# Patient Record
Sex: FEMALE | Race: WHITE | Employment: OTHER | URBAN - METROPOLITAN AREA
[De-identification: names, ages, dates, MRNs, and addresses within clinical notes are randomized per-mention and may not be internally consistent; named-entity substitution may affect disease eponyms.]

---

## 2017-01-29 ENCOUNTER — PATIENT MESSAGE (OUTPATIENT)
Dept: FAMILY MEDICINE CLINIC | Age: 67
End: 2017-01-29

## 2017-01-30 RX ORDER — CLOBETASOL PROPIONATE 0.5 MG/G
AEROSOL, FOAM TOPICAL
Qty: 1 CAN | Refills: 5 | Status: SHIPPED | OUTPATIENT
Start: 2017-01-30 | End: 2019-03-19 | Stop reason: ALTCHOICE

## 2017-05-31 ENCOUNTER — PATIENT MESSAGE (OUTPATIENT)
Dept: FAMILY MEDICINE CLINIC | Age: 67
End: 2017-05-31

## 2017-05-31 DIAGNOSIS — M15.9 PRIMARY OSTEOARTHRITIS INVOLVING MULTIPLE JOINTS: Primary | ICD-10-CM

## 2017-06-15 RX ORDER — TIZANIDINE 4 MG/1
TABLET ORAL
Qty: 180 TABLET | Refills: 0 | Status: SHIPPED | OUTPATIENT
Start: 2017-06-15 | End: 2017-09-13 | Stop reason: SDUPTHER

## 2017-09-14 RX ORDER — TIZANIDINE 4 MG/1
TABLET ORAL
Qty: 180 TABLET | Refills: 0 | Status: SHIPPED | OUTPATIENT
Start: 2017-09-14 | End: 2017-12-06 | Stop reason: SDUPTHER

## 2017-09-20 ENCOUNTER — TELEPHONE (OUTPATIENT)
Dept: FAMILY MEDICINE CLINIC | Age: 67
End: 2017-09-20

## 2017-09-20 RX ORDER — OMEPRAZOLE 20 MG/1
20 CAPSULE, DELAYED RELEASE ORAL DAILY
Qty: 30 CAPSULE | Refills: 11 | Status: SHIPPED | OUTPATIENT
Start: 2017-09-20 | End: 2018-09-21 | Stop reason: SDUPTHER

## 2017-09-26 DIAGNOSIS — I10 ESSENTIAL HYPERTENSION: ICD-10-CM

## 2017-09-26 RX ORDER — AMLODIPINE BESYLATE 10 MG/1
5 TABLET ORAL DAILY
Qty: 45 TABLET | Refills: 3 | Status: SHIPPED | OUTPATIENT
Start: 2017-09-26 | End: 2018-05-11 | Stop reason: SDUPTHER

## 2017-11-06 ENCOUNTER — OFFICE VISIT (OUTPATIENT)
Dept: PULMONOLOGY | Age: 67
End: 2017-11-06
Payer: MEDICARE

## 2017-11-06 VITALS
WEIGHT: 216 LBS | SYSTOLIC BLOOD PRESSURE: 122 MMHG | BODY MASS INDEX: 36.88 KG/M2 | HEART RATE: 62 BPM | DIASTOLIC BLOOD PRESSURE: 78 MMHG | OXYGEN SATURATION: 96 % | HEIGHT: 64 IN

## 2017-11-06 DIAGNOSIS — I10 ESSENTIAL HYPERTENSION: ICD-10-CM

## 2017-11-06 DIAGNOSIS — E66.9 OBESITY (BMI 30-39.9): ICD-10-CM

## 2017-11-06 DIAGNOSIS — G47.33 OBSTRUCTIVE SLEEP APNEA ON CPAP: Primary | ICD-10-CM

## 2017-11-06 DIAGNOSIS — Z78.0 POST-MENOPAUSAL: ICD-10-CM

## 2017-11-06 DIAGNOSIS — Z99.89 OBSTRUCTIVE SLEEP APNEA ON CPAP: Primary | ICD-10-CM

## 2017-11-06 PROCEDURE — 99214 OFFICE O/P EST MOD 30 MIN: CPT | Performed by: NURSE PRACTITIONER

## 2017-11-06 RX ORDER — TRIHEXYPHENIDYL HYDROCHLORIDE 2 MG/1
1 TABLET ORAL 2 TIMES DAILY
COMMUNITY
End: 2019-03-19 | Stop reason: ALTCHOICE

## 2017-11-06 NOTE — PATIENT INSTRUCTIONS
Health Maintenance Due   Topic Date Due    Hepatitis C screen  1950    DTaP/Tdap/Td vaccine (1 - Tdap) 07/02/1969    Colon cancer screen colonoscopy  07/02/2000    Zostavax vaccine  07/02/2010    DEXA (modify frequency per FRAX score)  07/02/2015    Pneumococcal low/med risk (1 of 2 - PCV13) 07/02/2015    Breast cancer screen  11/13/2016    Flu vaccine (1) 09/01/2017

## 2017-11-17 RX ORDER — METOPROLOL TARTRATE 50 MG/1
50 TABLET, FILM COATED ORAL 2 TIMES DAILY
Qty: 180 TABLET | Refills: 3 | Status: SHIPPED | OUTPATIENT
Start: 2017-11-17 | End: 2017-12-21 | Stop reason: DRUGHIGH

## 2017-11-29 ENCOUNTER — HOSPITAL ENCOUNTER (OUTPATIENT)
Dept: MAMMOGRAPHY | Age: 67
Discharge: HOME | End: 2017-11-29
Payer: MEDICARE

## 2017-11-29 DIAGNOSIS — Z12.31 VISIT FOR SCREENING MAMMOGRAM: ICD-10-CM

## 2017-11-29 PROCEDURE — G0202 SCR MAMMO BI INCL CAD: HCPCS

## 2017-12-05 ENCOUNTER — HOSPITAL ENCOUNTER (OUTPATIENT)
Dept: WOMENS IMAGING | Age: 67
Discharge: HOME | End: 2017-12-05
Payer: MEDICARE

## 2017-12-05 DIAGNOSIS — R92.2 BREAST DENSITY: ICD-10-CM

## 2017-12-05 PROCEDURE — G0279 TOMOSYNTHESIS, MAMMO: HCPCS

## 2017-12-05 PROCEDURE — 76642 ULTRASOUND BREAST LIMITED: CPT

## 2017-12-05 PROCEDURE — G0206 DX MAMMO INCL CAD UNI: HCPCS

## 2017-12-07 RX ORDER — TIZANIDINE 4 MG/1
TABLET ORAL
Qty: 180 TABLET | Refills: 0 | Status: SHIPPED | OUTPATIENT
Start: 2017-12-07 | End: 2018-03-14 | Stop reason: SDUPTHER

## 2017-12-13 ENCOUNTER — HOSPITAL ENCOUNTER (OUTPATIENT)
Dept: WOMENS IMAGING | Age: 67
Discharge: HOME | End: 2017-12-13
Payer: MEDICARE

## 2017-12-13 VITALS — DIASTOLIC BLOOD PRESSURE: 63 MMHG | TEMPERATURE: 98 F | SYSTOLIC BLOOD PRESSURE: 107 MMHG | HEART RATE: 56 BPM

## 2017-12-13 DIAGNOSIS — N63.20 MASS OF LEFT BREAST: ICD-10-CM

## 2017-12-13 PROCEDURE — G0206 DX MAMMO INCL CAD UNI: HCPCS

## 2017-12-13 PROCEDURE — C1894 INTRO/SHEATH, NON-LASER: HCPCS

## 2017-12-13 PROCEDURE — 19083 BX BREAST 1ST LESION US IMAG: CPT

## 2017-12-13 PROCEDURE — 88305 TISSUE EXAM BY PATHOLOGIST: CPT

## 2017-12-13 PROCEDURE — A4648 IMPLANTABLE TISSUE MARKER: HCPCS

## 2017-12-13 NOTE — PROGRESS NOTES
Breast Biopsy Flowsheet/Post-Operative Care    Date of Procedure: 12/13/2017  Physician: Dr. Mendoza  Technologist: Dena HERNANDEZ (JAMAR)(HERIBERTO Lundy [place] guided breast biopsy  Lesion type: [] Palpable     [x] Non-palpable  Breast: left    Clock face position: Site #1:  Lower inner, retroareolar region         Primary Method of Detection: [] Palpation    [x] Mammogram    [] Ultrasound   [x] Mass:      [] Microcalcifications:   [] Pleomorphic   [] Increasing Number   Distribution:  [] Grouped [] Linear [] Regional    Asymmetry: asymmetric    Biopsy Method:   sertera:    Site #1     Gauge:  14     # of Passes: 4     Clip:   [] \"Ribbon\"   [] \"O\"     [] \"M\"      [] \"X\"      [x] \"tribell\"       [] \"Bowtie\"  [] \"U\"          Pre-Op Assessment: (BI-RADS)   [] 3. Probably Benign   [x] 4. Suspicious Abnormality   [] 5. Highly Suggestive of Malignancy      Patient Tolerated Procedure: good  Complications: none  Comments: none    Post Operative Care  Steri strips: Yes  Dressing: [] Pressure Dressing   [x] Gauze. Tape   Ice Applied to Site:  Yes  Evidence of Bleeding:  No    Pain Verbalized: No      Written Discharge Instructions: Yes  Condition at Discharge: good  Time of Discharge: 9609    Electronically signed by Oumou Simmons RN on 12/13/2017 at 5:29 PM

## 2017-12-13 NOTE — PROGRESS NOTES
[facility] 69  Pre-Biopsy Assessment      Patient Education    Written information about procedure Yes   [] Left        [] Right       [] Bilateral   Procedural steps explained Yes   [] Stereotactic Biopsy      [] Ultrasound Biopsy   Post-op potential: bruising, hematoma, pain Yes    Self-care: activity, care of dressing Yes    Patient verbalized understanding Yes    Consent signed and witnessed Yes      Hormone Therapy Status: on Premarin    Recent Medication: [] Aspirin [] Ibuprofen  [] Coumadin [x] N/A       Emotional Status: [x] Calm   [] Nervous   [] Emotionally Upset    Language or Physical Barriers: none    Comments: none        Electronically signed by Marcella Salinas RN on 12/13/2017 at 5:28 PM

## 2017-12-20 RX ORDER — LEVOTHYROXINE SODIUM 125 MCG
125 TABLET ORAL DAILY
Qty: 30 TABLET | Refills: 11 | Status: SHIPPED | OUTPATIENT
Start: 2017-12-20 | End: 2018-12-19 | Stop reason: SDUPTHER

## 2017-12-21 ENCOUNTER — OFFICE VISIT (OUTPATIENT)
Dept: FAMILY MEDICINE CLINIC | Age: 67
End: 2017-12-21
Payer: MEDICARE

## 2017-12-21 VITALS
RESPIRATION RATE: 20 BRPM | HEART RATE: 56 BPM | WEIGHT: 217.8 LBS | SYSTOLIC BLOOD PRESSURE: 102 MMHG | BODY MASS INDEX: 37.18 KG/M2 | DIASTOLIC BLOOD PRESSURE: 52 MMHG | HEIGHT: 64 IN

## 2017-12-21 DIAGNOSIS — F31.9 BIPOLAR AFFECTIVE DISORDER, REMISSION STATUS UNSPECIFIED (HCC): ICD-10-CM

## 2017-12-21 DIAGNOSIS — Z99.89 OBSTRUCTIVE SLEEP APNEA ON CPAP: ICD-10-CM

## 2017-12-21 DIAGNOSIS — I10 ESSENTIAL HYPERTENSION: ICD-10-CM

## 2017-12-21 DIAGNOSIS — G47.33 OBSTRUCTIVE SLEEP APNEA ON CPAP: ICD-10-CM

## 2017-12-21 DIAGNOSIS — E78.5 DYSLIPIDEMIA: ICD-10-CM

## 2017-12-21 DIAGNOSIS — Z78.0 POST-MENOPAUSAL: ICD-10-CM

## 2017-12-21 DIAGNOSIS — R42 ORTHOSTATIC LIGHTHEADEDNESS: Primary | ICD-10-CM

## 2017-12-21 DIAGNOSIS — R73.01 IFG (IMPAIRED FASTING GLUCOSE): ICD-10-CM

## 2017-12-21 DIAGNOSIS — R68.2 DRY MOUTH: ICD-10-CM

## 2017-12-21 DIAGNOSIS — E03.9 HYPOTHYROIDISM, UNSPECIFIED TYPE: ICD-10-CM

## 2017-12-21 PROCEDURE — 3014F SCREEN MAMMO DOC REV: CPT | Performed by: FAMILY MEDICINE

## 2017-12-21 PROCEDURE — G8427 DOCREV CUR MEDS BY ELIG CLIN: HCPCS | Performed by: FAMILY MEDICINE

## 2017-12-21 PROCEDURE — 99214 OFFICE O/P EST MOD 30 MIN: CPT | Performed by: FAMILY MEDICINE

## 2017-12-21 PROCEDURE — 4040F PNEUMOC VAC/ADMIN/RCVD: CPT | Performed by: FAMILY MEDICINE

## 2017-12-21 PROCEDURE — G8484 FLU IMMUNIZE NO ADMIN: HCPCS | Performed by: FAMILY MEDICINE

## 2017-12-21 PROCEDURE — 1090F PRES/ABSN URINE INCON ASSESS: CPT | Performed by: FAMILY MEDICINE

## 2017-12-21 PROCEDURE — 1036F TOBACCO NON-USER: CPT | Performed by: FAMILY MEDICINE

## 2017-12-21 PROCEDURE — G8417 CALC BMI ABV UP PARAM F/U: HCPCS | Performed by: FAMILY MEDICINE

## 2017-12-21 PROCEDURE — 3017F COLORECTAL CA SCREEN DOC REV: CPT | Performed by: FAMILY MEDICINE

## 2017-12-21 PROCEDURE — G8400 PT W/DXA NO RESULTS DOC: HCPCS | Performed by: FAMILY MEDICINE

## 2017-12-21 PROCEDURE — 1123F ACP DISCUSS/DSCN MKR DOCD: CPT | Performed by: FAMILY MEDICINE

## 2017-12-21 RX ORDER — METOPROLOL TARTRATE 50 MG/1
25 TABLET, FILM COATED ORAL 2 TIMES DAILY
Qty: 180 TABLET | Refills: 3 | Status: SHIPPED
Start: 2017-12-21 | End: 2018-09-05

## 2017-12-21 ASSESSMENT — ENCOUNTER SYMPTOMS
RESPIRATORY NEGATIVE: 1
GASTROINTESTINAL NEGATIVE: 1
CHEST TIGHTNESS: 0

## 2017-12-21 ASSESSMENT — PATIENT HEALTH QUESTIONNAIRE - PHQ9
SUM OF ALL RESPONSES TO PHQ QUESTIONS 1-9: 0
1. LITTLE INTEREST OR PLEASURE IN DOING THINGS: 0
2. FEELING DOWN, DEPRESSED OR HOPELESS: 0
SUM OF ALL RESPONSES TO PHQ9 QUESTIONS 1 & 2: 0

## 2017-12-21 NOTE — PROGRESS NOTES
Visit Information    Have you changed or started any medications since your last visit including any over-the-counter medicines, vitamins, or herbal medicines? no   Are you having any side effects from any of your medications? -  no  Have you stopped taking any of your medications? Is so, why? -  yes - tx complete    Have you seen any other physician or provider since your last visit? Yes - Records Obtained  Have you had any other diagnostic tests since your last visit? Yes - Records Obtained  Have you been seen in the emergency room and/or had an admission to a hospital since we last saw you? No  Have you had your routine dental cleaning in the past 6 months? yes - 11/2017    Have you activated your [employer] account? If not, what are your barriers?  Yes     Patient Care Team:  Jazmyn Solis DO as PCP - General (Family Medicine)  Jazmyn Solis DO as PCP - S Attributed Provider    Medical History Review  Past Medical, Family, and Social History reviewed and does not contribute to the patient presenting condition    Health Maintenance   Topic Date Due    Hepatitis C screen  1950    DTaP/Tdap/Td vaccine (1 - Tdap) 07/02/1969    Colon cancer screen colonoscopy  07/02/2000    Zostavax vaccine  07/02/2010    DEXA (modify frequency per FRAX score)  07/02/2015    Pneumococcal low/med risk (1 of 2 - PCV13) 07/02/2015    Flu vaccine (1) 09/01/2017    Breast cancer screen  12/13/2018    Diabetes screen  12/15/2018    Lipid screen  12/15/2020

## 2017-12-21 NOTE — PROGRESS NOTES
Subjective:      Patient ID: Godfrey Ring is a 79 y.o. female. HPI:    Chief Complaint   Patient presents with    Dizziness     off and on for the past 3mo. When dizzines occurs pt has a hard time concentrating or thinking straight     Pt here for periodic dizziness over the last few months. In the morning is the worse time. This happens shortly after taking her meds. BPs have been running low after her am meds. BP Readings from Last 3 Encounters:   17 (!) 102/52   17 107/63   17 122/78     Pt has also had a lot of life stressors lately. With additional questioning, it appears she is more orthostatic. Denies CP, palpitations. No recent med changes. Patient Active Problem List   Diagnosis    Hypothyroid    Eczema    Dyslipidemia    Bipolar disorder ([address]    Post-menopausal    HTN (hypertension)    Contact dermatitis    Obesity (BMI 30-39. 9)    Obstructive sleep apnea on CPAP    ACE inhibitor-aggravated angioedema     Past Surgical History:   Procedure Laterality Date    ANKLE SURGERY      left    CATARACT REMOVAL      Both eyes      SECTION      x 3    FOOT SURGERY      Left     HIP SURGERY      HYSTERECTOMY      Total     TOTAL KNEE ARTHROPLASTY Left 10/14/14     Prior to Admission medications    Medication Sig Start Date End Date Taking? Authorizing Provider   SYNTHROID 125 MCG tablet Take 1 tablet by mouth daily Take with water on an empty stomach- wait 30 minutes before eating or taking other meds.  17  Yes Marian Kind, DO   tiZANidine (ZANAFLEX) 4 MG tablet TAKE 1 TABLET TWICE A DAY 17  Yes Marian Kind, DO   metoprolol tartrate (LOPRESSOR) 50 MG tablet Take 1 tablet by mouth 2 times daily 17  Yes Marian Kind, DO   trihexyphenidyl (ARTANE) 2 MG tablet Take 2 mg by mouth 3 times daily   Yes Historical Provider, MD   amLODIPine (NORVASC) 10 MG tablet Take 0.5 tablets by mouth daily 17  Yes Maximus Jimenez

## 2018-01-19 LAB
A/G RATIO: 2 (ref 1.5–2.5)
ABSOLUTE BASO #: 0 /CMM (ref 0–200)
ABSOLUTE EOS #: 200 /CMM (ref 0–500)
ABSOLUTE LYMPH #: 1900 /CMM (ref 1000–4800)
ABSOLUTE MONO #: 600 /CMM (ref 0–800)
ABSOLUTE NEUT #: 3900 /CMM (ref 1800–7700)
ALBUMIN SERPL-MCNC: 4.5 GM/DL (ref 3.5–5)
ALP BLD-CCNC: 52 IU/L (ref 39–118)
ALT SERPL-CCNC: 20 IU/L (ref 10–40)
ANION GAP SERPL CALCULATED.3IONS-SCNC: 11 MMOL/L (ref 4–12)
AST SERPL-CCNC: 22 IU/L (ref 15–41)
BASOPHILS RELATIVE PERCENT: 0.7 % (ref 0–2)
BILIRUB SERPL-MCNC: 0.5 MG/DL (ref 0.2–1)
BUN BLDV-MCNC: 23 MG/DL (ref 7–20)
CALCIUM SERPL-MCNC: 9.4 MG/DL (ref 8.8–10.5)
CHLORIDE BLD-SCNC: 104 MEQ/L (ref 101–111)
CHOLESTEROL/HDL RELATIVE RISK: 2.9 (ref 4–4.4)
CHOLESTEROL: 211 MG/DL
CO2: 24 MEQ/L (ref 21–32)
CREAT SERPL-MCNC: 0.79 MG/DL (ref 0.6–1.3)
CREATININE CLEARANCE: >60
DIRECT-LDL / HDL RISK: 2
EOSINOPHILS RELATIVE PERCENT: 3 % (ref 0–6)
ESTIMATED AVERAGE GLUCOSE: 114 MG/DL
GLUCOSE: 73 MG/DL (ref 70–110)
HBA1C MFR BLD: 5.6 % (ref 4.4–6.4)
HCT VFR BLD CALC: 40.4 % (ref 35–44)
HDLC SERPL-MCNC: 71 MG/DL
HEMOGLOBIN: 13.4 GM/DL (ref 12–15)
LDL CHOLESTEROL DIRECT: 142 MG/DL
LYMPHOCYTES RELATIVE PERCENT: 27.9 % (ref 15–45)
MCH RBC QN AUTO: 29.5 PG (ref 27.5–33)
MCHC RBC AUTO-ENTMCNC: 33.3 GM/DL (ref 33–36)
MCV RBC AUTO: 88.6 CU MIC (ref 80–97)
MONOCYTES RELATIVE PERCENT: 9.7 % (ref 2–10)
NEUTROPHILS RELATIVE PERCENT: 58.7 % (ref 40–70)
NUCLEATED RBCS: 0 /100 WBC
PDW BLD-RTO: 13.3 % (ref 12–16)
PLATELET # BLD: 319 TH/CMM (ref 150–400)
POTASSIUM SERPL-SCNC: 3.8 MEQ/L (ref 3.6–5)
RBC # BLD: 4.56 MIL/CMM (ref 4–5.1)
SODIUM BLD-SCNC: 139 MEQ/L (ref 135–145)
TOTAL PROTEIN: 6.8 G/DL (ref 6.2–8)
TRIGL SERPL-MCNC: 188 MG/DL
TSH REFLEX: 1.43 MCIU/ML (ref 0.49–4.67)
VLDLC SERPL CALC-MCNC: 37 MG/DL
WBC # BLD: 6.6 TH/CMM (ref 4.4–10.5)

## 2018-03-15 RX ORDER — TIZANIDINE 4 MG/1
TABLET ORAL
Qty: 180 TABLET | Refills: 0 | Status: SHIPPED | OUTPATIENT
Start: 2018-03-15 | End: 2018-06-22 | Stop reason: SDUPTHER

## 2018-04-18 RX ORDER — LAMOTRIGINE 100 MG/1
250 TABLET ORAL DAILY
Qty: 30 TABLET | OUTPATIENT
Start: 2018-04-18

## 2018-04-19 RX ORDER — LABETALOL 100 MG/1
100 TABLET, FILM COATED ORAL 2 TIMES DAILY
Qty: 180 TABLET | Refills: 3 | Status: SHIPPED | OUTPATIENT
Start: 2018-04-19 | End: 2019-05-22 | Stop reason: SDUPTHER

## 2018-05-11 ENCOUNTER — TELEPHONE (OUTPATIENT)
Dept: FAMILY MEDICINE CLINIC | Age: 68
End: 2018-05-11

## 2018-05-11 DIAGNOSIS — I10 ESSENTIAL HYPERTENSION: ICD-10-CM

## 2018-05-11 RX ORDER — AMLODIPINE BESYLATE 10 MG/1
10 TABLET ORAL DAILY
Qty: 90 TABLET | Refills: 3 | Status: SHIPPED | OUTPATIENT
Start: 2018-05-11 | End: 2019-03-27 | Stop reason: SDUPTHER

## 2018-06-22 RX ORDER — TIZANIDINE 4 MG/1
TABLET ORAL
Qty: 180 TABLET | Refills: 0 | Status: SHIPPED | OUTPATIENT
Start: 2018-06-22 | End: 2018-09-05

## 2018-06-22 RX ORDER — TIZANIDINE 4 MG/1
TABLET ORAL
Qty: 180 TABLET | Refills: 0 | OUTPATIENT
Start: 2018-06-22

## 2018-09-05 ENCOUNTER — OFFICE VISIT (OUTPATIENT)
Dept: FAMILY MEDICINE CLINIC | Age: 68
End: 2018-09-05
Payer: MEDICARE

## 2018-09-05 VITALS
SYSTOLIC BLOOD PRESSURE: 105 MMHG | HEIGHT: 63 IN | DIASTOLIC BLOOD PRESSURE: 55 MMHG | BODY MASS INDEX: 36 KG/M2 | WEIGHT: 203.2 LBS

## 2018-09-05 DIAGNOSIS — E78.5 DYSLIPIDEMIA: ICD-10-CM

## 2018-09-05 DIAGNOSIS — Z78.0 POST-MENOPAUSAL: ICD-10-CM

## 2018-09-05 DIAGNOSIS — Z00.00 ROUTINE GENERAL MEDICAL EXAMINATION AT A HEALTH CARE FACILITY: ICD-10-CM

## 2018-09-05 DIAGNOSIS — I10 ESSENTIAL HYPERTENSION: Primary | ICD-10-CM

## 2018-09-05 DIAGNOSIS — E03.9 HYPOTHYROIDISM, UNSPECIFIED TYPE: ICD-10-CM

## 2018-09-05 DIAGNOSIS — G47.33 OBSTRUCTIVE SLEEP APNEA ON CPAP: ICD-10-CM

## 2018-09-05 DIAGNOSIS — Z99.89 OBSTRUCTIVE SLEEP APNEA ON CPAP: ICD-10-CM

## 2018-09-05 DIAGNOSIS — E66.9 OBESITY (BMI 30-39.9): ICD-10-CM

## 2018-09-05 DIAGNOSIS — F31.9 BIPOLAR AFFECTIVE DISORDER, REMISSION STATUS UNSPECIFIED (HCC): ICD-10-CM

## 2018-09-05 PROCEDURE — 1123F ACP DISCUSS/DSCN MKR DOCD: CPT | Performed by: FAMILY MEDICINE

## 2018-09-05 PROCEDURE — G0438 PPPS, INITIAL VISIT: HCPCS | Performed by: FAMILY MEDICINE

## 2018-09-05 PROCEDURE — G8400 PT W/DXA NO RESULTS DOC: HCPCS | Performed by: FAMILY MEDICINE

## 2018-09-05 PROCEDURE — 3017F COLORECTAL CA SCREEN DOC REV: CPT | Performed by: FAMILY MEDICINE

## 2018-09-05 PROCEDURE — 1101F PT FALLS ASSESS-DOCD LE1/YR: CPT | Performed by: FAMILY MEDICINE

## 2018-09-05 PROCEDURE — 99214 OFFICE O/P EST MOD 30 MIN: CPT | Performed by: FAMILY MEDICINE

## 2018-09-05 PROCEDURE — 1036F TOBACCO NON-USER: CPT | Performed by: FAMILY MEDICINE

## 2018-09-05 PROCEDURE — 1090F PRES/ABSN URINE INCON ASSESS: CPT | Performed by: FAMILY MEDICINE

## 2018-09-05 PROCEDURE — G8427 DOCREV CUR MEDS BY ELIG CLIN: HCPCS | Performed by: FAMILY MEDICINE

## 2018-09-05 PROCEDURE — 4040F PNEUMOC VAC/ADMIN/RCVD: CPT | Performed by: FAMILY MEDICINE

## 2018-09-05 PROCEDURE — G8417 CALC BMI ABV UP PARAM F/U: HCPCS | Performed by: FAMILY MEDICINE

## 2018-09-05 RX ORDER — CLONAZEPAM 0.5 MG/1
1 TABLET ORAL 2 TIMES DAILY
Status: ON HOLD | COMMUNITY
End: 2019-09-13

## 2018-09-05 RX ORDER — VENLAFAXINE 75 MG/1
75 TABLET ORAL DAILY
COMMUNITY
End: 2020-09-22

## 2018-09-05 ASSESSMENT — LIFESTYLE VARIABLES
HOW OFTEN DURING THE LAST YEAR HAVE YOU BEEN UNABLE TO REMEMBER WHAT HAPPENED THE NIGHT BEFORE BECAUSE YOU HAD BEEN DRINKING: 0
HOW OFTEN DURING THE LAST YEAR HAVE YOU FAILED TO DO WHAT WAS NORMALLY EXPECTED FROM YOU BECAUSE OF DRINKING: 0
HAS A RELATIVE, FRIEND, DOCTOR, OR ANOTHER HEALTH PROFESSIONAL EXPRESSED CONCERN ABOUT YOUR DRINKING OR SUGGESTED YOU CUT DOWN: 0
AUDIT-C TOTAL SCORE: 1
AUDIT TOTAL SCORE: 1
HOW OFTEN DURING THE LAST YEAR HAVE YOU FOUND THAT YOU WERE NOT ABLE TO STOP DRINKING ONCE YOU HAD STARTED: 0
HOW OFTEN DURING THE LAST YEAR HAVE YOU HAD A FEELING OF GUILT OR REMORSE AFTER DRINKING: 0
HOW OFTEN DO YOU HAVE A DRINK CONTAINING ALCOHOL: 1
HOW MANY STANDARD DRINKS CONTAINING ALCOHOL DO YOU HAVE ON A TYPICAL DAY: 0
HOW OFTEN DO YOU HAVE SIX OR MORE DRINKS ON ONE OCCASION: 0
HOW OFTEN DURING THE LAST YEAR HAVE YOU NEEDED AN ALCOHOLIC DRINK FIRST THING IN THE MORNING TO GET YOURSELF GOING AFTER A NIGHT OF HEAVY DRINKING: 0
HAVE YOU OR SOMEONE ELSE BEEN INJURED AS A RESULT OF YOUR DRINKING: 0

## 2018-09-05 ASSESSMENT — ENCOUNTER SYMPTOMS
GASTROINTESTINAL NEGATIVE: 1
RESPIRATORY NEGATIVE: 1

## 2018-09-05 ASSESSMENT — PATIENT HEALTH QUESTIONNAIRE - PHQ9
SUM OF ALL RESPONSES TO PHQ QUESTIONS 1-9: 0
SUM OF ALL RESPONSES TO PHQ QUESTIONS 1-9: 0

## 2018-09-05 NOTE — PATIENT INSTRUCTIONS
You may receive a survey about your visit with us today. The feedback from our patients helps us identify what is working well and where the service to all patients can be enhanced. Thank you! Personalized Preventive Plan for Elsy Rizo - 9/5/2018  Medicare offers a range of preventive health benefits. Some of the tests and screenings are paid in full while other may be subject to a deductible, co-insurance, and/or copay. Some of these benefits include a comprehensive review of your medical history including lifestyle, illnesses that may run in your family, and various assessments and screenings as appropriate. After reviewing your medical record and screening and assessments performed today your provider may have ordered immunizations, labs, imaging, and/or referrals for you. A list of these orders (if applicable) as well as your Preventive Care list are included within your After Visit Summary for your review. Other Preventive Recommendations:    · A preventive eye exam performed by an eye specialist is recommended every 1-2 years to screen for glaucoma; cataracts, macular degeneration, and other eye disorders. · A preventive dental visit is recommended every 6 months. · Try to get at least 150 minutes of exercise per week or 10,000 steps per day on a pedometer . · Order or download the FREE \"Exercise & Physical Activity: Your Everyday Guide\" from [employer]. Call 4-630.638.1050 or search [employer] online. · You need 4219-8120 mg of calcium and 2701-5608 IU of vitamin D per day. It is possible to meet your calcium requirement with diet alone, but a vitamin D supplement is usually necessary to meet this goal.  · When exposed to the sun, use a sunscreen that protects against both UVA and UVB radiation with an SPF of 30 or greater. Reapply every 2 to 3 hours or after sweating, drying off with a towel, or swimming.   · Always wear a seat belt when

## 2018-09-05 NOTE — PROGRESS NOTES
Subjective:      Patient ID: Maureen Cadet is a 76 y.o. female. HPI:  Annual eval.    Doing well overall. BP well controlled. Was recently in the hospital for SI and racing thoughts. While there, her BP was in the 200s. Had a hard time getting it down. BP Readings from Last 3 Encounters:   17 (!) 102/52   17 107/63   17 122/78     Looks good on current meds. Pt denies CP, chest tightness. Does admit to occasional lightheadedness with position change. Mood is much better controlled at the moment. Has appt with Dr. Tyler Schneider every few months. GERD controlled. OAB controlled. Pt recently changed her Gyn, she is writing the Myrbetriq and Premarin. Patient Active Problem List   Diagnosis    Hypothyroid    Eczema    Dyslipidemia    Bipolar disorder ([address]    Post-menopausal    HTN (hypertension)    Contact dermatitis    Obesity (BMI 30-39. 9)    Obstructive sleep apnea on CPAP    ACE inhibitor-aggravated angioedema     Past Surgical History:   Procedure Laterality Date    ANKLE SURGERY      left    CATARACT REMOVAL      Both eyes      SECTION      x 3    FOOT SURGERY      Left     HIP SURGERY      HYSTERECTOMY      Total     TOTAL KNEE ARTHROPLASTY Left 10/14/14     Prior to Admission medications    Medication Sig Start Date End Date Taking? Authorizing Provider   venlafaxine (EFFEXOR) 75 MG tablet Take 75 mg by mouth daily   Yes Historical Provider, MD   buPROPion (WELLBUTRIN) 100 MG tablet Take 100 mg by mouth daily   Yes Historical Provider, MD   clonazePAM (KLONOPIN) 0.5 MG tablet Take 0.5 mg by mouth daily as needed. Radha Lara Historical Provider, MD   tiZANidine (ZANAFLEX) 4 MG tablet TAKE 1 TABLET TWICE A DAY 18  Yes Elysa Hashimoto, DO   amLODIPine (NORVASC) 10 MG tablet Take 1 tablet by mouth daily 18  Yes Elysa Hashimoto, DO   labetalol (TRANDATE) 100 MG tablet Take 1 tablet by mouth 2 times daily 18  Yes Vicki Duke Ebb Parents, DO   metoprolol tartrate (LOPRESSOR) 50 MG tablet Take 0.5 tablets by mouth 2 times daily 12/21/17  Yes Viviane Uribe DO   SYNTHROID 125 MCG tablet Take 1 tablet by mouth daily Take with water on an empty stomach- wait 30 minutes before eating or taking other meds. 12/20/17  Yes Viviane Uribe DO   trihexyphenidyl (ARTANE) 2 MG tablet Take 2 mg by mouth 3 times daily   Yes Historical Provider, MD   omeprazole (PRILOSEC) 20 MG delayed release capsule Take 1 capsule by mouth daily 9/20/17  Yes Viviane Uribe DO   clobetasol (OLUX) 0.05 % foam Apply topically 2 times daily. 1/30/17  Yes Viviane Uribe DO   Mirabegron ER (MYRBETRIQ) 50 MG TB24 Take 50 mg by mouth daily   Yes Historical Provider, MD   lamoTRIgine (LAMICTAL) 100 MG tablet Take 250 mg by mouth daily 1 in the am 1 in the pm    Yes Historical Provider, MD   venlafaxine (EFFEXOR XR) 150 MG XR capsule Take 2 capsules once daily 10/28/14  Yes Viviane Uribe DO   gabapentin (NEURONTIN) 100 MG capsule Take two tablets by oral route twice daily  Patient taking differently: 400 mg 2 times daily Take two tablets by oral route twice daily 10/28/14  Yes Viviane Uribe DO   Lurasidone HCl (LATUDA PO) Take 60 mg by mouth daily    Yes Historical Provider, MD   fish oil-omega-3 fatty acids 1000 MG capsule Take 1 g by mouth daily. Yes Historical Provider, MD   vitamin E 400 UNIT capsule Take 400 Units by mouth daily. Yes Historical Provider, MD   Ascorbic Acid (VITAMIN C) 500 MG tablet Take 500 mg by mouth daily. Yes Historical Provider, MD   Multiple Vitamin (MULTIVITAMIN PO) Take 1 tablet by mouth daily. Yes Historical Provider, MD   estrogens, conjugated, (PREMARIN) 0.9 MG tablet Take 0.9 mg by mouth daily. Yes Historical Provider, MD         Review of Systems   Constitutional: Negative. HENT: Negative. Respiratory: Negative. Cardiovascular: Negative. Gastrointestinal: Negative. DO    Medicare Annual Wellness Visit  Name: Jay Rued Date: 2018   MRN: [id number] Sex: Female   Age: 76 y.o. Ethnicity: Non-/Non    : 1950 Race: Anna Palomino is here for Medicare AWV; Colonoscopy (discuss); and Immunizations (discuss Prevnar 15 )    Screenings for behavioral, psychosocial and functional/safety risks, and cognitive dysfunction are all negative except as indicated below. These results, as well as other patient data from the [address] form, are documented in Flowsheets linked to this Encounter. Allergies   Allergen Reactions    Adhesive Tape     Codeine     Lotrel [Amlodipine Besy-Benazepril Hcl] Swelling    Pcn [Penicillins]     Typhoid Vaccines        Prior to Visit Medications    Medication Sig Taking? Authorizing Provider   venlafaxine (EFFEXOR) 75 MG tablet Take 75 mg by mouth daily Yes Historical Provider, MD   buPROPion (WELLBUTRIN) 100 MG tablet Take 100 mg by mouth daily Yes Historical Provider, MD   clonazePAM (KLONOPIN) 0.5 MG tablet Take 0.5 mg by mouth daily as needed. . Yes Historical Provider, MD   amLODIPine (NORVASC) 10 MG tablet Take 1 tablet by mouth daily Yes Minnie Cristina DO   labetalol (TRANDATE) 100 MG tablet Take 1 tablet by mouth 2 times daily Yes Minnie Cristina DO   SYNTHROID 125 MCG tablet Take 1 tablet by mouth daily Take with water on an empty stomach- wait 30 minutes before eating or taking other meds. Yes Minnie Cristina DO   trihexyphenidyl (ARTANE) 2 MG tablet Take 2 mg by mouth 3 times daily Yes Historical Provider, MD   omeprazole (PRILOSEC) 20 MG delayed release capsule Take 1 capsule by mouth daily Yes Minnie Cristina DO   clobetasol (OLUX) 0.05 % foam Apply topically 2 times daily.  Yes Minnie Cristina DO   Mirabegron ER (MYRBETRIQ) 50 MG TB24 Take 50 mg by mouth daily Yes Historical Provider, MD   lamoTRIgine (LAMICTAL) 100 MG tablet Take 250 mg by mouth daily 1 in the Weight: 203 lb 3.2 oz (92.2 kg)   Height: 5' 2.7\" (1.593 m)     Body mass index is 36.34 kg/m². Patient's complete Health Risk Assessment and screening values have been reviewed and are found in Flowsheets. The following problems were reviewed today and where indicated follow up appointments were made and/or referrals ordered. Positive Risk Factor Screenings with Interventions:     General Health:  General  In general, how would you say your health is?: Very Good  In the past 7 days, have you experienced any of the following?: (!) (P) Stress  Do you get the social and emotional support that you need?: (P) Yes  Do you have a Living Will?: (!) (P) No  General Health Risk Interventions:  · Stress: has f/u with Dr. Obregon Mins  · No Living Will: patient declined    Health Habits/Nutrition:  Health Habits/Nutrition  Do you exercise for at least 20 minutes 2-3 times per week?: (!) (P) No  Have you lost any weight without trying in the past 3 months?: (!) (P) Yes  Do you eat fewer than 2 meals per day?: (P) No  Have you seen a dentist within the past year?: (P) Yes  Body mass index is 36.34 kg/m². Health Habits/Nutrition Interventions:  · Nutritional issues:  pt's weight is down, on low-sugar, gluten-free diet    Hearing/Vision:  Hearing/Vision  Do you or your family notice any trouble with your hearing?: (P) No  Do you have difficulty driving, watching TV, or doing any of your daily activities because of your eyesight?: (!) (P) Yes  Have you had an eye exam within the past year?: (P) Yes  Hearing/Vision Interventions:  · Vision concerns:  patient encouraged to make appointment with his/her eye specialist    Personalized Preventive Plan   Current Health Maintenance Status    There is no immunization history on file for this patient.      Health Maintenance   Topic Date Due    Hepatitis C screen  1950    DTaP/Tdap/Td vaccine (1 - Tdap) 07/02/1969    Shingles Vaccine (1 of 2 - 2 Dose Series) 07/02/2000    Colon cancer screen colonoscopy  07/02/2000    DEXA (modify frequency per FRAX score)  07/02/2015    Pneumococcal low/med risk (1 of 2 - PCV13) 07/02/2015    Flu vaccine (1) 09/01/2018    Breast cancer screen  12/13/2018    TSH testing  01/19/2019    Lipid screen  01/19/2023     Recommendations for Preventive Services Due: see orders and patient instructions/AVS.  .   Recommended screening schedule for the next 5-10 years is provided to the patient in written form: see Patient Instructions/AVS.

## 2018-09-21 RX ORDER — OMEPRAZOLE 20 MG/1
20 CAPSULE, DELAYED RELEASE ORAL DAILY
Qty: 30 CAPSULE | Refills: 11 | Status: SHIPPED | OUTPATIENT
Start: 2018-09-21 | End: 2019-08-26 | Stop reason: SDUPTHER

## 2018-09-26 ENCOUNTER — APPOINTMENT (OUTPATIENT)
Dept: WOMENS IMAGING | Age: 68
End: 2018-09-26
Payer: MEDICARE

## 2018-09-26 ENCOUNTER — HOSPITAL ENCOUNTER (OUTPATIENT)
Dept: WOMENS IMAGING | Age: 68
Discharge: HOME | End: 2018-09-26
Payer: MEDICARE

## 2018-09-26 DIAGNOSIS — Z09 FOLLOW-UP EXAM: ICD-10-CM

## 2018-09-26 DIAGNOSIS — N63.0 BREAST MASS: ICD-10-CM

## 2018-09-26 PROCEDURE — G0279 TOMOSYNTHESIS, MAMMO: HCPCS

## 2018-11-07 ENCOUNTER — OFFICE VISIT (OUTPATIENT)
Dept: PULMONOLOGY | Age: 68
End: 2018-11-07
Payer: MEDICARE

## 2018-11-07 VITALS
BODY MASS INDEX: 35.86 KG/M2 | HEIGHT: 63 IN | WEIGHT: 202.4 LBS | HEART RATE: 60 BPM | DIASTOLIC BLOOD PRESSURE: 68 MMHG | SYSTOLIC BLOOD PRESSURE: 112 MMHG | OXYGEN SATURATION: 98 %

## 2018-11-07 DIAGNOSIS — Z99.89 OBSTRUCTIVE SLEEP APNEA ON CPAP: Primary | ICD-10-CM

## 2018-11-07 DIAGNOSIS — I10 ESSENTIAL HYPERTENSION: ICD-10-CM

## 2018-11-07 DIAGNOSIS — E66.9 OBESITY (BMI 30-39.9): ICD-10-CM

## 2018-11-07 DIAGNOSIS — G47.33 OBSTRUCTIVE SLEEP APNEA ON CPAP: Primary | ICD-10-CM

## 2018-11-07 PROCEDURE — 1090F PRES/ABSN URINE INCON ASSESS: CPT | Performed by: PHYSICIAN ASSISTANT

## 2018-11-07 PROCEDURE — 4040F PNEUMOC VAC/ADMIN/RCVD: CPT | Performed by: PHYSICIAN ASSISTANT

## 2018-11-07 PROCEDURE — G8484 FLU IMMUNIZE NO ADMIN: HCPCS | Performed by: PHYSICIAN ASSISTANT

## 2018-11-07 PROCEDURE — 1036F TOBACCO NON-USER: CPT | Performed by: PHYSICIAN ASSISTANT

## 2018-11-07 PROCEDURE — G8417 CALC BMI ABV UP PARAM F/U: HCPCS | Performed by: PHYSICIAN ASSISTANT

## 2018-11-07 PROCEDURE — G8400 PT W/DXA NO RESULTS DOC: HCPCS | Performed by: PHYSICIAN ASSISTANT

## 2018-11-07 PROCEDURE — 3017F COLORECTAL CA SCREEN DOC REV: CPT | Performed by: PHYSICIAN ASSISTANT

## 2018-11-07 PROCEDURE — 1123F ACP DISCUSS/DSCN MKR DOCD: CPT | Performed by: PHYSICIAN ASSISTANT

## 2018-11-07 PROCEDURE — 1101F PT FALLS ASSESS-DOCD LE1/YR: CPT | Performed by: PHYSICIAN ASSISTANT

## 2018-11-07 PROCEDURE — 99213 OFFICE O/P EST LOW 20 MIN: CPT | Performed by: PHYSICIAN ASSISTANT

## 2018-11-07 PROCEDURE — G8427 DOCREV CUR MEDS BY ELIG CLIN: HCPCS | Performed by: PHYSICIAN ASSISTANT

## 2018-11-07 ASSESSMENT — ENCOUNTER SYMPTOMS
ALLERGIC/IMMUNOLOGIC NEGATIVE: 1
COUGH: 0
SHORTNESS OF BREATH: 0
BACK PAIN: 0
DIARRHEA: 0
WHEEZING: 0
STRIDOR: 0
CHEST TIGHTNESS: 0
EYES NEGATIVE: 1
NAUSEA: 0

## 2018-11-07 NOTE — PROGRESS NOTES
[facility]         [id number]  2018   Chief Complaint   Patient presents with    Follow-up     12 month follow up RANDY with a Milton Rolle        Pt of Dr. Mitra Renteria    PAP Download:   Original or initialAHI: 50.4     Date of initial study: 09     [x] Compliant  97%   []  Noncompliant      PAP TypeAirSense 10 AutoSet  Level 8 cmH20  Avg Hrs/Day 9:27  AHI: 1.4   Recorded compliance dates ,10-8-18 to 18   Machine/Mfg: ResMed  Interface: Nasal    Provider:  [x][facility]  [][facility]  [][facility]  [][facility]         [][facility] []Other:   Neck Size:16   Mallampati 4  ESS: 5     Here is a scan of the most recent download:          Presentation:   Ana Santo presents for sleep medicine follow up for obstructive sleep apnea  Since the last visit, Ana Santo is doing well with PAP. She feels rested. She is taking naps occ. She is raising 2 grandchildren. Equipment issues: The pressure is  acceptable, the mask is acceptable and she  is  using the humidity. Sleep issues:  Do you feel better? Yes  More rested? Yes   Better concentration? yes    Progress History:   Since last visit any new medical issues? No  New ER or hospitlal visits? No  Any new or changes in medicines? No  Any new sleep medicines?  No        Past Medical History:   Diagnosis Date    Asthma     History of blood transfusion 2004    Hypertension     Mood disorder ([address]     Sleep apnea     Thyroid disease        Past Surgical History:   Procedure Laterality Date    ANKLE SURGERY      left    CATARACT REMOVAL      Both eyes      SECTION      x 3    FOOT SURGERY      Left     HIP SURGERY      HYSTERECTOMY      Total     TOTAL KNEE ARTHROPLASTY Left 10/14/14       Social History   Substance Use Topics    Smoking status: Never Smoker    Smokeless tobacco: Never Used    Alcohol use No      Comment: rarely       Allergies   Allergen Reactions    Adhesive Tape     Codeine Musculoskeletal: Normal range of motion. Neurological: She is alert and oriented to person, place, and time. Skin: Skin is warm and dry. Psychiatric: She has a normal mood and affect. Her behavior is normal. Judgment and thought content normal.         ASSESSMENT/DIAGNOSIS     Diagnosis Orders   1. Obstructive sleep apnea on CPAP     2. Obesity (BMI 30-39.9)     3. Essential hypertension              Plan   Do you need any equipment today? Yes update supplies    - She  was advised to continue current positive airway pressure therapy with above described pressure. - She  advised to keep goodcompliance with current recommended pressure to get optimal results and clinical improvement  - Recommend 7-9 hours of sleep with PAP  - She was advised to call [facility] regarding supplies if needed.   -She call my office for earlier appointment if needed for worsening of sleep symptoms.   - She was instructed on weight loss  - Andre Lopez was educated about my impression and plan. Patient verbalizesunderstanding.   We will see Areli Shown back in: 1 year with download    Saunders BRUCE Lockwood PA-C  11/7/2018

## 2018-12-13 RX ORDER — TIZANIDINE 4 MG/1
TABLET ORAL
Qty: 180 TABLET | Refills: 0 | Status: SHIPPED | OUTPATIENT
Start: 2018-12-13 | End: 2019-03-19 | Stop reason: ALTCHOICE

## 2018-12-19 RX ORDER — LEVOTHYROXINE SODIUM 125 MCG
125 TABLET ORAL DAILY
Qty: 30 TABLET | Refills: 11 | Status: SHIPPED | OUTPATIENT
Start: 2018-12-19 | End: 2019-12-02 | Stop reason: SDUPTHER

## 2018-12-19 NOTE — TELEPHONE ENCOUNTER
We received a fax from [facility] requesting a refill of Synthroid 125mcg. Refill if appropriate.

## 2019-03-19 ENCOUNTER — OFFICE VISIT (OUTPATIENT)
Dept: FAMILY MEDICINE CLINIC | Age: 69
End: 2019-03-19
Payer: MEDICARE

## 2019-03-19 VITALS
BODY MASS INDEX: 36.48 KG/M2 | WEIGHT: 213.7 LBS | RESPIRATION RATE: 20 BRPM | OXYGEN SATURATION: 97 % | HEART RATE: 88 BPM | DIASTOLIC BLOOD PRESSURE: 80 MMHG | TEMPERATURE: 98.2 F | SYSTOLIC BLOOD PRESSURE: 134 MMHG | HEIGHT: 64 IN

## 2019-03-19 DIAGNOSIS — M53.3 SACROILIAC JOINT DYSFUNCTION OF RIGHT SIDE: ICD-10-CM

## 2019-03-19 DIAGNOSIS — M18.12 DEGENERATIVE ARTHRITIS OF THUMB, LEFT: ICD-10-CM

## 2019-03-19 DIAGNOSIS — B34.9 VIRAL SYNDROME: Primary | ICD-10-CM

## 2019-03-19 PROCEDURE — G8417 CALC BMI ABV UP PARAM F/U: HCPCS | Performed by: NURSE PRACTITIONER

## 2019-03-19 PROCEDURE — 1101F PT FALLS ASSESS-DOCD LE1/YR: CPT | Performed by: NURSE PRACTITIONER

## 2019-03-19 PROCEDURE — 3017F COLORECTAL CA SCREEN DOC REV: CPT | Performed by: NURSE PRACTITIONER

## 2019-03-19 PROCEDURE — 99213 OFFICE O/P EST LOW 20 MIN: CPT | Performed by: NURSE PRACTITIONER

## 2019-03-19 PROCEDURE — 4040F PNEUMOC VAC/ADMIN/RCVD: CPT | Performed by: NURSE PRACTITIONER

## 2019-03-19 PROCEDURE — G8484 FLU IMMUNIZE NO ADMIN: HCPCS | Performed by: NURSE PRACTITIONER

## 2019-03-19 PROCEDURE — 1036F TOBACCO NON-USER: CPT | Performed by: NURSE PRACTITIONER

## 2019-03-19 PROCEDURE — 1090F PRES/ABSN URINE INCON ASSESS: CPT | Performed by: NURSE PRACTITIONER

## 2019-03-19 PROCEDURE — G8400 PT W/DXA NO RESULTS DOC: HCPCS | Performed by: NURSE PRACTITIONER

## 2019-03-19 PROCEDURE — G8427 DOCREV CUR MEDS BY ELIG CLIN: HCPCS | Performed by: NURSE PRACTITIONER

## 2019-03-19 PROCEDURE — 1123F ACP DISCUSS/DSCN MKR DOCD: CPT | Performed by: NURSE PRACTITIONER

## 2019-03-19 RX ORDER — BUPROPION HYDROCHLORIDE 150 MG/1
150 TABLET ORAL EVERY MORNING
COMMUNITY
End: 2019-09-04 | Stop reason: ALTCHOICE

## 2019-03-19 RX ORDER — METHYLPREDNISOLONE 4 MG/1
TABLET ORAL
Qty: 1 KIT | Refills: 0 | Status: SHIPPED | OUTPATIENT
Start: 2019-03-19 | End: 2019-03-25

## 2019-03-19 RX ORDER — TIZANIDINE 2 MG/1
2 TABLET ORAL 2 TIMES DAILY
COMMUNITY

## 2019-03-19 RX ORDER — TETRABENAZINE 12.5 MG/1
12.5 TABLET ORAL 2 TIMES DAILY
COMMUNITY
End: 2021-05-25

## 2019-03-20 ASSESSMENT — ENCOUNTER SYMPTOMS
ABDOMINAL PAIN: 0
BACK PAIN: 1
SHORTNESS OF BREATH: 0
NAUSEA: 0
COUGH: 1
RHINORRHEA: 1
SORE THROAT: 1

## 2019-03-26 ENCOUNTER — TELEPHONE (OUTPATIENT)
Dept: FAMILY MEDICINE CLINIC | Age: 69
End: 2019-03-26

## 2019-03-26 DIAGNOSIS — M53.3 SACROILIAC JOINT DYSFUNCTION OF RIGHT SIDE: Primary | ICD-10-CM

## 2019-03-27 DIAGNOSIS — I10 ESSENTIAL HYPERTENSION: ICD-10-CM

## 2019-03-27 RX ORDER — AMLODIPINE BESYLATE 10 MG/1
10 TABLET ORAL DAILY
Qty: 90 TABLET | Refills: 3 | Status: SHIPPED | OUTPATIENT
Start: 2019-03-27 | End: 2019-09-04 | Stop reason: DRUGHIGH

## 2019-05-15 ENCOUNTER — OFFICE VISIT (OUTPATIENT)
Dept: FAMILY MEDICINE CLINIC | Age: 69
End: 2019-05-15
Payer: MEDICARE

## 2019-05-15 VITALS
BODY MASS INDEX: 37.2 KG/M2 | HEART RATE: 79 BPM | WEIGHT: 216.7 LBS | TEMPERATURE: 98.3 F | DIASTOLIC BLOOD PRESSURE: 60 MMHG | OXYGEN SATURATION: 97 % | SYSTOLIC BLOOD PRESSURE: 122 MMHG | RESPIRATION RATE: 15 BRPM

## 2019-05-15 DIAGNOSIS — Z01.818 PRE-OP EVALUATION: Primary | ICD-10-CM

## 2019-05-15 DIAGNOSIS — Z99.89 OBSTRUCTIVE SLEEP APNEA ON CPAP: ICD-10-CM

## 2019-05-15 DIAGNOSIS — E78.5 DYSLIPIDEMIA: ICD-10-CM

## 2019-05-15 DIAGNOSIS — F31.9 BIPOLAR AFFECTIVE DISORDER, REMISSION STATUS UNSPECIFIED (HCC): ICD-10-CM

## 2019-05-15 DIAGNOSIS — M17.11 PRIMARY OSTEOARTHRITIS OF RIGHT KNEE: ICD-10-CM

## 2019-05-15 DIAGNOSIS — E03.9 HYPOTHYROIDISM, UNSPECIFIED TYPE: ICD-10-CM

## 2019-05-15 DIAGNOSIS — Z78.0 POST-MENOPAUSAL: ICD-10-CM

## 2019-05-15 DIAGNOSIS — I10 ESSENTIAL HYPERTENSION: ICD-10-CM

## 2019-05-15 DIAGNOSIS — G47.33 OBSTRUCTIVE SLEEP APNEA ON CPAP: ICD-10-CM

## 2019-05-15 PROCEDURE — 1123F ACP DISCUSS/DSCN MKR DOCD: CPT | Performed by: FAMILY MEDICINE

## 2019-05-15 PROCEDURE — 1090F PRES/ABSN URINE INCON ASSESS: CPT | Performed by: FAMILY MEDICINE

## 2019-05-15 PROCEDURE — 1036F TOBACCO NON-USER: CPT | Performed by: FAMILY MEDICINE

## 2019-05-15 PROCEDURE — 3017F COLORECTAL CA SCREEN DOC REV: CPT | Performed by: FAMILY MEDICINE

## 2019-05-15 PROCEDURE — G8417 CALC BMI ABV UP PARAM F/U: HCPCS | Performed by: FAMILY MEDICINE

## 2019-05-15 PROCEDURE — 99214 OFFICE O/P EST MOD 30 MIN: CPT | Performed by: FAMILY MEDICINE

## 2019-05-15 PROCEDURE — G8400 PT W/DXA NO RESULTS DOC: HCPCS | Performed by: FAMILY MEDICINE

## 2019-05-15 PROCEDURE — G8427 DOCREV CUR MEDS BY ELIG CLIN: HCPCS | Performed by: FAMILY MEDICINE

## 2019-05-15 PROCEDURE — 4040F PNEUMOC VAC/ADMIN/RCVD: CPT | Performed by: FAMILY MEDICINE

## 2019-05-15 NOTE — PROGRESS NOTES
Visit Information    Have you changed or started any medications since your last visit including any over-the-counter medicines, vitamins, or herbal medicines? no   Are you having any side effects from any of your medications? -  no  Have you stopped taking any of your medications? Is so, why? -  no    Have you seen any other physician or provider since your last visit? Yes - Records Obtained  Have you had any other diagnostic tests since your last visit? Yes - Records Obtained  Have you been seen in the emergency room and/or had an admission to a hospital since we last saw you? No  Have you had your routine dental cleaning in the past 6 months? na  Have you activated your [employer] account? If not, what are your barriers?  Yes     Patient Care Team:  Jackie Woody DO as PCP - General (Family Medicine)  Jackie Woody DO as PCP - S Attributed Provider    Medical History Review  Past Medical, Family, and Social History reviewed and does contribute to the patient presenting condition    Health Maintenance   Topic Date Due    Hepatitis C screen  1950    DTaP/Tdap/Td vaccine (1 - Tdap) 07/02/1969    Shingles Vaccine (1 of 2) 07/02/2000    Colon cancer screen colonoscopy  07/02/2000    DEXA (modify frequency per FRAX score)  07/02/2015    Pneumococcal 65+ years Vaccine (1 of 2 - PCV13) 07/02/2015    TSH testing  01/19/2019    Flu vaccine (Season Ended) 09/01/2019    Breast cancer screen  09/26/2019    Lipid screen  01/19/2023

## 2019-05-15 NOTE — PROGRESS NOTES
Subjective:      Patient ID: Michell Ramirez is a 76 y.o. female. HPI:    Chief Complaint   Patient presents with        Dr. Carlita Shelby 19 RTK at [address].  Scheduled for right total knee with Dr. Carlita Shelby on 19. Pt denies CP. Able to perform 4 METs with no cardiac symptoms. No recent stress or echo. BP well controlled. BP Readings from Last 3 Encounters:   05/15/19 122/60   19 134/80   18 112/68     Pt has had issues with general anesthesia in the past, she has requested this to be under spinal.    Patient Active Problem List   Diagnosis    Hypothyroid    Eczema    Dyslipidemia    Bipolar disorder ([address]    Post-menopausal    HTN (hypertension)    Contact dermatitis    Obesity (BMI 30-39. 9)    Obstructive sleep apnea on CPAP    ACE inhibitor-aggravated angioedema     Past Surgical History:   Procedure Laterality Date    ANKLE SURGERY      left    CATARACT REMOVAL      Both eyes      SECTION      x 3    FOOT SURGERY      Left     HIP SURGERY      HYSTERECTOMY      Total     TOTAL KNEE ARTHROPLASTY Left 10/14/14     Prior to Admission medications    Medication Sig Start Date End Date Taking? Authorizing Provider   amLODIPine (NORVASC) 10 MG tablet Take 1 tablet by mouth daily 3/27/19  Yes Ana Yancey DO   tiZANidine (ZANAFLEX) 2 MG tablet Take 2 mg by mouth 2 times daily   Yes Historical Provider, MD   tetrabenazine (XENAZINE) 12.5 MG tablet Take 12.5 mg by mouth 2 times daily   Yes Historical Provider, MD   buPROPion (WELLBUTRIN XL) 150 MG extended release tablet Take 150 mg by mouth every morning   Yes Historical Provider, MD   diclofenac sodium 1 % GEL Apply 2 g topically 4 times daily 3/19/19  Yes DAVID Lomeli - CNP   SYNTHROID 125 MCG tablet Take 1 tablet by mouth daily Take with water on an empty stomach- wait 30 minutes before eating or taking other meds.  18  Yes Ana Yancey DO   omeprazole (PRILOSEC) 20 MG delayed release capsule Take 1 capsule by mouth daily 9/21/18  Yes Reggie Morales DO   venlafaxine [facility]) 75 MG tablet Take 75 mg by mouth daily   Yes Historical Provider, MD   clonazePAM (KLONOPIN) 0.5 MG tablet Take 0.5 mg by mouth daily as needed. Yannick Lowe Historical Provider, MD   labetalol (TRANDATE) 100 MG tablet Take 1 tablet by mouth 2 times daily 4/19/18  Yes Reggie Morales DO   Mirabegron ER (MYRBETRIQ) 50 MG TB24 Take 50 mg by mouth daily   Yes Historical Provider, MD   lamoTRIgine (LAMICTAL) 100 MG tablet Take 100 mg by mouth daily 1 in the am 2 in the pm   Yes Historical Provider, MD   venlafaxine (EFFEXOR XR) 150 MG XR capsule Take 2 capsules once daily 10/28/14  Yes Reggie Morales DO   gabapentin (NEURONTIN) 100 MG capsule Take two tablets by oral route twice daily  Patient taking differently: 400 mg 2 times daily Take two tablets by oral route twice daily 10/28/14  Yes Reggie Morales DO   Lurasidone HCl (LATUDA PO) Take 20 mg by mouth daily    Yes Historical Provider, MD   fish oil-omega-3 fatty acids 1000 MG capsule Take 1 g by mouth daily. Yes Historical Provider, MD   vitamin E 400 UNIT capsule Take 400 Units by mouth daily. Yes Historical Provider, MD   Ascorbic Acid (VITAMIN C) 500 MG tablet Take 500 mg by mouth daily. Yes Historical Provider, MD   Multiple Vitamin (MULTIVITAMIN PO) Take 1 tablet by mouth daily.      Yes Historical Provider, MD   estrogens, conjugated, (PREMARIN) 0.9 MG tablet Take 0.45 mg by mouth daily    Yes Historical Provider, MD       Lab Results   Component Value Date    LABA1C 5.6 01/19/2018     Lab Results   Component Value Date     01/19/2018       No components found for: CHLPL  Lab Results   Component Value Date    TRIG 188 (H) 01/19/2018    TRIG 215 (H) 12/15/2015    TRIG 219 (H) 11/24/2014     Lab Results   Component Value Date    HDL 71 01/19/2018    HDL 66 12/15/2015    HDL 58 11/24/2014     Lab Results Component Value Date    LDLCALC 93 03/01/2013    LDLCALC 101 08/31/2012    LDLCALC 87 03/16/2012     No results found for: LABVLDL      Chemistry        Component Value Date/Time     01/19/2018 0801    K 3.8 01/19/2018 0801     01/19/2018 0801    CO2 24 01/19/2018 0801    BUN 23 (H) 01/19/2018 0801    CREATININE 0.79 01/19/2018 0801        Component Value Date/Time    CALCIUM 9.4 01/19/2018 0801    ALKPHOS 52 01/19/2018 0801    AST 22 01/19/2018 0801    ALT 20 01/19/2018 0801    BILITOT 0.5 01/19/2018 0801            No results found for: TSH, L4QVSYV, X0WUINS, THYROIDAB    Lab Results   Component Value Date    WBC 6.6 01/19/2018    HGB 13.4 01/19/2018    HCT 40.4 01/19/2018    MCV 88.6 01/19/2018     01/19/2018         Health Maintenance   Topic Date Due    Hepatitis C screen  1950    DTaP/Tdap/Td vaccine (1 - Tdap) 07/02/1969    Shingles Vaccine (1 of 2) 07/02/2000    Colon cancer screen colonoscopy  07/02/2000    DEXA (modify frequency per FRAX score)  07/02/2015    Pneumococcal 65+ years Vaccine (1 of 2 - PCV13) 07/02/2015    TSH testing  01/19/2019    Flu vaccine (Season Ended) 09/01/2019    Breast cancer screen  09/26/2019    Lipid screen  01/19/2023         There is no immunization history on file for this patient. Review of Systems   Constitutional: Negative. HENT: Negative. Respiratory: Negative. Cardiovascular: Negative. Gastrointestinal: Negative. Musculoskeletal: Positive for arthralgias (right knee pain). All other systems reviewed and are negative. Objective:   Physical Exam   Constitutional: She is oriented to person, place, and time. She appears well-developed and well-nourished. HENT:   Head: Normocephalic and atraumatic. Right Ear: Tympanic membrane normal.   Left Ear: Tympanic membrane normal.   Mouth/Throat: Oropharynx is clear and moist and mucous membranes are normal.   Neck: Carotid bruit is not present.    Cardiovascular:

## 2019-05-16 ASSESSMENT — ENCOUNTER SYMPTOMS
RESPIRATORY NEGATIVE: 1
GASTROINTESTINAL NEGATIVE: 1

## 2019-05-22 ENCOUNTER — TELEPHONE (OUTPATIENT)
Dept: FAMILY MEDICINE CLINIC | Age: 69
End: 2019-05-22

## 2019-05-22 RX ORDER — LABETALOL 100 MG/1
100 TABLET, FILM COATED ORAL 2 TIMES DAILY
Qty: 180 TABLET | Refills: 3 | Status: SHIPPED | OUTPATIENT
Start: 2019-05-22 | End: 2019-10-01

## 2019-05-22 NOTE — TELEPHONE ENCOUNTER
Received a fax from [employer] requesting a refill of the patients Labetalol. Order pended for your signature.

## 2019-06-03 ENCOUNTER — HOSPITAL ENCOUNTER (OUTPATIENT)
Dept: INTERVENTIONAL RADIOLOGY/VASCULAR | Age: 69
Discharge: HOME | End: 2019-06-03
Payer: MEDICARE

## 2019-06-03 DIAGNOSIS — M79.661 PAIN IN RIGHT LOWER LEG: ICD-10-CM

## 2019-06-03 PROCEDURE — 93971 EXTREMITY STUDY: CPT

## 2019-07-12 RX ORDER — TIZANIDINE 4 MG/1
TABLET ORAL
Qty: 180 TABLET | Refills: 0 | Status: SHIPPED | OUTPATIENT
Start: 2019-07-12 | End: 2019-09-04 | Stop reason: DRUGHIGH

## 2019-08-26 RX ORDER — OMEPRAZOLE 20 MG/1
20 CAPSULE, DELAYED RELEASE ORAL DAILY
Qty: 30 CAPSULE | Refills: 11 | Status: SHIPPED | OUTPATIENT
Start: 2019-08-26 | End: 2019-09-04 | Stop reason: SDUPTHER

## 2019-08-29 ENCOUNTER — TELEPHONE (OUTPATIENT)
Dept: FAMILY MEDICINE CLINIC | Age: 69
End: 2019-08-29

## 2019-08-29 DIAGNOSIS — I89.0 LYMPHEDEMA OF RIGHT LOWER EXTREMITY: Primary | ICD-10-CM

## 2019-08-29 NOTE — TELEPHONE ENCOUNTER
I'm assuming he meant \"lymphedema clinic\" and not \"lymphoma clinic\"? Is she having significant swelling in the leg?

## 2019-09-04 ENCOUNTER — OFFICE VISIT (OUTPATIENT)
Dept: FAMILY MEDICINE CLINIC | Age: 69
End: 2019-09-04
Payer: MEDICARE

## 2019-09-04 VITALS
WEIGHT: 241.7 LBS | HEIGHT: 63 IN | SYSTOLIC BLOOD PRESSURE: 108 MMHG | BODY MASS INDEX: 42.82 KG/M2 | DIASTOLIC BLOOD PRESSURE: 66 MMHG | HEART RATE: 80 BPM | RESPIRATION RATE: 20 BRPM

## 2019-09-04 DIAGNOSIS — E66.01 MORBID OBESITY WITH BMI OF 40.0-44.9, ADULT (HCC): ICD-10-CM

## 2019-09-04 DIAGNOSIS — Z98.890 S/P KNEE SURGERY: ICD-10-CM

## 2019-09-04 DIAGNOSIS — R60.0 LOWER LEG EDEMA: Primary | ICD-10-CM

## 2019-09-04 DIAGNOSIS — I10 ESSENTIAL HYPERTENSION: ICD-10-CM

## 2019-09-04 DIAGNOSIS — R63.5 WEIGHT GAIN: ICD-10-CM

## 2019-09-04 PROCEDURE — 1123F ACP DISCUSS/DSCN MKR DOCD: CPT | Performed by: FAMILY MEDICINE

## 2019-09-04 PROCEDURE — 3017F COLORECTAL CA SCREEN DOC REV: CPT | Performed by: FAMILY MEDICINE

## 2019-09-04 PROCEDURE — 1090F PRES/ABSN URINE INCON ASSESS: CPT | Performed by: FAMILY MEDICINE

## 2019-09-04 PROCEDURE — 1036F TOBACCO NON-USER: CPT | Performed by: FAMILY MEDICINE

## 2019-09-04 PROCEDURE — 4040F PNEUMOC VAC/ADMIN/RCVD: CPT | Performed by: FAMILY MEDICINE

## 2019-09-04 PROCEDURE — G8400 PT W/DXA NO RESULTS DOC: HCPCS | Performed by: FAMILY MEDICINE

## 2019-09-04 PROCEDURE — G8427 DOCREV CUR MEDS BY ELIG CLIN: HCPCS | Performed by: FAMILY MEDICINE

## 2019-09-04 PROCEDURE — G8417 CALC BMI ABV UP PARAM F/U: HCPCS | Performed by: FAMILY MEDICINE

## 2019-09-04 PROCEDURE — 99214 OFFICE O/P EST MOD 30 MIN: CPT | Performed by: FAMILY MEDICINE

## 2019-09-04 RX ORDER — OMEPRAZOLE 20 MG/1
20 CAPSULE, DELAYED RELEASE ORAL DAILY
Qty: 30 CAPSULE | Refills: 11 | Status: SHIPPED | OUTPATIENT
Start: 2019-09-04 | End: 2019-12-03

## 2019-09-04 RX ORDER — AMLODIPINE BESYLATE 10 MG/1
5 TABLET ORAL DAILY
Qty: 90 TABLET | Refills: 3 | Status: SHIPPED
Start: 2019-09-04 | End: 2019-09-04 | Stop reason: SINTOL

## 2019-09-04 RX ORDER — BUMETANIDE 1 MG/1
1 TABLET ORAL DAILY
Qty: 7 TABLET | Refills: 0 | Status: SHIPPED | OUTPATIENT
Start: 2019-09-04 | End: 2019-09-09

## 2019-09-04 RX ORDER — NAPROXEN 500 MG/1
500 TABLET ORAL 2 TIMES DAILY WITH MEALS
Status: ON HOLD | COMMUNITY
End: 2019-09-15 | Stop reason: HOSPADM

## 2019-09-04 RX ORDER — OLANZAPINE 7.5 MG/1
15 TABLET ORAL NIGHTLY
COMMUNITY
End: 2020-09-22

## 2019-09-04 ASSESSMENT — ENCOUNTER SYMPTOMS
GASTROINTESTINAL NEGATIVE: 1
SHORTNESS OF BREATH: 1
CHEST TIGHTNESS: 0

## 2019-09-04 NOTE — PROGRESS NOTES
MG tablet Take 0.45 mg by mouth daily    Yes Historical Provider, MD         Review of Systems   Constitutional: Positive for unexpected weight change. HENT: Negative. Respiratory: Positive for shortness of breath. Negative for chest tightness. Cardiovascular: Positive for leg swelling. Negative for chest pain. Gastrointestinal: Negative. Musculoskeletal: Negative. All other systems reviewed and are negative. Objective:   Physical Exam   Constitutional: She is oriented to person, place, and time. She appears well-developed and well-nourished. HENT:   Head: Normocephalic and atraumatic. Right Ear: Tympanic membrane normal.   Left Ear: Tympanic membrane normal.   Mouth/Throat: Oropharynx is clear and moist and mucous membranes are normal.   Neck: Carotid bruit is not present. Cardiovascular: Normal rate, regular rhythm and normal heart sounds. No murmur heard. Pulmonary/Chest: Effort normal and breath sounds normal.   Abdominal: Soft. Bowel sounds are normal.   Musculoskeletal: She exhibits edema (2+ edema RLE, 1+ LLE with stasis changes right.). Neurological: She is alert and oriented to person, place, and time. Skin: Skin is warm and dry. Psychiatric: She has a normal mood and affect. Her behavior is normal.   Nursing note and vitals reviewed. Assessment:       Diagnosis Orders   1. Lower leg edema  bumetanide (BUMEX) 1 MG tablet   2. Weight gain     3. S/P knee surgery     4. Essential hypertension     5. Morbid obesity with BMI of 40.0-44.9, adult ([facility]     6.  Morbid obesity with BMI of 40.0-44.9, adult ([facility]             Plan:      -  Feel that her edema is med related  -  Norvasc vs Zyprexa vs gabapentin vs combo  -  Will hold Norvasc for now  -  Start Bumex daily for 1 week  -  RTO 1 week for re-eval      Quality & Risk Score Accuracy    Visit Dx:  Z29.42, Z68.41 - Morbid obesity with BMI of 40.0-44.9, adult ([facility] [address]  The current BMI is 42.82 kg/m2 as of 09/04/19 09:01

## 2019-09-09 ENCOUNTER — PATIENT MESSAGE (OUTPATIENT)
Dept: FAMILY MEDICINE CLINIC | Age: 69
End: 2019-09-09

## 2019-09-09 RX ORDER — BUMETANIDE 1 MG/1
1 TABLET ORAL 2 TIMES DAILY
Qty: 14 TABLET | Refills: 0 | Status: SHIPPED | OUTPATIENT
Start: 2019-09-09 | End: 2019-09-11

## 2019-09-11 ENCOUNTER — HOSPITAL ENCOUNTER (OUTPATIENT)
Age: 69
Discharge: HOME | End: 2019-09-11
Payer: MEDICARE

## 2019-09-11 ENCOUNTER — OFFICE VISIT (OUTPATIENT)
Dept: FAMILY MEDICINE CLINIC | Age: 69
End: 2019-09-11
Payer: MEDICARE

## 2019-09-11 ENCOUNTER — HOSPITAL ENCOUNTER (OUTPATIENT)
Dept: GENERAL RADIOLOGY | Age: 69
Discharge: HOME | End: 2019-09-11
Payer: MEDICARE

## 2019-09-11 VITALS
BODY MASS INDEX: 41.92 KG/M2 | DIASTOLIC BLOOD PRESSURE: 70 MMHG | SYSTOLIC BLOOD PRESSURE: 152 MMHG | HEART RATE: 76 BPM | WEIGHT: 236.6 LBS | RESPIRATION RATE: 20 BRPM | HEIGHT: 63 IN

## 2019-09-11 DIAGNOSIS — R73.01 IFG (IMPAIRED FASTING GLUCOSE): ICD-10-CM

## 2019-09-11 DIAGNOSIS — R63.5 WEIGHT GAIN: ICD-10-CM

## 2019-09-11 DIAGNOSIS — R14.0 ABDOMINAL DISTENTION: ICD-10-CM

## 2019-09-11 DIAGNOSIS — I89.0 LYMPHEDEMA: ICD-10-CM

## 2019-09-11 DIAGNOSIS — E66.01 MORBID OBESITY WITH BMI OF 40.0-44.9, ADULT (HCC): ICD-10-CM

## 2019-09-11 DIAGNOSIS — I10 ESSENTIAL HYPERTENSION: ICD-10-CM

## 2019-09-11 DIAGNOSIS — E03.9 HYPOTHYROIDISM, UNSPECIFIED TYPE: ICD-10-CM

## 2019-09-11 DIAGNOSIS — R60.0 BILATERAL LOWER EXTREMITY EDEMA: Primary | ICD-10-CM

## 2019-09-11 DIAGNOSIS — K59.00 CONSTIPATION, UNSPECIFIED CONSTIPATION TYPE: ICD-10-CM

## 2019-09-11 PROCEDURE — 4040F PNEUMOC VAC/ADMIN/RCVD: CPT | Performed by: FAMILY MEDICINE

## 2019-09-11 PROCEDURE — 1123F ACP DISCUSS/DSCN MKR DOCD: CPT | Performed by: FAMILY MEDICINE

## 2019-09-11 PROCEDURE — 3017F COLORECTAL CA SCREEN DOC REV: CPT | Performed by: FAMILY MEDICINE

## 2019-09-11 PROCEDURE — G8417 CALC BMI ABV UP PARAM F/U: HCPCS | Performed by: FAMILY MEDICINE

## 2019-09-11 PROCEDURE — G8427 DOCREV CUR MEDS BY ELIG CLIN: HCPCS | Performed by: FAMILY MEDICINE

## 2019-09-11 PROCEDURE — 74018 RADEX ABDOMEN 1 VIEW: CPT

## 2019-09-11 PROCEDURE — 99214 OFFICE O/P EST MOD 30 MIN: CPT | Performed by: FAMILY MEDICINE

## 2019-09-11 PROCEDURE — G8400 PT W/DXA NO RESULTS DOC: HCPCS | Performed by: FAMILY MEDICINE

## 2019-09-11 PROCEDURE — 1036F TOBACCO NON-USER: CPT | Performed by: FAMILY MEDICINE

## 2019-09-11 PROCEDURE — 1090F PRES/ABSN URINE INCON ASSESS: CPT | Performed by: FAMILY MEDICINE

## 2019-09-11 RX ORDER — FUROSEMIDE 40 MG/1
40 TABLET ORAL 2 TIMES DAILY
Qty: 14 TABLET | Refills: 0 | Status: SHIPPED | OUTPATIENT
Start: 2019-09-11 | End: 2019-09-18 | Stop reason: SDUPTHER

## 2019-09-11 ASSESSMENT — ENCOUNTER SYMPTOMS
SHORTNESS OF BREATH: 1
ABDOMINAL DISTENTION: 1
ABDOMINAL PAIN: 1
CONSTIPATION: 1
BLOOD IN STOOL: 0

## 2019-09-11 NOTE — PROGRESS NOTES
Subjective:      Patient ID: Ana Vazquez is a 71 y.o. female. HPI:    Chief Complaint   Patient presents with    Follow-up     1wk follow up right leg edema     1 week eval.    Still having significant LE edema. Her weight is down 5 lbs but she does not seem to be responding to the Bumex as hoped. Wt Readings from Last 3 Encounters:   19 236 lb 9.6 oz (107.3 kg)   19 241 lb 11.2 oz (109.6 kg)   05/15/19 216 lb 11.2 oz (98.3 kg)     BP elevated off the amlodipine. BP Readings from Last 3 Encounters:   19 (!) 152/70   19 108/66   05/15/19 122/60     She also c/o constipation and abdominal distention. Has not had a BM for 4 days and feels full. Denies NV. No recent med changes other than decreased gabapentin as well as stopping the amlodipine. Patient Active Problem List   Diagnosis    Hypothyroid    Eczema    Dyslipidemia    Bipolar disorder ([facility] [address] [facility]    Post-menopausal    HTN (hypertension)    Contact dermatitis    Obesity (BMI 30-39. 9)    Obstructive sleep apnea on CPAP    ACE inhibitor-aggravated angioedema     Past Surgical History:   Procedure Laterality Date    ANKLE SURGERY      left    CATARACT REMOVAL      Both eyes      SECTION      x 3    FOOT SURGERY      Left     HIP SURGERY      HYSTERECTOMY      Total     TOTAL KNEE ARTHROPLASTY Left 10/14/14    TOTAL KNEE ARTHROPLASTY Right 2019     Prior to Admission medications    Medication Sig Start Date End Date Taking?  Authorizing Provider   furosemide (LASIX) 40 MG tablet Take 1 tablet by mouth 2 times daily for 7 days 19 Yes Valarie Briggs DO   OLANZapine (ZYPREXA) 7.5 MG tablet Take 7.5 mg by mouth nightly   Yes Historical Provider, MD   naproxen (EC NAPROSYN) 500 MG EC tablet Take 500 mg by mouth 2 times daily (with meals)   Yes Historical Provider, MD   omeprazole (PRILOSEC) 20 MG delayed release capsule Take 1 capsule by mouth daily 19  Yes Valarie Briggs DO   labetalol (TRANDATE) 100 MG tablet Take 1 tablet by mouth 2 times daily 5/22/19  Yes Joel Lam DO   tiZANidine (ZANAFLEX) 2 MG tablet Take 2 mg by mouth 2 times daily   Yes Historical Provider, MD   tetrabenazine (XENAZINE) 12.5 MG tablet Take 12.5 mg by mouth 2 times daily   Yes Historical Provider, MD   SYNTHROID 125 MCG tablet Take 1 tablet by mouth daily Take with water on an empty stomach- wait 30 minutes before eating or taking other meds. 12/19/18  Yes Joel Lam DO   venlafaxine (EFFEXOR) 75 MG tablet Take 75 mg by mouth daily   Yes Historical Provider, MD   clonazePAM (KLONOPIN) 0.5 MG tablet Take 0.5 mg by mouth daily as needed. .   Yes Historical Provider, MD   Mirabegron ER (MYRBETRIQ) 50 MG TB24 Take 50 mg by mouth daily   Yes Historical Provider, MD   lamoTRIgine (LAMICTAL) 100 MG tablet Take 100 mg by mouth daily 1 in the am 2 in the pm   Yes Historical Provider, MD   venlafaxine (EFFEXOR XR) 150 MG XR capsule Take 2 capsules once daily 10/28/14  Yes Joel Lam DO   gabapentin (NEURONTIN) 100 MG capsule Take two tablets by oral route twice daily  Patient taking differently: 400 mg 2 times daily Take two tablets by oral route twice daily 10/28/14  Yes Joel Lam DO   fish oil-omega-3 fatty acids 1000 MG capsule Take 1 g by mouth daily. Yes Historical Provider, MD   vitamin E 400 UNIT capsule Take 400 Units by mouth daily. Yes Historical Provider, MD   Ascorbic Acid (VITAMIN C) 500 MG tablet Take 500 mg by mouth daily. Yes Historical Provider, MD   Multiple Vitamin (MULTIVITAMIN PO) Take 1 tablet by mouth daily. Yes Historical Provider, MD   estrogens, conjugated, (PREMARIN) 0.9 MG tablet Take 0.45 mg by mouth daily    Yes Historical Provider, MD         Review of Systems   Constitutional: Positive for appetite change. Negative for fever. HENT: Negative. Respiratory: Positive for shortness of breath.     Cardiovascular: Positive for leg vs lymphedema  -  Check labs, KUB  -  Continue Miralax for now  -  RTO prn for now, further recs after above        Eder Leo, DO

## 2019-09-12 ENCOUNTER — TELEPHONE (OUTPATIENT)
Dept: FAMILY MEDICINE CLINIC | Age: 69
End: 2019-09-12

## 2019-09-12 ENCOUNTER — APPOINTMENT (OUTPATIENT)
Dept: CT IMAGING | Age: 69
End: 2019-09-12
Payer: MEDICARE

## 2019-09-12 ENCOUNTER — HOSPITAL ENCOUNTER (OUTPATIENT)
Age: 69
Setting detail: OBSERVATION
Discharge: HOME | End: 2019-09-15
Attending: FAMILY MEDICINE | Admitting: INTERNAL MEDICINE
Payer: MEDICARE

## 2019-09-12 ENCOUNTER — APPOINTMENT (OUTPATIENT)
Dept: GENERAL RADIOLOGY | Age: 69
End: 2019-09-12
Payer: MEDICARE

## 2019-09-12 DIAGNOSIS — I71.20 THORACIC AORTIC ANEURYSM WITHOUT RUPTURE: ICD-10-CM

## 2019-09-12 DIAGNOSIS — R14.0 BLOATING: ICD-10-CM

## 2019-09-12 DIAGNOSIS — R77.8 ELEVATED TROPONIN: ICD-10-CM

## 2019-09-12 DIAGNOSIS — R60.0 BILATERAL LOWER EXTREMITY EDEMA: Primary | ICD-10-CM

## 2019-09-12 LAB
ALBUMIN SERPL-MCNC: 4.5 G/DL (ref 3.5–5.1)
ALP BLD-CCNC: 81 U/L (ref 38–126)
ALT SERPL-CCNC: 22 U/L (ref 11–66)
ANION GAP SERPL CALCULATED.3IONS-SCNC: 15 MEQ/L (ref 8–16)
AST SERPL-CCNC: 24 U/L (ref 5–40)
BASOPHILS # BLD: 0.7 %
BASOPHILS ABSOLUTE: 0.1 THOU/MM3 (ref 0–0.1)
BILIRUB SERPL-MCNC: 0.3 MG/DL (ref 0.3–1.2)
BILIRUBIN DIRECT: < 0.2 MG/DL (ref 0–0.3)
BILIRUBIN URINE: NEGATIVE
BLOOD, URINE: NEGATIVE
BUN BLDV-MCNC: 22 MG/DL (ref 7–22)
CALCIUM SERPL-MCNC: 9.9 MG/DL (ref 8.5–10.5)
CHARACTER, URINE: CLEAR
CHLORIDE BLD-SCNC: 92 MEQ/L (ref 98–111)
CO2: 25 MEQ/L (ref 23–33)
COLOR: YELLOW
CREAT SERPL-MCNC: 0.8 MG/DL (ref 0.4–1.2)
EKG ATRIAL RATE: 76 BPM
EKG ATRIAL RATE: 80 BPM
EKG P AXIS: 63 DEGREES
EKG P AXIS: 66 DEGREES
EKG P-R INTERVAL: 164 MS
EKG P-R INTERVAL: 174 MS
EKG Q-T INTERVAL: 380 MS
EKG Q-T INTERVAL: 380 MS
EKG QRS DURATION: 82 MS
EKG QRS DURATION: 84 MS
EKG QTC CALCULATION (BAZETT): 427 MS
EKG QTC CALCULATION (BAZETT): 438 MS
EKG R AXIS: -11 DEGREES
EKG R AXIS: 2 DEGREES
EKG T AXIS: 33 DEGREES
EKG T AXIS: 53 DEGREES
EKG VENTRICULAR RATE: 76 BPM
EKG VENTRICULAR RATE: 80 BPM
EOSINOPHIL # BLD: 3.7 %
EOSINOPHILS ABSOLUTE: 0.3 THOU/MM3 (ref 0–0.4)
ERYTHROCYTE [DISTWIDTH] IN BLOOD BY AUTOMATED COUNT: 14 % (ref 11.5–14.5)
ERYTHROCYTE [DISTWIDTH] IN BLOOD BY AUTOMATED COUNT: 45 FL (ref 35–45)
GFR SERPL CREATININE-BSD FRML MDRD: 71 ML/MIN/1.73M2
GLUCOSE BLD-MCNC: 87 MG/DL (ref 70–108)
GLUCOSE URINE: NEGATIVE MG/DL
HCT VFR BLD CALC: 38 % (ref 37–47)
HEMOGLOBIN: 12.2 GM/DL (ref 12–16)
IMMATURE GRANS (ABS): 0.02 THOU/MM3 (ref 0–0.07)
IMMATURE GRANULOCYTES: 0 %
KETONES, URINE: NEGATIVE
LEUKOCYTE ESTERASE, URINE: NEGATIVE
LIPASE: 33.1 U/L (ref 5.6–51.3)
LYMPHOCYTES # BLD: 23.8 %
LYMPHOCYTES ABSOLUTE: 1.8 THOU/MM3 (ref 1–4.8)
MCH RBC QN AUTO: 28.2 PG (ref 26–33)
MCHC RBC AUTO-ENTMCNC: 32.1 GM/DL (ref 32.2–35.5)
MCV RBC AUTO: 87.8 FL (ref 81–99)
MONOCYTES # BLD: 11.6 %
MONOCYTES ABSOLUTE: 0.9 THOU/MM3 (ref 0.4–1.3)
NITRITE, URINE: NEGATIVE
NUCLEATED RED BLOOD CELLS: 0 /100 WBC
OSMOLALITY CALCULATION: 267.2 MOSMOL/KG (ref 275–300)
PH UA: 7 (ref 5–9)
PLATELET # BLD: 343 THOU/MM3 (ref 130–400)
PMV BLD AUTO: 9.1 FL (ref 9.4–12.4)
POTASSIUM SERPL-SCNC: 4.1 MEQ/L (ref 3.5–5.2)
PRO-BNP: 188.5 PG/ML (ref 0–900)
PROTEIN UA: NEGATIVE
RBC # BLD: 4.33 MILL/MM3 (ref 4.2–5.4)
SEG NEUTROPHILS: 59.9 %
SEGMENTED NEUTROPHILS ABSOLUTE COUNT: 4.6 THOU/MM3 (ref 1.8–7.7)
SODIUM BLD-SCNC: 132 MEQ/L (ref 135–145)
SPECIFIC GRAVITY, URINE: 1.01 (ref 1–1.03)
TOTAL PROTEIN: 7.3 G/DL (ref 6.1–8)
TROPONIN T: 0.01 NG/ML
TROPONIN T: 0.02 NG/ML
UROBILINOGEN, URINE: 0.2 EU/DL (ref 0–1)
WBC # BLD: 7.6 THOU/MM3 (ref 4.8–10.8)

## 2019-09-12 PROCEDURE — 99285 EMERGENCY DEPT VISIT HI MDM: CPT

## 2019-09-12 PROCEDURE — 85025 COMPLETE CBC W/AUTO DIFF WBC: CPT

## 2019-09-12 PROCEDURE — 83690 ASSAY OF LIPASE: CPT

## 2019-09-12 PROCEDURE — 80053 COMPREHEN METABOLIC PANEL: CPT

## 2019-09-12 PROCEDURE — 6370000000 HC RX 637 (ALT 250 FOR IP): Performed by: NURSE PRACTITIONER

## 2019-09-12 PROCEDURE — 2709999900 HC NON-CHARGEABLE SUPPLY

## 2019-09-12 PROCEDURE — G0378 HOSPITAL OBSERVATION PER HR: HCPCS

## 2019-09-12 PROCEDURE — 74176 CT ABD & PELVIS W/O CONTRAST: CPT

## 2019-09-12 PROCEDURE — 82248 BILIRUBIN DIRECT: CPT

## 2019-09-12 PROCEDURE — 93010 ELECTROCARDIOGRAM REPORT: CPT | Performed by: NUCLEAR MEDICINE

## 2019-09-12 PROCEDURE — 84484 ASSAY OF TROPONIN QUANT: CPT

## 2019-09-12 PROCEDURE — 36415 COLL VENOUS BLD VENIPUNCTURE: CPT

## 2019-09-12 PROCEDURE — 71045 X-RAY EXAM CHEST 1 VIEW: CPT

## 2019-09-12 PROCEDURE — 81003 URINALYSIS AUTO W/O SCOPE: CPT

## 2019-09-12 PROCEDURE — 99220 PR INITIAL OBSERVATION CARE/DAY 70 MINUTES: CPT | Performed by: INTERNAL MEDICINE

## 2019-09-12 PROCEDURE — 83880 ASSAY OF NATRIURETIC PEPTIDE: CPT

## 2019-09-12 PROCEDURE — 93005 ELECTROCARDIOGRAM TRACING: CPT | Performed by: NURSE PRACTITIONER

## 2019-09-12 RX ORDER — POLYETHYLENE GLYCOL 3350 17 G/17G
17 POWDER, FOR SOLUTION ORAL 2 TIMES DAILY
Status: DISPENSED | OUTPATIENT
Start: 2019-09-12 | End: 2019-09-14

## 2019-09-12 RX ORDER — FUROSEMIDE 10 MG/ML
40 INJECTION INTRAMUSCULAR; INTRAVENOUS 2 TIMES DAILY
Status: COMPLETED | OUTPATIENT
Start: 2019-09-12 | End: 2019-09-14

## 2019-09-12 RX ORDER — TETRABENAZINE 12.5 MG/1
12.5 TABLET ORAL 2 TIMES DAILY
Status: DISCONTINUED | OUTPATIENT
Start: 2019-09-12 | End: 2019-09-15 | Stop reason: HOSPADM

## 2019-09-12 RX ORDER — LEVOTHYROXINE SODIUM 0.12 MG/1
125 TABLET ORAL
Status: DISCONTINUED | OUTPATIENT
Start: 2019-09-13 | End: 2019-09-15 | Stop reason: HOSPADM

## 2019-09-12 RX ORDER — GABAPENTIN 400 MG/1
400 CAPSULE ORAL 2 TIMES DAILY
Status: DISCONTINUED | OUTPATIENT
Start: 2019-09-13 | End: 2019-09-15 | Stop reason: HOSPADM

## 2019-09-12 RX ORDER — POTASSIUM CHLORIDE 7.45 MG/ML
10 INJECTION INTRAVENOUS PRN
Status: DISCONTINUED | OUTPATIENT
Start: 2019-09-12 | End: 2019-09-15 | Stop reason: HOSPADM

## 2019-09-12 RX ORDER — POTASSIUM CHLORIDE 20 MEQ/1
40 TABLET, EXTENDED RELEASE ORAL PRN
Status: DISCONTINUED | OUTPATIENT
Start: 2019-09-12 | End: 2019-09-15 | Stop reason: HOSPADM

## 2019-09-12 RX ORDER — SODIUM CHLORIDE 0.9 % (FLUSH) 0.9 %
10 SYRINGE (ML) INJECTION EVERY 12 HOURS SCHEDULED
Status: DISCONTINUED | OUTPATIENT
Start: 2019-09-12 | End: 2019-09-15 | Stop reason: HOSPADM

## 2019-09-12 RX ORDER — CLONAZEPAM 0.5 MG/1
0.5 TABLET ORAL NIGHTLY
Status: DISCONTINUED | OUTPATIENT
Start: 2019-09-12 | End: 2019-09-13

## 2019-09-12 RX ORDER — SODIUM CHLORIDE 0.9 % (FLUSH) 0.9 %
10 SYRINGE (ML) INJECTION PRN
Status: DISCONTINUED | OUTPATIENT
Start: 2019-09-12 | End: 2019-09-15 | Stop reason: HOSPADM

## 2019-09-12 RX ORDER — ASCORBIC ACID 500 MG
500 TABLET ORAL DAILY
Status: DISCONTINUED | OUTPATIENT
Start: 2019-09-13 | End: 2019-09-15 | Stop reason: HOSPADM

## 2019-09-12 RX ORDER — ASPIRIN 81 MG/1
324 TABLET, CHEWABLE ORAL ONCE
Status: COMPLETED | OUTPATIENT
Start: 2019-09-12 | End: 2019-09-12

## 2019-09-12 RX ORDER — ONDANSETRON 2 MG/ML
4 INJECTION INTRAMUSCULAR; INTRAVENOUS EVERY 6 HOURS PRN
Status: DISCONTINUED | OUTPATIENT
Start: 2019-09-12 | End: 2019-09-15 | Stop reason: HOSPADM

## 2019-09-12 RX ADMIN — ASPIRIN 324 MG: 81 TABLET, CHEWABLE ORAL at 19:38

## 2019-09-12 ASSESSMENT — ENCOUNTER SYMPTOMS
SHORTNESS OF BREATH: 1
WHEEZING: 0
ABDOMINAL PAIN: 1
NAUSEA: 1
COUGH: 0
VOMITING: 0
BACK PAIN: 0
RHINORRHEA: 0
SORE THROAT: 0
DIARRHEA: 0
CONSTIPATION: 1

## 2019-09-12 ASSESSMENT — PAIN SCALES - GENERAL: PAINLEVEL_OUTOF10: 0

## 2019-09-13 LAB
ALBUMIN SERPL-MCNC: 4.1 G/DL (ref 3.5–5.1)
ALP BLD-CCNC: 74 U/L (ref 38–126)
ALT SERPL-CCNC: 20 U/L (ref 11–66)
ANION GAP SERPL CALCULATED.3IONS-SCNC: 13 MEQ/L (ref 8–16)
AST SERPL-CCNC: 22 U/L (ref 5–40)
BILIRUB SERPL-MCNC: 0.3 MG/DL (ref 0.3–1.2)
BILIRUBIN URINE: NEGATIVE
BLOOD, URINE: NEGATIVE
BUN BLDV-MCNC: 20 MG/DL (ref 7–22)
CALCIUM SERPL-MCNC: 9.7 MG/DL (ref 8.5–10.5)
CHARACTER, URINE: CLEAR
CHLORIDE BLD-SCNC: 99 MEQ/L (ref 98–111)
CO2: 28 MEQ/L (ref 23–33)
COLOR: YELLOW
CREAT SERPL-MCNC: 0.7 MG/DL (ref 0.4–1.2)
ERYTHROCYTE [DISTWIDTH] IN BLOOD BY AUTOMATED COUNT: 14.1 % (ref 11.5–14.5)
ERYTHROCYTE [DISTWIDTH] IN BLOOD BY AUTOMATED COUNT: 45.2 FL (ref 35–45)
GFR SERPL CREATININE-BSD FRML MDRD: 83 ML/MIN/1.73M2
GLUCOSE BLD-MCNC: 93 MG/DL (ref 70–108)
GLUCOSE, URINE: NEGATIVE MG/DL
HCT VFR BLD CALC: 36.5 % (ref 37–47)
HEMOGLOBIN: 11.7 GM/DL (ref 12–16)
KETONES, URINE: NEGATIVE
LEUKOCYTE EST, POC: NEGATIVE
LV EF: 58 %
LVEF MODALITY: NORMAL
MCH RBC QN AUTO: 28.4 PG (ref 26–33)
MCHC RBC AUTO-ENTMCNC: 32.1 GM/DL (ref 32.2–35.5)
MCV RBC AUTO: 88.6 FL (ref 81–99)
MRSA SCREEN RT-PCR: NEGATIVE
NITRITE, URINE: NEGATIVE
PH UA: 7.5 (ref 5–9)
PLATELET # BLD: 312 THOU/MM3 (ref 130–400)
PMV BLD AUTO: 8.9 FL (ref 9.4–12.4)
POTASSIUM REFLEX MAGNESIUM: 3.7 MEQ/L (ref 3.5–5.2)
PROTEIN UA: NEGATIVE MG/DL
RBC # BLD: 4.12 MILL/MM3 (ref 4.2–5.4)
SODIUM BLD-SCNC: 140 MEQ/L (ref 135–145)
SPECIFIC GRAVITY UA: 1.01 (ref 1–1.03)
TOTAL PROTEIN: 6.7 G/DL (ref 6.1–8)
TROPONIN T: 0.01 NG/ML
TROPONIN T: 0.03 NG/ML
TROPONIN T: 0.03 NG/ML
TSH SERPL DL<=0.05 MIU/L-ACNC: 2.47 UIU/ML (ref 0.4–4.2)
UROBILINOGEN, URINE: 1 EU/DL (ref 0–1)
WBC # BLD: 5.6 THOU/MM3 (ref 4.8–10.8)

## 2019-09-13 PROCEDURE — 2580000003 HC RX 258: Performed by: INTERNAL MEDICINE

## 2019-09-13 PROCEDURE — 84443 ASSAY THYROID STIM HORMONE: CPT

## 2019-09-13 PROCEDURE — G0378 HOSPITAL OBSERVATION PER HR: HCPCS

## 2019-09-13 PROCEDURE — 96376 TX/PRO/DX INJ SAME DRUG ADON: CPT

## 2019-09-13 PROCEDURE — 80053 COMPREHEN METABOLIC PANEL: CPT

## 2019-09-13 PROCEDURE — 6370000000 HC RX 637 (ALT 250 FOR IP): Performed by: FAMILY MEDICINE

## 2019-09-13 PROCEDURE — 81003 URINALYSIS AUTO W/O SCOPE: CPT

## 2019-09-13 PROCEDURE — 6370000000 HC RX 637 (ALT 250 FOR IP): Performed by: INTERNAL MEDICINE

## 2019-09-13 PROCEDURE — 84484 ASSAY OF TROPONIN QUANT: CPT

## 2019-09-13 PROCEDURE — 94760 N-INVAS EAR/PLS OXIMETRY 1: CPT

## 2019-09-13 PROCEDURE — 6360000002 HC RX W HCPCS: Performed by: INTERNAL MEDICINE

## 2019-09-13 PROCEDURE — 87081 CULTURE SCREEN ONLY: CPT

## 2019-09-13 PROCEDURE — 99226 PR SBSQ OBSERVATION CARE/DAY 35 MINUTES: CPT | Performed by: FAMILY MEDICINE

## 2019-09-13 PROCEDURE — 96374 THER/PROPH/DIAG INJ IV PUSH: CPT

## 2019-09-13 PROCEDURE — 87641 MR-STAPH DNA AMP PROBE: CPT

## 2019-09-13 PROCEDURE — 36415 COLL VENOUS BLD VENIPUNCTURE: CPT

## 2019-09-13 PROCEDURE — 85027 COMPLETE CBC AUTOMATED: CPT

## 2019-09-13 PROCEDURE — 93306 TTE W/DOPPLER COMPLETE: CPT

## 2019-09-13 PROCEDURE — 6370000000 HC RX 637 (ALT 250 FOR IP): Performed by: PHYSICIAN ASSISTANT

## 2019-09-13 RX ORDER — HYDRALAZINE HYDROCHLORIDE 10 MG/1
10 TABLET, FILM COATED ORAL EVERY 8 HOURS
Status: DISCONTINUED | OUTPATIENT
Start: 2019-09-13 | End: 2019-09-15 | Stop reason: HOSPADM

## 2019-09-13 RX ORDER — VENLAFAXINE 37.5 MG/1
75 TABLET ORAL DAILY
Status: DISCONTINUED | OUTPATIENT
Start: 2019-09-13 | End: 2019-09-13

## 2019-09-13 RX ORDER — GABAPENTIN 300 MG/1
300 CAPSULE ORAL 3 TIMES DAILY
COMMUNITY

## 2019-09-13 RX ORDER — CLONAZEPAM 1 MG/1
1 TABLET ORAL 3 TIMES DAILY PRN
COMMUNITY

## 2019-09-13 RX ORDER — NAPROXEN 250 MG/1
250 TABLET ORAL 2 TIMES DAILY PRN
Status: DISCONTINUED | OUTPATIENT
Start: 2019-09-13 | End: 2019-09-15 | Stop reason: HOSPADM

## 2019-09-13 RX ORDER — VENLAFAXINE HYDROCHLORIDE 150 MG/1
225 CAPSULE, EXTENDED RELEASE ORAL DAILY
COMMUNITY

## 2019-09-13 RX ORDER — VENLAFAXINE 37.5 MG/1
150 TABLET ORAL DAILY
Status: DISCONTINUED | OUTPATIENT
Start: 2019-09-13 | End: 2019-09-13

## 2019-09-13 RX ORDER — CLONAZEPAM 1 MG/1
1 TABLET ORAL 2 TIMES DAILY
Status: DISCONTINUED | OUTPATIENT
Start: 2019-09-13 | End: 2019-09-15 | Stop reason: HOSPADM

## 2019-09-13 RX ADMIN — TETRABENAZINE 12.5 MG: 12.5 TABLET ORAL at 08:57

## 2019-09-13 RX ADMIN — Medication 10 ML: at 18:15

## 2019-09-13 RX ADMIN — SODIUM CHLORIDE, PRESERVATIVE FREE 10 ML: 5 INJECTION INTRAVENOUS at 09:08

## 2019-09-13 RX ADMIN — TETRABENAZINE 12.5 MG: 12.5 TABLET ORAL at 20:53

## 2019-09-13 RX ADMIN — HYDRALAZINE HYDROCHLORIDE 10 MG: 10 TABLET, FILM COATED ORAL at 20:51

## 2019-09-13 RX ADMIN — Medication 500 MG: at 09:05

## 2019-09-13 RX ADMIN — POLYETHYLENE GLYCOL 3350 17 G: 17 POWDER, FOR SOLUTION ORAL at 08:54

## 2019-09-13 RX ADMIN — SODIUM CHLORIDE, PRESERVATIVE FREE 10 ML: 5 INJECTION INTRAVENOUS at 20:55

## 2019-09-13 RX ADMIN — HYDRALAZINE HYDROCHLORIDE 10 MG: 10 TABLET, FILM COATED ORAL at 04:35

## 2019-09-13 RX ADMIN — SODIUM CHLORIDE, PRESERVATIVE FREE 10 ML: 5 INJECTION INTRAVENOUS at 00:06

## 2019-09-13 RX ADMIN — GABAPENTIN 400 MG: 400 CAPSULE ORAL at 20:52

## 2019-09-13 RX ADMIN — POLYETHYLENE GLYCOL 3350 17 G: 17 POWDER, FOR SOLUTION ORAL at 20:51

## 2019-09-13 RX ADMIN — GABAPENTIN 300 MG: 400 CAPSULE ORAL at 08:58

## 2019-09-13 RX ADMIN — CLONAZEPAM 1 MG: 1 TABLET ORAL at 20:52

## 2019-09-13 RX ADMIN — TETRABENAZINE 12.5 MG: 12.5 TABLET ORAL at 00:07

## 2019-09-13 RX ADMIN — FUROSEMIDE 40 MG: 10 INJECTION, SOLUTION INTRAMUSCULAR; INTRAVENOUS at 18:15

## 2019-09-13 RX ADMIN — CLONAZEPAM 0.5 MG: 0.5 TABLET ORAL at 00:03

## 2019-09-13 RX ADMIN — GABAPENTIN 400 MG: 400 CAPSULE ORAL at 00:05

## 2019-09-13 RX ADMIN — FUROSEMIDE 40 MG: 10 INJECTION, SOLUTION INTRAMUSCULAR; INTRAVENOUS at 00:04

## 2019-09-13 RX ADMIN — HYDRALAZINE HYDROCHLORIDE 10 MG: 10 TABLET, FILM COATED ORAL at 11:59

## 2019-09-13 RX ADMIN — FUROSEMIDE 40 MG: 10 INJECTION, SOLUTION INTRAMUSCULAR; INTRAVENOUS at 09:56

## 2019-09-13 RX ADMIN — CLONAZEPAM 1 MG: 1 TABLET ORAL at 12:38

## 2019-09-13 RX ADMIN — VENLAFAXINE HYDROCHLORIDE 225 MG: 150 CAPSULE, EXTENDED RELEASE ORAL at 12:42

## 2019-09-13 RX ADMIN — LEVOTHYROXINE SODIUM 125 MCG: 0.12 TABLET ORAL at 07:40

## 2019-09-13 ASSESSMENT — PAIN SCALES - GENERAL
PAINLEVEL_OUTOF10: 0

## 2019-09-13 ASSESSMENT — ENCOUNTER SYMPTOMS
ABDOMINAL DISTENTION: 1
SINUS PAIN: 0
SINUS PRESSURE: 0

## 2019-09-13 NOTE — H&P
Hypertension     Mood disorder ([facility] [address]     Sleep apnea     Thyroid disease        Past Surgical History:          Procedure Laterality Date    ANKLE SURGERY      left    CATARACT REMOVAL      Both eyes      SECTION      x 3    FOOT SURGERY      Left     HIP SURGERY      HYSTERECTOMY      Total     TOTAL KNEE ARTHROPLASTY Left 10/14/14    TOTAL KNEE ARTHROPLASTY Right 2019       Medications Prior to Admission:      Prior to Admission medications    Medication Sig Start Date End Date Taking? Authorizing Provider   furosemide (LASIX) 40 MG tablet Take 1 tablet by mouth 2 times daily for 7 days 19 Yes Candy Robles DO   OLANZapine (ZYPREXA) 7.5 MG tablet Take 7.5 mg by mouth nightly   Yes Historical Provider, MD   naproxen (EC NAPROSYN) 500 MG EC tablet Take 500 mg by mouth 2 times daily (with meals)   Yes Historical Provider, MD   omeprazole (PRILOSEC) 20 MG delayed release capsule Take 1 capsule by mouth daily 19  Yes Candy Robles DO   labetalol (TRANDATE) 100 MG tablet Take 1 tablet by mouth 2 times daily 19  Yes Candy Robles DO   tiZANidine (ZANAFLEX) 2 MG tablet Take 2 mg by mouth 2 times daily   Yes Historical Provider, MD   tetrabenazine (XENAZINE) 12.5 MG tablet Take 12.5 mg by mouth 2 times daily   Yes Historical Provider, MD   SYNTHROID 125 MCG tablet Take 1 tablet by mouth daily Take with water on an empty stomach- wait 30 minutes before eating or taking other meds. 18  Yes Candy Robles DO   venlafaxine (EFFEXOR) 75 MG tablet Take 75 mg by mouth daily   Yes Historical Provider, MD   clonazePAM (KLONOPIN) 0.5 MG tablet Take 0.5 mg by mouth daily as needed. .   Yes Historical Provider, MD   Mirabegron ER (MYRBETRIQ) 50 MG TB24 Take 50 mg by mouth daily   Yes Historical Provider, MD   lamoTRIgine (LAMICTAL) 100 MG tablet Take 100 mg by mouth daily 1 in the am 2 in the pm   Yes Historical Provider, MD   venlafaxine (EFFEXOR XR) 150 MG XR capsule Take 2 capsules once daily 10/28/14  Yes Joel Lam DO   gabapentin (NEURONTIN) 100 MG capsule Take two tablets by oral route twice daily  Patient taking differently: 400 mg 2 times daily Take two tablets by oral route twice daily 10/28/14  Yes Joel Lam DO   fish oil-omega-3 fatty acids 1000 MG capsule Take 1 g by mouth daily. Yes Historical Provider, MD   vitamin E 400 UNIT capsule Take 400 Units by mouth daily. Yes Historical Provider, MD   Ascorbic Acid (VITAMIN C) 500 MG tablet Take 500 mg by mouth daily. Yes Historical Provider, MD   Multiple Vitamin (MULTIVITAMIN PO) Take 1 tablet by mouth daily. Yes Historical Provider, MD   estrogens, conjugated, (PREMARIN) 0.9 MG tablet Take 0.45 mg by mouth daily    Yes Historical Provider, MD       Allergies:  Ace inhibitors; Adhesive tape; Codeine; Lotrel [amlodipine besy-benazepril hcl]; Pcn [penicillins]; and Typhoid vaccines    Social History:      The patient currently lives     TOBACCO:   reports that she has never smoked. She has never used smokeless tobacco.  ETOH:   reports that she does not drink alcohol. Family History:       Reviewed in detail and negative for DM, CAD, Cancer, CVA. Positive as follows:        Problem Relation Age of Onset    Diabetes Mother     High Blood Pressure Father     Cancer Father     Other Brother     Diabetes Brother     Cancer Sister     Breast Cancer Sister 39    Cancer Brother        Diet:  No diet orders on file    REVIEW OF SYSTEMS:   Pertinent positives as noted in the HPI. All other systems reviewed and negative. PHYSICAL EXAM:    BP (!) 186/91   Pulse 81   Temp 97.6 °F (36.4 °C) (Oral)   Resp 18   Ht 5' 3\" (1.6 m)   Wt 240 lb (108.9 kg)   LMP  (Exact Date)   SpO2 96%   BMI 42.51 kg/m²     General appearance:  No apparent distress, appears stated age and cooperative. HEENT:  Normal cephalic, atraumatic without obvious deformity.  Pupils equal, structures may be vascular. Mild prominence of the interstitium suggesting mild interstitial edema. **This report has been created using voice recognition software. It may contain minor errors which are inherent in voice recognition technology. **      Final report electronically signed by Dr. Kylee Baca on 9/12/2019 5:27 PM           DVT prophylaxis: Lovenox    Code Status: Prior      PT/OT Eval Status:     Surya Choi Problems    Diagnosis Date Noted    Leg edema [R60.0] 09/12/2019       PLAN:    1. Leg Edema- less clearly defined. ? Possibly from RANDY versus HFPEF. Reports about 7lbs weight loss with recent diuretics use  BNP normal but non reliable with morbid obesity  + JVD without HJR   + abd congestion and decreased appetite  Will start Lasix 40mg IV push BID and do daily weights  Check Echo to establish if CHF. GDMT- will start BB, and ? ARB  Check TSH    2. HTN- not controlled per patient since ACEI was discontinued  Add PRN hydralazine. Consider Imdur of not tolerating ACEI  Keep systolic < 489    3. Mood disorder- continue home agents    4. RANDY- continue home CPAP. 5. Morbid obesity-May be contributing to CHF. need with loss. ? Bariatric surgery          Thank you Hoa Silverio DO for the opportunity to be involved in this patient's care.     Electronically signed by No Curry MD on 9/12/2019 at 8:47 PM

## 2019-09-13 NOTE — FLOWSHEET NOTE
09/13/19 1727   Provider Notification   Reason for Communication Evaluate   Provider Name Danitza Pepe   Provider Notification Physician Assistant   Method of Communication Secure Message   Response No new orders   Notification Time [id number]     aortic aneurysm 4.1 per ECHO results today 9/13/2019, trops slightly elevated    Reply back -- > Add to Dr. Chaim Valentine list

## 2019-09-14 LAB
ALBUMIN SERPL-MCNC: 4.2 G/DL (ref 3.5–5.1)
ALP BLD-CCNC: 74 U/L (ref 38–126)
ALT SERPL-CCNC: 21 U/L (ref 11–66)
ANION GAP SERPL CALCULATED.3IONS-SCNC: 16 MEQ/L (ref 8–16)
AST SERPL-CCNC: 20 U/L (ref 5–40)
BASOPHILS # BLD: 0.7 %
BASOPHILS ABSOLUTE: 0 THOU/MM3 (ref 0–0.1)
BILIRUB SERPL-MCNC: 0.2 MG/DL (ref 0.3–1.2)
BUN BLDV-MCNC: 27 MG/DL (ref 7–22)
CALCIUM SERPL-MCNC: 10 MG/DL (ref 8.5–10.5)
CHLORIDE BLD-SCNC: 100 MEQ/L (ref 98–111)
CO2: 23 MEQ/L (ref 23–33)
CREAT SERPL-MCNC: 0.8 MG/DL (ref 0.4–1.2)
EOSINOPHIL # BLD: 3 %
EOSINOPHILS ABSOLUTE: 0.2 THOU/MM3 (ref 0–0.4)
ERYTHROCYTE [DISTWIDTH] IN BLOOD BY AUTOMATED COUNT: 14.3 % (ref 11.5–14.5)
ERYTHROCYTE [DISTWIDTH] IN BLOOD BY AUTOMATED COUNT: 46 FL (ref 35–45)
GFR SERPL CREATININE-BSD FRML MDRD: 71 ML/MIN/1.73M2
GLUCOSE BLD-MCNC: 112 MG/DL (ref 70–108)
HCT VFR BLD CALC: 40.1 % (ref 37–47)
HEMOGLOBIN: 12.7 GM/DL (ref 12–16)
IMMATURE GRANS (ABS): 0.02 THOU/MM3 (ref 0–0.07)
IMMATURE GRANULOCYTES: 0 %
LYMPHOCYTES # BLD: 23.7 %
LYMPHOCYTES ABSOLUTE: 1.6 THOU/MM3 (ref 1–4.8)
MCH RBC QN AUTO: 28.5 PG (ref 26–33)
MCHC RBC AUTO-ENTMCNC: 31.7 GM/DL (ref 32.2–35.5)
MCV RBC AUTO: 89.9 FL (ref 81–99)
MONOCYTES # BLD: 13.4 %
MONOCYTES ABSOLUTE: 0.9 THOU/MM3 (ref 0.4–1.3)
NUCLEATED RED BLOOD CELLS: 0 /100 WBC
PLATELET # BLD: 315 THOU/MM3 (ref 130–400)
PMV BLD AUTO: 8.8 FL (ref 9.4–12.4)
POTASSIUM REFLEX MAGNESIUM: 4 MEQ/L (ref 3.5–5.2)
RBC # BLD: 4.46 MILL/MM3 (ref 4.2–5.4)
SEG NEUTROPHILS: 58.9 %
SEGMENTED NEUTROPHILS ABSOLUTE COUNT: 3.9 THOU/MM3 (ref 1.8–7.7)
SODIUM BLD-SCNC: 139 MEQ/L (ref 135–145)
TOTAL PROTEIN: 7 G/DL (ref 6.1–8)
WBC # BLD: 6.7 THOU/MM3 (ref 4.8–10.8)

## 2019-09-14 PROCEDURE — 80053 COMPREHEN METABOLIC PANEL: CPT

## 2019-09-14 PROCEDURE — 99203 OFFICE O/P NEW LOW 30 MIN: CPT | Performed by: INTERNAL MEDICINE

## 2019-09-14 PROCEDURE — G0378 HOSPITAL OBSERVATION PER HR: HCPCS

## 2019-09-14 PROCEDURE — 96375 TX/PRO/DX INJ NEW DRUG ADDON: CPT

## 2019-09-14 PROCEDURE — 96376 TX/PRO/DX INJ SAME DRUG ADON: CPT

## 2019-09-14 PROCEDURE — 6370000000 HC RX 637 (ALT 250 FOR IP): Performed by: INTERNAL MEDICINE

## 2019-09-14 PROCEDURE — 2500000003 HC RX 250 WO HCPCS: Performed by: PHYSICIAN ASSISTANT

## 2019-09-14 PROCEDURE — 2580000003 HC RX 258: Performed by: INTERNAL MEDICINE

## 2019-09-14 PROCEDURE — 36415 COLL VENOUS BLD VENIPUNCTURE: CPT

## 2019-09-14 PROCEDURE — 85025 COMPLETE CBC W/AUTO DIFF WBC: CPT

## 2019-09-14 PROCEDURE — 99226 PR SBSQ OBSERVATION CARE/DAY 35 MINUTES: CPT | Performed by: FAMILY MEDICINE

## 2019-09-14 PROCEDURE — 6360000002 HC RX W HCPCS: Performed by: INTERNAL MEDICINE

## 2019-09-14 PROCEDURE — 6370000000 HC RX 637 (ALT 250 FOR IP): Performed by: FAMILY MEDICINE

## 2019-09-14 RX ORDER — LABETALOL 20 MG/4 ML (5 MG/ML) INTRAVENOUS SYRINGE
10 EVERY 6 HOURS PRN
Status: DISCONTINUED | OUTPATIENT
Start: 2019-09-14 | End: 2019-09-14

## 2019-09-14 RX ORDER — LABETALOL 100 MG/1
100 TABLET, FILM COATED ORAL EVERY 12 HOURS SCHEDULED
Status: DISCONTINUED | OUTPATIENT
Start: 2019-09-14 | End: 2019-09-15 | Stop reason: HOSPADM

## 2019-09-14 RX ORDER — FUROSEMIDE 40 MG/1
40 TABLET ORAL DAILY
Status: DISCONTINUED | OUTPATIENT
Start: 2019-09-15 | End: 2019-09-15 | Stop reason: HOSPADM

## 2019-09-14 RX ADMIN — HYDRALAZINE HYDROCHLORIDE 10 MG: 10 TABLET, FILM COATED ORAL at 04:00

## 2019-09-14 RX ADMIN — LABETALOL 20 MG/4 ML (5 MG/ML) INTRAVENOUS SYRINGE 10 MG: at 01:16

## 2019-09-14 RX ADMIN — FUROSEMIDE 40 MG: 10 INJECTION, SOLUTION INTRAMUSCULAR; INTRAVENOUS at 09:32

## 2019-09-14 RX ADMIN — CLONAZEPAM 1 MG: 1 TABLET ORAL at 20:36

## 2019-09-14 RX ADMIN — VENLAFAXINE HYDROCHLORIDE 225 MG: 150 CAPSULE, EXTENDED RELEASE ORAL at 11:31

## 2019-09-14 RX ADMIN — LABETALOL HCL 100 MG: 100 TABLET, FILM COATED ORAL at 20:32

## 2019-09-14 RX ADMIN — GABAPENTIN 400 MG: 400 CAPSULE ORAL at 11:31

## 2019-09-14 RX ADMIN — HYDRALAZINE HYDROCHLORIDE 10 MG: 10 TABLET, FILM COATED ORAL at 20:39

## 2019-09-14 RX ADMIN — LEVOTHYROXINE SODIUM 125 MCG: 0.12 TABLET ORAL at 05:37

## 2019-09-14 RX ADMIN — TETRABENAZINE 12.5 MG: 12.5 TABLET ORAL at 20:34

## 2019-09-14 RX ADMIN — HYDRALAZINE HYDROCHLORIDE 10 MG: 10 TABLET, FILM COATED ORAL at 11:31

## 2019-09-14 RX ADMIN — Medication 10 ML: at 01:16

## 2019-09-14 RX ADMIN — SODIUM CHLORIDE, PRESERVATIVE FREE 10 ML: 5 INJECTION INTRAVENOUS at 09:32

## 2019-09-14 RX ADMIN — GABAPENTIN 400 MG: 400 CAPSULE ORAL at 20:31

## 2019-09-14 RX ADMIN — CLONAZEPAM 1 MG: 1 TABLET ORAL at 09:32

## 2019-09-14 RX ADMIN — SODIUM CHLORIDE, PRESERVATIVE FREE 10 ML: 5 INJECTION INTRAVENOUS at 20:42

## 2019-09-14 RX ADMIN — TETRABENAZINE 12.5 MG: 12.5 TABLET ORAL at 11:31

## 2019-09-14 RX ADMIN — Medication 500 MG: at 11:31

## 2019-09-14 ASSESSMENT — PAIN SCALES - GENERAL: PAINLEVEL_OUTOF10: 0

## 2019-09-14 NOTE — CONSULTS
SECTION      x 3    FOOT SURGERY      Left     HIP SURGERY      HYSTERECTOMY      Total     TOTAL KNEE ARTHROPLASTY Left 10/14/14    TOTAL KNEE ARTHROPLASTY Right 2019       Medications Prior to Admission:    Medications Prior to Admission: clonazePAM (KLONOPIN) 1 MG tablet, Take 1 mg by mouth 2 times daily. venlafaxine (EFFEXOR XR) 150 MG extended release capsule, Take 150 mg by mouth daily  gabapentin (NEURONTIN) 300 MG capsule, Take 300 mg by mouth 4 times daily. furosemide (LASIX) 40 MG tablet, Take 1 tablet by mouth 2 times daily for 7 days  OLANZapine (ZYPREXA) 7.5 MG tablet, Take 7.5 mg by mouth nightly  naproxen (EC NAPROSYN) 500 MG EC tablet, Take 500 mg by mouth 2 times daily (with meals)  omeprazole (PRILOSEC) 20 MG delayed release capsule, Take 1 capsule by mouth daily  labetalol (TRANDATE) 100 MG tablet, Take 1 tablet by mouth 2 times daily  tiZANidine (ZANAFLEX) 2 MG tablet, Take 2 mg by mouth 2 times daily  tetrabenazine (XENAZINE) 12.5 MG tablet, Take 12.5 mg by mouth 2 times daily  SYNTHROID 125 MCG tablet, Take 1 tablet by mouth daily Take with water on an empty stomach- wait 30 minutes before eating or taking other meds. venlafaxine (EFFEXOR) 75 MG tablet, Take 75 mg by mouth daily  Mirabegron ER (MYRBETRIQ) 50 MG TB24, Take 50 mg by mouth daily  lamoTRIgine (LAMICTAL) 100 MG tablet, Take 100 mg by mouth daily 1 in the am 2 in the pm  fish oil-omega-3 fatty acids 1000 MG capsule, Take 1 g by mouth daily. vitamin E 400 UNIT capsule, Take 400 Units by mouth daily. Ascorbic Acid (VITAMIN C) 500 MG tablet, Take 500 mg by mouth daily. Multiple Vitamin (MULTIVITAMIN PO), Take 1 tablet by mouth daily. estrogens, conjugated, (PREMARIN) 0.9 MG tablet, Take 0.45 mg by mouth daily   [DISCONTINUED] clonazePAM (KLONOPIN) 0.5 MG tablet, Take 1 mg by mouth 2 times daily.    [DISCONTINUED] venlafaxine (EFFEXOR XR) 150 MG XR capsule, Take 2 capsules once daily (Patient taking differently: Take 150 mg by mouth daily )  [DISCONTINUED] gabapentin (NEURONTIN) 100 MG capsule, Take two tablets by oral route twice daily (Patient taking differently: 400 mg 2 times daily Take two tablets by oral route twice daily)    Allergies:    Ace inhibitors; Adhesive tape; Codeine; Lotrel [amlodipine besy-benazepril hcl]; Pcn [penicillins]; and Typhoid vaccines    Social History:    reports that she has never smoked. She has never used smokeless tobacco. She reports that she does not drink alcohol or use drugs. Family History:   family history includes Breast Cancer (age of onset: 39) in her sister; Cancer in her brother, father, and sister; Diabetes in her brother and mother; High Blood Pressure in her father; Other in her brother. Denies any family hisotry of aortic aneurysm or dissection    REVIEW OF SYSTEMS:  Constitutional: negative for anorexia, chills and fevers,weight change  Respiratory: negative for cough, hemoptysis, shortness of breath and wheezing  Cardiovascular: negative for  orthopnea, palpitations and syncope. Gastrointestinal: negative for abdominal pain,nausea , vomiting, constipation, diarrhea.   Hematologic/lymphatic: negative for bruising,prolonged bleeding,blood clots  Musculoskeletal: H/O KNEE SURGERY   Neurological: negative for coordination problems, dizziness, gait problems and vertigo  Behavioral/Psych:negative for mood/sleep disturbance      PHYSICAL EXAM:   Vitals:  Patient Vitals for the past 24 hrs:   BP Temp Temp src Pulse Resp SpO2 Weight   09/14/19 0942 -- -- -- -- -- -- 223 lb 3.2 oz (101.2 kg)   09/14/19 0905 (!) 151/85 99.1 °F (37.3 °C) Oral 76 16 93 % --   09/14/19 0600 (!) 152/81 -- -- 75 -- -- --   09/14/19 0526 (!) 158/77 -- -- 76 19 -- --   09/14/19 0509 (!) 142/93 -- -- 76 22 -- --   09/14/19 0500 (!) 164/82 -- -- -- -- -- --   09/14/19 0356 (!) 199/86 97.8 °F (36.6 °C) Oral 80 18 92 % --   09/14/19 0245 (!) 160/77 -- -- 80 17 94 % --   09/14/19 0115 (!) 154/74 09/14/2019    MPV 8.8 09/14/2019     BMP:    Lab Results   Component Value Date     09/14/2019    K 4.0 09/14/2019     09/14/2019    CO2 23 09/14/2019    BUN 27 09/14/2019    LABALBU 4.2 09/14/2019    CREATININE 0.8 09/14/2019    CALCIUM 10.0 09/14/2019    LABGLOM 71 09/14/2019    GLUCOSE 112 09/14/2019    GLUCOSE 73 01/19/2018     Hepatic Function Panel:    Lab Results   Component Value Date    ALKPHOS 74 09/14/2019    ALT 21 09/14/2019    AST 20 09/14/2019    PROT 7.0 09/14/2019    BILITOT 0.2 09/14/2019    BILIDIR <0.2 09/12/2019    LABALBU 4.2 09/14/2019     Magnesium:  No results found for: MG  Warfarin PT/INR:  No components found for: PTPATWAR, PTINRWAR  HgBA1c:    Lab Results   Component Value Date    LABA1C 5.6 01/19/2018     FLP:    Lab Results   Component Value Date    TRIG 188 01/19/2018    HDL 71 01/19/2018    HDL 51 03/16/2012    LDLCALC 93 03/01/2013    LDLDIRECT 142 01/19/2018     TSH:    Lab Results   Component Value Date    TSH 2.470 09/13/2019     BNP: No results found for: BNP      ASSESSMENT/RECOMMENDATIONS:    1. Dilated ascending aorta  - Seen on echocardiogram, 4.1 cm  - Denies any chest pain, SOB, dizziness. ..  - Has no Family Hisotry of aortic aneurysm or dissection  - Will need CTA of aorta, can be done as outpatient     2. HFpEF (Acute on chronic)  - Continue PO lasix on discharge  - Limit sodium intake  - Needs good blood pressure control     3. Elevated Troponin  - Denies chest pain, trended down  - ESCOTO, resolved  - Probably demand ischemia in setting of uncontrolled HTN, HFpEF, volume overload   - Denies having any recent ischemia work up   - Needs a stress test, can be done as outpatient. Will follow up with me in office in two weeks    Above findings and plan of care were discussed with patient and her  in details, patient's questions were answered. Thank you for allowing me to participate in the care of this patient.   Please let me know if I can be of any further assistance.     Rasta Celaya MD   11:01 AM  9/14/2019

## 2019-09-15 VITALS
DIASTOLIC BLOOD PRESSURE: 78 MMHG | WEIGHT: 222 LBS | HEIGHT: 63 IN | RESPIRATION RATE: 18 BRPM | TEMPERATURE: 98.4 F | OXYGEN SATURATION: 97 % | HEART RATE: 78 BPM | SYSTOLIC BLOOD PRESSURE: 144 MMHG | BODY MASS INDEX: 39.34 KG/M2

## 2019-09-15 LAB — MRSA SCREEN: NORMAL

## 2019-09-15 PROCEDURE — 6370000000 HC RX 637 (ALT 250 FOR IP): Performed by: INTERNAL MEDICINE

## 2019-09-15 PROCEDURE — G0378 HOSPITAL OBSERVATION PER HR: HCPCS

## 2019-09-15 PROCEDURE — 99217 PR OBSERVATION CARE DISCHARGE MANAGEMENT: CPT | Performed by: FAMILY MEDICINE

## 2019-09-15 PROCEDURE — 2580000003 HC RX 258: Performed by: INTERNAL MEDICINE

## 2019-09-15 PROCEDURE — 6370000000 HC RX 637 (ALT 250 FOR IP): Performed by: FAMILY MEDICINE

## 2019-09-15 RX ORDER — HYDRALAZINE HYDROCHLORIDE 10 MG/1
10 TABLET, FILM COATED ORAL EVERY 8 HOURS
Qty: 90 TABLET | Refills: 0 | Status: SHIPPED | OUTPATIENT
Start: 2019-09-15 | End: 2019-10-01

## 2019-09-15 RX ADMIN — LEVOTHYROXINE SODIUM 125 MCG: 0.12 TABLET ORAL at 04:40

## 2019-09-15 RX ADMIN — SODIUM CHLORIDE, PRESERVATIVE FREE 10 ML: 5 INJECTION INTRAVENOUS at 09:02

## 2019-09-15 RX ADMIN — TETRABENAZINE 12.5 MG: 12.5 TABLET ORAL at 09:02

## 2019-09-15 RX ADMIN — CLONAZEPAM 1 MG: 1 TABLET ORAL at 09:02

## 2019-09-15 RX ADMIN — HYDRALAZINE HYDROCHLORIDE 10 MG: 10 TABLET, FILM COATED ORAL at 13:02

## 2019-09-15 RX ADMIN — HYDRALAZINE HYDROCHLORIDE 10 MG: 10 TABLET, FILM COATED ORAL at 04:40

## 2019-09-15 RX ADMIN — VENLAFAXINE HYDROCHLORIDE 225 MG: 150 CAPSULE, EXTENDED RELEASE ORAL at 09:02

## 2019-09-15 RX ADMIN — FUROSEMIDE 40 MG: 40 TABLET ORAL at 09:02

## 2019-09-15 RX ADMIN — Medication 500 MG: at 09:02

## 2019-09-15 RX ADMIN — LABETALOL HCL 100 MG: 100 TABLET, FILM COATED ORAL at 09:02

## 2019-09-15 RX ADMIN — GABAPENTIN 400 MG: 400 CAPSULE ORAL at 09:02

## 2019-09-15 NOTE — PROGRESS NOTES
Discharge teaching and instructions for diagnosis/procedure of  Bilateral Leg Edema completed with patient using teachback method. AVS reviewed. Printed prescriptions given to patient. Patients IV and Tele were removed. Patients belongings and medications were sent home with patient. Patient voiced understanding regarding prescriptions, follow up appointments, and care of self at home.  Discharged in a wheelchair to  home with support per family
Echo completed at bedside.
Date)   SpO2 91%   BMI 39.54 kg/m²      Intake/Output Summary (Last 24 hours) at 9/14/2019 1348  Last data filed at 9/14/2019 0356  Gross per 24 hour   Intake 740 ml   Output 1600 ml   Net -860 ml      Subjective: Patient reported having high blood pressure and she was on Norvasc in the past but it has been stopped because of bilateral lower extremity swelling. Patient had a reaction from ACE inhibitors in the past probable angioedema. Patient was on labetalol 100 mg every 12 hours before she got admitted but not started yet. Patient started hydralazine when she got admitted. Patient reported highly improvement in her leg swelling with the use of Lasix every 12 hours. Nurse's notes, vitals, labs reviewed. General:   sitting in bed comfortably  HEENT:  normocephalic and atraumatic. No scleral icterus. PERRLA. Neck: supple. No thyromegaly. Lungs: clear to auscultation. No abnormal breathing sounds appreciated. Cardiac: S1, S2, RRR without murmur. No JVD. Abdomen: Slightly tender generalized, no rebound tenderness or guarding. Bowel sounds positive. Extremities: trace pitting edema bilateral lower extremities   vasculature: capillary refill < 3 seconds. Pedal pulses intact bilaterally. Skin:  warm and dry. Not clammy. Psych:  Alert to time, person and place. Communicable. Affect appropriate  Lymph:  No supraclavicular ,and no anterior cervical lymphadenopathy. Neurologic:  No focal deficit. CN II-XII grossly intact. Motor and Sensory capacity intact in all extremities. Data: (All radiographs, tracings, PFTs, and imaging are personally viewed and interpreted unless otherwise noted). Discussed with patient plan. Patient verbalized understandings and agree. All questions addressed with concerns.         Electronically signed by Aretha Shetty MD on 9/14/2019 at 1:48 PM

## 2019-09-15 NOTE — DISCHARGE SUMMARY
them in 2 weeks after discharge. #4. possible lymphedema and venous insufficiency: Patient already prescribed pneumatic compression stocking he is not using currently. We encouraged patient to use them for good outcome.     #5. constipation: Patient already on MiraLAX, Colace added to her regimen to improve her constipation.     #6. mixed depression and anxiety: Continue home medication of benzos and Effexor 225 mg daily           #7. morbid obesity: Patient needs comprehensive behavior counseling with her primary care physician as an outpatient and frequent visits to discussed morbid obesity and weight reduction. #8. chronic diverticulosis: patient instructed to follow-up with GI as an outpatient. Exam:     Vitals:  Vitals:    09/14/19 1706 09/14/19 2000 09/14/19 2332 09/15/19 0845   BP: (!) 168/88 (!) 164/88 (!) 174/83 (!) 144/78   Pulse:  88 83 78   Resp:  16 16 18   Temp:  98.7 °F (37.1 °C) 98.5 °F (36.9 °C) 98.4 °F (36.9 °C)   TempSrc:   Oral Oral   SpO2:  93% 92% 97%   Weight:       Height:         Weight: Weight: 223 lb 3.2 oz (101.2 kg)     24 hour intake/output:    Intake/Output Summary (Last 24 hours) at 9/15/2019 1040  Last data filed at 9/14/2019 1933  Gross per 24 hour   Intake 680 ml   Output 1425 ml   Net -745 ml         General:   sitting in bed comfortably  HEENT:  normocephalic and atraumatic. No scleral icterus. PERRLA. Neck: supple. No thyromegaly. Lungs: clear to auscultation. No abnormal breathing sounds appreciated. Cardiac: S1, S2, RRR without murmur. No JVD. Abdomen: Slightly tender generalized, no rebound tenderness or guarding. Bowel sounds positive. Extremities: trace pitting edema bilateral lower extremities   vasculature: capillary refill < 3 seconds. Pedal pulses intact bilaterally. Skin:  warm and dry. Not clammy. Psych:  Alert to time, person and place. Communicable.   Affect appropriate  Lymph:  No supraclavicular ,and no anterior cervical with water on an empty stomach- wait 30 minutes before eating or taking other meds. tetrabenazine (XENAZINE) 12.5 MG tablet  Take 12.5 mg by mouth 2 times daily             tiZANidine (ZANAFLEX) 2 MG tablet  Take 2 mg by mouth 2 times daily             venlafaxine (EFFEXOR XR) 150 MG extended release capsule  Take 150 mg by mouth daily             venlafaxine (EFFEXOR) 75 MG tablet  Take 75 mg by mouth daily             vitamin E 400 UNIT capsule  Take 400 Units by mouth daily. Time Spent on discharge is more than 30 minutes in the examination, evaluation, counseling and review of medications and discharge plan. Signed: Thank you Suzette Barnes DO for the opportunity to be involved in this patient's care.     Electronically signed by Zeeshan Beyer MD on 9/15/2019 at 10:40 AM

## 2019-09-16 ENCOUNTER — TELEPHONE (OUTPATIENT)
Dept: FAMILY MEDICINE CLINIC | Age: 69
End: 2019-09-16

## 2019-09-16 NOTE — TELEPHONE ENCOUNTER
[facility] Transitions Initial Follow Up Call    Outreach made within 2 business days of discharge: Yes    Patient: Darwin Valdez Patient : 1950   MRN: [id number]  Reason for Admission: There are no discharge diagnoses documented for the most recent discharge. Discharge Date: 9/15/19       Spoke with: Patient    Discharge department/facility: [facility]    TCM Interactive Patient Contact:  Was patient able to fill all prescriptions: Yes  Was patient instructed to bring all medications to the follow-up visit: Yes  Is patient taking all medications as directed in the discharge summary?  Yes  Does patient understand their discharge instructions: Yes  Does patient have questions or concerns that need addressed prior to 7-14 day follow up office visit: No-    Scheduled appointment with PCP within 7-14 days    Follow Up  Future Appointments   Date Time Provider Jake Holloway   2019  1:45 PM Joel Lam, Deepika Nixon [facility]   2019  2:40 PM STR CT IMAGING RM1  [facility] OUT EXP STR Radiolog   10/1/2019  1:30 PM Carisa Willett MD [facility]   2019 11:15 AM Geovani Spain [address] [place] [address]

## 2019-09-17 ENCOUNTER — TELEPHONE (OUTPATIENT)
Dept: FAMILY MEDICINE CLINIC | Age: 69
End: 2019-09-17

## 2019-09-18 ENCOUNTER — OFFICE VISIT (OUTPATIENT)
Dept: FAMILY MEDICINE CLINIC | Age: 69
End: 2019-09-18
Payer: MEDICARE

## 2019-09-18 VITALS
RESPIRATION RATE: 20 BRPM | BODY MASS INDEX: 40.39 KG/M2 | DIASTOLIC BLOOD PRESSURE: 64 MMHG | SYSTOLIC BLOOD PRESSURE: 102 MMHG | HEART RATE: 76 BPM | WEIGHT: 228 LBS

## 2019-09-18 DIAGNOSIS — R60.0 BILATERAL LOWER EXTREMITY EDEMA: ICD-10-CM

## 2019-09-18 DIAGNOSIS — I10 ESSENTIAL HYPERTENSION: ICD-10-CM

## 2019-09-18 DIAGNOSIS — I89.0 LYMPHEDEMA: ICD-10-CM

## 2019-09-18 DIAGNOSIS — I71.21 ASCENDING AORTIC ANEURYSM: ICD-10-CM

## 2019-09-18 DIAGNOSIS — E66.01 MORBID OBESITY WITH BMI OF 40.0-44.9, ADULT (HCC): ICD-10-CM

## 2019-09-18 DIAGNOSIS — F31.9 BIPOLAR AFFECTIVE DISORDER, REMISSION STATUS UNSPECIFIED (HCC): ICD-10-CM

## 2019-09-18 DIAGNOSIS — R73.01 IFG (IMPAIRED FASTING GLUCOSE): ICD-10-CM

## 2019-09-18 DIAGNOSIS — Z78.0 POST-MENOPAUSAL: ICD-10-CM

## 2019-09-18 DIAGNOSIS — R63.5 WEIGHT GAIN: ICD-10-CM

## 2019-09-18 DIAGNOSIS — I50.31 ACUTE HEART FAILURE WITH PRESERVED EJECTION FRACTION (HCC): Primary | ICD-10-CM

## 2019-09-18 DIAGNOSIS — E03.9 HYPOTHYROIDISM, UNSPECIFIED TYPE: ICD-10-CM

## 2019-09-18 DIAGNOSIS — Z12.31 ENCOUNTER FOR SCREENING MAMMOGRAM FOR BREAST CANCER: ICD-10-CM

## 2019-09-18 PROCEDURE — 1111F DSCHRG MED/CURRENT MED MERGE: CPT | Performed by: FAMILY MEDICINE

## 2019-09-18 PROCEDURE — 99495 TRANSJ CARE MGMT MOD F2F 14D: CPT | Performed by: FAMILY MEDICINE

## 2019-09-18 RX ORDER — FUROSEMIDE 40 MG/1
40 TABLET ORAL DAILY
Qty: 30 TABLET | Refills: 11 | Status: SHIPPED | OUTPATIENT
Start: 2019-09-18 | End: 2019-10-01

## 2019-09-18 NOTE — PROGRESS NOTES
Post-Discharge Transitional Care Management Services      Lani Vyas   YOB: 1950    Date of Visit:  9/18/2019  30 Day Post-Discharge Date: 10/15/19  Chief Complaint   Patient presents with    Follow-Up from [facility] [place] - D/C'd from [facility] on 9/15/19 - [facility]     Hospital Course:   Lani Vyas is a 71 y.o. female admitted to [facility] on 9/12/2019 for:    #1. heart failure with preserved ejection fraction (CHF exacerbation): Per echo results grade 1 diastolic dysfunction. Patient received Lasix IV every 12 hours and her condition improved the next day.   Instructed to continue Lasix when she discharged home 40 mg every 12 hours orally. Instructed to continue cardiac diet, low-sodium diet and encourage DASH diet.  Treat other comorbidities including hypertension, weight reduction, healthy cardiac diet.     #2. bilateral lower extremity edema: Likely secondary to CHF exacerbation continue same plan as above.     #3.  Uncontrolled hypertension:  Patient blood pressure was uncontrolled when she got admitted. Patient started on hydralazine 10 mg every 8 hours. We restarted labetalol 100 mg every 12 hours. Patient blood pressure improved with this regimen.   Patient had a reaction from ACE inhibitors in the past.  Echocardiogram showed aortic aneurysm 4.1 cm.   We controlled hypertension while inpatient. Patient needs CTA to confirm the size of aortic aneurysm at the same time she need a stress test and both need to be done as an outpatient. Patient been seen from cardiology and recommended to see them in 2 weeks after discharge.     #4.  possible lymphedema and venous insufficiency: Patient already prescribed pneumatic compression stocking he is not using currently.  We encouraged patient to use them for good outcome.     #5. constipation: Patient already on MiraLAX, Colace added to her regimen to improve her constipation.     #6. mixed depression and anxiety: Continue home medication of benzos and Effexor 225 mg daily           #7. morbid obesity: Patient needs comprehensive behavior counseling with her primary care physician as an outpatient and frequent visits to discussed morbid obesity and weight reduction.          #8. chronic diverticulosis: patient instructed to follow-up with GI as an outpatient. Weight is down 8 lbs since 9/11. She is   Wt Readings from Last 3 Encounters:   09/18/19 228 lb (103.4 kg)   09/15/19 222 lb (100.7 kg)   09/11/19 236 lb 9.6 oz (107.3 kg)         Allergies   Allergen Reactions    Ace Inhibitors Anaphylaxis    Adhesive Tape     Codeine     Lotrel [Amlodipine Besy-Benazepril Hcl] Swelling    Pcn [Penicillins]     Typhoid Vaccines      Outpatient Medications Marked as Taking for the 9/18/19 encounter (Office Visit) with Ebenezer Landa, DO   Medication Sig Dispense Refill    furosemide (LASIX) 40 MG tablet Take 1 tablet by mouth daily 30 tablet 11    hydrALAZINE (APRESOLINE) 10 MG tablet Take 1 tablet by mouth every 8 hours 90 tablet 0    clonazePAM (KLONOPIN) 1 MG tablet Take 1 mg by mouth 2 times daily.  venlafaxine (EFFEXOR XR) 150 MG extended release capsule Take 150 mg by mouth daily      gabapentin (NEURONTIN) 300 MG capsule Take 300 mg by mouth 3 times daily.  OLANZapine (ZYPREXA) 7.5 MG tablet Take 5 mg by mouth nightly       omeprazole (PRILOSEC) 20 MG delayed release capsule Take 1 capsule by mouth daily 30 capsule 11    labetalol (TRANDATE) 100 MG tablet Take 1 tablet by mouth 2 times daily 180 tablet 3    tiZANidine (ZANAFLEX) 2 MG tablet Take 2 mg by mouth 2 times daily      tetrabenazine (XENAZINE) 12.5 MG tablet Take 12.5 mg by mouth 2 times daily      SYNTHROID 125 MCG tablet Take 1 tablet by mouth daily Take with water on an empty stomach- wait 30 minutes before eating or taking other meds.  30 tablet 11    venlafaxine (EFFEXOR) 75 MG tablet Take 75 mg by mouth daily      Mirabegron ER (MYRBETRIQ) 50

## 2019-09-18 NOTE — PROGRESS NOTES
Chronic Disease Visit Information    BP Readings from Last 3 Encounters:   09/18/19 102/64   09/15/19 (!) 144/78   09/11/19 (!) 152/70          Hemoglobin A1C (%)   Date Value   01/19/2018 5.6   12/15/2015 5.8   06/14/2013 5.7     LDL Calculated (mg/dL)   Date Value   03/01/2013 93     HDL   Date Value   01/19/2018 71 mg/dL   03/16/2012 51 mg/dl     BUN (mg/dL)   Date Value   09/14/2019 27 (H)     CREATININE (mg/dL)   Date Value   09/14/2019 0.8     Glucose (mg/dL)   Date Value   09/14/2019 112 (H)   01/19/2018 73            Have you changed or started any medications since your last visit including any over-the-counter medicines, vitamins, or herbal medicines? yes - see updated med list   Are you having any side effects from any of your medications? -  no  Have you stopped taking any of your medications? Is so, why? -  no    Have you seen any other physician or provider since your last visit? Yes - Records Obtained  Have you had any other diagnostic tests since your last visit? Yes - Records Obtained  Have you been seen in the emergency room and/or had an admission to a hospital since we last saw you? Yes - Records Obtained  Have you had your annual diabetic retinal (eye) exam? N/A  Have you had your routine dental cleaning in the past 6 months? n/a  Have you activated your [employer] account? If not, what are your barriers?  Yes     Patient Care Team:  Jen De La Vega DO as PCP - General (Family Medicine)  Jen De La Vega DO as PCP - [facility]         Medical History Review  Past Medical, Family, and Social History reviewed and does contribute to the patient presenting condition    Health Maintenance   Topic Date Due    Hepatitis C screen  1950    DTaP/Tdap/Td vaccine (1 - Tdap) 07/02/1969    Shingles Vaccine (1 of 2) 07/02/2000    Colon cancer screen colonoscopy  07/02/2000    DEXA (modify frequency per FRAX score)  07/02/2015    Pneumococcal 65+ years Vaccine (1 of 2 - PCV13)

## 2019-09-19 ENCOUNTER — PATIENT MESSAGE (OUTPATIENT)
Dept: FAMILY MEDICINE CLINIC | Age: 69
End: 2019-09-19

## 2019-09-19 DIAGNOSIS — I71.20 THORACIC AORTIC ANEURYSM WITHOUT RUPTURE: Primary | ICD-10-CM

## 2019-09-19 PROBLEM — I71.21 ASCENDING AORTIC ANEURYSM: Status: ACTIVE | Noted: 2019-09-19

## 2019-09-19 NOTE — TELEPHONE ENCOUNTER
From: Tri Moore  To: Hosea Mera DO  Sent: 9/19/2019 1:41 PM EDT  Subject: Visit Follow-Up Question    [facility] needs a \"CT CHEST WO CONTRAST\" [FWE904]. Original order [id number]. Dr. Juwan Riggs ordered it, but the prior authorization department will not allow it ordered by a specailist. Makes no sense! It sounds like central scheduling from [facility]. [facility] will be contacting you to get the order, but if not, could you please send an order to [address] [place] [address] and [facility] Services.     Thanks  Rudy Francis and Xi Schulz

## 2019-09-22 NOTE — ED PROVIDER NOTES
[address]     Pt Name: Melissa Renteria  MRN: [id number]  [place] 1950  Date of evaluation: 9/22/19        Mid-level provider Note:    I have personally performed and/or participated in the history, exam and medical decision making and agree with all pertinent clinical information as noted by the previous provider. I have also reviewed and agree with the past medical, family and social history unless otherwise noted. I have personally performed a face to face diagnostic evaluation on this patient. I have reviewed the previous provider's findings and agree. Evaluation:  I assumed care of this patient from NIA stephen pending imaging. CT was reassuring but  Her troponin is elevated without renal insufficiency. She needs to be admitted for a cardiac work up. She is agreeable and the hospitalist graciously agreed to admit. Ct Abdomen Pelvis Wo Contrast Additional Contrast? None    Result Date: 9/12/2019  PROCEDURE: CT ABDOMEN PELVIS WO CONTRAST CLINICAL INFORMATION: abdominal pain. constipation . COMPARISON: May 8, 2010 TECHNIQUE: Axial 5 mm CT images were obtained through the abdomen and pelvis. No contrast was given. Coronal reconstructions were obtained. All CT scans at this facility use dose modulation, iterative reconstruction, and/or weight-based dosing when appropriate to reduce radiation dose to as low as reasonably achievable. FINDINGS:  The heart remains mildly enlarged. There are coarse markings at the posterior bases. There is visualization of a left lateral lower lobe subpleural nodule or granuloma partially visualized measuring 4 mm. There is no effusion present. The noncontrast appearance of the liver demonstrates mild hepatomegaly and fatty changes. The gallbladder is mildly distended but negative for visualized stone retention or wall thickening. There is no secondary biliary obstruction.  The pancreas and spleen are
Negative for diarrhea and vomiting. Genitourinary: Negative for decreased urine volume, difficulty urinating, dysuria, flank pain, frequency, hematuria and urgency. Musculoskeletal: Negative for arthralgias, back pain, myalgias and neck pain. PAST MEDICAL HISTORY     Past Medical History:   Diagnosis Date    Asthma     History of blood transfusion 2004    Hypertension     Mood disorder ([facility] [address]     Sleep apnea     Thyroid disease        SURGICALHISTORY      has a past surgical history that includes Total knee arthroplasty (Left, 10/14/14); hip surgery; Foot surgery; Ankle surgery;  section; Cataract removal; Hysterectomy; and Total knee arthroplasty (Right, 2019). CURRENT MEDICATIONS       Current Discharge Medication List      CONTINUE these medications which have NOT CHANGED    Details   clonazePAM (KLONOPIN) 1 MG tablet Take 1 mg by mouth 2 times daily. venlafaxine (EFFEXOR XR) 150 MG extended release capsule Take 150 mg by mouth daily      furosemide (LASIX) 40 MG tablet Take 1 tablet by mouth 2 times daily for 7 days  Qty: 14 tablet, Refills: 0    Associated Diagnoses: Bilateral lower extremity edema      OLANZapine (ZYPREXA) 7.5 MG tablet Take 7.5 mg by mouth nightly      naproxen (EC NAPROSYN) 500 MG EC tablet Take 500 mg by mouth 2 times daily (with meals)      omeprazole (PRILOSEC) 20 MG delayed release capsule Take 1 capsule by mouth daily  Qty: 30 capsule, Refills: 11      labetalol (TRANDATE) 100 MG tablet Take 1 tablet by mouth 2 times daily  Qty: 180 tablet, Refills: 3      tiZANidine (ZANAFLEX) 2 MG tablet Take 2 mg by mouth 2 times daily      tetrabenazine (XENAZINE) 12.5 MG tablet Take 12.5 mg by mouth 2 times daily      SYNTHROID 125 MCG tablet Take 1 tablet by mouth daily Take with water on an empty stomach- wait 30 minutes before eating or taking other meds.   Qty: 30 tablet, Refills: 11      venlafaxine (EFFEXOR) 75 MG tablet Take 75 mg by mouth daily

## 2019-09-24 ENCOUNTER — HOSPITAL ENCOUNTER (OUTPATIENT)
Dept: CT IMAGING | Age: 69
Discharge: HOME | End: 2019-09-24
Payer: MEDICARE

## 2019-09-24 DIAGNOSIS — I71.20 THORACIC AORTIC ANEURYSM WITHOUT RUPTURE: ICD-10-CM

## 2019-09-24 PROCEDURE — 71250 CT THORAX DX C-: CPT

## 2019-09-29 ENCOUNTER — HOSPITAL ENCOUNTER (EMERGENCY)
Age: 69
Discharge: HOME | End: 2019-09-30
Attending: EMERGENCY MEDICINE
Payer: MEDICARE

## 2019-09-29 ENCOUNTER — APPOINTMENT (OUTPATIENT)
Dept: GENERAL RADIOLOGY | Age: 69
End: 2019-09-29
Payer: MEDICARE

## 2019-09-29 ENCOUNTER — APPOINTMENT (OUTPATIENT)
Dept: CT IMAGING | Age: 69
End: 2019-09-29
Payer: MEDICARE

## 2019-09-29 DIAGNOSIS — I10 HYPERTENSION, UNSPECIFIED TYPE: Primary | ICD-10-CM

## 2019-09-29 LAB
ALBUMIN SERPL-MCNC: 4.3 G/DL (ref 3.5–5.1)
ALP BLD-CCNC: 83 U/L (ref 38–126)
ALT SERPL-CCNC: 18 U/L (ref 11–66)
ANION GAP SERPL CALCULATED.3IONS-SCNC: 14 MEQ/L (ref 8–16)
AST SERPL-CCNC: 19 U/L (ref 5–40)
BASOPHILS # BLD: 0.4 %
BASOPHILS ABSOLUTE: 0 THOU/MM3 (ref 0–0.1)
BILIRUB SERPL-MCNC: < 0.2 MG/DL (ref 0.3–1.2)
BILIRUBIN DIRECT: < 0.2 MG/DL (ref 0–0.3)
BUN BLDV-MCNC: 25 MG/DL (ref 7–22)
CALCIUM SERPL-MCNC: 9.8 MG/DL (ref 8.5–10.5)
CHLORIDE BLD-SCNC: 101 MEQ/L (ref 98–111)
CO2: 26 MEQ/L (ref 23–33)
CREAT SERPL-MCNC: 0.8 MG/DL (ref 0.4–1.2)
EKG ATRIAL RATE: 91 BPM
EKG P AXIS: 62 DEGREES
EKG P-R INTERVAL: 152 MS
EKG Q-T INTERVAL: 358 MS
EKG QRS DURATION: 74 MS
EKG QTC CALCULATION (BAZETT): 440 MS
EKG R AXIS: -16 DEGREES
EKG T AXIS: 27 DEGREES
EKG VENTRICULAR RATE: 91 BPM
EOSINOPHIL # BLD: 3.1 %
EOSINOPHILS ABSOLUTE: 0.3 THOU/MM3 (ref 0–0.4)
ERYTHROCYTE [DISTWIDTH] IN BLOOD BY AUTOMATED COUNT: 14.1 % (ref 11.5–14.5)
ERYTHROCYTE [DISTWIDTH] IN BLOOD BY AUTOMATED COUNT: 46.3 FL (ref 35–45)
GFR SERPL CREATININE-BSD FRML MDRD: 71 ML/MIN/1.73M2
GLUCOSE BLD-MCNC: 102 MG/DL (ref 70–108)
HCT VFR BLD CALC: 38.5 % (ref 37–47)
HEMOGLOBIN: 12.1 GM/DL (ref 12–16)
IMMATURE GRANS (ABS): 0.02 THOU/MM3 (ref 0–0.07)
IMMATURE GRANULOCYTES: 0.2 %
LIPASE: 64.2 U/L (ref 5.6–51.3)
LYMPHOCYTES # BLD: 24.8 %
LYMPHOCYTES ABSOLUTE: 2.1 THOU/MM3 (ref 1–4.8)
MCH RBC QN AUTO: 28.3 PG (ref 26–33)
MCHC RBC AUTO-ENTMCNC: 31.4 GM/DL (ref 32.2–35.5)
MCV RBC AUTO: 90.2 FL (ref 81–99)
MONOCYTES # BLD: 10.8 %
MONOCYTES ABSOLUTE: 0.9 THOU/MM3 (ref 0.4–1.3)
NUCLEATED RED BLOOD CELLS: 0 /100 WBC
OSMOLALITY CALCULATION: 285.9 MOSMOL/KG (ref 275–300)
PLATELET # BLD: 324 THOU/MM3 (ref 130–400)
PMV BLD AUTO: 8.9 FL (ref 9.4–12.4)
POTASSIUM SERPL-SCNC: 3.9 MEQ/L (ref 3.5–5.2)
PRO-BNP: 229.5 PG/ML (ref 0–900)
RBC # BLD: 4.27 MILL/MM3 (ref 4.2–5.4)
SEG NEUTROPHILS: 60.7 %
SEGMENTED NEUTROPHILS ABSOLUTE COUNT: 5 THOU/MM3 (ref 1.8–7.7)
SODIUM BLD-SCNC: 141 MEQ/L (ref 135–145)
TOTAL PROTEIN: 7.1 G/DL (ref 6.1–8)
TROPONIN T: 0.03 NG/ML
TSH SERPL DL<=0.05 MIU/L-ACNC: 2.62 UIU/ML (ref 0.4–4.2)
WBC # BLD: 8.3 THOU/MM3 (ref 4.8–10.8)

## 2019-09-29 PROCEDURE — 99285 EMERGENCY DEPT VISIT HI MDM: CPT

## 2019-09-29 PROCEDURE — 93005 ELECTROCARDIOGRAM TRACING: CPT | Performed by: EMERGENCY MEDICINE

## 2019-09-29 PROCEDURE — 83880 ASSAY OF NATRIURETIC PEPTIDE: CPT

## 2019-09-29 PROCEDURE — 2500000003 HC RX 250 WO HCPCS: Performed by: EMERGENCY MEDICINE

## 2019-09-29 PROCEDURE — 85025 COMPLETE CBC W/AUTO DIFF WBC: CPT

## 2019-09-29 PROCEDURE — 80053 COMPREHEN METABOLIC PANEL: CPT

## 2019-09-29 PROCEDURE — 36415 COLL VENOUS BLD VENIPUNCTURE: CPT

## 2019-09-29 PROCEDURE — 84484 ASSAY OF TROPONIN QUANT: CPT

## 2019-09-29 PROCEDURE — 83690 ASSAY OF LIPASE: CPT

## 2019-09-29 PROCEDURE — 71046 X-RAY EXAM CHEST 2 VIEWS: CPT

## 2019-09-29 PROCEDURE — 82248 BILIRUBIN DIRECT: CPT

## 2019-09-29 PROCEDURE — 6370000000 HC RX 637 (ALT 250 FOR IP): Performed by: EMERGENCY MEDICINE

## 2019-09-29 PROCEDURE — 84443 ASSAY THYROID STIM HORMONE: CPT

## 2019-09-29 PROCEDURE — 70450 CT HEAD/BRAIN W/O DYE: CPT

## 2019-09-29 PROCEDURE — 96374 THER/PROPH/DIAG INJ IV PUSH: CPT

## 2019-09-29 RX ORDER — HYDRALAZINE HYDROCHLORIDE 20 MG/ML
10 INJECTION INTRAMUSCULAR; INTRAVENOUS ONCE
Status: DISCONTINUED | OUTPATIENT
Start: 2019-09-29 | End: 2019-09-29

## 2019-09-29 RX ORDER — METOPROLOL TARTRATE 5 MG/5ML
5 INJECTION INTRAVENOUS ONCE
Status: COMPLETED | OUTPATIENT
Start: 2019-09-29 | End: 2019-09-29

## 2019-09-29 RX ORDER — CLONIDINE HYDROCHLORIDE 0.1 MG/1
0.1 TABLET ORAL ONCE
Status: COMPLETED | OUTPATIENT
Start: 2019-09-29 | End: 2019-09-29

## 2019-09-29 RX ADMIN — CLONIDINE HYDROCHLORIDE 0.1 MG: 0.1 TABLET ORAL at 23:44

## 2019-09-29 RX ADMIN — METOPROLOL TARTRATE 5 MG: 5 INJECTION INTRAVENOUS at 23:44

## 2019-09-29 ASSESSMENT — ENCOUNTER SYMPTOMS
ABDOMINAL PAIN: 0
VOMITING: 0
PHOTOPHOBIA: 0
ABDOMINAL DISTENTION: 0
TROUBLE SWALLOWING: 0
WHEEZING: 0
BACK PAIN: 0
SORE THROAT: 0
BLOOD IN STOOL: 0
EYE DISCHARGE: 0
CHEST TIGHTNESS: 0
EYE PAIN: 0
SINUS PRESSURE: 0
DIARRHEA: 0
RHINORRHEA: 0
VOICE CHANGE: 0
COUGH: 0
SHORTNESS OF BREATH: 1
EYE ITCHING: 0
CHOKING: 0
CONSTIPATION: 0
NAUSEA: 0
EYE REDNESS: 0

## 2019-09-30 ENCOUNTER — TELEPHONE (OUTPATIENT)
Dept: FAMILY MEDICINE CLINIC | Age: 69
End: 2019-09-30

## 2019-09-30 VITALS
BODY MASS INDEX: 41.64 KG/M2 | HEIGHT: 63 IN | DIASTOLIC BLOOD PRESSURE: 65 MMHG | WEIGHT: 235 LBS | TEMPERATURE: 98.4 F | RESPIRATION RATE: 16 BRPM | SYSTOLIC BLOOD PRESSURE: 144 MMHG | HEART RATE: 94 BPM | OXYGEN SATURATION: 96 %

## 2019-09-30 LAB
BILIRUBIN URINE: NEGATIVE
BLOOD, URINE: NEGATIVE
CHARACTER, URINE: CLEAR
COLOR: YELLOW
GLUCOSE URINE: NEGATIVE MG/DL
KETONES, URINE: NEGATIVE
LEUKOCYTE ESTERASE, URINE: NEGATIVE
NITRITE, URINE: NEGATIVE
PH UA: 6.5 (ref 5–9)
PROTEIN UA: NEGATIVE
SPECIFIC GRAVITY, URINE: 1.01 (ref 1–1.03)
UROBILINOGEN, URINE: 0.2 EU/DL (ref 0–1)

## 2019-09-30 PROCEDURE — 93010 ELECTROCARDIOGRAM REPORT: CPT | Performed by: INTERNAL MEDICINE

## 2019-09-30 PROCEDURE — 6360000002 HC RX W HCPCS: Performed by: EMERGENCY MEDICINE

## 2019-09-30 PROCEDURE — 81003 URINALYSIS AUTO W/O SCOPE: CPT

## 2019-09-30 PROCEDURE — 96375 TX/PRO/DX INJ NEW DRUG ADDON: CPT

## 2019-09-30 RX ORDER — HYDRALAZINE HYDROCHLORIDE 20 MG/ML
20 INJECTION INTRAMUSCULAR; INTRAVENOUS ONCE
Status: COMPLETED | OUTPATIENT
Start: 2019-09-30 | End: 2019-09-30

## 2019-09-30 RX ORDER — CLONIDINE HYDROCHLORIDE 0.1 MG/1
0.1 TABLET ORAL PRN
Qty: 15 TABLET | Refills: 0 | Status: SHIPPED | OUTPATIENT
Start: 2019-09-30 | End: 2019-11-20 | Stop reason: ALTCHOICE

## 2019-09-30 RX ADMIN — HYDRALAZINE HYDROCHLORIDE 20 MG: 20 INJECTION INTRAMUSCULAR; INTRAVENOUS at 00:40

## 2019-10-01 ENCOUNTER — OFFICE VISIT (OUTPATIENT)
Dept: CARDIOLOGY CLINIC | Age: 69
End: 2019-10-01
Payer: MEDICARE

## 2019-10-01 VITALS
HEIGHT: 63 IN | SYSTOLIC BLOOD PRESSURE: 154 MMHG | WEIGHT: 235 LBS | HEART RATE: 78 BPM | DIASTOLIC BLOOD PRESSURE: 72 MMHG | BODY MASS INDEX: 41.64 KG/M2

## 2019-10-01 DIAGNOSIS — R06.02 SOB (SHORTNESS OF BREATH) ON EXERTION: ICD-10-CM

## 2019-10-01 DIAGNOSIS — R60.0 BILATERAL LOWER EXTREMITY EDEMA: ICD-10-CM

## 2019-10-01 DIAGNOSIS — I72.9 ANEURYSM (HCC): Primary | ICD-10-CM

## 2019-10-01 DIAGNOSIS — I10 ESSENTIAL (PRIMARY) HYPERTENSION: ICD-10-CM

## 2019-10-01 PROCEDURE — G8400 PT W/DXA NO RESULTS DOC: HCPCS | Performed by: INTERNAL MEDICINE

## 2019-10-01 PROCEDURE — G8428 CUR MEDS NOT DOCUMENT: HCPCS | Performed by: INTERNAL MEDICINE

## 2019-10-01 PROCEDURE — 1090F PRES/ABSN URINE INCON ASSESS: CPT | Performed by: INTERNAL MEDICINE

## 2019-10-01 PROCEDURE — 4040F PNEUMOC VAC/ADMIN/RCVD: CPT | Performed by: INTERNAL MEDICINE

## 2019-10-01 PROCEDURE — 1036F TOBACCO NON-USER: CPT | Performed by: INTERNAL MEDICINE

## 2019-10-01 PROCEDURE — 99214 OFFICE O/P EST MOD 30 MIN: CPT | Performed by: INTERNAL MEDICINE

## 2019-10-01 PROCEDURE — 3017F COLORECTAL CA SCREEN DOC REV: CPT | Performed by: INTERNAL MEDICINE

## 2019-10-01 PROCEDURE — 1123F ACP DISCUSS/DSCN MKR DOCD: CPT | Performed by: INTERNAL MEDICINE

## 2019-10-01 PROCEDURE — G8484 FLU IMMUNIZE NO ADMIN: HCPCS | Performed by: INTERNAL MEDICINE

## 2019-10-01 PROCEDURE — 93000 ELECTROCARDIOGRAM COMPLETE: CPT | Performed by: INTERNAL MEDICINE

## 2019-10-01 PROCEDURE — G8417 CALC BMI ABV UP PARAM F/U: HCPCS | Performed by: INTERNAL MEDICINE

## 2019-10-01 RX ORDER — HYDRALAZINE HYDROCHLORIDE 25 MG/1
25 TABLET, FILM COATED ORAL EVERY 8 HOURS
Qty: 270 TABLET | Refills: 3 | Status: SHIPPED | OUTPATIENT
Start: 2019-10-01 | End: 2019-10-07 | Stop reason: ALTCHOICE

## 2019-10-01 RX ORDER — LABETALOL 200 MG/1
200 TABLET, FILM COATED ORAL 2 TIMES DAILY
Qty: 135 TABLET | Refills: 3 | Status: SHIPPED | OUTPATIENT
Start: 2019-10-01 | End: 2019-11-20

## 2019-10-01 RX ORDER — FUROSEMIDE 40 MG/1
40 TABLET ORAL 2 TIMES DAILY
Qty: 180 TABLET | Refills: 3 | Status: SHIPPED | OUTPATIENT
Start: 2019-10-01 | End: 2019-10-14 | Stop reason: DRUGHIGH

## 2019-10-02 ENCOUNTER — HOSPITAL ENCOUNTER (OUTPATIENT)
Dept: ULTRASOUND IMAGING | Age: 69
Discharge: HOME | End: 2019-10-02
Payer: MEDICARE

## 2019-10-02 DIAGNOSIS — K76.0 FATTY LIVER: ICD-10-CM

## 2019-10-02 PROCEDURE — 76705 ECHO EXAM OF ABDOMEN: CPT

## 2019-10-02 PROCEDURE — 93975 VASCULAR STUDY: CPT

## 2019-10-07 ENCOUNTER — OFFICE VISIT (OUTPATIENT)
Dept: CARDIOLOGY CLINIC | Age: 69
End: 2019-10-07
Payer: MEDICARE

## 2019-10-07 VITALS
DIASTOLIC BLOOD PRESSURE: 84 MMHG | HEART RATE: 80 BPM | BODY MASS INDEX: 41.82 KG/M2 | SYSTOLIC BLOOD PRESSURE: 170 MMHG | WEIGHT: 236 LBS | HEIGHT: 63 IN

## 2019-10-07 DIAGNOSIS — I10 ESSENTIAL HYPERTENSION: ICD-10-CM

## 2019-10-07 DIAGNOSIS — I50.32 CHRONIC DIASTOLIC CONGESTIVE HEART FAILURE (HCC): Primary | ICD-10-CM

## 2019-10-07 PROCEDURE — 99213 OFFICE O/P EST LOW 20 MIN: CPT | Performed by: PHYSICIAN ASSISTANT

## 2019-10-07 PROCEDURE — 1036F TOBACCO NON-USER: CPT | Performed by: PHYSICIAN ASSISTANT

## 2019-10-07 PROCEDURE — 1090F PRES/ABSN URINE INCON ASSESS: CPT | Performed by: PHYSICIAN ASSISTANT

## 2019-10-07 PROCEDURE — G8484 FLU IMMUNIZE NO ADMIN: HCPCS | Performed by: PHYSICIAN ASSISTANT

## 2019-10-07 PROCEDURE — 3017F COLORECTAL CA SCREEN DOC REV: CPT | Performed by: PHYSICIAN ASSISTANT

## 2019-10-07 PROCEDURE — 4040F PNEUMOC VAC/ADMIN/RCVD: CPT | Performed by: PHYSICIAN ASSISTANT

## 2019-10-07 PROCEDURE — G8417 CALC BMI ABV UP PARAM F/U: HCPCS | Performed by: PHYSICIAN ASSISTANT

## 2019-10-07 PROCEDURE — G8400 PT W/DXA NO RESULTS DOC: HCPCS | Performed by: PHYSICIAN ASSISTANT

## 2019-10-07 PROCEDURE — G8427 DOCREV CUR MEDS BY ELIG CLIN: HCPCS | Performed by: PHYSICIAN ASSISTANT

## 2019-10-07 PROCEDURE — 1123F ACP DISCUSS/DSCN MKR DOCD: CPT | Performed by: PHYSICIAN ASSISTANT

## 2019-10-07 RX ORDER — HYDRALAZINE HYDROCHLORIDE 50 MG/1
50 TABLET, FILM COATED ORAL 3 TIMES DAILY
Qty: 90 TABLET | Refills: 3 | Status: ON HOLD | OUTPATIENT
Start: 2019-10-07 | End: 2019-10-18 | Stop reason: HOSPADM

## 2019-10-08 LAB
B-TYPE NATRIURETIC PEPTIDE: 106 PG/ML
BUN BLDV-MCNC: 31 MG/DL
CALCIUM SERPL-MCNC: 9.9 MG/DL
CHLORIDE BLD-SCNC: 103 MMOL/L
CHOLESTEROL, TOTAL: 180 MG/DL
CHOLESTEROL/HDL RATIO: 3.4
CO2: 25 MMOL/L
CREAT SERPL-MCNC: 1.06 MG/DL
GFR CALCULATED: 51
GLUCOSE BLD-MCNC: 102 MG/DL
HDLC SERPL-MCNC: 52 MG/DL (ref 35–70)
LDL CHOLESTEROL CALCULATED: 120 MG/DL (ref 0–160)
POTASSIUM SERPL-SCNC: 4.4 MMOL/L
SODIUM BLD-SCNC: 138 MMOL/L
TRIGL SERPL-MCNC: 137 MG/DL
VLDLC SERPL CALC-MCNC: 27 MG/DL

## 2019-10-10 ENCOUNTER — TELEPHONE (OUTPATIENT)
Dept: CARDIOLOGY CLINIC | Age: 69
End: 2019-10-10

## 2019-10-10 DIAGNOSIS — I50.32 CHRONIC DIASTOLIC CONGESTIVE HEART FAILURE (HCC): Primary | ICD-10-CM

## 2019-10-11 ENCOUNTER — HOSPITAL ENCOUNTER (OUTPATIENT)
Dept: NON INVASIVE DIAGNOSTICS | Age: 69
Discharge: HOME | End: 2019-10-11
Payer: MEDICARE

## 2019-10-11 DIAGNOSIS — R60.0 BILATERAL LOWER EXTREMITY EDEMA: ICD-10-CM

## 2019-10-11 DIAGNOSIS — I72.9 ANEURYSM (HCC): ICD-10-CM

## 2019-10-11 DIAGNOSIS — R06.02 SOB (SHORTNESS OF BREATH) ON EXERTION: ICD-10-CM

## 2019-10-11 LAB
ANION GAP SERPL CALCULATED.3IONS-SCNC: 10 MMOL/L (ref 4–12)
B-TYPE NATRIURETIC PEPTIDE: 106 PG/ML (ref 0–100)
BUN BLDV-MCNC: 31 MG/DL (ref 7–20)
CALCIUM SERPL-MCNC: 9.9 MG/DL (ref 8.8–10.5)
CHLORIDE BLD-SCNC: 103 MEQ/L (ref 101–111)
CHOLESTEROL/HDL RELATIVE RISK: 3.4 (ref 4–4.4)
CHOLESTEROL: 180 MG/DL
CO2: 25 MEQ/L (ref 21–32)
CREAT SERPL-MCNC: 1.06 MG/DL (ref 0.6–1.3)
CREATININE CLEARANCE: 51
DIRECT-LDL / HDL RISK: 2.3
GLUCOSE: 102 MG/DL (ref 70–110)
HDLC SERPL-MCNC: 52 MG/DL
LDL CHOLESTEROL DIRECT: 120 MG/DL
POTASSIUM SERPL-SCNC: 4.4 MEQ/L (ref 3.6–5)
SODIUM BLD-SCNC: 138 MEQ/L (ref 135–145)
TRIGL SERPL-MCNC: 137 MG/DL
VLDLC SERPL CALC-MCNC: 27 MG/DL

## 2019-10-11 PROCEDURE — 6360000002 HC RX W HCPCS

## 2019-10-11 PROCEDURE — 3430000000 HC RX DIAGNOSTIC RADIOPHARMACEUTICAL: Performed by: INTERNAL MEDICINE

## 2019-10-11 PROCEDURE — A9500 TC99M SESTAMIBI: HCPCS | Performed by: INTERNAL MEDICINE

## 2019-10-11 PROCEDURE — 93017 CV STRESS TEST TRACING ONLY: CPT | Performed by: INTERNAL MEDICINE

## 2019-10-11 PROCEDURE — 78452 HT MUSCLE IMAGE SPECT MULT: CPT

## 2019-10-11 PROCEDURE — 2709999900 HC NON-CHARGEABLE SUPPLY

## 2019-10-11 RX ADMIN — Medication 34.9 MILLICURIE: at 09:25

## 2019-10-11 RX ADMIN — Medication 10.2 MILLICURIE: at 08:15

## 2019-10-14 ENCOUNTER — HOSPITAL ENCOUNTER (OUTPATIENT)
Age: 69
Discharge: HOME | End: 2019-10-14
Payer: MEDICARE

## 2019-10-14 ENCOUNTER — TELEPHONE (OUTPATIENT)
Dept: CARDIOLOGY CLINIC | Age: 69
End: 2019-10-14

## 2019-10-14 ENCOUNTER — OFFICE VISIT (OUTPATIENT)
Dept: CARDIOLOGY CLINIC | Age: 69
End: 2019-10-14
Payer: MEDICARE

## 2019-10-14 VITALS
BODY MASS INDEX: 42.28 KG/M2 | OXYGEN SATURATION: 91 % | HEART RATE: 74 BPM | HEIGHT: 63 IN | WEIGHT: 238.6 LBS | SYSTOLIC BLOOD PRESSURE: 130 MMHG | DIASTOLIC BLOOD PRESSURE: 70 MMHG

## 2019-10-14 DIAGNOSIS — R06.00 DYSPNEA, UNSPECIFIED TYPE: ICD-10-CM

## 2019-10-14 DIAGNOSIS — I50.33 CHF (CONGESTIVE HEART FAILURE), NYHA CLASS III, ACUTE ON CHRONIC, DIASTOLIC (HCC): Primary | ICD-10-CM

## 2019-10-14 DIAGNOSIS — R60.0 BILATERAL LOWER EXTREMITY EDEMA: ICD-10-CM

## 2019-10-14 DIAGNOSIS — R94.39 ABNORMAL STRESS TEST: ICD-10-CM

## 2019-10-14 DIAGNOSIS — I50.32 CHRONIC DIASTOLIC CONGESTIVE HEART FAILURE (HCC): ICD-10-CM

## 2019-10-14 PROBLEM — I71.20 THORACIC AORTIC ANEURYSM WITHOUT RUPTURE (HCC): Status: ACTIVE | Noted: 2019-09-19

## 2019-10-14 LAB
ANION GAP SERPL CALCULATED.3IONS-SCNC: 12 MEQ/L (ref 8–16)
BUN BLDV-MCNC: 35 MG/DL (ref 7–22)
CALCIUM SERPL-MCNC: 10.4 MG/DL (ref 8.5–10.5)
CHLORIDE BLD-SCNC: 98 MEQ/L (ref 98–111)
CO2: 29 MEQ/L (ref 23–33)
CREAT SERPL-MCNC: 0.9 MG/DL (ref 0.4–1.2)
GFR SERPL CREATININE-BSD FRML MDRD: 62 ML/MIN/1.73M2
GLUCOSE BLD-MCNC: 101 MG/DL (ref 70–108)
POTASSIUM SERPL-SCNC: 4.6 MEQ/L (ref 3.5–5.2)
PRO-BNP: 286.1 PG/ML (ref 0–900)
SODIUM BLD-SCNC: 139 MEQ/L (ref 135–145)

## 2019-10-14 PROCEDURE — G8484 FLU IMMUNIZE NO ADMIN: HCPCS | Performed by: NURSE PRACTITIONER

## 2019-10-14 PROCEDURE — G8427 DOCREV CUR MEDS BY ELIG CLIN: HCPCS | Performed by: NURSE PRACTITIONER

## 2019-10-14 PROCEDURE — 1123F ACP DISCUSS/DSCN MKR DOCD: CPT | Performed by: NURSE PRACTITIONER

## 2019-10-14 PROCEDURE — G8400 PT W/DXA NO RESULTS DOC: HCPCS | Performed by: NURSE PRACTITIONER

## 2019-10-14 PROCEDURE — 3017F COLORECTAL CA SCREEN DOC REV: CPT | Performed by: NURSE PRACTITIONER

## 2019-10-14 PROCEDURE — 1090F PRES/ABSN URINE INCON ASSESS: CPT | Performed by: NURSE PRACTITIONER

## 2019-10-14 PROCEDURE — 1036F TOBACCO NON-USER: CPT | Performed by: NURSE PRACTITIONER

## 2019-10-14 PROCEDURE — 4040F PNEUMOC VAC/ADMIN/RCVD: CPT | Performed by: NURSE PRACTITIONER

## 2019-10-14 PROCEDURE — 99214 OFFICE O/P EST MOD 30 MIN: CPT | Performed by: NURSE PRACTITIONER

## 2019-10-14 PROCEDURE — 96374 THER/PROPH/DIAG INJ IV PUSH: CPT | Performed by: NURSE PRACTITIONER

## 2019-10-14 PROCEDURE — 83880 ASSAY OF NATRIURETIC PEPTIDE: CPT

## 2019-10-14 PROCEDURE — 80048 BASIC METABOLIC PNL TOTAL CA: CPT

## 2019-10-14 PROCEDURE — G8417 CALC BMI ABV UP PARAM F/U: HCPCS | Performed by: NURSE PRACTITIONER

## 2019-10-14 PROCEDURE — 36415 COLL VENOUS BLD VENIPUNCTURE: CPT

## 2019-10-14 RX ORDER — FUROSEMIDE 10 MG/ML
60 INJECTION INTRAMUSCULAR; INTRAVENOUS ONCE
Status: COMPLETED | OUTPATIENT
Start: 2019-10-14 | End: 2019-10-14

## 2019-10-14 RX ORDER — FUROSEMIDE 80 MG
80 TABLET ORAL 2 TIMES DAILY
COMMUNITY
End: 2019-10-14 | Stop reason: SDUPTHER

## 2019-10-14 RX ORDER — ATORVASTATIN CALCIUM 20 MG/1
20 TABLET, FILM COATED ORAL DAILY
COMMUNITY
End: 2019-10-14 | Stop reason: SDUPTHER

## 2019-10-14 RX ORDER — ATORVASTATIN CALCIUM 20 MG/1
20 TABLET, FILM COATED ORAL DAILY
Qty: 90 TABLET | Refills: 3 | Status: SHIPPED | OUTPATIENT
Start: 2019-10-14 | End: 2020-09-14 | Stop reason: SDUPTHER

## 2019-10-14 RX ORDER — FUROSEMIDE 80 MG
80 TABLET ORAL 2 TIMES DAILY
Qty: 180 TABLET | Refills: 3 | Status: ON HOLD | OUTPATIENT
Start: 2019-10-14 | End: 2019-10-18 | Stop reason: HOSPADM

## 2019-10-14 RX ORDER — POTASSIUM CHLORIDE 20 MEQ/1
40 TABLET, EXTENDED RELEASE ORAL ONCE
Status: COMPLETED | OUTPATIENT
Start: 2019-10-14 | End: 2019-10-14

## 2019-10-14 RX ADMIN — FUROSEMIDE 60 MG: 10 INJECTION INTRAMUSCULAR; INTRAVENOUS at 09:42

## 2019-10-14 RX ADMIN — POTASSIUM CHLORIDE 40 MEQ: 20 TABLET, EXTENDED RELEASE ORAL at 09:42

## 2019-10-14 ASSESSMENT — ENCOUNTER SYMPTOMS
ABDOMINAL DISTENTION: 1
SHORTNESS OF BREATH: 1
COUGH: 0

## 2019-10-17 ENCOUNTER — TELEPHONE (OUTPATIENT)
Dept: CARDIOLOGY CLINIC | Age: 69
End: 2019-10-17

## 2019-10-17 DIAGNOSIS — N17.9 AKI (ACUTE KIDNEY INJURY) (HCC): Primary | ICD-10-CM

## 2019-10-17 DIAGNOSIS — I50.33 ACUTE ON CHRONIC HEART FAILURE WITH PRESERVED EJECTION FRACTION (HCC): ICD-10-CM

## 2019-10-18 ENCOUNTER — HOSPITAL ENCOUNTER (OUTPATIENT)
Dept: INPATIENT UNIT | Age: 69
Discharge: HOME | End: 2019-10-18
Attending: INTERNAL MEDICINE | Admitting: INTERNAL MEDICINE
Payer: MEDICARE

## 2019-10-18 VITALS
HEART RATE: 74 BPM | BODY MASS INDEX: 40.93 KG/M2 | SYSTOLIC BLOOD PRESSURE: 93 MMHG | DIASTOLIC BLOOD PRESSURE: 49 MMHG | TEMPERATURE: 97.3 F | WEIGHT: 231 LBS | OXYGEN SATURATION: 93 % | HEIGHT: 63 IN | RESPIRATION RATE: 20 BRPM

## 2019-10-18 DIAGNOSIS — R94.39 ABNORMAL STRESS TEST: ICD-10-CM

## 2019-10-18 LAB
ABO: NORMAL
ANION GAP SERPL CALCULATED.3IONS-SCNC: 13 MEQ/L (ref 8–16)
ANTIBODY SCREEN: NORMAL
APTT: 34.1 SECONDS (ref 22–38)
BUN BLDV-MCNC: 38 MG/DL (ref 7–22)
CALCIUM SERPL-MCNC: 9.4 MG/DL (ref 8.5–10.5)
CHLORIDE BLD-SCNC: 97 MEQ/L (ref 98–111)
CO2: 27 MEQ/L (ref 23–33)
COLLECTED BY:: ABNORMAL
COLLECTED BY:: ABNORMAL
COLLECTED BY:: NORMAL
CREAT SERPL-MCNC: 1.3 MG/DL (ref 0.4–1.2)
EKG ATRIAL RATE: 69 BPM
EKG P AXIS: 5 DEGREES
EKG P-R INTERVAL: 182 MS
EKG Q-T INTERVAL: 424 MS
EKG QRS DURATION: 90 MS
EKG QTC CALCULATION (BAZETT): 454 MS
EKG R AXIS: -21 DEGREES
EKG T AXIS: -3 DEGREES
EKG VENTRICULAR RATE: 69 BPM
ERYTHROCYTE [DISTWIDTH] IN BLOOD BY AUTOMATED COUNT: 14.3 % (ref 11.5–14.5)
ERYTHROCYTE [DISTWIDTH] IN BLOOD BY AUTOMATED COUNT: 48.3 FL (ref 35–45)
GFR SERPL CREATININE-BSD FRML MDRD: 41 ML/MIN/1.73M2
GLUCOSE BLD-MCNC: 114 MG/DL (ref 70–108)
HCT VFR BLD CALC: 33.5 % (ref 37–47)
HEMOGLOBIN: 10.4 GM/DL (ref 12–16)
INR BLD: 0.9 (ref 0.85–1.13)
MCH RBC QN AUTO: 28.5 PG (ref 26–33)
MCHC RBC AUTO-ENTMCNC: 31 GM/DL (ref 32.2–35.5)
MCV RBC AUTO: 91.8 FL (ref 81–99)
PLATELET # BLD: 277 THOU/MM3 (ref 130–400)
PMV BLD AUTO: 8.7 FL (ref 9.4–12.4)
POC O2 SATURATION: 77 % (ref 94–97)
POC O2 SATURATION: 80 % (ref 94–97)
POC O2 SATURATION: 96 % (ref 94–97)
POTASSIUM SERPL-SCNC: 4.3 MEQ/L (ref 3.5–5.2)
RBC # BLD: 3.65 MILL/MM3 (ref 4.2–5.4)
RH FACTOR: NORMAL
SODIUM BLD-SCNC: 137 MEQ/L (ref 135–145)
SOURCE, BLOOD GAS: ABNORMAL
SOURCE, BLOOD GAS: ABNORMAL
SOURCE, BLOOD GAS: NORMAL
WBC # BLD: 6.3 THOU/MM3 (ref 4.8–10.8)

## 2019-10-18 PROCEDURE — 93456 R HRT CORONARY ARTERY ANGIO: CPT | Performed by: INTERNAL MEDICINE

## 2019-10-18 PROCEDURE — 86900 BLOOD TYPING SEROLOGIC ABO: CPT

## 2019-10-18 PROCEDURE — 93460 R&L HRT ART/VENTRICLE ANGIO: CPT

## 2019-10-18 PROCEDURE — 93005 ELECTROCARDIOGRAM TRACING: CPT | Performed by: INTERNAL MEDICINE

## 2019-10-18 PROCEDURE — C1769 GUIDE WIRE: HCPCS

## 2019-10-18 PROCEDURE — 86901 BLOOD TYPING SEROLOGIC RH(D): CPT

## 2019-10-18 PROCEDURE — 85730 THROMBOPLASTIN TIME PARTIAL: CPT

## 2019-10-18 PROCEDURE — 82810 BLOOD GASES O2 SAT ONLY: CPT

## 2019-10-18 PROCEDURE — 2580000003 HC RX 258: Performed by: INTERNAL MEDICINE

## 2019-10-18 PROCEDURE — 2500000003 HC RX 250 WO HCPCS

## 2019-10-18 PROCEDURE — C1887 CATHETER, GUIDING: HCPCS

## 2019-10-18 PROCEDURE — 80048 BASIC METABOLIC PNL TOTAL CA: CPT

## 2019-10-18 PROCEDURE — C1894 INTRO/SHEATH, NON-LASER: HCPCS

## 2019-10-18 PROCEDURE — 86850 RBC ANTIBODY SCREEN: CPT

## 2019-10-18 PROCEDURE — 6360000002 HC RX W HCPCS

## 2019-10-18 PROCEDURE — 36415 COLL VENOUS BLD VENIPUNCTURE: CPT

## 2019-10-18 PROCEDURE — 85027 COMPLETE CBC AUTOMATED: CPT

## 2019-10-18 PROCEDURE — 2709999900 HC NON-CHARGEABLE SUPPLY

## 2019-10-18 PROCEDURE — 6370000000 HC RX 637 (ALT 250 FOR IP): Performed by: INTERNAL MEDICINE

## 2019-10-18 PROCEDURE — 6360000004 HC RX CONTRAST MEDICATION: Performed by: INTERNAL MEDICINE

## 2019-10-18 PROCEDURE — 85610 PROTHROMBIN TIME: CPT

## 2019-10-18 RX ORDER — SODIUM CHLORIDE 9 MG/ML
INJECTION, SOLUTION INTRAVENOUS CONTINUOUS
Status: DISCONTINUED | OUTPATIENT
Start: 2019-10-18 | End: 2019-10-18 | Stop reason: HOSPADM

## 2019-10-18 RX ORDER — HYDRALAZINE HYDROCHLORIDE 100 MG/1
100 TABLET, FILM COATED ORAL 3 TIMES DAILY
Qty: 60 TABLET | Refills: 3 | Status: SHIPPED | OUTPATIENT
Start: 2019-10-18 | End: 2019-11-04 | Stop reason: DRUGHIGH

## 2019-10-18 RX ORDER — ASPIRIN 325 MG
325 TABLET ORAL ONCE
Status: COMPLETED | OUTPATIENT
Start: 2019-10-18 | End: 2019-10-18

## 2019-10-18 RX ORDER — BUMETANIDE 1 MG/1
1 TABLET ORAL EVERY 8 HOURS
Qty: 30 TABLET | Refills: 3 | Status: SHIPPED | OUTPATIENT
Start: 2019-10-18 | End: 2019-11-20

## 2019-10-18 RX ORDER — SODIUM CHLORIDE 0.9 % (FLUSH) 0.9 %
10 SYRINGE (ML) INJECTION PRN
Status: DISCONTINUED | OUTPATIENT
Start: 2019-10-18 | End: 2019-10-18 | Stop reason: HOSPADM

## 2019-10-18 RX ADMIN — IOPAMIDOL 40 ML: 755 INJECTION, SOLUTION INTRAVENOUS at 09:20

## 2019-10-18 RX ADMIN — SODIUM CHLORIDE: 9 INJECTION, SOLUTION INTRAVENOUS at 07:16

## 2019-10-18 RX ADMIN — ASPIRIN 325 MG: 325 TABLET, FILM COATED ORAL at 07:10

## 2019-10-19 PROCEDURE — 93010 ELECTROCARDIOGRAM REPORT: CPT | Performed by: INTERNAL MEDICINE

## 2019-10-21 ENCOUNTER — TELEPHONE (OUTPATIENT)
Dept: CARDIOLOGY CLINIC | Age: 69
End: 2019-10-21

## 2019-10-21 ENCOUNTER — TELEPHONE (OUTPATIENT)
Dept: FAMILY MEDICINE CLINIC | Age: 69
End: 2019-10-21

## 2019-10-29 ENCOUNTER — OFFICE VISIT (OUTPATIENT)
Dept: CARDIOLOGY CLINIC | Age: 69
End: 2019-10-29
Payer: MEDICARE

## 2019-10-29 VITALS
SYSTOLIC BLOOD PRESSURE: 170 MMHG | HEART RATE: 73 BPM | DIASTOLIC BLOOD PRESSURE: 86 MMHG | WEIGHT: 231.4 LBS | HEIGHT: 63 IN | OXYGEN SATURATION: 90 % | BODY MASS INDEX: 41 KG/M2

## 2019-10-29 DIAGNOSIS — I50.32 CHF (CONGESTIVE HEART FAILURE), NYHA CLASS III, CHRONIC, DIASTOLIC (HCC): Primary | ICD-10-CM

## 2019-10-29 PROCEDURE — 4040F PNEUMOC VAC/ADMIN/RCVD: CPT | Performed by: NURSE PRACTITIONER

## 2019-10-29 PROCEDURE — 99213 OFFICE O/P EST LOW 20 MIN: CPT | Performed by: NURSE PRACTITIONER

## 2019-10-29 PROCEDURE — G8427 DOCREV CUR MEDS BY ELIG CLIN: HCPCS | Performed by: NURSE PRACTITIONER

## 2019-10-29 PROCEDURE — 3017F COLORECTAL CA SCREEN DOC REV: CPT | Performed by: NURSE PRACTITIONER

## 2019-10-29 PROCEDURE — G8400 PT W/DXA NO RESULTS DOC: HCPCS | Performed by: NURSE PRACTITIONER

## 2019-10-29 PROCEDURE — 1123F ACP DISCUSS/DSCN MKR DOCD: CPT | Performed by: NURSE PRACTITIONER

## 2019-10-29 PROCEDURE — G8484 FLU IMMUNIZE NO ADMIN: HCPCS | Performed by: NURSE PRACTITIONER

## 2019-10-29 PROCEDURE — 1036F TOBACCO NON-USER: CPT | Performed by: NURSE PRACTITIONER

## 2019-10-29 PROCEDURE — 1090F PRES/ABSN URINE INCON ASSESS: CPT | Performed by: NURSE PRACTITIONER

## 2019-10-29 PROCEDURE — G8417 CALC BMI ABV UP PARAM F/U: HCPCS | Performed by: NURSE PRACTITIONER

## 2019-10-29 RX ORDER — LUBIPROSTONE 24 UG/1
24 CAPSULE, GELATIN COATED ORAL DAILY
COMMUNITY
End: 2020-02-18

## 2019-10-29 RX ORDER — POLYETHYLENE GLYCOL 3350 17 G/17G
17 POWDER, FOR SOLUTION ORAL DAILY
COMMUNITY
End: 2020-09-22

## 2019-10-29 ASSESSMENT — ENCOUNTER SYMPTOMS
COUGH: 0
SHORTNESS OF BREATH: 1
ABDOMINAL DISTENTION: 0

## 2019-10-31 ENCOUNTER — TELEPHONE (OUTPATIENT)
Dept: CARDIOLOGY CLINIC | Age: 69
End: 2019-10-31

## 2019-11-04 ENCOUNTER — OFFICE VISIT (OUTPATIENT)
Dept: CARDIOLOGY CLINIC | Age: 69
End: 2019-11-04
Payer: MEDICARE

## 2019-11-04 VITALS
SYSTOLIC BLOOD PRESSURE: 125 MMHG | WEIGHT: 227.6 LBS | HEART RATE: 76 BPM | DIASTOLIC BLOOD PRESSURE: 70 MMHG | HEIGHT: 63 IN | BODY MASS INDEX: 40.33 KG/M2

## 2019-11-04 DIAGNOSIS — I50.32 CHRONIC DIASTOLIC (CONGESTIVE) HEART FAILURE (HCC): Primary | ICD-10-CM

## 2019-11-04 DIAGNOSIS — I10 ESSENTIAL HYPERTENSION: ICD-10-CM

## 2019-11-04 PROCEDURE — 99213 OFFICE O/P EST LOW 20 MIN: CPT | Performed by: PHYSICIAN ASSISTANT

## 2019-11-04 PROCEDURE — G8417 CALC BMI ABV UP PARAM F/U: HCPCS | Performed by: PHYSICIAN ASSISTANT

## 2019-11-04 PROCEDURE — G8484 FLU IMMUNIZE NO ADMIN: HCPCS | Performed by: PHYSICIAN ASSISTANT

## 2019-11-04 PROCEDURE — 3017F COLORECTAL CA SCREEN DOC REV: CPT | Performed by: PHYSICIAN ASSISTANT

## 2019-11-04 PROCEDURE — G8400 PT W/DXA NO RESULTS DOC: HCPCS | Performed by: PHYSICIAN ASSISTANT

## 2019-11-04 PROCEDURE — 1036F TOBACCO NON-USER: CPT | Performed by: PHYSICIAN ASSISTANT

## 2019-11-04 PROCEDURE — 4040F PNEUMOC VAC/ADMIN/RCVD: CPT | Performed by: PHYSICIAN ASSISTANT

## 2019-11-04 PROCEDURE — 1090F PRES/ABSN URINE INCON ASSESS: CPT | Performed by: PHYSICIAN ASSISTANT

## 2019-11-04 PROCEDURE — G8427 DOCREV CUR MEDS BY ELIG CLIN: HCPCS | Performed by: PHYSICIAN ASSISTANT

## 2019-11-04 PROCEDURE — 1123F ACP DISCUSS/DSCN MKR DOCD: CPT | Performed by: PHYSICIAN ASSISTANT

## 2019-11-04 RX ORDER — HYDRALAZINE HYDROCHLORIDE 100 MG/1
50 TABLET, FILM COATED ORAL 3 TIMES DAILY
Qty: 60 TABLET | Refills: 3 | Status: SHIPPED | OUTPATIENT
Start: 2019-11-04 | End: 2019-11-20

## 2019-11-08 ENCOUNTER — TELEPHONE (OUTPATIENT)
Dept: CARDIOLOGY CLINIC | Age: 69
End: 2019-11-08

## 2019-11-14 ENCOUNTER — OFFICE VISIT (OUTPATIENT)
Dept: INTERNAL MEDICINE CLINIC | Age: 69
End: 2019-11-14
Payer: MEDICARE

## 2019-11-14 VITALS — WEIGHT: 224 LBS | HEIGHT: 63 IN | BODY MASS INDEX: 39.69 KG/M2

## 2019-11-14 DIAGNOSIS — I50.32 CHRONIC DIASTOLIC HEART FAILURE (HCC): ICD-10-CM

## 2019-11-14 PROCEDURE — 97802 MEDICAL NUTRITION INDIV IN: CPT | Performed by: DIETITIAN, REGISTERED

## 2019-11-14 RX ORDER — FERROUS SULFATE 325(65) MG
325 TABLET ORAL
COMMUNITY

## 2019-11-18 ENCOUNTER — TELEPHONE (OUTPATIENT)
Dept: CARDIOLOGY CLINIC | Age: 69
End: 2019-11-18

## 2019-11-18 LAB
ANION GAP SERPL CALCULATED.3IONS-SCNC: 8 MMOL/L (ref 4–12)
BUN BLDV-MCNC: 42 MG/DL (ref 7–20)
CALCIUM SERPL-MCNC: 9.5 MG/DL (ref 8.8–10.5)
CHLORIDE BLD-SCNC: 107 MEQ/L (ref 101–111)
CO2: 26 MEQ/L (ref 21–32)
CREAT SERPL-MCNC: 0.91 MG/DL (ref 0.6–1.3)
CREATININE CLEARANCE: >60
GLUCOSE: 88 MG/DL (ref 70–110)
POTASSIUM SERPL-SCNC: 4.1 MEQ/L (ref 3.6–5)
SODIUM BLD-SCNC: 141 MEQ/L (ref 135–145)

## 2019-11-20 ENCOUNTER — OFFICE VISIT (OUTPATIENT)
Dept: FAMILY MEDICINE CLINIC | Age: 69
End: 2019-11-20
Payer: MEDICARE

## 2019-11-20 VITALS
RESPIRATION RATE: 16 BRPM | WEIGHT: 224.3 LBS | HEART RATE: 76 BPM | OXYGEN SATURATION: 98 % | BODY MASS INDEX: 39.73 KG/M2 | SYSTOLIC BLOOD PRESSURE: 124 MMHG | DIASTOLIC BLOOD PRESSURE: 58 MMHG

## 2019-11-20 DIAGNOSIS — G56.90 NEUROPATHY OF HAND, UNSPECIFIED LATERALITY: Primary | ICD-10-CM

## 2019-11-20 PROCEDURE — G8400 PT W/DXA NO RESULTS DOC: HCPCS | Performed by: FAMILY MEDICINE

## 2019-11-20 PROCEDURE — 1036F TOBACCO NON-USER: CPT | Performed by: FAMILY MEDICINE

## 2019-11-20 PROCEDURE — 1123F ACP DISCUSS/DSCN MKR DOCD: CPT | Performed by: FAMILY MEDICINE

## 2019-11-20 PROCEDURE — G8417 CALC BMI ABV UP PARAM F/U: HCPCS | Performed by: FAMILY MEDICINE

## 2019-11-20 PROCEDURE — 4040F PNEUMOC VAC/ADMIN/RCVD: CPT | Performed by: FAMILY MEDICINE

## 2019-11-20 PROCEDURE — 3017F COLORECTAL CA SCREEN DOC REV: CPT | Performed by: FAMILY MEDICINE

## 2019-11-20 PROCEDURE — G8427 DOCREV CUR MEDS BY ELIG CLIN: HCPCS | Performed by: FAMILY MEDICINE

## 2019-11-20 PROCEDURE — 1090F PRES/ABSN URINE INCON ASSESS: CPT | Performed by: FAMILY MEDICINE

## 2019-11-20 PROCEDURE — 99213 OFFICE O/P EST LOW 20 MIN: CPT | Performed by: FAMILY MEDICINE

## 2019-11-20 PROCEDURE — G8484 FLU IMMUNIZE NO ADMIN: HCPCS | Performed by: FAMILY MEDICINE

## 2019-11-20 RX ORDER — CLINDAMYCIN HYDROCHLORIDE 150 MG/1
CAPSULE ORAL
COMMUNITY
Start: 2019-11-11 | End: 2019-12-03

## 2019-11-20 RX ORDER — BUMETANIDE 1 MG/1
1 TABLET ORAL DAILY
COMMUNITY
End: 2019-11-22 | Stop reason: SDUPTHER

## 2019-11-20 RX ORDER — HYDRALAZINE HYDROCHLORIDE 25 MG/1
TABLET, FILM COATED ORAL
COMMUNITY
Start: 2019-11-15 | End: 2019-11-22 | Stop reason: SDUPTHER

## 2019-11-20 RX ORDER — LABETALOL 100 MG/1
TABLET, FILM COATED ORAL
COMMUNITY
Start: 2019-11-15 | End: 2019-11-22 | Stop reason: SDUPTHER

## 2019-11-20 RX ORDER — FUROSEMIDE 40 MG/1
TABLET ORAL
COMMUNITY
Start: 2019-11-15 | End: 2019-11-20

## 2019-11-20 ASSESSMENT — ENCOUNTER SYMPTOMS
GASTROINTESTINAL NEGATIVE: 1
RESPIRATORY NEGATIVE: 1

## 2019-11-22 ENCOUNTER — OFFICE VISIT (OUTPATIENT)
Dept: NEPHROLOGY | Age: 69
End: 2019-11-22
Payer: MEDICARE

## 2019-11-22 VITALS
HEART RATE: 64 BPM | DIASTOLIC BLOOD PRESSURE: 60 MMHG | BODY MASS INDEX: 39.34 KG/M2 | SYSTOLIC BLOOD PRESSURE: 112 MMHG | WEIGHT: 222 LBS | OXYGEN SATURATION: 95 % | HEIGHT: 63 IN

## 2019-11-22 DIAGNOSIS — N18.2 CKD (CHRONIC KIDNEY DISEASE), STAGE II: Primary | ICD-10-CM

## 2019-11-22 DIAGNOSIS — I10 ESSENTIAL HYPERTENSION: ICD-10-CM

## 2019-11-22 PROCEDURE — G8484 FLU IMMUNIZE NO ADMIN: HCPCS | Performed by: INTERNAL MEDICINE

## 2019-11-22 PROCEDURE — G8417 CALC BMI ABV UP PARAM F/U: HCPCS | Performed by: INTERNAL MEDICINE

## 2019-11-22 PROCEDURE — G8400 PT W/DXA NO RESULTS DOC: HCPCS | Performed by: INTERNAL MEDICINE

## 2019-11-22 PROCEDURE — G8427 DOCREV CUR MEDS BY ELIG CLIN: HCPCS | Performed by: INTERNAL MEDICINE

## 2019-11-22 PROCEDURE — 4040F PNEUMOC VAC/ADMIN/RCVD: CPT | Performed by: INTERNAL MEDICINE

## 2019-11-22 PROCEDURE — 1090F PRES/ABSN URINE INCON ASSESS: CPT | Performed by: INTERNAL MEDICINE

## 2019-11-22 PROCEDURE — 1123F ACP DISCUSS/DSCN MKR DOCD: CPT | Performed by: INTERNAL MEDICINE

## 2019-11-22 PROCEDURE — 3017F COLORECTAL CA SCREEN DOC REV: CPT | Performed by: INTERNAL MEDICINE

## 2019-11-22 PROCEDURE — 99213 OFFICE O/P EST LOW 20 MIN: CPT | Performed by: INTERNAL MEDICINE

## 2019-11-22 PROCEDURE — 1036F TOBACCO NON-USER: CPT | Performed by: INTERNAL MEDICINE

## 2019-11-22 RX ORDER — SPIRONOLACTONE 25 MG/1
25 TABLET ORAL DAILY
Qty: 30 TABLET | Refills: 3 | Status: SHIPPED | OUTPATIENT
Start: 2019-11-22 | End: 2020-01-24 | Stop reason: SDUPTHER

## 2019-11-22 RX ORDER — HYDRALAZINE HYDROCHLORIDE 50 MG/1
50 TABLET, FILM COATED ORAL 3 TIMES DAILY
Qty: 90 TABLET | Refills: 1
Start: 2019-11-22 | End: 2020-02-18

## 2019-11-22 RX ORDER — LABETALOL 200 MG/1
200 TABLET, FILM COATED ORAL 2 TIMES DAILY
Qty: 60 TABLET | Refills: 1
Start: 2019-11-22 | End: 2020-01-07 | Stop reason: ALTCHOICE

## 2019-11-22 RX ORDER — BUMETANIDE 1 MG/1
1 TABLET ORAL 3 TIMES DAILY
Qty: 80 TABLET | Refills: 1
Start: 2019-11-22 | End: 2019-11-25 | Stop reason: SDUPTHER

## 2019-11-25 RX ORDER — BUMETANIDE 1 MG/1
1 TABLET ORAL 3 TIMES DAILY
Qty: 90 TABLET | Refills: 6 | Status: SHIPPED | OUTPATIENT
Start: 2019-11-25 | End: 2020-01-30

## 2019-12-02 RX ORDER — LEVOTHYROXINE SODIUM 125 MCG
125 TABLET ORAL DAILY
Qty: 30 TABLET | Refills: 11 | Status: SHIPPED | OUTPATIENT
Start: 2019-12-02 | End: 2020-12-22 | Stop reason: SDUPTHER

## 2019-12-03 ENCOUNTER — OFFICE VISIT (OUTPATIENT)
Dept: CARDIOLOGY CLINIC | Age: 69
End: 2019-12-03
Payer: MEDICARE

## 2019-12-03 VITALS
SYSTOLIC BLOOD PRESSURE: 122 MMHG | HEART RATE: 62 BPM | WEIGHT: 220.8 LBS | HEIGHT: 63 IN | DIASTOLIC BLOOD PRESSURE: 70 MMHG | BODY MASS INDEX: 39.12 KG/M2 | OXYGEN SATURATION: 97 %

## 2019-12-03 DIAGNOSIS — I10 ESSENTIAL HYPERTENSION: ICD-10-CM

## 2019-12-03 DIAGNOSIS — I50.32 CHF (CONGESTIVE HEART FAILURE), NYHA CLASS II, CHRONIC, DIASTOLIC (HCC): Primary | ICD-10-CM

## 2019-12-03 PROCEDURE — 1036F TOBACCO NON-USER: CPT | Performed by: NURSE PRACTITIONER

## 2019-12-03 PROCEDURE — 1123F ACP DISCUSS/DSCN MKR DOCD: CPT | Performed by: NURSE PRACTITIONER

## 2019-12-03 PROCEDURE — G8484 FLU IMMUNIZE NO ADMIN: HCPCS | Performed by: NURSE PRACTITIONER

## 2019-12-03 PROCEDURE — 4040F PNEUMOC VAC/ADMIN/RCVD: CPT | Performed by: NURSE PRACTITIONER

## 2019-12-03 PROCEDURE — G8417 CALC BMI ABV UP PARAM F/U: HCPCS | Performed by: NURSE PRACTITIONER

## 2019-12-03 PROCEDURE — 1090F PRES/ABSN URINE INCON ASSESS: CPT | Performed by: NURSE PRACTITIONER

## 2019-12-03 PROCEDURE — G8427 DOCREV CUR MEDS BY ELIG CLIN: HCPCS | Performed by: NURSE PRACTITIONER

## 2019-12-03 PROCEDURE — 3017F COLORECTAL CA SCREEN DOC REV: CPT | Performed by: NURSE PRACTITIONER

## 2019-12-03 PROCEDURE — 99214 OFFICE O/P EST MOD 30 MIN: CPT | Performed by: NURSE PRACTITIONER

## 2019-12-03 PROCEDURE — G8400 PT W/DXA NO RESULTS DOC: HCPCS | Performed by: NURSE PRACTITIONER

## 2019-12-03 RX ORDER — OMEPRAZOLE 40 MG/1
20 CAPSULE, DELAYED RELEASE ORAL DAILY
COMMUNITY
Start: 2019-11-25 | End: 2020-09-08

## 2019-12-03 ASSESSMENT — ENCOUNTER SYMPTOMS
COUGH: 0
SHORTNESS OF BREATH: 1
ABDOMINAL DISTENTION: 1

## 2019-12-09 ENCOUNTER — TELEPHONE (OUTPATIENT)
Dept: NEPHROLOGY | Age: 69
End: 2019-12-09

## 2019-12-09 DIAGNOSIS — I10 ESSENTIAL HYPERTENSION: ICD-10-CM

## 2019-12-09 DIAGNOSIS — N18.2 CKD (CHRONIC KIDNEY DISEASE), STAGE II: ICD-10-CM

## 2019-12-11 LAB
ANION GAP SERPL CALCULATED.3IONS-SCNC: 8 MMOL/L (ref 4–12)
BUN BLDV-MCNC: 40 MG/DL (ref 7–20)
CALCIUM SERPL-MCNC: 10 MG/DL (ref 8.8–10.5)
CHLORIDE BLD-SCNC: 105 MEQ/L (ref 101–111)
CO2: 28 MEQ/L (ref 21–32)
CREAT SERPL-MCNC: 1.12 MG/DL (ref 0.6–1.3)
CREATININE CLEARANCE: 48
GLUCOSE: 79 MG/DL (ref 70–110)
POTASSIUM SERPL-SCNC: 4.2 MEQ/L (ref 3.6–5)
SODIUM BLD-SCNC: 141 MEQ/L (ref 135–145)

## 2020-01-07 ENCOUNTER — OFFICE VISIT (OUTPATIENT)
Dept: CARDIOLOGY CLINIC | Age: 70
End: 2020-01-07
Payer: MEDICARE

## 2020-01-07 ENCOUNTER — TELEPHONE (OUTPATIENT)
Dept: CARDIOLOGY CLINIC | Age: 70
End: 2020-01-07

## 2020-01-07 VITALS
DIASTOLIC BLOOD PRESSURE: 50 MMHG | SYSTOLIC BLOOD PRESSURE: 104 MMHG | HEART RATE: 83 BPM | HEIGHT: 63 IN | BODY MASS INDEX: 39.16 KG/M2 | WEIGHT: 221 LBS

## 2020-01-07 PROCEDURE — G8417 CALC BMI ABV UP PARAM F/U: HCPCS | Performed by: PHYSICIAN ASSISTANT

## 2020-01-07 PROCEDURE — 3017F COLORECTAL CA SCREEN DOC REV: CPT | Performed by: PHYSICIAN ASSISTANT

## 2020-01-07 PROCEDURE — G8484 FLU IMMUNIZE NO ADMIN: HCPCS | Performed by: PHYSICIAN ASSISTANT

## 2020-01-07 PROCEDURE — G8400 PT W/DXA NO RESULTS DOC: HCPCS | Performed by: PHYSICIAN ASSISTANT

## 2020-01-07 PROCEDURE — G8427 DOCREV CUR MEDS BY ELIG CLIN: HCPCS | Performed by: PHYSICIAN ASSISTANT

## 2020-01-07 PROCEDURE — 1036F TOBACCO NON-USER: CPT | Performed by: PHYSICIAN ASSISTANT

## 2020-01-07 PROCEDURE — 4040F PNEUMOC VAC/ADMIN/RCVD: CPT | Performed by: PHYSICIAN ASSISTANT

## 2020-01-07 PROCEDURE — 1123F ACP DISCUSS/DSCN MKR DOCD: CPT | Performed by: PHYSICIAN ASSISTANT

## 2020-01-07 PROCEDURE — 99213 OFFICE O/P EST LOW 20 MIN: CPT | Performed by: PHYSICIAN ASSISTANT

## 2020-01-07 PROCEDURE — 1090F PRES/ABSN URINE INCON ASSESS: CPT | Performed by: PHYSICIAN ASSISTANT

## 2020-01-07 RX ORDER — LABETALOL 100 MG/1
TABLET, FILM COATED ORAL
Qty: 90 TABLET | Refills: 3 | Status: SHIPPED | OUTPATIENT
Start: 2020-01-07 | End: 2020-04-02 | Stop reason: ALTCHOICE

## 2020-01-07 NOTE — PROGRESS NOTES
[facility] PROFESSIONAL SERVICES  [facility] [address]   [address] [address]   Dept: [phone]   Dept Fax: [phone]   Loc: [phone]      Chief Complaint   Patient presents with    Follow-up     discuss fatigue and medications      Patient presents with complaint of fatigue. Patient states after taking her morning medication she is quite fatigued and has to go back to sleep. She has some occasional lightheadedness. She has some intermittent shortness of breath. She denies any chest pain or palpitations. Cardiologist:  Dr. Marhsall Felt:  Fatigue   pulmonary:    No dyspnea, no wheezing  Cardiac:    Denies recent chest pain   GI:     No nausea or vomiting, no abdominal pain  Neuro:   Occasional light headedness  Musculoskeletal:  No recent active issues  Extremities:   No edema, good peripheral pulses      Past Medical History:   Diagnosis Date    Acid reflux     Arthritis     Asthma     Bipolar 1 disorder ([facility])     CHF (congestive heart failure) ([facility])     CKD (chronic kidney disease), stage II 11/22/2019    History of blood transfusion 2004    Hypertension     Mood disorder ([facility])     Sleep apnea     Thyroid disease        Allergies   Allergen Reactions    Ace Inhibitors Anaphylaxis    Adhesive Tape     Codeine     Lotrel [Amlodipine Besy-Benazepril Hcl] Swelling    Pcn [Penicillins]     Typhoid Vaccines        Current Outpatient Medications   Medication Sig Dispense Refill    labetalol (NORMODYNE) 100 MG tablet Take 100 mg po in the morning and 200 mg po in the evening 90 tablet 3    omeprazole (PRILOSEC) 40 MG delayed release capsule Take 40 mg by mouth daily      SYNTHROID 125 MCG tablet Take 1 tablet by mouth daily Take with water on an empty stomach- wait 30 minutes before eating or taking other meds.  30 tablet 11    bumetanide (BUMEX) 1 MG tablet Take 1 tablet by mouth 3 times daily 90 tablet 6    hydrALAZINE (APRESOLINE) 50 MG tablet Take 1 tablet by mouth 3 times daily 90 tablet 1    spironolactone (ALDACTONE) 25 MG tablet Take 1 tablet by mouth daily 30 tablet 3    methylcellulose (CITRUCEL) 500 MG TABS Take by mouth      ferrous sulfate 325 (65 Fe) MG tablet Take 325 mg by mouth daily (with breakfast)      polyethylene glycol (GLYCOLAX) packet Take 17 g by mouth daily as needed for Constipation      lubiprostone (AMITIZA) 24 MCG capsule Take 24 mcg by mouth 2 times daily (with meals)       atorvastatin (LIPITOR) 20 MG tablet Take 1 tablet by mouth daily 90 tablet 3    aspirin 81 MG tablet Take 81 mg by mouth daily      clonazePAM (KLONOPIN) 1 MG tablet Take 1 mg by mouth 2 times daily. Patient taking 1/2 tab in morning and full tablet at night      venlafaxine (EFFEXOR XR) 150 MG extended release capsule Take 150 mg by mouth daily      gabapentin (NEURONTIN) 300 MG capsule Take 300 mg by mouth 3 times daily.  OLANZapine (ZYPREXA) 7.5 MG tablet Take 5 mg by mouth nightly       tiZANidine (ZANAFLEX) 2 MG tablet Take 2 mg by mouth 2 times daily      tetrabenazine (XENAZINE) 12.5 MG tablet Take 12.5 mg by mouth 2 times daily      venlafaxine (EFFEXOR) 75 MG tablet Take 75 mg by mouth daily      Mirabegron ER (MYRBETRIQ) 50 MG TB24 Take 50 mg by mouth daily      lamoTRIgine (LAMICTAL) 100 MG tablet Take 100 mg by mouth daily Patient taking 1 tablet TID      fish oil-omega-3 fatty acids 1000 MG capsule Take 1 g by mouth daily.  vitamin E 400 UNIT capsule Take 400 Units by mouth daily.  Ascorbic Acid (VITAMIN C) 500 MG tablet Take 500 mg by mouth daily.  Multiple Vitamin (MULTIVITAMIN PO) Take 1 tablet by mouth daily.  estrogens, conjugated, (PREMARIN) 0.9 MG tablet Take 0.3 mg by mouth daily        No current facility-administered medications for this visit.         Social History     Socioeconomic History    Marital status:      Spouse name: None    Number of children: None    Years of education: None    Highest education level: None   Occupational History    None   Social Needs    Financial resource strain: None    Food insecurity:     Worry: None     Inability: None    Transportation needs:     Medical: None     Non-medical: None   Tobacco Use    Smoking status: Never Smoker    Smokeless tobacco: Never Used   Substance and Sexual Activity    Alcohol use: No     Alcohol/week: 0.0 standard drinks     Comment: rarely    Drug use: No    Sexual activity: Yes     Partners: Male   Lifestyle    Physical activity:     Days per week: None     Minutes per session: None    Stress: None   Relationships    Social connections:     Talks on phone: None     Gets together: None     Attends Denominational service: None     Active member of club or organization: None     Attends meetings of clubs or organizations: None     Relationship status: None    Intimate partner violence:     Fear of current or ex partner: None     Emotionally abused: None     Physically abused: None     Forced sexual activity: None   Other Topics Concern    None   Social History Narrative    None       Family History   Problem Relation Age of Onset    Diabetes Mother     High Blood Pressure Father     Cancer Father     Other Brother     Diabetes Brother     Cancer Sister     Breast Cancer Sister 39    Cancer Brother        Blood pressure (!) 104/50, pulse 83, height 5' 3\" (1.6 m), weight 221 lb (100.2 kg), not currently breastfeeding. General:   Well developed, well nourished  Lungs:   Clear to auscultation  Heart:    Normal S1 S2, No murmur, rubs, or gallops  Abdomen:   Soft, non tender, no organomegalies, positive bowel sounds  Extremities:   No edema, no cyanosis, good peripheral pulses  Neurological:   Awake, alert, oriented. No obvious focal deficits  Musculoskeletal:  No obvious deformities        Cardiac catheterization 10/18/2019  CONCLUSION:  1.  Nonobstructive coronary artery disease.   2. Gilmar Loredo heart failure with preserved ejection fraction. 3.  Volume overload. 4.  Elevated right and left filling pressures. 5.  Moderate pulmonary venous hypertension.     Echo 9/13/2019  Summary   Normal left ventricle size and systolic function. Ejection fraction was   estimated at 55 to 60 %. There were no regional left ventricular wall   motion abnormalities and wall thickness was within normal limits.   Doppler parameters were consistent with abnormal left ventricular   relaxation (grade 1 diastolic dysfunction).   The left atrium is Moderately dilated.   Aortic aneurysm noted in the ascending aorta .   Aortic aneurysm measures 4.1 cm . Diagnosis Orders   1. Chronic diastolic heart failure ([address]     2. Essential hypertension         Continue Dr Tonie Centeno current treatment plan    We will decrease her morning dose of labetalol to 100 mg    Continue to monitor blood pressure closely    Continue all other current medications and with constant vigilance to changes in symptoms and also any potential side effects. Return for care if become worse or seek medical attention immediately. The patient is educated on symptoms of heart disease that include chest pain and passing out, dizziness, etc and to report them if there is any change or go to emergency room.        Follow up with Dr Kristen Blackburn as scheduled or sooner if needed  (Please note that portions of this note were completed with a voice recognition program.  Efforts were made to edit the dictation but occasionally words are mis-transcribed.)      Lina Huitron PA-C

## 2020-01-07 NOTE — TELEPHONE ENCOUNTER
Visit Follow-Up Question     From  Allegra Zacarias To  [facility] Clinical Support Pool Sent  1/7/2020 12:10 PM   Yasir House is currently taking eight regular Tylenol pills a day for aches and pains. She had a knee replacement in May and has gone to physical therapy after that fall that we mentioned.  Since she cannot take Aleve or Advil,  A suggestion was offered by her Psychiatrist to use the compound creme diclofenac/gabapentin.  Would this be okay?  We can ask family doctor to prescribe.

## 2020-01-22 LAB
ANION GAP SERPL CALCULATED.3IONS-SCNC: 11 MMOL/L (ref 4–12)
BUN BLDV-MCNC: 65 MG/DL (ref 7–20)
CALCIUM SERPL-MCNC: 10.1 MG/DL (ref 8.8–10.5)
CHLORIDE BLD-SCNC: 103 MEQ/L (ref 101–111)
CO2: 25 MEQ/L (ref 21–32)
CREAT SERPL-MCNC: 1.33 MG/DL (ref 0.6–1.3)
CREATININE CLEARANCE: 40
CREATININE, RANDOM URINE: 60.4 MG/DL
GLUCOSE: 98 MG/DL (ref 70–110)
POTASSIUM SERPL-SCNC: 4.2 MEQ/L (ref 3.6–5)
PROTEIN, URINE, RANDOM: 5.1 MG/DL
PROTEIN/CREAT RATIO: 0.1 G/1.73M2
SODIUM BLD-SCNC: 139 MEQ/L (ref 135–145)

## 2020-01-24 ENCOUNTER — OFFICE VISIT (OUTPATIENT)
Dept: NEPHROLOGY | Age: 70
End: 2020-01-24
Payer: MEDICARE

## 2020-01-24 VITALS
WEIGHT: 218 LBS | SYSTOLIC BLOOD PRESSURE: 106 MMHG | OXYGEN SATURATION: 97 % | DIASTOLIC BLOOD PRESSURE: 60 MMHG | BODY MASS INDEX: 38.62 KG/M2 | HEART RATE: 71 BPM

## 2020-01-24 PROCEDURE — 4040F PNEUMOC VAC/ADMIN/RCVD: CPT | Performed by: INTERNAL MEDICINE

## 2020-01-24 PROCEDURE — 1123F ACP DISCUSS/DSCN MKR DOCD: CPT | Performed by: INTERNAL MEDICINE

## 2020-01-24 PROCEDURE — 1036F TOBACCO NON-USER: CPT | Performed by: INTERNAL MEDICINE

## 2020-01-24 PROCEDURE — G8400 PT W/DXA NO RESULTS DOC: HCPCS | Performed by: INTERNAL MEDICINE

## 2020-01-24 PROCEDURE — G8417 CALC BMI ABV UP PARAM F/U: HCPCS | Performed by: INTERNAL MEDICINE

## 2020-01-24 PROCEDURE — 3017F COLORECTAL CA SCREEN DOC REV: CPT | Performed by: INTERNAL MEDICINE

## 2020-01-24 PROCEDURE — G8427 DOCREV CUR MEDS BY ELIG CLIN: HCPCS | Performed by: INTERNAL MEDICINE

## 2020-01-24 PROCEDURE — G8484 FLU IMMUNIZE NO ADMIN: HCPCS | Performed by: INTERNAL MEDICINE

## 2020-01-24 PROCEDURE — 99213 OFFICE O/P EST LOW 20 MIN: CPT | Performed by: INTERNAL MEDICINE

## 2020-01-24 PROCEDURE — 1090F PRES/ABSN URINE INCON ASSESS: CPT | Performed by: INTERNAL MEDICINE

## 2020-01-24 RX ORDER — ACETAMINOPHEN 325 MG/1
650 TABLET ORAL 4 TIMES DAILY
COMMUNITY

## 2020-01-24 RX ORDER — SPIRONOLACTONE 25 MG/1
25 TABLET ORAL DAILY
Qty: 30 TABLET | Refills: 5 | Status: SHIPPED | OUTPATIENT
Start: 2020-01-24 | End: 2020-12-22 | Stop reason: SDUPTHER

## 2020-01-24 NOTE — PROGRESS NOTES
[facility]    [facility], [address]   [place] [facility]  [phone]  Progress Note  2020 9:46 AM      Pt Name:    Quentin Staley  YOB: 1950  Primary Care Physician:  Pauline Pérez DO     Chief Complaint:   Chief Complaint   Patient presents with    Follow-up     CKD II        History of Present Illness: This is a follow-up visit for hypertension and CKD. The patient was first seen in clinic 2 months ago. We started her on Spironolactone last visit and she says it has helped her abdominal bloating/swelling. Her BP was on lower ends and she was having a lot of fatigue in the mornings so cardio decreased her morning Labetalol dose to 100 mg. She states she is still tired but this seems to have helped. Home Bps are usually 494A-220A systolic. She is on bumex 1 mg TID. She has hx of HTN, diastolic CHF,pulm HTN,  obesity, thoracic AA, OA, RANDY, bipolar disorder, nephrolithiasis. The patient denies any nausea/vomiting/chest pain/shortness of breath/gross hematuria/flank pain/edema. Pertinent items are noted in HPI.          Past History:  Past Medical History:   Diagnosis Date    Acid reflux     Arthritis     Asthma     Bipolar 1 disorder ([address]     CHF (congestive heart failure) (HCC)     CKD (chronic kidney disease), stage II 2019    History of blood transfusion 2004    Hypertension     Mood disorder ([address]     Sleep apnea     Thyroid disease      Past Surgical History:   Procedure Laterality Date    ANKLE SURGERY      left    CATARACT REMOVAL      Both eyes      SECTION      x 3    FOOT SURGERY      Left     HIP SURGERY      HYSTERECTOMY      Total     TOTAL KNEE ARTHROPLASTY Left 10/14/14    TOTAL KNEE ARTHROPLASTY Right 2019        VITALS:  /60 (Site: Right Upper Arm, Position: Sitting, Cuff Size: Large Adult)   Pulse 71   Wt 218 lb (98.9 kg)   LMP  (Exact Date)   SpO2 97%   BMI 38.62 kg/m² XR) 150 MG extended release capsule Take 150 mg by mouth daily      gabapentin (NEURONTIN) 300 MG capsule Take 300 mg by mouth 3 times daily.  OLANZapine (ZYPREXA) 7.5 MG tablet Take 5 mg by mouth nightly       tiZANidine (ZANAFLEX) 2 MG tablet Take 2 mg by mouth 2 times daily      tetrabenazine (XENAZINE) 12.5 MG tablet Take 12.5 mg by mouth 2 times daily      venlafaxine (EFFEXOR) 75 MG tablet Take 75 mg by mouth daily      Mirabegron ER (MYRBETRIQ) 50 MG TB24 Take 50 mg by mouth daily      lamoTRIgine (LAMICTAL) 100 MG tablet Take 100 mg by mouth daily Patient taking 1 tablet TID      Ascorbic Acid (VITAMIN C) 500 MG tablet Take 500 mg by mouth daily.  Multiple Vitamin (MULTIVITAMIN PO) Take 1 tablet by mouth daily.  methylcellulose (CITRUCEL) 500 MG TABS Take by mouth      fish oil-omega-3 fatty acids 1000 MG capsule Take 1 g by mouth daily.  vitamin E 400 UNIT capsule Take 400 Units by mouth daily.  estrogens, conjugated, (PREMARIN) 0.9 MG tablet Take 0.3 mg by mouth daily        No current facility-administered medications for this visit.          Laboratory & Diagnostics:  CBC:   Lab Results   Component Value Date    WBC 6.3 10/18/2019    HGB 10.4 (L) 10/18/2019    HCT 33.5 (L) 10/18/2019    MCV 91.8 10/18/2019     10/18/2019     BMP:    Lab Results   Component Value Date     01/22/2020     12/11/2019     11/18/2019    K 4.2 01/22/2020    K 4.2 12/11/2019    K 4.1 11/18/2019     01/22/2020     12/11/2019     11/18/2019    CO2 25 01/22/2020    CO2 28 12/11/2019    CO2 26 11/18/2019    BUN 65 (H) 01/22/2020    BUN 40 (H) 12/11/2019    BUN 42 (H) 11/18/2019    CREATININE 1.33 (H) 01/22/2020    CREATININE 1.12 12/11/2019    CREATININE 0.91 11/18/2019    GLUCOSE 98 01/22/2020    GLUCOSE 79 12/11/2019    GLUCOSE 88 11/18/2019      Hepatic:   Lab Results   Component Value Date    AST 19 09/29/2019    AST 20 09/14/2019    AST 22 09/13/2019    ALT 18 09/29/2019    ALT 21 09/14/2019    ALT 20 09/13/2019    BILITOT <0.2 (L) 09/29/2019    BILITOT 0.2 (L) 09/14/2019    BILITOT 0.3 09/13/2019    ALKPHOS 83 09/29/2019    ALKPHOS 74 09/14/2019    ALKPHOS 74 09/13/2019     BNP:   Lab Results   Component Value Date     (H) 10/08/2019     (H) 10/08/2019     Lipids:   Lab Results   Component Value Date    CHOL 180 10/08/2019    HDL 52 10/08/2019     INR:   Lab Results   Component Value Date    INR 0.90 10/18/2019     URINE:   Lab Results   Component Value Date    PROTUR 30 mg/dL 09/23/2014     Lab Results   Component Value Date    NITRU NEGATIVE 09/30/2019    COLORU YELLOW 09/30/2019    PHUR 6.5 09/30/2019    WBCUA 0-5 09/23/2014    RBCUA None 09/23/2014    MUCUS Present 09/23/2014    BACTERIA Trace 09/23/2014    CLARITYU sl cloudy 10/25/2011    SPECGRAV 1.012 09/13/2019    LEUKOCYTESUR NEGATIVE 09/30/2019    UROBILINOGEN 0.2 09/30/2019    BILIRUBINUR NEGATIVE 09/30/2019    BILIRUBINUR beg 10/25/2011    BLOODU NEGATIVE 09/30/2019    GLUCOSEU NEGATIVE 09/30/2019    KETUA NEGATIVE 09/30/2019      Microalbumen/Creatinine ratio:  No components found for: RUCREAT        Impression/Plan:   1. CKD II with mild worsening of renal fxn due to medications (Spironolactone + Bumex) however her symptoms are better and BP is controlled. She is following < 2 L fluid restriction . Will recheck BMP 2 weeks if worse we may need to increase her fluid restriction a little. 2. Essential HTN: controlled  3. Diastolic CHF/pulm HTN: controlled on TID Bumex and Spironolactone. 4. RANDY        Bloodwork and medications were reviewed and plan of care discussed with the patient. Return to clinic in 6 months  or sooner if the need arises.       Xuan Jacobson,   [facility]

## 2020-01-29 NOTE — PROGRESS NOTES
TOTAL KNEE ARTHROPLASTY Right 05/24/2019       Social History     Tobacco Use    Smoking status: Never Smoker    Smokeless tobacco: Never Used   Substance Use Topics    Alcohol use: No     Alcohol/week: 0.0 standard drinks     Comment: rarely    Drug use: No       Allergies   Allergen Reactions    Ace Inhibitors Anaphylaxis    Adhesive Tape     Codeine     Lotrel [Amlodipine Besy-Benazepril Hcl] Swelling    Pcn [Penicillins]     Typhoid Vaccines        Current Outpatient Medications   Medication Sig Dispense Refill    linaclotide (LINZESS) 290 MCG CAPS capsule Take 290 mcg by mouth every morning (before breakfast)      acetaminophen (TYLENOL) 325 MG tablet Take 650 mg by mouth every 6 hours as needed for Pain      spironolactone (ALDACTONE) 25 MG tablet Take 1 tablet by mouth daily 30 tablet 5    labetalol (NORMODYNE) 100 MG tablet Take 100 mg po in the morning and 200 mg po in the evening 90 tablet 3    omeprazole (PRILOSEC) 40 MG delayed release capsule Take 20 mg by mouth daily       SYNTHROID 125 MCG tablet Take 1 tablet by mouth daily Take with water on an empty stomach- wait 30 minutes before eating or taking other meds. 30 tablet 11    bumetanide (BUMEX) 1 MG tablet Take 1 tablet by mouth 3 times daily 90 tablet 6    hydrALAZINE (APRESOLINE) 50 MG tablet Take 1 tablet by mouth 3 times daily 90 tablet 1    methylcellulose (CITRUCEL) 500 MG TABS Take by mouth      ferrous sulfate 325 (65 Fe) MG tablet Take 325 mg by mouth daily (with breakfast)      polyethylene glycol (GLYCOLAX) packet Take 17 g by mouth daily as needed for Constipation      atorvastatin (LIPITOR) 20 MG tablet Take 1 tablet by mouth daily 90 tablet 3    aspirin 81 MG tablet Take 81 mg by mouth daily      clonazePAM (KLONOPIN) 1 MG tablet Take 1 mg by mouth 2 times daily.  Patient taking 1/2 tab in morning and full tablet at night      venlafaxine (EFFEXOR XR) 150 MG extended release capsule Take 150 mg by mouth daily  gabapentin (NEURONTIN) 300 MG capsule Take 300 mg by mouth 3 times daily.  OLANZapine (ZYPREXA) 7.5 MG tablet Take 5 mg by mouth nightly       tiZANidine (ZANAFLEX) 2 MG tablet Take 2 mg by mouth 2 times daily      tetrabenazine (XENAZINE) 12.5 MG tablet Take 12.5 mg by mouth 2 times daily      venlafaxine (EFFEXOR) 75 MG tablet Take 75 mg by mouth daily      Mirabegron ER (MYRBETRIQ) 50 MG TB24 Take 50 mg by mouth daily      lamoTRIgine (LAMICTAL) 100 MG tablet Take 100 mg by mouth daily Patient taking 1 tablet TID      fish oil-omega-3 fatty acids 1000 MG capsule Take 1 g by mouth daily.  vitamin E 400 UNIT capsule Take 400 Units by mouth daily.  Ascorbic Acid (VITAMIN C) 500 MG tablet Take 500 mg by mouth daily.  Multiple Vitamin (MULTIVITAMIN PO) Take 1 tablet by mouth daily.  estrogens, conjugated, (PREMARIN) 0.9 MG tablet Take 0.3 mg by mouth daily       lubiprostone (AMITIZA) 24 MCG capsule Take 24 mcg by mouth daily        No current facility-administered medications for this visit. Family History   Problem Relation Age of Onset    Diabetes Mother     High Blood Pressure Father     Cancer Father     Other Brother     Diabetes Brother     Cancer Sister     Breast Cancer Sister 39    Cancer Brother         Review of Systems -   Review of Systems   Constitutional: Negative for activity change, appetite change, chills and fever. HENT: Negative for congestion and postnasal drip. Eyes: Negative. Respiratory: Negative for cough, chest tightness, shortness of breath, wheezing and stridor. Cardiovascular: Negative for chest pain and leg swelling. Gastrointestinal: Negative for diarrhea and nausea. Endocrine: Negative. Genitourinary: Negative. Musculoskeletal: Negative. Negative for arthralgias and back pain. Skin: Negative. Allergic/Immunologic: Negative. Neurological: Negative. Negative for dizziness and light-headedness. my office for earlier appointment if needed for worsening of sleep symptoms.   - She was instructed on weight loss  - Obdulio Gibbs was educated about my impression and plan. Patient verbalizesunderstanding.   We will see Irene Mora back in: 1 year with download    Information added by my medical assistant/LPN was reviewed today         Shabbir Crabtree PA-C, MPAS  1/30/2020

## 2020-01-30 ENCOUNTER — OFFICE VISIT (OUTPATIENT)
Dept: PULMONOLOGY | Age: 70
End: 2020-01-30
Payer: MEDICARE

## 2020-01-30 ENCOUNTER — OFFICE VISIT (OUTPATIENT)
Dept: CARDIOLOGY CLINIC | Age: 70
End: 2020-01-30
Payer: MEDICARE

## 2020-01-30 VITALS
DIASTOLIC BLOOD PRESSURE: 64 MMHG | OXYGEN SATURATION: 97 % | SYSTOLIC BLOOD PRESSURE: 132 MMHG | HEART RATE: 76 BPM | WEIGHT: 220.6 LBS | HEIGHT: 63 IN | BODY MASS INDEX: 39.09 KG/M2

## 2020-01-30 VITALS
DIASTOLIC BLOOD PRESSURE: 68 MMHG | SYSTOLIC BLOOD PRESSURE: 120 MMHG | HEART RATE: 87 BPM | OXYGEN SATURATION: 96 % | BODY MASS INDEX: 38.97 KG/M2 | WEIGHT: 220 LBS

## 2020-01-30 PROCEDURE — 4040F PNEUMOC VAC/ADMIN/RCVD: CPT | Performed by: PHYSICIAN ASSISTANT

## 2020-01-30 PROCEDURE — 1090F PRES/ABSN URINE INCON ASSESS: CPT | Performed by: PHYSICIAN ASSISTANT

## 2020-01-30 PROCEDURE — 3017F COLORECTAL CA SCREEN DOC REV: CPT | Performed by: NURSE PRACTITIONER

## 2020-01-30 PROCEDURE — G8427 DOCREV CUR MEDS BY ELIG CLIN: HCPCS | Performed by: PHYSICIAN ASSISTANT

## 2020-01-30 PROCEDURE — 1036F TOBACCO NON-USER: CPT | Performed by: NURSE PRACTITIONER

## 2020-01-30 PROCEDURE — G8484 FLU IMMUNIZE NO ADMIN: HCPCS | Performed by: NURSE PRACTITIONER

## 2020-01-30 PROCEDURE — G8417 CALC BMI ABV UP PARAM F/U: HCPCS | Performed by: NURSE PRACTITIONER

## 2020-01-30 PROCEDURE — 1123F ACP DISCUSS/DSCN MKR DOCD: CPT | Performed by: NURSE PRACTITIONER

## 2020-01-30 PROCEDURE — 1090F PRES/ABSN URINE INCON ASSESS: CPT | Performed by: NURSE PRACTITIONER

## 2020-01-30 PROCEDURE — G8484 FLU IMMUNIZE NO ADMIN: HCPCS | Performed by: PHYSICIAN ASSISTANT

## 2020-01-30 PROCEDURE — G8400 PT W/DXA NO RESULTS DOC: HCPCS | Performed by: PHYSICIAN ASSISTANT

## 2020-01-30 PROCEDURE — 4040F PNEUMOC VAC/ADMIN/RCVD: CPT | Performed by: NURSE PRACTITIONER

## 2020-01-30 PROCEDURE — 99213 OFFICE O/P EST LOW 20 MIN: CPT | Performed by: NURSE PRACTITIONER

## 2020-01-30 PROCEDURE — 1036F TOBACCO NON-USER: CPT | Performed by: PHYSICIAN ASSISTANT

## 2020-01-30 PROCEDURE — 99213 OFFICE O/P EST LOW 20 MIN: CPT | Performed by: PHYSICIAN ASSISTANT

## 2020-01-30 PROCEDURE — G8427 DOCREV CUR MEDS BY ELIG CLIN: HCPCS | Performed by: NURSE PRACTITIONER

## 2020-01-30 PROCEDURE — G8417 CALC BMI ABV UP PARAM F/U: HCPCS | Performed by: PHYSICIAN ASSISTANT

## 2020-01-30 PROCEDURE — 1123F ACP DISCUSS/DSCN MKR DOCD: CPT | Performed by: PHYSICIAN ASSISTANT

## 2020-01-30 PROCEDURE — 3017F COLORECTAL CA SCREEN DOC REV: CPT | Performed by: PHYSICIAN ASSISTANT

## 2020-01-30 PROCEDURE — G8400 PT W/DXA NO RESULTS DOC: HCPCS | Performed by: NURSE PRACTITIONER

## 2020-01-30 RX ORDER — BUMETANIDE 1 MG/1
1 TABLET ORAL 2 TIMES DAILY
Qty: 90 TABLET | Refills: 6
Start: 2020-01-30 | End: 2020-04-09

## 2020-01-30 ASSESSMENT — ENCOUNTER SYMPTOMS
NAUSEA: 0
COUGH: 0
DIARRHEA: 0
STRIDOR: 0
SHORTNESS OF BREATH: 0
CHEST TIGHTNESS: 0
WHEEZING: 0
BACK PAIN: 0
ALLERGIC/IMMUNOLOGIC NEGATIVE: 1
EYES NEGATIVE: 1

## 2020-01-30 NOTE — PROGRESS NOTES
transfusion 2004    Hypertension     Mood disorder ([facility] [address]     Sleep apnea     Thyroid disease      Past Surgical History:   Procedure Laterality Date    ANKLE SURGERY      left    CATARACT REMOVAL      Both eyes      SECTION      x 3    FOOT SURGERY      Left     HIP SURGERY      HYSTERECTOMY      Total     TOTAL KNEE ARTHROPLASTY Left 10/14/14    TOTAL KNEE ARTHROPLASTY Right 2019     Family History   Problem Relation Age of Onset    Diabetes Mother     High Blood Pressure Father     Cancer Father     Other Brother     Diabetes Brother     Cancer Sister     Breast Cancer Sister 39    Cancer Brother      Social History     Tobacco Use    Smoking status: Never Smoker    Smokeless tobacco: Never Used   Substance Use Topics    Alcohol use: No     Alcohol/week: 0.0 standard drinks     Comment: rarely     Current Outpatient Medications   Medication Sig Dispense Refill    linaclotide (LINZESS) 290 MCG CAPS capsule Take 290 mcg by mouth every morning (before breakfast)      bumetanide (BUMEX) 1 MG tablet Take 1 tablet by mouth 2 times daily 90 tablet 6    acetaminophen (TYLENOL) 325 MG tablet Take 650 mg by mouth every 6 hours as needed for Pain      spironolactone (ALDACTONE) 25 MG tablet Take 1 tablet by mouth daily 30 tablet 5    labetalol (NORMODYNE) 100 MG tablet Take 100 mg po in the morning and 200 mg po in the evening 90 tablet 3    omeprazole (PRILOSEC) 40 MG delayed release capsule Take 20 mg by mouth daily       SYNTHROID 125 MCG tablet Take 1 tablet by mouth daily Take with water on an empty stomach- wait 30 minutes before eating or taking other meds.  30 tablet 11    hydrALAZINE (APRESOLINE) 50 MG tablet Take 1 tablet by mouth 3 times daily 90 tablet 1    methylcellulose (CITRUCEL) 500 MG TABS Take by mouth      ferrous sulfate 325 (65 Fe) MG tablet Take 325 mg by mouth daily (with breakfast)      polyethylene glycol (GLYCOLAX) packet Take 17 g by mouth daily as Neurological: Negative for weakness, light-headedness and headaches. Hematological: Negative for adenopathy. Psychiatric/Behavioral: Negative for sleep disturbance. OBJECTIVE:   Today's Vitals:  /68   Pulse 87   Wt 220 lb (99.8 kg)   LMP  (Exact Date)   SpO2 96%   BMI 38.97 kg/m²     Physical Exam  Vitals signs reviewed. Constitutional:       General: She is not in acute distress. Appearance: Normal appearance. She is well-developed. She is not diaphoretic. HENT:      Head: Normocephalic and atraumatic. Eyes:      Conjunctiva/sclera: Conjunctivae normal.   Neck:      Musculoskeletal: Normal range of motion and neck supple. Comments: No JVD  Cardiovascular:      Rate and Rhythm: Normal rate and regular rhythm. Heart sounds: Normal heart sounds. No murmur. Pulmonary:      Effort: Pulmonary effort is normal. No respiratory distress. Breath sounds: Normal breath sounds. No wheezing or rales. Abdominal:      General: Bowel sounds are normal. There is no distension. Palpations: Abdomen is soft. Tenderness: There is no abdominal tenderness. Musculoskeletal: Normal range of motion. Right lower leg: No edema. Left lower leg: No edema. Skin:     General: Skin is warm and dry. Capillary Refill: Capillary refill takes less than 2 seconds. Neurological:      Mental Status: She is alert and oriented to person, place, and time. Coordination: Coordination normal.   Psychiatric:         Behavior: Behavior normal.         Wt Readings from Last 3 Encounters:   01/30/20 220 lb (99.8 kg)   01/30/20 220 lb 9.6 oz (100.1 kg)   01/24/20 218 lb (98.9 kg)     BP Readings from Last 3 Encounters:   01/30/20 120/68   01/30/20 132/64   01/24/20 106/60     Pulse Readings from Last 3 Encounters:   01/30/20 87   01/30/20 76   01/24/20 71     Body mass index is 38.97 kg/m². ECHO:   Summary   Normal left ventricle size and systolic function.  Ejection fraction  D1:  Large tortuous vessel without significant obstruction. Dominance, right.     MEDICATIONS:  See MAR.     ACCESS:  1.  Right brachial vein for right heart cath. 2.  Right radial artery for left heart cath.     COMPLICATIONS:  None.     CONCLUSION:  1.  Nonobstructive coronary artery disease. 2.  Congestive heart failure with preserved ejection fraction. 3.  Volume overload. 4.  Elevated right and left filling pressures. 5.  Moderate pulmonary venous hypertension.     RECOMMENDATIONS:  1.  Aggressive risk factor modification for nonobstructive coronary  disease. 2.  Continue aspirin 81 mg.  3.  Lipitor 20 mg p.o. daily. 4.  Fluid restriction to less than 1.5 to 2 liters per day. 5.  Sodium restriction to less than 1.5 gm per day. 6.  Daily weight monitoring. 7.  The patient was given Lasix 80 mg IV x1 in the cath lab. 8.  Stop p.o. Lasix.  Currently, the patient is on Lasix 80 mg p.o.  b.i.d.  9.  Start Bumex 1 mg p.o. t.i.d. 10.  Check BMP and magnesium level next week. 11.  Keep potassium above 4 and magnesium above 2. 12.  The patient needs better blood pressure control.  Her hydralazine  was increased to 100 mg p.o. t.i.d.  13.  Continue to monitor home blood pressure readings. 15.  The patient has history of obstructive sleep apnea.  Continue CPAP  during sleep.  She had an appointment with her sleep medicine specialist  within one to two weeks. 15.  Lifestyle modifications including weight loss advisable. 16.  Refer to cardiac rehab once volume status is better. The above findings and plan of care were discussed in details with the  patient and her family members including  and daughter.    Questions were answered. Niall Shaffer are agreeable with above-mentioned plan.     Jones Walter MD     D: 10/18/2019 9:59:22       Results reviewed:  BNP:   Lab Results   Component Value Date     (H) 10/08/2019     CBC:   Lab Results   Component Value Date    WBC 6.3 10/18/2019    RBC 3.65 10/18/2019    HGB 10.4 10/18/2019    HCT 33.5 10/18/2019     10/18/2019     CMP:    Lab Results   Component Value Date     01/22/2020    K 4.2 01/22/2020    K 4.0 09/14/2019     01/22/2020    CO2 25 01/22/2020    BUN 65 01/22/2020    CREATININE 1.33 01/22/2020    AGRATIO 2.0 01/19/2018    LABGLOM 41 10/18/2019    GLUCOSE 98 01/22/2020    CALCIUM 10.10 01/22/2020     Hepatic Function Panel:    Lab Results   Component Value Date    ALKPHOS 83 09/29/2019    ALT 18 09/29/2019    AST 19 09/29/2019    PROT 7.1 09/29/2019    BILITOT <0.2 09/29/2019    BILIDIR <0.2 09/29/2019    LABALBU 4.3 09/29/2019     Magnesium:    Lab Results   Component Value Date    MG 2.4 10/25/2019     PT/INR:    Lab Results   Component Value Date    INR 0.90 10/18/2019     Lipids:    Lab Results   Component Value Date    TRIG 137 10/08/2019    HDL 52 10/08/2019    HDL 51 03/16/2012    LDLCALC 120 10/08/2019    LDLDIRECT 120 10/08/2019       ASSESSMENT AND PLAN:   The patient's condition/symptoms are Stable: No clinical evidence of fluid overload today. Continue current medical regimen without changes at present time. Diagnosis Orders   1. CHF (congestive heart failure), NYHA class II, chronic, diastolic (HCC)        Continue:  GDMT:   ACE/ARB/ARNI - None   BB - Labetalol 100 AM, 200 PM   Diuretic - Bumex 1 TID - Decrease 1 BID   Hydralazine/Isos. - Hydralazine 50 TID   Aldosterone- Aldactone 25/day  Continue Current medications  Stable. Looks great. No signs fluid overload. Labs 1/22 - creat 1.33, GFR 40, BUN 65 (slight bump in all)  Appears hit dry weight - 220#. Will decrease Bumex to 1 BID( from TID) - If weights increase 2-3#/day or 5# week instructed to take extra Bumex. Repeat BMP in 2 weeks. Dr Dexter Soto following closely as well. Continue to monitor BPs - if fatigue worsens consider changing to Coreg or Toprol. F/U w/ [facility] in March   F/U clinic in 6 months or sooner if needed.      · Daily weights  · Fluid restriction of 2 Liters per day  · Limit sodium in diet to around 3302-6451 mg/day  · Monitor BP  · Activity as tolerated     Patient was instructed to call the [facility] for any changes in symptoms as noted in AVS.      Return in about 6 months (around 7/30/2020). or sooner if needed     Patient given educational materials - see patient instructions. We discussed the importance of weighing oneself and recording daily. We also discussed the importance of a low sodium diet, higher sodium foods to avoid and better low sodium food options. Patient verbalizes understanding of plan of care using teach back method, and is agreeable to the treatment plan.        Electronically signed by DAVID Del Real CNP on 1/31/2020 at 9:23 AM

## 2020-01-30 NOTE — PATIENT INSTRUCTIONS
You may receive a survey regarding the care you received during your visit. Your input is valuable to us. We encourage you to complete and return your survey. We hope you will choose us in the future for your healthcare needs. Continue:  · Continue current medications  · Daily weights and record  · Fluid restriction of 2 Liters per day  · Limit sodium in diet to around 0360-4110 mg/day  · Monitor BP  · Activity as tolerated     Call the Heart Failure Clinic for any of the following symptoms: [phone]   Weight gain of 2-3 pounds in 1 day or 5 pounds in 1 week   Increased shortness of breath   Shortness of breath while laying down   Cough   Chest pain   Swelling in feet, ankles or legs   Tenderness or bloating in the abdomen   Fatigue    Decreased appetite or nausea    Confusion     Decrease Bumex to twice daily - take 3rd dose if weight goes up 2-3# overnight or 5# in week.

## 2020-01-31 ASSESSMENT — ENCOUNTER SYMPTOMS
COUGH: 0
ABDOMINAL DISTENTION: 0
SHORTNESS OF BREATH: 0

## 2020-02-10 LAB
ANION GAP SERPL CALCULATED.3IONS-SCNC: 8 MMOL/L (ref 4–12)
BUN BLDV-MCNC: 28 MG/DL (ref 7–20)
CALCIUM SERPL-MCNC: 9.6 MG/DL (ref 8.8–10.5)
CHLORIDE BLD-SCNC: 106 MEQ/L (ref 101–111)
CO2: 27 MEQ/L (ref 21–32)
CREAT SERPL-MCNC: 1 MG/DL (ref 0.6–1.3)
CREATININE CLEARANCE: 55
GLUCOSE: 79 MG/DL (ref 70–110)
POTASSIUM SERPL-SCNC: 4.4 MEQ/L (ref 3.6–5)
SODIUM BLD-SCNC: 141 MEQ/L (ref 135–145)

## 2020-02-11 ENCOUNTER — TELEPHONE (OUTPATIENT)
Dept: NEPHROLOGY | Age: 70
End: 2020-02-11

## 2020-02-11 NOTE — TELEPHONE ENCOUNTER
Called to inform patient and she states she was on Labetalol 200 mg BID and we decreased it due to it making her too sleepy.

## 2020-02-11 NOTE — TELEPHONE ENCOUNTER
Patient is notified of good labs. But BP is running 190-160's /50,88,90's and 100's.  All she has taken is 50mg of hydralizine and 100 mg of labetalol

## 2020-02-18 ENCOUNTER — OFFICE VISIT (OUTPATIENT)
Dept: CARDIOLOGY CLINIC | Age: 70
End: 2020-02-18
Payer: MEDICARE

## 2020-02-18 VITALS
OXYGEN SATURATION: 93 % | DIASTOLIC BLOOD PRESSURE: 62 MMHG | WEIGHT: 225 LBS | BODY MASS INDEX: 39.86 KG/M2 | HEART RATE: 80 BPM | SYSTOLIC BLOOD PRESSURE: 106 MMHG

## 2020-02-18 PROCEDURE — 1036F TOBACCO NON-USER: CPT | Performed by: NURSE PRACTITIONER

## 2020-02-18 PROCEDURE — 4040F PNEUMOC VAC/ADMIN/RCVD: CPT | Performed by: NURSE PRACTITIONER

## 2020-02-18 PROCEDURE — 1090F PRES/ABSN URINE INCON ASSESS: CPT | Performed by: NURSE PRACTITIONER

## 2020-02-18 PROCEDURE — G8427 DOCREV CUR MEDS BY ELIG CLIN: HCPCS | Performed by: NURSE PRACTITIONER

## 2020-02-18 PROCEDURE — 99213 OFFICE O/P EST LOW 20 MIN: CPT | Performed by: NURSE PRACTITIONER

## 2020-02-18 PROCEDURE — 3017F COLORECTAL CA SCREEN DOC REV: CPT | Performed by: NURSE PRACTITIONER

## 2020-02-18 PROCEDURE — G8417 CALC BMI ABV UP PARAM F/U: HCPCS | Performed by: NURSE PRACTITIONER

## 2020-02-18 PROCEDURE — G8484 FLU IMMUNIZE NO ADMIN: HCPCS | Performed by: NURSE PRACTITIONER

## 2020-02-18 PROCEDURE — 1123F ACP DISCUSS/DSCN MKR DOCD: CPT | Performed by: NURSE PRACTITIONER

## 2020-02-18 PROCEDURE — G8400 PT W/DXA NO RESULTS DOC: HCPCS | Performed by: NURSE PRACTITIONER

## 2020-02-18 RX ORDER — HYDRALAZINE HYDROCHLORIDE 50 MG/1
75 TABLET, FILM COATED ORAL 3 TIMES DAILY
COMMUNITY
End: 2020-02-26

## 2020-02-18 RX ORDER — HYDRALAZINE HYDROCHLORIDE 50 MG/1
75 TABLET, FILM COATED ORAL 3 TIMES DAILY
Qty: 90 TABLET | Refills: 3 | Status: SHIPPED | OUTPATIENT
Start: 2020-02-18 | End: 2020-02-26 | Stop reason: SDUPTHER

## 2020-02-18 ASSESSMENT — ENCOUNTER SYMPTOMS
COUGH: 0
SHORTNESS OF BREATH: 1
ABDOMINAL DISTENTION: 0

## 2020-02-18 NOTE — PROGRESS NOTES
Heart Failure Clinic       Visit Date: 2/18/2020  Cardiologist:  Dr. Pablo Bolaños  Primary Care Physician: Dr. Anna Vasquez,     Deven Abarca is a 71 y.o. female who presents today for:  Chief Complaint   Patient presents with    Congestive Heart Failure       HPI:   Deven Abarca is a 71 y.o. female who presents to the office for a follow up patient visit in the heart failure clinic. Accompanied by   Raising grandkids - 15and 15year old  HX: HFpEF (55-60%, grd 1), Uncontrolled HTN (past year, worse since knee surgery/infection in July 2019), obesity, thoracic AA, OA-knee surgery, RANDY, Bipolar  Dry Wt = 220#    Hospitalization r/t Heart Failure:  Sept 2019 = ESCOTO, Edema, HTN.  Lasix IV.  BNP normal.  +CXR. Discharge weight - 222#  Cath 10/18/19 - Nonobstructive CAD, PWCP 30 - received Lasix 80 IV in lab, changed to Bumex TID  OV 12/3 - doing well - no changes   OV 1/7 Errol - fatigued, BP low. Decreased Labetolol  CC 1/15 - Saw Dr Shweta Fisher. No changes. MEMs only if readmitted. Concerns today: Called yesterday and wanted appt = feeling depressed and frustrated w/ lifestyle changes required w/ HF. She wrote a letter - read it aloud. Stating she can't eat what she wants. She is wasting so much time trying to eat right - cook from scratch. She is overall frustrated, feeling depressed. This is already a bad time of year that she struggles w/ w/ her Bipolar. She has not ambition for anything - sleeping a lot more. She saw her psychiatrist yesterday - he encouraged her to start exercising. She has appt w/ [address] program this Thursday  Up 5# in 2 weeks - mild ESCOTO. Activity: Walked from parking lot - mild ESCOTO, can complete ADLs. Diet: Watches VERY closely = keeps Na<2gm and fluid around 48 oz.     Patient has:  Chest Pain: No  SOB: ESCOTO  Orthopnea/PND: No  RANDY: Compliant w/ CPAP  Edema: No  Fatigue: some - improving  Abdominal bloating: No  Cough: Psychiatric/Behavioral: Negative for sleep disturbance. OBJECTIVE:   Today's Vitals:  /62   Pulse 80   Wt 225 lb (102.1 kg)   LMP  (Exact Date)   SpO2 93%   BMI 39.86 kg/m²     Physical Exam  Vitals signs reviewed. Constitutional:       General: She is not in acute distress. Appearance: Normal appearance. She is well-developed. She is not diaphoretic. HENT:      Head: Normocephalic and atraumatic. Eyes:      Conjunctiva/sclera: Conjunctivae normal.   Neck:      Musculoskeletal: Normal range of motion and neck supple. Comments: No JVD  Cardiovascular:      Rate and Rhythm: Normal rate and regular rhythm. Heart sounds: Normal heart sounds. No murmur. Pulmonary:      Effort: Pulmonary effort is normal. No respiratory distress. Breath sounds: Normal breath sounds. No wheezing or rales. Abdominal:      General: Bowel sounds are normal. There is no distension. Palpations: Abdomen is soft. Tenderness: There is no abdominal tenderness. Musculoskeletal: Normal range of motion. Right lower leg: No edema. Left lower leg: No edema. Skin:     General: Skin is warm and dry. Capillary Refill: Capillary refill takes less than 2 seconds. Neurological:      Mental Status: She is alert and oriented to person, place, and time. Coordination: Coordination normal.   Psychiatric:         Behavior: Behavior normal.       Wt Readings from Last 3 Encounters:   02/18/20 225 lb (102.1 kg)   01/30/20 220 lb (99.8 kg)   01/30/20 220 lb 9.6 oz (100.1 kg)     BP Readings from Last 3 Encounters:   02/18/20 106/62   01/30/20 120/68   01/30/20 132/64     Pulse Readings from Last 3 Encounters:   02/18/20 80   01/30/20 87   01/30/20 76     Body mass index is 39.86 kg/m². ECHO:   Summary   Normal left ventricle size and systolic function. Ejection fraction was   estimated at 55 to 60 %.  There were no regional left ventricular wall   motion abnormalities and wall MAR.     ACCESS:  1.  Right brachial vein for right heart cath. 2.  Right radial artery for left heart cath.     COMPLICATIONS:  None.     CONCLUSION:  1.  Nonobstructive coronary artery disease. 2.  Congestive heart failure with preserved ejection fraction. 3.  Volume overload. 4.  Elevated right and left filling pressures. 5.  Moderate pulmonary venous hypertension.     RECOMMENDATIONS:  1.  Aggressive risk factor modification for nonobstructive coronary  disease. 2.  Continue aspirin 81 mg.  3.  Lipitor 20 mg p.o. daily. 4.  Fluid restriction to less than 1.5 to 2 liters per day. 5.  Sodium restriction to less than 1.5 gm per day. 6.  Daily weight monitoring. 7.  The patient was given Lasix 80 mg IV x1 in the cath lab. 8.  Stop p.o. Lasix.  Currently, the patient is on Lasix 80 mg p.o.  b.i.d.  9.  Start Bumex 1 mg p.o. t.i.d. 10.  Check BMP and magnesium level next week. 11.  Keep potassium above 4 and magnesium above 2. 12.  The patient needs better blood pressure control.  Her hydralazine  was increased to 100 mg p.o. t.i.d.  13.  Continue to monitor home blood pressure readings. 15.  The patient has history of obstructive sleep apnea.  Continue CPAP  during sleep.  She had an appointment with her sleep medicine specialist  within one to two weeks. 15.  Lifestyle modifications including weight loss advisable. 16.  Refer to cardiac rehab once volume status is better. The above findings and plan of care were discussed in details with the  patient and her family members including  and daughter.    Questions were answered. Norma Jerez are agreeable with above-mentioned plan.     Jude Mathur MD     D: 10/18/2019 9:59:22       Results reviewed:  BNP:   Lab Results   Component Value Date     (H) 10/08/2019     CBC:   Lab Results   Component Value Date    WBC 6.3 10/18/2019    RBC 3.65 10/18/2019    HGB 10.4 10/18/2019    HCT 33.5 10/18/2019     10/18/2019     CMP:    Lab Results Component Value Date     02/10/2020    K 4.4 02/10/2020    K 4.0 09/14/2019     02/10/2020    CO2 27 02/10/2020    BUN 28 02/10/2020    CREATININE 1.00 02/10/2020    AGRATIO 2.0 01/19/2018    LABGLOM 41 10/18/2019    GLUCOSE 79 02/10/2020    CALCIUM 9.60 02/10/2020     Hepatic Function Panel:    Lab Results   Component Value Date    ALKPHOS 83 09/29/2019    ALT 18 09/29/2019    AST 19 09/29/2019    PROT 7.1 09/29/2019    BILITOT <0.2 09/29/2019    BILIDIR <0.2 09/29/2019    LABALBU 4.3 09/29/2019     Magnesium:    Lab Results   Component Value Date    MG 2.4 10/25/2019     PT/INR:    Lab Results   Component Value Date    INR 0.90 10/18/2019     Lipids:    Lab Results   Component Value Date    TRIG 137 10/08/2019    HDL 52 10/08/2019    HDL 51 03/16/2012    LDLCALC 120 10/08/2019    LDLDIRECT 120 10/08/2019       ASSESSMENT AND PLAN:   The patient's condition/symptoms are Stable: No clinical evidence of fluid overload today. Continue current medical regimen without changes at present time. Diagnosis Orders   1. CHF (congestive heart failure), NYHA class II, chronic, diastolic ([facility] [address]  [facility]      Continue:  GDMT:   ACE/ARB/ARNI - None   BB - Labetalol 100 AM, 200 PM   Diuretic - Bumex 1  BID   Hydralazine/Isos. - Hydralazine 75 TID   Aldosterone- Aldactone 25/day  Continue Current medications  Stable. Looks good but dealing w/ some depression/frustration. Encouraged to start exercising = [address] = get calendar, write it down. Encouraged to Journal - not just the bad, also good. Referral to Spiritual care  Continue to monitor BPs - bring cuff to next OV. Labs 2/10 - great  F/U w/ [facility] in March   F/U clinic in 6 months or sooner if needed.      · Daily weights  · Fluid restriction of 2 Liters per day  · Limit sodium in diet to around 7324-4646 mg/day  · Monitor BP  · Activity as tolerated     Patient was instructed to call the Heart Failure Clinic for any changes in symptoms as noted in AVS.      Return in about 4 months (around 6/18/2020). or sooner if needed     Patient given educational materials - see patient instructions. We discussed the importance of weighing oneself and recording daily. We also discussed the importance of a low sodium diet, higher sodium foods to avoid and better low sodium food options. Patient verbalizes understanding of plan of care using teach back method, and is agreeable to the treatment plan.        Electronically signed by Cherylene Pons, APRN - CNP on 2/18/2020 at 9:27 AM

## 2020-02-18 NOTE — LETTER
[facility]  [facility]  [address]  [facility] [address]  Phone: [phone]  Fax: [phone]    DAVID Morgan CNP        February 18, 2020     Patient: Amada Padilla   YOB: 1950   Date of Visit: 2/18/2020       To Whom It May Concern: It is my medical opinion that Cassandra Candelario may participate in [facility].    If you have any questions or concerns, please don't hesitate to call.     Sincerely,        DAVID Morgan CNP

## 2020-02-19 ENCOUNTER — TELEPHONE (OUTPATIENT)
Dept: SPIRITUAL SERVICES | Facility: CLINIC | Age: 70
End: 2020-02-19

## 2020-02-19 NOTE — TELEPHONE ENCOUNTER
I received a telephone call from Chilo Rust requesting support for her recent diagnosis and what is being experienced in her life at this time. An appointment is scheduled for 2/24 at [address] [place] [address].

## 2020-02-24 ENCOUNTER — CLINICAL DOCUMENTATION (OUTPATIENT)
Dept: SPIRITUAL SERVICES | Facility: CLINIC | Age: 70
End: 2020-02-24

## 2020-02-24 NOTE — PROGRESS NOTES
Ambulatory  Consult Note     Patient Name:  Concepcion Arriola   YOB: 1950   Age/Gender: 71 y.o. / female     Consultation Requested By:  CHF RN of Rene Ching for support due to new diagnosis. Advanced Care Planning:  Rene Ching has completed [address] and Living Will documents in her file. Hospital Problem List:    Patient Active Problem List   Diagnosis    Hypothyroid    Eczema    Dyslipidemia    [facility] [address]    Post-menopausal    HTN (hypertension)    Contact dermatitis    Obesity (BMI 30-39. 9)    Obstructive sleep apnea on CPAP    ACE inhibitor-aggravated angioedema    Leg edema    Acute on chronic diastolic congestive heart failure (HCC)    Ascending aortic aneurysm (HCC)    Chronic diastolic heart failure ([facility] [address]    Thoracic aortic aneurysm without rupture (HCC)    Abnormal stress test    CKD (chronic kidney disease), stage II     Patient Demographics    Address: [facility]  [place] [address]   Contact Numbers: [phone] (home)    Next of Kin: Extended Emergency Contact Information  Primary Emergency Contact: Yasmeen Hannon  Address: [address] [facility], [address] [place] [address] Phone: [phone]  Mobile Phone: [phone]  Relation: Spouse  Secondary Emergency Contact: [address] Phone: [phone]  Mobile Phone: [phone]  Relation: Child       Anabaptism Affiliation/: Tenriism   Mobility/Ability to leave home-Do they Drive? 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 She was approachable and responsive, freely engaging in open conversation about her feelings and story. She expressed, \"I've been down. I was upset. \" She added that as she would journal about her feelings, she felt \"I want to give up on what I was to do. \" It was at that point she reached out to the CHF Clinic for help. Objective:       Calm   [] Approachable   [x] Grieving   [x]   Anxious   [] Angry   [x] Loneliness   []   Hopeless   [] Coping   [] Despair   []   Passive   [] Tearful   [] Fearful  []    Hopeful  []  Peaceful   [] Sleeping  []    Guilt   [] Other   []    Unable to Respond  []         Assessment:     Mac Woodruff freely shared her story of diagnosis in September 2019. She expressed \"it was a real surprise to me. \" She was accepting of the diagnosis \"with a grain of salt,\" but it led to other discoveries. She shared the need of seeing a kidney specialist for complications. In her frustration with her experience at that time, she stated \"it seemed like all I was doing was going to see doctors. \" She was the grieving the loss of her freedom, and life as she knew it prior to diagnosis. She shared her story of change and ongoing adjustment. She stated that 4 weeks of penitentiary was enjoyed by her and her , then they received permanent custody of 2 grandchild (now ages 15 and 13). She added that this was the only way to keep the children out of foster care. It was and still is a major life-change for both of them. In addition to care of the children, she is dealing with mobility changes. She is now using a wheelchair on long excursions due to lack of stamina. A scooter may be an option, if needed, in the future. She stated having an accident in her past while in [address] [place] [address], which brought several injuries with lingering aftereffects to her body. Currently, she is battling with normal emotions which come with major life-change: denial that it has come her way, anger at the diagnosis, and frustration at what she can no longer do with ease, or minimal effort. Moving forward, she is sustained through support from her .  She is the

## 2020-02-26 ENCOUNTER — TELEPHONE (OUTPATIENT)
Dept: CARDIOLOGY CLINIC | Age: 70
End: 2020-02-26

## 2020-02-26 RX ORDER — HYDRALAZINE HYDROCHLORIDE 50 MG/1
75 TABLET, FILM COATED ORAL 3 TIMES DAILY
Qty: 135 TABLET | Refills: 3 | Status: SHIPPED | OUTPATIENT
Start: 2020-02-26 | End: 2020-04-02

## 2020-02-27 ENCOUNTER — TELEPHONE (OUTPATIENT)
Dept: INTERNAL MEDICINE CLINIC | Age: 70
End: 2020-02-27

## 2020-03-09 ENCOUNTER — CLINICAL DOCUMENTATION (OUTPATIENT)
Dept: SPIRITUAL SERVICES | Facility: CLINIC | Age: 70
End: 2020-03-09

## 2020-03-09 NOTE — PROGRESS NOTES
Omar Echeverria is a 71-year-old wife present for a follow-up scheduled appointment to receive support/support as she processes life-adjustment following a new diagnosis with CHF, along with additional health concerns. She freely engaged in open conversation as she shared her recent story and feelings being experienced. She expressed \"my sister \" (she lives in [place]). She added having just spoken with her prior to her death. She is grieving her death, and the fact of no formal  service. A \"mock\"  service was held in [facility] with other family members in her memory, which \"helped\" her with some closure. She added that her  created a picture collage of her sister. Omar Echeverria shared that her new lifestyle has been difficult. She shared \"I still struggle with not being able to go out and eat like I used to do. \" Not getting out to eat, as well as not going out for her \"wellness program\" has contributed to her stating being \"depressed. \" She stated having to \"push myself to get out of bed. \" She added that she is viewing her wellness program as \"punishment,\" instead of a means to a better quality of life. She admitted during the conversation \"I'm pessimistic. \"    Harry Jimenez continued to encouraged her, and challenge her, to establish goals and post pictures of the things she would like to do and is planning to do in her future as motivation to push forward. She stated having posted a picture of [place], which is a destination for her with her . In addition, she was encouraged to post other pictures which could motivate her to move forward, as well as maintain a positive attitude to promote a positive outcome. She added pursuing a couple motivational children's books to assist: [facility]; Oh, The Places You'll Go. She was reminded that we often find ourselves in a \"rut\" throughout life. Therefore, we must choose our \"rut\" wisely.  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  provided an empathic and compassionate presence for her to express her story, as well as feelings. Hope was nurtured through the words expressed during the encounter, as well as a sustaining presence. She was grateful for the encounter and ministry received. The session was ended with prayer while holding hands. A follow-up appointment is scheduled for 3/25 at [address] [place] [address].  remains available for further emotional and spiritual support as needed.

## 2020-03-23 ENCOUNTER — TELEPHONE (OUTPATIENT)
Dept: CARDIOLOGY CLINIC | Age: 70
End: 2020-03-23

## 2020-03-23 NOTE — TELEPHONE ENCOUNTER
220ish is around your dry weight so the water pills are doing their job. I know you are wanting to lose more weight and this is difficult as we age. Keep moving and exercising as much as you can on your own. Hopefully you can get back with the Wellness program after this craziness. Your blood pressures are little elevated in AM, PM but are ok when meds in your system. The Hydralazine can be used 4 times a day instead of 3 times if you want to try tweeking the schedule with that it may help. Call me if your not sure. Send me an update on BPs in 1 week. Thanks.

## 2020-03-27 ENCOUNTER — CLINICAL DOCUMENTATION (OUTPATIENT)
Dept: SPIRITUAL SERVICES | Facility: CLINIC | Age: 70
End: 2020-03-27

## 2020-03-30 ENCOUNTER — TELEPHONE (OUTPATIENT)
Dept: CARDIOLOGY CLINIC | Age: 70
End: 2020-03-30

## 2020-04-02 ENCOUNTER — VIRTUAL VISIT (OUTPATIENT)
Dept: CARDIOLOGY CLINIC | Age: 70
End: 2020-04-02
Payer: MEDICARE

## 2020-04-02 VITALS
SYSTOLIC BLOOD PRESSURE: 140 MMHG | BODY MASS INDEX: 38.44 KG/M2 | HEART RATE: 70 BPM | DIASTOLIC BLOOD PRESSURE: 80 MMHG | WEIGHT: 217 LBS

## 2020-04-02 PROCEDURE — 99213 OFFICE O/P EST LOW 20 MIN: CPT | Performed by: NURSE PRACTITIONER

## 2020-04-02 RX ORDER — CARVEDILOL 25 MG/1
12.5 TABLET ORAL DAILY
Qty: 60 TABLET | Refills: 2 | Status: SHIPPED | OUTPATIENT
Start: 2020-04-02 | End: 2020-12-29 | Stop reason: SDUPTHER

## 2020-04-02 RX ORDER — HYDRALAZINE HYDROCHLORIDE 50 MG/1
50 TABLET, FILM COATED ORAL 3 TIMES DAILY
Qty: 135 TABLET | Refills: 3
Start: 2020-04-02 | End: 2021-01-19 | Stop reason: SDUPTHER

## 2020-04-02 ASSESSMENT — ENCOUNTER SYMPTOMS
COUGH: 0
ABDOMINAL DISTENTION: 0
SHORTNESS OF BREATH: 1

## 2020-04-02 NOTE — PROGRESS NOTES
TELEHEALTH EVALUATION -- Audio/Visual (During HJKRF-24 public health emergency)    [address]        Visit Date: 4/2/2020  Cardiologist:  Dr. Dk Santo  Primary Care Physician: Dr. Rose Marie Tuttle,     Harshal Mcneill is a 71 y.o. female who presents today for:  No chief complaint on file. HPI:   Harshal Mcneill is a 71 y.o. female who is seen via video virtual [facility] visit for a follow up patient visit in the heart failure clinic. Harshal Mcneill was at home. Provider was present at CHF clinic. CHF clinic nurse assisted. TYPE HF: HFpEF (EF 55-60%, grd 1)  Device: No  HX: Uncontrolled HTN (past year, worse since knee surgery/infection in July 2019), obesity, thoracic AA, OA-knee surgery, RANDY, Bipolar  Dry Wt = 220#     Hospitalization r/t Heart Failure:  Sept 2019 = ESCOTO, Edema, HTN.  Lasix IV.  BNP normal.  +CXR. Discharge weight - 222#  Cath 10/18/19 - Nonobstructive CAD, PWCP 30 - received Lasix 80 IV in lab, changed to Bumex TID  OV 12/3 - doing well - no changes   OV 1/7 Errol - fatigued, BP low. Decreased Labetolol  CC 1/15 - Saw Dr Adri Cannon. No changes. MEMs only if readmitted    Concerns today: Called yesterday regarding concerns w/ symptoms from BP meds. Have been adjusting past few weeks for high BP. States after taking Labetalol, \"my BP bottoms out\" - 105-110s. Prior to Labetalol -170s. Feels groggy, tired. Activity: Walked outside 200-300ft, some ESCOTO. Was feeling better when doing Wellness Program - plans to resume after Covid crisis.     Diet: watches closely    Patient has:  Chest Pain: No  SOB: ESCOTO - no worse than normal  Orthopnea/PND: No  RANDY: Compliant w/ CPAP  Edema: No  Fatigue: Same  Abdominal bloating: GI related  Cough: No  Appetite: Good  Any extra diuretic use: No  Weight gain: No  Home weight: Stable  Home blood pressure: see above    Past Medical History:   Diagnosis Date    Acid reflux     Arthritis     Asthma     the [facility] waiver authority and the [employer] and [employer] Act, this Virtual  Visit was conducted, with patient's consent, to reduce the patient's risk of exposure to COVID-19 and provide continuity of care for an established patient. Services were provided through a video synchronous discussion virtually to substitute for in-person clinic visit. Greater then 30 minutes of time was spent reviewing the chart and educating the patient about HF, medications, diet, exercise, and discussing the plan of care. I personally spent more then 50% of the appt time face to face with the patient counseling/coordinating patient's care.     Electronicallysigned by DAVID Palacios CNP on 4/2/2020 at 10:28 AM

## 2020-04-04 ENCOUNTER — APPOINTMENT (OUTPATIENT)
Dept: GENERAL RADIOLOGY | Age: 70
End: 2020-04-04
Payer: MEDICARE

## 2020-04-04 ENCOUNTER — HOSPITAL ENCOUNTER (EMERGENCY)
Age: 70
Discharge: HOME | End: 2020-04-04
Attending: EMERGENCY MEDICINE
Payer: MEDICARE

## 2020-04-04 VITALS
HEART RATE: 54 BPM | DIASTOLIC BLOOD PRESSURE: 59 MMHG | BODY MASS INDEX: 38.45 KG/M2 | WEIGHT: 217 LBS | HEIGHT: 63 IN | OXYGEN SATURATION: 94 % | RESPIRATION RATE: 16 BRPM | SYSTOLIC BLOOD PRESSURE: 115 MMHG | TEMPERATURE: 98.4 F

## 2020-04-04 LAB
ALBUMIN SERPL-MCNC: 4 G/DL (ref 3.5–5.1)
ALP BLD-CCNC: 107 U/L (ref 38–126)
ALT SERPL-CCNC: 18 U/L (ref 11–66)
ANION GAP SERPL CALCULATED.3IONS-SCNC: 15 MEQ/L (ref 8–16)
AST SERPL-CCNC: 19 U/L (ref 5–40)
BASOPHILS # BLD: 0.4 %
BASOPHILS ABSOLUTE: 0 THOU/MM3 (ref 0–0.1)
BILIRUB SERPL-MCNC: 0.2 MG/DL (ref 0.3–1.2)
BUN BLDV-MCNC: 49 MG/DL (ref 7–22)
CALCIUM SERPL-MCNC: 9.6 MG/DL (ref 8.5–10.5)
CHLORIDE BLD-SCNC: 98 MEQ/L (ref 98–111)
CO2: 24 MEQ/L (ref 23–33)
CREAT SERPL-MCNC: 1.8 MG/DL (ref 0.4–1.2)
EKG ATRIAL RATE: 57 BPM
EKG P AXIS: 59 DEGREES
EKG P-R INTERVAL: 180 MS
EKG Q-T INTERVAL: 428 MS
EKG QRS DURATION: 88 MS
EKG QTC CALCULATION (BAZETT): 416 MS
EKG R AXIS: 6 DEGREES
EKG T AXIS: 64 DEGREES
EKG VENTRICULAR RATE: 57 BPM
EOSINOPHIL # BLD: 2.3 %
EOSINOPHILS ABSOLUTE: 0.2 THOU/MM3 (ref 0–0.4)
ERYTHROCYTE [DISTWIDTH] IN BLOOD BY AUTOMATED COUNT: 12.8 % (ref 11.5–14.5)
ERYTHROCYTE [DISTWIDTH] IN BLOOD BY AUTOMATED COUNT: 44.6 FL (ref 35–45)
GFR SERPL CREATININE-BSD FRML MDRD: 28 ML/MIN/1.73M2
GLUCOSE BLD-MCNC: 106 MG/DL (ref 70–108)
HCT VFR BLD CALC: 33.8 % (ref 37–47)
HEMOGLOBIN: 10.6 GM/DL (ref 12–16)
IMMATURE GRANS (ABS): 0.24 THOU/MM3 (ref 0–0.07)
IMMATURE GRANULOCYTES: 3.1 %
INR BLD: 0.88 (ref 0.85–1.13)
LYMPHOCYTES # BLD: 23.7 %
LYMPHOCYTES ABSOLUTE: 1.8 THOU/MM3 (ref 1–4.8)
MCH RBC QN AUTO: 29.9 PG (ref 26–33)
MCHC RBC AUTO-ENTMCNC: 31.4 GM/DL (ref 32.2–35.5)
MCV RBC AUTO: 95.5 FL (ref 81–99)
MONOCYTES # BLD: 14 %
MONOCYTES ABSOLUTE: 1.1 THOU/MM3 (ref 0.4–1.3)
NUCLEATED RED BLOOD CELLS: 0 /100 WBC
OSMOLALITY CALCULATION: 287.2 MOSMOL/KG (ref 275–300)
PLATELET # BLD: 385 THOU/MM3 (ref 130–400)
PMV BLD AUTO: 8.6 FL (ref 9.4–12.4)
POTASSIUM REFLEX MAGNESIUM: 4.7 MEQ/L (ref 3.5–5.2)
PRO-BNP: 244.8 PG/ML (ref 0–900)
RBC # BLD: 3.54 MILL/MM3 (ref 4.2–5.4)
SEG NEUTROPHILS: 56.5 %
SEGMENTED NEUTROPHILS ABSOLUTE COUNT: 4.4 THOU/MM3 (ref 1.8–7.7)
SODIUM BLD-SCNC: 137 MEQ/L (ref 135–145)
TOTAL PROTEIN: 7.1 G/DL (ref 6.1–8)
TROPONIN T: 0.05 NG/ML
TROPONIN T: 0.06 NG/ML
WBC # BLD: 7.8 THOU/MM3 (ref 4.8–10.8)

## 2020-04-04 PROCEDURE — 85610 PROTHROMBIN TIME: CPT

## 2020-04-04 PROCEDURE — 6370000000 HC RX 637 (ALT 250 FOR IP): Performed by: EMERGENCY MEDICINE

## 2020-04-04 PROCEDURE — 93010 ELECTROCARDIOGRAM REPORT: CPT | Performed by: NUCLEAR MEDICINE

## 2020-04-04 PROCEDURE — 96361 HYDRATE IV INFUSION ADD-ON: CPT

## 2020-04-04 PROCEDURE — 2580000003 HC RX 258: Performed by: EMERGENCY MEDICINE

## 2020-04-04 PROCEDURE — 99284 EMERGENCY DEPT VISIT MOD MDM: CPT

## 2020-04-04 PROCEDURE — 80053 COMPREHEN METABOLIC PANEL: CPT

## 2020-04-04 PROCEDURE — 83880 ASSAY OF NATRIURETIC PEPTIDE: CPT

## 2020-04-04 PROCEDURE — 84484 ASSAY OF TROPONIN QUANT: CPT

## 2020-04-04 PROCEDURE — 36415 COLL VENOUS BLD VENIPUNCTURE: CPT

## 2020-04-04 PROCEDURE — 85025 COMPLETE CBC W/AUTO DIFF WBC: CPT

## 2020-04-04 PROCEDURE — 93005 ELECTROCARDIOGRAM TRACING: CPT | Performed by: EMERGENCY MEDICINE

## 2020-04-04 PROCEDURE — 96360 HYDRATION IV INFUSION INIT: CPT

## 2020-04-04 PROCEDURE — 71046 X-RAY EXAM CHEST 2 VIEWS: CPT

## 2020-04-04 RX ORDER — ASPIRIN 81 MG/1
324 TABLET, CHEWABLE ORAL ONCE
Status: COMPLETED | OUTPATIENT
Start: 2020-04-04 | End: 2020-04-04

## 2020-04-04 RX ORDER — 0.9 % SODIUM CHLORIDE 0.9 %
500 INTRAVENOUS SOLUTION INTRAVENOUS ONCE
Status: COMPLETED | OUTPATIENT
Start: 2020-04-04 | End: 2020-04-04

## 2020-04-04 RX ADMIN — SODIUM CHLORIDE 500 ML: 9 INJECTION, SOLUTION INTRAVENOUS at 01:51

## 2020-04-04 RX ADMIN — ASPIRIN 81 MG 324 MG: 81 TABLET ORAL at 01:35

## 2020-04-04 ASSESSMENT — PAIN DESCRIPTION - LOCATION: LOCATION: ARM;CHEST

## 2020-04-04 ASSESSMENT — ENCOUNTER SYMPTOMS
BACK PAIN: 0
ABDOMINAL PAIN: 0
EYE ITCHING: 0
ABDOMINAL DISTENTION: 0
SHORTNESS OF BREATH: 1
VOMITING: 0
CHEST TIGHTNESS: 0
SORE THROAT: 0
STRIDOR: 0
NAUSEA: 0
RHINORRHEA: 0
PHOTOPHOBIA: 0
DIARRHEA: 0
CONSTIPATION: 0
EYE PAIN: 0
EYE DISCHARGE: 0
COUGH: 0
WHEEZING: 0
EYE REDNESS: 0

## 2020-04-04 ASSESSMENT — PAIN DESCRIPTION - PAIN TYPE: TYPE: ACUTE PAIN

## 2020-04-04 ASSESSMENT — PAIN DESCRIPTION - DESCRIPTORS: DESCRIPTORS: PRESSURE

## 2020-04-04 ASSESSMENT — PAIN DESCRIPTION - ORIENTATION: ORIENTATION: LEFT

## 2020-04-04 ASSESSMENT — PAIN SCALES - GENERAL: PAINLEVEL_OUTOF10: 2

## 2020-04-04 NOTE — ED PROVIDER NOTES
light.   Neck:      Musculoskeletal: Normal range of motion and neck supple. Vascular: No JVD. Trachea: No tracheal deviation. Cardiovascular:      Rate and Rhythm: Normal rate and regular rhythm. Heart sounds: Normal heart sounds. No murmur. No friction rub. No gallop. Pulmonary:      Effort: Pulmonary effort is normal. No respiratory distress. Breath sounds: Normal breath sounds. No stridor. No wheezing or rales. Chest:      Chest wall: No tenderness. Abdominal:      General: Bowel sounds are normal. There is no distension. Palpations: Abdomen is soft. There is no mass. Tenderness: There is no abdominal tenderness. There is no guarding or rebound. Hernia: No hernia is present. Musculoskeletal:         General: No tenderness or deformity. Right lower leg: Edema present. Left lower leg: Edema present. Comments: 2+ mild edema which is chronic. Lymphadenopathy:      Cervical: No cervical adenopathy. Skin:     General: Skin is warm and dry. Capillary Refill: Capillary refill takes less than 2 seconds. Coloration: Skin is not pale. Findings: No erythema or rash. Neurological:      Mental Status: She is alert and oriented to person, place, and time. Cranial Nerves: No cranial nerve deficit. Sensory: No sensory deficit. Motor: No abnormal muscle tone. Coordination: Coordination normal.      Deep Tendon Reflexes: Reflexes normal.   Psychiatric:         Behavior: Behavior normal.         Thought Content:  Thought content normal.         Judgment: Judgment normal.           DIFFERENTIAL DIAGNOSIS:   Bronchitis, Pneumonia, ACS, PE, Musculoskeletal pain, pneumothorax, pleuritic pain, CHF, renal impairment    DIAGNOSTIC RESULTS     EKG: All EKG's are interpreted by the Emergency Department Physician who either signs or Co-signsthis chart in the absence of a cardiologist.  Sinus bradycardia  VR 57 bpm   ms  QRS duration 88 ms  QTc 416 ms  Normal EKG    RADIOLOGY: non-plain film images(s) such as CT, Ultrasound and MRI are read by the radiologist.    XR CHEST STANDARD (2 VW)   Final Result      Mid and lower left lung infiltrates consistent with pneumonia. Peripheral right upper lobe infiltrate or mass, new compared to the prior study. Recommend imaging follow-up to resolution. **This report has been created using voice recognition software. It may contain minor errors which are inherent in voice recognition technology. **      Final report electronically signed by Dr. Panda Steen on 4/4/2020 1:30 AM          []Visualized and interpreted by me   [] Radiologist's Wet Read Report Reviewed   [] Discussed with Radiologist.    Marti Nunez:   Results for orders placed or performed during the hospital encounter of 04/04/20   Troponin   Result Value Ref Range    Troponin T 0.055 (A) ng/ml   CBC auto differential   Result Value Ref Range    WBC 7.8 4.8 - 10.8 thou/mm3    RBC 3.54 (L) 4.20 - 5.40 mill/mm3    Hemoglobin 10.6 (L) 12.0 - 16.0 gm/dl    Hematocrit 33.8 (L) 37.0 - 47.0 %    MCV 95.5 81.0 - 99.0 fL    MCH 29.9 26.0 - 33.0 pg    MCHC 31.4 (L) 32.2 - 35.5 gm/dl    RDW-CV 12.8 11.5 - 14.5 %    RDW-SD 44.6 35.0 - 45.0 fL    Platelets 806 486 - 090 thou/mm3    MPV 8.6 (L) 9.4 - 12.4 fL    Seg Neutrophils 56.5 %    Lymphocytes 23.7 %    Monocytes 14.0 %    Eosinophils 2.3 %    Basophils 0.4 %    Immature Granulocytes 3.1 %    Segs Absolute 4.4 1.8 - 7.7 thou/mm3    Lymphocytes Absolute 1.8 1.0 - 4.8 thou/mm3    Monocytes Absolute 1.1 0.4 - 1.3 thou/mm3    Eosinophils Absolute 0.2 0.0 - 0.4 thou/mm3    Basophils Absolute 0.0 0.0 - 0.1 thou/mm3    Immature Grans (Abs) 0.24 (H) 0.00 - 0.07 thou/mm3    nRBC 0 /100 wbc   Comprehensive Metabolic Panel w/ Reflex to MG   Result Value Ref Range    Glucose 106 70 - 108 mg/dL    CREATININE 1.8 (H) 0.4 - 1.2 mg/dL    BUN 49 (H) 7 - 22 mg/dL    Sodium 137 135 - 145 meq/L    Potassium reflex Magnesium 4.7 3.5 - 5.2 meq/L    Chloride 98 98 - 111 meq/L    CO2 24 23 - 33 meq/L    Calcium 9.6 8.5 - 10.5 mg/dL    AST 19 5 - 40 U/L    Alkaline Phosphatase 107 38 - 126 U/L    Total Protein 7.1 6.1 - 8.0 g/dL    Alb 4.0 3.5 - 5.1 g/dL    Total Bilirubin 0.2 (L) 0.3 - 1.2 mg/dL    ALT 18 11 - 66 U/L   Brain Natriuretic Peptide   Result Value Ref Range    Pro-.8 0.0 - 900.0 pg/mL   Protime-INR   Result Value Ref Range    INR 0.88 0.85 - 1.13   Anion Gap   Result Value Ref Range    Anion Gap 15.0 8.0 - 16.0 meq/L   Glomerular Filtration Rate, Estimated   Result Value Ref Range    Est, Glom Filt Rate 28 (A) ml/min/1.73m2   Osmolality   Result Value Ref Range    Osmolality Calc 287.2 275.0 - 300 mOsmol/kg   Troponin   Result Value Ref Range    Troponin T 0.051 (A) ng/ml   EKG Chest Pain   Result Value Ref Range    Ventricular Rate 57 BPM    Atrial Rate 57 BPM    P-R Interval 180 ms    QRS Duration 88 ms    Q-T Interval 428 ms    QTc Calculation (Bazett) 416 ms    P Axis 59 degrees    R Axis 6 degrees    T Axis 64 degrees       EMERGENCY DEPARTMENT COURSE:   Vitals:    Vitals:    04/04/20 0135 04/04/20 0140 04/04/20 0154 04/04/20 0240   BP: 111/66 97/62  (!) 115/59   Pulse: 57 58 61 54   Resp: 16 19 18 16   Temp:       TempSrc:       SpO2: 95% 94% 95% 94%   Weight:       Height:           12:52 AM    Patient is seen and evaluated in a timely fashion. Action:     Large bore IV  EKG  CXR  Labs    MedicalDecision Making    Reassessment: Stable. Patient feels better with following ED medications. Medications   0.9 % sodium chloride bolus (500 mLs Intravenous New Bag 4/4/20 0151)   aspirin chewable tablet 324 mg (324 mg Oral Given 4/4/20 0135)       No more chest pain in the ED. Labs are reviewed. Initial troponin 0.055, repeated troponin 0.051. Patient's BUN is 49, creatinine 1.8, both above her baselines which are 28/0.9. Patient is on Lasix and spironolactone.   Apparently patient is having some renal impairment due to agressive diuresis. Chest x-ray interpreted by radiologist as mid and lower left lung infiltrates consistent with pneumonia. Patient has no fever, no cough, chest pain only lasted for several minutes, her lung exams are almost clear, I have no suspicion patient has pneumonia. But I did have a thorough discussion with the patient regarding abnormal chest x-ray findings and I recommend patient's PCP to repeat a chest x-ray in 1 week. Patient discharged in stable conditions. I suggest she also should call her nephrologist and cardiologist to give them update about ED findings. I recommend holding both Lasix and spironolactone for 3-5 days and decrease her fluid restriction to 1500 ml daily. See below for detailed discharge plan. CRITICAL CARE:   None    CONSULTS:  None    PROCEDURES:  None    FINAL IMPRESSION      1. Atypical chest pain    2. Acute on chronic renal insufficiency    3. Chronic congestive heart failure, unspecified heart failure type ([facility] [address]    4. Elevated troponin          DISPOSITION/PLAN   Home    PATIENT REFERRED TO:  Maggie Waters DO  [address] [address]    In 1 week  Tell them your BUN is 48 and Creatinine is 1.8. ED physician hold your Lasix and Spironolactone. Your cardiologist    In 1 week  ED vist follow up for chest pain and CHF. Tell them ED physician hold your water pills due to impaired renal functions and your fluid restriction down to 1500 ml daily.       DISCHARGE MEDICATIONS:  New Prescriptions    No medications on file       (Please note that portions of this note were completed with a voice recognition program.  Efforts were made to edit the dictations but occasionally words aremis-transcribed.)    MD Rosy Giles MD  04/04/20 [id number]

## 2020-04-04 NOTE — ED NOTES
Pt medicated per provider order. Pt tolerates well. Pt vital signs stable and respirations unlabored.      [facility], RN  04/04/20 5007

## 2020-04-06 ENCOUNTER — CARE COORDINATION (OUTPATIENT)
Dept: CARE COORDINATION | Age: 70
End: 2020-04-06

## 2020-04-06 ENCOUNTER — TELEPHONE (OUTPATIENT)
Dept: NEPHROLOGY | Age: 70
End: 2020-04-06

## 2020-04-06 ENCOUNTER — TELEPHONE (OUTPATIENT)
Dept: CARDIOLOGY CLINIC | Age: 70
End: 2020-04-06

## 2020-04-06 NOTE — TELEPHONE ENCOUNTER
Spoke to patient told her to resume bumex at a low dose 1 mg starting on Tuesday and then do bmp.   Pt verbalized understanding

## 2020-04-06 NOTE — CARE COORDINATION
COVID-19 ED/Flu Clinic Initial Outreach Note    Patient contacted regarding recent visit for viral symptoms. This Eduin Guzman contacted the patient by telephone to perform post discharge call. Verified name and  with patient as identifiers. Provided introduction to self, and reason for call due to viral symptoms of infection and/or exposure to COVID-19. Patient presented to emergency department/flu clinic with complaints of viral symptoms/exposure to COVID. Patient reports symptoms are improving. Due to no new or worsening symptoms the RN CTN/JAMEL was not notified for escalation. Discussed exposure protocols and quarantine with CDC Guidelines What To Do If You Are Sick    Patient was given an opportunity for questions and concerns. Stay home except to get medical care  People who are mildly ill with COVID-19 are able to isolate at home during their illness. You should restrict activities outside your home, except for getting medical care. Do not go to work, school, or public areas. Avoid using public transportation, ride-sharing, or taxis. Separate yourself from other people and animals in your home  People: As much as possible, you should stay in a specific room and away from other people in your home. Also, you should use a separate bathroom, if available. Animals: You should restrict contact with pets and other animals while you are sick with COVID-19, just like you would around other people. Although there have not been reports of pets or other animals becoming sick with COVID-19, it is still recommended that people sick with COVID-19 limit contact with animals until more information is known about the virus. When possible, have another member of your household care for your animals while you are sick. If you are sick with COVID-19, avoid contact with your pet, including petting, snuggling, being kissed or licked, and sharing food.  If you must care for your pet or be around animals while you are sick, wash your hands before and after you interact with pets and wear a facemask. Call ahead before visiting your doctor  If you have a medical appointment, call the healthcare provider and tell them that you have or may have COVID-19. This will help the healthcare provider's office take steps to keep other people from getting infected or exposed. Wear a facemask  You should wear a facemask when you are around other people (e.g., sharing a room or vehicle) or pets and before you enter a healthcare provider's office. If you are not able to wear a facemask (for example, because it causes trouble breathing), then people who live with you should not stay in the same room with you, or they should wear a facemask if they enter your room. Cover your coughs and sneezes  Cover your mouth and nose with a tissue when you cough or sneeze. Throw used tissues in a lined trash can. Immediately wash your hands with soap and water for at least 20 seconds or, if soap and water are not available, clean your hands with an alcohol-based hand  that contains at least 60% alcohol. Clean your hands often  Wash your hands often with soap and water for at least 20 seconds, especially after blowing your nose, coughing, or sneezing; going to the bathroom; and before eating or preparing food. If soap and water are not readily available, use an alcohol-based hand  with at least 60% alcohol, covering all surfaces of your hands and rubbing them together until they feel dry. Soap and water are the best option if hands are visibly dirty. Avoid touching your eyes, nose, and mouth with unwashed hands. Avoid sharing personal household items  You should not share dishes, drinking glasses, cups, eating utensils, towels, or bedding with other people or pets in your home. After using these items, they should be washed thoroughly with soap and water.   Clean all high-touch surfaces everyday  High touch surfaces include counters, tabletops, doorknobs, bathroom fixtures, toilets, phones, keyboards, tablets, and bedside tables. Also, clean any surfaces that may have blood, stool, or body fluids on them. Use a household cleaning spray or wipe, according to the label instructions. Labels contain instructions for safe and effective use of the cleaning product including precautions you should take when applying the product, such as wearing gloves and making sure you have good ventilation during use of the product. Monitor your symptoms  Seek prompt medical attention if your illness is worsening (e.g., difficulty breathing). Before seeking care, call your healthcare provider and tell them that you have, or are being evaluated for, COVID-19. Put on a facemask before you enter the facility. These steps will help the healthcare provider's office to keep other people in the office or waiting room from getting infected or exposed. Ask your healthcare provider to call the local or [facility] health department. Persons who are placed under If you have a medical emergency and need to call 911, notify the dispatch personnel that you have, or are being evaluated for COVID-19. If possible, put on a facemask before emergency medical services arrive. The patient agrees to contact the [employer] exposure line [phone], local health department [place] Department of Health: ([phone]) and PCP office for questions related to their healthcare. Author provided contact information for future reference. Patient/family/caregiver given information for [employer] and agrees to enroll no    Based on Loop alert triggers, patient will be contacted by nurse care manager for worsening symptoms. I spoke with the patient re: ed visit and Cade education. Patient was seen and treated for chest pain. Patient states she is doing well and denies any more chest pain since ED visit. Admits to SOB, but states this is chronic. No fever or cough.   Patient has been in contact

## 2020-04-06 NOTE — TELEPHONE ENCOUNTER
Has not taken Bumex or Aldactone past 2 days. Having diarrhea, thinks shes taking too much Linzess, is holding Metamucil - wt is down to 216#. Repeat bloodwork tomorrow or Wednesday. Continue to hold diuretics. /65, HR 52 after meds.   Call GI regarding diarrhea and Linzess

## 2020-04-07 ENCOUNTER — CLINICAL DOCUMENTATION (OUTPATIENT)
Dept: SPIRITUAL SERVICES | Facility: CLINIC | Age: 70
End: 2020-04-07

## 2020-04-08 LAB
ANION GAP SERPL CALCULATED.3IONS-SCNC: 12 MMOL/L (ref 4–12)
BUN BLDV-MCNC: 39 MG/DL (ref 7–20)
CALCIUM SERPL-MCNC: 9.9 MG/DL (ref 8.8–10.5)
CHLORIDE BLD-SCNC: 108 MEQ/L (ref 101–111)
CO2: 21 MEQ/L (ref 21–32)
CREAT SERPL-MCNC: 1.22 MG/DL (ref 0.6–1.3)
CREATININE CLEARANCE: 44
GLUCOSE: 87 MG/DL (ref 70–110)
POTASSIUM SERPL-SCNC: 4.8 MEQ/L (ref 3.6–5)
SODIUM BLD-SCNC: 141 MEQ/L (ref 135–145)

## 2020-04-09 RX ORDER — BUMETANIDE 1 MG/1
1 TABLET ORAL DAILY
Qty: 90 TABLET | Refills: 6
Start: 2020-04-09 | End: 2020-11-05 | Stop reason: SDUPTHER

## 2020-04-09 NOTE — TELEPHONE ENCOUNTER
Restarted Bumex 1mg/day and Aldactone 25/day. Called and spoke to [place] = wts stable. No HF symptoms. Instructed to continue above doses. Notes BPs little elevated in evening, 150-160's. Will monitor over weekend and check HR as well and call on Monday. May increase Coreg if tolerates.

## 2020-04-17 ENCOUNTER — TELEMEDICINE (OUTPATIENT)
Dept: FAMILY MEDICINE CLINIC | Age: 70
End: 2020-04-17
Payer: MEDICARE

## 2020-04-17 VITALS
DIASTOLIC BLOOD PRESSURE: 75 MMHG | WEIGHT: 219 LBS | SYSTOLIC BLOOD PRESSURE: 150 MMHG | BODY MASS INDEX: 38.79 KG/M2 | HEART RATE: 76 BPM

## 2020-04-17 PROCEDURE — G0439 PPPS, SUBSEQ VISIT: HCPCS | Performed by: FAMILY MEDICINE

## 2020-04-17 PROCEDURE — 3017F COLORECTAL CA SCREEN DOC REV: CPT | Performed by: FAMILY MEDICINE

## 2020-04-17 PROCEDURE — 4040F PNEUMOC VAC/ADMIN/RCVD: CPT | Performed by: FAMILY MEDICINE

## 2020-04-17 PROCEDURE — 1123F ACP DISCUSS/DSCN MKR DOCD: CPT | Performed by: FAMILY MEDICINE

## 2020-04-17 ASSESSMENT — ENCOUNTER SYMPTOMS
GASTROINTESTINAL NEGATIVE: 1
SHORTNESS OF BREATH: 1

## 2020-04-17 ASSESSMENT — LIFESTYLE VARIABLES: HOW OFTEN DO YOU HAVE A DRINK CONTAINING ALCOHOL: 0

## 2020-04-17 NOTE — PROGRESS NOTES
Subjective:      Patient ID: Sameera Murillo is a 71 y.o. female. HPI:    Chief Complaint   Patient presents with    Medicare AWV     This visit is being done virtually, pt is at home. I am conducting this interview from my home office. This visit was performed via a synchronous telecommunication system. AMW. Doing well overall. Pt was recently seen in the ED for CP. Work up essentially negative other than HEBER. Diuretics were held at that time, repeat BMP looks much better. She is now back on spironolactone and Bumex, lower dose. Follows closely with the CHF clinic as well as her Nephrologist.  Plans to repeat BMP in a few more weeks. Pt denies CP, chest tightness. Pt does admit to some SOB on exertion, this is not new for her. Weight stable, checks daily and records. Wt Readings from Last 3 Encounters:   20 219 lb (99.3 kg)   20 217 lb (98.4 kg)   20 217 lb (98.4 kg)     BP elevated this am.  Recently switched to Coreg. Most readings ok at home. BP Readings from Last 3 Encounters:   20 (!) 150/75   20 (!) 115/59   20 (!) 140/80     Mood controlled on current medications    GERD controlled on omeprazole. OAB controlled on Myrbetriq. Patient Active Problem List   Diagnosis    Hypothyroid    Eczema    Dyslipidemia    Bipolar disorder ([facility] [address]    Post-menopausal    HTN (hypertension)    Contact dermatitis    Obesity (BMI 30-39. 9)    Obstructive sleep apnea on CPAP    ACE inhibitor-aggravated angioedema    Leg edema    Acute on chronic diastolic congestive heart failure (HCC)    Ascending aortic aneurysm (HCC)    Chronic diastolic heart failure (HCC)    Thoracic aortic aneurysm without rupture (HCC)    Abnormal stress test    CKD (chronic kidney disease), stage II     Past Surgical History:   Procedure Laterality Date    ANKLE SURGERY      left    CATARACT REMOVAL      Both eyes      SECTION      x 3    FOOT SURGERY      Left DTaP/Tdap/Td vaccine (1 - Tdap) 07/02/1969    Shingles Vaccine (1 of 2) 07/02/2000    Colon cancer screen colonoscopy  07/02/2000    DEXA (modify frequency per FRAX score)  07/02/2005    Pneumococcal 65+ years Vaccine (1 of 1 - PPSV23) 07/02/2015    Annual Wellness Visit (AWV)  05/29/2019    Breast cancer screen  09/26/2019    Flu vaccine (Season Ended) 11/20/2020 (Originally 9/1/2020)    TSH testing  09/29/2020    Lipid screen  10/08/2020    Potassium monitoring  04/07/2021    Creatinine monitoring  04/07/2021    Hepatitis A vaccine  Aged Out    Hepatitis B vaccine  Aged Out    Hib vaccine  Aged Out    Meningococcal (ACWY) vaccine  Aged Out     Recommendations for [employer] Due: see orders and patient instructions/AVS.  . Recommended screening schedule for the next 5-10 years is provided to the patient in written form: see Patient Sonali Gastelum was seen today for medicare awv.     Diagnoses and all orders for this visit:    Routine general medical examination at a health care facility    Bilateral lower extremity edema    Essential hypertension    Morbid obesity with BMI of 40.0-44.9, adult (HCC)    Hypothyroidism, unspecified type    Lymphedema    Chronic diastolic heart failure ([address]    Bipolar affective disorder, remission status unspecified ([address]    CKD (chronic kidney disease), stage II

## 2020-04-21 ENCOUNTER — CARE COORDINATION (OUTPATIENT)
Dept: CARE COORDINATION | Age: 70
End: 2020-04-21

## 2020-04-21 NOTE — CARE COORDINATION
Patient contacted regarding COVID-19 risk and screening. This author contacted the patient by telephone to perform follow-up call. Verified name and  with patient as identifiers. Symptoms reviewed with patient. Patient reports symptoms are improving. Due to no new or worsening symptoms the RN CTN/ACROD was not notified for escalation. This author reviewed discharge instructions, medical action plan and red flags such as increased shortness of breath, increasing fever, worsening cough or chest pain with patient who verbalized understanding. Discussed exposure protocols and quarantine with CDC Guidelines What To Do If You Are Sick    Patient who was given an opportunity for questions and concerns. The patient agrees to contact the [facility] exposure line [phone], local health department [place] Department of Health: ([phone])[facility] PCP office for questions related to their healthcare. Author provided contact information for future reference. I spoke with the patient for a subsequent call re: ed visit. Patient was seen and treated for chest pain. Patient is doing much better. Denies current chest pain or tightness. Patient did have a VV with PCP. is now back on spironolactone and Bumex, lower dose. Follows closely with the CHF clinic as well as her Nephrologist. Madhuri  to SOB on exertion. No cough or fever. We discussed COVID-19 precautions and education. I advised the patient to call the community call center at [phone] for COVID 19-like symptoms for further evalaution and instructions. After hours, please call [facility] COVID-19 hotline number  [phone]. Encouraged the patient to notify PCP office for new or worsening symptoms. I will resolve episode and not contact any further at this time.

## 2020-04-28 LAB
ANION GAP SERPL CALCULATED.3IONS-SCNC: 9 MMOL/L (ref 4–12)
BUN BLDV-MCNC: 32 MG/DL (ref 7–20)
CALCIUM SERPL-MCNC: 9.5 MG/DL (ref 8.8–10.5)
CHLORIDE BLD-SCNC: 104 MEQ/L (ref 101–111)
CO2: 27 MEQ/L (ref 21–32)
CREAT SERPL-MCNC: 1.15 MG/DL (ref 0.6–1.3)
CREATININE CLEARANCE: 47
GLUCOSE: 100 MG/DL (ref 70–110)
POTASSIUM SERPL-SCNC: 4.2 MEQ/L (ref 3.6–5)
SODIUM BLD-SCNC: 140 MEQ/L (ref 135–145)

## 2020-05-05 ENCOUNTER — TELEPHONE (OUTPATIENT)
Dept: CARDIOLOGY CLINIC | Age: 70
End: 2020-05-05

## 2020-05-06 ENCOUNTER — PATIENT MESSAGE (OUTPATIENT)
Dept: CARDIOLOGY CLINIC | Age: 70
End: 2020-05-06

## 2020-05-06 NOTE — TELEPHONE ENCOUNTER
Called pt to have her hold Bumex and Aldactone this am.  She took them 5 minutes ago. CT Scan is scheduled tomorrow. Do we need to moved it or are there other instructions?

## 2020-05-07 ENCOUNTER — HOSPITAL ENCOUNTER (OUTPATIENT)
Dept: CT IMAGING | Age: 70
Discharge: HOME | End: 2020-05-07
Payer: MEDICARE

## 2020-05-07 PROCEDURE — 71275 CT ANGIOGRAPHY CHEST: CPT

## 2020-05-07 PROCEDURE — 6360000004 HC RX CONTRAST MEDICATION: Performed by: INTERNAL MEDICINE

## 2020-05-07 RX ADMIN — IOPAMIDOL 75 ML: 755 INJECTION, SOLUTION INTRAVENOUS at 11:17

## 2020-05-08 ENCOUNTER — TELEPHONE (OUTPATIENT)
Dept: CARDIOLOGY CLINIC | Age: 70
End: 2020-05-08

## 2020-05-11 ENCOUNTER — TELEMEDICINE (OUTPATIENT)
Dept: FAMILY MEDICINE CLINIC | Age: 70
End: 2020-05-11
Payer: MEDICARE

## 2020-05-11 PROCEDURE — G8428 CUR MEDS NOT DOCUMENT: HCPCS | Performed by: FAMILY MEDICINE

## 2020-05-11 PROCEDURE — 1090F PRES/ABSN URINE INCON ASSESS: CPT | Performed by: FAMILY MEDICINE

## 2020-05-11 PROCEDURE — 3017F COLORECTAL CA SCREEN DOC REV: CPT | Performed by: FAMILY MEDICINE

## 2020-05-11 PROCEDURE — G8400 PT W/DXA NO RESULTS DOC: HCPCS | Performed by: FAMILY MEDICINE

## 2020-05-11 PROCEDURE — 4040F PNEUMOC VAC/ADMIN/RCVD: CPT | Performed by: FAMILY MEDICINE

## 2020-05-11 PROCEDURE — 1123F ACP DISCUSS/DSCN MKR DOCD: CPT | Performed by: FAMILY MEDICINE

## 2020-05-11 PROCEDURE — 99214 OFFICE O/P EST MOD 30 MIN: CPT | Performed by: FAMILY MEDICINE

## 2020-05-11 ASSESSMENT — ENCOUNTER SYMPTOMS
GASTROINTESTINAL NEGATIVE: 1
RESPIRATORY NEGATIVE: 1

## 2020-05-11 NOTE — TELEPHONE ENCOUNTER
Patient has been informed and voiced understanding and has scheduled a VV Mychart for 05.11.2020 @ 1200  Patient wanted to also make Dr Rob John aware of the liver US that she had done 12.06.2019 (results in EPIC to view) which was ordered by Dr Sarai Austin

## 2020-05-11 NOTE — PROGRESS NOTES
Subjective:      Patient ID: Dwayne Hassan is a 71 y.o. female. HPI:    Chief Complaint   Patient presents with    Abnormal Test Results       Dwayne Hassan (:  1950) has requested an audio/video evaluation for the following concern(s):    Pt wishes to review results from recent testing. She had recent CT via Cardio to eval thoracic aneurysm. This is stable but other findings noted. Impression       1. Stable mild prominence of the ascending aorta measuring 4 cm in greatest diameter. 2. Stable pulmonary nodules. 3. Enhancing lesions in the right lobe of liver. Recommend multiphase CT or MRI of the liver for further evaluation. She had a liver US back in October that did not show any lesions at that time. Impression       Mildly lobulated liver without focal mass. Vasculature appears intact. There is a large gallstone.         Per pt, she is down to 1/2 Bumex and 1/2 spironolactone daily per CHF clinic. Weight and BPs elevated today so plans to take additional medication. Weight was up 3 lbs today. She was camping over the weekend and did not follow her diet. Patient Active Problem List   Diagnosis    Hypothyroid    Eczema    Dyslipidemia    Bipolar disorder ([facility] [address]    Post-menopausal    HTN (hypertension)    Contact dermatitis    Obesity (BMI 30-39. 9)    Obstructive sleep apnea on CPAP    ACE inhibitor-aggravated angioedema    Leg edema    Acute on chronic diastolic congestive heart failure (HCC)    Ascending aortic aneurysm (HCC)    Chronic diastolic heart failure (HCC)    Thoracic aortic aneurysm without rupture (HCC)    Abnormal stress test    CKD (chronic kidney disease), stage II     Past Surgical History:   Procedure Laterality Date    ANKLE SURGERY      left    CATARACT REMOVAL      Both eyes      SECTION      x 3    FOOT SURGERY      Left     HIP SURGERY      HYSTERECTOMY      Total     TOTAL KNEE ARTHROPLASTY Left 10/14/14    TOTAL KNEE ARTHROPLASTY Right 05/24/2019     Prior to Admission medications    Medication Sig Start Date End Date Taking? Authorizing Provider   bumetanide (BUMEX) 1 MG tablet Take 1 tablet by mouth daily 4/9/20   DAVID Jackson CNP   hydrALAZINE (APRESOLINE) 50 MG tablet Take 1 tablet by mouth 3 times daily 4/2/20   DAVID Jackson CNP   carvedilol (COREG) 25 MG tablet Take 0.5 tablets by mouth daily 4/2/20   DAVID Jackson CNP   acetaminophen (TYLENOL) 325 MG tablet Take 650 mg by mouth 4 times daily     Historical Provider, MD   spironolactone (ALDACTONE) 25 MG tablet Take 1 tablet by mouth daily 1/24/20   Chris Dueñas DO   omeprazole (PRILOSEC) 40 MG delayed release capsule Take 20 mg by mouth daily  11/25/19   Historical Provider, MD   SYNTHROID 125 MCG tablet Take 1 tablet by mouth daily Take with water on an empty stomach- wait 30 minutes before eating or taking other meds. 12/2/19   Mata Cunningham,    ferrous sulfate 325 (65 Fe) MG tablet Take 325 mg by mouth daily (with breakfast)    Historical Provider, MD   polyethylene glycol (GLYCOLAX) packet Take 17 g by mouth daily     Historical Provider, MD   atorvastatin (LIPITOR) 20 MG tablet Take 1 tablet by mouth daily 10/14/19   Jonathan Enriquez MD   clonazePAM (KLONOPIN) 1 MG tablet Take 1 mg by mouth 2 times daily. Historical Provider, MD   venlafaxine (EFFEXOR XR) 150 MG extended release capsule Take 150 mg by mouth daily    Historical Provider, MD   gabapentin (NEURONTIN) 300 MG capsule Take 300 mg by mouth 3 times daily.      Historical Provider, MD   OLANZapine (ZYPREXA) 7.5 MG tablet Take 5 mg by mouth nightly     Historical Provider, MD   tiZANidine (ZANAFLEX) 2 MG tablet Take 2 mg by mouth 2 times daily    Historical Provider, MD   tetrabenazine (XENAZINE) 12.5 MG tablet Take 12.5 mg by mouth 2 times daily    Historical Provider, MD   venlafaxine (EFFEXOR) 75 MG tablet Take 75 mg by mouth daily    Historical Provider, MD specialists as scheduled  -  Results reviewed, will check MRI  -  RTO prn for now, further recommendations after above    Due to this being a TeleHealth encounter (During [address] emergency), evaluation of the following organ systems was limited: Vitals/Constitutional/EENT/Resp/CV/GI//MS/Neuro/Skin/Heme-Lymph-Imm. Pursuant to the emergency declaration under the [address] [place] [address], [address] [place] [address] authority and the [employer] Act, this Virtual Visit was conducted with patient's (and/or legal guardian's) consent, to reduce the patient's risk of exposure to COVID-19 and provide necessary medical care. The patient (and/or legal guardian) has also been advised to contact this office for worsening conditions or problems, and seek emergency medical treatment and/or call 911 if deemed necessary. Patient identification was verified at the start of the visit: Yes    Total time spent for this encounter: Not billed by time    Services were provided through a video synchronous discussion virtually to substitute for in-person clinic visit. Patient and provider were located at their individual homes.           Peewee Schmitz DO

## 2020-05-19 ENCOUNTER — HOSPITAL ENCOUNTER (OUTPATIENT)
Dept: MRI IMAGING | Age: 70
Discharge: HOME | End: 2020-05-19
Payer: MEDICARE

## 2020-05-19 PROCEDURE — A9579 GAD-BASE MR CONTRAST NOS,1ML: HCPCS | Performed by: FAMILY MEDICINE

## 2020-05-19 PROCEDURE — 74183 MRI ABD W/O CNTR FLWD CNTR: CPT

## 2020-05-19 PROCEDURE — 6360000004 HC RX CONTRAST MEDICATION: Performed by: FAMILY MEDICINE

## 2020-05-19 RX ADMIN — GADOTERIDOL 20 ML: 279.3 INJECTION, SOLUTION INTRAVENOUS at 11:06

## 2020-05-20 ENCOUNTER — TELEPHONE (OUTPATIENT)
Dept: SPIRITUAL SERVICES | Facility: CLINIC | Age: 70
End: 2020-05-20

## 2020-05-29 ENCOUNTER — OFFICE VISIT (OUTPATIENT)
Dept: FAMILY MEDICINE CLINIC | Age: 70
End: 2020-05-29
Payer: MEDICARE

## 2020-05-29 ENCOUNTER — HOSPITAL ENCOUNTER (OUTPATIENT)
Dept: GENERAL RADIOLOGY | Age: 70
Discharge: HOME | End: 2020-05-29
Payer: MEDICARE

## 2020-05-29 ENCOUNTER — HOSPITAL ENCOUNTER (OUTPATIENT)
Age: 70
Discharge: HOME | End: 2020-05-29
Payer: MEDICARE

## 2020-05-29 VITALS
DIASTOLIC BLOOD PRESSURE: 72 MMHG | BODY MASS INDEX: 38.82 KG/M2 | RESPIRATION RATE: 12 BRPM | SYSTOLIC BLOOD PRESSURE: 118 MMHG | HEIGHT: 63 IN | TEMPERATURE: 97.8 F | WEIGHT: 219.1 LBS | HEART RATE: 66 BPM

## 2020-05-29 PROCEDURE — G8400 PT W/DXA NO RESULTS DOC: HCPCS | Performed by: FAMILY MEDICINE

## 2020-05-29 PROCEDURE — 4040F PNEUMOC VAC/ADMIN/RCVD: CPT | Performed by: FAMILY MEDICINE

## 2020-05-29 PROCEDURE — 73502 X-RAY EXAM HIP UNI 2-3 VIEWS: CPT

## 2020-05-29 PROCEDURE — 1090F PRES/ABSN URINE INCON ASSESS: CPT | Performed by: FAMILY MEDICINE

## 2020-05-29 PROCEDURE — 1036F TOBACCO NON-USER: CPT | Performed by: FAMILY MEDICINE

## 2020-05-29 PROCEDURE — 1123F ACP DISCUSS/DSCN MKR DOCD: CPT | Performed by: FAMILY MEDICINE

## 2020-05-29 PROCEDURE — G8427 DOCREV CUR MEDS BY ELIG CLIN: HCPCS | Performed by: FAMILY MEDICINE

## 2020-05-29 PROCEDURE — 3017F COLORECTAL CA SCREEN DOC REV: CPT | Performed by: FAMILY MEDICINE

## 2020-05-29 PROCEDURE — 99213 OFFICE O/P EST LOW 20 MIN: CPT | Performed by: FAMILY MEDICINE

## 2020-05-29 PROCEDURE — G8417 CALC BMI ABV UP PARAM F/U: HCPCS | Performed by: FAMILY MEDICINE

## 2020-05-29 PROCEDURE — 73552 X-RAY EXAM OF FEMUR 2/>: CPT

## 2020-05-29 RX ORDER — TRAMADOL HYDROCHLORIDE 50 MG/1
50 TABLET ORAL EVERY 8 HOURS PRN
Qty: 15 TABLET | Refills: 0 | Status: SHIPPED | OUTPATIENT
Start: 2020-05-29 | End: 2020-06-03

## 2020-05-29 NOTE — PROGRESS NOTES
Subjective:      Patient ID: Trish Pollack is a 71 y.o. female. HPI:    Chief Complaint   Patient presents with    Fall     having pain      Pt here today to discuss pain in right hip and leg. Pt states that she was walking on her deck last week and fell. She fell onto her left buttock region but was ok. Then, 2 days ago she fell at her daughter's house onto the same area. Now with sharp pain in the right hip and left side of her low back. No bruising. Significant pain in the right buttock and right hamstring. She is having a hard time bearing weight. She is able to ambulate short distance with the walker. Patient Active Problem List   Diagnosis    Hypothyroid    Eczema    Dyslipidemia    Bipolar disorder ([address]    Post-menopausal    HTN (hypertension)    Contact dermatitis    Obesity (BMI 30-39. 9)    Obstructive sleep apnea on CPAP    ACE inhibitor-aggravated angioedema    Leg edema    Acute on chronic diastolic congestive heart failure (HCC)    Ascending aortic aneurysm (HCC)    Chronic diastolic heart failure (HCC)    Thoracic aortic aneurysm without rupture (HCC)    Abnormal stress test    CKD (chronic kidney disease), stage II     Past Surgical History:   Procedure Laterality Date    ANKLE SURGERY      left    CATARACT REMOVAL      Both eyes      SECTION      x 3    FOOT SURGERY      Left     HIP SURGERY      HYSTERECTOMY      Total     TOTAL KNEE ARTHROPLASTY Left 10/14/14    TOTAL KNEE ARTHROPLASTY Right 2019     Prior to Admission medications    Medication Sig Start Date End Date Taking? Authorizing Provider   traMADol (ULTRAM) 50 MG tablet Take 1 tablet by mouth every 8 hours as needed for Pain for up to 5 days.  5/29/20 6/3/20 Yes Sandra Aragon DO   bumetanide (BUMEX) 1 MG tablet Take 1 tablet by mouth daily 20  Yes DAVID Licona CNP   hydrALAZINE (APRESOLINE) 50 MG tablet Take 1 tablet by mouth 3 times daily 20  Yes tablet Take 5 mg by mouth nightly     Historical Provider, MD   tetrabenazine (XENAZINE) 12.5 MG tablet Take 12.5 mg by mouth 2 times daily    Historical Provider, MD         Review of Systems   Constitutional: Negative. HENT: Negative. Respiratory: Negative. Cardiovascular: Negative. Gastrointestinal: Negative. Musculoskeletal: Positive for arthralgias (right hip pain), back pain and gait problem. All other systems reviewed and are negative. Objective:   Physical Exam  Vitals signs and nursing note reviewed. Constitutional:       Appearance: She is well-developed. HENT:      Head: Normocephalic and atraumatic. Right Ear: Tympanic membrane normal.      Left Ear: Tympanic membrane normal.   Cardiovascular:      Rate and Rhythm: Normal rate and regular rhythm. Heart sounds: Normal heart sounds. No murmur. Pulmonary:      Effort: Pulmonary effort is normal.      Breath sounds: Normal breath sounds. Abdominal:      General: Bowel sounds are normal.      Palpations: Abdomen is soft. Musculoskeletal:      Right hip: She exhibits tenderness. She exhibits normal range of motion, no bony tenderness and no deformity. Lumbar back: She exhibits tenderness and pain. Back:    Skin:     General: Skin is warm and dry. Neurological:      Mental Status: She is alert and oriented to person, place, and time. Psychiatric:         Behavior: Behavior normal.         Assessment:       Diagnosis Orders   1. Acute hip pain, right  XR HIP RIGHT (2-3 VIEWS)    traMADol (ULTRAM) 50 MG tablet   2.  Right leg pain  XR FEMUR RIGHT (MIN 2 VIEWS)           Plan:      -  Will start with x-rays and go from there  -  Tramadol prn pain  -  RTO prn, will call with results and recommendations        Hunter Hendrickson DO

## 2020-06-01 ASSESSMENT — ENCOUNTER SYMPTOMS
GASTROINTESTINAL NEGATIVE: 1
RESPIRATORY NEGATIVE: 1
BACK PAIN: 1

## 2020-06-03 ENCOUNTER — TELEPHONE (OUTPATIENT)
Dept: CARDIOLOGY CLINIC | Age: 70
End: 2020-06-03

## 2020-06-04 LAB
ANION GAP SERPL CALCULATED.3IONS-SCNC: 10 MMOL/L (ref 4–12)
BUN BLDV-MCNC: 37 MG/DL (ref 7–20)
CALCIUM SERPL-MCNC: 9.6 MG/DL (ref 8.8–10.5)
CHLORIDE BLD-SCNC: 104 MEQ/L (ref 101–111)
CO2: 26 MEQ/L (ref 21–32)
CREAT SERPL-MCNC: 1.18 MG/DL (ref 0.6–1.3)
CREATININE CLEARANCE: 45
GLUCOSE: 87 MG/DL (ref 70–110)
POTASSIUM SERPL-SCNC: 4.6 MEQ/L (ref 3.6–5)
SODIUM BLD-SCNC: 140 MEQ/L (ref 135–145)

## 2020-06-16 ENCOUNTER — OFFICE VISIT (OUTPATIENT)
Dept: CARDIOLOGY CLINIC | Age: 70
End: 2020-06-16
Payer: MEDICARE

## 2020-06-16 VITALS
SYSTOLIC BLOOD PRESSURE: 126 MMHG | BODY MASS INDEX: 40.04 KG/M2 | DIASTOLIC BLOOD PRESSURE: 72 MMHG | HEIGHT: 63 IN | HEART RATE: 70 BPM | WEIGHT: 226 LBS

## 2020-06-16 PROCEDURE — G8400 PT W/DXA NO RESULTS DOC: HCPCS | Performed by: INTERNAL MEDICINE

## 2020-06-16 PROCEDURE — 1036F TOBACCO NON-USER: CPT | Performed by: INTERNAL MEDICINE

## 2020-06-16 PROCEDURE — G8417 CALC BMI ABV UP PARAM F/U: HCPCS | Performed by: INTERNAL MEDICINE

## 2020-06-16 PROCEDURE — 1123F ACP DISCUSS/DSCN MKR DOCD: CPT | Performed by: INTERNAL MEDICINE

## 2020-06-16 PROCEDURE — G8427 DOCREV CUR MEDS BY ELIG CLIN: HCPCS | Performed by: INTERNAL MEDICINE

## 2020-06-16 PROCEDURE — 1090F PRES/ABSN URINE INCON ASSESS: CPT | Performed by: INTERNAL MEDICINE

## 2020-06-16 PROCEDURE — 3017F COLORECTAL CA SCREEN DOC REV: CPT | Performed by: INTERNAL MEDICINE

## 2020-06-16 PROCEDURE — 99213 OFFICE O/P EST LOW 20 MIN: CPT | Performed by: INTERNAL MEDICINE

## 2020-06-16 PROCEDURE — 4040F PNEUMOC VAC/ADMIN/RCVD: CPT | Performed by: INTERNAL MEDICINE

## 2020-06-16 RX ORDER — ASPIRIN 81 MG/1
81 TABLET ORAL DAILY
COMMUNITY

## 2020-06-16 RX ORDER — LINACLOTIDE 72 UG/1
72 CAPSULE, GELATIN COATED ORAL
COMMUNITY
Start: 2020-06-15 | End: 2021-02-02

## 2020-06-16 NOTE — PROGRESS NOTES
[address] [place] [address],[place] [id number] [address] [facility] [address]  [place] [address]  Dept: [phone]  Dept Fax: [phone]  Loc: [phone]    Visit Date: 6/16/2020    Ms. Paola Arteaga is a 71 y.o. female  who presented for:  Chief Complaint   Patient presents with    Check-Up    Congestive Heart Failure       HPI:   FATUMA Blankenship is a pleasant 71year old male patient who  has a past medical history of Acid reflux, Arthritis, Asthma, Bipolar 1 disorder ([facility] [address] CHF (congestive heart failure) ([facility] [address] CKD (chronic kidney disease), stage II, History of blood transfusion, Hypertension, Mood disorder ([facility] [address] Sleep apnea, and Thyroid disease. She has known h/o HFpEF. The patient follows at CHF clinic. Multiple adjustments in BP medications were previously needed, now CHF better controlled. She presents for follow up. Cath in 10/2019 revealed nonobstructive CAD, elevated filling pressures. Echo 09/2019 revealed preserved EF. The patient is compliant with her medications. She watches her BP, weight at home. Patient denies chest pain, shortness of breath, dyspnea on exertion, orthopnea, paroxysmal nocturnal dyspnea, palpitations, dizziness, syncope, weight gain or leg swelling.        Current Outpatient Medications:     aspirin 81 MG EC tablet, Take 81 mg by mouth daily, Disp: , Rfl:     LINZESS 72 MCG CAPS capsule, Take 72 mcg by mouth daily , Disp: , Rfl:     bumetanide (BUMEX) 1 MG tablet, Take 1 tablet by mouth daily, Disp: 90 tablet, Rfl: 6    hydrALAZINE (APRESOLINE) 50 MG tablet, Take 1 tablet by mouth 3 times daily, Disp: 135 tablet, Rfl: 3    carvedilol (COREG) 25 MG tablet, Take 0.5 tablets by mouth daily, Disp: 60 tablet, Rfl: 2    acetaminophen (TYLENOL) 325 MG tablet, Take 650 mg by mouth 4 times daily , Disp: , Rfl:     spironolactone (ALDACTONE) 25 MG tablet, Take 1 tablet by mouth daily, Disp: 30 tablet, Rfl: 5    omeprazole (PRILOSEC) 40 MG delayed release alcohol or use drugs. Family History  Berdine Lat family history includes Breast Cancer (age of onset: 39) in her sister; Cancer in her brother, father, and sister; Diabetes in her brother and mother; High Blood Pressure in her father; Other in her brother. Past Surgical History   Past Surgical History:   Procedure Laterality Date    ANKLE SURGERY      left    CATARACT REMOVAL      Both eyes      SECTION      x 3    FOOT SURGERY      Left     HIP SURGERY      HYSTERECTOMY      Total     TOTAL KNEE ARTHROPLASTY Left 10/14/14    TOTAL KNEE ARTHROPLASTY Right 2019       Review of Systems   Constitutional: Negative for chills and fever  HENT: Negative for congestion, sinus pressure, sneezing and sore throat. Eyes: Negative for pain, discharge, redness and itching. Respiratory: Negative for apnea, cough  Gastrointestinal: Negative for blood in stool, constipation, diarrhea   Endocrine: Negative for cold intolerance, heat intolerance, polydipsia. Genitourinary: Negative for dysuria, enuresis, flank pain and hematuria. Musculoskeletal: Negative for arthralgias, joint swelling and neck pain. Neurological: Negative for numbness and headaches. Psychiatric/Behavioral: Negative for agitation, confusion, decreased concentration and dysphoric mood. Objective:     /72   Pulse 70   Ht 5' 3\" (1.6 m)   Wt 226 lb (102.5 kg)   BMI 40.03 kg/m²     Wt Readings from Last 3 Encounters:   20 226 lb (102.5 kg)   20 219 lb 1.6 oz (99.4 kg)   20 219 lb (99.3 kg)     BP Readings from Last 3 Encounters:   20 126/72   20 118/72   20 (!) 150/75       Nursing note and vitals reviewed. Physical Exam   Constitutional: Oriented to person, place, and time. Appears well-developed and well-nourished. ENT: Moist mucous membranes. No bleeding. Tongue is midline. Head: Normocephalic and atraumatic.    Eyes: EOM are normal. Pupils are equal, round, and reactive to light.   Neck: Normal range of motion. Neck supple. No JVD present. Cardiovascular: Normal rate, regular rhythm, systolci murmur, no rubs, and intact distal pulses. Pulmonary/Chest: Effort normal and breath sounds normal. No respiratory distress. No wheezes. No rales. Abdominal: Soft. Bowel sounds are normal. No distension. There is no tenderness. Musculoskeletal: Normal range of motion. trace edema. Neurological: Alert and oriented to person, place, and time. No cranial nerve deficit. Coordination normal.   Skin: Skin is warm and dry. Psychiatric: Normal mood and affect.        No results found for: CKTOTAL, CKMB, CKMBINDEX    Lab Results   Component Value Date    WBC 5.1 04/28/2020    RBC 3.89 04/28/2020    HGB 11.9 04/28/2020    HCT 35.8 04/28/2020    MCV 92.1 04/28/2020    MCH 30.7 04/28/2020    MCHC 33.4 04/28/2020    RDW 13.7 04/28/2020     04/28/2020    MPV 8.6 04/04/2020       Lab Results   Component Value Date     06/04/2020    K 4.6 06/04/2020    K 4.7 04/04/2020     06/04/2020    CO2 26 06/04/2020    BUN 37 06/04/2020    LABALBU 4.0 04/04/2020    CREATININE 1.18 06/04/2020    CALCIUM 9.60 06/04/2020    LABGLOM 28 04/04/2020    GLUCOSE 87 06/04/2020       Lab Results   Component Value Date    ALKPHOS 107 04/04/2020    ALT 18 04/04/2020    AST 19 04/04/2020    PROT 7.1 04/04/2020    BILITOT 0.2 04/04/2020    BILIDIR <0.2 09/29/2019    LABALBU 4.0 04/04/2020       Lab Results   Component Value Date    MG 2.4 10/25/2019       Lab Results   Component Value Date    INR 0.88 04/04/2020    INR 0.90 10/18/2019         Lab Results   Component Value Date    LABA1C 5.6 01/19/2018       Lab Results   Component Value Date    TRIG 137 10/08/2019    HDL 52 10/08/2019    HDL 51 03/16/2012    LDLCALC 120 10/08/2019    LDLDIRECT 120 10/08/2019       Lab Results   Component Value Date    TSH 2.620 09/29/2019         Testing Reviewed:      I have individually reviewed the cardiac test ----------------------------------------------------------------      Findings      Mitral Valve   The mitral valve structure was normal with normal leaflet separation. DOPPLER: The transmitral velocity was within the normal range with no   evidence for mitral stenosis. Trace mitral regurgitation is present. Aortic Valve   The aortic valve was trileaflet with normal thickness and cuspal   separation. DOPPLER: Transaortic velocity was within the normal range with   no evidence of aortic stenosis. Trivial aortic regurgitation is noted. Tricuspid Valve   The tricuspid valve structure was normal with normal leaflet separation. DOPPLER: There was no evidence of tricuspid stenosis. Trivial tricuspid regurgitation visualized. Pulmonic Valve   The pulmonic valve leaflets exhibited normal thickness, no calcification,   and normal cuspal separation. DOPPLER: The transpulmonic velocity was   within the normal range with no evidence for regurgitation. Left Atrium   The left atrium is Moderately dilated. Left Ventricle   Normal left ventricle size and systolic function. Ejection fraction was   estimated at 55 to 60 %. There were no regional left ventricular wall   motion abnormalities and wall thickness was within normal limits. Doppler parameters were consistent with abnormal left ventricular   relaxation (grade 1 diastolic dysfunction). Right Atrium   Right atrial size was normal.      Right Ventricle   The right ventricular size was normal with normal systolic function and   wall thickness. Pericardial Effusion   The pericardium was normal in appearance with no evidence of a pericardial   effusion. Pleural Effusion   No evidence of pleural effusion. Aorta / Great Vessels   Aortic aneurysm noted in the ascending aorta . Aortic aneurysm measures 4.1 cm .   -The Pulmonary artery is within normal limits.    -IVC size is within normal limits with normal respiratory phasic changes. M-Mode/2D Measurements & Calculations      LV Diastolic   LV Systolic Dimension:    AV Cusp Separation: 1.5 cmLA   Dimension: 5.4 4.3 cm                    Dimension: 3.9 cmAO Root   cm             LV Volume Diastolic: 091  Dimension: 3.5 cmLA Area: 29.5   LV FS:20.4 %   ml                        cm^2   LV PW          LV Volume Systolic: 89.5   Diastolic: 1   ml   cm             LV EDV/LV EDV Index: 141   Septum         ml/68 m^2LV ESV/LV ESV    RV Diastolic Dimension: 2.6 cm   Diastolic: 0.7 Index: 40.1 ml/40 m^2   cm             EF Calculated: 41.1 %     LA/Aorta: 1.11                                            Ascending Aorta: 4.1 cm                                            LA volume/Index: 127.9 ml /61m^2     Doppler Measurements & Calculations      MV Peak E-Wave: 124 cm/s   AV Peak Velocity: 164  LVOT Peak Velocity: 143   MV Peak A-Wave: 140 cm/s   cm/s                   cm/s   MV E/A Ratio: 0.89         AV Peak Gradient:      LVOT Peak Gradient: 7   MV Peak Gradient: 6.15     10.76 mmHg             mmHg   mmHg                                                     TV Peak E-Wave: 65.6   MV Deceleration Time: 165                         cm/s   msec                                              TV Peak A-Wave: 92 cm/s                              AV P1/2t: 510 msec                              IVRT: 95 msec          TV Peak Gradient: 1.72   MV E' Septal Velocity: 5.4                        mmHg   cm/s   MV A' Septal Velocity: 8.6 AV DVI (Vmax):0.87     PV Peak Velocity: 92   cm/s                                              cm/s   MV E' Lateral Velocity:                           PV Peak Gradient: 3.39   5.8 cm/s                                          mmHg   MV A' Lateral Velocity:   12.9 cm/s   E/E' septal: 22.96   E/E' lateral: 21.38     [email or web address] [id number]%3d        Ekg:   EKG Interpretation:  nonspecific ST and T waves changes, sinus bradycardia, unchanged from previous tracings. Stress Test: Summary   There was a moderate sized, moderate in intensity, reversible myocardial   perfusion defect of the anterior wall. There was a small sized, moderate in intensity, reversible myocardial   perfusion defect of the lateral wall. The nuclear images is suggestive for myocardial ischemia. Recommendation   Invasive ischemia workup is recommended if clinically indicated. Signatures         Cath:10/2019  HEMODYNAMICS:  1. Left ventricular end-diastolic pressure was found to be elevated at  30 mmHg. 2.  Cardiac output 8.9 or 8.91 liters per minute. 3.  Cardiac index 4.3 liters per minute per square meter. 4.  Right atrial pressure elevated at 25 mmHg. 5.  RV pressure was elevated at 59/14 mmHg. 6.  Pulmonary arterial pressure was elevated at 57/34 mmHg with a mean  pulmonary arterial pressure of 45 mmHg. 7.  Pulmonary capillary wedge pressure was 30 mmHg.     CORONARY ANGIOGRAM:  1. RCA:  RCA has moderate tortuosity in the proximal segment. Proximal  RCA is patent without significant obstruction. Mid RCA is patent  without significant obstruction. Distal RCA is patent. RPDA has some  mild diffuse disease. RPL has distal mild diffuse disease. 2.  Left main:  Left main is patent without significant obstruction. Left main bifurcates into LCX and LAD. 3.  LCX:  Moderate tortuosity noted in the LCX. No significant  obstruction in the proximal segment. Distal LCX with mild diffuse  disease. OM1 is a larger tortuous branching vessel with mild diffuse  disease. 4. LAD:  Proximal LAD is patent without significant obstruction. Mid  LAD is moderately tortuous. No significant obstruction. Distal LAD has  moderate tortuosity with mild diffuse disease. 5.  D1:  Large tortuous vessel without significant obstruction.    Dominance, right.     MEDICATIONS:  See MAR.     ACCESS:  1. Right brachial vein for right heart cath. 2.  Right radial artery for left heart cath.     COMPLICATIONS:  None.     CONCLUSION:  1. Nonobstructive coronary artery disease. 2.  Congestive heart failure with preserved ejection fraction. 3.  Volume overload. 4.  Elevated right and left filling pressures. 5.  Moderate pulmonary venous hypertension. AssessmentPlan:   Senia Castillo is a pleasant 71year old male patient who  has a past medical history of Acid reflux, Arthritis, Asthma, Bipolar 1 disorder ([address] CHF (congestive heart failure) ([address] CKD (chronic kidney disease), stage II, History of blood transfusion, Hypertension, Mood disorder ([address] Sleep apnea, and Thyroid disease. She has known h/o HFpEF. The patient follows at CHF clinic. Multiple adjustments in BP medications were previously needed, now CHF better controlled. She presents for follow up. Cath in 10/2019 revealed nonobstructive CAD, elevated filling pressures. Echo 09/2019 revealed preserved EF. The patient is compliant with her medications. She watches her BP, weight at home. Patient denies chest pain, shortness of breath, dyspnea on exertion, orthopnea, paroxysmal nocturnal dyspnea, palpitations, dizziness, syncope, weight gain or leg swelling. 1. HFpEF, chronic   2. Hypertension  3. Obesity  4. CKD  5. Anemia  6. Asthma   7. Dyslipidemia      Well compensated CHF at this time   Cont bumex 1 mg po daily   Monitor weight   Na restriction    BP is well controlled   Cont FU with CHF clinic    Hyperlipidemia: on statins, followed periodically. Patient need periodic lipid and liver profile. Above findings and plan of care were discussed with patient in details, patient's questions were answered.      Disposition:  RTC in 12 months             Electronically signed by Benson Martinez MD, [place], [place]    6/16/2020 at 10:58 AM EDT

## 2020-06-16 NOTE — PROGRESS NOTES
Pt here for 8 mo check up    Pt denies     Pt continues with chest pain , went to ED in 4/2020, still getting a tingle since ED visit , sob on exertion is better , noticing swelling in mid section at times    Pt denies heart palpitations, dizziness, swelling in legs and feet     Pt concerned b/p runs high at times 130-150/60-70

## 2020-06-19 ENCOUNTER — OFFICE VISIT (OUTPATIENT)
Dept: NEPHROLOGY | Age: 70
End: 2020-06-19
Payer: MEDICARE

## 2020-06-19 VITALS
BODY MASS INDEX: 40.03 KG/M2 | OXYGEN SATURATION: 98 % | WEIGHT: 226 LBS | DIASTOLIC BLOOD PRESSURE: 70 MMHG | SYSTOLIC BLOOD PRESSURE: 128 MMHG | HEART RATE: 74 BPM

## 2020-06-19 PROCEDURE — G8417 CALC BMI ABV UP PARAM F/U: HCPCS | Performed by: INTERNAL MEDICINE

## 2020-06-19 PROCEDURE — 99213 OFFICE O/P EST LOW 20 MIN: CPT | Performed by: INTERNAL MEDICINE

## 2020-06-19 PROCEDURE — 1123F ACP DISCUSS/DSCN MKR DOCD: CPT | Performed by: INTERNAL MEDICINE

## 2020-06-19 PROCEDURE — 1036F TOBACCO NON-USER: CPT | Performed by: INTERNAL MEDICINE

## 2020-06-19 PROCEDURE — 3017F COLORECTAL CA SCREEN DOC REV: CPT | Performed by: INTERNAL MEDICINE

## 2020-06-19 PROCEDURE — 1090F PRES/ABSN URINE INCON ASSESS: CPT | Performed by: INTERNAL MEDICINE

## 2020-06-19 PROCEDURE — G8427 DOCREV CUR MEDS BY ELIG CLIN: HCPCS | Performed by: INTERNAL MEDICINE

## 2020-06-19 PROCEDURE — G8400 PT W/DXA NO RESULTS DOC: HCPCS | Performed by: INTERNAL MEDICINE

## 2020-06-19 PROCEDURE — 4040F PNEUMOC VAC/ADMIN/RCVD: CPT | Performed by: INTERNAL MEDICINE

## 2020-06-19 NOTE — PROGRESS NOTES
Ascorbic Acid (VITAMIN C) 500 MG tablet Take 500 mg by mouth daily.  Multiple Vitamin (MULTIVITAMIN PO) Take 1 tablet by mouth daily.  acetaminophen (TYLENOL) 325 MG tablet Take 650 mg by mouth 4 times daily       omeprazole (PRILOSEC) 40 MG delayed release capsule Take 20 mg by mouth daily       OLANZapine (ZYPREXA) 7.5 MG tablet Take 5 mg by mouth nightly        No current facility-administered medications for this visit.          Laboratory & Diagnostics:  CBC:   Lab Results   Component Value Date    WBC 5.1 04/28/2020    HGB 11.9 (L) 04/28/2020    HCT 35.8 04/28/2020    MCV 92.1 04/28/2020     04/28/2020     BMP:    Lab Results   Component Value Date     06/04/2020     04/28/2020     04/07/2020    K 4.6 06/04/2020    K 4.2 04/28/2020    K 4.8 04/07/2020     06/04/2020     04/28/2020     04/07/2020    CO2 26 06/04/2020    CO2 27 04/28/2020    CO2 21 04/07/2020    BUN 37 (H) 06/04/2020    BUN 32 (H) 04/28/2020    BUN 39 (H) 04/07/2020    CREATININE 1.18 06/04/2020    CREATININE 1.15 04/28/2020    CREATININE 1.22 04/07/2020    GLUCOSE 87 06/04/2020    GLUCOSE 100 04/28/2020    GLUCOSE 87 04/07/2020      Hepatic:   Lab Results   Component Value Date    AST 19 04/04/2020    AST 19 09/29/2019    AST 20 09/14/2019    ALT 18 04/04/2020    ALT 18 09/29/2019    ALT 21 09/14/2019    BILITOT 0.2 (L) 04/04/2020    BILITOT <0.2 (L) 09/29/2019    BILITOT 0.2 (L) 09/14/2019    ALKPHOS 107 04/04/2020    ALKPHOS 83 09/29/2019    ALKPHOS 74 09/14/2019     BNP:   Lab Results   Component Value Date     (H) 10/08/2019     (H) 10/08/2019     Lipids:   Lab Results   Component Value Date    CHOL 180 10/08/2019    HDL 52 10/08/2019     INR:   Lab Results   Component Value Date    INR 0.88 04/04/2020    INR 0.90 10/18/2019     URINE:   Lab Results   Component Value Date    PROTUR 30 mg/dL 09/23/2014     Lab Results   Component Value Date    NITRU NEGATIVE 09/30/2019

## 2020-07-20 ENCOUNTER — TELEPHONE (OUTPATIENT)
Dept: CARDIOLOGY CLINIC | Age: 70
End: 2020-07-20

## 2020-07-20 NOTE — TELEPHONE ENCOUNTER
Pt called and states her weight is fluctuating. She is up 3 pounds overnight, pt states it is up when she eats out, then goes back down after a day. 232 today, 229 yesterday. Pt states only symptom is abdominal bloating. Currently on bumex 1 mg daily and spironolactone 25 mg daily.      Pt also wants to know if her next appt can be a MyChart visit

## 2020-07-20 NOTE — TELEPHONE ENCOUNTER
Im ok w/ fluctuation - it seems to come back down. Definitely call if wt doesn't return to baseline.   Yes ok to do MyChart Visit

## 2020-07-28 ENCOUNTER — TELEPHONE (OUTPATIENT)
Dept: CARDIOLOGY CLINIC | Age: 70
End: 2020-07-28

## 2020-07-28 NOTE — TELEPHONE ENCOUNTER
Patient left message wt up 5 lb and abd bloating     Returned call   No answer, left message to call office

## 2020-07-28 NOTE — TELEPHONE ENCOUNTER
Patient returned call       7/20 231  7/27 234  Took extra bumex  7/28 235.8    bp's have been   130-140/60    Ache under ribs   Feels like when she first started in clinic   Took 2 bumex yesterday  Denies ANTHONY  Has ESCOTO

## 2020-08-11 ENCOUNTER — OFFICE VISIT (OUTPATIENT)
Dept: CARDIOLOGY CLINIC | Age: 70
End: 2020-08-11
Payer: MEDICARE

## 2020-08-11 VITALS
HEART RATE: 79 BPM | BODY MASS INDEX: 42.31 KG/M2 | DIASTOLIC BLOOD PRESSURE: 61 MMHG | HEIGHT: 63 IN | SYSTOLIC BLOOD PRESSURE: 108 MMHG | WEIGHT: 238.8 LBS | OXYGEN SATURATION: 91 %

## 2020-08-11 LAB
ANION GAP SERPL CALCULATED.3IONS-SCNC: 12 MEQ/L (ref 8–16)
BUN BLDV-MCNC: 39 MG/DL (ref 7–22)
CALCIUM SERPL-MCNC: 9.8 MG/DL (ref 8.5–10.5)
CHLORIDE BLD-SCNC: 105 MEQ/L (ref 98–111)
CO2: 24 MEQ/L (ref 23–33)
CREAT SERPL-MCNC: 1 MG/DL (ref 0.4–1.2)
GFR SERPL CREATININE-BSD FRML MDRD: 55 ML/MIN/1.73M2
GLUCOSE BLD-MCNC: 118 MG/DL (ref 70–108)
POTASSIUM SERPL-SCNC: 4.6 MEQ/L (ref 3.5–5.2)
PRO-BNP: 72.9 PG/ML (ref 0–900)
SODIUM BLD-SCNC: 141 MEQ/L (ref 135–145)

## 2020-08-11 PROCEDURE — G8400 PT W/DXA NO RESULTS DOC: HCPCS | Performed by: NURSE PRACTITIONER

## 2020-08-11 PROCEDURE — 4040F PNEUMOC VAC/ADMIN/RCVD: CPT | Performed by: NURSE PRACTITIONER

## 2020-08-11 PROCEDURE — 1123F ACP DISCUSS/DSCN MKR DOCD: CPT | Performed by: NURSE PRACTITIONER

## 2020-08-11 PROCEDURE — 3017F COLORECTAL CA SCREEN DOC REV: CPT | Performed by: NURSE PRACTITIONER

## 2020-08-11 PROCEDURE — G8427 DOCREV CUR MEDS BY ELIG CLIN: HCPCS | Performed by: NURSE PRACTITIONER

## 2020-08-11 PROCEDURE — 99213 OFFICE O/P EST LOW 20 MIN: CPT | Performed by: NURSE PRACTITIONER

## 2020-08-11 PROCEDURE — 36415 COLL VENOUS BLD VENIPUNCTURE: CPT | Performed by: NURSE PRACTITIONER

## 2020-08-11 PROCEDURE — G8417 CALC BMI ABV UP PARAM F/U: HCPCS | Performed by: NURSE PRACTITIONER

## 2020-08-11 PROCEDURE — 1090F PRES/ABSN URINE INCON ASSESS: CPT | Performed by: NURSE PRACTITIONER

## 2020-08-11 PROCEDURE — 1036F TOBACCO NON-USER: CPT | Performed by: NURSE PRACTITIONER

## 2020-08-11 ASSESSMENT — ENCOUNTER SYMPTOMS
SHORTNESS OF BREATH: 1
ABDOMINAL DISTENTION: 1
COUGH: 0

## 2020-08-11 NOTE — PROGRESS NOTES
Heart Failure Clinic       Visit Date: 8/11/2020  Cardiologist:  Dr. Brennan Ibarra  Primary Care Physician: Dr. Phyllis Calderón, DO    Jori Stewart is a 79 y.o. female who presents today for:  Chief Complaint   Patient presents with    Follow-up     CHF       HPI:   Jori Stewart is a 79 y.o. female who presents to the office for a follow up patient visit in the heart failure clinic. Accompanied by     TYPE HF: HFpEF (EF 55-60%, grd 1)  Device: No  HX: Uncontrolled HTN (past year, worse since knee surgery/infection in July 2019), obesity, thoracic AA, OA-knee surgery, RANDY, Bipolar  Dry Wt = 220#     Hospitalization r/t Heart Failure:  Sept 2019 = ESCOTO, Edema, HTN.  Lasix IV.  BNP normal.  +CXR. Discharge weight - 222#  Cath 10/18/19 - Nonobstructive CAD, PWCP 30 - received Lasix 80 IV in lab, changed to Bumex TID  OV 12/3 - doing well - no changes   OV 1/7 Errol - fatigued, BP low.  Decreased Labetolol  CC 1/15 - Saw Dr Yana Hemphill.  No changes.  MEMs only if readmitted    Concerns today: Called yesterday d/t wt gain, bloating, increased ESCOTO and concern d/t leaving for vacation today. She notes her Zyprexa has been increased several times  In past month and questions it causing wt gain. She has also not been as active. She fell in May/June and has been using walker since. She has taken extra Bumex here/there - average 2x/week - states it does not help. She wanted to be seen to ensure it is not fluid accumulating. She only feels fluid overload in abd. Reviewed wt log - it has been slow gain and appears to have started about time Zyprexa was increased.    Diet: still watching very closely    Patient has:  Chest Pain: No  SOB: ESCOTO - slightly worse  Orthopnea/PND: No  RANDY: Compliant w/ CPAP  Edema: no  Fatigue: No  Abdominal bloating: yes  Cough: no  Appetite: good  Any extra diuretic use: no  Weight gain: yes = up approx 10-15# in past 2 months  Home weight: going up  Home blood pressure: ferrous sulfate 325 (65 Fe) MG tablet Take 325 mg by mouth daily (with breakfast)      polyethylene glycol (GLYCOLAX) packet Take 17 g by mouth daily       atorvastatin (LIPITOR) 20 MG tablet Take 1 tablet by mouth daily 90 tablet 3    clonazePAM (KLONOPIN) 1 MG tablet Take 1 mg by mouth 3 times daily as needed.  venlafaxine (EFFEXOR XR) 150 MG extended release capsule Take 150 mg by mouth daily      gabapentin (NEURONTIN) 300 MG capsule Take 300 mg by mouth 3 times daily.  OLANZapine (ZYPREXA) 7.5 MG tablet Take 5 mg by mouth nightly       tiZANidine (ZANAFLEX) 2 MG tablet Take 2 mg by mouth 2 times daily      tetrabenazine (XENAZINE) 12.5 MG tablet Take 12.5 mg by mouth 2 times daily      venlafaxine (EFFEXOR) 75 MG tablet Take 75 mg by mouth daily      Mirabegron ER (MYRBETRIQ) 50 MG TB24 Take 50 mg by mouth daily      lamoTRIgine (LAMICTAL) 100 MG tablet Take 100 mg by mouth daily Patient taking 1 tablet TID      Ascorbic Acid (VITAMIN C) 500 MG tablet Take 500 mg by mouth daily.  Multiple Vitamin (MULTIVITAMIN PO) Take 1 tablet by mouth daily. No current facility-administered medications for this visit. Allergies   Allergen Reactions    Ace Inhibitors Anaphylaxis    Adhesive Tape     Codeine     Lotrel [Amlodipine Besy-Benazepril Hcl] Swelling    Pcn [Penicillins]     Typhoid Vaccines        SUBJECTIVE:   Review of Systems   Constitutional: Negative for activity change, appetite change and fatigue. Respiratory: Positive for shortness of breath (ESCOTO). Negative for cough. Cardiovascular: Negative for chest pain, palpitations and leg swelling. Gastrointestinal: Positive for abdominal distention. Neurological: Negative for weakness, light-headedness and headaches. Hematological: Negative for adenopathy. Psychiatric/Behavioral: Negative for sleep disturbance.        OBJECTIVE:   Today's Vitals:  /61   Pulse 79   Ht 5' 3\" (1.6 m)   Wt 238 lb 12.8 oz dysfunction).   The left atrium is Moderately dilated.   Aortic aneurysm noted in the ascending aorta .   Aortic aneurysm measures 4.1 cm .      Signature   ----------------------------------------------------------------   Electronically signed by Yvonne Guzman MD (Interpreting   physician) on 09/13/2019 at 04:51 PM   ----------------------------------------------------------------        CATH (10/18)  FINDINGS:  HEMODYNAMICS:  1.  Left ventricular end-diastolic pressure was found to be elevated at  30 mmHg. 2.  Cardiac output 8.9 or 8.91 liters per minute. 3.  Cardiac index 4.3 liters per minute per square meter. 4.  Right atrial pressure elevated at 25 mmHg. 5.  RV pressure was elevated at 59/14 mmHg. 6.  Pulmonary arterial pressure was elevated at 57/34 mmHg with a mean  pulmonary arterial pressure of 45 mmHg. 7.  Pulmonary capillary wedge pressure was 30 mmHg.     CORONARY ANGIOGRAM:  1.  RCA:  RCA has moderate tortuosity in the proximal segment.  Proximal  RCA is patent without significant obstruction.  Mid RCA is patent  without significant obstruction.  Distal RCA is patent.  RPDA has some  mild diffuse disease.  RPL has distal mild diffuse disease. 2.  Left main:  Left main is patent without significant obstruction. Left main bifurcates into LCX and LAD. 3.  LCX:  Moderate tortuosity noted in the LCX.  No significant  obstruction in the proximal segment.  Distal LCX with mild diffuse  disease.  OM1 is a larger tortuous branching vessel with mild diffuse  disease. 4.  LAD:  Proximal LAD is patent without significant obstruction.  Mid  LAD is moderately tortuous.  No significant obstruction.  Distal LAD has  moderate tortuosity with mild diffuse disease. 5.  D1:  Large tortuous vessel without significant obstruction. Dominance, right.     MEDICATIONS:  See MAR.     ACCESS:  1.  Right brachial vein for right heart cath.   2.  Right radial artery for left heart cath.     COMPLICATIONS:  None.     CONCLUSION:  1.  Nonobstructive coronary artery disease. 2.  Congestive heart failure with preserved ejection fraction. 3.  Volume overload. 4.  Elevated right and left filling pressures. 5.  Moderate pulmonary venous hypertension.     RECOMMENDATIONS:  1.  Aggressive risk factor modification for nonobstructive coronary  disease. 2.  Continue aspirin 81 mg.  3.  Lipitor 20 mg p.o. daily. 4.  Fluid restriction to less than 1.5 to 2 liters per day. 5.  Sodium restriction to less than 1.5 gm per day. 6.  Daily weight monitoring. 7.  The patient was given Lasix 80 mg IV x1 in the cath lab. 8.  Stop p.o. Lasix.  Currently, the patient is on Lasix 80 mg p.o.  b.i.d.  9.  Start Bumex 1 mg p.o. t.i.d. 10.  Check BMP and magnesium level next week. 11.  Keep potassium above 4 and magnesium above 2. 12.  The patient needs better blood pressure control.  Her hydralazine  was increased to 100 mg p.o. t.i.d.  13.  Continue to monitor home blood pressure readings. 15.  The patient has history of obstructive sleep apnea.  Continue CPAP  during sleep.  She had an appointment with her sleep medicine specialist  within one to two weeks. 15.  Lifestyle modifications including weight loss advisable. 16.  Refer to cardiac rehab once volume status is better. The above findings and plan of care were discussed in details with the  patient and her family members including  and daughter.    Questions were answered. Kalin Cousin are agreeable with above-mentioned plan.     Vianney Camara MD     D: 10/18/2019 9:59:22        Results reviewed:  BNP:   Lab Results   Component Value Date     (H) 10/08/2019     CBC:   Lab Results   Component Value Date    WBC 5.1 04/28/2020    RBC 3.89 04/28/2020    HGB 11.9 04/28/2020    HCT 35.8 04/28/2020     04/28/2020     CMP:    Lab Results   Component Value Date     06/04/2020    K 4.6 06/04/2020    K 4.7 04/04/2020     06/04/2020    CO2 26 06/04/2020 mg/day  · Monitor BP  · Activity as tolerated     Patient was instructed to call the [facility] for any changes in symptoms as noted in AVS.      Return in about 3 months (around 11/11/2020). or sooner if needed     Patient given educational materials - see patient instructions. We discussed the importance of weighing oneself and recording daily. We also discussed the importance of a low sodium diet, higher sodium foods to avoid and better low sodium food options. Patient verbalizes understanding of plan of care using teach back method, and is agreeable to the treatment plan.        Electronically signed by DAVID Aguilar CNP on 8/11/2020 at 10:24 AM

## 2020-09-04 ENCOUNTER — TELEPHONE (OUTPATIENT)
Dept: FAMILY MEDICINE CLINIC | Age: 70
End: 2020-09-04

## 2020-09-04 NOTE — TELEPHONE ENCOUNTER
[facility] fax received asking for refill of Omeprazole DR 20 mg Capsules. On review of medication list 40 mg is listed. Pt unsure what mg she's taking.  is at work currently he will call the office back on Tuesday 9/8/2020 regarding. When confirmed asking to refill.

## 2020-09-05 ENCOUNTER — PATIENT MESSAGE (OUTPATIENT)
Dept: FAMILY MEDICINE CLINIC | Age: 70
End: 2020-09-05

## 2020-09-08 RX ORDER — OMEPRAZOLE 20 MG/1
20 CAPSULE, DELAYED RELEASE ORAL
Qty: 90 CAPSULE | Refills: 3 | Status: SHIPPED | OUTPATIENT
Start: 2020-09-08 | End: 2021-08-17 | Stop reason: SDUPTHER

## 2020-09-08 NOTE — TELEPHONE ENCOUNTER
From: Jasper Kingsley  To: Braxton Madsen DO  Sent: 9/5/2020 1:02 PM EDT  Subject: Prescription Question    I double checked with the pharmacy. I am taking omeprazole 20 mg tablets. The omeprazole 40 mg tablets were prescribed I believe by Dr. Karthikeyan Galindo after a test I had done with him for just one month. So if you could add omeprazole 20 mg back into my med list that would be great. Please sent prescription to [facility].

## 2020-09-14 ENCOUNTER — TELEPHONE (OUTPATIENT)
Dept: CARDIOLOGY CLINIC | Age: 70
End: 2020-09-14

## 2020-09-14 RX ORDER — ATORVASTATIN CALCIUM 20 MG/1
20 TABLET, FILM COATED ORAL DAILY
Qty: 90 TABLET | Refills: 3 | Status: SHIPPED | OUTPATIENT
Start: 2020-09-14 | End: 2021-08-18 | Stop reason: SDUPTHER

## 2020-09-14 NOTE — TELEPHONE ENCOUNTER
Patient's  LM on [facility]F line [facility]. [facility]  stated pt was told she needs to make an appt with HF clinic due to weight gain of 20lbs in 2.5 months.     UTLM on pt VM--number rings and then hangs up

## 2020-09-17 LAB
ANION GAP SERPL CALCULATED.3IONS-SCNC: 7 MMOL/L (ref 4–12)
B-TYPE NATRIURETIC PEPTIDE: 14 PG/ML (ref 0–100)
BUN BLDV-MCNC: 45 MG/DL (ref 7–20)
CALCIUM SERPL-MCNC: 9.4 MG/DL (ref 8.8–10.5)
CHLORIDE BLD-SCNC: 107 MEQ/L (ref 101–111)
CO2: 25 MEQ/L (ref 21–32)
CREAT SERPL-MCNC: 1.17 MG/DL (ref 0.6–1.3)
CREATININE CLEARANCE: 46
GLUCOSE: 90 MG/DL (ref 70–110)
POTASSIUM SERPL-SCNC: 4.4 MEQ/L (ref 3.6–5)
SODIUM BLD-SCNC: 139 MEQ/L (ref 135–145)

## 2020-09-22 ENCOUNTER — OFFICE VISIT (OUTPATIENT)
Dept: CARDIOLOGY CLINIC | Age: 70
End: 2020-09-22
Payer: MEDICARE

## 2020-09-22 VITALS
HEIGHT: 63 IN | WEIGHT: 244 LBS | SYSTOLIC BLOOD PRESSURE: 108 MMHG | BODY MASS INDEX: 43.23 KG/M2 | HEART RATE: 87 BPM | DIASTOLIC BLOOD PRESSURE: 66 MMHG | OXYGEN SATURATION: 92 %

## 2020-09-22 PROCEDURE — 1090F PRES/ABSN URINE INCON ASSESS: CPT | Performed by: NURSE PRACTITIONER

## 2020-09-22 PROCEDURE — 4040F PNEUMOC VAC/ADMIN/RCVD: CPT | Performed by: NURSE PRACTITIONER

## 2020-09-22 PROCEDURE — 1036F TOBACCO NON-USER: CPT | Performed by: NURSE PRACTITIONER

## 2020-09-22 PROCEDURE — 99214 OFFICE O/P EST MOD 30 MIN: CPT | Performed by: NURSE PRACTITIONER

## 2020-09-22 PROCEDURE — G8427 DOCREV CUR MEDS BY ELIG CLIN: HCPCS | Performed by: NURSE PRACTITIONER

## 2020-09-22 PROCEDURE — G8400 PT W/DXA NO RESULTS DOC: HCPCS | Performed by: NURSE PRACTITIONER

## 2020-09-22 PROCEDURE — 3017F COLORECTAL CA SCREEN DOC REV: CPT | Performed by: NURSE PRACTITIONER

## 2020-09-22 PROCEDURE — 1123F ACP DISCUSS/DSCN MKR DOCD: CPT | Performed by: NURSE PRACTITIONER

## 2020-09-22 PROCEDURE — G8417 CALC BMI ABV UP PARAM F/U: HCPCS | Performed by: NURSE PRACTITIONER

## 2020-09-22 RX ORDER — OLANZAPINE 15 MG/1
15 TABLET ORAL DAILY
COMMUNITY
Start: 2020-09-08

## 2020-09-22 RX ORDER — BUSPIRONE HYDROCHLORIDE 15 MG/1
15 TABLET ORAL 3 TIMES DAILY
COMMUNITY
Start: 2020-09-04 | End: 2021-02-02

## 2020-09-22 ASSESSMENT — ENCOUNTER SYMPTOMS
COUGH: 0
ABDOMINAL DISTENTION: 0
SHORTNESS OF BREATH: 1

## 2020-09-22 NOTE — PROGRESS NOTES
Heart Failure Clinic       Visit Date: 9/22/2020  Cardiologist:  Dr. Brandi Saucedo  Primary Care Physician: Dr. Lamin Carson, DO    Eda Moise is a 79 y.o. female who presents today for:  Chief Complaint   Patient presents with    Congestive Heart Failure       HPI:   Eda Moise is a 79 y.o. female who presents to the office for a follow up patient visit in the heart failure clinic. Accompanied by     TYPE HF: HFpEF (EF 55-60%, grd 1)  Device: No  HX: Uncontrolled HTN (past year, worse since knee surgery/infection in July 2019), obesity, thoracic AA, OA-knee surgery, RANDY, Bipolar    Dry Wt = 220#     Hospitalization r/t Heart Failure:  Sept 2019 = ESCOTO, Edema, HTN.  Lasix IV.  BNP normal.  +CXR. Discharge weight - 222#  Cath 10/18/19 - Nonobstructive CAD, PWCP 30 - received Lasix 80 IV in lab, changed to Bumex TID  OV 12/3 - doing well - no changes   OV 1/7 Errol - fatigued, BP low.  Decreased Labetolol  CC 1/15/20 - Saw Dr Princess Mittal.  No changes.  MEMs only if readmitted    Concerns today: Called last week c/o wt gain (20# in 2.5 months) and increased ESCOTO/SOB requesting appt to be seen. CXR and BMP, BNP done last week - CXR negative. BMP normal.  BNP 14 (lowest ever). Lowest wt - 220# in April - TODAY 244# = has been gradual gain  Knee issues, fell  In May/June = less active since  Psych meds adjusted - Zyprexa = does have side effect of wt gain  VERY inactive =  states she sleeps till noon - then back to bed by 7 pm  Activity: walker - walked from parking lot - no break. Diet: Watching salt and fluid closely    Patient has:  Chest Pain: No  SOB: Yes  Orthopnea/PND: No  RANDY: Compliant w/ CPAP  Edema: No  Fatigue:  Yes  Abdominal bloating: Yes  Cough: No  Appetite: Good  Any extra diuretic use: No  Weight gain: YES  Home weight: going up  Home blood pressure: sporadic = some high    Past Medical History:   Diagnosis Date    Acid reflux     Arthritis     Asthma     Bipolar 1 disorder ([facility]     CHF (congestive heart failure) (HCC)     CKD (chronic kidney disease), stage II 2019    History of blood transfusion 2004    Hypertension     Mood disorder ([facility]     Sleep apnea     Thyroid disease      Past Surgical History:   Procedure Laterality Date    ANKLE SURGERY      left    CATARACT REMOVAL      Both eyes      SECTION      x 3    FOOT SURGERY      Left     HIP SURGERY      HYSTERECTOMY      Total     TOTAL KNEE ARTHROPLASTY Left 10/14/14    TOTAL KNEE ARTHROPLASTY Right 2019     Family History   Problem Relation Age of Onset    Diabetes Mother     High Blood Pressure Father     Cancer Father     Other Brother     Diabetes Brother     Cancer Sister     Breast Cancer Sister 39    Cancer Brother      Social History     Tobacco Use    Smoking status: Never Smoker    Smokeless tobacco: Never Used   Substance Use Topics    Alcohol use: No     Alcohol/week: 0.0 standard drinks     Comment: rarely     Current Outpatient Medications   Medication Sig Dispense Refill    OLANZapine (ZYPREXA) 15 MG tablet Take 15 mg by mouth daily      busPIRone (BUSPAR) 15 MG tablet Take 15 mg by mouth 3 times daily      atorvastatin (LIPITOR) 20 MG tablet Take 1 tablet by mouth daily 90 tablet 3    omeprazole (PRILOSEC) 20 MG delayed release capsule Take 1 capsule by mouth every morning (before breakfast) 90 capsule 3    aspirin 81 MG EC tablet Take 81 mg by mouth daily      LINZESS 72 MCG CAPS capsule Take 72 mcg by mouth every 3 days       bumetanide (BUMEX) 1 MG tablet Take 1 tablet by mouth daily 90 tablet 6    hydrALAZINE (APRESOLINE) 50 MG tablet Take 1 tablet by mouth 3 times daily 135 tablet 3    carvedilol (COREG) 25 MG tablet Take 0.5 tablets by mouth daily 60 tablet 2    acetaminophen (TYLENOL) 325 MG tablet Take 650 mg by mouth 4 times daily       spironolactone (ALDACTONE) 25 MG tablet Take 1 tablet by mouth daily 30 tablet 5    SYNTHROID 125 distress. Appearance: Normal appearance. She is well-developed. She is obese. She is not diaphoretic. HENT:      Head: Normocephalic and atraumatic. Eyes:      Conjunctiva/sclera: Conjunctivae normal.   Neck:      Musculoskeletal: Normal range of motion and neck supple. Comments: No JVD  Cardiovascular:      Rate and Rhythm: Normal rate and regular rhythm. Heart sounds: Normal heart sounds. No murmur. Pulmonary:      Effort: Pulmonary effort is normal. No respiratory distress. Breath sounds: Normal breath sounds. No wheezing or rales. Abdominal:      General: Bowel sounds are normal. There is no distension. Palpations: Abdomen is soft. Tenderness: There is no abdominal tenderness. Musculoskeletal: Normal range of motion. Right lower leg: No edema. Left lower leg: No edema. Skin:     General: Skin is warm and dry. Capillary Refill: Capillary refill takes less than 2 seconds. Neurological:      Mental Status: She is alert and oriented to person, place, and time. Coordination: Coordination normal.   Psychiatric:         Behavior: Behavior normal.         Wt Readings from Last 3 Encounters:   09/22/20 244 lb (110.7 kg)   08/11/20 238 lb 12.8 oz (108.3 kg)   06/19/20 226 lb (102.5 kg)     BP Readings from Last 3 Encounters:   09/22/20 108/66   08/11/20 108/61   06/19/20 128/70     Pulse Readings from Last 3 Encounters:   09/22/20 87   08/11/20 79   06/19/20 74     Body mass index is 43.22 kg/m². ECHO:   Summary   Normal left ventricle size and systolic function. Ejection fraction was   estimated at 55 to 60 %.  There were no regional left ventricular wall   motion abnormalities and wall thickness was within normal limits.   Doppler parameters were consistent with abnormal left ventricular   relaxation (grade 1 diastolic dysfunction).   The left atrium is Moderately dilated.   Aortic aneurysm noted in the ascending aorta .   Aortic aneurysm measures 4.1 cm .      Signature   ----------------------------------------------------------------   Electronically signed by Ana Turner MD (Interpreting   [id number]) on 09/13/2019 at 04:51 PM   ----------------------------------------------------------------        CATH (10/18)  FINDINGS:  HEMODYNAMICS:  1.  Left ventricular end-diastolic pressure was found to be elevated at  30 mmHg. 2.  Cardiac output 8.9 or 8.91 liters per minute. 3.  Cardiac index 4.3 liters per minute per square meter. 4.  Right atrial pressure elevated at 25 mmHg. 5.  RV pressure was elevated at 59/14 mmHg. 6.  Pulmonary arterial pressure was elevated at 57/34 mmHg with a mean  pulmonary arterial pressure of 45 mmHg. 7.  Pulmonary capillary wedge pressure was 30 mmHg.     CORONARY ANGIOGRAM:  1.  RCA:  RCA has moderate tortuosity in the proximal segment.  Proximal  RCA is patent without significant obstruction.  Mid RCA is patent  without significant obstruction.  Distal RCA is patent.  RPDA has some  mild diffuse disease.  RPL has distal mild diffuse disease. 2.  Left main:  Left main is patent without significant obstruction. Left main bifurcates into LCX and LAD. 3.  LCX:  Moderate tortuosity noted in the LCX.  No significant  obstruction in the proximal segment.  Distal LCX with mild diffuse  disease.  OM1 is a larger tortuous branching vessel with mild diffuse  disease. 4.  LAD:  Proximal LAD is patent without significant obstruction.  Mid  LAD is moderately tortuous.  No significant obstruction.  Distal LAD has  moderate tortuosity with mild diffuse disease. 5.  D1:  Large tortuous vessel without significant obstruction. Dominance, right.     MEDICATIONS:  See MAR.     ACCESS:  1.  Right brachial vein for right heart cath. 2.  Right radial artery for left heart cath.     COMPLICATIONS:  None.     CONCLUSION:  1.  Nonobstructive coronary artery disease. 2.  Congestive heart failure with preserved ejection fraction.   3.  Volume overload. 4.  Elevated right and left filling pressures. 5.  Moderate pulmonary venous hypertension.     RECOMMENDATIONS:  1.  Aggressive risk factor modification for nonobstructive coronary  disease. 2.  Continue aspirin 81 mg.  3.  Lipitor 20 mg p.o. daily. 4.  Fluid restriction to less than 1.5 to 2 liters per day. 5.  Sodium restriction to less than 1.5 gm per day. 6.  Daily weight monitoring. 7.  The patient was given Lasix 80 mg IV x1 in the cath lab. 8.  Stop p.o. Lasix.  Currently, the patient is on Lasix 80 mg p.o.  b.i.d.  9.  Start Bumex 1 mg p.o. t.i.d. 10.  Check BMP and magnesium level next week. 11.  Keep potassium above 4 and magnesium above 2. 12.  The patient needs better blood pressure control.  Her hydralazine  was increased to 100 mg p.o. t.i.d.  13.  Continue to monitor home blood pressure readings. 15.  The patient has history of obstructive sleep apnea.  Continue CPAP  during sleep.  She had an appointment with her sleep medicine specialist  within one to two weeks. 15.  Lifestyle modifications including weight loss advisable. 16.  Refer to cardiac rehab once volume status is better. The above findings and plan of care were discussed in details with the  patient and her family members including  and daughter.    Questions were answered. Alexa Gibson are agreeable with above-mentioned plan.     Harvey Torres MD     D: 10/18/2019 9:59:22      Results reviewed:  BNP:   Lab Results   Component Value Date    BNP 14 09/17/2020     CBC:   Lab Results   Component Value Date    WBC 5.1 04/28/2020    RBC 3.89 04/28/2020    HGB 11.9 04/28/2020    HCT 35.8 04/28/2020     04/28/2020     CMP:    Lab Results   Component Value Date     09/17/2020    K 4.4 09/17/2020    K 4.7 04/04/2020     09/17/2020    CO2 25 09/17/2020    BUN 45 09/17/2020    CREATININE 1.17 09/17/2020    AGRATIO 2.0 01/19/2018    LABGLOM 55 08/11/2020    GLUCOSE 90 09/17/2020    CALCIUM 9.40 09/17/2020 more then 50% of the appt time face to face with the patient. · Daily weights  · Fluid restriction of 2 Liters per day  · Limit sodium in diet to around 9113-0135 mg/day  · Monitor BP  · Activity as tolerated     Patient was instructed to call the [facility] for any changes in symptoms as noted in AVS.      Return in about 2 months (around 11/22/2020). or sooner if needed     Patient given educational materials - see patient instructions. We discussed the importance of weighing oneself and recording daily. We also discussed the importance of a low sodium diet, higher sodium foods to avoid and better low sodium food options. Patient verbalizes understanding of plan of care using teach back method, and is agreeable to the treatment plan.        Electronically signed by DAVID Robin CNP on 9/22/2020 at 9:47 AM

## 2020-09-22 NOTE — PATIENT INSTRUCTIONS
You may receive a survey regarding the care you received during your visit. Your input is valuable to us. We encourage you to complete and return your survey. We hope you will choose us in the future for your healthcare needs.     Continue:  · Continue current medications  · Daily weights and record  · Fluid restriction of 2 Liters per day  · Limit sodium in diet to around 8162-2787 mg/day  · Monitor BP  · Activity as tolerated     Call the Heart Failure Clinic for any of the following symptoms: [phone]   Weight gain of 2-3 pounds in 1 day or 5 pounds in 1 week   Increased shortness of breath   Shortness of breath while laying down   Cough   Chest pain   Swelling in feet, ankles or legs   Tenderness or bloating in the abdomen   Fatigue    Decreased appetite or nausea    Confusion

## 2020-10-05 ENCOUNTER — TELEPHONE (OUTPATIENT)
Dept: SPIRITUAL SERVICES | Facility: CLINIC | Age: 70
End: 2020-10-05

## 2020-10-05 NOTE — TELEPHONE ENCOUNTER
received a telephone call from Brian Walker, asking if now was a good time to talk?  provided a listening presence for Brian Walker to freely share her story, as well as desire for a possible CHF Clinic Support Group. Brian Walker expressed her current physical situation, having \"pulled my MCL,\" and now being in braces which is limiting her ability to be mobile. She added using a walker for needed assistance. Emotionally, Brian Walker shared experiencing \"ups-and-downs\" with her bipolar situation. She stated believing that the medication may be contributing to weight gain, which is frustrating for her. Mentally, she shared dealing with discouragement about her recent incident and the limitations which have resulted. She is frustrated that she cannot do more. Spiritually, she is hopeful for the future. She shared a desire to be part of a CHF Clinic Support Group with other persons dealing with similar situations in life. She will speak with the other persons interested to secure needed information about a preferred day and time to meet.  will check on a possible room once the information is received. She was appreciative of the encounter and opportunity to receive support, as well as the prospect of Support Group moving forward. She was grateful for the listening presence, as well as the prayer provided.

## 2020-11-04 ENCOUNTER — PATIENT MESSAGE (OUTPATIENT)
Dept: CARDIOLOGY CLINIC | Age: 70
End: 2020-11-04

## 2020-11-05 RX ORDER — BUMETANIDE 1 MG/1
1 TABLET ORAL DAILY
Qty: 90 TABLET | Refills: 3 | Status: SHIPPED | OUTPATIENT
Start: 2020-11-05 | End: 2020-12-23

## 2020-11-24 ENCOUNTER — TELEMEDICINE (OUTPATIENT)
Dept: CARDIOLOGY CLINIC | Age: 70
End: 2020-11-24
Payer: MEDICARE

## 2020-11-24 PROCEDURE — 1123F ACP DISCUSS/DSCN MKR DOCD: CPT | Performed by: NURSE PRACTITIONER

## 2020-11-24 PROCEDURE — G8427 DOCREV CUR MEDS BY ELIG CLIN: HCPCS | Performed by: NURSE PRACTITIONER

## 2020-11-24 PROCEDURE — 4040F PNEUMOC VAC/ADMIN/RCVD: CPT | Performed by: NURSE PRACTITIONER

## 2020-11-24 PROCEDURE — 1036F TOBACCO NON-USER: CPT | Performed by: NURSE PRACTITIONER

## 2020-11-24 PROCEDURE — 99214 OFFICE O/P EST MOD 30 MIN: CPT | Performed by: NURSE PRACTITIONER

## 2020-11-24 PROCEDURE — G8400 PT W/DXA NO RESULTS DOC: HCPCS | Performed by: NURSE PRACTITIONER

## 2020-11-24 PROCEDURE — 3017F COLORECTAL CA SCREEN DOC REV: CPT | Performed by: NURSE PRACTITIONER

## 2020-11-24 PROCEDURE — G8417 CALC BMI ABV UP PARAM F/U: HCPCS | Performed by: NURSE PRACTITIONER

## 2020-11-24 PROCEDURE — G8484 FLU IMMUNIZE NO ADMIN: HCPCS | Performed by: NURSE PRACTITIONER

## 2020-11-24 PROCEDURE — 1090F PRES/ABSN URINE INCON ASSESS: CPT | Performed by: NURSE PRACTITIONER

## 2020-11-24 RX ORDER — ARIPIPRAZOLE 5 MG/1
10 TABLET ORAL DAILY
Qty: 30 TABLET | Refills: 3 | COMMUNITY
Start: 2020-11-24

## 2020-11-24 ASSESSMENT — ENCOUNTER SYMPTOMS
SHORTNESS OF BREATH: 1
ABDOMINAL DISTENTION: 0
COUGH: 0

## 2020-11-24 NOTE — PROGRESS NOTES
TELEHEALTH EVALUATION -- Audio/Visual (During [id number] public health emergency)    Jenifer        Visit Date: 11/24/2020  Cardiologist:  Dr. Rosmery Ramirez  Primary Care Physician: Dr. Landry Shaffer, DO Rylan Cartwright is a 79 y.o. female who presents today for:  No chief complaint on file. HPI:   Rylan Cartwright is a 79 y.o. female who is seen via video virtual [address] visit for a follow up patient visit in the heart failure clinic. Rylan Cartwright was at home. Provider was present at CHF clinic. CHF clinic nurse assisted. Accompanied by , Mk Jean    TYPE HF: HFpEF (EF 55-60%, grd 1)  Device: No  HX: Uncontrolled HTN (past year, worse since knee surgery/infection in July 2019), obesity, thoracic AA, OA-knee surgery, RANDY, Bipolar     Dry Wt = 220#     Hospitalization r/t Heart Failure:  Sept 2019 = ESCOTO, Edema, HTN.  Lasix IV.  BNP normal.  +CXR. Discharge weight - 222#  Cath 10/18/19 - Nonobstructive CAD, PWCP 30 - received Lasix 80 IV in lab, changed to Bumex TID  CC 1/15/20 - Saw Dr Joan Chen.  No changes.  MEMs only if readmitted  Slow wt gain seemingly r/t Zyprexa use = lowest 220# (April), highest 244#    Concerns today: Wt has plateaued = today 855#. She took herself off Zyprexa x 2 weeks and lost 4# had to restart for mood - wt reincreased. Notes some bloating on/off and continued ESCOTO. Takes extra Bumex few times/month. Energy has improved. She was sleeping a lot, depressed. Abilify added = helped mood significantly  States she cleaned living room all morning = does still get ESCOTO, fatigued. No worse.     Diet: watching sodium/fluid intake    Patient has:  Chest Pain: no  SOB: ESCOTO  Orthopnea/PND: no  RANDY: compliant w/ CPAP  Edema: no  Fatigue: same  Abdominal bloating: on/off  Cough: no  Appetite: good  Any extra diuretic use: no  Weight gain: no  Home weight: stable  Home blood pressure: stable = 130-140/60-70    Past Medical History:   Diagnosis Date    Acid reflux     Arthritis     Asthma     Bipolar 1 disorder (HCC)     CHF (congestive heart failure) (HCC)     CKD (chronic kidney disease), stage II 2019    History of blood transfusion 2004    Hypertension     Mood disorder ([facility] [address]     Sleep apnea     Thyroid disease      Past Surgical History:   Procedure Laterality Date    ANKLE SURGERY      left    CATARACT REMOVAL      Both eyes      SECTION      x 3    FOOT SURGERY      Left     HIP SURGERY      HYSTERECTOMY      Total     TOTAL KNEE ARTHROPLASTY Left 10/14/14    TOTAL KNEE ARTHROPLASTY Right 2019     Family History   Problem Relation Age of Onset    Diabetes Mother     High Blood Pressure Father     Cancer Father     Other Brother     Diabetes Brother     Cancer Sister     Breast Cancer Sister 39    Cancer Brother      Social History     Tobacco Use    Smoking status: Never Smoker    Smokeless tobacco: Never Used   Substance Use Topics    Alcohol use: No     Alcohol/week: 0.0 standard drinks     Comment: rarely     Current Outpatient Medications   Medication Sig Dispense Refill    ARIPiprazole (ABILIFY) 5 MG tablet Take 2 tablets by mouth daily 30 tablet 3    bumetanide (BUMEX) 1 MG tablet Take 1 tablet by mouth daily 90 tablet 3    OLANZapine (ZYPREXA) 15 MG tablet Take 15 mg by mouth daily      busPIRone (BUSPAR) 15 MG tablet Take 15 mg by mouth 3 times daily      atorvastatin (LIPITOR) 20 MG tablet Take 1 tablet by mouth daily 90 tablet 3    omeprazole (PRILOSEC) 20 MG delayed release capsule Take 1 capsule by mouth every morning (before breakfast) 90 capsule 3    aspirin 81 MG EC tablet Take 81 mg by mouth daily      LINZESS 72 MCG CAPS capsule Take 72 mcg by mouth every 3 days       hydrALAZINE (APRESOLINE) 50 MG tablet Take 1 tablet by mouth 3 times daily 135 tablet 3    carvedilol (COREG) 25 MG tablet Take 0.5 tablets by mouth daily 60 tablet 2    acetaminophen (TYLENOL) 325 MG tablet Take 650 mg by mouth 4 times daily       spironolactone (ALDACTONE) 25 MG tablet Take 1 tablet by mouth daily 30 tablet 5    SYNTHROID 125 MCG tablet Take 1 tablet by mouth daily Take with water on an empty stomach- wait 30 minutes before eating or taking other meds. 30 tablet 11    ferrous sulfate 325 (65 Fe) MG tablet Take 325 mg by mouth daily (with breakfast)      clonazePAM (KLONOPIN) 1 MG tablet Take 1 mg by mouth 3 times daily as needed.  venlafaxine (EFFEXOR XR) 150 MG extended release capsule Take 150 mg by mouth daily      gabapentin (NEURONTIN) 300 MG capsule Take 300 mg by mouth 3 times daily.  tiZANidine (ZANAFLEX) 2 MG tablet Take 2 mg by mouth 2 times daily      tetrabenazine (XENAZINE) 12.5 MG tablet Take 12.5 mg by mouth 2 times daily      Mirabegron ER (MYRBETRIQ) 50 MG TB24 Take 50 mg by mouth daily      lamoTRIgine (LAMICTAL) 100 MG tablet Take 100 mg by mouth daily Patient taking 1 tablet TID      Ascorbic Acid (VITAMIN C) 500 MG tablet Take 500 mg by mouth daily.  Multiple Vitamin (MULTIVITAMIN PO) Take 1 tablet by mouth daily. No current facility-administered medications for this visit. Allergies   Allergen Reactions    Ace Inhibitors Anaphylaxis    Adhesive Tape     Codeine     Lotrel [Amlodipine Besy-Benazepril Hcl] Swelling    Pcn [Penicillins]     Typhoid Vaccines        SUBJECTIVE:   Review of Systems   Constitutional: Positive for fatigue. Negative for activity change and appetite change. Respiratory: Positive for shortness of breath (ESCOTO). Negative for cough. Cardiovascular: Negative for chest pain, palpitations and leg swelling. Gastrointestinal: Negative for abdominal distention. Neurological: Negative for weakness, light-headedness and headaches. Hematological: Negative for adenopathy. Psychiatric/Behavioral: Negative for sleep disturbance. OBJECTIVE:   Today's Vitals:   There were no vitals taken for this visit.    Physical Exam  Vitals signs reviewed. Constitutional:       General: She is not in acute distress. Appearance: Normal appearance. HENT:      Head: Normocephalic and atraumatic. Eyes:      Extraocular Movements: Extraocular movements intact. Neck:      Musculoskeletal: Normal range of motion. Pulmonary:      Effort: Pulmonary effort is normal.   Abdominal:      General: Abdomen is flat. There is no distension. Musculoskeletal:      Right lower leg: No edema (none per patient). Left lower leg: No edema (none per patient). Skin:     General: Skin is warm and dry. Neurological:      Mental Status: She is alert and oriented to person, place, and time. Psychiatric:         Mood and Affect: Mood normal.         Behavior: Behavior normal.         Wt Readings from Last 3 Encounters:   09/22/20 244 lb (110.7 kg)   08/11/20 238 lb 12.8 oz (108.3 kg)   06/19/20 226 lb (102.5 kg)     BP Readings from Last 3 Encounters:   09/22/20 108/66   08/11/20 108/61   06/19/20 128/70     Pulse Readings from Last 3 Encounters:   09/22/20 87   08/11/20 79   06/19/20 74     There is no height or weight on file to calculate BMI. ECHO:   Summary   Normal left ventricle size and systolic function. Ejection fraction was   estimated at 55 to 60 %.  There were no regional left ventricular wall   motion abnormalities and wall thickness was within normal limits.   Doppler parameters were consistent with abnormal left ventricular   relaxation (grade 1 diastolic dysfunction).   The left atrium is Moderately dilated.   Aortic aneurysm noted in the ascending aorta .   Aortic aneurysm measures 4.1 cm .      Signature   ----------------------------------------------------------------   Electronically signed by Zita Fofana MD (Interpreting   physician) on 09/13/2019 at 04:51 PM   ----------------------------------------------------------------        CATH (10/18)  FINDINGS:  HEMODYNAMICS:  1.  Left ventricular end-diastolic pressure was found to be elevated at  30 mmHg. 2.  Cardiac output 8.9 or 8.91 liters per minute. 3.  Cardiac index 4.3 liters per minute per square meter. 4.  Right atrial pressure elevated at 25 mmHg. 5.  RV pressure was elevated at 59/14 mmHg. 6.  Pulmonary arterial pressure was elevated at 57/34 mmHg with a mean  pulmonary arterial pressure of 45 mmHg. 7.  Pulmonary capillary wedge pressure was 30 mmHg.     CORONARY ANGIOGRAM:  1.  RCA:  RCA has moderate tortuosity in the proximal segment.  Proximal  RCA is patent without significant obstruction.  Mid RCA is patent  without significant obstruction.  Distal RCA is patent.  RPDA has some  mild diffuse disease.  RPL has distal mild diffuse disease. 2.  Left main:  Left main is patent without significant obstruction. Left main bifurcates into LCX and LAD. 3.  LCX:  Moderate tortuosity noted in the LCX.  No significant  obstruction in the proximal segment.  Distal LCX with mild diffuse  disease.  OM1 is a larger tortuous branching vessel with mild diffuse  disease. 4.  LAD:  Proximal LAD is patent without significant obstruction.  Mid  LAD is moderately tortuous.  No significant obstruction.  Distal LAD has  moderate tortuosity with mild diffuse disease. 5.  D1:  Large tortuous vessel without significant obstruction. Dominance, right.     MEDICATIONS:  See MAR.     ACCESS:  1.  Right brachial vein for right heart cath. 2.  Right radial artery for left heart cath.     COMPLICATIONS:  None.     CONCLUSION:  1.  Nonobstructive coronary artery disease. 2.  Congestive heart failure with preserved ejection fraction. 3.  Volume overload. 4.  Elevated right and left filling pressures. 5.  Moderate pulmonary venous hypertension.     RECOMMENDATIONS:  1.  Aggressive risk factor modification for nonobstructive coronary  disease. 2.  Continue aspirin 81 mg.  3.  Lipitor 20 mg p.o. daily.   4.  Fluid restriction to less than 1.5 to 2 liters per day.  5.  Sodium restriction to less than 1.5 gm per day. 6.  Daily weight monitoring. 7.  The patient was given Lasix 80 mg IV x1 in the cath lab. 8.  Stop p.o. Lasix.  Currently, the patient is on Lasix 80 mg p.o.  b.i.d.  9.  Start Bumex 1 mg p.o. t.i.d. 10.  Check BMP and magnesium level next week. 11.  Keep potassium above 4 and magnesium above 2. 12.  The patient needs better blood pressure control.  Her hydralazine  was increased to 100 mg p.o. t.i.d.  13.  Continue to monitor home blood pressure readings. 15.  The patient has history of obstructive sleep apnea.  Continue CPAP  during sleep.  She had an appointment with her sleep medicine specialist  within one to two weeks. 15.  Lifestyle modifications including weight loss advisable. 16.  Refer to cardiac rehab once volume status is better. The above findings and plan of care were discussed in details with the  patient and her family members including  and daughter.    Questions were answered. Mariana Winston are agreeable with above-mentioned plan.     Ly Person MD     D: 10/18/2019 9:59:22       Results reviewed:  BNP:   Lab Results   Component Value Date    BNP 14 09/17/2020     CBC:   Lab Results   Component Value Date    WBC 5.1 04/28/2020    RBC 3.89 04/28/2020    HGB 11.9 04/28/2020    HCT 35.8 04/28/2020     04/28/2020     CMP:    Lab Results   Component Value Date     09/17/2020    K 4.4 09/17/2020    K 4.7 04/04/2020     09/17/2020    CO2 25 09/17/2020    BUN 45 09/17/2020    CREATININE 1.17 09/17/2020    AGRATIO 2.0 01/19/2018    LABGLOM 55 08/11/2020    GLUCOSE 90 09/17/2020    CALCIUM 9.40 09/17/2020     Hepatic Function Panel:    Lab Results   Component Value Date    ALKPHOS 107 04/04/2020    ALT 18 04/04/2020    AST 19 04/04/2020    PROT 7.1 04/04/2020    BILITOT 0.2 04/04/2020    BILIDIR <0.2 09/29/2019    LABALBU 4.0 04/04/2020     Magnesium:    Lab Results   Component Value Date    MG 2.4 10/25/2019 PT/INR:    Lab Results   Component Value Date    INR 0.88 04/04/2020     Lipids:    Lab Results   Component Value Date    TRIG 137 10/08/2019    HDL 52 10/08/2019    HDL 51 03/16/2012    LDLCALC 120 10/08/2019    LDLDIRECT 120 10/08/2019       ASSESSMENT AND PLAN:   The patient's condition/symptoms are Stable: No clinical evidence of fluid overload today. Continue current medical regimen without changes at present time. Diagnosis Orders   1. CHF (congestive heart failure), NYHA class II, chronic, diastolic ([facility] [address]     2. Essential hypertension     3. CKD (chronic kidney disease), stage II     4. ESCOTO (dyspnea on exertion)       Continue:  GDMT:   ACE/ARB/ARNI - None   BB - Coreg 12.5 BID   Diuretic - Bumex 1/day   Hydralazine/Isos. - Hydralazine 50 TID   Aldosterone- Aldactone 25/day  Continue Current medications  Stable. Appears Euvolemic  Reviewed labs - stable. No med changes today. Continue monitor BP  Encouraged to continue to increase activity. Continue fluid/salt adherence. F/U in 3 months or sooner if needed. · Daily weights  · Fluid restriction of 2 Liters per day  · Limit sodium in diet to around 8913-2494 mg/day  · Monitor BP  · Activity as tolerated     Patient was instructed to call the [facility] for any changes in symptoms as noted in AVS.      Return in about 3 months (around 2/24/2021). or sooner if needed     We discussed the importance of weighing oneself and recording daily. We also discussed the importance of a low sodium diet, higher sodium foods to avoid and appropriate low sodium food choices. Discussed use, benefit, and side effects of prescribed medications. All patient questions answered. Patient verbalizes understanding of plan of care using teach back method, and is agreeable to the treatment plan.      Pursuant to the emergency declaration under the [address] [facility] [address] and Response Supplemental Appropriations Act, this Virtual  Visit was conducted, with patient's consent, to reduce the patient's risk of exposure to COVID-19 and provide continuity of care for an established patient. Services were provided through a video synchronous discussion virtually to substitute for in-person clinic visit. Total of 30 minutes of time was spent reviewing the chart and educating the patient about HF, medications, diet, exercise, and discussing the plan of care. I personally spent more then 50% of the appt time face to face with the patient counseling/coordinating patient's care.     Electronicallysigned by DAVID Jacobs CNP on 11/24/2020 at 4:16 PM

## 2020-12-17 LAB
ANION GAP SERPL CALCULATED.3IONS-SCNC: 8 MMOL/L (ref 4–12)
BUN BLDV-MCNC: 44 MG/DL (ref 7–20)
CALCIUM SERPL-MCNC: 10.2 MG/DL (ref 8.8–10.5)
CHLORIDE BLD-SCNC: 103 MEQ/L (ref 101–111)
CO2: 28 MEQ/L (ref 21–32)
CREAT SERPL-MCNC: 1.38 MG/DL (ref 0.6–1.3)
CREATININE CLEARANCE: 38
GLUCOSE: 126 MG/DL (ref 70–110)
PARATHYROID HORMONE INTACT: 47.1 U/ML (ref 12–88)
PHOSPHORUS: 3.9 MG/DL (ref 2.4–4.7)
POTASSIUM SERPL-SCNC: 4.1 MEQ/L (ref 3.6–5)
SODIUM BLD-SCNC: 139 MEQ/L (ref 135–145)
VITAMIN D 25-HYDROXY: 29.7 NG/ML (ref 30–100)

## 2020-12-22 ENCOUNTER — OFFICE VISIT (OUTPATIENT)
Dept: NEPHROLOGY | Age: 70
End: 2020-12-22
Payer: MEDICARE

## 2020-12-22 VITALS
SYSTOLIC BLOOD PRESSURE: 120 MMHG | OXYGEN SATURATION: 98 % | WEIGHT: 243.6 LBS | BODY MASS INDEX: 43.15 KG/M2 | DIASTOLIC BLOOD PRESSURE: 68 MMHG | HEART RATE: 73 BPM

## 2020-12-22 PROCEDURE — G8427 DOCREV CUR MEDS BY ELIG CLIN: HCPCS | Performed by: INTERNAL MEDICINE

## 2020-12-22 PROCEDURE — G8417 CALC BMI ABV UP PARAM F/U: HCPCS | Performed by: INTERNAL MEDICINE

## 2020-12-22 PROCEDURE — 1036F TOBACCO NON-USER: CPT | Performed by: INTERNAL MEDICINE

## 2020-12-22 PROCEDURE — G8484 FLU IMMUNIZE NO ADMIN: HCPCS | Performed by: INTERNAL MEDICINE

## 2020-12-22 PROCEDURE — G8400 PT W/DXA NO RESULTS DOC: HCPCS | Performed by: INTERNAL MEDICINE

## 2020-12-22 PROCEDURE — 99213 OFFICE O/P EST LOW 20 MIN: CPT | Performed by: INTERNAL MEDICINE

## 2020-12-22 PROCEDURE — 4040F PNEUMOC VAC/ADMIN/RCVD: CPT | Performed by: INTERNAL MEDICINE

## 2020-12-22 PROCEDURE — 3017F COLORECTAL CA SCREEN DOC REV: CPT | Performed by: INTERNAL MEDICINE

## 2020-12-22 PROCEDURE — 1090F PRES/ABSN URINE INCON ASSESS: CPT | Performed by: INTERNAL MEDICINE

## 2020-12-22 PROCEDURE — 1123F ACP DISCUSS/DSCN MKR DOCD: CPT | Performed by: INTERNAL MEDICINE

## 2020-12-22 RX ORDER — SPIRONOLACTONE 25 MG/1
25 TABLET ORAL DAILY
Qty: 30 TABLET | Refills: 5 | Status: SHIPPED | OUTPATIENT
Start: 2020-12-22 | End: 2021-06-18 | Stop reason: SDUPTHER

## 2020-12-22 RX ORDER — LEVOTHYROXINE SODIUM 125 MCG
125 TABLET ORAL DAILY
Qty: 30 TABLET | Refills: 11 | Status: SHIPPED | OUTPATIENT
Start: 2020-12-22 | End: 2021-12-09 | Stop reason: SDUPTHER

## 2020-12-22 NOTE — PROGRESS NOTES
[facility]    [facility], [address]   [address]  [phone]  Progress Note  2020 10:13 AM      Pt Name:    Danilo Scott  YOB: 1950  Primary Care Physician:  Anthony Kyle DO     Chief Complaint:   Chief Complaint   Patient presents with    Follow-up     CKD III        History of Present Illness: This is a follow-up visit for CKD III and HTN. Patient has had quite a bit of weight gain she states from Zyprexa. States edema is controlled with bumex 1 mg daily and spironolactone 25 mg daily. Chronic ESCOTO is unchanged. She has hx of HTN, diastolic CHF,pulm HTN,  obesity, thoracic AA, OA, RANDY, bipolar disorder, nephrolithiasis. Pertinent items are noted in HPI. Past History:  Past Medical History:   Diagnosis Date    Acid reflux     Arthritis     Asthma     Bipolar 1 disorder ([facility] [address]     CHF (congestive heart failure) (HCC)     CKD (chronic kidney disease), stage II 2019    History of blood transfusion 2004    Hypertension     Mood disorder (HCC)     Sleep apnea     Thyroid disease      Past Surgical History:   Procedure Laterality Date    ANKLE SURGERY      left    CATARACT REMOVAL      Both eyes      SECTION      x 3    FOOT SURGERY      Left     HIP SURGERY      HYSTERECTOMY      Total     TOTAL KNEE ARTHROPLASTY Left 10/14/14    TOTAL KNEE ARTHROPLASTY Right 2019        VITALS:  /68 (Site: Right Upper Arm, Position: Sitting, Cuff Size: Large Adult)   Pulse 73   Wt 243 lb 9.6 oz (110.5 kg)   SpO2 98%   BMI 43.15 kg/m²   Wt Readings from Last 3 Encounters:   20 243 lb 9.6 oz (110.5 kg)   20 244 lb (110.7 kg)   20 238 lb 12.8 oz (108.3 kg)     Body mass index is 43.15 kg/m².      General Appearance: alert and cooperative with exam, appears comfortable, no distress  HEENT: EOMI, moist oral mucus membranes  Neck: No jugular venous distention,   Lungs: Air entry B/L, no crackles or rales, no use of accessory muscles  Heart: S1, S2 heard, no rub  GI: soft, non-tender, no guarding,   Extremities: No sig LE edema, no cyanosis  Skin: warm, dry  Neurologic: no tremor, no asterixis,      Medications:  Current Outpatient Medications   Medication Sig Dispense Refill    ARIPiprazole (ABILIFY) 5 MG tablet Take 2 tablets by mouth daily 30 tablet 3    bumetanide (BUMEX) 1 MG tablet Take 1 tablet by mouth daily 90 tablet 3    OLANZapine (ZYPREXA) 15 MG tablet Take 15 mg by mouth daily      busPIRone (BUSPAR) 15 MG tablet Take 15 mg by mouth 3 times daily      atorvastatin (LIPITOR) 20 MG tablet Take 1 tablet by mouth daily 90 tablet 3    omeprazole (PRILOSEC) 20 MG delayed release capsule Take 1 capsule by mouth every morning (before breakfast) 90 capsule 3    aspirin 81 MG EC tablet Take 81 mg by mouth daily      LINZESS 72 MCG CAPS capsule Take 72 mcg by mouth every 3 days       hydrALAZINE (APRESOLINE) 50 MG tablet Take 1 tablet by mouth 3 times daily 135 tablet 3    carvedilol (COREG) 25 MG tablet Take 0.5 tablets by mouth daily 60 tablet 2    acetaminophen (TYLENOL) 325 MG tablet Take 650 mg by mouth 4 times daily       spironolactone (ALDACTONE) 25 MG tablet Take 1 tablet by mouth daily 30 tablet 5    SYNTHROID 125 MCG tablet Take 1 tablet by mouth daily Take with water on an empty stomach- wait 30 minutes before eating or taking other meds. 30 tablet 11    ferrous sulfate 325 (65 Fe) MG tablet Take 325 mg by mouth daily (with breakfast)      clonazePAM (KLONOPIN) 1 MG tablet Take 1 mg by mouth 3 times daily as needed.  venlafaxine (EFFEXOR XR) 150 MG extended release capsule Take 150 mg by mouth daily      gabapentin (NEURONTIN) 300 MG capsule Take 300 mg by mouth 3 times daily.        tiZANidine (ZANAFLEX) 2 MG tablet Take 2 mg by mouth 2 times daily      tetrabenazine (XENAZINE) 12.5 MG tablet Take 12.5 mg by mouth 2 times daily      Mirabegron ER (Megan Cliche) 50 MG TB24 Take 50 mg by mouth daily      lamoTRIgine (LAMICTAL) 100 MG tablet Take 100 mg by mouth daily Patient taking 1 tablet TID      Ascorbic Acid (VITAMIN C) 500 MG tablet Take 500 mg by mouth daily.  Multiple Vitamin (MULTIVITAMIN PO) Take 1 tablet by mouth daily. No current facility-administered medications for this visit.          Laboratory & Diagnostics:  CBC:   Lab Results   Component Value Date    WBC 5.1 04/28/2020    HGB 11.9 (L) 04/28/2020    HCT 35.8 04/28/2020    MCV 92.1 04/28/2020     04/28/2020     BMP:    Lab Results   Component Value Date     12/17/2020     09/17/2020     08/11/2020    K 4.1 12/17/2020    K 4.4 09/17/2020    K 4.6 08/11/2020     12/17/2020     09/17/2020     08/11/2020    CO2 28 12/17/2020    CO2 25 09/17/2020    CO2 24 08/11/2020    BUN 44 (H) 12/17/2020    BUN 45 (H) 09/17/2020    BUN 39 (H) 08/11/2020    CREATININE 1.38 (H) 12/17/2020    CREATININE 1.17 09/17/2020    CREATININE 1.0 08/11/2020    GLUCOSE 126 (H) 12/17/2020    GLUCOSE 90 09/17/2020    GLUCOSE 118 (H) 08/11/2020      Hepatic:   Lab Results   Component Value Date    AST 19 04/04/2020    AST 19 09/29/2019    AST 20 09/14/2019    ALT 18 04/04/2020    ALT 18 09/29/2019    ALT 21 09/14/2019    BILITOT 0.2 (L) 04/04/2020    BILITOT <0.2 (L) 09/29/2019    BILITOT 0.2 (L) 09/14/2019    ALKPHOS 107 04/04/2020    ALKPHOS 83 09/29/2019    ALKPHOS 74 09/14/2019     BNP:   Lab Results   Component Value Date    BNP 14 09/17/2020     (H) 10/08/2019     (H) 10/08/2019     Lipids:   Lab Results   Component Value Date    CHOL 180 10/08/2019    HDL 52 10/08/2019     INR:   Lab Results   Component Value Date    INR 0.88 04/04/2020    INR 0.90 10/18/2019     URINE:   Lab Results   Component Value Date    PROTUR 30 mg/dL 09/23/2014     Lab Results   Component Value Date    NITRU NEGATIVE 09/30/2019    COLORU YELLOW 09/30/2019    PHUR 6.5 09/30/2019    [place] 0-5 09/23/2014    RBCUA None 09/23/2014    MUCUS Present 09/23/2014    BACTERIA Trace 09/23/2014    CLARITYU sl cloudy 10/25/2011    SPECGRAV 1.012 09/13/2019    LEUKOCYTESUR NEGATIVE 09/30/2019    UROBILINOGEN 0.2 09/30/2019    BILIRUBINUR NEGATIVE 09/30/2019    BILIRUBINUR beg 10/25/2011    BLOODU NEGATIVE 09/30/2019    GLUCOSEU NEGATIVE 09/30/2019    KETUA NEGATIVE 09/30/2019      Microalbumen/Creatinine ratio:  No components found for: RUCREAT        Impression/Plan:   1. CKD III due to cardiorenal syndrome. Creatinine up slightly from diuretics. Clinically she looks good. we will repeat a bmp in few weeks if creat is worse will reduce spironolactone to 12.5 mg daily. 2. Essential HTN: controlled. Hx of angioedema from ACE-I  3. Diastolic CHF/pulm HTN: appears euvolemic, on bumex 1 mg daily and spironolactone 25 mg daily  4. RANDY  5. Bipolar disorder        Bloodwork and medications were reviewed and plan of care discussed with the patient. Return to clinic in 6 months  or sooner if the need arises.       Claudeen Lapidus, DO  [facility]

## 2020-12-22 NOTE — TELEPHONE ENCOUNTER
Requested Prescriptions     Pending Prescriptions Disp Refills    SYNTHROID 125 MCG tablet 30 tablet 11     Sig: Take 1 tablet by mouth daily Take with water on an empty stomach- wait 30 minutes before eating or taking other meds.

## 2020-12-23 RX ORDER — BUMETANIDE 1 MG/1
TABLET ORAL
Qty: 180 TABLET | Refills: 3 | Status: SHIPPED | OUTPATIENT
Start: 2020-12-23 | End: 2022-04-12 | Stop reason: SDUPTHER

## 2020-12-29 RX ORDER — CARVEDILOL 25 MG/1
12.5 TABLET ORAL DAILY
Qty: 30 TABLET | Refills: 0 | Status: SHIPPED | OUTPATIENT
Start: 2020-12-29 | End: 2021-02-23 | Stop reason: SDUPTHER

## 2021-01-08 ENCOUNTER — TELEPHONE (OUTPATIENT)
Dept: SPIRITUAL SERVICES | Facility: CLINIC | Age: 71
End: 2021-01-08

## 2021-01-08 NOTE — TELEPHONE ENCOUNTER
[place] [id number] PROGRESS NOTE      Patient: Robert Mesa          YOB: 1950  Age: 79 y.o. Gender: female            Assessment:  Herberth Hernandez is a 59-year-old female who had left a voicemail desiring a telephone encounter for support.  made a follow-up attempt to contact Herberth Hernandez via telephone call to offer support for what is currently being experienced in life. * Depressed spirit - She expressed \"I'm real discouraged. \" She shared about experiencing trouble with her spine, and being unable to stand for extended periods of time. She added, \"I feel I'm young, and I shouldn't have to deal with this. \"  * Health situation - She shared \"my CHF has gotten worse. \" This has made her feel like a burden to her spouse. She stated Weston Mercer does is take care of me. \" She added, \"I'm very dependent on him. \"  * Next Steps - She received various opinions concerning care for her spine. She is going to a specialist in [facility] for a 2nd opinion on possible surgery. * Isolation - She is dealing with loneliness due to Covid-19 protocol, as well as seasonal depression symptoms. Interventions:   provided a listening presence for Herberth Hernandez to share her feelings and story since our last encounter.  provided open-ended questions, as well as follow-up questions for clarity and greater understanding. * Words of hope and encouragement were provided as appropriate. * Tips provided such as: turn all lights on; call friends on the phone to interact; and develop a structure to follow for the day. *  provided prayer prior to ending the telephone encounter. Outcomes:  Herberth Hernandez was very appreciative of the telephone encounter and support provided to her. She was grateful for the ministry and listening presence, as well as prayer provided to her. Plan:  1.  remains available to Herberth Hernandez for ongoing emotional and spiritual support as needed.     Electronically signed by Piero Pascual, on 1/8/2021 at 2:48 PM.  [address] [facility]  [phone]

## 2021-01-13 LAB
ALBUMIN SERPL-MCNC: 4.6 G/DL
ALP BLD-CCNC: 68 U/L
ALT SERPL-CCNC: 44 U/L
ANION GAP SERPL CALCULATED.3IONS-SCNC: 17 MMOL/L
AST SERPL-CCNC: 33 U/L
BASOPHILS ABSOLUTE: NORMAL
BASOPHILS RELATIVE PERCENT: NORMAL
BILIRUB SERPL-MCNC: 0.3 MG/DL (ref 0.1–1.4)
BUN BLDV-MCNC: 44 MG/DL
CALCIUM SERPL-MCNC: 9.4 MG/DL
CHLORIDE BLD-SCNC: 97 MMOL/L
CO2: 27 MMOL/L
CREAT SERPL-MCNC: 1.65 MG/DL
EOSINOPHILS ABSOLUTE: NORMAL
EOSINOPHILS RELATIVE PERCENT: NORMAL
GFR CALCULATED: 31
GLUCOSE BLD-MCNC: 120 MG/DL
HCT VFR BLD CALC: 39.6 % (ref 36–46)
HEMOGLOBIN: 12.9 G/DL (ref 12–16)
LYMPHOCYTES ABSOLUTE: NORMAL
LYMPHOCYTES RELATIVE PERCENT: NORMAL
MCH RBC QN AUTO: NORMAL PG
MCHC RBC AUTO-ENTMCNC: NORMAL G/DL
MCV RBC AUTO: NORMAL FL
MONOCYTES ABSOLUTE: NORMAL
MONOCYTES RELATIVE PERCENT: NORMAL
NEUTROPHILS ABSOLUTE: NORMAL
NEUTROPHILS RELATIVE PERCENT: NORMAL
PLATELET # BLD: 323 K/ΜL
PMV BLD AUTO: NORMAL FL
POTASSIUM SERPL-SCNC: 4.2 MMOL/L
RBC # BLD: 4.21 10^6/ΜL
SODIUM BLD-SCNC: NORMAL MMOL/L
TOTAL PROTEIN: 7.4
WBC # BLD: 8 10^3/ML

## 2021-01-15 LAB
BUN BLDV-MCNC: 36 MG/DL
CALCIUM SERPL-MCNC: 9.3 MG/DL
CHLORIDE BLD-SCNC: 106 MMOL/L
CO2: 24 MMOL/L
CREAT SERPL-MCNC: 1.12 MG/DL
GFR CALCULATED: 48
GLUCOSE BLD-MCNC: 117 MG/DL
POTASSIUM SERPL-SCNC: 4.6 MMOL/L
SODIUM BLD-SCNC: 142 MMOL/L

## 2021-01-16 LAB
BUN BLDV-MCNC: 32 MG/DL
CALCIUM SERPL-MCNC: 9.6 MG/DL
CHLORIDE BLD-SCNC: 106 MMOL/L
CO2: 24 MMOL/L
CREAT SERPL-MCNC: 1.25 MG/DL
GFR CALCULATED: 42
GLUCOSE BLD-MCNC: 108 MG/DL
MAGNESIUM: 2.3 MG/DL
POTASSIUM SERPL-SCNC: 4.3 MMOL/L
SODIUM BLD-SCNC: NORMAL MMOL/L

## 2021-01-17 LAB
BUN BLDV-MCNC: 37 MG/DL
CALCIUM SERPL-MCNC: 9.4 MG/DL
CHLORIDE BLD-SCNC: 104 MMOL/L
CO2: 24 MMOL/L
CREAT SERPL-MCNC: 1.19 MG/DL
GFR CALCULATED: 45
GLUCOSE BLD-MCNC: 106 MG/DL
POTASSIUM SERPL-SCNC: 4.2 MMOL/L
SODIUM BLD-SCNC: 139 MMOL/L

## 2021-01-19 ENCOUNTER — TELEPHONE (OUTPATIENT)
Dept: SPIRITUAL SERVICES | Facility: CLINIC | Age: 71
End: 2021-01-19

## 2021-01-19 ENCOUNTER — PATIENT MESSAGE (OUTPATIENT)
Dept: CARDIOLOGY CLINIC | Age: 71
End: 2021-01-19

## 2021-01-19 DIAGNOSIS — N18.2 CKD (CHRONIC KIDNEY DISEASE), STAGE II: ICD-10-CM

## 2021-01-19 DIAGNOSIS — I10 ESSENTIAL HYPERTENSION: ICD-10-CM

## 2021-01-19 RX ORDER — HYDRALAZINE HYDROCHLORIDE 50 MG/1
50 TABLET, FILM COATED ORAL 3 TIMES DAILY
Qty: 270 TABLET | Refills: 3 | Status: SHIPPED | OUTPATIENT
Start: 2021-01-19 | End: 2021-12-09 | Stop reason: SDUPTHER

## 2021-01-19 NOTE — TELEPHONE ENCOUNTER
returned a voicemail message to offer follow-up support to Tato Storey via telephone encounter. She expressed being a patient at [facility] due to experiencing Covid-19 virus, depression which worsened, and having suicidal ideation. Tato Storey expressed \"today is the first day I haven't had suicidal thoughts. \" She shared being seen by the staff daily which has provided needed support. She added having a sitter present, due to her suicidal ideation. Tato Storey shared having the history of a similar incident 3 1/2 years ago which needed hospitalization as well. She shared that her bipolar situation is what has caused this incident. Tato Storey hopeful for continued improvement. She stated her Covid experience has been without symptoms at this time. She is unsure of her Plan of Care moving forward.  ended the telephone encounter offering prayer. She was grateful for the encounter and listening presence provided to her to engage in conversation and share her story.

## 2021-01-19 NOTE — TELEPHONE ENCOUNTER
From: Sarah Chan  To: Reyes Conception, APRN - CNP  Sent: 1/19/2021 11:48 AM EST  Subject: Prescription Question    Please send a [facility]ill for hydralazine 50 mg [facility] is tomorrow and they mentioned they have not received a reply back from Dr. Marycarmen Greenberg office for a refill on hydralazine.  Thanks

## 2021-01-20 LAB
BUN BLDV-MCNC: 45 MG/DL
CALCIUM SERPL-MCNC: 9 MG/DL
CHLORIDE BLD-SCNC: 105 MMOL/L
CO2: 23 MMOL/L
CREAT SERPL-MCNC: 1.23 MG/DL
GFR CALCULATED: 43
GLUCOSE BLD-MCNC: 103 MG/DL
POTASSIUM SERPL-SCNC: 4.1 MMOL/L
SODIUM BLD-SCNC: 140 MMOL/L

## 2021-01-21 ENCOUNTER — TELEPHONE (OUTPATIENT)
Dept: FAMILY MEDICINE CLINIC | Age: 71
End: 2021-01-21

## 2021-01-21 ENCOUNTER — VIRTUAL VISIT (OUTPATIENT)
Dept: FAMILY MEDICINE CLINIC | Age: 71
End: 2021-01-21
Payer: MEDICARE

## 2021-01-21 ENCOUNTER — TELEPHONE (OUTPATIENT)
Dept: CARDIOLOGY CLINIC | Age: 71
End: 2021-01-21

## 2021-01-21 DIAGNOSIS — M54.16 LUMBAR RADICULOPATHY, CHRONIC: ICD-10-CM

## 2021-01-21 DIAGNOSIS — M51.36 DDD (DEGENERATIVE DISC DISEASE), LUMBAR: ICD-10-CM

## 2021-01-21 DIAGNOSIS — I50.32 CHRONIC DIASTOLIC HEART FAILURE (HCC): ICD-10-CM

## 2021-01-21 DIAGNOSIS — F32.9 MAJOR DEPRESSIVE DISORDER WITH CURRENT ACTIVE EPISODE, UNSPECIFIED DEPRESSION EPISODE SEVERITY, UNSPECIFIED WHETHER RECURRENT: ICD-10-CM

## 2021-01-21 DIAGNOSIS — I10 ESSENTIAL HYPERTENSION: ICD-10-CM

## 2021-01-21 DIAGNOSIS — N18.2 CKD (CHRONIC KIDNEY DISEASE), STAGE II: ICD-10-CM

## 2021-01-21 DIAGNOSIS — U07.1 COVID-19: Primary | ICD-10-CM

## 2021-01-21 DIAGNOSIS — E03.9 HYPOTHYROIDISM, UNSPECIFIED TYPE: ICD-10-CM

## 2021-01-21 PROCEDURE — 1123F ACP DISCUSS/DSCN MKR DOCD: CPT | Performed by: FAMILY MEDICINE

## 2021-01-21 PROCEDURE — 1090F PRES/ABSN URINE INCON ASSESS: CPT | Performed by: FAMILY MEDICINE

## 2021-01-21 PROCEDURE — 3017F COLORECTAL CA SCREEN DOC REV: CPT | Performed by: FAMILY MEDICINE

## 2021-01-21 PROCEDURE — 4040F PNEUMOC VAC/ADMIN/RCVD: CPT | Performed by: FAMILY MEDICINE

## 2021-01-21 PROCEDURE — G8400 PT W/DXA NO RESULTS DOC: HCPCS | Performed by: FAMILY MEDICINE

## 2021-01-21 PROCEDURE — 99214 OFFICE O/P EST MOD 30 MIN: CPT | Performed by: FAMILY MEDICINE

## 2021-01-21 PROCEDURE — G8428 CUR MEDS NOT DOCUMENT: HCPCS | Performed by: FAMILY MEDICINE

## 2021-01-21 NOTE — PROGRESS NOTES
Tanner Fagan (:  1950) is a 79 y.o. female,Established patient, here for evaluation of the following chief complaint(s): Follow-Up from Hospital      SUBJECTIVE/OBJECTIVE:  HPI:    Chief Complaint   Patient presents with    Follow-Up from MEDICAL[facility]DENTAL FACILITY AT [facility] up. Pt presented to [facility] due to SI, uncontrolled depression. While there, pt tested + for COVID-19 but was never symptomatic. Multiple medication changes made, overall doing better mood wise. Pt continues to have issues with her low back, this is chronic in nature. Has plans to follow up with pain management and Ortho if needed. No recent PT performed. Majority of pain is in her low back. Per pt, she had a recent MRI which shows DDD with limited stenosis. Patient Active Problem List   Diagnosis    Hypothyroid    Eczema    Dyslipidemia    Bipolar disorder ([address]    Post-menopausal    HTN (hypertension)    Contact dermatitis    Obesity (BMI 30-39. 9)    Obstructive sleep apnea on CPAP    ACE inhibitor-aggravated angioedema    Leg edema    Acute on chronic diastolic congestive heart failure (HCC)    Ascending aortic aneurysm (HCC)    Chronic diastolic heart failure (HCC)    Thoracic aortic aneurysm without rupture (HCC)    Abnormal stress test    CKD (chronic kidney disease), stage II     Past Surgical History:   Procedure Laterality Date    ANKLE SURGERY      left    CATARACT REMOVAL      Both eyes      SECTION      x 3    FOOT SURGERY      Left     HIP SURGERY      HYSTERECTOMY      Total     TOTAL KNEE ARTHROPLASTY Left 10/14/14    TOTAL KNEE ARTHROPLASTY Right 2019     Prior to Admission medications    Medication Sig Start Date End Date Taking?  Authorizing Provider   hydrALAZINE (APRESOLINE) 50 MG tablet Take 1 tablet by mouth 3 times daily 21   DAVID Parmar - CNP   carvedilol (COREG) 25 MG tablet Take 0.5 tablets by mouth daily 20   MD antonio Crow (BUMEX) 1 MG tablet 1-2 tabs daily as needed. 12/23/20   DAVID Andrade - CNP   spironolactone (ALDACTONE) 25 MG tablet Take 1 tablet by mouth daily 12/22/20   Isabel Dueñas DO   SYNTHROID 125 MCG tablet Take 1 tablet by mouth daily Take with water on an empty stomach- wait 30 minutes before eating or taking other meds. 12/22/20   Ina Adame DO   ARIPiprazole (ABILIFY) 5 MG tablet Take 2 tablets by mouth daily 11/24/20   DAVID Andrade - CNP   OLANZapine (ZYPREXA) 15 MG tablet Take 15 mg by mouth daily 9/8/20   Historical Provider, MD   busPIRone (BUSPAR) 15 MG tablet Take 15 mg by mouth 3 times daily 9/4/20   Historical Provider, MD   atorvastatin (LIPITOR) 20 MG tablet Take 1 tablet by mouth daily 9/14/20   Chirag Flores MD   omeprazole (PRILOSEC) 20 MG delayed release capsule Take 1 capsule by mouth every morning (before breakfast) 9/8/20   Ina Adame DO   aspirin 81 MG EC tablet Take 81 mg by mouth daily    Historical Provider, MD Daniel Silver 72 MCG CAPS capsule Take 72 mcg by mouth every 3 days  6/15/20   Historical Provider, MD   acetaminophen (TYLENOL) 325 MG tablet Take 650 mg by mouth 4 times daily     Historical Provider, MD   ferrous sulfate 325 (65 Fe) MG tablet Take 325 mg by mouth daily (with breakfast)    Historical Provider, MD   clonazePAM (KLONOPIN) 1 MG tablet Take 1 mg by mouth 3 times daily as needed. Historical Provider, MD   venlafaxine (EFFEXOR XR) 150 MG extended release capsule Take 150 mg by mouth daily    Historical Provider, MD   gabapentin (NEURONTIN) 300 MG capsule Take 300 mg by mouth 3 times daily.      Historical Provider, MD   tiZANidine (ZANAFLEX) 2 MG tablet Take 2 mg by mouth 2 times daily    Historical Provider, MD   tetrabenazine (XENAZINE) 12.5 MG tablet Take 12.5 mg by mouth 2 times daily    Historical Provider, MD   Mirabegron ER (MYRBETRIQ) 50 MG TB24 Take 50 mg by mouth daily    Historical Provider, MD   lamoTRIgine (LAMICTAL) 100 MG tablet Take 100 mg by mouth daily Patient taking 1 tablet TID    Historical Provider, MD   Ascorbic Acid (VITAMIN C) 500 MG tablet Take 500 mg by mouth daily. Historical Provider, MD   Multiple Vitamin (MULTIVITAMIN PO) Take 1 tablet by mouth daily. Historical Provider, MD         Review of Systems   Constitutional: Negative. HENT: Negative. Respiratory: Negative. Cardiovascular: Negative. Gastrointestinal: Negative. Musculoskeletal: Positive for back pain. All other systems reviewed and are negative. Patient-Reported Vitals 11/24/2020   Patient-Reported Weight 240.6lbs   Patient-Reported Systolic 328   Patient-Reported Diastolic 60        Physical Exam  Constitutional:       General: She is not in acute distress. Appearance: Normal appearance. She is well-developed. She is not ill-appearing. HENT:      Head: Normocephalic and atraumatic. Right Ear: External ear normal.      Left Ear: External ear normal.   Eyes:      Conjunctiva/sclera: Conjunctivae normal.   Pulmonary:      Effort: Pulmonary effort is normal. No respiratory distress. Skin:     Findings: No rash (on exposed surfaces). Neurological:      Mental Status: She is alert and oriented to person, place, and time. Psychiatric:         Mood and Affect: Mood normal.         Behavior: Behavior normal.         Thought Content: Thought content normal.         Judgment: Judgment normal.         ASSESSMENT/PLAN:  1. COVID-19  2. Major depressive disorder with current active episode, unspecified depression episode severity, unspecified whether recurrent  3. DDD (degenerative disc disease), lumbar  -     External Referral To Physical Therapy  4. Lumbar radiculopathy, chronic  -     External Referral To Physical Therapy  5. Hypothyroidism, unspecified type  6. Essential hypertension  7. Chronic diastolic heart failure ([facility]  8.  CKD (chronic kidney disease), stage II    -  Chronic medical problems stable  -  Continue current medications  -  Follow up with specialists as scheduled  -  Refer to PT for her back    Return if symptoms worsen or fail to improve. Sydnie Mackey is a 79 y.o. female being evaluated by a Virtual Visit (video visit) encounter to address concerns as mentioned above. A caregiver was present when appropriate. Due to this being a TeleHealth encounter (During JNADB-58 public health emergency), evaluation of the following organ systems was limited: Vitals/Constitutional/EENT/Resp/CV/GI//MS/Neuro/Skin/Heme-Lymph-Imm. Pursuant to the emergency declaration under the [address] [place] [address], [address] [place] [address] authority and the [employer] Act, this Virtual Visit was conducted with patient's (and/or legal guardian's) consent, to reduce the patient's risk of exposure to COVID-19 and provide necessary medical care. The patient (and/or legal guardian) has also been advised to contact this office for worsening conditions or problems, and seek emergency medical treatment and/or call 911 if deemed necessary. Patient identification was verified at the start of the visit: Yes    Services were provided through a video synchronous discussion virtually to substitute for in-person clinic visit. Patient was located at home and provider was located in office or at home. An electronic signature was used to authenticate this note.     --Vy Monsivais, DO

## 2021-01-21 NOTE — TELEPHONE ENCOUNTER
Patient called stating she was recently hospitalized for COVID and Bipolar Disorder at [facility] admitting doctor told her she was dehydrated also; records requested. Pt stated while inpt her Bumex was increased to 1mg BID instead of QD dosing; pt states still has some abdominal swelling but overall feels swelling improved. Pt wanting to know if you would like her to continue Bumex as QD or BID.

## 2021-01-21 NOTE — TELEPHONE ENCOUNTER
Pt was seen for a vv today. Physical therapy was ordered today and faxed to PT federico Cline at [phone]. Pt aware that they will call her to schedule.

## 2021-01-22 ASSESSMENT — ENCOUNTER SYMPTOMS
GASTROINTESTINAL NEGATIVE: 1
RESPIRATORY NEGATIVE: 1
BACK PAIN: 1

## 2021-01-22 NOTE — TELEPHONE ENCOUNTER
Left detailed message with Bumex 1mg/day. Instructed to call office to confirm she received message.

## 2021-02-02 ENCOUNTER — OFFICE VISIT (OUTPATIENT)
Dept: PULMONOLOGY | Age: 71
End: 2021-02-02
Payer: MEDICARE

## 2021-02-02 VITALS
TEMPERATURE: 98.2 F | DIASTOLIC BLOOD PRESSURE: 68 MMHG | OXYGEN SATURATION: 93 % | BODY MASS INDEX: 44.12 KG/M2 | HEIGHT: 63 IN | SYSTOLIC BLOOD PRESSURE: 118 MMHG | HEART RATE: 82 BPM | WEIGHT: 249 LBS

## 2021-02-02 DIAGNOSIS — E66.01 MORBID OBESITY WITH BMI OF 40.0-44.9, ADULT (HCC): ICD-10-CM

## 2021-02-02 DIAGNOSIS — Z99.89 OSA ON CPAP: Primary | ICD-10-CM

## 2021-02-02 DIAGNOSIS — G47.33 OSA ON CPAP: Primary | ICD-10-CM

## 2021-02-02 PROCEDURE — 4040F PNEUMOC VAC/ADMIN/RCVD: CPT | Performed by: PHYSICIAN ASSISTANT

## 2021-02-02 PROCEDURE — 99213 OFFICE O/P EST LOW 20 MIN: CPT | Performed by: PHYSICIAN ASSISTANT

## 2021-02-02 PROCEDURE — 1123F ACP DISCUSS/DSCN MKR DOCD: CPT | Performed by: PHYSICIAN ASSISTANT

## 2021-02-02 PROCEDURE — G8484 FLU IMMUNIZE NO ADMIN: HCPCS | Performed by: PHYSICIAN ASSISTANT

## 2021-02-02 PROCEDURE — G8417 CALC BMI ABV UP PARAM F/U: HCPCS | Performed by: PHYSICIAN ASSISTANT

## 2021-02-02 PROCEDURE — 3017F COLORECTAL CA SCREEN DOC REV: CPT | Performed by: PHYSICIAN ASSISTANT

## 2021-02-02 PROCEDURE — G8400 PT W/DXA NO RESULTS DOC: HCPCS | Performed by: PHYSICIAN ASSISTANT

## 2021-02-02 PROCEDURE — 1036F TOBACCO NON-USER: CPT | Performed by: PHYSICIAN ASSISTANT

## 2021-02-02 PROCEDURE — G8427 DOCREV CUR MEDS BY ELIG CLIN: HCPCS | Performed by: PHYSICIAN ASSISTANT

## 2021-02-02 PROCEDURE — 1090F PRES/ABSN URINE INCON ASSESS: CPT | Performed by: PHYSICIAN ASSISTANT

## 2021-02-02 ASSESSMENT — ENCOUNTER SYMPTOMS
NAUSEA: 0
COUGH: 0
EYES NEGATIVE: 1
BACK PAIN: 1
ALLERGIC/IMMUNOLOGIC NEGATIVE: 1
DIARRHEA: 0
STRIDOR: 0
CHEST TIGHTNESS: 0
WHEEZING: 0
SHORTNESS OF BREATH: 1

## 2021-02-02 NOTE — PROGRESS NOTES
[facility]                                         [id number]  2021   No chief complaint on file. Pt of Dr. Andressa Ames      Presentation:   Chikis Lentz presents for sleep medicine follow up for {Sleep apnea diagnosis:49499}  Since the last visit Chikis Lentz has been doing reasonably well with ***. Symptoms of *** {ARE/ARE NOT:28875} improved    Progress History:   Since last visit any new medical issues? {EXAM;YES/NO:::\"No\"}  New ER or hospitlal visits? {EXAM; YES/NO:::\"No\"}  Any new or changes in medicines? {EXAM; YES/NO:::\"No\"}  Any new sleep medicines? {EXAM; YES/NO:::\"No\"}    Sleep issues:  Do you feel better? {YES / MS:66918}  More rested? {YES, NO, SOMETIMES:6938188690}   Better concentration?  {YES/NO:14085}      Past Medical History:   Diagnosis Date    Acid reflux     Arthritis     Asthma     [facility]     CH[facility]congestive heart failure) ([facility])     CKD (chronic kidney disease), stage II 2019    History of blood transfusion 2004    Hypertension     Mood disorder ([facility]     Sleep apnea     Thyroid disease        Past Surgical History:   Procedure Laterality Date    ANKLE SURGERY      left    CATARACT REMOVAL      Both eyes      SECTION      x 3    FOOT SURGERY      Left     HIP SURGERY      HYSTERECTOMY      Total     TOTAL KNEE ARTHROPLASTY Left 10/14/14    TOTAL KNEE ARTHROPLASTY Right 2019       Social History     Tobacco Use    Smoking status: Never Smoker    Smokeless tobacco: Never Used   Substance Use Topics    Alcohol use: No     Alcohol/week: 0.0 standard drinks     Comment: rarely    Drug use: No       Allergies   Allergen Reactions    Ace Inhibitors Anaphylaxis    Adhesive Tape     Codeine     Lotrel [Amlodipine Besy-Benazepril Hcl] Swelling    Pcn [Penicillins]     Typhoid Vaccines        Current Outpatient Medications   Medication Sig Dispense Refill  hydrALAZINE (APRESOLINE) 50 MG tablet Take 1 tablet by mouth 3 times daily 270 tablet 3    carvedilol (COREG) 25 MG tablet Take 0.5 tablets by mouth daily 30 tablet 0    bumetanide (BUMEX) 1 MG tablet 1-2 tabs daily as needed. 180 tablet 3    spironolactone (ALDACTONE) 25 MG tablet Take 1 tablet by mouth daily 30 tablet 5    SYNTHROID 125 MCG tablet Take 1 tablet by mouth daily Take with water on an empty stomach- wait 30 minutes before eating or taking other meds. 30 tablet 11    ARIPiprazole (ABILIFY) 5 MG tablet Take 2 tablets by mouth daily 30 tablet 3    OLANZapine (ZYPREXA) 15 MG tablet Take 15 mg by mouth daily      busPIRone (BUSPAR) 15 MG tablet Take 15 mg by mouth 3 times daily      atorvastatin (LIPITOR) 20 MG tablet Take 1 tablet by mouth daily 90 tablet 3    omeprazole (PRILOSEC) 20 MG delayed release capsule Take 1 capsule by mouth every morning (before breakfast) 90 capsule 3    aspirin 81 MG EC tablet Take 81 mg by mouth daily      LINZESS 72 MCG CAPS capsule Take 72 mcg by mouth every 3 days       acetaminophen (TYLENOL) 325 MG tablet Take 650 mg by mouth 4 times daily       ferrous sulfate 325 (65 Fe) MG tablet Take 325 mg by mouth daily (with breakfast)      clonazePAM (KLONOPIN) 1 MG tablet Take 1 mg by mouth 3 times daily as needed.  venlafaxine (EFFEXOR XR) 150 MG extended release capsule Take 150 mg by mouth daily      gabapentin (NEURONTIN) 300 MG capsule Take 300 mg by mouth 3 times daily.  tiZANidine (ZANAFLEX) 2 MG tablet Take 2 mg by mouth 2 times daily      tetrabenazine (XENAZINE) 12.5 MG tablet Take 12.5 mg by mouth 2 times daily      Mirabegron ER (MYRBETRIQ) 50 MG TB24 Take 50 mg by mouth daily      lamoTRIgine (LAMICTAL) 100 MG tablet Take 100 mg by mouth daily Patient taking 1 tablet TID      Ascorbic Acid (VITAMIN C) 500 MG tablet Take 500 mg by mouth daily.  Multiple Vitamin (MULTIVITAMIN PO) Take 1 tablet by mouth daily. No current facility-administered medications for this visit. Family History   Problem Relation Age of Onset    Diabetes Mother     High Blood Pressure Father     Cancer Father     Other Brother     Diabetes Brother     Cancer Sister     Breast Cancer Sister 39    Cancer Brother         Review of Systems -   General ROS: {WEIGHT LOSS/GAIN 4:09388}  ENT ROS: negative for - nasal congestion, oral lesions or sore throat  Hematological andLymphatic ROS: negative  Endocrine ROS: negative  Respiratory ROS: no cough, shortness of breath, or wheezing  Cardiovascular ROS: no chest pain or dyspnea on exertion  Gastrointestinal ROS: no abdominal pain,change in bowel habits, or black or bloody stools  Musculoskeletal ROS: negative  Neurological ROS: negative    Physical Exam:    BMI:  There is no height or weight on file to calculate BMI. Wt Readings from Last 3 Encounters:   12/22/20 243 lb 9.6 oz (110.5 kg)   09/22/20 244 lb (110.7 kg)   08/11/20 238 lb 12.8 oz (108.3 kg)     Vitals: There were no vitals taken for this visit. Physical Exam       Assessment     {No diagnosis found. (Refresh or delete this SmartLink)}       Plan     - She call my officefor earlier appointment if needed for worsening of sleep symptoms.   - She was instructed on weight loss  - Ignacio Laguerre was educated about my impression and plan. Patient verbalizes understanding.   We will see Unique Mann back in: {NUMBERS 1-12:10} {DAY, DAYS, WEEK, WEEKS, MONTH, MONTHS, YEAR, TCKVW:51051}     Mir Hernandez PA-C, [place]  2/2/2021

## 2021-02-02 NOTE — PROGRESS NOTES
daily.       tiZANidine (ZANAFLEX) 2 MG tablet Take 2 mg by mouth 2 times daily      tetrabenazine (XENAZINE) 12.5 MG tablet Take 12.5 mg by mouth 2 times daily      Mirabegron ER (MYRBETRIQ) 50 MG TB24 Take 50 mg by mouth daily      lamoTRIgine (LAMICTAL) 100 MG tablet Take 100 mg by mouth daily Patient taking 1 tablet TID      Ascorbic Acid (VITAMIN C) 500 MG tablet Take 500 mg by mouth daily.  Multiple Vitamin (MULTIVITAMIN PO) Take 1 tablet by mouth daily. No current facility-administered medications for this visit. Family History   Problem Relation Age of Onset    Diabetes Mother     High Blood Pressure Father     Cancer Father     Other Brother     Diabetes Brother     Cancer Sister     Breast Cancer Sister 39    Cancer Brother         Review of Systems -   Review of Systems   Constitutional: Negative for activity change, appetite change, chills and fever. HENT: Negative for congestion and postnasal drip. Eyes: Negative. Respiratory: Positive for shortness of breath. Negative for cough, chest tightness, wheezing and stridor. Cardiovascular: Negative for chest pain and leg swelling. Gastrointestinal: Negative for diarrhea and nausea. Endocrine: Negative. Genitourinary: Negative. Musculoskeletal: Positive for arthralgias and back pain. Skin: Negative. Allergic/Immunologic: Negative. Neurological: Negative. Negative for dizziness and light-headedness. Psychiatric/Behavioral: Negative. All other systems reviewed and are negative. Physical Exam:    BMI:  Body mass index is 44.11 kg/m².     Wt Readings from Last 3 Encounters:   02/02/21 249 lb (112.9 kg)   12/22/20 243 lb 9.6 oz (110.5 kg)   09/22/20 244 lb (110.7 kg)     Weight stable / unchanged  Vitals: /68 (Site: Right Upper Arm, Position: Sitting, Cuff Size: Large Adult)   Pulse 82   Temp 98.2 °F (36.8 °C) (Tympanic)   Ht 5' 3\" (1.6 m)   Wt 249 lb (112.9 kg)   SpO2 93% Comment: room air  BMI 44.11 kg/m²       Physical Exam  Constitutional:       Appearance: She is well-developed. HENT:      Head: Normocephalic and atraumatic. Right Ear: External ear normal.      Left Ear: External ear normal.   Eyes:      Conjunctiva/sclera: Conjunctivae normal.      Pupils: Pupils are equal, round, and reactive to light. Neck:      Musculoskeletal: Normal range of motion and neck supple. Cardiovascular:      Rate and Rhythm: Normal rate and regular rhythm. Heart sounds: Normal heart sounds. Pulmonary:      Effort: Pulmonary effort is normal.      Breath sounds: Normal breath sounds. Musculoskeletal: Normal range of motion. Skin:     General: Skin is warm and dry. Neurological:      Mental Status: She is alert and oriented to person, place, and time. Psychiatric:         Behavior: Behavior normal.         Thought Content: Thought content normal.         Judgment: Judgment normal.           ASSESSMENT/DIAGNOSIS     Diagnosis Orders   1. RANDY on CPAP     2. Morbid obesity with BMI of 40.0-44.9, adult [facility])              Plan   Do you need any equipment today? Yes update supplies  - Download reviewed and discussed with patient  - She  was advised to continue current positive airway pressure therapy with above described pressure. - She  advised to keep good compliance with current recommended pressure to get optimal results and clinical improvement  - Recommend 7-9 hours of sleep with PAP  - She was advised to call [employer] regarding supplies if needed.   -She call my office for earlier appointment if needed for worsening of sleep symptoms.   - She was instructed on weight loss  - Alanna Chavez was educated about my impression and plan. Patient verbalizesunderstanding.   We will see Abram Daley back in: 1 year with download    Information added by my medical assistant/LPN was reviewed today         BRUCE Montes De Oca PA-C  2/2/2021       Total time for visit was 20 min

## 2021-02-23 RX ORDER — CARVEDILOL 25 MG/1
12.5 TABLET ORAL DAILY
Qty: 30 TABLET | Refills: 5 | Status: SHIPPED | OUTPATIENT
Start: 2021-02-23 | End: 2021-02-25

## 2021-02-25 ENCOUNTER — OFFICE VISIT (OUTPATIENT)
Dept: CARDIOLOGY CLINIC | Age: 71
End: 2021-02-25
Payer: MEDICARE

## 2021-02-25 VITALS
OXYGEN SATURATION: 92 % | DIASTOLIC BLOOD PRESSURE: 80 MMHG | HEIGHT: 64 IN | WEIGHT: 254.6 LBS | HEART RATE: 73 BPM | SYSTOLIC BLOOD PRESSURE: 130 MMHG | BODY MASS INDEX: 43.46 KG/M2

## 2021-02-25 DIAGNOSIS — R63.5 WEIGHT GAIN: ICD-10-CM

## 2021-02-25 DIAGNOSIS — E66.01 CLASS 3 SEVERE OBESITY WITH SERIOUS COMORBIDITY AND BODY MASS INDEX (BMI) OF 40.0 TO 44.9 IN ADULT, UNSPECIFIED OBESITY TYPE (HCC): ICD-10-CM

## 2021-02-25 DIAGNOSIS — N18.2 CKD (CHRONIC KIDNEY DISEASE), STAGE II: ICD-10-CM

## 2021-02-25 DIAGNOSIS — I10 ESSENTIAL HYPERTENSION: ICD-10-CM

## 2021-02-25 DIAGNOSIS — I50.32 CHF (CONGESTIVE HEART FAILURE), NYHA CLASS II, CHRONIC, DIASTOLIC (HCC): Primary | ICD-10-CM

## 2021-02-25 PROBLEM — R60.0 EDEMA OF LOWER EXTREMITY: Status: ACTIVE | Noted: 2019-09-12

## 2021-02-25 PROBLEM — R60.0 LEG EDEMA: Status: RESOLVED | Noted: 2019-09-12 | Resolved: 2021-02-25

## 2021-02-25 LAB
ANION GAP SERPL CALCULATED.3IONS-SCNC: 10 MEQ/L (ref 8–16)
BUN BLDV-MCNC: 42 MG/DL (ref 7–22)
CALCIUM SERPL-MCNC: 10.2 MG/DL (ref 8.5–10.5)
CHLORIDE BLD-SCNC: 102 MEQ/L (ref 98–111)
CO2: 29 MEQ/L (ref 23–33)
CREAT SERPL-MCNC: 1.1 MG/DL (ref 0.4–1.2)
GFR SERPL CREATININE-BSD FRML MDRD: 49 ML/MIN/1.73M2
GLUCOSE BLD-MCNC: 106 MG/DL (ref 70–108)
POTASSIUM SERPL-SCNC: 4.6 MEQ/L (ref 3.5–5.2)
PRO-BNP: 56 PG/ML (ref 0–900)
SODIUM BLD-SCNC: 141 MEQ/L (ref 135–145)

## 2021-02-25 PROCEDURE — 3017F COLORECTAL CA SCREEN DOC REV: CPT | Performed by: NURSE PRACTITIONER

## 2021-02-25 PROCEDURE — 4040F PNEUMOC VAC/ADMIN/RCVD: CPT | Performed by: NURSE PRACTITIONER

## 2021-02-25 PROCEDURE — G8484 FLU IMMUNIZE NO ADMIN: HCPCS | Performed by: NURSE PRACTITIONER

## 2021-02-25 PROCEDURE — 99214 OFFICE O/P EST MOD 30 MIN: CPT | Performed by: NURSE PRACTITIONER

## 2021-02-25 PROCEDURE — G8427 DOCREV CUR MEDS BY ELIG CLIN: HCPCS | Performed by: NURSE PRACTITIONER

## 2021-02-25 PROCEDURE — 36415 COLL VENOUS BLD VENIPUNCTURE: CPT | Performed by: NURSE PRACTITIONER

## 2021-02-25 PROCEDURE — 1036F TOBACCO NON-USER: CPT | Performed by: NURSE PRACTITIONER

## 2021-02-25 PROCEDURE — G8400 PT W/DXA NO RESULTS DOC: HCPCS | Performed by: NURSE PRACTITIONER

## 2021-02-25 PROCEDURE — 1123F ACP DISCUSS/DSCN MKR DOCD: CPT | Performed by: NURSE PRACTITIONER

## 2021-02-25 PROCEDURE — G8417 CALC BMI ABV UP PARAM F/U: HCPCS | Performed by: NURSE PRACTITIONER

## 2021-02-25 PROCEDURE — 1090F PRES/ABSN URINE INCON ASSESS: CPT | Performed by: NURSE PRACTITIONER

## 2021-02-25 RX ORDER — CARVEDILOL 12.5 MG/1
12.5 TABLET ORAL 2 TIMES DAILY
Qty: 180 TABLET | Refills: 5 | Status: SHIPPED | OUTPATIENT
Start: 2021-02-25 | End: 2021-09-10 | Stop reason: DRUGHIGH

## 2021-02-25 ASSESSMENT — ENCOUNTER SYMPTOMS
COUGH: 0
ABDOMINAL DISTENTION: 1
SHORTNESS OF BREATH: 1
BACK PAIN: 1

## 2021-02-25 NOTE — PATIENT INSTRUCTIONS
You may receive a survey regarding the care you received during your visit. Your input is valuable to us. We encourage you to complete and return your survey. We hope you will choose us in the future for your healthcare needs.     Continue:  · Continue current medications  · Daily weights and record  · Fluid restriction of 2 Liters per day  · Limit sodium in diet to around 2089-7184 mg/day  · Monitor BP  · Activity as tolerated     Call the [facility] for any of the following symptoms: [phone]   Weight gain of 2-3 pounds in 1 day or 5 pounds in 1 week   Increased shortness of breath   Shortness of breath while laying down   Cough   Chest pain   Swelling in feet, ankles or legs   Tenderness or bloating in the abdomen   Fatigue    Decreased appetite or nausea    Confusion   

## 2021-02-25 NOTE — PROGRESS NOTES
Heart Failure Clinic       Visit Date: 2/25/2021  Cardiologist:  Dr. Georgette Espana  Primary Care Physician: Dr. Lynne Donis,     Jose Young is a 79 y.o. female who presents today for:  Chief Complaint   Patient presents with    Congestive Heart Failure       HPI:   Jose Young is a 79 y.o. female who presents to the office for a follow up patient visit in the heart failure clinic. Accompanied by , Manas Tafoya    TYPE HF: HFpEF (EF 55-60%, grd 1)  Device: No  HX: Uncontrolled HTN (past year, worse since knee surgery/infection in July 2019), obesity, thoracic AA, OA-knee surgery, RANDY, Bipolar     Dry Wt = 220#     Hospitalization r/t Heart Failure:  Sept 2019 = ESCOTO, Edema, HTN.  Lasix IV.  BNP normal.  +CXR. Discharge weight - 222#  Cath 10/18/19 - Nonobstructive CAD, PWCP 30 - received Lasix 80 IV in lab, changed to Bumex TID  CC 1/15/20 - Saw Dr Marlene Contreras.  No changes.  MEMs only if readmitted  Slow wt gain seemingly r/t Zyprexa use = lowest 220# (April), highest 244#  Fausto - admitted for suicidal ideations = dry - held Bumex, gave fluids  Concerns today: VERY frustrated, gaining wt. 254# today. Increased bloating, ESCOTO. Lots back pain - activity very limited. Activity: walker - can go short distances.   Has to sit often d/t back pain  Diet: watches salt/fluid very close    Patient has:  Chest Pain: no  SOB: ESCOTO  Orthopnea/PND: no  RANDY: no  Edema: no  Fatigue: yes  Abdominal bloating: yes  Cough: no  Appetite: good  Any extra diuretic use: YES - extra tab 2-3X week  Weight gain: yes  Home weight: going up  Home blood pressure: AM 130s = PM 150s    Past Medical History:   Diagnosis Date    Acid reflux     Arthritis     Asthma     Bipolar 1 disorder (HCC)     CHF (congestive heart failure) (HCC)     CKD (chronic kidney disease), stage II 11/22/2019    History of blood transfusion 2004    Hypertension     Mood disorder ([facility] [address]     Sleep apnea     Thyroid disease      Past Surgical History:   Procedure Laterality Date    ANKLE SURGERY      left    CATARACT REMOVAL      Both eyes      SECTION      x 3    FOOT SURGERY      Left     HIP SURGERY      HYSTERECTOMY      Total     TOTAL KNEE ARTHROPLASTY Left 10/14/14    TOTAL KNEE ARTHROPLASTY Right 2019     Family History   Problem Relation Age of Onset    Diabetes Mother     High Blood Pressure Father     Cancer Father     Other Brother     Diabetes Brother     Cancer Sister     Breast Cancer Sister 39    Cancer Brother      Social History     Tobacco Use    Smoking status: Never Smoker    Smokeless tobacco: Never Used   Substance Use Topics    Alcohol use: No     Alcohol/week: 0.0 standard drinks     Comment: rarely     Current Outpatient Medications   Medication Sig Dispense Refill    diclofenac sodium (VOLTAREN) 1 % GEL Apply topically as needed      carvedilol (COREG) 12.5 MG tablet Take 1 tablet by mouth 2 times daily 180 tablet 5    hydrALAZINE (APRESOLINE) 50 MG tablet Take 1 tablet by mouth 3 times daily 270 tablet 3    bumetanide (BUMEX) 1 MG tablet 1-2 tabs daily as needed. 180 tablet 3    spironolactone (ALDACTONE) 25 MG tablet Take 1 tablet by mouth daily 30 tablet 5    SYNTHROID 125 MCG tablet Take 1 tablet by mouth daily Take with water on an empty stomach- wait 30 minutes before eating or taking other meds.  30 tablet 11    ARIPiprazole (ABILIFY) 5 MG tablet Take 2 tablets by mouth daily 30 tablet 3    OLANZapine (ZYPREXA) 15 MG tablet Take 15 mg by mouth daily      atorvastatin (LIPITOR) 20 MG tablet Take 1 tablet by mouth daily 90 tablet 3    omeprazole (PRILOSEC) 20 MG delayed release capsule Take 1 capsule by mouth every morning (before breakfast) 90 capsule 3    aspirin 81 MG EC tablet Take 81 mg by mouth daily      acetaminophen (TYLENOL) 325 MG tablet Take 650 mg by mouth 4 times daily       ferrous sulfate 325 (65 Fe) MG tablet Take 325 mg by mouth daily (with breakfast)      clonazePAM (KLONOPIN) 1 MG tablet Take 1 mg by mouth 3 times daily as needed.  venlafaxine (EFFEXOR XR) 150 MG extended release capsule Take 225 mg by mouth daily       gabapentin (NEURONTIN) 300 MG capsule Take 300 mg by mouth 3 times daily.  tiZANidine (ZANAFLEX) 2 MG tablet Take 2 mg by mouth 2 times daily      tetrabenazine (XENAZINE) 12.5 MG tablet Take 12.5 mg by mouth 2 times daily      Mirabegron ER (MYRBETRIQ) 50 MG TB24 Take 50 mg by mouth daily      lamoTRIgine (LAMICTAL) 100 MG tablet Take 100 mg by mouth daily Patient taking 1 tablet TID      Ascorbic Acid (VITAMIN C) 500 MG tablet Take 500 mg by mouth daily.  Multiple Vitamin (MULTIVITAMIN PO) Take 1 tablet by mouth daily. No current facility-administered medications for this visit. Allergies   Allergen Reactions    Ace Inhibitors Anaphylaxis    Prednisone Other (See Comments)     Other reaction(s): Manic Behavior  **oral**      Codeine Hives and Nausea And Vomiting    Penicillins Hives    Lotrel [Amlodipine Besy-Benazepril Hcl] Swelling    Typhoid Vaccines     Adhesive Tape      Other reaction(s): Rash    Onion Nausea And Vomiting    Typhoid Vaccine, Live      Other reaction(s): unknown    Wound Dressing Adhesive Rash     tape       SUBJECTIVE:   Review of Systems   Constitutional: Positive for fatigue. Negative for activity change and appetite change. Respiratory: Positive for shortness of breath (ESCOTO). Negative for cough. Cardiovascular: Negative for chest pain, palpitations and leg swelling. Gastrointestinal: Positive for abdominal distention. Musculoskeletal: Positive for back pain and gait problem (walker). Neurological: Negative for weakness, light-headedness and headaches. Hematological: Negative for adenopathy. Psychiatric/Behavioral: Negative for sleep disturbance.        OBJECTIVE:   Today's Vitals:  /80   Pulse 73   Ht 5' 4\" (1.626 m)   Wt 254 lb 9.6 oz (115.5 kg)   SpO2 92%   BMI 43.70 kg/m²     Physical Exam  Vitals signs reviewed. Constitutional:       General: She is not in acute distress. Appearance: Normal appearance. She is well-developed. She is obese. She is not diaphoretic. HENT:      Head: Normocephalic and atraumatic. Eyes:      Conjunctiva/sclera: Conjunctivae normal.   Neck:      Musculoskeletal: Normal range of motion and neck supple. Comments: No JVD  Cardiovascular:      Rate and Rhythm: Normal rate and regular rhythm. Heart sounds: Normal heart sounds. No murmur. Pulmonary:      Effort: Pulmonary effort is normal. No respiratory distress. Breath sounds: Normal breath sounds. No wheezing or rales. Abdominal:      General: Bowel sounds are normal. There is no distension. Palpations: Abdomen is soft. Tenderness: There is no abdominal tenderness. Musculoskeletal: Normal range of motion. Right lower leg: No edema. Left lower leg: No edema. Skin:     General: Skin is warm and dry. Capillary Refill: Capillary refill takes less than 2 seconds. Neurological:      Mental Status: She is alert and oriented to person, place, and time. Coordination: Coordination normal.   Psychiatric:         Behavior: Behavior normal.         Wt Readings from Last 3 Encounters:   02/25/21 254 lb 9.6 oz (115.5 kg)   02/02/21 249 lb (112.9 kg)   12/22/20 243 lb 9.6 oz (110.5 kg)     BP Readings from Last 3 Encounters:   02/25/21 130/80   02/02/21 118/68   12/22/20 120/68     Pulse Readings from Last 3 Encounters:   02/25/21 73   02/02/21 82   12/22/20 73     Body mass index is 43.7 kg/m². ECHO:   Summary   Normal left ventricle size and systolic function. Ejection fraction was   estimated at 55 to 60 %.  There were no regional left ventricular wall   motion abnormalities and wall thickness was within normal limits.   Doppler parameters were consistent with abnormal left ventricular   relaxation (grade 1 diastolic dysfunction).   The left atrium is Moderately dilated.   Aortic aneurysm noted in the ascending aorta .   Aortic aneurysm measures 4.1 cm .      Signature   ----------------------------------------------------------------   Electronically signed by Dusty Rashid MD (Interpreting   physician) on 09/13/2019 at 04:51 PM   ----------------------------------------------------------------        CATH (10/18)  FINDINGS:  HEMODYNAMICS:  1.  Left ventricular end-diastolic pressure was found to be elevated at  30 mmHg. 2.  Cardiac output 8.9 or 8.91 liters per minute. 3.  Cardiac index 4.3 liters per minute per square meter. 4.  Right atrial pressure elevated at 25 mmHg. 5.  RV pressure was elevated at 59/14 mmHg. 6.  Pulmonary arterial pressure was elevated at 57/34 mmHg with a mean  pulmonary arterial pressure of 45 mmHg. 7.  Pulmonary capillary wedge pressure was 30 mmHg.     CORONARY ANGIOGRAM:  1.  RCA:  RCA has moderate tortuosity in the proximal segment.  Proximal  RCA is patent without significant obstruction.  Mid RCA is patent  without significant obstruction.  Distal RCA is patent.  RPDA has some  mild diffuse disease.  RPL has distal mild diffuse disease. 2.  Left main:  Left main is patent without significant obstruction. Left main bifurcates into LCX and LAD. 3.  LCX:  Moderate tortuosity noted in the LCX.  No significant  obstruction in the proximal segment.  Distal LCX with mild diffuse  disease.  OM1 is a larger tortuous branching vessel with mild diffuse  disease. 4.  LAD:  Proximal LAD is patent without significant obstruction.  Mid  LAD is moderately tortuous.  No significant obstruction.  Distal LAD has  moderate tortuosity with mild diffuse disease. 5.  D1:  Large tortuous vessel without significant obstruction. Dominance, right.     MEDICATIONS:  See MAR.     ACCESS:  1.  Right brachial vein for right heart cath.   2.  Right radial artery for left heart cath.     COMPLICATIONS:  None.     CONCLUSION:  1.  Nonobstructive coronary artery disease. 2.  Congestive heart failure with preserved ejection fraction. 3.  Volume overload. 4.  Elevated right and left filling pressures. 5.  Moderate pulmonary venous hypertension.     RECOMMENDATIONS:  1.  Aggressive risk factor modification for nonobstructive coronary  disease. 2.  Continue aspirin 81 mg.  3.  Lipitor 20 mg p.o. daily. 4.  Fluid restriction to less than 1.5 to 2 liters per day. 5.  Sodium restriction to less than 1.5 gm per day. 6.  Daily weight monitoring. 7.  The patient was given Lasix 80 mg IV x1 in the cath lab. 8.  Stop p.o. Lasix.  Currently, the patient is on Lasix 80 mg p.o.  b.i.d.  9.  Start Bumex 1 mg p.o. t.i.d. 10.  Check BMP and magnesium level next week. 11.  Keep potassium above 4 and magnesium above 2. 12.  The patient needs better blood pressure control.  Her hydralazine  was increased to 100 mg p.o. t.i.d.  13.  Continue to monitor home blood pressure readings. 15.  The patient has history of obstructive sleep apnea.  Continue CPAP  during sleep.  She had an appointment with her sleep medicine specialist  within one to two weeks. 15.  Lifestyle modifications including weight loss advisable. 16.  Refer to cardiac rehab once volume status is better. The above findings and plan of care were discussed in details with the  patient and her family members including  and daughter.    Questions were answered. Roldan Reyno are agreeable with above-mentioned plan.     Ashley Varela MD     D: 10/18/2019 9:59:22       Results reviewed:  BNP:   Lab Results   Component Value Date    BNP 14 09/17/2020     CBC:   Lab Results   Component Value Date    WBC 8.0 01/13/2021    RBC 4.21 01/13/2021    HGB 12.9 01/13/2021    HCT 39.6 01/13/2021     01/13/2021     CMP:    Lab Results   Component Value Date     02/25/2021    K 4.6 02/25/2021    K 4.7 04/04/2020     02/25/2021    CO2 29 02/25/2021    BUN 42 care; as well as review of chart: labs, ECHO, radiology reports, etc.   I personally spent more then 50% of the appt time face to face with the patient. · Daily weights  · Fluid restriction of 2 Liters per day  · Limit sodium in diet to around 6563-7515 mg/day  · Monitor BP  · Activity as tolerated     Patient was instructed to call the [facility] for any changes in symptoms as noted in AVS.      Return in about 3 months (around 5/25/2021). or sooner if needed     Patient given educational materials - see patient instructions. We discussed the importance of weighing oneself and recording daily. We also discussed the importance of a low sodium diet, higher sodium foods to avoid and better low sodium food options. Patient verbalizes understanding of plan of care using teach back method, and is agreeable to the treatment plan.        Electronically signed by DAVID Lynne CNP on 2/25/2021 at 8:31 PM

## 2021-02-25 NOTE — PROGRESS NOTES
Venipuncture obtained from [facility]. Patient tolerated procedure without complications or complaints.

## 2021-03-01 ENCOUNTER — PATIENT MESSAGE (OUTPATIENT)
Dept: CARDIOLOGY CLINIC | Age: 71
End: 2021-03-01

## 2021-05-06 ENCOUNTER — PATIENT MESSAGE (OUTPATIENT)
Dept: FAMILY MEDICINE CLINIC | Age: 71
End: 2021-05-06

## 2021-05-06 DIAGNOSIS — M54.16 LUMBAR RADICULOPATHY, CHRONIC: ICD-10-CM

## 2021-05-06 DIAGNOSIS — M51.36 DDD (DEGENERATIVE DISC DISEASE), LUMBAR: Primary | ICD-10-CM

## 2021-05-06 DIAGNOSIS — R26.81 UNSTABLE GAIT: ICD-10-CM

## 2021-05-06 NOTE — TELEPHONE ENCOUNTER
From: Rudy Gunn  To: Janey Lamar DO  Sent: 5/6/2021 2:43 PM EDT  Subject: Non-Urgent Medical Question    I am working with Ivis Terry at [facility] as a . She has indicated that physical therapy to help with back pain and balance would be helpful. Could you please send a Physical Therapy referral to [facility]. [facility] at fax number [phone]. Thanks.

## 2021-05-18 ENCOUNTER — HOSPITAL ENCOUNTER (OUTPATIENT)
Dept: PHYSICAL THERAPY | Age: 71
Setting detail: THERAPIES SERIES
Discharge: HOME | End: 2021-05-18
Payer: MEDICARE

## 2021-05-18 PROCEDURE — 97162 PT EVAL MOD COMPLEX 30 MIN: CPT
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
## 2021-05-25 ENCOUNTER — OFFICE VISIT (OUTPATIENT)
Dept: CARDIOLOGY CLINIC | Age: 71
End: 2021-05-25
Payer: MEDICARE

## 2021-05-25 ENCOUNTER — HOSPITAL ENCOUNTER (OUTPATIENT)
Dept: PHYSICAL THERAPY | Age: 71
Setting detail: THERAPIES SERIES
Discharge: HOME | End: 2021-05-25
Payer: MEDICARE

## 2021-05-25 VITALS
DIASTOLIC BLOOD PRESSURE: 62 MMHG | BODY MASS INDEX: 42.68 KG/M2 | WEIGHT: 250 LBS | HEIGHT: 64 IN | OXYGEN SATURATION: 92 % | SYSTOLIC BLOOD PRESSURE: 120 MMHG | HEART RATE: 90 BPM

## 2021-05-25 DIAGNOSIS — I10 ESSENTIAL HYPERTENSION: ICD-10-CM

## 2021-05-25 DIAGNOSIS — R06.09 DOE (DYSPNEA ON EXERTION): Primary | ICD-10-CM

## 2021-05-25 DIAGNOSIS — R53.83 FATIGUE, UNSPECIFIED TYPE: ICD-10-CM

## 2021-05-25 DIAGNOSIS — N18.2 CKD (CHRONIC KIDNEY DISEASE), STAGE II: ICD-10-CM

## 2021-05-25 DIAGNOSIS — Z99.89 OBSTRUCTIVE SLEEP APNEA ON CPAP: ICD-10-CM

## 2021-05-25 DIAGNOSIS — I50.32 CHF (CONGESTIVE HEART FAILURE), NYHA CLASS II, CHRONIC, DIASTOLIC (HCC): ICD-10-CM

## 2021-05-25 DIAGNOSIS — G47.33 OBSTRUCTIVE SLEEP APNEA ON CPAP: ICD-10-CM

## 2021-05-25 PROCEDURE — 97110 THERAPEUTIC EXERCISES: CPT

## 2021-05-25 PROCEDURE — 1123F ACP DISCUSS/DSCN MKR DOCD: CPT | Performed by: NURSE PRACTITIONER

## 2021-05-25 PROCEDURE — G8417 CALC BMI ABV UP PARAM F/U: HCPCS | Performed by: NURSE PRACTITIONER

## 2021-05-25 PROCEDURE — 97530 THERAPEUTIC ACTIVITIES: CPT

## 2021-05-25 PROCEDURE — 4040F PNEUMOC VAC/ADMIN/RCVD: CPT | Performed by: NURSE PRACTITIONER

## 2021-05-25 PROCEDURE — G8427 DOCREV CUR MEDS BY ELIG CLIN: HCPCS | Performed by: NURSE PRACTITIONER

## 2021-05-25 PROCEDURE — 3017F COLORECTAL CA SCREEN DOC REV: CPT | Performed by: NURSE PRACTITIONER

## 2021-05-25 PROCEDURE — G8400 PT W/DXA NO RESULTS DOC: HCPCS | Performed by: NURSE PRACTITIONER

## 2021-05-25 PROCEDURE — 99214 OFFICE O/P EST MOD 30 MIN: CPT | Performed by: NURSE PRACTITIONER

## 2021-05-25 PROCEDURE — 1036F TOBACCO NON-USER: CPT | Performed by: NURSE PRACTITIONER

## 2021-05-25 PROCEDURE — 1090F PRES/ABSN URINE INCON ASSESS: CPT | Performed by: NURSE PRACTITIONER

## 2021-05-25 PROCEDURE — 97112 NEUROMUSCULAR REEDUCATION: CPT

## 2021-05-25 RX ORDER — DONEPEZIL HYDROCHLORIDE 10 MG/1
10 TABLET, FILM COATED ORAL DAILY
COMMUNITY
Start: 2021-05-20 | End: 2021-11-04

## 2021-05-25 ASSESSMENT — ENCOUNTER SYMPTOMS
BACK PAIN: 1
COUGH: 0
SHORTNESS OF BREATH: 1
ABDOMINAL DISTENTION: 0

## 2021-05-25 NOTE — PATIENT INSTRUCTIONS
You may receive a survey regarding the care you received during your visit. Your input is valuable to us. We encourage you to complete and return your survey. We hope you will choose us in the future for your healthcare needs.     Continue:  · Continue current medications  · Daily weights and record  · Fluid restriction of 2 Liters per day  · Limit sodium in diet to around 9653-0866 mg/day  · Monitor BP  · Activity as tolerated     Call the Heart Failure Clinic for any of the following symptoms: [phone]   Weight gain of 2-3 pounds in 1 day or 5 pounds in 1 week   Increased shortness of breath   Shortness of breath while laying down   Cough   Chest pain   Swelling in feet, ankles or legs   Tenderness or bloating in the abdomen   Fatigue    Decreased appetite or nausea    Confusion   

## 2021-05-25 NOTE — PROGRESS NOTES
 Hypertension     Mood disorder ([facility] [address]     Sleep apnea     Thyroid disease      Past Surgical History:   Procedure Laterality Date    ANKLE SURGERY      left    CATARACT REMOVAL      Both eyes      SECTION      x 3    FOOT SURGERY      Left     HIP SURGERY      HYSTERECTOMY      Total     TOTAL KNEE ARTHROPLASTY Left 10/14/14    TOTAL KNEE ARTHROPLASTY Right 2019     Family History   Problem Relation Age of Onset    Diabetes Mother     High Blood Pressure Father     Cancer Father     Other Brother     Diabetes Brother     Cancer Sister     Breast Cancer Sister 39    Cancer Brother      Social History     Tobacco Use    Smoking status: Never Smoker    Smokeless tobacco: Never Used   Substance Use Topics    Alcohol use: No     Alcohol/week: 0.0 standard drinks     Comment: rarely     Current Outpatient Medications   Medication Sig Dispense Refill    donepezil (ARICEPT) 10 MG tablet Take 10 mg by mouth daily      diclofenac sodium (VOLTAREN) 1 % GEL Apply topically as needed      carvedilol (COREG) 12.5 MG tablet Take 1 tablet by mouth 2 times daily 180 tablet 5    hydrALAZINE (APRESOLINE) 50 MG tablet Take 1 tablet by mouth 3 times daily 270 tablet 3    bumetanide (BUMEX) 1 MG tablet 1-2 tabs daily as needed. 180 tablet 3    spironolactone (ALDACTONE) 25 MG tablet Take 1 tablet by mouth daily 30 tablet 5    SYNTHROID 125 MCG tablet Take 1 tablet by mouth daily Take with water on an empty stomach- wait 30 minutes before eating or taking other meds.  30 tablet 11    ARIPiprazole (ABILIFY) 5 MG tablet Take 2 tablets by mouth daily 30 tablet 3    OLANZapine (ZYPREXA) 15 MG tablet Take 15 mg by mouth daily      atorvastatin (LIPITOR) 20 MG tablet Take 1 tablet by mouth daily 90 tablet 3    omeprazole (PRILOSEC) 20 MG delayed release capsule Take 1 capsule by mouth every morning (before breakfast) 90 capsule 3    aspirin 81 MG EC tablet Take 81 mg by mouth daily      acetaminophen (TYLENOL) 325 MG tablet Take 650 mg by mouth 4 times daily       ferrous sulfate 325 (65 Fe) MG tablet Take 325 mg by mouth daily (with breakfast)      clonazePAM (KLONOPIN) 1 MG tablet Take 1 mg by mouth 3 times daily as needed.  venlafaxine (EFFEXOR XR) 150 MG extended release capsule Take 225 mg by mouth daily       gabapentin (NEURONTIN) 300 MG capsule Take 300 mg by mouth 3 times daily.  tiZANidine (ZANAFLEX) 2 MG tablet Take 2 mg by mouth 2 times daily      Mirabegron ER (MYRBETRIQ) 50 MG TB24 Take 50 mg by mouth daily      lamoTRIgine (LAMICTAL) 100 MG tablet Take 100 mg by mouth daily Patient taking 1 tablet TID      Ascorbic Acid (VITAMIN C) 500 MG tablet Take 500 mg by mouth daily.  Multiple Vitamin (MULTIVITAMIN PO) Take 1 tablet by mouth daily. No current facility-administered medications for this visit. Allergies   Allergen Reactions    Ace Inhibitors Anaphylaxis    Prednisone Other (See Comments)     Other reaction(s): Manic Behavior  **oral**      Codeine Hives and Nausea And Vomiting    Penicillins Hives    Lotrel [Amlodipine Besy-Benazepril Hcl] Swelling    Typhoid Vaccines     Adhesive Tape      Other reaction(s): Rash    Onion Nausea And Vomiting    Typhoid Vaccine, Live      Other reaction(s): unknown    Wound Dressing Adhesive Rash     tape       SUBJECTIVE:   Review of Systems   Constitutional: Positive for fatigue. Negative for activity change and appetite change. Respiratory: Positive for shortness of breath. Negative for cough. Cardiovascular: Negative for chest pain, palpitations and leg swelling. Gastrointestinal: Negative for abdominal distention. Musculoskeletal: Positive for arthralgias, back pain and gait problem (walker). Neurological: Negative for weakness, light-headedness and headaches. Hematological: Negative for adenopathy. Psychiatric/Behavioral: Negative for sleep disturbance.        OBJECTIVE:   Today's Vitals:  /62   Pulse 90   Ht 5' 4\" (1.626 m)   Wt 250 lb (113.4 kg)   SpO2 92%   BMI 42.91 kg/m²     Physical Exam  Vitals reviewed. Constitutional:       General: She is not in acute distress. Appearance: Normal appearance. She is well-developed. She is obese. She is not diaphoretic. HENT:      Head: Normocephalic and atraumatic. Eyes:      Conjunctiva/sclera: Conjunctivae normal.   Neck:      Comments: No JVD  Cardiovascular:      Rate and Rhythm: Normal rate and regular rhythm. Heart sounds: Normal heart sounds. No murmur heard. Pulmonary:      Effort: Pulmonary effort is normal. No respiratory distress. Breath sounds: Normal breath sounds. No wheezing or rales. Abdominal:      General: Bowel sounds are normal. There is no distension. Palpations: Abdomen is soft. Tenderness: There is no abdominal tenderness. Musculoskeletal:         General: Normal range of motion. Cervical back: Normal range of motion and neck supple. Right lower leg: No edema. Left lower leg: No edema. Skin:     General: Skin is warm and dry. Capillary Refill: Capillary refill takes less than 2 seconds. Neurological:      Mental Status: She is alert and oriented to person, place, and time. Coordination: Coordination normal.   Psychiatric:         Behavior: Behavior normal.         Wt Readings from Last 3 Encounters:   05/25/21 250 lb (113.4 kg)   02/25/21 254 lb 9.6 oz (115.5 kg)   02/02/21 249 lb (112.9 kg)     BP Readings from Last 3 Encounters:   05/25/21 120/62   02/25/21 130/80   02/02/21 118/68     Pulse Readings from Last 3 Encounters:   05/25/21 90   02/25/21 73   02/02/21 82     Body mass index is 42.91 kg/m². ECHO:   Summary   Normal left ventricle size and systolic function. Ejection fraction was   estimated at 55 to 60 %.  There were no regional left ventricular wall   motion abnormalities and wall thickness was within normal limits.   Doppler parameters cath.  2.  Right radial artery for left heart cath.     COMPLICATIONS:  None.     CONCLUSION:  1.  Nonobstructive coronary artery disease. 2.  Congestive heart failure with preserved ejection fraction. 3.  Volume overload. 4.  Elevated right and left filling pressures. 5.  Moderate pulmonary venous hypertension.     RECOMMENDATIONS:  1.  Aggressive risk factor modification for nonobstructive coronary  disease. 2.  Continue aspirin 81 mg.  3.  Lipitor 20 mg p.o. daily. 4.  Fluid restriction to less than 1.5 to 2 liters per day. 5.  Sodium restriction to less than 1.5 gm per day. 6.  Daily weight monitoring. 7.  The patient was given Lasix 80 mg IV x1 in the cath lab. 8.  Stop p.o. Lasix.  Currently, the patient is on Lasix 80 mg p.o.  b.i.d.  9.  Start Bumex 1 mg p.o. t.i.d. 10.  Check BMP and magnesium level next week. 11.  Keep potassium above 4 and magnesium above 2. 12.  The patient needs better blood pressure control.  Her hydralazine  was increased to 100 mg p.o. t.i.d.  13.  Continue to monitor home blood pressure readings. 15.  The patient has history of obstructive sleep apnea.  Continue CPAP  during sleep.  She had an appointment with her sleep medicine specialist  within one to two weeks. 15.  Lifestyle modifications including weight loss advisable. 16.  Refer to cardiac rehab once volume status is better. The above findings and plan of care were discussed in details with the  patient and her family members including  and daughter.    Questions were answered. Yasmany Perdomo are agreeable with above-mentioned plan.     Sandhya Mercer MD     D: 10/18/2019 9:59:22      Results reviewed:  BNP:   Lab Results   Component Value Date    BNP 14 09/17/2020     CBC:   Lab Results   Component Value Date    WBC 8.0 01/13/2021    RBC 4.21 01/13/2021    HGB 12.9 01/13/2021    HCT 39.6 01/13/2021     01/13/2021     CMP:    Lab Results   Component Value Date     02/25/2021    K 4.6 02/25/2021

## 2021-05-25 NOTE — PROGRESS NOTES
[address] [facility]  PHYSICAL THERAPY  [] EVALUATION  [x] DAILY NOTE (LAND) [] DAILY NOTE (AQUATIC ) [] PROGRESS NOTE [] DISCHARGE NOTE    [] [facility]   [] Carlo Gonzalez    [] [address] [facility]   [x] [place] Mathis    Date: 2021  Patient Name:  Christy Shepherd  : 1950  MRN: [id number]  CSN: [id number]    Referring Practitioner Ingrid Chacko DO   Diagnosis Other intervertebral disc degeneration, lumbar region [M51.36]  Radiculopathy, lumbar region [M54.16]    Treatment Diagnosis Difficulty walking    Date of Evaluation 21    Additional Pertinent History Bi-polar; SOB related to CHF      Functional Outcome Measure Used Tinetti   Functional Outcome Score 15/28 (21)       Insurance: Primary: Payor: Ann Marie Morrell /  /  / , aquatic therapy is covered; modalities covered except for IONTO  Secondary:    Authorization Information: Pre-certification is needed after eval    Visit # 2,  for progress note   Visits Allowed: 12   Recertification Date:    Physician Follow-Up:    Physician Orders:    History of Present Illness: Lucinda Valdez is referred to PT to address ongoing balance issues. She states that she noticed that she was struggling with her balance when she slipped off a short deck at a local [facility]. She admits that over the last few months she has also had a few falls; denies any major injury as a result of the falls. She started to workout with a local  a month hoping that it would help her balance. SUBJECTIVE: Reports knee pain today , but no LB pains.     TREATMENT   Precautions:    Pain:     X in shaded column indicates activity completed today   Modalities Parameters/  Location  Notes                     Manual Therapy Time/Technique  Notes                     Exercise/Intervention   Notes                 NuStep x5 min  x Level 5, seat 9, arms 9          Airex: heel to raises, marchjohnathon, mini squats x10 ea  x    3 way hip, Hs curls x10 ea  x    Tandem stepping fwd/retro x2 laps ea  x    Sidestepping left/right x2 laps ea  x    rocker board fwd/lat x15 ea  x 10 sec balance                   Specific Interventions Next Treatment: NuStep; airex balance exercises; Activity/Treatment Tolerance:  [x]  Patient tolerated treatment well  []  Patient limited by fatigue  []  Patient limited by pain   []  Patient limited by medical complications  []  Other:     Assessment:Initiated balance and therex. Moving slow with therex. Required short seated rest breaks but tolerated well. Body Structures/Functions/Activity Limitations: impaired activity tolerance, impaired balance, impaired coordination, impaired endurance, impaired safety awareness, impaired strength and pain  Prognosis: fair    GOALS:  Patient Goal: minimize fall risk    Short Term Goals:  Time Frame: 4 weeks  1. Vernon Muñoz will demonstrate a good ankle strategy to maintain standing balance with perturbations to her trunk. 2. Vernon Muñoz will be able to stand for 20 min at a time without needing a rest break allowing her to prepare meals and perform light chores without limitation. 3. Alison's Tinetti score will improve to 19/28 indicating minimal fall risk. Long Term Goals:  Time Frame: 8 weeks  1. Discharge with independent HEP  2. Alison's Tinetti score will improve to >21/28 indicating no fall risk. 3. Vernon Muñoz will be able to maintain her standing balance in all 4 conditions of the m-CTSIB, using an ankle strategy to maintain her balance. Patient Education:   [x]  HEP/Education Completed: Plan of Care, Goals, anterior/posterior wt shift   [facility] Access Code:  []  No new Education completed  []  Reviewed Prior HEP      [x]  Patient verbalized and/or demonstrated understanding of education provided. []  Patient unable to verbalize and/or demonstrate understanding of education provided. Will continue education.   [x]  Barriers to learning: n/a    PLAN:  Treatment Recommendations: Strengthening, Range of Motion, Balance Training, Endurance Training, Gait Training, Stair Training, Neuromuscular Re-education, Manual Therapy - Soft Tissue Mobilization, Manual Therapy - Joint Manipulation, Pain Management, Home Exercise Program, Patient Education and Modalities    [x]  Plan of care initiated. Plan to see patient 2 times per week for 8 weeks to address the treatment planned outlined above.   []  Continue with current plan of care  []  Modify plan of care as follows:    []  Hold pending physician visit  []  Discharge    Time In 0840   Time Out 0918   Timed Code Minutes: 38 min   Total Treatment Time: 38 min       Electronically Signed by: Carter Sheridan

## 2021-05-26 ENCOUNTER — HOSPITAL ENCOUNTER (OUTPATIENT)
Dept: PULMONOLOGY | Age: 71
Discharge: HOME | End: 2021-05-26
Payer: MEDICARE

## 2021-05-26 DIAGNOSIS — R06.09 DOE (DYSPNEA ON EXERTION): ICD-10-CM

## 2021-05-26 PROCEDURE — 94729 DIFFUSING CAPACITY: CPT

## 2021-05-26 PROCEDURE — 94726 PLETHYSMOGRAPHY LUNG VOLUMES: CPT

## 2021-05-26 PROCEDURE — 94060 EVALUATION OF WHEEZING: CPT

## 2021-05-28 ENCOUNTER — HOSPITAL ENCOUNTER (OUTPATIENT)
Dept: PHYSICAL THERAPY | Age: 71
Setting detail: THERAPIES SERIES
Discharge: HOME | End: 2021-05-28
Payer: MEDICARE

## 2021-05-28 PROCEDURE — 97530 THERAPEUTIC ACTIVITIES: CPT

## 2021-05-28 PROCEDURE — 97112 NEUROMUSCULAR REEDUCATION: CPT

## 2021-05-28 NOTE — PROGRESS NOTES
[address] [facility]  PHYSICAL THERAPY  [] EVALUATION  [x] DAILY NOTE (LAND) [] DAILY NOTE (AQUATIC ) [] PROGRESS NOTE [] DISCHARGE NOTE    [] [facility]   [] Carlo [address]    [] [address] [facility]   [x] Poppy Catherine    Date: 2021  Patient Name:  Beth Forbes  : 1950  MRN: [id number]  CSN: [id number]    Referring Practitioner Haley Huber DO   Diagnosis Other intervertebral disc degeneration, lumbar region [M51.36]  Radiculopathy, lumbar region [M54.16]    Treatment Diagnosis Difficulty walking    Date of Evaluation 21    Additional Pertinent History Bi-polar; SOB related to CHF      Functional Outcome Measure Used Tinetti   Functional Outcome Score 15/28 (21)       Insurance: Primary: Payor: Musa Olson /  /  / , aquatic therapy is covered; modalities covered except for IONTO  Secondary:    Authorization Information: Pre-certification is needed after eval    Visit # 3, 3/12 for progress note   Visits Allowed: 12   Recertification Date:    Physician Follow-Up:    Physician Orders:    History of Present Illness: Michael Price is referred to PT to address ongoing balance issues. She states that she noticed that she was struggling with her balance when she slipped off a short deck at a local [facility]. She admits that over the last few months she has also had a few falls; denies any major injury as a result of the falls. She started to workout with a local  a month hoping that it would help her balance. SUBJECTIVE: Really working on her HEP; sit-stands are getting easier.     TREATMENT   Precautions:    Pain:     X in shaded column indicates activity completed today   Modalities Parameters/  Location  Notes                     Manual Therapy Time/Technique  Notes                     Exercise/Intervention   Notes                 NuStep x5 min  x Level 5, seat 9, arms 9          Airex: heel to raises, marches, mini squats x10 ea  x    3 way hip, Hs curls x10 ea      Tandem stepping fwd/retro x2 laps ea  x    Sidestepping left/right x2 laps ea  x    rocker board fwd/lat x15 ea   10 sec balance   Hurdles: forward and lateral  x2 ea.  x    Agility mat: forward step in each square; lateral step in each square; march in each square x1  x Hand hold assist needed      Specific Interventions Next Treatment: NuStep; airex balance exercises; Activity/Treatment Tolerance:  [x]  Patient tolerated treatment well  []  Patient limited by fatigue  []  Patient limited by pain   []  Patient limited by medical complications  []  Other:     Assessment: Progressed dynamic balance/gait activities by incorporating hurdles and agility mat. As she lengthened her stride she became more unsteady and increase weight through her right UE with hand held support. Body Structures/Functions/Activity Limitations: impaired activity tolerance, impaired balance, impaired coordination, impaired endurance, impaired safety awareness, impaired strength and pain  Prognosis: fair    GOALS:  Patient Goal: minimize fall risk    Short Term Goals:  Time Frame: 4 weeks  1. Vidhi Lange will demonstrate a good ankle strategy to maintain standing balance with perturbations to her trunk. 2. Vidhi Lange will be able to stand for 20 min at a time without needing a rest break allowing her to prepare meals and perform light chores without limitation. 3. Alison's Tinetti score will improve to 19/28 indicating minimal fall risk. Long Term Goals:  Time Frame: 8 weeks  1. Discharge with independent HEP  2. Alison's Tinetti score will improve to >21/28 indicating no fall risk. 3. Vidhi Lange will be able to maintain her standing balance in all 4 conditions of the m-CTSIB, using an ankle strategy to maintain her balance.       Patient Education:   [x]  HEP/Education Completed: Plan of Care, Goals, anterior/posterior wt shift   [facility] Access Code:  []  No new Education completed  []  Reviewed Prior HEP      [x]  Patient verbalized and/or demonstrated understanding of education provided. []  Patient unable to verbalize and/or demonstrate understanding of education provided. Will continue education. [x]  Barriers to learning: n/a    PLAN:  Treatment Recommendations: Strengthening, Range of Motion, Balance Training, Endurance Training, Gait Training, Stair Training, Neuromuscular Re-education, Manual Therapy - Soft Tissue Mobilization, Manual Therapy - Joint Manipulation, Pain Management, Home Exercise Program, Patient Education and Modalities    [x]  Plan of care initiated. Plan to see patient 2 times per week for 8 weeks to address the treatment planned outlined above.   []  Continue with current plan of care  []  Modify plan of care as follows:    []  Hold pending physician visit  []  Discharge    Time In 1015   Time Out 1045   Timed Code Minutes: 30 min   Total Treatment Time: 30 min       Electronically Signed by: MINE Grimes [id number]"Arcenio Corea DPT  [id number]

## 2021-06-03 ENCOUNTER — HOSPITAL ENCOUNTER (OUTPATIENT)
Dept: PHYSICAL THERAPY | Age: 71
Setting detail: THERAPIES SERIES
Discharge: HOME | End: 2021-06-03
Payer: MEDICARE

## 2021-06-03 PROCEDURE — 97110 THERAPEUTIC EXERCISES: CPT

## 2021-06-03 PROCEDURE — 97112 NEUROMUSCULAR REEDUCATION: CPT

## 2021-06-03 PROCEDURE — 97530 THERAPEUTIC ACTIVITIES: CPT

## 2021-06-03 NOTE — PROGRESS NOTES
[address] [facility]  PHYSICAL THERAPY  [] EVALUATION  [x] DAILY NOTE (LAND) [] DAILY NOTE (AQUATIC ) [] PROGRESS NOTE [] DISCHARGE NOTE    [] [facility]   [] Carlo Gonzalez    [] [address] [facility]   [x] Nitza Ahmadi    Date: 6/3/2021  Patient Name:  Dima Guillaume  : 1950  MRN: [id number]  CSN: [id number]    Referring Practitioner Bishop Deleon, DO   Diagnosis Other intervertebral disc degeneration, lumbar region [M51.36]  Radiculopathy, lumbar region [M54.16]    Treatment Diagnosis Difficulty walking    Date of Evaluation 21    Additional Pertinent History Bi-polar; SOB related to CHF      Functional Outcome Measure Used Tinetti   Functional Outcome Score 15/28 (21)       Insurance: Primary: Payor: Heather Merrill /  /  / , aquatic therapy is covered; modalities covered except for IONTO  Secondary:    Authorization Information: Pre-certification is needed after eval    Visit # 4,  for progress note   Visits Allowed: 12   Recertification Date:    Physician Follow-Up:    Physician Orders:    History of Present Illness: Jag Fernandez is referred to PT to address ongoing balance issues. She states that she noticed that she was struggling with her balance when she slipped off a short deck at a local [facility]. She admits that over the last few months she has also had a few falls; denies any major injury as a result of the falls. She started to workout with a local  a month hoping that it would help her balance. SUBJECTIVE: Today was the first time she was able to stand on the scale without assistance and maintain her balance to get a reading!!! Noticing she is a bit more short of breath today.      TREATMENT   Precautions:    Pain:     X in shaded column indicates activity completed today   Modalities Parameters/  Location  Notes                     Manual Therapy Time/Technique  Notes Exercise/Intervention   Notes                 NuStep x5 min  x Level 4, seat 8, arms 9          Airex: heel to raises, marches, mini squats x10 ea  x    3 way hip, Hs curls x10 ea      Tandem stepping fwd/retro; forward march x2 laps ea  x    Sidestepping left/right x2 laps ea  x    rocker board fwd/lat x15 ea  x 10 sec balance   Hurdles: forward and lateral  x2 ea.  x    Agility mat: forward step in each square; lateral step in each square; march in each square x2  x Hand hold assist needed      Specific Interventions Next Treatment: NuStep; airex balance exercises; Activity/Treatment Tolerance:  [x]  Patient tolerated treatment well  []  Patient limited by fatigue  []  Patient limited by pain   []  Patient limited by medical complications  []  Other:     Assessment: Flora Staples continues to demonstrate improvements in general mobility and balance. She is demonstrating lengthened stride and increased confidence in her balance. Body Structures/Functions/Activity Limitations: impaired activity tolerance, impaired balance, impaired coordination, impaired endurance, impaired safety awareness, impaired strength and pain  Prognosis: fair    GOALS:  Patient Goal: minimize fall risk    Short Term Goals:  Time Frame: 4 weeks  1. Flora Staples will demonstrate a good ankle strategy to maintain standing balance with perturbations to her trunk. 2. Flora Staples will be able to stand for 20 min at a time without needing a rest break allowing her to prepare meals and perform light chores without limitation. 3. Alison's Tinetti score will improve to 19/28 indicating minimal fall risk. Long Term Goals:  Time Frame: 8 weeks  1. Discharge with independent HEP  2. Alison's Tinetti score will improve to >21/28 indicating no fall risk. 3. Flora Staples will be able to maintain her standing balance in all 4 conditions of the m-CTSIB, using an ankle strategy to maintain her balance.       Patient Education:   [x]  HEP/Education Completed: Plan of Care, Goals, anterior/posterior wt shift   [facility] Access Code:  []  No new Education completed  []  Reviewed Prior HEP      [x]  Patient verbalized and/or demonstrated understanding of education provided. []  Patient unable to verbalize and/or demonstrate understanding of education provided. Will continue education. [x]  Barriers to learning: n/a    PLAN:  Treatment Recommendations: Strengthening, Range of Motion, Balance Training, Endurance Training, Gait Training, Stair Training, Neuromuscular Re-education, Manual Therapy - Soft Tissue Mobilization, Manual Therapy - Joint Manipulation, Pain Management, Home Exercise Program, Patient Education and Modalities    [x]  Plan of care initiated. Plan to see patient 2 times per week for 8 weeks to address the treatment planned outlined above.   []  Continue with current plan of care  []  Modify plan of care as follows:    []  Hold pending physician visit  []  Discharge    Time In 0957   Time Out 1035   Timed Code Minutes: 38 min   Total Treatment Time: 38 min       Electronically Signed by: Anival Cook, MINE Carver [id number]"Stephanie Prasad DPT  [id number]

## 2021-06-08 ENCOUNTER — HOSPITAL ENCOUNTER (OUTPATIENT)
Dept: PHYSICAL THERAPY | Age: 71
Setting detail: THERAPIES SERIES
Discharge: HOME | End: 2021-06-08
Payer: MEDICARE

## 2021-06-08 PROCEDURE — 97112 NEUROMUSCULAR REEDUCATION: CPT

## 2021-06-08 PROCEDURE — 97530 THERAPEUTIC ACTIVITIES: CPT

## 2021-06-08 PROCEDURE — 97110 THERAPEUTIC EXERCISES: CPT

## 2021-06-08 NOTE — PROGRESS NOTES
[address] [facility]  PHYSICAL THERAPY  [] EVALUATION  [x] DAILY NOTE (LAND) [] DAILY NOTE (AQUATIC ) [] PROGRESS NOTE [] DISCHARGE NOTE    [] [address] [facility]   [] Carlo [address]    [] [address] [facility]   [x] Anil Blankenship    Date: 2021  Patient Name:  Quentin Romero  : 1950  MRN: [id number]  CSN: [id number]    Referring Practitioner Sheyla Casas DO   Diagnosis Other intervertebral disc degeneration, lumbar region [M51.36]  Radiculopathy, lumbar region [M54.16]    Treatment Diagnosis Difficulty walking    Date of Evaluation 21    Additional Pertinent History Bi-polar; SOB related to CHF      Functional Outcome Measure Used Tinetti   Functional Outcome Score 15/28 (21)       Insurance: Primary: Payor: Cristi Denney /  /  / , aquatic therapy is covered; modalities covered except for IONTO  Secondary:    Authorization Information: Pre-certification is needed after eval    Visit # 4,  for progress note   Visits Allowed: 12   Recertification Date:    Physician Follow-Up:    Physician Orders:    History of Present Illness: Joe Fink is referred to PT to address ongoing balance issues. She states that she noticed that she was struggling with her balance when she slipped off a short deck at a local [facility]. She admits that over the last few months she has also had a few falls; denies any major injury as a result of the falls. She started to workout with a local  a month hoping that it would help her balance. SUBJECTIVE: Reports feeling worn out from just finishing exercising in the [facility] with the Trainer.     TREATMENT   Precautions:    Pain:0/10     X in shaded column indicates activity completed today   Modalities Parameters/  Location  Notes                     Manual Therapy Time/Technique  Notes                     Exercise/Intervention   Notes                 NuStep x5 min  x Level 4, seat 8, arms 9          Airex: heel to raises, marches, mini squats x10 ea  x    3 way hip, Hs curls x10 ea      Tandem stepping fwd/retro x2 laps ea  x    Sidestepping left/right x2 laps ea  x    rocker board fwd/lat x15 ea  x 10 sec balance   Hurdles: forward and lateral  x2 ea. x green   Agility mat: forward step in each square; lateral step in each square; march in each square x2  x Hand hold assist needed      Specific Interventions Next Treatment: NuStep; airex balance exercises; Activity/Treatment Tolerance:  [x]  Patient tolerated treatment well  []  Patient limited by fatigue  []  Patient limited by pain   []  Patient limited by medical complications  []  Other:     Assessment: Yusuf continues to demonstrate improvements in general mobility and balance. She is demonstrating more confidence with balance tasks. Body Structures/Functions/Activity Limitations: impaired activity tolerance, impaired balance, impaired coordination, impaired endurance, impaired safety awareness, impaired strength and pain  Prognosis: fair    GOALS:  Patient Goal: minimize fall risk    Short Term Goals:  Time Frame: 4 weeks  1. Yusuf will demonstrate a good ankle strategy to maintain standing balance with perturbations to her trunk. 2. Yusuf will be able to stand for 20 min at a time without needing a rest break allowing her to prepare meals and perform light chores without limitation. 3. Alison's Tinetti score will improve to 19/28 indicating minimal fall risk. Long Term Goals:  Time Frame: 8 weeks  1. Discharge with independent HEP  2. Alison's Tinetti score will improve to >21/28 indicating no fall risk. 3. Yusuf will be able to maintain her standing balance in all 4 conditions of the m-CTSIB, using an ankle strategy to maintain her balance.       Patient Education:   [x]  HEP/Education Completed: Plan of Care, Goals, anterior/posterior wt shift   [facility] Access Code:  []  No new Education completed  []  Reviewed Prior HEP      [x]  Patient verbalized and/or demonstrated understanding of education provided. []  Patient unable to verbalize and/or demonstrate understanding of education provided. Will continue education. [x]  Barriers to learning: n/a    PLAN:  Treatment Recommendations: Strengthening, Range of Motion, Balance Training, Endurance Training, Gait Training, Stair Training, Neuromuscular Re-education, Manual Therapy - Soft Tissue Mobilization, Manual Therapy - Joint Manipulation, Pain Management, Home Exercise Program, Patient Education and Modalities    [x]  Plan of care initiated. Plan to see patient 2 times per week for 8 weeks to address the treatment planned outlined above.   []  Continue with current plan of care  []  Modify plan of care as follows:    []  Hold pending physician visit  []  Discharge    Time In 1000   Time Out 1040   Timed Code Minutes: 40 min   Total Treatment Time: 40 min       Electronically Signed by: Tiny Hong

## 2021-06-11 ENCOUNTER — HOSPITAL ENCOUNTER (OUTPATIENT)
Dept: PHYSICAL THERAPY | Age: 71
Setting detail: THERAPIES SERIES
Discharge: HOME | End: 2021-06-11
Payer: MEDICARE

## 2021-06-11 ENCOUNTER — NURSE ONLY (OUTPATIENT)
Dept: LAB | Age: 71
End: 2021-06-11

## 2021-06-11 DIAGNOSIS — N18.30 CKD (CHRONIC KIDNEY DISEASE), STAGE III (HCC): ICD-10-CM

## 2021-06-11 LAB
ANION GAP SERPL CALCULATED.3IONS-SCNC: 12 MEQ/L (ref 8–16)
BUN BLDV-MCNC: 28 MG/DL (ref 7–22)
CALCIUM SERPL-MCNC: 10.1 MG/DL (ref 8.5–10.5)
CHLORIDE BLD-SCNC: 104 MEQ/L (ref 98–111)
CO2: 25 MEQ/L (ref 23–33)
CREAT SERPL-MCNC: 1.1 MG/DL (ref 0.4–1.2)
GFR SERPL CREATININE-BSD FRML MDRD: 49 ML/MIN/1.73M2
GLUCOSE BLD-MCNC: 114 MG/DL (ref 70–108)
POTASSIUM SERPL-SCNC: 4.5 MEQ/L (ref 3.5–5.2)
PTH INTACT: 67.2 PG/ML (ref 15–65)
SODIUM BLD-SCNC: 141 MEQ/L (ref 135–145)
VITAMIN D 25-HYDROXY: 27 NG/ML (ref 30–100)

## 2021-06-11 PROCEDURE — 97112 NEUROMUSCULAR REEDUCATION: CPT

## 2021-06-11 PROCEDURE — 97530 THERAPEUTIC ACTIVITIES: CPT

## 2021-06-11 PROCEDURE — 97110 THERAPEUTIC EXERCISES: CPT

## 2021-06-11 NOTE — PROGRESS NOTES
[address] [facility]  PHYSICAL THERAPY  [] EVALUATION  [x] DAILY NOTE (LAND) [] DAILY NOTE (AQUATIC ) [] PROGRESS NOTE [] DISCHARGE NOTE    [] [facility]   [] Carlo Gonzalez    [] [address] [facility]   [x] Toby Tate    Date: 2021  Patient Name:  Naima Stevenson  : 1950  MRN: [id number]  CSN: [id number]    Referring Practitioner Mandy Reynaga DO   Diagnosis Other intervertebral disc degeneration, lumbar region [M51.36]  Radiculopathy, lumbar region [M54.16]    Treatment Diagnosis Difficulty walking    Date of Evaluation 21    Additional Pertinent History Bi-polar; SOB related to CHF      Functional Outcome Measure Used Tinetti   Functional Outcome Score 15/28 (21)       Insurance: Primary: Payor: Matteo Leahy /  /  / , aquatic therapy is covered; modalities covered except for IONTO  Secondary:    Authorization Information: Pre-certification is needed after eval    Visit # 5,  for progress note   Visits Allowed: 12   Recertification Date:    Physician Follow-Up:    Physician Orders:    History of Present Illness: Shahrzad Aguilar is referred to PT to address ongoing balance issues. She states that she noticed that she was struggling with her balance when she slipped off a short deck at a local [facility]. She admits that over the last few months she has also had a few falls; denies any major injury as a result of the falls. She started to workout with a local  a month hoping that it would help her balance.       SUBJECTIVE: Reports Doing leg exercises at home that are helping the knees    TREATMENT   Precautions:    Pain:0/10     X in shaded column indicates activity completed today   Modalities Parameters/  Location  Notes                     Manual Therapy Time/Technique  Notes                     Exercise/Intervention   Notes                 NuStep x5 min  x Level 4, seat 8, arms 9          Airex: heel to raises, marches, mini squats x10 ea  x    3 way hip, Hs curls x10 ea  x    Tandem stepping fwd/retro x2 laps ea  x    Sidestepping left/right x2 laps ea  x    rocker board fwd/lat x15 ea  x 10 sec balance   Hurdles: forward and lateral  x2 ea. x green   Agility mat: forward step in each square; lateral step in each square; march in each square x2  x Hand hold assist needed      Specific Interventions Next Treatment: NuStep; airex balance exercises; Activity/Treatment Tolerance:  [x]  Patient tolerated treatment well  []  Patient limited by fatigue  []  Patient limited by pain   []  Patient limited by medical complications  []  Other:     Assessment: Vidhi Lange continues to demonstrate improvements in general mobility and balance and reports exercising at home. Body Structures/Functions/Activity Limitations: impaired activity tolerance, impaired balance, impaired coordination, impaired endurance, impaired safety awareness, impaired strength and pain  Prognosis: fair    GOALS:  Patient Goal: minimize fall risk    Short Term Goals:  Time Frame: 4 weeks  1. Vidhi Lange will demonstrate a good ankle strategy to maintain standing balance with perturbations to her trunk. 2. Vidhi Lange will be able to stand for 20 min at a time without needing a rest break allowing her to prepare meals and perform light chores without limitation. 3. Alison's Tinetti score will improve to 19/28 indicating minimal fall risk. Long Term Goals:  Time Frame: 8 weeks  1. Discharge with independent HEP  2. Alison's Tinetti score will improve to >21/28 indicating no fall risk. 3. Vidhi Lange will be able to maintain her standing balance in all 4 conditions of the m-CTSIB, using an ankle strategy to maintain her balance.       Patient Education:   [x]  HEP/Education Completed: Plan of Care, Goals, anterior/posterior wt shift   [facility] Access Code:  []  No new Education completed  []  Reviewed Prior HEP      [x]  Patient verbalized and/or demonstrated understanding of education provided. []  Patient unable to verbalize and/or demonstrate understanding of education provided. Will continue education. [x]  Barriers to learning: n/a    PLAN:  Treatment Recommendations: Strengthening, Range of Motion, Balance Training, Endurance Training, Gait Training, Stair Training, Neuromuscular Re-education, Manual Therapy - Soft Tissue Mobilization, Manual Therapy - Joint Manipulation, Pain Management, Home Exercise Program, Patient Education and Modalities    [x]  Plan of care initiated. Plan to see patient 2 times per week for 8 weeks to address the treatment planned outlined above.   []  Continue with current plan of care  []  Modify plan of care as follows:    []  Hold pending physician visit  []  Discharge    Time In 0925   Time Out 1005   Timed Code Minutes: 40 min   Total Treatment Time: 40 min       Electronically Signed by: Moo Carrillo

## 2021-06-15 ENCOUNTER — HOSPITAL ENCOUNTER (OUTPATIENT)
Dept: PHYSICAL THERAPY | Age: 71
Setting detail: THERAPIES SERIES
Discharge: HOME | End: 2021-06-15
Payer: MEDICARE

## 2021-06-15 PROCEDURE — 97110 THERAPEUTIC EXERCISES: CPT

## 2021-06-15 PROCEDURE — 97530 THERAPEUTIC ACTIVITIES: CPT

## 2021-06-15 PROCEDURE — 97112 NEUROMUSCULAR REEDUCATION: CPT

## 2021-06-15 NOTE — PROGRESS NOTES
x Level 4, seat 8, arms 9          Airex: heel to raises, marches, mini squats x10 ea  x    3 way hip, Hs curls x10 ea      Tandem stepping fwd/retro x2 laps ea  x    Sidestepping left/right x2 laps ea  x    rocker board fwd/lat x15 ea  x 10 sec balance   Hurdles: forward and lateral  x2 ea. green   Agility mat: forward step in each square; lateral step in each square; march in each square x2  x Hand hold assist needed      Specific Interventions Next Treatment: NuStep; airex balance exercises; Activity/Treatment Tolerance:  [x]  Patient tolerated treatment well  []  Patient limited by fatigue  []  Patient limited by pain   []  Patient limited by medical complications  []  Other:     Assessment: Yusuf has demonstrated clinically significant improvements in dynamic balance and gait since starting therapy as indicated by her improved Tinetti score from 15/28 to 22/28. She will continue to benefit from PT to work to progress dynamic balance and gait with emphasis on maintaining standing balance while performing duel task, ie standing unsupported while chatting with a friend. Body Structures/Functions/Activity Limitations: impaired activity tolerance, impaired balance, impaired coordination, impaired endurance, impaired safety awareness, impaired strength and pain  Prognosis: fair    GOALS:  Patient Goal: minimize fall risk    Short Term Goals:  Time Frame: 4 weeks  1. Yusuf will demonstrate a good ankle strategy to maintain standing balance with perturbations to her trunk. 2. Yusuf will be able to stand for 20 min at a time without needing a rest break allowing her to prepare meals and perform light chores without limitation. NOT MET-limited to 5 min before she has to sit due to back pain; uses a kitchen stool a lot  3. Alison's Tinetti score will improve to 19/28 indicating minimal fall risk. GOAL MET-improved to 22/28      Long Term Goals:  Time Frame: 8 weeks  1. Discharge with independent HEP ONGOING  2. Alison's Tinetti score will improve to >21/28 indicating no fall risk. GOAL MET; REVISE to improve Tinetti score to >26/28  3. Laura Banks will be able to maintain her standing balance in all 4 conditions of the m-CTSIB, using an ankle strategy to maintain her balance. NOT MET-able to maintain standing balance in conditions 1-3; LOB after 3 sec in condition 4      Patient Education:   [x]  HEP/Education Completed: Plan of Care, Goals, anterior/posterior wt shift   [facility] Access Code:  []  No new Education completed  []  Reviewed Prior HEP      [x]  Patient verbalized and/or demonstrated understanding of education provided. []  Patient unable to verbalize and/or demonstrate understanding of education provided. Will continue education. [x]  Barriers to learning: n/a    PLAN:  Treatment Recommendations: Strengthening, Range of Motion, Balance Training, Endurance Training, Gait Training, Stair Training, Neuromuscular Re-education, Manual Therapy - Soft Tissue Mobilization, Manual Therapy - Joint Manipulation, Pain Management, Home Exercise Program, Patient Education and Modalities    []  Plan of care initiated. Plan to see patient 2 times per week for 8 weeks to address the treatment planned outlined above.   [x]  Continue with current plan of care  []  Modify plan of care as follows:    []  Hold pending physician visit  []  Discharge    Time In 1005   Time Out 1045   Timed Code Minutes: 40 min   Total Treatment Time: 40 min       Electronically Signed by: MINE Dangelo [id number]"Ann Stauffer DPT  [id number]

## 2021-06-17 ENCOUNTER — HOSPITAL ENCOUNTER (OUTPATIENT)
Dept: PHYSICAL THERAPY | Age: 71
Setting detail: THERAPIES SERIES
Discharge: HOME | End: 2021-06-17
Payer: MEDICARE

## 2021-06-17 PROCEDURE — 97110 THERAPEUTIC EXERCISES: CPT

## 2021-06-17 PROCEDURE — 97112 NEUROMUSCULAR REEDUCATION: CPT

## 2021-06-17 PROCEDURE — 97530 THERAPEUTIC ACTIVITIES: CPT

## 2021-06-17 NOTE — PROGRESS NOTES
[address] [facility]  PHYSICAL THERAPY  [] EVALUATION  [] DAILY NOTE (LAND) [] DAILY NOTE (AQUATIC ) [x] PROGRESS NOTE [] DISCHARGE NOTE    [] OUTPATIENT RE[facility]ABIL[facility]ATION [facility]ENTER [facility]   [] Carlo Gonzalez    [] [address] [facility]   [x] Hany Treviño    Date: 2021  Patient Name:  Vince Womack  : 1950  MRN: [id number]  CSN: [id number]    Referring Practitioner Maria Fernanda Lugo DO   Diagnosis Other intervertebral disc degeneration, lumbar region [M51.36]  Radiculopathy, lumbar region [M54.16]    Treatment Diagnosis Difficulty walking    Date of Evaluation 21    Additional Pertinent History Bi-polar; SOB related to CHF      Functional Outcome Measure Used Tinetti   Functional Outcome Score 15/28 (21)       Insurance: Primary: Payor: Berto Justice /  /  / , aquatic therapy is covered; modalities covered except for IONTO  Secondary:    Authorization Information: Pre-certification is needed after eval    Visit # 7,  for progress note   Visits Allowed: 12   Recertification Date:    Physician Follow-Up:    Physician Orders:    History of Present Illness: Flora Staples is referred to PT to address ongoing balance issues. She states that she noticed that she was struggling with her balance when she slipped off a short deck at a local [employer]. She admits that over the last few months she has also had a few falls; denies any major injury as a result of the falls. She started to workout with a local  a month hoping that it would help her balance. SUBJECTIVE: Flora Staples reports waking with LB pain on her left side today.     TREATMENT   Precautions:    Pain:4/10 Left LB    X in shaded column indicates activity completed today   Modalities Parameters/  Location  Notes                     Manual Therapy Time/Technique  Notes                     Exercise/Intervention   Notes   Mariano; review of goals    x           NuStep x6 min  x Level 4, seat

## 2021-06-18 ENCOUNTER — OFFICE VISIT (OUTPATIENT)
Dept: NEPHROLOGY | Age: 71
End: 2021-06-18
Payer: MEDICARE

## 2021-06-18 VITALS
WEIGHT: 245 LBS | OXYGEN SATURATION: 97 % | HEART RATE: 77 BPM | BODY MASS INDEX: 42.05 KG/M2 | DIASTOLIC BLOOD PRESSURE: 58 MMHG | SYSTOLIC BLOOD PRESSURE: 106 MMHG

## 2021-06-18 DIAGNOSIS — N18.2 CKD (CHRONIC KIDNEY DISEASE), STAGE II: ICD-10-CM

## 2021-06-18 DIAGNOSIS — N18.31 STAGE 3A CHRONIC KIDNEY DISEASE (HCC): Primary | ICD-10-CM

## 2021-06-18 PROCEDURE — G8400 PT W/DXA NO RESULTS DOC: HCPCS | Performed by: INTERNAL MEDICINE

## 2021-06-18 PROCEDURE — 1036F TOBACCO NON-USER: CPT | Performed by: INTERNAL MEDICINE

## 2021-06-18 PROCEDURE — 3017F COLORECTAL CA SCREEN DOC REV: CPT | Performed by: INTERNAL MEDICINE

## 2021-06-18 PROCEDURE — 1090F PRES/ABSN URINE INCON ASSESS: CPT | Performed by: INTERNAL MEDICINE

## 2021-06-18 PROCEDURE — G8427 DOCREV CUR MEDS BY ELIG CLIN: HCPCS | Performed by: INTERNAL MEDICINE

## 2021-06-18 PROCEDURE — G8417 CALC BMI ABV UP PARAM F/U: HCPCS | Performed by: INTERNAL MEDICINE

## 2021-06-18 PROCEDURE — 1123F ACP DISCUSS/DSCN MKR DOCD: CPT | Performed by: INTERNAL MEDICINE

## 2021-06-18 PROCEDURE — 99213 OFFICE O/P EST LOW 20 MIN: CPT | Performed by: INTERNAL MEDICINE

## 2021-06-18 PROCEDURE — 4040F PNEUMOC VAC/ADMIN/RCVD: CPT | Performed by: INTERNAL MEDICINE

## 2021-06-18 RX ORDER — SPIRONOLACTONE 25 MG/1
25 TABLET ORAL DAILY
Qty: 30 TABLET | Refills: 5 | Status: SHIPPED | OUTPATIENT
Start: 2021-06-18 | End: 2021-12-27 | Stop reason: SDUPTHER

## 2021-06-18 NOTE — PROGRESS NOTES
[facility]    [facility], [address]   Radha Pace  [phone]  Progress Note  2021 10:45 AM      Pt Name:    Jorge Laurent  YOB: 1950  Primary Care Physician:  Codi Lopez DO     Chief Complaint:   Chief Complaint   Patient presents with    Follow-up     CKD III        History of Present Illness: This is a follow-up visit for CKD III and HTN. She was hospitalized in  at [facility] for depression. Has chronic ESCOTO, unchanged. No edema, on bumex 1 mg daily, spironolactone 25 mg daily. Will take additional bumex few days a week if noticing more swelling. She is having issues with constipation. Asking about lifting fluid restriction slightly as this seems to help her more with the constipation and abdominal bloating she is having. She has hx of HTN, diastolic CHF,pulm HTN,  obesity, thoracic AA, OA, RANDY, bipolar disorder, nephrolithiasis. Pertinent items are noted in HPI.          Past History:  Past Medical History:   Diagnosis Date    Acid reflux     Arthritis     Asthma     Bipolar 1 disorder ([address]     CHF (congestive heart failure) (HCC)     CKD (chronic kidney disease), stage II 2019    History of blood transfusion     Hypertension     Mood disorder (HCC)     Sleep apnea     Thyroid disease      Past Surgical History:   Procedure Laterality Date    ANKLE SURGERY      left    CATARACT REMOVAL      Both eyes      SECTION      x 3    FOOT SURGERY      Left     HIP SURGERY      HYSTERECTOMY      Total     TOTAL KNEE ARTHROPLASTY Left 10/14/14    TOTAL KNEE ARTHROPLASTY Right 2019        VITALS:  BP (!) 106/58 (Site: Right Upper Arm, Position: Sitting, Cuff Size: Large Adult)   Pulse 77   Wt 245 lb (111.1 kg)   SpO2 97%   BMI 42.05 kg/m²   Wt Readings from Last 3 Encounters:   21 245 lb (111.1 kg)   21 250 lb (113.4 kg)   21 254 lb 9.6 oz (115.5 kg)     Body mass index is 42.05 kg/m². General Appearance: alert and cooperative with exam, appears comfortable, no distress  HEENT: EOMI, moist oral mucus membranes  Neck: No jugular venous distention,   Lungs: Air entry B/L, no crackles or rales, no use of accessory muscles  Heart: S1, S2 heard, no rub  GI: soft, non-tender, no guarding,   Extremities: No sig LE edema, no cyanosis  Skin: warm, dry  Neurologic: no tremor, no asterixis,      Medications:  Current Outpatient Medications   Medication Sig Dispense Refill    donepezil (ARICEPT) 10 MG tablet Take 10 mg by mouth daily      diclofenac sodium (VOLTAREN) 1 % GEL Apply topically as needed      carvedilol (COREG) 12.5 MG tablet Take 1 tablet by mouth 2 times daily 180 tablet 5    hydrALAZINE (APRESOLINE) 50 MG tablet Take 1 tablet by mouth 3 times daily 270 tablet 3    bumetanide (BUMEX) 1 MG tablet 1-2 tabs daily as needed. 180 tablet 3    spironolactone (ALDACTONE) 25 MG tablet Take 1 tablet by mouth daily 30 tablet 5    SYNTHROID 125 MCG tablet Take 1 tablet by mouth daily Take with water on an empty stomach- wait 30 minutes before eating or taking other meds. 30 tablet 11    ARIPiprazole (ABILIFY) 5 MG tablet Take 2 tablets by mouth daily 30 tablet 3    OLANZapine (ZYPREXA) 15 MG tablet Take 15 mg by mouth daily      atorvastatin (LIPITOR) 20 MG tablet Take 1 tablet by mouth daily 90 tablet 3    omeprazole (PRILOSEC) 20 MG delayed release capsule Take 1 capsule by mouth every morning (before breakfast) 90 capsule 3    aspirin 81 MG EC tablet Take 81 mg by mouth daily      acetaminophen (TYLENOL) 325 MG tablet Take 650 mg by mouth 4 times daily       ferrous sulfate 325 (65 Fe) MG tablet Take 325 mg by mouth daily (with breakfast)      clonazePAM (KLONOPIN) 1 MG tablet Take 1 mg by mouth 3 times daily as needed.        venlafaxine (EFFEXOR XR) 150 MG extended release capsule Take 225 mg by mouth daily       gabapentin (NEURONTIN) 300 MG capsule Take 300 mg by mouth 3 times daily.  tiZANidine (ZANAFLEX) 2 MG tablet Take 2 mg by mouth 2 times daily      Mirabegron ER (MYRBETRIQ) 50 MG TB24 Take 50 mg by mouth daily      lamoTRIgine (LAMICTAL) 100 MG tablet Take 100 mg by mouth daily Patient taking 1 tablet TID      Ascorbic Acid (VITAMIN C) 500 MG tablet Take 500 mg by mouth daily.  Multiple Vitamin (MULTIVITAMIN PO) Take 1 tablet by mouth daily. No current facility-administered medications for this visit.         Laboratory & Diagnostics:  CBC:   Lab Results   Component Value Date    WBC 8.0 01/13/2021    HGB 12.9 01/13/2021    HCT 39.6 01/13/2021    MCV 92.1 04/28/2020     01/13/2021     BMP:    Lab Results   Component Value Date     06/11/2021     02/25/2021     01/20/2021    K 4.5 06/11/2021    K 4.6 02/25/2021    K 4.1 01/20/2021     06/11/2021     02/25/2021     01/20/2021    CO2 25 06/11/2021    CO2 29 02/25/2021    CO2 23 01/20/2021    BUN 28 (H) 06/11/2021    BUN 42 (H) 02/25/2021    BUN 45 01/20/2021    CREATININE 1.1 06/11/2021    CREATININE 1.1 02/25/2021    CREATININE 1.23 01/20/2021    GLUCOSE 114 (H) 06/11/2021    GLUCOSE 106 02/25/2021    GLUCOSE 103 01/20/2021      Hepatic:   Lab Results   Component Value Date    AST 33 01/13/2021    AST 19 04/04/2020    AST 19 09/29/2019    ALT 44 01/13/2021    ALT 18 04/04/2020    ALT 18 09/29/2019    BILITOT 0.3 01/13/2021    BILITOT 0.2 (L) 04/04/2020    BILITOT <0.2 (L) 09/29/2019    ALKPHOS 68 01/13/2021    ALKPHOS 107 04/04/2020    ALKPHOS 83 09/29/2019     BNP:   Lab Results   Component Value Date    BNP 14 09/17/2020     (H) 10/08/2019     (H) 10/08/2019     Lipids:   Lab Results   Component Value Date    CHOL 180 10/08/2019    HDL 52 10/08/2019     INR:   Lab Results   Component Value Date    INR 0.88 04/04/2020    INR 0.90 10/18/2019     URINE:   Lab Results   Component Value Date    PROTUR 30 mg/dL 09/23/2014     Lab Results Component Value Date    NITRU NEGATIVE 09/30/2019    COLORU YELLOW 09/30/2019    PHUR 6.5 09/30/2019    WBCUA 0-5 09/23/2014    RBCUA None 09/23/2014    MUCUS Present 09/23/2014    BACTERIA Trace 09/23/2014    CLARITYU sl cloudy 10/25/2011    SPECGRAV 1.012 09/13/2019    LEUKOCYTESUR NEGATIVE 09/30/2019    UROBILINOGEN 0.2 09/30/2019    BILIRUBINUR NEGATIVE 09/30/2019    BILIRUBINUR beg 10/25/2011    BLOODU NEGATIVE 09/30/2019    GLUCOSEU NEGATIVE 09/30/2019    KETUA NEGATIVE 09/30/2019      Microalbumen/Creatinine ratio:  No components found for: RUCREAT        Impression/Plan:   1. CKD III due to cardiorenal syndrome. stable  2. Essential HTN: controlled. Hx of angioedema from ACE-I  3. Diastolic CHF/pulm HTN: appears euvolemic, on bumex 1 mg daily and spironolactone 25 mg daily. Told pt I am okay with her increasing fluid restriction slightly to help with constipation issues if ok with CHF clinic  4. RANDY  5. Bipolar disorder        Bloodwork and medications were reviewed and plan of care discussed with the patient. Return to clinic in 1 year  or sooner if the need arises.       Jami Stacy DO  [facility]

## 2021-06-22 ENCOUNTER — HOSPITAL ENCOUNTER (OUTPATIENT)
Dept: PHYSICAL THERAPY | Age: 71
Setting detail: THERAPIES SERIES
End: 2021-06-22
Payer: MEDICARE

## 2021-07-08 ENCOUNTER — HOSPITAL ENCOUNTER (OUTPATIENT)
Dept: PHYSICAL THERAPY | Age: 71
Setting detail: THERAPIES SERIES
Discharge: HOME | End: 2021-07-08
Payer: MEDICARE

## 2021-07-08 PROCEDURE — 97530 THERAPEUTIC ACTIVITIES: CPT

## 2021-07-08 PROCEDURE — 97112 NEUROMUSCULAR REEDUCATION: CPT

## 2021-07-08 PROCEDURE — 97110 THERAPEUTIC EXERCISES: CPT

## 2021-07-08 NOTE — PROGRESS NOTES
[facility] [facility]  PHYSICAL THERAPY  [] EVALUATION  [x] DAILY NOTE (LAND) [] DAILY NOTE (AQUATIC ) [] PROGRESS NOTE [] DISCHARGE NOTE    [] [facility]   [] Carlo [address]    [] [address] [employer]   [x] Tammy Hand    Date: 2021  Patient Name:  Alexa Guo  : 1950  MRN: [id number]  CSN: [id number]    Referring Practitioner Giuliana Atkinson DO   Diagnosis Other intervertebral disc degeneration, lumbar region [M51.36]  Radiculopathy, lumbar region [M54.16]    Treatment Diagnosis Difficulty walking    Date of Evaluation 21    Additional Pertinent History Bi-polar; SOB related to CHF      Functional Outcome Measure Used Tinetti   Functional Outcome Score 15/28 (21)       Insurance: Primary: Payor: Gene Mckeon /  /  / , aquatic therapy is covered; modalities covered except for IONTO  Secondary:    Authorization Information: Pre-certification is needed after eval    Visit # 8,  for progress note   Visits Allowed: 12   Recertification Date:    Physician Follow-Up:    Physician Orders:    History of Present Illness: Tylor Ruth is referred to PT to address ongoing balance issues. She states that she noticed that she was struggling with her balance when she slipped off a short deck at a local [employer]. She admits that over the last few months she has also had a few falls; denies any major injury as a result of the falls. She started to workout with a local  a month hoping that it would help her balance. SUBJECTIVE: Tylor Ruth reports feeling short of breath today.     TREATMENT   Precautions:    Pain:4/10 Left LB    X in shaded column indicates activity completed today   Modalities Parameters/  Location  Notes                     Manual Therapy Time/Technique  Notes                     Exercise/Intervention   Notes   Mariano; review of goals    x           NuStep x6 min  x Level 4, seat 8, arms 9 1-3; LOB after 3 sec in condition 4      Patient Education:   [x]  HEP/Education Completed: Plan of Care, Goals, anterior/posterior wt shift   Medbridge Access Code:  []  No new Education completed  []  Reviewed Prior HEP      [x]  Patient verbalized and/or demonstrated understanding of education provided. []  Patient unable to verbalize and/or demonstrate understanding of education provided. Will continue education. [x]  Barriers to learning: n/a    PLAN:  Treatment Recommendations: Strengthening, Range of Motion, Balance Training, Endurance Training, Gait Training, Stair Training, Neuromuscular Re-education, Manual Therapy - Soft Tissue Mobilization, Manual Therapy - Joint Manipulation, Pain Management, Home Exercise Program, Patient Education and Modalities    []  Plan of care initiated. Plan to see patient 2 times per week for 8 weeks to address the treatment planned outlined above.   [x]  Continue with current plan of care  []  Modify plan of care as follows:    []  Hold pending physician visit  []  Discharge    Time In 1110   Time Out 1148   Timed Code Minutes: 38 min   Total Treatment Time: 38 min       Electronically Signed by: Haider Bergman

## 2021-07-12 ENCOUNTER — HOSPITAL ENCOUNTER (OUTPATIENT)
Dept: PHYSICAL THERAPY | Age: 71
Setting detail: THERAPIES SERIES
Discharge: HOME | End: 2021-07-12
Payer: MEDICARE

## 2021-07-12 PROCEDURE — 97112 NEUROMUSCULAR REEDUCATION: CPT

## 2021-07-12 PROCEDURE — 97530 THERAPEUTIC ACTIVITIES: CPT

## 2021-07-12 NOTE — PROGRESS NOTES
** PLEASE SIGN, DATE AND TIME CERTIFICATION BELOW AND RETURN TO [facility] OUTPATIENT REHABILITATION (FAX #: [phone]). ATTEST/CO-SIGN IF ACCESSING VIA [employer]. THANK YOU.**    I certify that I have examined the patient below and determined that Physical Medicine and Rehabilitation service is necessary and that I approve the established plan of care for up to 90 days or as specifically noted. Attestation, signature or co-signature of physician indicates approval of certification requirements.    ________________________ ____________ __________  Physician Signature   Date   Time      7115 [facility]  PHYSICAL THERAPY  [] EVALUATION  [] DAILY NOTE (LAND) [] DAILY NOTE (AQUATIC ) [x] PROGRESS NOTE [] DISCHARGE NOTE    [] 615 [facility]   [] [facility] 90    [] 645 [facility]   [x] Tamiko Henrywig    Date: 2021  Patient Name:  Piyush Valles  : 1950  MRN: [id number]  CSN: [id number]    Referring Practitioner Lexie Black DO   Diagnosis Other intervertebral disc degeneration, lumbar region [M51.36]  Radiculopathy, lumbar region [M54.16]    Treatment Diagnosis Difficulty walking    Date of Evaluation 21    Additional Pertinent History Bi-polar; SOB related to CHF      Functional Outcome Measure Used Tinetti   Functional Outcome Score 15 (21)   24/28 (21)      Insurance: Primary: Payor: Kamille Johnston /  /  / , aquatic therapy is covered; modalities covered except for IONTO  Secondary:    Authorization Information: Pre-certification is needed after eval    Visit # 10, 10/12 for progress note   Visits Allowed: 12   Recertification Date:    Physician Follow-Up:    Physician Orders:    History of Present Illness: Colonel Pascual is referred to PT to address ongoing balance issues. She states that she noticed that she was struggling with her balance when she slipped off a short deck at a local [employer].  She admits that over the last few months she has also had a few falls; denies any major injury as a result of the falls. She started to workout with a local  a month hoping that it would help her balance. SUBJECTIVE: Having more back pain today; she had an injection 4 months ago. She is getting ready to go on vacation for a month. TREATMENT   Precautions:    Pain:4/10 Left LB    X in shaded column indicates activity completed today   Modalities Parameters/  Location  Notes                     Manual Therapy Time/Technique  Notes                     Exercise/Intervention   Notes   Mariano; review of goals    x           NuStep x6 min  x Level 4, seat 8, arms 9          Airex: heel to raises, marches, mini squats x10 ea  x    3 way hip, Hs curls x10 ea      Tandem stepping fwd/retro x2 laps ea  x    Sidestepping left/right x2 laps ea  x    rocker board fwd/lat x15 ea  x 10 sec balance with 1UE support   Hurdles: forward and lateral  x2 ea. x green   Agility mat: forward step in each square; lateral step in each square; march in each square x2  x Hand hold assist needed      Specific Interventions Next Treatment: NuStep; airex balance exercises; Activity/Treatment Tolerance:  [x]  Patient tolerated treatment well  []  Patient limited by fatigue  []  Patient limited by pain   []  Patient limited by medical complications  []  Other:     Assessment: Braulio Duenas did good with all activities today without becoming short of breath    Body Structures/Functions/Activity Limitations: impaired activity tolerance, impaired balance, impaired coordination, impaired endurance, impaired safety awareness, impaired strength and pain  Prognosis: fair    GOALS:  Patient Goal: minimize fall risk    Short Term Goals:  Time Frame: 4 weeks  1. Braulio Duenas will demonstrate a good ankle strategy to maintain standing balance with perturbations to her trunk.   2. Braulio Duenas will be able to stand for 20 min at a time without needing a rest break allowing her to prepare meals and perform light chores without limitation. NOT MET-limited to 5 min before she has to sit due to back pain; uses a kitchen stool a lot  3. Alison's Tinetti score will improve to 19/28 indicating minimal fall risk. GOAL MET-improved to 22/28      Long Term Goals:  Time Frame: 8 weeks  1. Discharge with independent HEP ONGOING  2. Alison's Tinetti score will improve to >21/28 indicating no fall risk. GOAL MET; REVISE to improve Tinetti score to >26/28; NOT MET-improved to 24/28  3. Bernadette Chaparro will be able to maintain her standing balance in all 4 conditions of the m-CTSIB, using an ankle strategy to maintain her balance. NOT MET-able to maintain standing balance in conditions 1-3; LOB after 7 sec in condition 4      Patient Education:   [x]  HEP/Education Completed: Plan of Care, Goals, anterior/posterior wt shift   Medbridge Access Code:  []  No new Education completed  []  Reviewed Prior HEP      [x]  Patient verbalized and/or demonstrated understanding of education provided. []  Patient unable to verbalize and/or demonstrate understanding of education provided. Will continue education. [x]  Barriers to learning: n/a    PLAN:  Treatment Recommendations: Strengthening, Range of Motion, Balance Training, Endurance Training, Gait Training, Stair Training, Neuromuscular Re-education, Manual Therapy - Soft Tissue Mobilization, Manual Therapy - Joint Manipulation, Pain Management, Home Exercise Program, Patient Education and Modalities    []  Plan of care initiated. Plan to see patient 2 times per week for 8 weeks to address the treatment planned outlined above. []  Continue with current plan of care  [x]  Modify plan of care as follows: Will complete remaining 2 visits once she returns from vacation.   []  Hold pending physician visit  []  Discharge    Time In 0930   Time Out 1001   Timed Code Minutes: 31 min   Total Treatment Time: 31 min       Electronically Signed by: Gale Hernandez Marcello Fall, PT   Klever Y Mehul [id number]"Luana, DPT  [id number]

## 2021-08-17 RX ORDER — OMEPRAZOLE 20 MG/1
20 CAPSULE, DELAYED RELEASE ORAL
Qty: 90 CAPSULE | Refills: 3 | Status: SHIPPED | OUTPATIENT
Start: 2021-08-17 | End: 2022-09-27 | Stop reason: SDUPTHER

## 2021-08-17 NOTE — TELEPHONE ENCOUNTER
Requested Prescriptions     Pending Prescriptions Disp Refills    omeprazole (PRILOSEC) 20 MG delayed release capsule 90 capsule 3     Sig: Take 1 capsule by mouth every morning (before breakfast)

## 2021-08-19 ENCOUNTER — HOSPITAL ENCOUNTER (OUTPATIENT)
Dept: PHYSICAL THERAPY | Age: 71
Setting detail: THERAPIES SERIES
Discharge: HOME | End: 2021-08-19
Payer: MEDICARE

## 2021-08-19 PROCEDURE — 97530 THERAPEUTIC ACTIVITIES: CPT

## 2021-08-19 PROCEDURE — 97112 NEUROMUSCULAR REEDUCATION: CPT

## 2021-08-19 PROCEDURE — 97110 THERAPEUTIC EXERCISES: CPT

## 2021-08-19 NOTE — PROGRESS NOTES
7[address] [facility]  PHYSICAL THERAPY  [] EVALUATION  [] DAILY NOTE (LAND) [] DAILY NOTE (AQUATIC ) [x] PROGRESS NOTE [] DISCHARGE NOTE    [] [facility]   [] Chiragunruly Gonzalez    [] [address] [employer]   [x] Angel Linear    Date: 2021  Patient Name:  Nahid Vasques  : 1950  MRN: [id number]  CSN: [id number]    Referring Practitioner Nick Bear DO   Diagnosis Other intervertebral disc degeneration, lumbar region [M51.36]  Radiculopathy, lumbar region [M54.16]    Treatment Diagnosis Difficulty walking    Date of Evaluation 21    Additional Pertinent History Bi-polar; SOB related to CHF      Functional Outcome Measure Used Tinetti   Functional Outcome Score 15/28 (21)    (21)      Insurance: Primary: Payor: Yamile Montero /  /  / , aquatic therapy is covered; modalities covered except for IONTO  Secondary:    Authorization Information: [id number] [facility] [address], [id number], [phone],   FROM 12 TO 21  AUTH. #[id number]   Visit # 11,  for progress note   Visits Allowed: 16   Recertification Date:    Physician Follow-Up:    Physician Orders:    History of Present Illness: Yusuf is referred to PT to address ongoing balance issues. She states that she noticed that she was struggling with her balance when she slipped off a short deck [facility]. She admits that over the last few months she has also had a few falls; denies any major injury as a result of the falls. She started to workout with a local  a month hoping that it would help her balance. SUBJECTIVE: Had a good vacation; back pain comes and goes.     TREATMENT   Precautions:    Pain:4/10 Denies; knees are stiff     X in shaded column indicates activity completed today   Modalities Parameters/  Location  Notes                     Manual Therapy Time/Technique  Notes Exercise/Intervention   Notes   Tinetti; review of goals    x           NuStep x6 min  x Level 5, seat 8, arms 9          Airex: heel to raises, marches, mini squats x10 ea  x    3 way hip, Hs curls x10 ea      Tandem stepping fwd/retro x2 laps ea  x    Sidestepping left/right; tandem stepping; retro x2 laps ea  x    rocker board fwd/lat x15 ea  x 10 sec balance with 1UE support   Hurdles: forward and lateral  x2 ea. x green   Agility mat: forward step in each square; lateral step in each square; march in each square x2  x Hand hold assist needed      Specific Interventions Next Treatment: NuStep; airex balance exercises; Activity/Treatment Tolerance:  [x]  Patient tolerated treatment well  []  Patient limited by fatigue  []  Patient limited by pain   []  Patient limited by medical complications  []  Other:     Assessment: Renae Toure returns to therapy after having been on vacation for the last month. She did struggle with dynamic balance activities today; progressed HEP by incorporating side stepping and tandem walk. She will continue to benefit from PT to work to progress dynamic balance and gait activities while also adding more core exercises to address more painful low back. Body Structures/Functions/Activity Limitations: impaired activity tolerance, impaired balance, impaired coordination, impaired endurance, impaired safety awareness, impaired strength and pain  Prognosis: fair    GOALS:  Patient Goal: minimize fall risk    Short Term Goals:  Time Frame: 4 weeks  1. Renae Toure will demonstrate a good ankle strategy to maintain standing balance with perturbations to her trunk. 2. Renae Toure will be able to stand for 20 min at a time without needing a rest break allowing her to prepare meals and perform light chores without limitation. NOT MET-limited to 5 min before she has to sit due to back pain; uses a kitchen stool a lot  3. Alison's Tinetti score will improve to 19/28 indicating minimal fall risk.

## 2021-08-20 RX ORDER — ATORVASTATIN CALCIUM 20 MG/1
20 TABLET, FILM COATED ORAL DAILY
Qty: 90 TABLET | Refills: 0 | Status: SHIPPED | OUTPATIENT
Start: 2021-08-20 | End: 2021-12-08 | Stop reason: SDUPTHER

## 2021-08-26 ENCOUNTER — HOSPITAL ENCOUNTER (OUTPATIENT)
Dept: PHYSICAL THERAPY | Age: 71
Setting detail: THERAPIES SERIES
Discharge: HOME | End: 2021-08-26
Payer: MEDICARE

## 2021-08-26 PROCEDURE — 97530 THERAPEUTIC ACTIVITIES: CPT

## 2021-08-26 PROCEDURE — 97110 THERAPEUTIC EXERCISES: CPT

## 2021-08-26 NOTE — PROGRESS NOTES
Time/Technique  Notes                     Exercise/Intervention   Notes   Mariano; review of goals               NuStep x6 min  x Level 5, seat 8, arms 9   Forward step ups  x10   x Bilaterally    Airex: heel to raises, marches, mini squats x10 ea  x    3 way hip, Hs curls x10 ea  x Green band   Tandem stepping fwd/retro x2 laps ea  x    Sidestepping left/right; tandem stepping; retro x2 laps ea  x    rocker board fwd/lat x15 ea  x 10 sec balance with 1UE support   Hurdles: forward and lateral  x2 ea. x green   Agility mat: forward step in each square; lateral step in each square; march in each square x2  x Hand hold assist needed    \"Walked the line\"-tandem walk  x2  x                    Specific Interventions Next Treatment: NuStep; airex balance exercises; Activity/Treatment Tolerance:  [x]  Patient tolerated treatment well  []  Patient limited by fatigue  []  Patient limited by pain   []  Patient limited by medical complications  []  Other:     Assessment: Progressed dynamic gait program by having her tandem walk outside of the // bars. Definite need of hand held assist and she struggled more with consistent stepping pattern. Body Structures/Functions/Activity Limitations: impaired activity tolerance, impaired balance, impaired coordination, impaired endurance, impaired safety awareness, impaired strength and pain  Prognosis: fair    GOALS:  Patient Goal: minimize fall risk    Short Term Goals:  Time Frame: 4 weeks  1. Inge Henry will demonstrate a good ankle strategy to maintain standing balance with perturbations to her trunk. 2. Inge Henry will be able to stand for 20 min at a time without needing a rest break allowing her to prepare meals and perform light chores without limitation. NOT MET-limited to 5 min before she has to sit due to back pain; uses a kitchen stool a lot  3. Alison's Tinetti score will improve to 19/28 indicating minimal fall risk.  GOAL MET-improved to 22/28      Long Term Goals:  Time Frame: 8 weeks  1. Discharge with independent HEP ONGOING  2. Alison's Tinetti score will improve to >21/28 indicating no fall risk. GOAL MET; REVISE to improve Tinetti score to >26/28; NOT MET-improved to 24/28  3. Albin Salmon will be able to maintain her standing balance in all 4 conditions of the m-CTSIB, using an ankle strategy to maintain her balance. NOT MET-able to maintain standing balance in conditions 1-3; LOB after 7 sec in condition 4  4. NEW GOAL: Albin Salmon will demonstrate good abdominal stabilization with all transfers and dynamic gait activities to provide greater support to low back. Patient Education:   [x]  HEP/Education Completed: Plan of Care, Goals, anterior/posterior wt shift   Medbridge Access Code:  []  No new Education completed  []  Reviewed Prior HEP      [x]  Patient verbalized and/or demonstrated understanding of education provided. []  Patient unable to verbalize and/or demonstrate understanding of education provided. Will continue education. [x]  Barriers to learning: n/a    PLAN:  Treatment Recommendations: Strengthening, Range of Motion, Balance Training, Endurance Training, Gait Training, Stair Training, Neuromuscular Re-education, Manual Therapy - Soft Tissue Mobilization, Manual Therapy - Joint Manipulation, Pain Management, Home Exercise Program, Patient Education and Modalities    []  Plan of care initiated. Plan to see patient 2 times per week for 8 weeks to address the treatment planned outlined above. []  Continue with current plan of care  [x]  Modify plan of care as follows:  1 time per week for remaining 6 visits.   []  Hold pending physician visit  []  Discharge    Time In 0930   Time Out 1005   Timed Code Minutes: 35 min   Total Treatment Time: 35 min       Electronically Signed by: MINE Jha 7099" Randle Hashimoto, DPT  [id number]

## 2021-09-02 ENCOUNTER — HOSPITAL ENCOUNTER (OUTPATIENT)
Dept: PHYSICAL THERAPY | Age: 71
Setting detail: THERAPIES SERIES
Discharge: HOME | End: 2021-09-02
Payer: MEDICARE

## 2021-09-02 PROCEDURE — 97530 THERAPEUTIC ACTIVITIES: CPT

## 2021-09-02 PROCEDURE — 97110 THERAPEUTIC EXERCISES: CPT

## 2021-09-02 NOTE — PROGRESS NOTES
7115 [facility]  PHYSICAL THERAPY  [] EVALUATION  [x] DAILY NOTE (LAND) [] DAILY NOTE (AQUATIC ) [] PROGRESS NOTE [] DISCHARGE NOTE    [] 615 [facility]   [] Carlo 90    [] 645 [facility]   [x] Amada Loya    Date: 2021  Patient Name:  Daniel Dc  : 1950  MRN: [id number]  CSN: [id number]    Referring Practitioner Kenny Ovalle DO   Diagnosis Other intervertebral disc degeneration, lumbar region [M51.36]  Radiculopathy, lumbar region [M54.16]    Treatment Diagnosis Difficulty walking    Date of Evaluation 21    Additional Pertinent History Bi-polar; SOB related to CHF      Functional Outcome Measure Used Tinetti   Functional Outcome Score 15/28 (21)    (21)      Insurance: Primary: Payor: Jeromy Klein /  /  / , aquatic therapy is covered; modalities covered except for IONTO  Secondary:    Authorization Information: [id number] [employer] [address] [facility] [address], [id number], [id number], [address] [place] [address]  FROM 12 TO 21  AUTH. #[id number]   Visit # 13,  for progress note   Visits Allowed: 16   Recertification Date:    Physician Follow-Up:    Physician Orders:    History of Present Illness: Syeda Álvarez is referred to PT to address ongoing balance issues. She states that she noticed that she was struggling with her balance when she slipped off a short deck at a local [facility]. She admits that over the last few months she has also had a few falls; denies any major injury as a result of the falls. She started to workout with a local  a month hoping that it would help her balance.       SUBJECTIVE: Has been working on doing her HEP    TREATMENT   Precautions:    Pain:4/10 Denies; knees are stiff     X in shaded column indicates activity completed today   Modalities Parameters/  Location  Notes                     Manual Therapy Time/Technique  Notes Exercise/Intervention   Notes   Mariano; review of goals               NuStep x6 min  x Level 5, seat 8, arms 9   Forward step ups  x10   x Bilaterally    Airex: heel to raises, marches, mini squats x10 ea  x    3 way hip, Hs curls x10 ea   Green band   Tandem stepping fwd/retro x2 laps ea      Sidestepping left/right; tandem stepping; retro x2 laps ea  x    rocker board fwd/lat x15 ea  x 10 sec balance with 1UE support   Hurdles: forward and lateral  x2 ea. x green   Agility mat: forward step in each square; lateral step in each square; march in each square x2   Hand hold assist needed    \"Walked the line\"-tandem walk  x2      // bars: forward and lateral walk with emphasis on taking longer, exaggerated steps  x2 ea.  x    Oxygen saturation: 87%-recovered to 92% with seated rest break   x      Specific Interventions Next Treatment: NuStep; airex balance exercises; Activity/Treatment Tolerance:  [x]  Patient tolerated treatment well  []  Patient limited by fatigue  []  Patient limited by pain   []  Patient limited by medical complications  []  Other:     Assessment: Greater emphasis on lengthening stride both forward and laterally to break her from her habit of shuffling. Increased in SOB noted today; oxygen saturation levels dropped off to 87%. Will continue to monitor o2 levels. Body Structures/Functions/Activity Limitations: impaired activity tolerance, impaired balance, impaired coordination, impaired endurance, impaired safety awareness, impaired strength and pain  Prognosis: fair    GOALS:  Patient Goal: minimize fall risk    Short Term Goals:  Time Frame: 4 weeks  1. Ivon Marroquin will demonstrate a good ankle strategy to maintain standing balance with perturbations to her trunk. 2. Ivon Marroquin will be able to stand for 20 min at a time without needing a rest break allowing her to prepare meals and perform light chores without limitation.  NOT MET-limited to 5 min before she has to sit due to back pain; uses a kitchen stool a lot  3. Alison's Tinetti score will improve to 19/28 indicating minimal fall risk. GOAL MET-improved to 22/28      Long Term Goals:  Time Frame: 8 weeks  1. Discharge with independent HEP ONGOING  2. Luiss Tinetti score will improve to >21/28 indicating no fall risk. GOAL MET; REVISE to improve Tinetti score to >26/28; NOT MET-improved to 24/28  3. Anusha Neal will be able to maintain her standing balance in all 4 conditions of the m-CTSIB, using an ankle strategy to maintain her balance. NOT MET-able to maintain standing balance in conditions 1-3; LOB after 7 sec in condition 4  4. NEW GOAL: Anusha Neal will demonstrate good abdominal stabilization with all transfers and dynamic gait activities to provide greater support to low back. Patient Education:   [x]  HEP/Education Completed: Plan of Care, Goals, anterior/posterior wt shift   Medbridge Access Code:  []  No new Education completed  []  Reviewed Prior HEP      [x]  Patient verbalized and/or demonstrated understanding of education provided. []  Patient unable to verbalize and/or demonstrate understanding of education provided. Will continue education. [x]  Barriers to learning: n/a    PLAN:  Treatment Recommendations: Strengthening, Range of Motion, Balance Training, Endurance Training, Gait Training, Stair Training, Neuromuscular Re-education, Manual Therapy - Soft Tissue Mobilization, Manual Therapy - Joint Manipulation, Pain Management, Home Exercise Program, Patient Education and Modalities    []  Plan of care initiated. Plan to see patient 2 times per week for 8 weeks to address the treatment planned outlined above. []  Continue with current plan of care  [x]  Modify plan of care as follows:  1 time per week for remaining 6 visits.   []  Hold pending physician visit  []  Discharge    Time In 0930   Time Out 1000   Timed Code Minutes: 30 min   Total Treatment Time: 30 min       Electronically Signed by: Alicia Gill \"[facility]\Fuentes Burris, DPT  [id number]

## 2021-09-09 ENCOUNTER — APPOINTMENT (OUTPATIENT)
Dept: PHYSICAL THERAPY | Age: 71
End: 2021-09-09
Payer: MEDICARE

## 2021-09-09 ENCOUNTER — TELEPHONE (OUTPATIENT)
Dept: CARDIOLOGY CLINIC | Age: 71
End: 2021-09-09

## 2021-09-09 DIAGNOSIS — I10 ESSENTIAL HYPERTENSION: ICD-10-CM

## 2021-09-09 NOTE — TELEPHONE ENCOUNTER
Patient called into the office- states that her O2 is low. Spoke with the patient she states that her pulse ox is running 88/92 currently 89  Patient states that she does not feel more SOB than normal.   BP is running high yesterday evening 180/90,   this morning 160/90 -2 hours after meds. Patient states that she is actually down 2 lbs, and denies additional swelling. SOB with exertion.

## 2021-09-09 NOTE — TELEPHONE ENCOUNTER
Is this new finding at PT?? Had previously had good readings? if so would recommend seeing PCP for eval.   Any CHF symptoms? ?

## 2021-09-10 RX ORDER — CARVEDILOL 12.5 MG/1
18.75 TABLET ORAL 2 TIMES DAILY
Qty: 180 TABLET | Refills: 5 | Status: SHIPPED
Start: 2021-09-10 | End: 2021-09-29 | Stop reason: SDUPTHER

## 2021-09-13 ENCOUNTER — HOSPITAL ENCOUNTER (OUTPATIENT)
Dept: GENERAL RADIOLOGY | Age: 71
Discharge: HOME | End: 2021-09-13
Payer: MEDICARE

## 2021-09-13 ENCOUNTER — TELEPHONE (OUTPATIENT)
Dept: PULMONOLOGY | Age: 71
End: 2021-09-13

## 2021-09-13 ENCOUNTER — HOSPITAL ENCOUNTER (OUTPATIENT)
Age: 71
Discharge: HOME | End: 2021-09-13
Payer: MEDICARE

## 2021-09-13 DIAGNOSIS — R09.02 HYPOXEMIA: ICD-10-CM

## 2021-09-13 DIAGNOSIS — I50.9 CONGESTIVE HEART FAILURE, UNSPECIFIED HF CHRONICITY, UNSPECIFIED HEART FAILURE TYPE (HCC): ICD-10-CM

## 2021-09-13 DIAGNOSIS — R06.02 SOB (SHORTNESS OF BREATH): Primary | ICD-10-CM

## 2021-09-13 DIAGNOSIS — R06.02 SOB (SHORTNESS OF BREATH): ICD-10-CM

## 2021-09-13 PROCEDURE — 71046 X-RAY EXAM CHEST 2 VIEWS: CPT

## 2021-09-13 NOTE — TELEPHONE ENCOUNTER
I don't believe she has been seen for pulm here and will need a pulm evaluation.  I will order PFT and CXR and she can see Marygrace Paget

## 2021-09-13 NOTE — TELEPHONE ENCOUNTER
Spoke to patient regarding she can order  Chest xray and  Pft. She will go today or tomorrow for chest xray at [facility] [employer] they stated they do them there. I would schedule pft and them set up for 9/21 at 1230. Patient is okay with this time and I will set up appt with Destiny before then.

## 2021-09-13 NOTE — TELEPHONE ENCOUNTER
Patient called stating he oxygen is running at 88 all the time and she needs to know if there is anything she needs to do, she called the DME comp and they stated to call our office Please advise. They stated they saw you. Mike Schwartz

## 2021-09-14 ENCOUNTER — TELEPHONE (OUTPATIENT)
Dept: PULMONOLOGY | Age: 71
End: 2021-09-14

## 2021-09-14 RX ORDER — LEVOFLOXACIN 750 MG/1
750 TABLET ORAL DAILY
Qty: 10 TABLET | Refills: 0 | Status: SHIPPED | OUTPATIENT
Start: 2021-09-14 | End: 2021-09-24

## 2021-09-14 NOTE — PROGRESS NOTES
[facility]    Patient: Bakari Robert, 70 y.o.   : 1950  9/15/2021    Patient of DAVID Velasco CNP     Subjective     Chief Complaint   Patient presents with    Follow-up     7 month f/u, SOB, CXR 21. FATUMA Ruth is here as a new pulmonary consult for low SpO2 of 88% at home. She first noticed that she was having a low SpO2 when she was at physical therapy 2 weeks ago and had thus purchased a pulse oximeter to monitor her SpO2. Patient follows with TREMAYNE Parham in the sleep clinic and had called in on  with SpO2 88% at all times. TREMAYNE Parham had ordered a CXR and PFT with pulmonary clinic follow up. Her CXR demonstrated pneumonia and Acefiordaliza Cherry had wrote a prescription for levofloxacin 750 mg for 10 days. Patient is scheduled for the PFT on 2021 at 1230. She also follows with DAVID Neely CNP at the heart failure clinic. She had notified Isa Landry of her low SpO2 and was denying symptoms of heart failure (swelling or weight gain). She was also having a high blood pressure of 180/90 and Isa Landry had placed her on Carvedilol 12.5 mg twice daily. Her BP today is 130/90. She admits to shortness of breath but she reports that she has been having shortness of breath since she was diagnosed with CHF. She has some difficulty catching her breath at times when up and walking. She also admits to symptoms of dysphagia (coughing when swallowing) and admits that this has been going on for about 1 year. She reports that she has never been evaluated by speech therapy. She also reports frequent falls, her most recent fall was 1 month ago. She reports difficulty with her memory for the past year as well. She states that she is very active and that she goes to physical therapy and is in an exercise program. She denies exposure to COVID-19, fevers, chills, cough, productive cough, or loss of taste or smell.  Denies chest pain, shoulder pain, neck Diagnosis Date    Acid reflux     Arthritis     Asthma     Bipolar 1 disorder (HCC)     CHF (congestive heart failure) (HCC)     CKD (chronic kidney disease), stage II 2019    History of blood transfusion 2004    Hypertension     Mood disorder ([facility] [address]     Sleep apnea     Thyroid disease      SURGICAL HISTORY:  Past Surgical History:   Procedure Laterality Date    ANKLE SURGERY      left    CATARACT REMOVAL      Both eyes      SECTION      x 3    FOOT SURGERY      Left     HIP SURGERY      HYSTERECTOMY      Total     TOTAL KNEE ARTHROPLASTY Left 10/14/14    TOTAL KNEE ARTHROPLASTY Right 2019     SOCIAL HISTORY:  Social History     Tobacco Use    Smoking status: Never Smoker    Smokeless tobacco: Never Used   Vaping Use    Vaping Use: Never used   Substance Use Topics    Alcohol use: No     Alcohol/week: 0.0 standard drinks     Comment: rarely    Drug use: No     ALLERGIES:  Allergies   Allergen Reactions    Ace Inhibitors Anaphylaxis    Prednisone Other (See Comments)     Other reaction(s): Manic Behavior  **oral**      Codeine Hives and Nausea And Vomiting    Penicillins Hives    Lotrel [Amlodipine Besy-Benazepril Hcl] Swelling    Typhoid Vaccines     Adhesive Tape      Other reaction(s): Rash    Onion Nausea And Vomiting    Typhoid Vaccine, Live      Other reaction(s): unknown    Wound Dressing Adhesive Rash     tape     FAMILY HISTORY:  Family History   Problem Relation Age of Onset    Diabetes Mother     High Blood Pressure Father     Cancer Father     Other Brother     Diabetes Brother     Cancer Sister     Breast Cancer Sister 39    Cancer Brother      CURRENT MEDICATIONS:  Current Outpatient Medications   Medication Sig Dispense Refill    carvedilol (COREG) 6.25 MG tablet Take 6.25 mg by mouth 2 times daily (with meals)      levoFLOXacin (LEVAQUIN) 750 MG tablet Take 1 tablet by mouth daily for 10 days 10 tablet 0    carvedilol (COREG) 12.5 MG tablet Take 1.5 tablets by mouth 2 times daily 180 tablet 5    atorvastatin (LIPITOR) 20 MG tablet Take 1 tablet by mouth daily 90 tablet 0    omeprazole (PRILOSEC) 20 MG delayed release capsule Take 1 capsule by mouth every morning (before breakfast) 90 capsule 3    spironolactone (ALDACTONE) 25 MG tablet Take 1 tablet by mouth daily 30 tablet 5    donepezil (ARICEPT) 10 MG tablet Take 10 mg by mouth daily      diclofenac sodium (VOLTAREN) 1 % GEL Apply topically as needed      hydrALAZINE (APRESOLINE) 50 MG tablet Take 1 tablet by mouth 3 times daily 270 tablet 3    bumetanide (BUMEX) 1 MG tablet 1-2 tabs daily as needed. 180 tablet 3    SYNTHROID 125 MCG tablet Take 1 tablet by mouth daily Take with water on an empty stomach- wait 30 minutes before eating or taking other meds. 30 tablet 11    ARIPiprazole (ABILIFY) 5 MG tablet Take 2 tablets by mouth daily 30 tablet 3    OLANZapine (ZYPREXA) 15 MG tablet Take 15 mg by mouth daily      aspirin 81 MG EC tablet Take 81 mg by mouth daily      acetaminophen (TYLENOL) 325 MG tablet Take 650 mg by mouth 4 times daily       ferrous sulfate 325 (65 Fe) MG tablet Take 325 mg by mouth daily (with breakfast)      clonazePAM (KLONOPIN) 1 MG tablet Take 1 mg by mouth 3 times daily as needed.  venlafaxine (EFFEXOR XR) 150 MG extended release capsule Take 225 mg by mouth daily       gabapentin (NEURONTIN) 300 MG capsule Take 300 mg by mouth 3 times daily.  tiZANidine (ZANAFLEX) 2 MG tablet Take 2 mg by mouth 2 times daily      Mirabegron ER (MYRBETRIQ) 50 MG TB24 Take 50 mg by mouth daily      lamoTRIgine (LAMICTAL) 100 MG tablet Take 100 mg by mouth daily Patient taking 1 tablet TID      Ascorbic Acid (VITAMIN C) 500 MG tablet Take 500 mg by mouth daily.  Multiple Vitamin (MULTIVITAMIN PO) Take 1 tablet by mouth daily. No current facility-administered medications for this visit. Arden MILLER   Review of Systems   Constitutional: Left lower leg: No edema. Skin:     General: Skin is warm and dry. Capillary Refill: Capillary refill takes less than 2 seconds. Findings: No erythema. Neurological:      General: No focal deficit present. Mental Status: She is alert. Psychiatric:         Mood and Affect: Mood normal.         Behavior: Behavior normal.         Thought Content: Thought content normal.         Judgment: Judgment normal.          Results   Lung Nodule Screening     [] Qualifies    [x] Does not qualify   [] Declined    [] Completed  Non-smoker   The USPSTF recommends annual screening for lung cancer with low-dose computed tomography (LDCT) in adults aged 48 to [de-identified] years who have a 20 pack-year smoking history and currently smoke or have quit within the past 15 years. Screening should be discontinued once a person has not smoked for 15 years or develops a health problem that substantially limits life expectancy or the ability or willingness to have curative lung surgery. CXR 9/13/2021 (Reviewed) Calcified hilar lymph nodes and bilateral mid and lower lung atelectasis/infiltrate  Narrative   PROCEDURE: XR CHEST (2 VW)       CLINICAL INFORMATION: Shortness of breath       TECHNIQUE: PA and lateral chest radiographs.       COMPARISON: PA and lateral chest radiographs 4/4/2020       FINDINGS: Cardiac silhouette is at the upper limits of normal in size. There are calcified hilar lymph nodes. Alveolar and reticular opacities are seen at the bilateral mid and lower lungs. Degenerative changes in the thoracic spine are poorly    visualized. Soft tissues are unremarkable.         Impression       Bilateral mid and lower lung atelectasis/infiltrate.                   **This report has been created using voice recognition software. It may contain minor errors which are inherent in voice recognition technology. **       Final report electronically signed by Dr. Yari Painting on 9/13/2021 4:00 PM           Full PFT 5/26/2021 focus of enhancement is also present. Visualized upper abdominal solid organs are otherwise unremarkable.             Impression       1. Stable mild prominence of the ascending aorta measuring 4 cm in greatest diameter. 2. Stable pulmonary nodules. 3. Enhancing lesions in the right lobe of liver. Recommend multiphase CT or MRI of the liver for further evaluation.                   **This report has been created using voice recognition software. It may contain minor errors which are inherent in voice recognition technology. **       Final report electronically signed by Dr. Lucía Montesinos MD on 5/7/2020 4:30 PM             Results for Lexy Coleman (MRN [id number]) as of 9/15/2021 08:17 (Reviewed) High PTH and low Vit D   Ref. Range 6/11/2021 10:23   Sodium Latest Ref Range: 135 - 145 meq/L 141   Potassium Latest Ref Range: 3.5 - 5.2 meq/L 4.5   Chloride Latest Ref Range: 98 - 111 meq/L 104   CO2 Latest Ref Range: 23 - 33 meq/L 25   BUN Latest Ref Range: 7 - 22 mg/dL 28 (H)   Creatinine Latest Ref Range: 0.4 - 1.2 mg/dL 1.1   Anion Gap Latest Ref Range: 8.0 - 16.0 meq/L 12.0   Est, Glom Filt Rate Latest Units: ml/min/1.73m2 49 (A)   Glucose Latest Ref Range: 70 - 108 mg/dL 114 (H)   Calcium Latest Ref Range: 8.5 - 10.5 mg/dL 10.1   Pth Intact Latest Ref Range: 15.0 - 65.0 pg/mL 67.2 (H)   Vit D, 25-Hydroxy Latest Ref Range: 30 - 100 ng/ml 27 (L)     Results for Lexy Coleman (MRN [id number]) as of 9/15/2021 08:17 (Reviewed) Normal Hemoglobin   Ref. Range 1/13/2021 00:00   WBC Latest Units: 10^3/mL 8.0   RBC Latest Units: 10^6/µL 4.21   Hemoglobin Quant Latest Ref Range: 12.0 - 16.0 g/dL 12.9   Hematocrit Latest Ref Range: 36 - 46 % 39.6   Platelet Count Latest Units: K/µL 323     Six Minute Walk Test  Pyiush Valles 1950    Six minute walk test done in my office today by my medical assistant. Alison's oxygen saturation at rest on room air was 93%.  Her oxygen saturation dropped to 87% on room air with exertion after walking 50 feet and within 30 seconds. The six minute walk test was repeated with oxygen supplementation. Oxygen supplementation was started with 1 LPM via nasal cannula and titrated to 1 LPM via nasal cannula. At the end of the test Alison Leroy oxygen saturation remained at 92% on 1 LPM with exertion. She is mobile in the home and requires oxygen as outlined above. Patient ambulated a total of 648 feet with oxygen. Resting Dyspnea/Tonie score was 5 / 8  and 4 / 6  upon completion of the walk. Resting heart rate was  77 bpm and 99 bpm upon completing the walk. Nasal Oxygen order:  1 lpm to be used with:  Rest: No.  Walking: Yes. Sleep: No.   POC flow: No.  Continuous flow: Yes. DME Medical Necessity Documentation    Brian Walker was seen in the office on 9/15/2021 for the diagnosis Acute respiratory failure with hypoxia due to pneumonia. I am prescribing oxygen because the diagnosis and testing requires the patient to have oxygen in the home. her condition will improve or be benefited by oxygen use. The patient is able to perform good mobility in a home setting and therefore does require the use of a portable oxygen system. Assessment      Diagnosis Orders   1. Pneumonia of both lower lobes due to infectious organism  Culture, Respiratory    DME Order for Home Oxygen as OP    [place] DME SUPPLY OR ACCESSORY, NOS    CT CHEST WO CONTRAST    CBC    D-Dimer, Quantitative    Brain Natriuretic Peptide    Basic Metabolic Panel    [facility]  GILBERTO    COVID-19   2. Acute respiratory failure with hypoxia (HCC)  6 Minute Walk Test    DME Order for Home Oxygen as OP    D-Dimer, Quantitative    Brain Natriuretic Peptide    Basic Metabolic Panel    [id number]   3. Restrictive lung disease  D-Dimer, Quantitative    Brain Natriuretic Peptide    Basic Metabolic Panel   4. Lung nodule < 6cm on CT  CT CHEST WO CONTRAST   5.  Congestive heart failure, unspecified HF chronicity, unspecified heart failure type (HCC)  Brain Natriuretic Peptide   6. Morbid obesity with BMI of 40.0-44.9, adult ([facility] [address]     7. RANDY on CPAP     8. Dysphagia, unspecified type  Adrian 21   1. Pneumonia of both lower lobes due to infectious organism  - Encouraged patient to continue levofloxacin until antibiotic therapy is complete  - Obtain bloodwork to r/o anemia, blood clot, HEBER, dehydration, and CHF exacerbation  - Swab to ensure pneumonia and respiratory failure is not due to COVID-19  - 6 Minute Walk Test - new order for 1 lpm with exertion 0 lpm at rest  - Culture, Respiratory if she is able to expectorate sputum  - Order for incentive spirometer for pneumonia with atelectasis  - DME Order for Home Oxygen as OP - new order for 1 lpm with exertion 0 lpm at rest  - [place] DME SUPPLY OR ACCESSORY, NOS  - CT CHEST WO CONTRAST; Future  - CBC; Future  - D-Dimer, Quantitative; Future  - Brain Natriuretic Peptide; Future  - Basic Metabolic Panel; Future  - COVID-19; Future  - Advised to maintain pneumonia vaccine with PCP and to take flu vaccine this coming season. 2. Acute respiratory failure with hypoxia (HCC)  - Obtain bloodwork to r/o anemia, blood clot, HEBER, dehydration, and CHF exacerbation  - Swab to ensure pneumonia and respiratory failure is not due to COVID-19  - 6 Minute Walk Test - new order for 1 lpm with exertion 0 lpm at rest  - DME Order for Home Oxygen as OP  - D-Dimer, Quantitative; Future  - Brain Natriuretic Peptide; Future  - Basic Metabolic Panel; Future  - COVID-19; Future    3. Restrictive lung disease  - Likely due to obesity and CHF, encouraged CHF optimization and weight loss  - Normal diffusing capacity on most recent PFT    4. Lung nodule < 6cm on CT  - Will re-evaluate on CT chest  - CT CHEST WO CONTRAST; Future    5.  Congestive heart failure, unspecified HF chronicity, unspecified heart failure type ([facility] [address]  - Will check BNP to assess for CHF exacerbation  - Encouraged

## 2021-09-14 NOTE — TELEPHONE ENCOUNTER
----- Message from Kalyani Roman PA-C sent at 9/14/2021  7:45 AM EDT -----  CXR shows pneumonia.   I sent in script for ATB

## 2021-09-15 ENCOUNTER — NURSE ONLY (OUTPATIENT)
Dept: LAB | Age: 71
End: 2021-09-15

## 2021-09-15 ENCOUNTER — HOSPITAL ENCOUNTER (EMERGENCY)
Age: 71
Discharge: HOME | End: 2021-09-15
Attending: EMERGENCY MEDICINE
Payer: MEDICARE

## 2021-09-15 ENCOUNTER — APPOINTMENT (OUTPATIENT)
Dept: GENERAL RADIOLOGY | Age: 71
End: 2021-09-15
Payer: MEDICARE

## 2021-09-15 ENCOUNTER — HOSPITAL ENCOUNTER (OUTPATIENT)
Dept: CT IMAGING | Age: 71
Discharge: HOME | End: 2021-09-15
Payer: MEDICARE

## 2021-09-15 ENCOUNTER — TELEPHONE (OUTPATIENT)
Dept: PULMONOLOGY | Age: 71
End: 2021-09-15

## 2021-09-15 ENCOUNTER — TELEPHONE (OUTPATIENT)
Dept: NEPHROLOGY | Age: 71
End: 2021-09-15

## 2021-09-15 ENCOUNTER — INITIAL CONSULT (OUTPATIENT)
Dept: PULMONOLOGY | Age: 71
End: 2021-09-15
Payer: MEDICARE

## 2021-09-15 VITALS
OXYGEN SATURATION: 88 % | SYSTOLIC BLOOD PRESSURE: 130 MMHG | DIASTOLIC BLOOD PRESSURE: 90 MMHG | HEART RATE: 77 BPM | TEMPERATURE: 95.1 F

## 2021-09-15 VITALS
OXYGEN SATURATION: 92 % | DIASTOLIC BLOOD PRESSURE: 71 MMHG | TEMPERATURE: 98.3 F | SYSTOLIC BLOOD PRESSURE: 137 MMHG | RESPIRATION RATE: 23 BRPM | HEART RATE: 76 BPM | HEIGHT: 64 IN | WEIGHT: 250 LBS | BODY MASS INDEX: 42.68 KG/M2

## 2021-09-15 DIAGNOSIS — J18.9 PNEUMONIA OF BOTH LOWER LOBES DUE TO INFECTIOUS ORGANISM: Primary | ICD-10-CM

## 2021-09-15 DIAGNOSIS — I50.9 CONGESTIVE HEART FAILURE, UNSPECIFIED HF CHRONICITY, UNSPECIFIED HEART FAILURE TYPE (HCC): ICD-10-CM

## 2021-09-15 DIAGNOSIS — R13.10 DYSPHAGIA, UNSPECIFIED TYPE: ICD-10-CM

## 2021-09-15 DIAGNOSIS — R79.89 ELEVATED D-DIMER: ICD-10-CM

## 2021-09-15 DIAGNOSIS — J96.01 ACUTE RESPIRATORY FAILURE WITH HYPOXIA (HCC): ICD-10-CM

## 2021-09-15 DIAGNOSIS — J98.4 RESTRICTIVE LUNG DISEASE: ICD-10-CM

## 2021-09-15 DIAGNOSIS — Z99.89 OSA ON CPAP: ICD-10-CM

## 2021-09-15 DIAGNOSIS — G47.33 OSA ON CPAP: ICD-10-CM

## 2021-09-15 DIAGNOSIS — E66.01 MORBID OBESITY WITH BMI OF 40.0-44.9, ADULT (HCC): ICD-10-CM

## 2021-09-15 DIAGNOSIS — J18.9 PNEUMONIA OF BOTH LOWER LOBES DUE TO INFECTIOUS ORGANISM: ICD-10-CM

## 2021-09-15 DIAGNOSIS — R06.00 DYSPNEA, UNSPECIFIED TYPE: Primary | ICD-10-CM

## 2021-09-15 DIAGNOSIS — R79.89 ELEVATED D-DIMER: Primary | ICD-10-CM

## 2021-09-15 LAB
ANION GAP SERPL CALCULATED.3IONS-SCNC: 13 MEQ/L (ref 8–16)
BUN BLDV-MCNC: 32 MG/DL (ref 7–22)
CALCIUM SERPL-MCNC: 10.4 MG/DL (ref 8.5–10.5)
CHLORIDE BLD-SCNC: 100 MEQ/L (ref 98–111)
CO2: 28 MEQ/L (ref 23–33)
CREAT SERPL-MCNC: 1.2 MG/DL (ref 0.4–1.2)
D-DIMER QUANTITATIVE: 578 NG/ML FEU (ref 0–500)
EKG ATRIAL RATE: 79 BPM
EKG P AXIS: 39 DEGREES
EKG P-R INTERVAL: 162 MS
EKG Q-T INTERVAL: 364 MS
EKG QRS DURATION: 78 MS
EKG QTC CALCULATION (BAZETT): 417 MS
EKG R AXIS: -20 DEGREES
EKG T AXIS: 54 DEGREES
EKG VENTRICULAR RATE: 79 BPM
ERYTHROCYTE [DISTWIDTH] IN BLOOD BY AUTOMATED COUNT: 13.1 % (ref 11.5–14.5)
ERYTHROCYTE [DISTWIDTH] IN BLOOD BY AUTOMATED COUNT: 47.2 FL (ref 35–45)
GFR SERPL CREATININE-BSD FRML MDRD: 44 ML/MIN/1.73M2
GLUCOSE BLD-MCNC: 128 MG/DL (ref 70–108)
HCT VFR BLD CALC: 42.5 % (ref 37–47)
HEMOGLOBIN: 12.9 GM/DL (ref 12–16)
MCH RBC QN AUTO: 30.1 PG (ref 26–33)
MCHC RBC AUTO-ENTMCNC: 30.4 GM/DL (ref 32.2–35.5)
MCV RBC AUTO: 99.1 FL (ref 81–99)
PLATELET # BLD: 284 THOU/MM3 (ref 130–400)
PMV BLD AUTO: 9.8 FL (ref 9.4–12.4)
POTASSIUM SERPL-SCNC: 4.7 MEQ/L (ref 3.5–5.2)
PRO-BNP: 66.1 PG/ML (ref 0–900)
RBC # BLD: 4.29 MILL/MM3 (ref 4.2–5.4)
SODIUM BLD-SCNC: 141 MEQ/L (ref 135–145)
WBC # BLD: 5.7 THOU/MM3 (ref 4.8–10.8)

## 2021-09-15 PROCEDURE — G8427 DOCREV CUR MEDS BY ELIG CLIN: HCPCS

## 2021-09-15 PROCEDURE — 93005 ELECTROCARDIOGRAM TRACING: CPT | Performed by: EMERGENCY MEDICINE

## 2021-09-15 PROCEDURE — G8417 CALC BMI ABV UP PARAM F/U: HCPCS

## 2021-09-15 PROCEDURE — 1090F PRES/ABSN URINE INCON ASSESS: CPT

## 2021-09-15 PROCEDURE — 94618 PULMONARY STRESS TESTING: CPT

## 2021-09-15 PROCEDURE — 99283 EMERGENCY DEPT VISIT LOW MDM: CPT

## 2021-09-15 PROCEDURE — 99213 OFFICE O/P EST LOW 20 MIN: CPT

## 2021-09-15 PROCEDURE — 71045 X-RAY EXAM CHEST 1 VIEW: CPT

## 2021-09-15 RX ORDER — CARVEDILOL 6.25 MG/1
6.25 TABLET ORAL 2 TIMES DAILY WITH MEALS
COMMUNITY
End: 2021-11-04

## 2021-09-15 ASSESSMENT — ENCOUNTER SYMPTOMS
VOMITING: 0
RHINORRHEA: 0
EYE ITCHING: 0
ABDOMINAL PAIN: 0
WHEEZING: 0
ABDOMINAL DISTENTION: 0
COUGH: 0
SHORTNESS OF BREATH: 1
SHORTNESS OF BREATH: 1
BACK PAIN: 0
SORE THROAT: 0
DIARRHEA: 0
COUGH: 1
EYE DISCHARGE: 0
CONSTIPATION: 0
EYE REDNESS: 0
CHEST TIGHTNESS: 0
SINUS PAIN: 0
CONSTIPATION: 1
ABDOMINAL PAIN: 0
EYE ITCHING: 0
SORE THROAT: 0
RHINORRHEA: 0
WHEEZING: 0
PHOTOPHOBIA: 0
BACK PAIN: 1
DIARRHEA: 0
STRIDOR: 0
NAUSEA: 0
EYE PAIN: 0
CHEST TIGHTNESS: 0
SINUS PRESSURE: 0

## 2021-09-15 NOTE — ED PROVIDER NOTES
[address] ENCOUNTER      PATIENT NAME: Cass Jarrell  MRN: [id number]  : 1950  ESCOTO: 9/15/2021  PROVIDER: Arnold Garcia MD      CHIEF COMPLAINT       Chief Complaint   Patient presents with    Shortness of Breath       Patient is seen and evaluated in a timely fashion. Nurses Notes are reviewed and I agree except as noted in the HPI. HISTORY OF PRESENT ILLNESS    Cass Jarrell is a 70 y.o. female who presents to Emergency Department with Shortness of Breath       Patient was sent to from pulmonologist office to have a CTA chest. Patient was seen today by pulmonologist's office for low SPO2 of 88% at home, she first noticed she was having a low SPO2 when she was at physical therapy 2 weeks ago. Patient was seen 2 days ago by PCP and a chest x-ray shows bilateral moderate lower lobe atelectasis/infiltrates. Patient was seen by pulmonologist today, D-dimer was 578. Patient was sent to ED for a CTA chest. Patient the past medical history remarkable for CHF, CKD, hypertension, and obesity. This HPI was provided by patient and EMR review. REVIEW OF SYSTEMS   Review of Systems   Constitutional: Negative for activity change, appetite change, chills, fatigue, fever and unexpected weight change. HENT: Negative for congestion, ear discharge, ear pain, hearing loss, nosebleeds, rhinorrhea and sore throat. Eyes: Negative for photophobia, pain, discharge, redness and itching. Respiratory: Positive for cough and shortness of breath. Negative for chest tightness, wheezing and stridor. Cardiovascular: Negative for chest pain, palpitations and leg swelling. Gastrointestinal: Negative for abdominal distention, abdominal pain, constipation, diarrhea, nausea and vomiting. Endocrine: Negative for cold intolerance, heat intolerance, polydipsia and polyphagia. Genitourinary: Negative for dysuria, flank pain, frequency and hematuria.    Musculoskeletal: Negative for arthralgias, back pain, gait problem, myalgias, neck pain and neck stiffness. Skin: Negative for pallor, rash and wound. Allergic/Immunologic: Negative for environmental allergies and food allergies. Neurological: Negative for dizziness, tremors, syncope, weakness and headaches. Psychiatric/Behavioral: Negative for agitation, behavioral problems, confusion, self-injury, sleep disturbance and suicidal ideas.         PAST MEDICAL HISTORY     Past Medical History:   Diagnosis Date    Acid reflux     Arthritis     Asthma     B[facility]     [facility]     CKD (chronic kidney disease), stage II 2019    History of blood transfusion 2004    Hypertension     Mood disorder ([facility]     Sleep apnea     Thyroid disease        SURGICAL HISTORY       Past Surgical History:   Procedure Laterality Date    ANKLE SURGERY      left    CATARACT REMOVAL      Both eyes      SECTION      x 3    FOOT SURGERY      Left     HIP SURGERY      HYSTERECTOMY      Total     TOTAL KNEE ARTHROPLASTY Left 10/14/14    TOTAL KNEE ARTHROPLASTY Right 2019       CURRENT MEDICATIONS       Discharge Medication List as of 9/15/2021  7:03 PM      CONTINUE these medications which have NOT CHANGED    Details   !! carvedilol (COREG) 6.25 MG tablet Take 6.25 mg by mouth 2 times daily (with meals)Historical Med      levoFLOXacin (LEVAQUIN) 750 MG tablet Take 1 tablet by mouth daily for 10 days, Disp-10 tablet, R-0Normal      !! carvedilol (COREG) 12.5 MG tablet Take 1.5 tablets by mouth 2 times daily, Disp-180 tablet, R-5Adjust Sig      atorvastatin (LIPITOR) 20 MG tablet Take 1 tablet by mouth daily, Disp-90 tablet, R-0Normal      omeprazole (PRILOSEC) 20 MG delayed release capsule Take 1 capsule by mouth every morning (before breakfast), Disp-90 capsule, R-3Normal      spironolactone (ALDACTONE) 25 MG tablet Take 1 tablet by mouth daily, Disp-30 tablet, R-5Normal      donepezil (ARICEPT) 10 MG tablet Take 10 mg by mouth dailyHistorical Med      diclofenac sodium (VOLTAREN) 1 % GEL Apply topically as needed, Topical, PRN Starting Mon 1/4/2021, Historical Med      hydrALAZINE (APRESOLINE) 50 MG tablet Take 1 tablet by mouth 3 times daily, Disp-270 tablet, R-3Normal      bumetanide (BUMEX) 1 MG tablet 1-2 tabs daily as needed. , Disp-180 tablet, R-3Normal      SYNTHROID 125 MCG tablet Take 1 tablet by mouth daily Take with water on an empty stomach- wait 30 minutes before eating or taking other meds. , Disp-30 tablet, R-11, DAWNormal      ARIPiprazole (ABILIFY) 5 MG tablet Take 2 tablets by mouth daily, Disp-30 tablet,R-3Historical Med      OLANZapine (ZYPREXA) 15 MG tablet Take 15 mg by mouth dailyHistorical Med      aspirin 81 MG EC tablet Take 81 mg by mouth dailyHistorical Med      acetaminophen (TYLENOL) 325 MG tablet Take 650 mg by mouth 4 times daily Historical Med      ferrous sulfate 325 (65 Fe) MG tablet Take 325 mg by mouth daily (with breakfast)Historical Med      clonazePAM (KLONOPIN) 1 MG tablet Take 1 mg by mouth 3 times daily as needed. Historical Med      venlafaxine (EFFEXOR XR) 150 MG extended release capsule Take 225 mg by mouth daily Historical Med      gabapentin (NEURONTIN) 300 MG capsule Take 300 mg by mouth 3 times daily. Historical Med      tiZANidine (ZANAFLEX) 2 MG tablet Take 2 mg by mouth 2 times dailyHistorical Med      Mirabegron ER (MYRBETRIQ) 50 MG TB24 Take 50 mg by mouth daily      lamoTRIgine (LAMICTAL) 100 MG tablet Take 100 mg by mouth daily Patient taking 1 tablet TIDHistorical Med      Ascorbic Acid (VITAMIN C) 500 MG tablet Take 500 mg by mouth daily. Multiple Vitamin (MULTIVITAMIN PO) Take 1 tablet by mouth daily. !! - Potential duplicate medications found. Please discuss with provider. ALLERGIES     Ace inhibitors; Prednisone; Codeine; Penicillins; Lotrel [amlodipine besy-benazepril hcl]; Typhoid vaccines; Adhesive tape;  Onion; Typhoid vaccine, live; and Wound dressing adhesive    FAMILY HISTORY     She indicated that her mother is . She indicated that her father is . She indicated that only one of her two sisters is alive. She indicated that only one of her two brothers is alive. family history includes Breast Cancer (age of onset: 39) in her sister; Cancer in her brother, father, and sister; Diabetes in her brother and mother; High Blood Pressure in her father; Other in her brother. SOCIAL HISTORY      reports that she has never smoked. She has never used smokeless tobacco. She reports that she does not drink alcohol and does not use drugs. PHYSICAL EXAM      height is 5' 4\" (1.626 m) and weight is 250 lb (113.4 kg). Her oral temperature is 98.3 °F (36.8 °C). Her blood pressure is 137/71 and her pulse is 76. Her respiration is 23 and oxygen saturation is 92%. Physical Exam  Vitals and nursing note reviewed. Constitutional:       Appearance: She is well-developed. She is not diaphoretic. HENT:      Head: Normocephalic and atraumatic. Nose: Nose normal.   Eyes:      General: No scleral icterus. Right eye: No discharge. Left eye: No discharge. Conjunctiva/sclera: Conjunctivae normal.      Pupils: Pupils are equal, round, and reactive to light. Neck:      Vascular: No JVD. Trachea: No tracheal deviation. Cardiovascular:      Rate and Rhythm: Normal rate and regular rhythm. Heart sounds: Normal heart sounds. No murmur heard. No friction rub. No gallop. Pulmonary:      Effort: Pulmonary effort is normal. No respiratory distress. Breath sounds: No stridor. Examination of the right-lower field reveals rhonchi and rales. Examination of the left-lower field reveals rhonchi and rales. Rhonchi and rales present. No wheezing. Chest:      Chest wall: No tenderness. Abdominal:      General: Bowel sounds are normal. There is no distension.       Palpations: Abdomen is soft. There is no mass. Tenderness: There is no abdominal tenderness. There is no guarding or rebound. Hernia: No hernia is present. Musculoskeletal:         General: No tenderness or deformity. Cervical back: Normal range of motion and neck supple. Lymphadenopathy:      Cervical: No cervical adenopathy. Skin:     General: Skin is warm and dry. Capillary Refill: Capillary refill takes less than 2 seconds. Coloration: Skin is not pale. Findings: No erythema or rash. Neurological:      Mental Status: She is alert and oriented to person, place, and time. Cranial Nerves: No cranial nerve deficit. Sensory: No sensory deficit. Motor: No abnormal muscle tone. Coordination: Coordination normal.      Deep Tendon Reflexes: Reflexes normal.   Psychiatric:         Behavior: Behavior normal.         Thought Content: Thought content normal.         Judgment: Judgment normal.         DIFFERETIAL DIAGNOSIS   Includes but not limited to: Pneumonia, bronchitis, CHF, PE, COPD, asthma, pulmonary edema, pleural effusion, AMI, URI, influenza, sinusitis, allergies, anxiety    ANCILLARY TEST RESULTS   EKG: Interpreted by me  Not indicated    LAB RESULTS:  Results for orders placed or performed during the hospital encounter of 09/15/21   EKG 12 Lead   Result Value Ref Range    Ventricular Rate 79 BPM    Atrial Rate 79 BPM    P-R Interval 162 ms    QRS Duration 78 ms    Q-T Interval 364 ms    QTc Calculation (Bazett) 417 ms    P Axis 39 degrees    R Axis -20 degrees    T Axis 54 degrees       RADIOLOGY REPORTS  XR CHEST PORTABLE   Final Result   Streaky plate atelectasis both lower lobes. **This report has been created using voice recognition software. It may contain minor errors which are inherent in voice recognition technology. **      Final report electronically signed by Dr. Hall Given on 9/15/2021 7:01 PM          [facility] ED COURSE     ED Vitals:  Vitals:    09/15/21 1808 09/15/21 1832   BP: (!) 141/68 137/71   Pulse:  76   Resp: 18 23   Temp: 98.3 °F (36.8 °C)    TempSrc: Oral    SpO2: 92% 92%   Weight: 250 lb (113.4 kg)    Height: 5' 4\" (1.626 m)        MDM:  Patient's D-dimer is normal with age adjustment. I discussed the case with his PCP and pulmonologist, we all agreed that chance having PE is extremely low. Risk overweight benefits to do a CTA chest for this patient with history of CHF and CKD. Patient is discharged in stable conditions with PCP follow-up in 3 days. CRITICAL CARE   None    CONSULTS   Dr. Lucero Speaks      1.  Dyspnea, unspecified type        Home    PATIENT REFERRED TO:  Quinton Strange 1, [address]  [phone]    In 3 days  ED discharge follow up      [address]:  Discharge Medication List as of 9/15/2021  7:03 PM          (Please note that portions of this note were completed with a voice recognition program.  Efforts were made to edit the dictations but occasionally words aremis-transcribed.)    MD Sameera Germain MD  09/16/21 0064

## 2021-09-15 NOTE — ED TRIAGE NOTES
Pt was told to come to the ED for CT scan. Pt had blood work completed and was told to come to the ED to r/o blood clot. Pt is complaining of SOB, but states that normal due to CHF. Pt denies hx of blood clots. Pt respirations even and unlabored. Pt states oxygen saturation of 92 is high and good for her. Pt had COVID19 test completed and negative today.

## 2021-09-15 NOTE — PATIENT INSTRUCTIONS
We will try to get a sputum sample if you are able to cough up sputum to test it for bacteria. We will get a COVID-19 test to rule out COVID-19 pneumonia. I have ordered bloodwork for you to get done. After the walk test today, you do not require oxygen when you are resting but you do require 1 lpm of oxygen when you are walking and exercising. I also ordered you an incentive spirometer to help open up your lungs and to help improve your breathing with your pneumonia. Continue your antibiotic until you complete your regimen. We will get a CT scan of your chest in 3 months to ensure your pneumonia has cleared and to check on your lung nodules. I will call you with your results of your pulmonary function test and I will see you back in 3 months after the CT scan of your chest.    I have also referred you to [address] Make sure to make an appointment with Dr. Mary Ortiz.

## 2021-09-15 NOTE — TELEPHONE ENCOUNTER
Called patient to tell her to go to main radiology to have CTA because of her elevated D-Dimer. Patient asked if I could call her  on his cell, I got his voicemail and left him a message. I then called Brian Walker back and asked if she could also try to get a hold of him. I told her that she would have to try to get there asap. I told Brian Walker that she will have to have IV fluids afterwards because her Creatinine is elevated. I also told her not to eat or drink anything. I contacted [facility] radiology registration to let them know that she was coming and they are open until 9. Radiology then called Lxe Schultz and told her that outpatient would not be in to do the IV and that the patient would now have to go thru the ER. I called her  and explained that they would now have to go to the ER and he was ok with that.

## 2021-09-15 NOTE — ED NOTES
Dr. Tarik Maldonado at bedside. Pt and vs reassessed. RR even and unlabored. Pt resting in bed. No distress noted. Pt denies any needs at this time.  Will continue to monitor     Trina Gordon RN  09/15/21 0077

## 2021-09-16 ENCOUNTER — TELEPHONE (OUTPATIENT)
Dept: CARDIOLOGY CLINIC | Age: 71
End: 2021-09-16

## 2021-09-16 ENCOUNTER — APPOINTMENT (OUTPATIENT)
Dept: PHYSICAL THERAPY | Age: 71
End: 2021-09-16
Payer: MEDICARE

## 2021-09-16 ENCOUNTER — TELEPHONE (OUTPATIENT)
Dept: PULMONOLOGY | Age: 71
End: 2021-09-16

## 2021-09-16 NOTE — TELEPHONE ENCOUNTER
I had called patient and spoke with her and her  to discuss patient's CBC, Pro-BNP, and BMP results and that they were stable. They both verbalized understanding. I had also advised the patient and her  to call and notify Dr. Jayson Pelletier that she is on levofloxacin for her pneumonia. She has a history of AAA 4 cm that Dr. Jayson Pelletier may like to monitor closer with levofloxacin use. I notified them that we usually like to avoid levofloxacin use in patients with AAA history but we could not treat with penicillin or 3rd generation cephalosporin as she has an allergy to penicillins. The patient and her  both verbalized understanding and state they will call Dr. Jayson Pelletier.

## 2021-09-17 ENCOUNTER — HOSPITAL ENCOUNTER (OUTPATIENT)
Dept: PHYSICAL THERAPY | Age: 71
Setting detail: THERAPIES SERIES
Discharge: HOME | End: 2021-09-17
Payer: MEDICARE

## 2021-09-17 PROCEDURE — 97530 THERAPEUTIC ACTIVITIES: CPT

## 2021-09-17 PROCEDURE — 97110 THERAPEUTIC EXERCISES: CPT

## 2021-09-17 NOTE — PROGRESS NOTES
[address]  PHYSICAL THERAPY  [] EVALUATION  [x] DAILY NOTE (LAND) [] DAILY NOTE (AQUATIC ) [] PROGRESS NOTE [] DISCHARGE NOTE    [] [facility]   [] [address]    [] [address] [facility]   [x] Kandi Bosworth    Date: 2021  Patient Name:  Alber Bear  : 1950  MRN: [id number]  CSN: [id number]    Referring Practitioner Nancy Sharpe DO   Diagnosis Other intervertebral disc degeneration, lumbar region [M51.36]  Radiculopathy, lumbar region [M54.16]    Treatment Diagnosis Difficulty walking    Date of Evaluation 21    Additional Pertinent History Bi-polar; SOB related to CHF      Functional Outcome Measure Used Tinetti   Functional Outcome Score 15/28 (21)    (21)      Insurance: Primary: Payor: Jaden Beltrán /  /  / , aquatic therapy is covered; modalities covered except for IONTO  Secondary:    Authorization Information: [id number] [place] [id number], [phone], [phone],   FROM 12 TO 21  AUTH. #[id number]   Visit # 14,  for progress note   Visits Allowed: 17   Recertification Date:    Physician Follow-Up:    Physician Orders:    History of Present Illness: Phu Aguilar is referred to PT to address ongoing balance issues. She states that she noticed that she was struggling with her balance when she slipped off a short deck at a local [facility]. She admits that over the last few months she has also had a few falls; denies any major injury as a result of the falls. She started to workout with a local  a month hoping that it would help her balance.       SUBJECTIVE: Has been working on doing her HEP    TREATMENT   Precautions:    Pain:4/10 Denies; knees are stiff     X in shaded column indicates activity completed today   Modalities Parameters/  Location  Notes                     Manual Therapy Time/Technique  Notes Exercise/Intervention   Notes   Mariano; review of goals               NuStep x6 min  x Level 5, seat 8, arms 9 ( O2 sats at 5min=88, recovered to 92))   Forward step ups  x10   x Bilaterally    Airex: heel to raises, marches, mini squats x10 ea  x    3 way hip, Hs curls x10 ea   Green band   Tandem stepping fwd/retro x2 laps ea      Sidestepping left/right; tandem stepping; retro x2 laps ea  x    rocker board fwd/lat x15 ea  x 10 sec balance with 1UE support   Hurdles: forward and lateral  x2 ea. x green   Agility mat: forward step in each square; lateral step in each square; march in each square x2   Hand hold assist needed    \"Walked the line\"-tandem walk  x2      // bars: forward and lateral walk with emphasis on taking longer, exaggerated steps  x2 ea.  x           Seated:                   Marches, LAQs, resisted Hs curls x10 ea  x orange                               Oxygen saturation: 87%-recovered to 92% with seated rest break         Specific Interventions Next Treatment: NuStep; airex balance exercises; Activity/Treatment Tolerance:  [x]  Patient tolerated treatment well  []  Patient limited by fatigue  []  Patient limited by pain   []  Patient limited by medical complications  []  Other:     Assessment:Monitored O2 levels throughout treatment. Recovers quickly with standing and sested breaks. Body Structures/Functions/Activity Limitations: impaired activity tolerance, impaired balance, impaired coordination, impaired endurance, impaired safety awareness, impaired strength and pain  Prognosis: fair    GOALS:  Patient Goal: minimize fall risk    Short Term Goals:  Time Frame: 4 weeks  1. Cathi Mcardle will demonstrate a good ankle strategy to maintain standing balance with perturbations to her trunk. 2. Cathi Mcardle will be able to stand for 20 min at a time without needing a rest break allowing her to prepare meals and perform light chores without limitation.  NOT MET-limited to 5 min before she has to sit due to back pain; uses a kitchen stool a lot  3. Alison's Tinetti score will improve to 19/28 indicating minimal fall risk. GOAL MET-improved to 22/28      Long Term Goals:  Time Frame: 8 weeks  1. Discharge with independent HEP ONGOING  2. Alison's Tinetti score will improve to >21/28 indicating no fall risk. GOAL MET; REVISE to improve Tinetti score to >26/28; NOT MET-improved to 24/28  3. Tylor Ruth will be able to maintain her standing balance in all 4 conditions of the m-CTSIB, using an ankle strategy to maintain her balance. NOT MET-able to maintain standing balance in conditions 1-3; LOB after 7 sec in condition 4  4. NEW GOAL: Tylor Ruth will demonstrate good abdominal stabilization with all transfers and dynamic gait activities to provide greater support to low back. Patient Education:   [x]  HEP/Education Completed: Plan of Care, Goals, anterior/posterior wt shift   Medbridge Access Code:  []  No new Education completed  []  Reviewed Prior HEP      [x]  Patient verbalized and/or demonstrated understanding of education provided. []  Patient unable to verbalize and/or demonstrate understanding of education provided. Will continue education. [x]  Barriers to learning: n/a    PLAN:  Treatment Recommendations: Strengthening, Range of Motion, Balance Training, Endurance Training, Gait Training, Stair Training, Neuromuscular Re-education, Manual Therapy - Soft Tissue Mobilization, Manual Therapy - Joint Manipulation, Pain Management, Home Exercise Program, Patient Education and Modalities    []  Plan of care initiated. Plan to see patient 2 times per week for 8 weeks to address the treatment planned outlined above. []  Continue with current plan of care  [x]  Modify plan of care as follows:  1 time per week for remaining 6 visits.   []  Hold pending physician visit  []  Discharge    Time In 0945   Time Out 1025   Timed Code Minutes: 40 min   Total Treatment Time: 40 min       Electronically Signed by: Tiffanie Ndiaye

## 2021-09-20 ENCOUNTER — TELEPHONE (OUTPATIENT)
Dept: PULMONOLOGY | Age: 71
End: 2021-09-20

## 2021-09-20 NOTE — TELEPHONE ENCOUNTER
Received a call from Donna Holman in the PFT Lab. Patient just had PFT's done in May. LM advising pt that the PFT's were cancelled due to just completing them.

## 2021-09-23 ENCOUNTER — HOSPITAL ENCOUNTER (OUTPATIENT)
Dept: PHYSICAL THERAPY | Age: 71
Setting detail: THERAPIES SERIES
Discharge: HOME | End: 2021-09-23
Payer: MEDICARE

## 2021-09-23 PROCEDURE — 97110 THERAPEUTIC EXERCISES: CPT

## 2021-09-23 PROCEDURE — 97112 NEUROMUSCULAR REEDUCATION: CPT

## 2021-09-23 PROCEDURE — 97530 THERAPEUTIC ACTIVITIES: CPT

## 2021-09-23 NOTE — PROGRESS NOTES
[facility]  PHYSICAL THERAPY  [] EVALUATION  [x] DAILY NOTE (LAND) [] DAILY NOTE (AQUATIC ) [] PROGRESS NOTE [] DISCHARGE NOTE    [] [facility]   [] [address]    [] [address] [facility]   [x] Piper Thomas    Date: 2021  Patient Name:  Cass Jarrell  : 1950  MRN: [id number]  CSN: [id number]    Referring Practitioner Leta Carrillo DO   Diagnosis Other intervertebral disc degeneration, lumbar region [M51.36]  Radiculopathy, lumbar region [M54.16]    Treatment Diagnosis Difficulty walking    Date of Evaluation 21    Additional Pertinent History Bi-polar; SOB related to CHF      Functional Outcome Measure Used Tinetti   Functional Outcome Score 15/28 (21)    (21)      Insurance: Primary: Payor: Ayesha Beverly /  /  / , aquatic therapy is covered; modalities covered except for IONTO  Secondary:    Authorization Information: [id number] [id number], [id number], [phone], [address] [place] [address]  FROM 12 TO 21  AUTH. #[id number]   Visit # 15, 15/17 for progress note   Visits Allowed: 16   Recertification Date:    Physician Follow-Up:    Physician Orders:    History of Present Illness: Anusha Neal is referred to PT to address ongoing balance issues. She states that she noticed that she was struggling with her balance when she slipped off a short deck at a local [facility]. She admits that over the last few months she has also had a few falls; denies any major injury as a result of the falls. She started to workout with a local  a month hoping that it would help her balance. SUBJECTIVE: Reports feeling tired and frustrated today.     TREATMENT   Precautions:    Pain:2/10 Denies; knees are stiff     X in shaded column indicates activity completed today   Modalities Parameters/  Location  Notes                     Manual Therapy Time/Technique  Notes Exercise/Intervention   Notes   Mariano; review of goals               NuStep x6 min  x Level 5, seat 8, arms 9 ( O2 sats at 6min=88, recovered to 92))   Forward step ups  x10   x Bilaterally    Airex: heel to raises, marches, mini squats x10 ea  x    3 way hip, Hs curls x10 ea   Green band   Tandem stepping fwd/retro x2 laps ea      Sidestepping left/right; tandem stepping; retro x2 laps ea  x    rocker board fwd/lat x15 ea  x 10 sec balance with 1UE support   Hurdles: forward and lateral  x2 ea. x green   Agility mat: forward step in each square; lateral step in each square; march in each square x2   Hand hold assist needed    \"Walked the line\"-tandem walk  x2      // bars: forward and lateral walk with emphasis on taking longer, exaggerated steps  x2 ea.  x           Seated:                   Marches, LAQs, resisted Hs curls x10 ea  x orange                               Oxygen saturation         Specific Interventions Next Treatment: NuStep; airex balance exercises; Activity/Treatment Tolerance:  [x]  Patient tolerated treatment well  []  Patient limited by fatigue  []  Patient limited by pain   []  Patient limited by medical complications  []  Other:     Assessment:Monitored O2 levels throughout treatment. At 92% at start of treatment. Recovers quickly with standing and sested breaks. Body Structures/Functions/Activity Limitations: impaired activity tolerance, impaired balance, impaired coordination, impaired endurance, impaired safety awareness, impaired strength and pain  Prognosis: fair    GOALS:  Patient Goal: minimize fall risk    Short Term Goals:  Time Frame: 4 weeks  1. Barbara Joseph will demonstrate a good ankle strategy to maintain standing balance with perturbations to her trunk. 2. Barbara Joseph will be able to stand for 20 min at a time without needing a rest break allowing her to prepare meals and perform light chores without limitation.  NOT MET-limited to 5 min before she has to sit due to back pain; uses a kitchen stool a lot  3. Alison's Tinetti score will improve to 19/28 indicating minimal fall risk. GOAL MET-improved to 22/28      Long Term Goals:  Time Frame: 8 weeks  1. Discharge with independent HEP ONGOING  2. Alison's Tinetti score will improve to >21/28 indicating no fall risk. GOAL MET; REVISE to improve Tinetti score to >26/28; NOT MET-improved to 24/28  3. Terrence Romero will be able to maintain her standing balance in all 4 conditions of the m-CTSIB, using an ankle strategy to maintain her balance. NOT MET-able to maintain standing balance in conditions 1-3; LOB after 7 sec in condition 4  4. NEW GOAL: Terrence Romero will demonstrate good abdominal stabilization with all transfers and dynamic gait activities to provide greater support to low back. Patient Education:   [x]  HEP/Education Completed: Plan of Care, Goals, anterior/posterior wt shift   Med[employer] Access Code:  []  No new Education completed  []  Reviewed Prior HEP      [x]  Patient verbalized and/or demonstrated understanding of education provided. []  Patient unable to verbalize and/or demonstrate understanding of education provided. Will continue education. [x]  Barriers to learning: n/a    PLAN:  Treatment Recommendations: Strengthening, Range of Motion, Balance Training, Endurance Training, Gait Training, Stair Training, Neuromuscular Re-education, Manual Therapy - Soft Tissue Mobilization, Manual Therapy - Joint Manipulation, Pain Management, Home Exercise Program, Patient Education and Modalities    []  Plan of care initiated. Plan to see patient 2 times per week for 8 weeks to address the treatment planned outlined above. []  Continue with current plan of care  [x]  Modify plan of care as follows:  1 time per week for remaining 6 visits.   []  Hold pending physician visit  []  Discharge    Time In 0915   Time Out 0955   Timed Code Minutes: 40 min   Total Treatment Time: 40 min       Electronically Signed by: Kristal Kulkarni [id number]

## 2021-09-28 ENCOUNTER — PATIENT MESSAGE (OUTPATIENT)
Dept: CARDIOLOGY CLINIC | Age: 71
End: 2021-09-28

## 2021-09-28 DIAGNOSIS — I10 ESSENTIAL HYPERTENSION: ICD-10-CM

## 2021-09-29 NOTE — TELEPHONE ENCOUNTER
From: Seth Mortensen  To: Lee Medina, APRN - CNP  Sent: 9/28/2021 9:00 PM EDT  Subject: Prescription Question    I currently have carvedilol 12.5 mg tablets 60 tablets. I'm currently taking 12.5+6.25 per your suggestion. I am going to run out in about two weeks.  We still use [facility] in [facility]

## 2021-09-30 ENCOUNTER — HOSPITAL ENCOUNTER (OUTPATIENT)
Dept: PHYSICAL THERAPY | Age: 71
Setting detail: THERAPIES SERIES
Discharge: HOME | End: 2021-09-30
Payer: MEDICARE

## 2021-09-30 PROCEDURE — 97530 THERAPEUTIC ACTIVITIES: CPT

## 2021-09-30 PROCEDURE — 97110 THERAPEUTIC EXERCISES: CPT

## 2021-09-30 RX ORDER — CARVEDILOL 12.5 MG/1
18.75 TABLET ORAL 2 TIMES DAILY
Qty: 270 TABLET | Refills: 3 | Status: SHIPPED | OUTPATIENT
Start: 2021-09-30 | End: 2021-10-08 | Stop reason: SDUPTHER

## 2021-09-30 NOTE — PROGRESS NOTES
** PLEASE SIGN, DATE AND TIME CERTIFICATION BELOW AND RETURN TO [facility] OUTPATIENT REHABILITATION (FAX #: [phone]). ATTEST/CO-SIGN IF ACCESSING VIA [employer]. THANK YOU.**    I certify that I have examined the patient below and determined that Physical Medicine and Rehabilitation service is necessary and that I approve the established plan of care for up to 90 days or as specifically noted. Attestation, signature or co-signature of physician indicates approval of certification requirements.    ________________________ ____________ __________  Physician Signature   Date   Time        [address] [facility]  PHYSICAL THERAPY  [] EVALUATION  [x] DAILY NOTE (LAND) [] DAILY NOTE (AQUATIC ) [] PROGRESS NOTE [] DISCHARGE NOTE    [] [facility]   [] [place] [address]    [] [facility]   [x] Ma Walnut    Date: 2021  Patient Name:  Mattie Gomez  : 1950  MRN: [id number]  CSN: [id number]    Referring Practitioner Rita Foot, DO   Diagnosis Other intervertebral disc degeneration, lumbar region [M51.36]  Radiculopathy, lumbar region [M54.16]    Treatment Diagnosis Difficulty walking    Date of Evaluation 21    Additional Pertinent History Bi-polar; SOB related to CHF      Functional Outcome Measure Used Tinetti   Functional Outcome Score 15/28 (21)   2428 (21)  24 (21)      Insurance: Primary: Payor: Jeanna Corea /  /  / , aquatic therapy is covered; modalities covered except for IONTO  Secondary:    Authorization Information: [id number] [address], [id number], [id number], [facility]  FROM 12 TO 21  AUTH. #[id number]   Visit # 16,  for progress note   Visits Allowed: 17   Recertification Date:    Physician Follow-Up:    Physician Orders:    History of Present Illness: Antonietta Henderson is referred to PT to address ongoing balance issues.  She states that she noticed that she was struggling with her balance when she slipped off a short deck at a local [facility]. She admits that over the last few months she has also had a few falls; denies any major injury as a result of the falls. She started to workout with a local  a month hoping that it would help her balance. SUBJECTIVE: Having more burning pain in both knees lately; she is getting back to her HEP following TKA and she is icing which seems to help. TREATMENT   Precautions:    Pain:2/10 Denies; knees are stiff     X in shaded column indicates activity completed today   Modalities Parameters/  Location  Notes                     Manual Therapy Time/Technique  Notes                     Exercise/Intervention   Notes   Mariano; review of goals    x           NuStep x6 min   Level 5, seat 8, arms 9 ( O2 sats at 6min=88, recovered to 92))   Forward step ups  x10   x Bilaterally    Airex: heel to raises, marches, mini squats x10 ea  x    3 way hip, Hs curls x10 ea   Green band   Tandem stepping fwd/retro x2 laps ea  x    Sidestepping left/right; tandem stepping; retro x2 laps ea  x    rocker board fwd/lat x15 ea  x 10 sec balance with 1UE support   Hurdles: forward and lateral  x2 ea. x green   Agility mat: forward step in each square; lateral step in each square; march in each square x2   Hand hold assist needed    \"Walked the line\"-tandem walk  x2      // bars: forward and lateral walk with emphasis on taking longer, exaggerated steps  x2 ea.  x           Seated:                   Marches, LAQs, resisted Hs curls x10 ea   orange   NK table: flexion/extension x10 ea 5# x bilaterally   BOSU lunges  x10  x                  Oxygen saturation   x Dropped to 89-90% at end of session on room air     Specific Interventions Next Treatment: NuStep; airex balance exercises;      Activity/Treatment Tolerance:  [x]  Patient tolerated treatment well  []  Patient limited by fatigue  []  Patient limited by pain   []  Patient limited by medical complications  []  Other:     Assessment: Xochilt Hassan is doing so much better now that she has recovered from her pneumonia. She came in without her oxygen on, however she did have it available should she need it. Xochilt Hassan will continue to benefit from PT to get her back on track with dynamic balance and strength; she lost some balance and strength with recent bout with pneumonia. Body Structures/Functions/Activity Limitations: impaired activity tolerance, impaired balance, impaired coordination, impaired endurance, impaired safety awareness, impaired strength and pain  Prognosis: fair    GOALS:  Patient Goal: minimize fall risk    Short Term Goals:  Time Frame: 4 weeks  1. Xochilt Hassan will demonstrate a good ankle strategy to maintain standing balance with perturbations to her trunk. 2. Xochilt Hassan will be able to stand for 20 min at a time without needing a rest break allowing her to prepare meals and perform light chores without limitation. NOT MET-limited to 5 min before she has to sit due to back pain; uses a kitchen stool a lot  3. Alison's Tinetti score will improve to 19/28 indicating minimal fall risk. GOAL MET-improved to 22/28      Long Term Goals:  Time Frame: 8 weeks  1. Discharge with independent HEP ONGOING  2. Alison's Tinetti score will improve to >21/28 indicating no fall risk. GOAL MET; REVISE to improve Tinetti score to >26/28; NOT MET-improved to 24/28  3. Xochilt Hassan will be able to maintain her standing balance in all 4 conditions of the m-CTSIB, using an ankle strategy to maintain her balance. NOT MET-able to maintain standing balance in conditions 1-3; LOB after 7 sec in condition 4  4. NEW GOAL: Xochilt Hassan will demonstrate good abdominal stabilization with all transfers and dynamic gait activities to provide greater support to low back.  IMPROVING       Patient Education:   [x]  HEP/Education Completed: Plan of Care, Goals, anterior/posterior wt shift   [facility] Access Code:  [] No new Education completed  []  Reviewed Prior HEP      [x]  Patient verbalized and/or demonstrated understanding of education provided. []  Patient unable to verbalize and/or demonstrate understanding of education provided. Will continue education. [x]  Barriers to learning: n/a    PLAN:  Treatment Recommendations: Strengthening, Range of Motion, Balance Training, Endurance Training, Gait Training, Stair Training, Neuromuscular Re-education, Manual Therapy - Soft Tissue Mobilization, Manual Therapy - Joint Manipulation, Pain Management, Home Exercise Program, Patient Education and Modalities    []  Plan of care initiated. Plan to see patient 2 times per week for 8 weeks to address the treatment planned outlined above. []  Continue with current plan of care  [x]  Modify plan of care as follows:  1 time per week for remaining 6 visits.   []  Hold pending physician visit  []  Discharge    Time In 0930   Time Out 1005   Timed Code Minutes: 35 min   Total Treatment Time: 35 min       Electronically Signed by: Amy Sadler, MINE [id number]"Delmy Ramirez DPT  [id number]

## 2021-10-04 ENCOUNTER — TELEPHONE (OUTPATIENT)
Dept: FAMILY MEDICINE CLINIC | Age: 71
End: 2021-10-04

## 2021-10-04 ENCOUNTER — VIRTUAL VISIT (OUTPATIENT)
Dept: FAMILY MEDICINE CLINIC | Age: 71
End: 2021-10-04
Payer: MEDICARE

## 2021-10-04 DIAGNOSIS — J06.9 VIRAL URI: Primary | ICD-10-CM

## 2021-10-04 PROCEDURE — 4040F PNEUMOC VAC/ADMIN/RCVD: CPT | Performed by: FAMILY MEDICINE

## 2021-10-04 PROCEDURE — 99213 OFFICE O/P EST LOW 20 MIN: CPT | Performed by: FAMILY MEDICINE

## 2021-10-04 PROCEDURE — 3017F COLORECTAL CA SCREEN DOC REV: CPT | Performed by: FAMILY MEDICINE

## 2021-10-04 PROCEDURE — 1123F ACP DISCUSS/DSCN MKR DOCD: CPT | Performed by: FAMILY MEDICINE

## 2021-10-04 PROCEDURE — G8400 PT W/DXA NO RESULTS DOC: HCPCS | Performed by: FAMILY MEDICINE

## 2021-10-04 PROCEDURE — G8428 CUR MEDS NOT DOCUMENT: HCPCS | Performed by: FAMILY MEDICINE

## 2021-10-04 PROCEDURE — 1090F PRES/ABSN URINE INCON ASSESS: CPT | Performed by: FAMILY MEDICINE

## 2021-10-04 ASSESSMENT — ENCOUNTER SYMPTOMS
CHEST TIGHTNESS: 0
RHINORRHEA: 1
COUGH: 1
GASTROINTESTINAL NEGATIVE: 1
SHORTNESS OF BREATH: 0
SORE THROAT: 1

## 2021-10-04 NOTE — PROGRESS NOTES
Mignon Bowers (:  1950) is a 70 y.o. female,Established patient, here for evaluation of the following chief complaint(s): Congestion and Cough              SUBJECTIVE/OBJECTIVE:  HPI:    Chief Complaint   Patient presents with    Congestion    Cough     Pt presents today with cough, congestion for the last 2 days. Some fatigue. Slight cough. No fevers. Watching family member's dog, not sure if allergies or not. On Zyrtec with some relief. Vaccinated. Patient Active Problem List   Diagnosis    Hypothyroid    Dyslipidemia    Bipolar disorder ([facility]    Post-menopausal    HTN (hypertension)    Obesity (BMI 30-39. 9)    RANDY on CPAP    ACE inhibitor-aggravated angioedema    Acute on chronic diastolic congestive heart failure (HCC)    Ascending aortic aneurysm (HCC)    Chronic diastolic heart failure (HCC)    Thoracic aortic aneurysm without rupture (HCC)    Abnormal stress test    CKD (chronic kidney disease), stage II    Edema of lower extremity    Pneumonia of both lower lobes due to infectious organism    Acute respiratory failure with hypoxia (HCC)    Lung nodule < 6cm on CT    Congestive heart failure (HCC)    Morbid obesity with BMI of 40.0-44.9, adult ([facility]     Past Surgical History:   Procedure Laterality Date    ANKLE SURGERY      left    CATARACT REMOVAL      Both eyes      SECTION      x 3    FOOT SURGERY      Left     HIP SURGERY      HYSTERECTOMY      Total     TOTAL KNEE ARTHROPLASTY Left 10/14/14    TOTAL KNEE ARTHROPLASTY Right 2019     Social History     Tobacco Use    Smoking status: Never Smoker    Smokeless tobacco: Never Used   Vaping Use    Vaping Use: Never used   Substance Use Topics    Alcohol use: No     Alcohol/week: 0.0 standard drinks     Comment: rarely    Drug use: No         Review of Systems   Constitutional: Positive for fatigue. Negative for fever. HENT: Positive for congestion, postnasal drip, rhinorrhea and sore throat. Respiratory: Positive for cough. Negative for chest tightness and shortness of breath. Cardiovascular: Negative. Gastrointestinal: Negative. Musculoskeletal: Negative. All other systems reviewed and are negative. Patient-Reported Vitals 11/24/2020   Patient-Reported Weight 240.6lbs   Patient-Reported Systolic 563   Patient-Reported Diastolic 60        Physical Exam  Vitals and nursing note reviewed. Constitutional:       General: She is not in acute distress. Appearance: Normal appearance. She is well-developed. She is not ill-appearing. HENT:      Head: Normocephalic and atraumatic. Right Ear: Tympanic membrane and external ear normal.      Left Ear: Tympanic membrane and external ear normal.   Eyes:      Conjunctiva/sclera: Conjunctivae normal.   Cardiovascular:      Rate and Rhythm: Normal rate and regular rhythm. Heart sounds: Normal heart sounds. No murmur heard. Pulmonary:      Effort: Pulmonary effort is normal. No respiratory distress. Breath sounds: Normal breath sounds. No wheezing, rhonchi or rales. Abdominal:      General: There is no distension. Musculoskeletal:      Cervical back: Neck supple. Skin:     General: Skin is warm and dry. Findings: No rash (on exposed surfaces). Neurological:      General: No focal deficit present. Mental Status: She is alert and oriented to person, place, and time. Psychiatric:         Attention and Perception: Attention normal.         Mood and Affect: Mood normal.         Speech: Speech normal.         Behavior: Behavior normal. Behavior is cooperative. Thought Content: Thought content normal.         Judgment: Judgment normal.     ASSESSMENT/PLAN:  1. Viral URI    -  Viral nature discussed  -  Rest, fluids  -  Symptomatic care only at this time, OTC Coricidin  -  Handwashing!!!    Return if symptoms worsen or fail to improve.                 Carmen Ames, was evaluated through a synchronous (real-time) audio-video encounter. The patient (or guardian if applicable) is aware that this is a billable service. Verbal consent to proceed has been obtained within the past 12 months. The visit was conducted pursuant to the emergency declaration under the [facility], [address] [place] [address] authority and the [employer] Act. Patient identification was verified, and a caregiver was present when appropriate. The patient was located in a state where the provider was credentialed to provide care. An electronic signature was used to authenticate this note.     --Idris Valentine DO

## 2021-10-04 NOTE — TELEPHONE ENCOUNTER
----- Message from Glo Leahy sent at 10/4/2021  8:44 AM EDT -----  Subject: Referral Request    QUESTIONS   Reason for referral request? patient is requesting to have a rapid covid   test done, today, if possible. Has the physician seen you for this condition before? No   Preferred Specialist (if applicable)? Dawna Duran you already have an appointment scheduled? No  Additional Information for Provider?   ---------------------------------------------------------------------------  --------------  CALL BACK INFO  What is the best way for the office to contact you? OK to leave message on   voicemail  Preferred Call Back Phone Number?  [phone]

## 2021-10-08 ENCOUNTER — HOSPITAL ENCOUNTER (OUTPATIENT)
Dept: PHYSICAL THERAPY | Age: 71
Setting detail: THERAPIES SERIES
Discharge: HOME | End: 2021-10-08
Payer: MEDICARE

## 2021-10-08 DIAGNOSIS — I10 ESSENTIAL HYPERTENSION: ICD-10-CM

## 2021-10-08 PROCEDURE — 97530 THERAPEUTIC ACTIVITIES: CPT

## 2021-10-08 PROCEDURE — 97110 THERAPEUTIC EXERCISES: CPT

## 2021-10-08 RX ORDER — CARVEDILOL 25 MG/1
25 TABLET ORAL 2 TIMES DAILY
Qty: 180 TABLET | Refills: 3 | Status: SHIPPED | OUTPATIENT
Start: 2021-10-08 | End: 2022-09-22 | Stop reason: SDUPTHER

## 2021-10-08 NOTE — TELEPHONE ENCOUNTER
Patient called in needing a RX for Coreg 12.5mg - 1.5 tabs BID. This was sent on 9/30/. Pharmacy claims they do not have it. Although, Patient states BP is not controlled on  1.5 tabs BID. BP running 150s/80s. Was on Coreg 25 mg BID in the past and it controlled BP better.

## 2021-10-08 NOTE — TELEPHONE ENCOUNTER
VO from EHSAN Fontaine:  Increase Coreg to 25 mg BID    Rx pended. , Moses Gifford, notified and verbalized understanding.

## 2021-10-08 NOTE — PROGRESS NOTES
[facility]  PHYSICAL THERAPY  [] EVALUATION  [x] DAILY NOTE (LAND) [] DAILY NOTE (AQUATIC ) [] PROGRESS NOTE [] DISCHARGE NOTE    [] [facility]   [] [address]    [] [address] [facility]   [x] Michial Bending    Date: 10/8/2021  Patient Name:  Lilliam Quiñones  : 1950  MRN: [id number]  CSN: [id number]    Referring Practitioner Gokul Rollins DO   Diagnosis Other intervertebral disc degeneration, lumbar region [M51.36]  Radiculopathy, lumbar region [M54.16]    Treatment Diagnosis Difficulty walking    Date of Evaluation 21    Additional Pertinent History Bi-polar; SOB related to CHF      Functional Outcome Measure Used Tinetti   Functional Outcome Score 15/28 (21)    (21)   (21)      Insurance: Primary: Payor: Rafael Griffin /  /  / , aquatic therapy is covered; modalities covered except for IONTO  Secondary:    Authorization Information: RECEIVED AUTH FOR PT  TOTAL OF 12 VISITS  FROM 21 TO 21  CPT CODES 90752, 20156, [place] [address]. # [id number]   Visit # 17,  for progress note   Visits Allowed: 12   Recertification Date:    Physician Follow-Up:    Physician Orders:    History of Present Illness: Jesus Barr is referred to PT to address ongoing balance issues. She states that she noticed that she was struggling with her balance when she slipped off a short deck at a local [facility]. She admits that over the last few months she has also had a few falls; denies any major injury as a result of the falls. She started to workout with a local  a month hoping that it would help her balance.       SUBJECTIVE: Had a bad cold earlier today; feeling better and she just worked out with her      TREATMENT   Precautions:    Pain:2/10 Denies; knees are stiff     X in shaded column indicates activity completed today   Modalities Parameters/  Location  Notes Manual Therapy Time/Technique  Notes                     Exercise/Intervention   Notes   Mariano; review of goals    x           NuStep x5 min   Level 3, seat 8, arms 9 ( O2 sats at 6min=88, recovered to 92))   Forward step ups  x10   x Bilaterally    Airex: heel to raises, marches, mini squats x10 ea  x    3 way hip, Hs curls x10 ea   Green band   Tandem stepping fwd/retro x2 laps ea      Sidestepping left/right; tandem stepping; retro x2 laps ea      rocker board fwd/lat x15 ea  x 10 sec balance with 1UE support   Hurdles: forward and lateral  x2 ea. green   Agility mat: forward step in each square; lateral step in each square; march in each square x2   Hand hold assist needed    \"Walked the line\"-tandem walk  x2      // bars: forward and lateral walk with emphasis on taking longer, exaggerated steps  x2 ea.  x           Seated:                   Marches, LAQs, resisted Hs curls x10 ea   orange   NK table: flexion/extension x10 ea 5#  bilaterally   BOSU lunges  x10      Seated ball squeezes and hip abd x10  x Green band          Oxygen saturation   x Started at 88% on room air; had Megan Briggs put on 2 liters of oxygen     Specific Interventions Next Treatment: NuStep; airex balance exercises; Activity/Treatment Tolerance:  [x]  Patient tolerated treatment well  []  Patient limited by fatigue  []  Patient limited by pain   []  Patient limited by medical complications  []  Other:     Assessment: Struggled to keep her oxygen levels stable today; she is recovering from a head cold and walked in without her supplemental oxygen on. Once she was on 2 liters her levels increased and stayed between 92-94%. Body Structures/Functions/Activity Limitations: impaired activity tolerance, impaired balance, impaired coordination, impaired endurance, impaired safety awareness, impaired strength and pain  Prognosis: fair    GOALS:  Patient Goal: minimize fall risk    Short Term Goals:  Time Frame: 4 weeks  1. Carolann Miles will demonstrate a good ankle strategy to maintain standing balance with perturbations to her trunk. 2. Carolann Miles will be able to stand for 20 min at a time without needing a rest break allowing her to prepare meals and perform light chores without limitation. NOT MET-limited to 5 min before she has to sit due to back pain; uses a kitchen stool a lot  3. Luiss Tinetti score will improve to 19/28 indicating minimal fall risk. GOAL MET-improved to 22/28      Long Term Goals:  Time Frame: 8 weeks  1. Discharge with independent HEP ONGOING  2. Luiss Tinetti score will improve to >21/28 indicating no fall risk. GOAL MET; REVISE to improve Tinetti score to >26/28; NOT MET-improved to 24/28  3. Carolann Miles will be able to maintain her standing balance in all 4 conditions of the m-CTSIB, using an ankle strategy to maintain her balance. NOT MET-able to maintain standing balance in conditions 1-3; LOB after 7 sec in condition 4  4. NEW GOAL: Carolann Miles will demonstrate good abdominal stabilization with all transfers and dynamic gait activities to provide greater support to low back. IMPROVING       Patient Education:   [x]  HEP/Education Completed: Plan of Care, Goals, anterior/posterior wt shift   [facility] Access Code:  []  No new Education completed  []  Reviewed Prior HEP      [x]  Patient verbalized and/or demonstrated understanding of education provided. []  Patient unable to verbalize and/or demonstrate understanding of education provided. Will continue education. [x]  Barriers to learning: n/a    PLAN:  Treatment Recommendations: Strengthening, Range of Motion, Balance Training, Endurance Training, Gait Training, Stair Training, Neuromuscular Re-education, Manual Therapy - Soft Tissue Mobilization, Manual Therapy - Joint Manipulation, Pain Management, Home Exercise Program, Patient Education and Modalities    []  Plan of care initiated.   Plan to see patient 2 times per week for 8 weeks to address the treatment planned outlined above.   []  Continue with current plan of care  [x]  Modify plan of care as follows: 2 time per week   []  Hold pending physician visit  []  Discharge    Time In 1045   Time Out 1115   Timed Code Minutes: 30 min   Total Treatment Time: 30 min       Electronically Signed by: Ronny Mcgraw, MINE Carver [id number]"SAVANNAH MoralesT  [id number]

## 2021-10-13 ENCOUNTER — PATIENT MESSAGE (OUTPATIENT)
Dept: PULMONOLOGY | Age: 71
End: 2021-10-13

## 2021-10-13 ENCOUNTER — HOSPITAL ENCOUNTER (OUTPATIENT)
Dept: PHYSICAL THERAPY | Age: 71
Setting detail: THERAPIES SERIES
Discharge: HOME | End: 2021-10-13
Payer: MEDICARE

## 2021-10-13 PROCEDURE — 97110 THERAPEUTIC EXERCISES: CPT

## 2021-10-13 NOTE — PROGRESS NOTES
[address]  PHYSICAL THERAPY  [] EVALUATION  [x] DAILY NOTE (LAND) [] DAILY NOTE (AQUATIC ) [] PROGRESS NOTE [] DISCHARGE NOTE    [] [facility]   [] [address]    [] [facility]   [x] Brady Fernandez    Date: 10/13/2021  Patient Name:  Aric Bustamante  : 1950  MRN: [id number]  CSN: [id number]    Referring Practitioner Zackery Lugo DO   Diagnosis Other intervertebral disc degeneration, lumbar region [M51.36]  Radiculopathy, lumbar region [M54.16]    Treatment Diagnosis Difficulty walking    Date of Evaluation 21    Additional Pertinent History Bi-polar; SOB related to CHF      Functional Outcome Measure Used Tinetti   Functional Outcome Score 15/28 (21)    (21)   (21)      Insurance: Primary: Payor: Lorena Posadas /  /  / , aquatic therapy is covered; modalities covered except for IONTO  Secondary:    Authorization Information: RECEIVED AUTH FOR PT  TOTAL OF 12 VISITS  FROM 21 TO 21  CPT CODES 48022, 27864, [place] [address]. # [id number]   Visit # 18,  for progress note   Visits Allowed: 12   Recertification Date:    Physician Follow-Up:    Physician Orders:    History of Present Illness: Genevieve Lara is referred to PT to address ongoing balance issues. She states that she noticed that she was struggling with her balance when she slipped off a short deck at a local [facility]. She admits that over the last few months she has also had a few falls; denies any major injury as a result of the falls. She started to workout with a local  a month hoping that it would help her balance. SUBJECTIVE: Pt stated she woke up this morning and O2 was 94%. Pt has no new concerns.  At beginning of session O2 was 90% with no oxygen    TREATMENT   Precautions:    Pain:1/10 Denies; knees are stiff     X in shaded column indicates activity completed today   Modalities Parameters/  Location  Notes                     Manual Therapy Time/Technique  Notes                     Exercise/Intervention   Notes   Mariano; review of goals    x           NuStep x5 min  x Level 3, seat 8, arms 9 ( O2 sats at 6min=88, recovered to 92))   Forward step ups  x10   x Bilaterally    Airex: heel to raises, marches, mini squats x10 ea  x After marching dropped to 86% so put on 1L   3 way hip, Hs curls x10 ea  x Green band   Tandem stepping fwd/retro x2 laps ea      Sidestepping left/right; tandem stepping; retro x2 laps ea  x    rocker board fwd/lat x15 ea  x 10 sec balance with 1UE support   wobbleboard 10x  x CW/CCW, manual overpressure provided   Hurdles: forward and lateral  x2 ea. green   Agility mat: forward step in each square; lateral step in each square; march in each square x2   Hand hold assist needed    \"Walked the line\"-tandem walk  x2      // bars: forward and lateral walk with emphasis on taking longer, exaggerated steps  x2 ea. Seated:                   Marches, LAQs, resisted Hs curls x10 ea   orange   NK table: flexion/extension x10 ea 5#  bilaterally   BOSU lunges  x10      Seated ball squeezes and hip abd x10   Green band          Oxygen saturation   x Started at 86% on room air; had Samy Alexandre put on 1 liters of oxygen, then after 30 min of exercises up O2 to 2L     Specific Interventions Next Treatment: NuStep; airex balance exercises; Activity/Treatment Tolerance:  [x]  Patient tolerated treatment well  []  Patient limited by fatigue  []  Patient limited by pain   []  Patient limited by medical complications  []  Other:     Assessment: Pt continues to struggle with keeping O2 levels above 90% without O2. Pt able to use 1L of O2 for about 30 min of mild activity then increased to 2L for last 15 min of session. O2 then only dropped to 91%. Wobbleboard added for increased ankle strengthening.      Body Structures/Functions/Activity Limitations: impaired activity tolerance, impaired balance, impaired coordination, impaired endurance, impaired safety awareness, impaired strength and pain  Prognosis: fair    GOALS:  Patient Goal: minimize fall risk    Short Term Goals:  Time Frame: 4 weeks  1. Nick Delgado will demonstrate a good ankle strategy to maintain standing balance with perturbations to her trunk. 2. Nick Delgado will be able to stand for 20 min at a time without needing a rest break allowing her to prepare meals and perform light chores without limitation. NOT MET-limited to 5 min before she has to sit due to back pain; uses a kitchen stool a lot  3. Alison's Tinetti score will improve to 19/28 indicating minimal fall risk. GOAL MET-improved to 22/28      Long Term Goals:  Time Frame: 8 weeks  1. Discharge with independent HEP ONGOING  2. Luiss Tinetti score will improve to >21/28 indicating no fall risk. GOAL MET; REVISE to improve Tinetti score to >26/28; NOT MET-improved to 24/28  3. Nick Delgado will be able to maintain her standing balance in all 4 conditions of the m-CTSIB, using an ankle strategy to maintain her balance. NOT MET-able to maintain standing balance in conditions 1-3; LOB after 7 sec in condition 4  4. NEW GOAL: Nick Delgado will demonstrate good abdominal stabilization with all transfers and dynamic gait activities to provide greater support to low back. IMPROVING       Patient Education:   []  HEP/Education Completed: Plan of Care, Goals, anterior/posterior wt shift   [facility] Access Code:  [x]  No new Education completed  []  Reviewed Prior HEP      []  Patient verbalized and/or demonstrated understanding of education provided. []  Patient unable to verbalize and/or demonstrate understanding of education provided. Will continue education.   [x]  Barriers to learning: n/a    PLAN:  Treatment Recommendations: Strengthening, Range of Motion, Balance Training, Endurance Training, Gait Training, Stair Training, Neuromuscular Re-education, Manual Therapy - Soft Tissue Mobilization, Manual Therapy - Joint Manipulation, Pain Management, Home Exercise Program, Patient Education and Modalities    []  Plan of care initiated. Plan to see patient 2 times per week for 8 weeks to address the treatment planned outlined above.   []  Continue with current plan of care  [x]  Modify plan of care as follows: 2 time per week   []  Hold pending physician visit  []  Discharge    Time In 0800   Time Out 0845   Timed Code Minutes: 45 min   Total Treatment Time: 45 min       Electronically Signed by: Vitaly Granados PTA

## 2021-10-13 NOTE — TELEPHONE ENCOUNTER
From: Johan Molina  To: DAVID Machado - CNP  Sent: 10/13/2021 11:52 AM EDT  Subject: Visit Follow-Up Question    For my last two physical therapy appointments, we have had to raise my oxygen level to number 2 to keep me above 90. If we didn't my oxygen level went down into the 80s.

## 2021-10-13 NOTE — TELEPHONE ENCOUNTER
I called the patient back. Patient reports that her oxygen level has been dropping at physical therapy, down to 85% on 1 lpm so they have increased it to 2 lpm when she is at physical therapy. She reports at PT she is doing a lot more exertion than at home. She denies an increase in shortness of breath, wheezing, or fevers. Reports occasional cough, productive at times with white and frothy sputum. She admits that she does have a \"cold\" but feels that she has almost recovered. She reports that her rhinorrhea, congestion, and sneezing have resolved. Denies loss of taste or smell. She reports that her O2 at rest at home has been 90%+. Her O2 right now on room air is 93% while we are on the phone. She admits that she does not always remember to use the O2 when she is walking in the house but she states that when she checks her SpO2, it remains 90% when walking short distances. I advised patient that she is likely working a lot harder at therapy and that is likely why her O2 has dropped while doing PT. It is OK for her to increase to 2 lpm while at therapy since they monitor her SpO2. I advised patient to continue to use 1 lpm with exertion while at home and room air at rest.    I advised patient that if she experiences an increase in respiratory symptoms, such as shortness of breath, wheezing, coughing, fevers, etc. to seek treatment in the emergency department.

## 2021-10-14 ENCOUNTER — HOSPITAL ENCOUNTER (OUTPATIENT)
Dept: SPEECH THERAPY | Age: 71
Setting detail: THERAPIES SERIES
Discharge: HOME | End: 2021-10-14
Payer: MEDICARE

## 2021-10-14 PROCEDURE — 92610 EVALUATE SWALLOWING FUNCTION: CPT

## 2021-10-14 NOTE — PROGRESS NOTES
** PLEASE SIGN, DATE AND TIME CERTIFICATION BELOW AND RETURN TO [facility] OUTPATIENT REHABILITATION (FAX #: [phone]). ATTEST/CO-SIGN IF ACCESSING VIA [employer]DoYouBuzz. THANK YOU.**    I certify that I have examined the patient below and determined that Physical Medicine and Rehabilitation service is necessary and that I approve the established plan of care for up to 90 days or as specifically noted. Attestation, signature or co-signature of physician indicates approval of certification requirements.    ________________________ ____________ __________  Physician Signature   Date   Time     [facility]  [x] CLINICAL SWALLOW EVALUATION  [] DAILY NOTE   [] PROGRESS NOTE [] DISCHARGE NOTE    [] [facility]   [] [address]    [] [facility]   [x] Ronan Silveira    Date: 10/14/2021  Patient Name:  Josselyn Goetz  : 1950  MRN: [id number]  CSN: [id number]    Referring Practitioner Breana Faust APR*   Diagnosis Dysphagia, unspecified [R13.10]    Treatment Diagnosis Oropharyngeal dysphagia   Date of Evaluation 10/14/21      Functional Outcome Measure Used EAT-10      Functional Outcome Score 6 (10/14/21)  (lower score equates to better swallow function. Score of 3 or greater indicates likely presence of swallowing dysfunction)      Insurance: Primary: Payor: Yessi Uribe /  /  / ,   Secondary:    Authorization Information: Pre-cert required after evaluation. No co-pay. Visit # 1, 1/10 for progress note   Visits Allowed: 1 / ? Recertification Date:    Physician Follow-Up: DAVID Amaya-CNP 21    Physician Orders: Evaluate and treat - \"Reporting difficulty with swallowing and some coughing with swallowing. She was also recently diagnosed with pneumonia\".     Pertinent History: Patient reports she was referred for speech therapy evaluation due to having PNA and wanting to ensure aspiration was not the cause. Endorses swallowing trouble where  has done the Heimlich on her previously. Reports trouble with breathing all summer, PNA recognized after oxygen dropping during PT visits. Patient also has hx of CHF with fluid restrictions (200ml/day) and sodium restrictions. History of COVID positive test January 2021, however did not have any symptoms at that time reportedly. Patient states, \"When sitting or laying in bed, it feels like something just won't open down there. It's hard to swallow\". Denies presence of GERD, however is currently taking medication. Patient's daughter is an SLP and she informed patient and  of the following: \"Oral residue with soft and dry foods\", \"food on outside corners of mouth, especially with ice cream and chocolate\", \"coughing during meals but not consistent\", \"mouth breather\", and \"open mouth posture\". Patient endorses coughing when eating and not really at other times. Reports coughing with both solids and liquids, more frequently with solids. Presence of xerostomia due to medications noted, however uses biotiene which she states does help. SUBJECTIVE: Patient pleasant and cooperative. Arrived with  who was supportive in providing information and history. Social/Functional History:  Medications and Allergies have been reviewed and are listed on the [address] lives with spouse and two grandchildren in a single story home with stairs and a handrail to enter.       Task Prior Level of Function Current Level of Function   ADLs  Independent Independent   Finance Management Not Applicable Not Applicable ( completes)   Medication Management Modified Independent Modified Independent ( sets up pill organizer)   Educational Level Master of Science in Nursing Master of Science in Nursing - Mental health   Driving Active  Active    Work Retired Retired   [employer], [facility], word searches Vision Status Corrected Corrected   Hearing Status WNL WNL   Diet Regular solids. Thin liquids. Regular solids. Thin liquids. Pain:  1/10 - Pain location: knee    Current Diet: Regular solids. Thin liquids. Respiratory Status:  Nasal Canula - 1L of O2. Will turn up to 2L during PT sessions. Behavioral Observation:  Alert and Oriented    Oral Mechanism Evaluation:      Facial / Labial Impaired    Lingual Impaired Mildly reduced lingual strength upon protrusion    Dentition Impaired Presence of top and bottom partial dentures. Velum WFL    Vocal Quality Impaired    Sensation Not Tested    Cough WFL Volitional cough appears strong     Patient Evaluated Using:  Regular water, puree, soft solids, coarse/dry solids (deven crackers)    Oral Phase:  Impaired:  Impaired AP Movement, Impaired Mastication and Reduced Bolus Formation. Presence of oral residue in lateral sulci for purees, soft solids, dry solids. Labial residue with dry solids. Pharyngeal Phase: WFL:  Pharyngeal phase appears WFL but cannot rule out pharyngeal phase deficits from a bedside swallowing evaluation alone. Signs and Symptoms of Laryngeal Penetration/Aspiration: No signs/symptoms of aspiration evident in this evaluation, but cannot rule out silent aspiration. Eating Assessment Tool (EAT-10) completed with total score 6 indicating possible presence of dysphagia. Normal swallow function is a score 3 or below. Reflux Symptom Index (RSI) completed with total score 6. Scores range from 0 (normal) to 26 (most severe), with a score of 11 or above generally considered to be indicative of laryngopharyngeal reflux (LPR). IMPRESSIONS: The patient presents with at least mild oral dysphagia and likely some degree of pharyngeal dysphagia that cannot be identified clinically without formal instrumentation/pharyngeal visualization.  Reduced bolus formation, reduced anterior-posterior transit, and presence of oral residue noted with all solid textures assessed likely due at least in some degree to reduced lingual strength. No overt s/s of aspiration occurring within this assessment, however patient with subjective reports of difficulty within the pharyngeal phase, warranting additional assessment. Recommending patient complete Modified Barium Swallow Study (MBS) for more informative and detailed assessment to facilitate further plan of care development for treatment of dysphagia. Order faxed to referring provider this date. RECOMMENDATIONS/ASSESSMENT:  Instrumental Evaluation: Modified Barium Swallow (MBS)  Diet Recommendations:  Regular solids. Thin liquids. Strategies:  Strategies pending MBS completion   Rehabilitation Potential/Prognosis: excellent  Areas for Improvement: Impaired swallow function  Overall Assessment: Possible oropharyngeal dysphagia. Requires MBS to fully assess pharyngeal swallow function. Specific Interventions Next Treatment: Complete MBS. Activity/Treatment Tolerance:  [x]  Patient tolerated treatment well  []  Patient limited by fatigue  []  Patient limited by pain   []  Patient limited by other medical complications  []  Other:     GOALS:  Patient Goal: Did not assess. Short Term Goals:  Time Frame: 6 weeks  Goal 1: The patient will participate in additional assessment via Modified Barium Swallow Study (MBS) within 6 weeks to further assess oropharyngeal swallow function and determine appropriate plan of care. Long Term Goals:  Time Frame: TBD    Patient Education:   [x]  HEP/Education Completed: Plan of Care, complete MBS   []  No new Education completed  []  Reviewed Prior HEP      [x]  Patient verbalized and/or demonstrated understanding of education provided. []  Patient unable to verbalize and/or demonstrate understanding of education provided. Will continue education. []  Barriers to learning:     PLAN:  Treatment Recommendations:     [x]  Plan of care initiated.   Patient to complete MBS prior to frequency and duration recommendations for treatment.   []  Continue with current plan of care  []  Modify plan of care as follows:    []  Hold pending physician visit  []  Discharge    Time In 0830   Time Out 0928   Timed Code Minutes: 0 min   Total Treatment Time: 62 min       Jayjay Booth M.S., [address] [place] [address]

## 2021-10-18 ENCOUNTER — APPOINTMENT (OUTPATIENT)
Dept: PHYSICAL THERAPY | Age: 71
End: 2021-10-18
Payer: MEDICARE

## 2021-10-18 ENCOUNTER — HOSPITAL ENCOUNTER (OUTPATIENT)
Dept: PHYSICAL THERAPY | Age: 71
Setting detail: THERAPIES SERIES
Discharge: HOME | End: 2021-10-18
Payer: MEDICARE

## 2021-10-18 PROCEDURE — 97110 THERAPEUTIC EXERCISES: CPT

## 2021-10-18 NOTE — PROGRESS NOTES
Modalities Parameters/  Location  Notes                     Manual Therapy Time/Technique  Notes                     Exercise/Intervention   Notes   Mariano; review of goals    x           NuStep x5 min  x Level 5, seat 8, arms 9, 95%   Forward step ups  x10   x Bilaterally    Airex: heel to raises, marches, mini squats x15 ea  x With O2 92%   3 way hip, Hs curls x15 ea  x Green band   Tandem stepping fwd/retro, side stepping x2 laps ea  x    rocker board fwd/lat x15 ea  x 15 sec balance with 1UE support   wobbleboard 10x  x CW/CCW, manual overpressure provided   Hurdles: forward and lateral  x2 ea. green   Agility mat: forward step in each square; lateral step in each square; march in each square x2   Hand hold assist needed    \"Walked the line\"-tandem walk  x2      // bars: forward and lateral walk with emphasis on taking longer, exaggerated steps  x2 ea. Seated:                   Marches, LAQs, resisted Hs curls x10 ea   orange   NK table: flexion/extension x10 ea 5#  bilaterally   BOSU lunges  x10      Seated ball squeezes and hip abd x10   Green band          Oxygen saturation   x Pt stayed on 1L of O2 during the entire session-92% and above     Specific Interventions Next Treatment: NuStep; airex balance exercises; Activity/Treatment Tolerance:  [x]  Patient tolerated treatment well  []  Patient limited by fatigue  []  Patient limited by pain   []  Patient limited by medical complications  []  Other:     Assessment: Pt remained on O2 the entire 30 min session with O2 sats dropping to only 92%. Pt able to increase reps to 15 this session. No increase in pain levels with exercises. Body Structures/Functions/Activity Limitations: impaired activity tolerance, impaired balance, impaired coordination, impaired endurance, impaired safety awareness, impaired strength and pain  Prognosis: fair    GOALS:  Patient Goal: minimize fall risk    Short Term Goals:  Time Frame: 4 weeks  1.  Antonietta Rides will demonstrate a good ankle strategy to maintain standing balance with perturbations to her trunk. 2. Yusuf will be able to stand for 20 min at a time without needing a rest break allowing her to prepare meals and perform light chores without limitation. NOT MET-limited to 5 min before she has to sit due to back pain; uses a kitchen stool a lot  3. Alison's Tinetti score will improve to 19/28 indicating minimal fall risk. GOAL MET-improved to 22/28      Long Term Goals:  Time Frame: 8 weeks  1. Discharge with independent HEP ONGOING  2. Alison's Tinetti score will improve to >21/28 indicating no fall risk. GOAL MET; REVISE to improve Tinetti score to >26/28; NOT MET-improved to 24/28  3. Yusuf will be able to maintain her standing balance in all 4 conditions of the m-CTSIB, using an ankle strategy to maintain her balance. NOT MET-able to maintain standing balance in conditions 1-3; LOB after 7 sec in condition 4  4. NEW GOAL: Yusuf will demonstrate good abdominal stabilization with all transfers and dynamic gait activities to provide greater support to low back. IMPROVING       Patient Education:   []  HEP/Education Completed: Plan of Care, Goals, anterior/posterior wt shift   [facility] Access Code:  [x]  No new Education completed  []  Reviewed Prior HEP      []  Patient verbalized and/or demonstrated understanding of education provided. []  Patient unable to verbalize and/or demonstrate understanding of education provided. Will continue education. [x]  Barriers to learning: n/a    PLAN:  Treatment Recommendations: Strengthening, Range of Motion, Balance Training, Endurance Training, Gait Training, Stair Training, Neuromuscular Re-education, Manual Therapy - Soft Tissue Mobilization, Manual Therapy - Joint Manipulation, Pain Management, Home Exercise Program, Patient Education and Modalities    []  Plan of care initiated.   Plan to see patient 2 times per week for 8 weeks to address the treatment planned outlined above.  []  Continue with current plan of care  [x]  Modify plan of care as follows: 2 time per week   []  Hold pending physician visit  []  Discharge    Time In 1700   Time Out 1730   Timed Code Minutes: 30 min   Total Treatment Time: 30 min       Electronically Signed by: Braeden Thomas PTA

## 2021-10-19 ENCOUNTER — APPOINTMENT (OUTPATIENT)
Dept: PHYSICAL THERAPY | Age: 71
End: 2021-10-19
Payer: MEDICARE

## 2021-10-21 ENCOUNTER — HOSPITAL ENCOUNTER (OUTPATIENT)
Dept: GENERAL RADIOLOGY | Age: 71
Discharge: HOME | End: 2021-10-21
Payer: MEDICARE

## 2021-10-21 ENCOUNTER — HOSPITAL ENCOUNTER (OUTPATIENT)
Dept: PHYSICAL THERAPY | Age: 71
Setting detail: THERAPIES SERIES
Discharge: HOME | End: 2021-10-21
Payer: MEDICARE

## 2021-10-21 DIAGNOSIS — R13.10 PROBLEMS WITH SWALLOWING AND MASTICATION: ICD-10-CM

## 2021-10-21 PROCEDURE — 97530 THERAPEUTIC ACTIVITIES: CPT

## 2021-10-21 PROCEDURE — 74230 X-RAY XM SWLNG FUNCJ C+: CPT

## 2021-10-21 PROCEDURE — 92611 MOTION FLUOROSCOPY/SWALLOW: CPT

## 2021-10-21 PROCEDURE — 97110 THERAPEUTIC EXERCISES: CPT

## 2021-10-21 PROCEDURE — 2500000003 HC RX 250 WO HCPCS: Performed by: PHYSICIAN ASSISTANT

## 2021-10-21 RX ADMIN — BARIUM SULFATE 450 ML: 0.81 POWDER, FOR SUSPENSION ORAL at 09:57

## 2021-10-21 RX ADMIN — BARIUM SULFATE 50 ML: 400 PASTE ORAL at 09:56

## 2021-10-21 NOTE — DISCHARGE SUMMARY
study.     OBJECTIVE:    Respiratory Status:  Patient arrived to fluoroscopy suite with nasal cannula and supplemental oxygen; however, patient removed nasal cannulation for completion of study without pulmonary distress. Behavioral Observation:  Alert and Oriented    PATIENT WAS EVALUATED USING:  BARIUM: thin liquids via cup/straw, puree, soft solids, hard solids    ORAL PREPARATION PHASE:  Impaired:  Uncoordinated Mastication, Decreased Bolus Control and Decreased Bolus Formation    ORAL PHASE: Uncoordinated AP Movement     ORAL PHASE EVELYN SCORE: (Dysphagia outcome and severity scale)  5 = Mild Dysphagia - May have one diet consistency restricted - Mild oral residue but clears    PHARYNGEAL PHASE:  Impaired: Delayed Swallow, Decreased Hyolaryngeal Elevation, Decreased Tongue Based Retraction and Residue in the Valleculae     PHARYNGEAL PHASE EVELYN SCORE: (Dysphagia outcome and severity scale)  6 = WFL/Modified Independent - May have mild pharyngeal delay or residue but clears spontaneously - No aspiration or laryngeal penetration    EVIDENCE FOR LARYNGEAL PENETRATION AND/OR ASPIRATION:  No evidence of laryngeal penetration  No evidence of aspiration    PENETRATION-ASPIRATION SCALE (PAS): Thin Liquids: 1 = Material does not enter the airway  Puree:  1 = Material does not enter the airway  Soft Solid:  1 = Material does not enter the airway  Hard Solid: 1 = Material does not enter the airway    ESOPHAGEAL PHASE:   No significant findings    ATTEMPTED TECHNIQUES:  Small Bolus Size Effective    Straw Effective    Cup Effective    Chin Tuck Not Attempted    Head Turn Not Attempted    Spoon Presentations Not Attempted    Volitional Cough Not Attempted    Spontaneous Cough Not Attempted           DIAGNOSTIC IMPRESSIONS:  Patient presents with mild oral dysphagia and modified independent pharyngeal dysphagia as evidenced by above skilled level findings.  Oral phase characterized by impulsivity and uncoordinated rotary mastication pattern resulting in slightly reduced bolus formation. Patient with diffuse thin barium coating within oral cavity across all solid + liquid trials this date which was unable to be cleared. Tongue based retraction, hyolaryngeal elevation/excursion slightly reduced along with pharyngeal constriction which resulted in mild stasis within the valleculae following trials of puree and soft solid textures. Patient unable to sense stasis within valleculae in which additional bolus was initiated and consumed which did clear vallecular residue. Additional, dry swallows and/or liquid wash effective in clearing vallecular residue in other trials. No laryngeal penetration or tracheal aspiration noted throughout study. Therefore, recommendations for patient to consume regular diet with thin liquids while implementing identified compensatory swallowing strategies to assist in nutrition/hydration measures. Diet Recommendations:  Regular diet with thin liquids  Strategies:  Full Upright Position, Small Bite/Sip, Alternate Solids and Liquids, Limit Distractions and Slow Down, Additional Dry Swallow, Add Extra Moisture to Dry Textures   Rehabilitation Potential: good    EDUCATION:  Learner: Patient and Significant Other  Education:  Reviewed results and recommendations of this evaluation, Reviewed diet and strategies, Reviewed signs, symptoms and risks of aspiration, Reviewed ST goals and Plan of Care, Reviewed recommendations for follow-up, Education Related to Potential Risks and Complications Due to Impairment/Illness/Injury, Education Related to Prevention of Recurrence of Impairment/Illness/Injury, Education Related to [employer] and Wellness and Home Safety Education  Evaluation of Education: Verbalizes understanding    PLAN:  For patient to return to OP ST at [facility] for dysphagia therapy. PATIENT GOAL:    Did not state. Will further assess during treatment.     SHORT TERM GOALS:  Short-term Goals  Timeframe for Short-term Goals: 4 weeks  Goal 1: Patient will consume regular diet with thin liquids while implementing identified compensatory strategies without overt s/s of aspiraiton to assist in nutrition/hydration measures  Goal 2: Patient will complete lingual ROM/coordination exercises x25 with good success to improve bolus manipulation and formation to reduce oral stasis. Goal 3: Patient will complete tongue based retraction exercises x25 with good success to improve AP transit and reduce base of tongue residue. LONG TERM GOALS:  Long-term Goals  Timeframe for Long-term Goals: 6 weeks  Goal 1: Patient will improve self-rated score on EAT-10 to a 3 to promote safe swallowing practices within least restrictive diet.       Denise Avila M.S., CF-SLP, [id number]

## 2021-10-21 NOTE — PROGRESS NOTES
[facility]  PHYSICAL THERAPY  [] EVALUATION  [x] DAILY NOTE (LAND) [] DAILY NOTE (AQUATIC ) [] PROGRESS NOTE [] DISCHARGE NOTE    [] [facility]   [] Carlo [address]    [] [address] [facility]   [x] Sangeetha Mems    Date: 10/21/2021  Patient Name:  Oumou Armstrong  : 1950  MRN: [id number]  CSN: [id number]    Referring Practitioner Prabhu Uribe DO   Diagnosis Other intervertebral disc degeneration, lumbar region [M51.36]  Radiculopathy, lumbar region [M54.16]    Treatment Diagnosis Difficulty walking    Date of Evaluation 21    Additional Pertinent History Bi-polar; SOB related to CHF      Functional Outcome Measure Used Tinetti   Functional Outcome Score 15/28 (21)    (21)   (21)      Insurance: Primary: Payor: Itz Suh /  /  / , aquatic therapy is covered; modalities covered except for IONTO  Secondary:    Authorization Information: RECEIVED AUTH FOR PT  TOTAL OF 12 VISITS  FROM 21 TO 21  CPT CODES 55649, [id number], [place] [address]. # [id number]   Visit # 19, 3/12 for progress note   Visits Allowed: 12   Recertification Date:    Physician Follow-Up:    Physician Orders:    History of Present Illness: Nick Delgado is referred to PT to address ongoing balance issues. She states that she noticed that she was struggling with her balance when she slipped off a short deck at a local [facility]. She admits that over the last few months she has also had a few falls; denies any major injury as a result of the falls. She started to workout with a local  a month hoping that it would help her balance. SUBJECTIVE: Pt ambulated into clinic with O2 on 1L. Pt using rollator for ambulation. Monitored O2 levels during session.     TREATMENT   Precautions:    Pain:1-2/10 B  Knees    X in shaded column indicates activity completed today   Modalities Parameters/  Location  Notes Manual Therapy Time/Technique  Notes                     Exercise/Intervention   Notes   Mariano; review of goals               NuStep x5 min  x Level 5, seat 8, arms 9, 94%   Forward step ups  x10   x Bilaterally    Airex: heel to raises, marches, mini squats x15 ea  x With O2 94%   3 way hip, Hs curls x15 ea  x Green band   Tandem stepping fwd/retro, side stepping x2 laps ea  x    rocker board fwd/lat x15 ea  x 15 sec balance with 1UE support   wobbleboard 10x  x CW/CCW, manual overpressure provided   Hurdles: forward and lateral  x2 ea. green   Agility mat: forward step in each square; lateral step in each square; march in each square x2   Hand hold assist needed    \"Walked the line\"-tandem walk  x2      // bars: forward and lateral walk with emphasis on taking longer, exaggerated steps  x2 ea. Seated:                   Marches, LAQs, resisted Hs curls x10 ea   orange   NK table: flexion/extension x10 ea 5#  bilaterally   BOSU lunges  x10      Seated ball squeezes and hip abd x10   Green band          Oxygen saturation   x Pt stayed on 1L of O2 during the entire session-92% and above     Specific Interventions Next Treatment: NuStep; airex balance exercises; Activity/Treatment Tolerance:  [x]  Patient tolerated treatment well  []  Patient limited by fatigue  []  Patient limited by pain   []  Patient limited by medical complications  []  Other:     Assessment: Pt remained on O2 the entire 30 min session with O2 sats dropping to only 92%. No increase in pain levels with exercises. Body Structures/Functions/Activity Limitations: impaired activity tolerance, impaired balance, impaired coordination, impaired endurance, impaired safety awareness, impaired strength and pain  Prognosis: fair    GOALS:  Patient Goal: minimize fall risk    Short Term Goals:  Time Frame: 4 weeks  1.  Timur Akron will demonstrate a good ankle strategy to maintain standing balance with perturbations to her trunk.  2. Lindsey Barrera will be able to stand for 20 min at a time without needing a rest break allowing her to prepare meals and perform light chores without limitation. NOT MET-limited to 5 min before she has to sit due to back pain; uses a kitchen stool a lot  3. Luiss Tinetti score will improve to 19/28 indicating minimal fall risk. GOAL MET-improved to 22/28      Long Term Goals:  Time Frame: 8 weeks  1. Discharge with independent HEP ONGOING  2. Luiss Tinetti score will improve to >21/28 indicating no fall risk. GOAL MET; REVISE to improve Tinetti score to >26/28; NOT MET-improved to 24/28  3. Lindsey Barrera will be able to maintain her standing balance in all 4 conditions of the m-CTSIB, using an ankle strategy to maintain her balance. NOT MET-able to maintain standing balance in conditions 1-3; LOB after 7 sec in condition 4  4. NEW GOAL: Lindsey Barrera will demonstrate good abdominal stabilization with all transfers and dynamic gait activities to provide greater support to low back. IMPROVING       Patient Education:   []  HEP/Education Completed: Plan of Care, Goals, anterior/posterior wt shift   [facility] Access [facility]:  [x]  No new Education completed  []  Reviewed Prior HEP      []  Patient verbalized and/or demonstrated understanding of education provided. []  Patient unable to verbalize and/or demonstrate understanding of education provided. Will continue education. [x]  Barriers to learning: n/a    PLAN:  Treatment Recommendations: Strengthening, Range of Motion, Balance Training, Endurance Training, Gait Training, Stair Training, Neuromuscular Re-education, Manual Therapy - Soft Tissue Mobilization, Manual Therapy - Joint Manipulation, Pain Management, Home Exercise Program, Patient Education and Modalities    []  Plan of care initiated. Plan to see patient 2 times per week for 8 weeks to address the treatment planned outlined above.   []  Continue with current plan of care  [x]  Modify plan of care as follows: 2 time per week   []  Hold pending physician visit  []  Discharge    Time In 1130   Time Out 1200   Timed Code Minutes: 30 min   Total Treatment Time: 30 min       Electronically Signed by: Patricia Cortez

## 2021-10-22 ENCOUNTER — APPOINTMENT (OUTPATIENT)
Dept: PHYSICAL THERAPY | Age: 71
End: 2021-10-22
Payer: MEDICARE

## 2021-10-26 ENCOUNTER — HOSPITAL ENCOUNTER (OUTPATIENT)
Dept: PHYSICAL THERAPY | Age: 71
Setting detail: THERAPIES SERIES
Discharge: HOME | End: 2021-10-26
Payer: MEDICARE

## 2021-10-26 PROCEDURE — 97110 THERAPEUTIC EXERCISES: CPT

## 2021-10-26 PROCEDURE — 97530 THERAPEUTIC ACTIVITIES: CPT

## 2021-10-26 NOTE — PROGRESS NOTES
[address] [facility]  PHYSICAL THERAPY  [] EVALUATION  [x] DAILY NOTE (LAND) [] DAILY NOTE (AQUATIC ) [] PROGRESS NOTE [] DISCHARGE NOTE    [] [facility]   [] Carlo Gonzalez    [] [address] [place]   [x] Michael End    Date: 10/26/2021  Patient Name:  Jeffy Pugh  : 1950  MRN: [id number]  CSN: [id number]    Referring Practitioner Kimber Leyden, DO   Diagnosis Other intervertebral disc degeneration, lumbar region [M51.36]  Radiculopathy, lumbar region [M54.16]    Treatment Diagnosis Difficulty walking    Date of Evaluation 21    Additional Pertinent History Bi-polar; SOB related to CHF      Functional Outcome Measure Used Tinetti   Functional Outcome Score 15/28 (21)    (21)   (21)      Insurance: Primary: Payor: Gibson Thayer /  /  / , aquatic therapy is covered; modalities covered except for IONTO  Secondary:    Authorization Information: RECEIVED AUTH FOR PT  TOTAL OF 12 VISITS  FROM 21 TO 21  CPT CODES 39926, 84920, [place] [address]. # [id number]   Visit # 20,  for progress note   Visits Allowed: 12   Recertification Date:    Physician Follow-Up:    Physician Orders:    History of Present Illness: Venice Yancey is referred to PT to address ongoing balance issues. She states that she noticed that she was struggling with her balance when she slipped off a short deck at a local [facility]. She admits that over the last few months she has also had a few falls; denies any major injury as a result of the falls. She started to workout with a local  a month hoping that it would help her balance. SUBJECTIVE: Pt ambulated into clinic with O2 on 1L. Pt using rollator for ambulation. Monitored O2 levels during session.     TREATMENT   Precautions:    Pain:1-2/10 B  Knees    X in shaded column indicates activity completed today   Modalities Parameters/  Location  Notes for 20 min at a time without needing a rest break allowing her to prepare meals and perform light chores without limitation. NOT MET-limited to 5 min before she has to sit due to back pain; uses a kitchen stool a lot  3. Alison's Tinetti score will improve to 19/28 indicating minimal fall risk. GOAL MET-improved to 22/28      Long Term Goals:  Time Frame: 8 weeks  1. Discharge with independent HEP ONGOING  2. Luiss Tinetti score will improve to >21/28 indicating no fall risk. GOAL MET; REVISE to improve Tinetti score to >26/28; NOT MET-improved to 24/28  3. Rafia Bear will be able to maintain her standing balance in all 4 conditions of the m-CTSIB, using an ankle strategy to maintain her balance. NOT MET-able to maintain standing balance in conditions 1-3; LOB after 7 sec in condition 4  4. NEW GOAL: Rafia Bear will demonstrate good abdominal stabilization with all transfers and dynamic gait activities to provide greater support to low back. IMPROVING       Patient Education:   []  HEP/Education Completed: Plan of Care, Goals, anterior/posterior wt shift   [facility] Access Code:  [x]  No new Education completed  []  Reviewed Prior HEP      []  Patient verbalized and/or demonstrated understanding of education provided. []  Patient unable to verbalize and/or demonstrate understanding of education provided. Will continue education. [x]  Barriers to learning: n/a    PLAN:  Treatment Recommendations: Strengthening, Range of Motion, Balance Training, Endurance Training, Gait Training, Stair Training, Neuromuscular Re-education, Manual Therapy - Soft Tissue Mobilization, Manual Therapy - Joint Manipulation, Pain Management, Home Exercise Program, Patient Education and Modalities    []  Plan of care initiated. Plan to see patient 2 times per week for 8 weeks to address the treatment planned outlined above.   []  Continue with current plan of care  [x]  Modify plan of care as follows: 2 time per week   []  Hold pending physician visit  []  Discharge    Time In 1340   Time Out 1418   Timed Code Minutes: 38 min   Total Treatment Time: 38 min       Electronically Signed by: Charlette Gustafson

## 2021-10-28 ENCOUNTER — HOSPITAL ENCOUNTER (OUTPATIENT)
Dept: SPEECH THERAPY | Age: 71
Setting detail: THERAPIES SERIES
Discharge: HOME | End: 2021-10-28
Payer: MEDICARE

## 2021-10-28 PROCEDURE — 92526 ORAL FUNCTION THERAPY: CPT

## 2021-10-28 NOTE — PROGRESS NOTES
[facility]  [] SPEECH LANGUAGE COGNITIVE EVALUATION  [x] DAILY NOTE   [] PROGRESS NOTE [] DISCHARGE NOTE    [] OUTPATIENT REHABILITATION [facility]   [] Chiragunruly Gonzalez    [] [address] ROBERTO   [x] Andrei Tony    Date: 10/28/2021  Patient Name:  Michael Whyte  : 1950  MRN: [id number]    Referring Practitioner Mikayla Bruce CNP   Diagnosis  Dysphagia, unspecified [R13.10]    Treatment Diagnosis Oropharyngeal dysphagia   Date of Evaluation 10/14/21      Functional Outcome Measure Used EAT-19   Functional Outcome Score 6 (10/14/21)  (lower score equates to better swallow function. Score of 3 or greater indicates likely presence of swallowing dysfunction)      Insurance: Primary: Payor: Reginald De La Cruz /  /  / ,   Secondary:    Authorization Information: Pre-cert requires after evaluation. No co-pay. RECEIVED AUTH FOR 8 VISITS FROM 10/16/21-11/15/21   Visit # 2, 2/10 for progress note   Visits Allowed:     Recertification Date:    Physician Follow-Up: ALEKSANDAR Springer 21    Physician Orders: Evaluate and treat - \"Reporting difficulty with swallowing and some coughing with swallowing. She was also recently diagnosed with pneumonia\". Pertinent History: Patient reports she was referred for speech therapy evaluation due to having PNA and wanting to ensure aspiration was not the cause. Endorses swallowing trouble where  has done the Heimlich on her previously. Reports trouble with breathing all summer, PNA recognized after oxygen dropping during PT visits. Patient also has hx of CHF with fluid restrictions (200ml/day) and sodium restrictions. History of COVID positive test 2021, however did not have any symptoms at that time reportedly. Patient states, \"When sitting or laying in bed, it feels like something just won't open down there. It's hard to swallow\".   Denies presence of GERD, however is currently taking medication. Patient's daughter is an SLP and she informed patient and  of the following: \"Oral residue with soft and dry foods\", \"food on outside corners of mouth, especially with ice cream and chocolate\", \"coughing during meals but not consistent\", \"mouth breather\", and \"open mouth posture\". Patient endorses coughing when eating and not really at other times. Reports coughing with both solids and liquids, more frequently with solids. Presence of xerostomia due to medications noted, however uses biotiene which she states does help. SUBJECTIVE: Patient reporting she has been attempting use to strategies for swallowing and eating as given to her during MBS. Patient reports recommendations included taking a bite and chewing whole bite before swallowing it as well as slowing down. Also describes recommendations for using drinks more frequently, however patient with fluid restriction which inhibits ability to utilize frequent liquid intake. Describes ongoing xerostomia, however explains currently using Biotine x1+ daily to help moisturize oral cavity. Plan to discuss oral care further in upcoming sessions. SHORT TERM GOAL #1:    Goal 1: Patient will consume regular diet with thin liquids while implementing identified compensatory strategies without overt s/s of aspiraiton in 10/10 trials to meet nutrition and hydration needs. INTERVENTIONS: SLP educated on continued recommendations for regular solids and thin liquids. Patient able to verbalize previously trained safe swallowing strategies/compensatory strategies independently. Encouraged continued use across settings and environments to improve safety and reduce risk of aspiration. SHORT TERM GOAL #2:    Goal 2: Patient will complete lingual ROM, coordination, and strengthening exercises x25 with good success to improve bolus manipulation and formation to reduce oral stasis.   INTERVENTIONS: SLP provided patient with list of 3 lingual exercises to promote improved ROM, coordination, and strength. HEP includes x3 sets of 10 reps, 2x/day. Good understanding verbalized  Lingual Lateralization - tongue push into L and R cheeks. Hold for 3 seconds. Can also provide manual resistance of hand to cheek for strengthening exercise. Lingual Circles - inside oral cavity in both directions  Lingual Protrusion/Retraction - Hold for 2 seconds    SHORT TERM GOAL #3:    Goal 3: Patient will complete tongue base retraction exercises x25 with good success to improve AP transit and reduce base of tongue residue. INTERVENTIONS: SLP provided handout and demonstration on effortful swallow exercise. Described how to complete and encouraged using purees or other foods. Patient with good understanding. HEP includes 3 sets of x10 reps, 2x/day. LONG TERM GOALS:  Timeframe for Long-term Goals: 6 weeks  Goal 1: Patient will improve score on EAT-10 to 3 or below to promote safe swallowing practices and reflect improved perceived swallowing functioning. Assessment: Progressing toward goals. Specific Interventions Next Treatment: diet texture analysis, training for safe swallowing strategies, education on oral care, lingual exercises, tongue base retraction exercises. Activity/Treatment Tolerance:  [x]  Patient tolerated treatment well  []  Patient limited by fatigue  []  Patient limited by pain   []  Patient limited by other medical complications  []  Other:     Patient Education:   [x]  HEP/Education Completed: Plan of Care, Goals, lingual exercise HEP and effortful swallow HEP  []  No new Education completed  []  Reviewed Prior HEP      [x]  Patient verbalized and/or demonstrated understanding of education provided. []  Patient unable to verbalize and/or demonstrate understanding of education provided. Will continue education. []  Barriers to learning:     PLAN:  []  Plan of care initiated.   Plan to see patient 1 time per week for 6 weeks to address the treatment planned outlined above.   [x]  Continue with current plan of care  []  Modify plan of care as follows:    []  Hold pending physician visit  []  Discharge    Time In 1106   Time Out 1146   Timed Code Minutes: 0 min   Total Treatment Time: 36 min       Anne-Marie Barriga M.S., [address] [place] [address]

## 2021-10-29 ENCOUNTER — HOSPITAL ENCOUNTER (OUTPATIENT)
Dept: PHYSICAL THERAPY | Age: 71
Setting detail: THERAPIES SERIES
Discharge: HOME | End: 2021-10-29
Payer: MEDICARE

## 2021-10-29 PROCEDURE — 97110 THERAPEUTIC EXERCISES: CPT

## 2021-10-29 PROCEDURE — 97530 THERAPEUTIC ACTIVITIES: CPT

## 2021-10-29 PROCEDURE — 97112 NEUROMUSCULAR REEDUCATION: CPT

## 2021-10-29 NOTE — PROGRESS NOTES
[address] [facility]  PHYSICAL THERAPY  [] EVALUATION  [] DAILY NOTE (LAND) [] DAILY NOTE (AQUATIC ) [x] PROGRESS NOTE [] DISCHARGE NOTE    [] OUTPATIENT REHABILITATION CENTER [facility]   [] Sarahdesyefri [address]    [] [address] [facility] Pat   [x] Dalila Hussein    Date: 10/29/2021  Patient Name:  Rama Gaspar  : 1950  MRN: [id number]  CSN: [id number]    Referring Practitioner Saadia Kohli DO   Diagnosis Other intervertebral disc degeneration, lumbar region [M51.36]  Radiculopathy, lumbar region [M54.16]    Treatment Diagnosis Difficulty walking    Date of Evaluation 21    Additional Pertinent History Bi-polar; SOB related to CHF      Functional Outcome Measure Used Tinetti   Functional Outcome Score 15/28 (21)    (21)   (21)   (10/29/21)      Insurance: Primary: Payor: Angel Barnard /  /  / , aquatic therapy is covered; modalities covered except for IONTO  Secondary:    Authorization Information: RECEIVED AUTH FOR PT  TOTAL OF 12 VISITS  FROM 21 TO 21  CPT CODES 31813, 60183, [place] [employer]. # [id number]   Visit # ,  for progress note   Visits Allowed: 12   Recertification Date:    Physician Follow-Up:    Physician Orders:    History of Present Illness: Lindsey Barrera is referred to PT to address ongoing balance issues. She states that she noticed that she was struggling with her balance when she slipped off a short deck at a local [facility]. She admits that over the last few months she has also had a few falls; denies any major injury as a result of the falls. She started to workout with a local  a month hoping that it would help her balance. SUBJECTIVE: Continues to wear supplemental oxygen with exercise. She is maintaining good oxygen saturation levels as long as she is not exercising so she is not wearing it throughout the day like she used to.   Starting today's session at 95% on 1 liter of oxygen    TREATMENT   Precautions:    Pain:1-2/10 B  Knees    X in shaded column indicates activity completed today   Modalities Parameters/  Location  Notes                     Manual Therapy Time/Technique  Notes                     Exercise/Intervention   Notes   Mariano; review of goals    x           NuStep x6 min  x Level 5, seat 8, arms 9, 92%   Forward step ups  x10   x Bilaterally    Airex: heel to raises, marches, mini squats x15 ea  x With O2 94%   3 way hip, Hs curls x15 ea  x Green band   Tandem stepping fwd/retro, side stepping x2 laps ea  x    rocker board fwd/lat x15 ea  x 15 sec balance with 1UE support   wobbleboard 10x  x CW/CCW, manual overpressure provided   Hurdles: forward and lateral  x2 ea. x green   Agility mat: forward step in each square; lateral step in each square; march in each square x2   Hand hold assist needed    \"Walked the line\"-tandem walk  x2      // bars: forward and lateral walk with emphasis on taking longer, exaggerated steps  x2 ea. Seated:                   Marches, LAQs, resisted Hs curls x10 ea   orange   NK table: flexion/extension x10 ea 5#  bilaterally   BOSU lunges  x10      Seated ball squeezes and hip abd x10   Green band          Oxygen saturation   x Pt stayed on 1L of O2 during the entire session-92% and above     Specific Interventions Next Treatment: NuStep; airex balance exercises; Activity/Treatment Tolerance:  [x]  Patient tolerated treatment well  []  Patient limited by fatigue  []  Patient limited by pain   []  Patient limited by medical complications  []  Other:     Assessment: Pt remained on O2 the entire session. Was able to exercise for entire session without needing to take a seated rest break! She continues to demonstrate excellent improvements in overall endurance and will continue to benefit from therapy to continue to work on endurance as she weans off supplemental oxygen.       Body Structures/Functions/Activity Limitations: impaired activity tolerance, impaired balance, impaired coordination, impaired endurance, impaired safety awareness, impaired strength and pain  Prognosis: fair    GOALS:  Patient Goal: minimize fall risk    Short Term Goals:  Time Frame: 4 weeks  1. Josselyn Huber will demonstrate a good ankle strategy to maintain standing balance with perturbations to her trunk. 2. Josselyn Huber will be able to stand for 20 min at a time without needing a rest break allowing her to prepare meals and perform light chores without limitation. NOT MET-limited to 5 min before she has to sit due to back pain; uses a kitchen stool a lot  3. Alison's Tinetti score will improve to 19/28 indicating minimal fall risk. GOAL MET-improved to 22/28      Long Term Goals:  Time Frame: 8 weeks  1. Discharge with independent HEP ONGOING  2. Alison's Tinetti score will improve to >21/28 indicating no fall risk. GOAL MET; REVISE to improve Tinetti score to >26/28; NOT MET-improved to 24/28  3. Josselyn Huber will be able to maintain her standing balance in all 4 conditions of the m-CTSIB, using an ankle strategy to maintain her balance. NOT MET-able to maintain standing balance in conditions 1-3; LOB after 7 sec in condition 4  4. NEW GOAL: Josselyn Huber will demonstrate good abdominal stabilization with all transfers and dynamic gait activities to provide greater support to low back. IMPROVING       Patient Education:   []  HEP/Education Completed: Plan of Care, Goals, anterior/posterior wt shift   [facility] Access Code:  [x]  No new Education completed  []  Reviewed Prior HEP      []  Patient verbalized and/or demonstrated understanding of education provided. []  Patient unable to verbalize and/or demonstrate understanding of education provided. Will continue education.   [x]  Barriers to learning: n/a    PLAN:  Treatment Recommendations: Strengthening, Range of Motion, Balance Training, Endurance Training, Gait Training, Stair Training, Neuromuscular Re-education, Manual Therapy - Soft Tissue Mobilization, Manual Therapy - Joint Manipulation, Pain Management, Home Exercise Program, Patient Education and Modalities    []  Plan of care initiated. Plan to see patient 2 times per week for 8 weeks to address the treatment planned outlined above.   []  Continue with current plan of care  [x]  Modify plan of care as follows: 2 time per week   []  Hold pending physician visit  []  Discharge    Time In 0915   Time Out 0953   Timed Code Minutes: 38 min   Total Treatment Time: 38 min       Electronically Signed by: Henrietta Snowden, IMNE Carver [id number]" Zeus Billings DPT  [id number]

## 2021-11-02 ENCOUNTER — HOSPITAL ENCOUNTER (OUTPATIENT)
Dept: PHYSICAL THERAPY | Age: 71
Setting detail: THERAPIES SERIES
Discharge: HOME | End: 2021-11-02
Payer: MEDICARE

## 2021-11-02 PROCEDURE — 97112 NEUROMUSCULAR REEDUCATION: CPT

## 2021-11-02 PROCEDURE — 97530 THERAPEUTIC ACTIVITIES: CPT

## 2021-11-02 PROCEDURE — 97110 THERAPEUTIC EXERCISES: CPT

## 2021-11-02 NOTE — PROGRESS NOTES
[address] [facility]  PHYSICAL THERAPY  [] EVALUATION  [] DAILY NOTE (LAND) [] DAILY NOTE (AQUATIC ) [] PROGRESS NOTE [] DISCHARGE NOTE    [] OUTPATIENT REHABILITATION CENTER [facility]   [] Carlo Carlos    [] [address] ROBERTO   [x] Megan Siddiqui    Date: 2021  Patient Name:  Eda Kiser  : 1950  MRN: [id number]  CSN: [id number]    Referring Practitioner Jack Rashid DO   Diagnosis Other intervertebral disc degeneration, lumbar region [M51.36]  Radiculopathy, lumbar region [M54.16]    Treatment Diagnosis Difficulty walking    Date of Evaluation 21    Additional Pertinent History Bi-polar; SOB related to CHF      Functional Outcome Measure Used Tinetti   Functional Outcome Score 15/28 (21)    (21)   (21)   (10/29/21)      Insurance: Primary: Payor: Keshia Ivory /  /  / , aquatic therapy is covered; modalities covered except for IONTO  Secondary:    Authorization Information: RECEIVED AUTH FOR PT  TOTAL OF 12 VISITS  FROM 21 TO 21  CPT CODES 42724, 93398, [place] [address]. # [id number]   Visit # 22,  for progress note   Visits Allowed: 12   Recertification Date:    Physician Follow-Up:    Physician Orders:    History of Present Illness: Thee Choudhary is referred to PT to address ongoing balance issues. She states that she noticed that she was struggling with her balance when she slipped off a short deck at a local [facility]. She admits that over the last few months she has also had a few falls; denies any major injury as a result of the falls. She started to workout with a local  a month hoping that it would help her balance.       SUBJECTIVE: Doing well    TREATMENT   Precautions:    Pain:1-2/10 B  Knees    X in shaded column indicates activity completed today   Modalities Parameters/  Location  Notes                     Manual Therapy Time/Technique  Notes Exercise/Intervention   Notes   Mariano; review of goals    x           NuStep x6 min  x Level 5, seat 8, arms 9, 92%   Forward step ups; side step ups  x10   x Bilaterally    Airex: heel to raises, marches, mini squats x15 ea  x With O2 94%   3 way hip, Hs curls x15 ea  x Green band   Tandem stepping fwd/retro, side stepping x2 laps ea  x    rocker board fwd/lat x15 ea   15 sec balance with 1UE support   wobbleboard 10x   CW/CCW, manual overpressure provided   Hurdles: forward and lateral  x2 ea. x green   Agility mat: forward step in each square; lateral step in each square; march in each square x2   Hand hold assist needed    \"Walked the line\"-tandem walk  x2      // bars: forward and lateral walk with emphasis on taking longer, exaggerated steps  x2 ea. NK table: flexion/extension 15x ea. x 7.5#   Seated:                   Marches, LAQs, resisted Hs curls x10 ea   orange   NK table: flexion/extension x10 ea 5#  bilaterally   BOSU lunges  x10      Seated ball squeezes and hip abd x10   Green band          Oxygen saturation   x Pt stayed on 1L of O2 during the entire session-92% and above     Specific Interventions Next Treatment: NuStep; airex balance exercises; Activity/Treatment Tolerance:  [x]  Patient tolerated treatment well  []  Patient limited by fatigue  []  Patient limited by pain   []  Patient limited by medical complications  []  Other:     Assessment: Perri Muller performed all exercises today without supplemental oxygen on! She maintained levels >92% and was not winded!!! Body Structures/Functions/Activity Limitations: impaired activity tolerance, impaired balance, impaired coordination, impaired endurance, impaired safety awareness, impaired strength and pain  Prognosis: fair    GOALS:  Patient Goal: minimize fall risk    Short Term Goals:  Time Frame: 4 weeks  1. Perri Muller will demonstrate a good ankle strategy to maintain standing balance with perturbations to her trunk.   2. Perri Muller will be able to stand for 20 min at a time without needing a rest break allowing her to prepare meals and perform light chores without limitation. NOT MET-limited to 5 min before she has to sit due to back pain; uses a kitchen stool a lot  3. Alison's Tinetti score will improve to 19/28 indicating minimal fall risk. GOAL MET-improved to 22/28      Long Term Goals:  Time Frame: 8 weeks  1. Discharge with independent HEP ONGOING  2. Alison's Tinetti score will improve to >21/28 indicating no fall risk. GOAL MET; REVISE to improve Tinetti score to >26/28; NOT MET-improved to 24/28  3. Thee Choudhary will be able to maintain her standing balance in all 4 conditions of the m-CTSIB, using an ankle strategy to maintain her balance. NOT MET-able to maintain standing balance in conditions 1-3; LOB after 7 sec in condition 4  4. NEW GOAL: Thee Foots will demonstrate good abdominal stabilization with all transfers and dynamic gait activities to provide greater support to low back. IMPROVING       Patient Education:   []  HEP/Education Completed: Plan of Care, Goals, anterior/posterior wt shift   [facility] Access Code:  [x]  No new Education completed  []  Reviewed Prior HEP      []  Patient verbalized and/or demonstrated understanding of education provided. []  Patient unable to verbalize and/or demonstrate understanding of education provided. Will continue education. [x]  Barriers to learning: n/a    PLAN:  Treatment Recommendations: Strengthening, Range of Motion, Balance Training, Endurance Training, Gait Training, Stair Training, Neuromuscular Re-education, Manual Therapy - Soft Tissue Mobilization, Manual Therapy - Joint Manipulation, Pain Management, Home Exercise Program, Patient Education and Modalities    []  Plan of care initiated. Plan to see patient 2 times per week for 8 weeks to address the treatment planned outlined above.   []  Continue with current plan of care  [x]  Modify plan of care as follows: 2 time per week   []  Hold pending physician visit  []  Discharge    Time In 1334   Time Out 1413   Timed Code Minutes: 39 min   Total Treatment Time: 39 min       Electronically Signed by: Yariel Vanessa, PT   Klever [address]" SAVANNAH EdgeT  [id number]

## 2021-11-04 ENCOUNTER — HOSPITAL ENCOUNTER (OUTPATIENT)
Dept: PHYSICAL THERAPY | Age: 71
Setting detail: THERAPIES SERIES
Discharge: HOME | End: 2021-11-04
Payer: MEDICARE

## 2021-11-04 ENCOUNTER — HOSPITAL ENCOUNTER (OUTPATIENT)
Dept: SPEECH THERAPY | Age: 71
Setting detail: THERAPIES SERIES
Discharge: HOME | End: 2021-11-04
Payer: MEDICARE

## 2021-11-04 ENCOUNTER — OFFICE VISIT (OUTPATIENT)
Dept: CARDIOLOGY CLINIC | Age: 71
End: 2021-11-04
Payer: MEDICARE

## 2021-11-04 VITALS
HEART RATE: 98 BPM | DIASTOLIC BLOOD PRESSURE: 79 MMHG | BODY MASS INDEX: 42 KG/M2 | SYSTOLIC BLOOD PRESSURE: 137 MMHG | HEIGHT: 64 IN | WEIGHT: 246 LBS

## 2021-11-04 DIAGNOSIS — R06.09 DOE (DYSPNEA ON EXERTION): ICD-10-CM

## 2021-11-04 DIAGNOSIS — I50.32 CHRONIC HEART FAILURE WITH PRESERVED EJECTION FRACTION (HFPEF) (HCC): Primary | ICD-10-CM

## 2021-11-04 DIAGNOSIS — R06.02 SOB (SHORTNESS OF BREATH): ICD-10-CM

## 2021-11-04 PROCEDURE — 1090F PRES/ABSN URINE INCON ASSESS: CPT | Performed by: INTERNAL MEDICINE

## 2021-11-04 PROCEDURE — 99214 OFFICE O/P EST MOD 30 MIN: CPT | Performed by: INTERNAL MEDICINE

## 2021-11-04 PROCEDURE — G8417 CALC BMI ABV UP PARAM F/U: HCPCS | Performed by: INTERNAL MEDICINE

## 2021-11-04 PROCEDURE — 1036F TOBACCO NON-USER: CPT | Performed by: INTERNAL MEDICINE

## 2021-11-04 PROCEDURE — 4040F PNEUMOC VAC/ADMIN/RCVD: CPT | Performed by: INTERNAL MEDICINE

## 2021-11-04 PROCEDURE — 97112 NEUROMUSCULAR REEDUCATION: CPT

## 2021-11-04 PROCEDURE — G8484 FLU IMMUNIZE NO ADMIN: HCPCS | Performed by: INTERNAL MEDICINE

## 2021-11-04 PROCEDURE — G8400 PT W/DXA NO RESULTS DOC: HCPCS | Performed by: INTERNAL MEDICINE

## 2021-11-04 PROCEDURE — 97530 THERAPEUTIC ACTIVITIES: CPT

## 2021-11-04 PROCEDURE — G8428 CUR MEDS NOT DOCUMENT: HCPCS | Performed by: INTERNAL MEDICINE

## 2021-11-04 PROCEDURE — 1123F ACP DISCUSS/DSCN MKR DOCD: CPT | Performed by: INTERNAL MEDICINE

## 2021-11-04 PROCEDURE — 92526 ORAL FUNCTION THERAPY: CPT

## 2021-11-04 PROCEDURE — 3017F COLORECTAL CA SCREEN DOC REV: CPT | Performed by: INTERNAL MEDICINE

## 2021-11-04 RX ORDER — CETIRIZINE HYDROCHLORIDE 10 MG/1
10 TABLET ORAL DAILY
COMMUNITY

## 2021-11-04 NOTE — PROGRESS NOTES
[address] [facility] [address]  Dept: [phone]  Dept Fax: [phone]  Loc: [phone]    Visit Date: 11/4/2021    Ms. Ana Paula Fuller is a 70 y.o. female  who presented for:    SOB  Congestive Heart Failure    HPI:   FATUMA Gong is a pleasant 70year old female patient who  has a past medical history of Acid reflux, Arthritis, Asthma, Bipolar 1 disorder ([facility] [address] CHF (congestive heart failure) ([facility] [address] CKD (chronic kidney disease), stage II, History of blood transfusion, Hypertension, Mood disorder ([facility] [address] Sleep apnea, and Thyroid disease. She has known h/o HFpEF, followed at CHF clinic. Cath in 10/2019 revealed nonobstructive CAD, elevated filling pressures. Echo 09/2019 revealed preserved EF. Labs on 9/2021 revealed K 4.7, Cr 1.2. She had recent pneumonia, she was seen by pulmonary, started on home oxygen. She reports SOB and ESCOTO, slowly improving. Patient denies chest pain, orthopnea, paroxysmal nocturnal dyspnea, palpitations, dizziness, syncope, weight gain or leg swelling. She is compliant with medications.        Current Outpatient Medications:     carvedilol (COREG) 25 MG tablet, Take 1 tablet by mouth 2 times daily, Disp: 180 tablet, Rfl: 3    carvedilol (COREG) 6.25 MG tablet, Take 6.25 mg by mouth 2 times daily (with meals), Disp: , Rfl:     atorvastatin (LIPITOR) 20 MG tablet, Take 1 tablet by mouth daily, Disp: 90 tablet, Rfl: 0    omeprazole (PRILOSEC) 20 MG delayed release capsule, Take 1 capsule by mouth every morning (before breakfast), Disp: 90 capsule, Rfl: 3    spironolactone (ALDACTONE) 25 MG tablet, Take 1 tablet by mouth daily, Disp: 30 tablet, Rfl: 5    donepezil (ARICEPT) 10 MG tablet, Take 10 mg by mouth daily, Disp: , Rfl:     diclofenac sodium (VOLTAREN) 1 % GEL, Apply topically as needed, Disp: , Rfl:     hydrALAZINE (APRESOLINE) 50 MG tablet, Take 1 tablet by mouth 3 times daily, Disp: 270 tablet, Rfl: 3    bumetanide (BUMEX) 1 MG tablet, 1-2 tabs daily as needed. , Disp: 180 tablet, Rfl: 3    SYNTHROID 125 MCG tablet, Take 1 tablet by mouth daily Take with water on an empty stomach- wait 30 minutes before eating or taking other meds. , Disp: 30 tablet, Rfl: 11    ARIPiprazole (ABILIFY) 5 MG tablet, Take 2 tablets by mouth daily, Disp: 30 tablet, Rfl: 3    OLANZapine (ZYPREXA) 15 MG tablet, Take 15 mg by mouth daily, Disp: , Rfl:     aspirin 81 MG EC tablet, Take 81 mg by mouth daily, Disp: , Rfl:     acetaminophen (TYLENOL) 325 MG tablet, Take 650 mg by mouth 4 times daily , Disp: , Rfl:     ferrous sulfate 325 (65 Fe) MG tablet, Take 325 mg by mouth daily (with breakfast), Disp: , Rfl:     clonazePAM (KLONOPIN) 1 MG tablet, Take 1 mg by mouth 3 times daily as needed. , Disp: , Rfl:     venlafaxine (EFFEXOR XR) 150 MG extended release capsule, Take 225 mg by mouth daily , Disp: , Rfl:     gabapentin (NEURONTIN) 300 MG capsule, Take 300 mg by mouth 3 times daily. , Disp: , Rfl:     tiZANidine (ZANAFLEX) 2 MG tablet, Take 2 mg by mouth 2 times daily, Disp: , Rfl:     Mirabegron ER (MYRBETRIQ) 50 MG TB24, Take 50 mg by mouth daily, Disp: , Rfl:     lamoTRIgine (LAMICTAL) 100 MG tablet, Take 100 mg by mouth daily Patient taking 1 tablet TID, Disp: , Rfl:     Ascorbic Acid (VITAMIN C) 500 MG tablet, Take 500 mg by mouth daily. , Disp: , Rfl:     Multiple Vitamin (MULTIVITAMIN PO), Take 1 tablet by mouth daily. , Disp: , Rfl:     Past Medical History  Jeison Andersen  has a past medical history of Acid reflux, Arthritis, Asthma, Bipolar 1 disorder ([address] CHF (congestive heart failure) ([facility] [address] CKD (chronic kidney disease), stage II, History of blood transfusion, Hypertension, Mood disorder ([facility] [address] Sleep apnea, and Thyroid disease. Social History  Jeison Andersen  reports that she has never smoked.  She has never used smokeless tobacco. She reports that she does not drink alcohol and does not use drugs. Family History  Natan Walden family history includes Breast Cancer (age of onset: 39) in her sister; Cancer in her brother, father, and sister; Diabetes in her brother and mother; High Blood Pressure in her father; Other in her brother. Past Surgical History   Past Surgical History:   Procedure Laterality Date    ANKLE SURGERY      left    CATARACT REMOVAL      Both eyes      SECTION      x 3    FOOT SURGERY      Left     HIP SURGERY      HYSTERECTOMY      Total     TOTAL KNEE ARTHROPLASTY Left 10/14/14    TOTAL KNEE ARTHROPLASTY Right 2019       Review of Systems   Constitutional: Negative for chills and fever  HENT: Negative for congestion, sinus pressure, sneezing and sore throat. Eyes: Negative for pain, discharge, redness and itching. Respiratory: Negative for apnea, cough  Gastrointestinal: Negative for blood in stool, constipation, diarrhea   Endocrine: Negative for cold intolerance, heat intolerance, polydipsia. Genitourinary: Negative for dysuria, enuresis, flank pain and hematuria. Musculoskeletal: Negative for arthralgias, joint swelling and neck pain. Neurological: Negative for numbness and headaches. Psychiatric/Behavioral: Negative for agitation, confusion, decreased concentration and dysphoric mood. Objective: There were no vitals taken for this visit. Wt Readings from Last 3 Encounters:   09/15/21 250 lb (113.4 kg)   21 245 lb (111.1 kg)   21 250 lb (113.4 kg)     BP Readings from Last 3 Encounters:   09/15/21 137/71   09/15/21 (!) 130/90   21 (!) 106/58       Nursing note and vitals reviewed. Physical Exam   Constitutional: Oriented to person, place, and time. Appears well-developed and well-nourished. ENT: Moist mucous membranes. No bleeding. Tongue is midline. Head: Normocephalic and atraumatic. Eyes: EOM are normal. Pupils are equal, round, and reactive to light. Neck: Normal range of motion. Neck supple.  No JVD present. Cardiovascular: Normal rate, regular rhythm, no murmur, no rubs, and intact distal pulses. Pulmonary/Chest: Effort normal and breath sounds normal. No respiratory distress. No wheezes. No rales. Abdominal: Soft. Bowel sounds are normal. No distension. There is no tenderness. Musculoskeletal: Normal range of motion. no edema. Neurological: Alert and oriented to person, place, and time. No cranial nerve deficit. Coordination normal.   Skin: Skin is warm and dry. Psychiatric: Normal mood and affect.        No results found for: CKTOTAL, CKMB, CKMBINDEX    Lab Results   Component Value Date    WBC 5.7 09/15/2021    RBC 4.29 09/15/2021    HGB 12.9 09/15/2021    HCT 42.5 09/15/2021    MCV 99.1 09/15/2021    MCH 30.1 09/15/2021    MCHC 30.4 09/15/2021    RDW 13.7 04/28/2020     09/15/2021    MPV 9.8 09/15/2021       Lab Results   Component Value Date     09/15/2021    K 4.7 09/15/2021    K 4.7 04/04/2020     09/15/2021    CO2 28 09/15/2021    BUN 32 09/15/2021    LABALBU 4.6 01/13/2021    CREATININE 1.2 09/15/2021    CALCIUM 10.4 09/15/2021    LABGLOM 44 09/15/2021    GLUCOSE 128 09/15/2021    GLUCOSE 126 12/17/2020       Lab Results   Component Value Date    ALKPHOS 68 01/13/2021    ALT 44 01/13/2021    AST 33 01/13/2021    PROT 7.1 04/04/2020    BILITOT 0.3 01/13/2021    BILIDIR <0.2 09/29/2019    LABALBU 4.6 01/13/2021       Lab Results   Component Value Date    MG 2.3 01/16/2021       Lab Results   Component Value Date    INR 0.88 04/04/2020    INR 0.90 10/18/2019         Lab Results   Component Value Date    LABA1C 5.6 01/19/2018       Lab Results   Component Value Date    TRIG 137 10/08/2019    HDL 52 10/08/2019    HDL 51 03/16/2012    LDLCALC 120 10/08/2019    LDLDIRECT 120 10/08/2019       Lab Results   Component Value Date    TSH 2.620 09/29/2019         Testing Reviewed:      I have individually reviewed the cardiac test below:    ECHO: Results for orders placed during the hospital encounter of 09/12/19    Echocardiogram 2D/ M-Mode/ Colorflow/ Do    Narrative  Transthoracic Echocardiography Report (TTE)    Demographics    Patient Name     Nba Lemon Gender                 Female    MR #             [id number]     Race                       Ethnicity    Account #        [de-identified]     Room Number            0004    Accession Number [id number]     Date of Study          09/13/2019    Date of Birth    1950    Referring Physician    Marvin Boxer MD    Age              71 year(s)    Sonographer    Interpreting Physician Cesar Tovar MD    Procedure    Type of Study    TTE procedure:ECHOCARDIOGRAM COMPLETE 2D W DOPPLER W COLOR. Procedure Date  Date: 09/13/2019 Start: 09:21 AM    Study Location: Bedside  Technical Quality: Limited visualization due to body habitus. Indications:Lower extremity edema. Additional Medical History:sleep apnea, hypertension, hyperlipidemia,  hypothyroid, obesity    Patient Status: Routine    Height: 63 inches Weight: 237.01 pounds BSA: 2.08 m^2 BMI: 41.98 kg/m^2    HR: 74 bpm BP: 164/78 mmHg    Conclusions    Summary  Normal left ventricle size and systolic function. Ejection fraction was  estimated at 55 to 60 %. There were no regional left ventricular wall  motion abnormalities and wall thickness was within normal limits. Doppler parameters were consistent with abnormal left ventricular  relaxation (grade 1 diastolic dysfunction). The left atrium is Moderately dilated. Aortic aneurysm noted in the ascending aorta . Aortic aneurysm measures 4.1 cm . Signature    ----------------------------------------------------------------  Electronically signed by Cesar Tovar MD (Interpreting  physician) on 09/13/2019 at 04:51 PM  ----------------------------------------------------------------    Findings    Mitral Valve  The mitral valve structure was normal with normal leaflet separation.   DOPPLER: The transmitral velocity was within the normal range with no  evidence for mitral stenosis. Trace mitral regurgitation is present. Aortic Valve  The aortic valve was trileaflet with normal thickness and cuspal  separation. DOPPLER: Transaortic velocity was within the normal range with  no evidence of aortic stenosis. Trivial aortic regurgitation is noted. Tricuspid Valve  The tricuspid valve structure was normal with normal leaflet separation. DOPPLER: There was no evidence of tricuspid stenosis. Trivial tricuspid regurgitation visualized. Pulmonic Valve  The pulmonic valve leaflets exhibited normal thickness, no calcification,  and normal cuspal separation. DOPPLER: The transpulmonic velocity was  within the normal range with no evidence for regurgitation. Left Atrium  The left atrium is Moderately dilated. Left Ventricle  Normal left ventricle size and systolic function. Ejection fraction was  estimated at 55 to 60 %. There were no regional left ventricular wall  motion abnormalities and wall thickness was within normal limits. Doppler parameters were consistent with abnormal left ventricular  relaxation (grade 1 diastolic dysfunction). Right Atrium  Right atrial size was normal.    Right Ventricle  The right ventricular size was normal with normal systolic function and  wall thickness. Pericardial Effusion  The pericardium was normal in appearance with no evidence of a pericardial  effusion. Pleural Effusion  No evidence of pleural effusion. Aorta / Great Vessels  Aortic aneurysm noted in the ascending aorta . Aortic aneurysm measures 4.1 cm .  -The Pulmonary artery is within normal limits. -IVC size is within normal limits with normal respiratory phasic changes.     M-Mode/2D Measurements & Calculations    LV Diastolic   LV Systolic Dimension:    AV Cusp Separation: 1.5 cmLA  Dimension: 5.4 4.3 cm                    Dimension: 3.9 cmAO Root  cm             LV Volume Diastolic: 754  Dimension: 3.5 cmLA Area: 29.5  LV FS:20.4 %   ml                        cm^2  LV PW          LV Volume Systolic: 01.9  Diastolic: 1   ml  cm             LV EDV/LV EDV Index: 141  Septum         ml/68 m^2LV ESV/LV ESV    RV Diastolic Dimension: 2.6 cm  Diastolic: 0.7 Index: 38.2 ml/40 m^2  cm             EF Calculated: 41.1 %     LA/Aorta: 1.11  Ascending Aorta: 4.1 cm  LA volume/Index: 127.9 ml /61m^2    Doppler Measurements & Calculations    MV Peak E-Wave: 124 cm/s   AV Peak Velocity: 164  LVOT Peak Velocity: 143  MV Peak A-Wave: 140 cm/s   cm/s                   cm/s  MV E/A Ratio: 0.89         AV Peak Gradient:      LVOT Peak Gradient: 7  MV Peak Gradient: 6.15     10.76 mmHg             mmHg  mmHg  TV Peak E-Wave: 65.6  MV Deceleration Time: 165                         cm/s  msec                                              TV Peak A-Wave: 92 cm/s  AV P1/2t: 510 msec  IVRT: 95 msec          TV Peak Gradient: 1.72  MV E' Septal Velocity: 5.4                        mmHg  cm/s  MV A' Septal Velocity: 8.6 AV DVI (Vmax):0.87     PV Peak Velocity: 92  cm/s                                              cm/s  MV E' Lateral Velocity:                           PV Peak Gradient: 3.39  5.8 cm/s                                          mmHg  MV A' Lateral Velocity:  12.9 cm/s  E/E' septal: 22.96  E/E' lateral: 21.38    [email or web address].[email or web address]? [id number]       Cath:10/2019  HEMODYNAMICS:  1.  Left ventricular end-diastolic pressure was found to be elevated at  30 mmHg. 2.  Cardiac output 8.9 or 8.91 liters per minute. 3.  Cardiac index 4.3 liters per minute per square meter. 4.  Right atrial pressure elevated at 25 mmHg. 5.  RV pressure was elevated at 59/14 mmHg. 6.  Pulmonary arterial pressure was elevated at 57/34 mmHg with a mean  pulmonary arterial pressure of 45 mmHg.   7.  Pulmonary capillary wedge pressure was 30 mmHg.     CORONARY ANGIOGRAM:  1.  RCA: Tristin Carolinalain has moderate tortuosity in the proximal segment.  Proximal  RCA is patent without significant obstruction.  Mid RCA is patent  without significant obstruction.  Distal RCA is patent.  RPDA has some  mild diffuse disease.  RPL has distal mild diffuse disease. 2.  Left main:  Left main is patent without significant obstruction. Left main bifurcates into LCX and LAD. 3.  LCX:  Moderate tortuosity noted in the LCX.  No significant  obstruction in the proximal segment.  Distal LCX with mild diffuse  disease.  OM1 is a larger tortuous branching vessel with mild diffuse  disease. 4.  LAD:  Proximal LAD is patent without significant obstruction.  Mid  LAD is moderately tortuous.  No significant obstruction.  Distal LAD has  moderate tortuosity with mild diffuse disease. 5.  D1:  Large tortuous vessel without significant obstruction. Dominance, right.     MEDICATIONS:  See MAR.     ACCESS:  1.  Right brachial vein for right heart cath. 2.  Right radial artery for left heart cath.     COMPLICATIONS:  None.     CONCLUSION:  1.  Nonobstructive coronary artery disease. 2.  Congestive heart failure with preserved ejection fraction. 3.  Volume overload. 4.  Elevated right and left filling pressures. 5.  Moderate pulmonary venous hypertension. AssessmentPlan:   Hedy Trevino is a pleasant 70year old female patient who  has a past medical history of Acid reflux, Arthritis, Asthma, Bipolar 1 disorder ([address] CHF (congestive heart failure) ([address] CKD (chronic kidney disease), stage II, History of blood transfusion, Hypertension, Mood disorder ([address] Sleep apnea, and Thyroid disease. She has known h/o HFpEF, followed at CHF clinic. Cath in 10/2019 revealed nonobstructive CAD, elevated filling pressures. Echo 09/2019 revealed preserved EF. Labs on 9/2021 revealed K 4.7, Cr 1.2. She had recent pneumonia, she was seen by pulmonary, started on home oxygen. She reports SOB and ESCOTO, slowly improving.

## 2021-11-04 NOTE — PROGRESS NOTES
[address] [facility]  PHYSICAL THERAPY  [] EVALUATION  [x] DAILY NOTE (LAND) [] DAILY NOTE (AQUATIC ) [] PROGRESS NOTE [] DISCHARGE NOTE    [] [facility]   [] [address]    [] [address] [facility]   [x] Arch Nearing    Date: 2021  Patient Name:  Mignon Bowers  : 1950  MRN: [id number]  CSN: [id number]    Referring Practitioner Stanford Lehman DO   Diagnosis No admission diagnoses are documented for this encounter. Treatment Diagnosis Difficulty walking    Date of Evaluation 21    Additional Pertinent History Bi-polar; SOB related to CHF      Functional Outcome Measure Used Tinetti   Functional Outcome Score 15/28 (21)    (21)   (21)   (10/29/21)      Insurance: Primary: Payor: Reginald Ivan /  /  / , aquatic therapy is covered; modalities covered except for IONTO  Secondary:    Authorization Information: RECEIVED AUTH FOR PT  TOTAL OF 12 VISITS  FROM 21 TO 21  CPT CODES 64802, 99297, [address]. # [id number]   Visit # 23,  for progress note   Visits Allowed: 12   Recertification Date:    Physician Follow-Up:    Physician Orders:    History of Present Illness: Layo Leal is referred to PT to address ongoing balance issues. She states that she noticed that she was struggling with her balance when she slipped off a short deck at a local [facility]. She admits that over the last few months she has also had a few falls; denies any major injury as a result of the falls. She started to workout with a local  a month hoping that it would help her balance.       SUBJECTIVE: Stiff and sore this AM; does not move as well first thing in the AM.    TREATMENT   Precautions:    Pain:1-2/10 B  Knees    X in shaded column indicates activity completed today   Modalities Parameters/  Location  Notes                     Manual Therapy Time/Technique  Notes ankle strategy to maintain standing balance with perturbations to her trunk. 2. Lindsey Barrera will be able to stand for 20 min at a time without needing a rest break allowing her to prepare meals and perform light chores without limitation. NOT MET-limited to 5 min before she has to sit due to back pain; uses a kitchen stool a lot  3. Luiss Tinetti score will improve to 19/28 indicating minimal fall risk. GOAL MET-improved to 22/28      Long Term Goals:  Time Frame: 8 weeks  1. Discharge with independent HEP ONGOING  2. Luiss Tinetti score will improve to >21/28 indicating no fall risk. GOAL MET; REVISE to improve Tinetti score to >26/28; NOT MET-improved to 24/28  3. Lindsey Barrera will be able to maintain her standing balance in all 4 conditions of the m-CTSIB, using an ankle strategy to maintain her balance. NOT MET-able to maintain standing balance in conditions 1-3; LOB after 7 sec in condition 4  4. NEW GOAL: Lindsey Barrera will demonstrate good abdominal stabilization with all transfers and dynamic gait activities to provide greater support to low back. IMPROVING       Patient Education:   []  HEP/Education Completed: Plan of Care, Goals, anterior/posterior wt shift   [facility]:  [x]  No new Education completed  []  Reviewed Prior HEP      []  Patient verbalized and/or demonstrated understanding of education provided. []  Patient unable to verbalize and/or demonstrate understanding of education provided. Will continue education. [x]  Barriers to learning: n/a    PLAN:  Treatment Recommendations: Strengthening, Range of Motion, Balance Training, Endurance Training, Gait Training, Stair Training, Neuromuscular Re-education, Manual Therapy - Soft Tissue Mobilization, Manual Therapy - Joint Manipulation, Pain Management, Home Exercise Program, Patient Education and Modalities    []  Plan of care initiated. Plan to see patient 2 times per week for 8 weeks to address the treatment planned outlined above.   []  Continue with current plan of care  [x]  Modify plan of care as follows: 2 time per week   []  Hold pending physician visit  []  Discharge    Time In 0800   Time Out 0830   Timed Code Minutes: 30 min   Total Treatment Time: 30 min       Electronically Signed by: MINE Mota [id number]" Dee Martinez DPT  [id number]

## 2021-11-04 NOTE — PROGRESS NOTES
[facility] [facility]  [] SPEECH LANGUAGE COGNITIVE EVALUATION  [x] DAILY NOTE   [] PROGRESS NOTE [] DISCHARGE NOTE    [] OUTPATIENT REHABILITATION [facility]   [] Carlo [address]    [] [address] [facility]   [x] Diego Shearcasa    Date: 2021  Patient Name:  Jake Schneider  : 1950  MRN: [id number]    Referring Practitioner Vi Murguia CNP   Diagnosis  Dysphagia, unspecified [R13.10]    Treatment Diagnosis Oropharyngeal dysphagia   Date of Evaluation 10/14/21      Functional Outcome Measure Used EAT-19   Functional Outcome Score 6 (10/14/21)  (lower score equates to better swallow function. Score of 3 or greater indicates likely presence of swallowing dysfunction)      Insurance: Primary: Payor: Lawyer Nuñez /  /  / ,   Secondary:    Authorization Information: Pre-cert requires after evaluation. No co-pay. RECEIVED AUTH FOR 8 VISITS FROM 10/16/21-11/15/21   Visit # 3, 3/10 for progress note   Visits Allowed: 3/8    Recertification Date:    Physician Follow-Up: DAVID Guthrie-EHSAN 21    Physician Orders: Evaluate and treat - \"Reporting difficulty with swallowing and some coughing with swallowing. She was also recently diagnosed with pneumonia\". Pertinent History: Patient reports she was referred for speech therapy evaluation due to having PNA and wanting to ensure aspiration was not the cause. Endorses swallowing trouble where  has done the Heimlich on her previously. Reports trouble with breathing all summer, PNA recognized after oxygen dropping during PT visits. Patient also has hx of CHF with fluid restrictions (200ml/day) and sodium restrictions. History of COVID positive test 2021, however did not have any symptoms at that time reportedly. Patient states, \"When sitting or laying in bed, it feels like something just won't open down there. It's hard to swallow\".   Denies presence of GERD, however is currently taking medication. Patient's daughter is an SLP and she informed patient and  of the following: \"Oral residue with soft and dry foods\", \"food on outside corners of mouth, especially with ice cream and chocolate\", \"coughing during meals but not consistent\", \"mouth breather\", and \"open mouth posture\". Patient endorses coughing when eating and not really at other times. Reports coughing with both solids and liquids, more frequently with solids. Presence of xerostomia due to medications noted, however uses biotiene which she states does help. SUBJECTIVE: Patient reporting \"I have decided I definitely do not eat right\". Describes she often takes more bites before swallowing the first bite. Reports recognizing only chewing each bite a few times before swallowing it. Reports really attempting to utilize the trained techniques however having a hard time remembering to use the during meals and also reports completing exercises provided in previous visit. SLP recommended placing a note where patient is eating to remind her to use the swallow strategies/techniques. Patient receptive to idea. SLP also educated patient on respiratory-swallow coordination of exhale, swallow, exhale pattern. Explained with respiratory compromise, often change in coordination with inhales present near swallow vs exhales. Explained caution with intake to ensure no active inhalation directly before or after the swallow to reduce risk of aspiration of boluses or residue. SHORT TERM GOAL #1:  Goal 1: Patient will consume regular diet with thin liquids while implementing identified compensatory strategies without overt s/s of aspiraiton in 10/10 trials to meet nutrition and hydration needs. INTERVENTIONS: Patient reports good tolerance of regular solids and thin liquids, describing less frequent coughing when utilizing trained techniques.  Patient reports trying to use the following: slow rate with thorough mastication and alternating solids and liquids. SLP also reviewed the following: small and single bites/sips, upright positioning, adding extra moisture to dry textures, and additional dry swallow if indicated. Explained dry swallow may not be necessary if tongue base is strengthened and therefore results in no vallecular residue. SHORT TERM GOAL #2:  Goal 2: Patient will complete lingual ROM, coordination, and strengthening exercises x25 with good success to improve bolus manipulation and formation to reduce oral stasis. INTERVENTIONS: SLP introduced tongue press exercise with handout. SLP demonstrated and explained how to complete with accurate technique. Included in HEP for x3 sets of 10 reps, 2x/day. HEP:  x3 sets of 10 reps, 2x/day. Lingual Lateralization against manual resistance - Tongue push into L and R cheeks. Hold for 3 seconds. Lingual Circles - Inside oral cavity in both directions  Lingual Protrusion/Retraction - Hold for 5 seconds in each location. Tongue Press - Tongue push into palate. Hold 3-5 seconds. SHORT TERM GOAL #3:  Goal 3: Patient will complete tongue base retraction exercises x25 with good success to improve AP transit and reduce base of tongue residue. INTERVENTIONS: SLP provided updated handout on effortful swallow exercise. Described how to complete and encouraged using purees or other foods. Provided patient with teaspoon and applesauce this visit to demonstrate accurate completion and technique. Patient with good success completing x15 reps with direct puree intake given minimal verbal cueing. HEP:  Effortful swallow: 3 sets of x10 reps, 2x/day. LONG TERM GOALS:  Timeframe for Long-term Goals: 6 weeks  Goal 1: Patient will improve score on EAT-10 to 3 or below to promote safe swallowing practices and reflect improved perceived swallowing functioning. Assessment: Progressing toward goals.   Specific Interventions Next Treatment: diet texture analysis, training for safe swallowing strategies, education on oral care, lingual exercises, tongue base retraction exercises. Activity/Treatment Tolerance:  [x]  Patient tolerated treatment well  []  Patient limited by fatigue  []  Patient limited by pain   []  Patient limited by other medical complications  []  Other:     Patient Education:   [x]  HEP/Education Completed: Plan of Care, Goals, lingual exercise HEP and effortful swallow HEP  []  No new Education completed  []  Reviewed Prior HEP      [x]  Patient verbalized and/or demonstrated understanding of education provided. []  Patient unable to verbalize and/or demonstrate understanding of education provided. Will continue education. []  Barriers to learning:     PLAN:  []  Plan of care initiated. Plan to see patient 1 time per week for 6 weeks to address the treatment planned outlined above.   [x]  Continue with current plan of care  []  Modify plan of care as follows:    []  Hold pending physician visit  []  Discharge    Time In 0829   Time Out 0900   Timed Code Minutes: 0 min   Total Treatment Time: 32 min       Rosalia Edouard M.S., [address] [place] [address]

## 2021-11-05 ENCOUNTER — APPOINTMENT (OUTPATIENT)
Dept: PHYSICAL THERAPY | Age: 71
End: 2021-11-05
Payer: MEDICARE

## 2021-11-09 ENCOUNTER — HOSPITAL ENCOUNTER (OUTPATIENT)
Dept: PHYSICAL THERAPY | Age: 71
Setting detail: THERAPIES SERIES
Discharge: HOME | End: 2021-11-09
Payer: MEDICARE

## 2021-11-09 PROCEDURE — 97110 THERAPEUTIC EXERCISES: CPT

## 2021-11-09 PROCEDURE — 97530 THERAPEUTIC ACTIVITIES: CPT

## 2021-11-09 NOTE — PROGRESS NOTES
[facility]  PHYSICAL THERAPY  [] EVALUATION  [x] DAILY NOTE (LAND) [] DAILY NOTE (AQUATIC ) [] PROGRESS NOTE [] DISCHARGE NOTE    [] [facility]   [] Carlo Gonzalez    [] [address] [place]   [x] Brandon Payment    Date: 2021  Patient Name:  Damaris Saunders  : 1950  MRN: [id number]  CSN: [id number]    Referring Practitioner Galdino Menon DO   Diagnosis Other intervertebral disc degeneration, lumbar region [M51.36]  Radiculopathy, lumbar region [M54.16]    Treatment Diagnosis Difficulty walking    Date of Evaluation 21    Additional Pertinent History Bi-polar; SOB related to CHF      Functional Outcome Measure Used Tinetti   Functional Outcome Score 15/28 (21)    (21)   (21)   (10/29/21)      Insurance: Primary: Payor: [employer] /  /  / , aquatic therapy is covered; modalities covered except for IONTO  Secondary:    Authorization Information: RECEIVED AUTH FOR PT  TOTAL OF 12 VISITS  FROM 21 TO 21  CPT CODES 96991, 01236, [place] [address]. # [id number]   Visit # 23,  for progress note   Visits Allowed: 12   Recertification Date:    Physician Follow-Up:    Physician Orders:    History of Present Illness: Bety Mosley is referred to PT to address ongoing balance issues. She states that she noticed that she was struggling with her balance when she slipped off a short deck at a local [facility]. She admits that over the last few months she has also had a few falls; denies any major injury as a result of the falls. She started to workout with a local  a month hoping that it would help her balance. SUBJECTIVE:Reports O2 levels have been good for over a week now.     TREATMENT   Precautions:    Pain:1-2/10 B  Knees    X in shaded column indicates activity completed today   Modalities Parameters/  Location  Notes                     Manual Therapy Time/Technique  Notes                     Exercise/Intervention   Notes   Mariano; review of goals        Standing balance: tandem and feet together x2 30 sec hold x    NuStep x6 min  x Level 5, seat 8, arms 9, 95%   Forward step ups; side step ups  x10   x Bilaterally    Airex: heel to raises, marches, mini squats x15 ea  x With O2 94%   3 way hip, Hs curls x15 ea  x Green band   Tandem stepping fwd/retro, side stepping x2 laps ea  x    rocker board fwd/lat x15 ea  x 15 sec balance with 1UE support   wobbleboard 10x  x CW/CCW, manual overpressure provided   Hurdles: forward and lateral  x2 ea. x green   Agility mat: forward step in each square; lateral step in each square; march in each square x2   Hand hold assist needed    \"Walked the line\"-tandem walk  x2  x    // bars: forward and lateral walk with emphasis on taking longer, exaggerated steps  x2 ea. NK table: flexion/extension 15x ea. 7.5#   Seated:       Marches, LAQs, resisted Hs curls x10 ea   orange   NK table: flexion/extension x10 ea 5#  bilaterally   BOSU lunges  x10      Seated ball squeezes and hip abd x10   Green band          Oxygen saturation    Pt stayed on 1L of O2 during the entire session-92% and above     Specific Interventions Next Treatment: NuStep; airex balance exercises; Activity/Treatment Tolerance:  [x]  Patient tolerated treatment well  []  Patient limited by fatigue  []  Patient limited by pain   []  Patient limited by medical complications  []  Other:     Assessment: Completely off the oxygen for entire session today, including the NuStep. Slight drop in oxygen saturation levels, however she recovered quickly with 2-3 deep breaths.     Body Structures/Functions/Activity Limitations: impaired activity tolerance, impaired balance, impaired coordination, impaired endurance, impaired safety awareness, impaired strength and pain  Prognosis: fair    GOALS:  Patient Goal: minimize fall risk    Short Term Goals:  Time Frame: 4 weeks  1. Lucas Vazquez will demonstrate a good ankle strategy to maintain standing balance with perturbations to her trunk. 2. Lucas Vazquez will be able to stand for 20 min at a time without needing a rest break allowing her to prepare meals and perform light chores without limitation. NOT MET-limited to 5 min before she has to sit due to back pain; uses a kitchen stool a lot  3. Alison's Tinetti score will improve to 19/28 indicating minimal fall risk. GOAL MET-improved to 22/28      Long Term Goals:  Time Frame: 8 weeks  1. Discharge with independent HEP ONGOING  2. Luiss Tinetti score will improve to >21/28 indicating no fall risk. GOAL MET; REVISE to improve Tinetti score to >26/28; NOT MET-improved to 24/28  3. Lucas Vazquez will be able to maintain her standing balance in all 4 conditions of the m-CTSIB, using an ankle strategy to maintain her balance. NOT MET-able to maintain standing balance in conditions 1-3; LOB after 7 sec in condition 4  4. NEW GOAL: Lucas Vazquez will demonstrate good abdominal stabilization with all transfers and dynamic gait activities to provide greater support to low back. IMPROVING       Patient Education:   []  HEP/Education Completed: Plan of Care, Goals, anterior/posterior wt shift   [facility] Access Code:  [x]  No new Education completed  []  Reviewed Prior HEP      []  Patient verbalized and/or demonstrated understanding of education provided. []  Patient unable to verbalize and/or demonstrate understanding of education provided. Will continue education. [x]  Barriers to learning: n/a    PLAN:  Treatment Recommendations: Strengthening, Range of Motion, Balance Training, Endurance Training, Gait Training, Stair Training, Neuromuscular Re-education, Manual Therapy - Soft Tissue Mobilization, Manual Therapy - Joint Manipulation, Pain Management, Home Exercise Program, Patient Education and Modalities    []  Plan of care initiated.   Plan to see patient 2 times per week for 8 weeks to address the treatment planned outlined above.   []  Continue with current plan of care  [x]  Modify plan of care as follows: 2 time per week   []  Hold pending physician visit  []  Discharge    Time In 1340   Time Out 1418   Timed Code Minutes: 38 min   Total Treatment Time: 38 min       Electronically Signed by: Aleyda Palacios

## 2021-11-10 ENCOUNTER — HOSPITAL ENCOUNTER (OUTPATIENT)
Dept: NON INVASIVE DIAGNOSTICS | Age: 71
Discharge: HOME | End: 2021-11-10
Payer: MEDICARE

## 2021-11-10 DIAGNOSIS — R06.02 SOB (SHORTNESS OF BREATH): ICD-10-CM

## 2021-11-10 DIAGNOSIS — R06.09 DOE (DYSPNEA ON EXERTION): ICD-10-CM

## 2021-11-10 DIAGNOSIS — I50.32 CHRONIC HEART FAILURE WITH PRESERVED EJECTION FRACTION (HFPEF) (HCC): ICD-10-CM

## 2021-11-10 LAB
LV EF: 63 %
LVEF MODALITY: NORMAL

## 2021-11-10 PROCEDURE — 93306 TTE W/DOPPLER COMPLETE: CPT

## 2021-11-11 ENCOUNTER — HOSPITAL ENCOUNTER (OUTPATIENT)
Dept: SPEECH THERAPY | Age: 71
Setting detail: THERAPIES SERIES
Discharge: HOME | End: 2021-11-11
Payer: MEDICARE

## 2021-11-11 PROCEDURE — 92526 ORAL FUNCTION THERAPY: CPT

## 2021-11-11 NOTE — PROGRESS NOTES
[facility]  [] SPEECH LANGUAGE COGNITIVE EVALUATION  [x] DAILY NOTE   [] PROGRESS NOTE [] DISCHARGE NOTE    [] OUTPATIENT REHABILITATION CENTER [facility]   [] Carlo Gonzalez    [] [address] [place]   [x] Anabel Camarena    Date: 2021  Patient Name:  Rosy Nair  : 1950  MRN: [id number]    Referring Practitioner Abby Calix CNP   Diagnosis  Dysphagia, unspecified [R13.10]    Treatment Diagnosis Oropharyngeal dysphagia   Date of Evaluation 10/14/21      Functional Outcome Measure Used EAT-10   Functional Outcome Score 6 (10/14/21)  (lower score equates to better swallow function. Score of 3 or greater indicates likely presence of swallowing dysfunction)      Insurance: Primary: Payor: Christiano Salinas /  /  / ,   Secondary:    Authorization Information: Pre-cert requires after evaluation. No co-pay. RECEIVED AUTH FOR 8 VISITS FROM 10/16/21-11/15/21   Visit # 4, 4/10 for progress note   Visits Allowed:     Recertification Date:    Physician Follow-Up: DAVID Fierro-CNP 21    Physician Orders: Evaluate and treat - \"Reporting difficulty with swallowing and some coughing with swallowing. She was also recently diagnosed with pneumonia\". Pertinent History: Patient reports she was referred for speech therapy evaluation due to having PNA and wanting to ensure aspiration was not the cause. Endorses swallowing trouble where  has done the Heimlich on her previously. Reports trouble with breathing all summer, PNA recognized after oxygen dropping during PT visits. Patient also has hx of CHF with fluid restrictions (200ml/day) and sodium restrictions. History of COVID positive test 2021, however did not have any symptoms at that time reportedly. Patient states, \"When sitting or laying in bed, it feels like something just won't open down there. It's hard to swallow\".   Denies presence of GERD, however is all.    SHORT TERM GOAL #2:  Goal 2: Patient will complete lingual ROM, coordination, and strengthening exercises x25 with good success to improve bolus manipulation and formation to reduce oral stasis. INTERVENTIONS: Reviewed HEP as listed below. Patient independently explaining how to complete with accurate technique. HEP:  x3 sets of 10 reps, 2x/day. Lingual Lateralization against manual resistance - Tongue push into L and R cheeks. Hold for 3 seconds. Lingual Circles - Inside oral cavity in both directions  Lingual Protrusion/Retraction - Hold for 5 seconds in each location. Tongue Press - Tongue push into palate. Hold 3-5 seconds. SHORT TERM GOAL #3:  Goal 3: Patient will complete tongue base retraction exercises x25 with good success to improve AP transit and reduce base of tongue residue. INTERVENTIONS: Reviewed pharyngeal swallow exercise HEP as listed below. Patient asking some questions for clarification and SLP providing responses as appropriate. Encouraged use of solid intake to increase muscle strength with various boluses. HEP:  Effortful swallow: 3 sets of x10 reps, 2x/day. *Explained use of puree foods in small and large bolus sizes as well as soft solid foods in small and large bolus sizes. LONG TERM GOALS:  Timeframe for Long-term Goals: 6 weeks  Goal 1: Patient will improve score on EAT-10 to 3 or below to promote safe swallowing practices and reflect improved perceived swallowing functioning. Assessment: Progressing toward goals. Specific Interventions Next Treatment: diet texture analysis, training for safe swallowing strategies, education on oral care, lingual exercises, tongue base retraction exercises.      Activity/Treatment Tolerance:  [x]  Patient tolerated treatment well  []  Patient limited by fatigue  []  Patient limited by pain   []  Patient limited by other medical complications  []  Other:     Patient Education:   []  HEP/Education Completed: Plan of Care, Goals, lingual exercise HEP and effortful swallow HEP  []  No new Education completed  [x]  Reviewed Prior HEP      [x]  Patient verbalized and/or demonstrated understanding of education provided. []  Patient unable to verbalize and/or demonstrate understanding of education provided. Will continue education. []  Barriers to learning:     PLAN:  []  Plan of care initiated. Plan to see patient 1 time per week for 6 weeks to address the treatment planned outlined above.   []  Continue with current plan of care  [x]  Modify plan of care as follows: Change in frequency - plan to complete one month re-check then likely discharge   []  Hold pending physician visit  []  Discharge    Time In 1103   Time Out 1135   Timed Code Minutes: 0 min   Total Treatment Time: 28 min       Beth Franco M.S., [address] [place] [address]

## 2021-11-12 ENCOUNTER — TELEPHONE (OUTPATIENT)
Dept: CARDIOLOGY CLINIC | Age: 71
End: 2021-11-12

## 2021-11-12 ENCOUNTER — HOSPITAL ENCOUNTER (OUTPATIENT)
Dept: PHYSICAL THERAPY | Age: 71
Setting detail: THERAPIES SERIES
Discharge: HOME | End: 2021-11-12
Payer: MEDICARE

## 2021-11-12 DIAGNOSIS — I71.21 ASCENDING AORTIC ANEURYSM: Primary | ICD-10-CM

## 2021-11-12 PROCEDURE — 97112 NEUROMUSCULAR REEDUCATION: CPT

## 2021-11-12 PROCEDURE — 97110 THERAPEUTIC EXERCISES: CPT

## 2021-11-12 PROCEDURE — 97530 THERAPEUTIC ACTIVITIES: CPT

## 2021-11-12 NOTE — TELEPHONE ENCOUNTER
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  Obtain Chest CTA prior to next office visit

## 2021-11-12 NOTE — PROGRESS NOTES
[facility]  PHYSICAL [facility]ERAPY  [] EVALUATION  [x] DAILY NOTE (LAND) [] DAILY NOTE (AQUATIC ) [] PROGRESS NOTE [] DISCHARGE NOTE    [] [facility]   [] Carlo [address]    [] [facility]   [x] Brady Fernandez    Date: 2021  Patient Name:  Aric Bustamante  : 1950  MRN: [id number]  CSN: [id number]    Referring Practitioner Zackery Lugo DO   Diagnosis Other intervertebral disc degeneration, lumbar region [M51.36]  Radiculopathy, lumbar region [M54.16]    Treatment Diagnosis Difficulty walking    Date of Evaluation 21    Additional Pertinent History Bi-polar; SOB related to CHF      Functional Outcome Measure Used Tinetti   Functional Outcome Score 15/28 (21)    (21)   (21)   (10/29/21)      Insurance: Primary: Payor: Lorena Posadas /  /  / , aquatic therapy is covered; modalities covered except for IONTO  Secondary:    Authorization Information: RECEIVED AUTH FOR PT  TOTAL OF 12 VISITS  FROM 21 TO 21  CPT CODES 69250, 31104, [place] [address]. # [id number]   Visit # 24,  for progress note   Visits Allowed: 12   Recertification Date:    Physician Follow-Up:    Physician Orders:    History of Present Illness: Genevieve Lara is referred to PT to address ongoing balance issues. She states that she noticed that she was struggling with her balance when she slipped off a short deck at a local [facility]. She admits that over the last few months she has also had a few falls; denies any major injury as a result of the falls. She started to workout with a local  a month hoping that it would help her balance. SUBJECTIVE: doing okay; just woke up!     TREATMENT   Precautions:    Pain:1-2/10 B  Knees    X in shaded column indicates activity completed today   Modalities Parameters/  Location  Notes                     Manual Therapy Time/Technique  Notes demonstrate a good ankle strategy to maintain standing balance with perturbations to her trunk. 2. Mini Moncada will be able to stand for 20 min at a time without needing a rest break allowing her to prepare meals and perform light chores without limitation. NOT MET-limited to 5 min before she has to sit due to back pain; uses a kitchen stool a lot  3. Luiss Tinetti score will improve to 19/28 indicating minimal fall risk. GOAL MET-improved to 22/28      Long Term Goals:  Time Frame: 8 weeks  1. Discharge with independent HEP ONGOING  2. Luiss Tinetti score will improve to >21/28 indicating no fall risk. GOAL MET; REVISE to improve Tinetti score to >26/28; NOT MET-improved to 24/28  3. Mini Moncada will be able to maintain her standing balance in all 4 conditions of the m-CTSIB, using an ankle strategy to maintain her balance. NOT MET-able to maintain standing balance in conditions 1-3; LOB after 7 sec in condition 4  4. NEW GOAL: Mini Moncada will demonstrate good abdominal stabilization with all transfers and dynamic gait activities to provide greater support to low back. IMPROVING       Patient Education:   []  HEP/Education Completed: Plan of Care, Goals, anterior/posterior wt shift   [facility] Access Code:  [x]  No new Education completed  []  Reviewed Prior HEP      []  Patient verbalized and/or demonstrated understanding of education provided. []  Patient unable to verbalize and/or demonstrate understanding of education provided. Will continue education. [x]  Barriers to learning: n/a    PLAN:  Treatment Recommendations: Strengthening, Range of Motion, Balance Training, Endurance Training, Gait Training, Stair Training, Neuromuscular Re-education, Manual Therapy - Soft Tissue Mobilization, Manual Therapy - Joint Manipulation, Pain Management, Home Exercise Program, Patient Education and Modalities    []  Plan of care initiated.   Plan to see patient 2 times per week for 8 weeks to address the treatment planned outlined above.  []  Continue with current plan of care  [x]  Modify plan of care as follows: 2 time per week   []  Hold pending physician visit  []  Discharge    Time In 1045   Time Out 1125   Timed Code Minutes: 40 min   Total Treatment Time: 40 min       Electronically Signed by: MINE Marshall [id number]" Donta Blake DPT  [id number]

## 2021-11-17 ENCOUNTER — HOSPITAL ENCOUNTER (OUTPATIENT)
Dept: PHYSICAL THERAPY | Age: 71
Setting detail: THERAPIES SERIES
Discharge: HOME | End: 2021-11-17
Payer: MEDICARE

## 2021-11-17 PROCEDURE — 97110 THERAPEUTIC EXERCISES: CPT

## 2021-11-17 NOTE — PROGRESS NOTES
[facility]    Patient: Cisco Bolaños, 70 y.o.   : 2021    Patient of DAVID Springer CNP     Subjective     Chief Complaint   Patient presents with    Follow-up     3 month follow up, CT 21,         FATUMA  Angelina Maldonado is here for follow up for shortness of breath. Patient had a chest x-ray that had demonstrated pneumonia and was prescribed levofloxacin. At her last appointment, patient had a 6 MWT and was ordered to wear 1 lpm on exertion. Patient had messaged the office as she has had to increase her supplemental O2 to 2lpm at physical therapy due to increased exertion at therapy. Patient reports that she has not been wearing her oxygen at physical therapy as her SpO2 has been maintaining > 90% for the past month. She was referred to speech therapy after her last appointment due to dysphagia and reports that she has completed speech therapy. Patient follows with the CHF clinic and weighs herself daily. Patient had an elevated D. Dimer and was evaluated at [facility] ED. A CTA chest was not completed due to patient's history of CHF and CKD as her D. Dimer was normal after adjusted for her age. Patient had a (-) COVID-19 test. Patient had an Echo completed per her cardiologist that revealed Grade I Diastolic Dysfunction, EF 85-20%, dilation of ascending aorta with diameter of 4.1 cm, and normal right ventricle. Patient is here today with CT Chest that revealed tiny pericardial effusion (unchanged), 4 cm fusiform aneurysm (unchanged), mild atelectatic/fibrotic stranding in both lung bases, and stable lung nodules. Patient reports continued shortness of breath that has improved since her last appointment. She denies chest tightness, cough, hemoptysis, wheezing, chest pain, leg swelling, or weight gain. Does admit to intermittent palpitations and has had her Coreg increased recently by cardiology.  Her past medical history is significant for hypertension, thyroid PREOPERATIVE PHACOEMULSIFICATION  LEFT EYE:    INTRAOCULAR LENS (IOL) CALCULATION/SELECTION DISCUSSION:  The IOL Master was performed, and the IOL calculations were reviewed in detail.  I had a thorough discussion with Mr. Landaverde on the postoperative refraction options, goals, and expectations.  I also had a detailed discussion with him on all the available premium IOL's including a thorough discussion on the pros/cons, risks, benefits and cost.  All his questions were answered.  He understands his refraction issues/options well.  The IOL was then selected:  +22.00 diopters.    INFORMED CONSENT DISCUSSION:  CATARACT,OS, consistent with  his visual acuity and multiple visual/lifestyle complaints.  Mr. Landaverde is very symptomatic and wants/needs to see better.  I had a detailed discussion with him on the natural history of cataracts, treatment options, and surgical risks including:  death, blindness, loss of eye, endophthalmitis, hemorrhage, retinal detachment, cystic macular edema, elevated eye pressure, need for more surgery, worsening of diabetic retinopathy, anisometropia, diplopia, pupil problems, IOL related problems as discussed, dislocation/retention of lens material; multiple other potential complications were discussed.  All his questions were answered, and the consent forms were reviewed, signed, and witnessed.  I will proceed with phacoemulsification with IOL as scheduled.     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 Kathleen Favre), CHF (follows CHF clinic), arthritis, reflux, bipolar 1, and CKD (follows Dr. Anneliese Rubio). MMRC Dyspnea Scale:   0: Dyspneic on strenuous exercise  1: Dyspneic on walking a slight hill  2: Dyspneic on walking level ground; must stop occasionally due to breathlessness  3: Must stop for breathlessness after walking 100 yards or after a few minutes  4: Cannot leave house; breathless on dressing/undressing    MMRC dyspnea score: 0    Progress History:   Since last visit any new medical issues? Yes, testing for memory loss (decreased perception & currently cannot drive). MRI of the brain also ordered   New ER or hospital visits? No  Any new or changes in medicines? Yes, increased Coreg  Using inhalers? No  Last PFT: 5/26/2021 - restriction with air trapping and normal DLCO  Last 6 MWT: 9/15/2021    Smoking History:  Never smoker  Denies passive tobacco exposure from parents or work environment.     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 (Coal, dust, asbestos, molds ie Hay)  [place]. Denies exposure to pets/animals at home. Denies exposure to tuberculosis.   Denies family history of ALS, GBS, myasthenia gravis, myotonic dystrophy, MS, or spinal muscular atrophy.      Flu vaccine: does not wish to receive  Pneumonia vaccine: Not vaccinated  COVID-19 vaccine: Vaccinated & booster on 12/1/2021 and tested positive in January 2021, but did not have symptoms  Past Medical hx   PMH:  Past Medical History:   Diagnosis Date    Acid reflux     Arthritis     Asthma     [facility]     [facility]F [facility]congestive [facility] failure) [facility]     CKD (chronic kidney disease), stage II 11/22/2019    History of blood transfusion 2004    Hypertension     Lumbosacral radiculopathy 2021    Mood disorder ([address]     Pneumonia 2021    Sleep apnea     Thyroid disease      SURGICAL HISTORY:  Past Surgical History:   Procedure Laterality Date    ANKLE SURGERY      left    CATARACT REMOVAL      Both eyes      SECTION      x 3    FOOT SURGERY      Left     HIP SURGERY      HYSTERECTOMY      Total     TOTAL KNEE ARTHROPLASTY Left 10/14/14    TOTAL KNEE ARTHROPLASTY Right 2019     SOCIAL HISTORY:  Social History     Tobacco Use    Smoking status: Never Smoker    Smokeless tobacco: Never Used   Vaping Use    Vaping Use: Never used   Substance Use Topics    Alcohol use: No     Alcohol/week: 0.0 standard drinks     Comment: rarely    Drug use: No     ALLERGIES:  Allergies   Allergen Reactions    Ace Inhibitors Anaphylaxis    Prednisone Other (See Comments)     Other reaction(s): Manic Behavior  **oral**      Codeine Hives and Nausea And Vomiting    Penicillins Hives    Lotrel [Amlodipine Besy-Benazepril Hcl] Swelling    Typhoid Vaccines     Adhesive Tape      Other reaction(s): Rash    Onion Nausea And Vomiting    Typhoid Vaccine, Live      Other reaction(s): unknown    Wound Dressing Adhesive Rash     tape     FAMILY HISTORY:  Family History   Problem Relation Age of Onset    Diabetes Mother     High Blood Pressure Father     Cancer Father     Other Brother     Diabetes Brother     Cancer Sister     Breast Cancer Sister 39    Cancer Brother      CURRENT MEDICATIONS:  Current Outpatient Medications   Medication Sig Dispense Refill    SYNTHROID 125 MCG tablet Take 1 tablet by mouth daily Take with water on an empty stomach- wait 30 minutes before eating or taking other meds.  30 tablet 11    hydrALAZINE (APRESOLINE) 50 MG tablet Take 1 tablet by mouth 3 times daily 90 tablet 11    atorvastatin (LIPITOR) 20 MG tablet Take 1 tablet by mouth daily 90 tablet 3    cetirizine (ZYRTEC) 10 MG tablet Take 10 mg by mouth daily  vitamin E 200 units capsule Take 200 Units by mouth daily      METAMUCIL FIBER PO Take by mouth      NONFORMULARY daily colase 100 mg 1 am and 2 in pm      carvedilol (COREG) 25 MG tablet Take 1 tablet by mouth 2 times daily 180 tablet 3    omeprazole (PRILOSEC) 20 MG delayed release capsule Take 1 capsule by mouth every morning (before breakfast) 90 capsule 3    spironolactone (ALDACTONE) 25 MG tablet Take 1 tablet by mouth daily 30 tablet 5    bumetanide (BUMEX) 1 MG tablet 1-2 tabs daily as needed. 180 tablet 3    ARIPiprazole (ABILIFY) 5 MG tablet Take 2 tablets by mouth daily 30 tablet 3    OLANZapine (ZYPREXA) 15 MG tablet Take 15 mg by mouth daily      aspirin 81 MG EC tablet Take 81 mg by mouth daily      acetaminophen (TYLENOL) 325 MG tablet Take 650 mg by mouth 4 times daily       ferrous sulfate 325 (65 Fe) MG tablet Take 325 mg by mouth daily (with breakfast)      clonazePAM (KLONOPIN) 1 MG tablet Take 1 mg by mouth 3 times daily as needed.  venlafaxine (EFFEXOR XR) 150 MG extended release capsule Take 225 mg by mouth daily       gabapentin (NEURONTIN) 300 MG capsule Take 300 mg by mouth 3 times daily.  tiZANidine (ZANAFLEX) 2 MG tablet Take 2 mg by mouth 2 times daily      Mirabegron ER (MYRBETRIQ) 50 MG TB24 Take 50 mg by mouth daily      lamoTRIgine (LAMICTAL) 100 MG tablet Take 100 mg by mouth daily Patient taking 1 tablet TID      Ascorbic Acid (VITAMIN C) 500 MG tablet Take 500 mg by mouth daily.  Multiple Vitamin (MULTIVITAMIN PO) Take 1 tablet by mouth daily. No current facility-administered medications for this visit. Gracie MILLER   Review of Systems   Constitutional: Negative for appetite change, chills and fever. Has been working on weight loss   HENT: Negative for congestion, postnasal drip, rhinorrhea, sinus pressure, sinus pain, sneezing and sore throat. Eyes: Negative for itching. Respiratory: Positive for shortness of breath. Negative for cough, chest tightness and wheezing. Denies hemoptysis   Cardiovascular: Positive for palpitations (can have elevated HR with walking). Negative for chest pain and leg swelling. Gastrointestinal: Positive for constipation (takes stool softener). Negative for diarrhea. Abdominal fullness at times - takes extra bumex when this occurs   Genitourinary: Negative for difficulty urinating. Allergic/Immunologic: Negative for environmental allergies. Physical exam   BP (!) 140/78   Pulse 75   Temp 97.8 °F (36.6 °C)   Ht 5' 4\" (1.626 m)   Wt 248 lb (112.5 kg)   SpO2 95% Comment: r/a  BMI 42.57 kg/m²    Wt Readings from Last 3 Encounters:   12/16/21 248 lb (112.5 kg)   12/09/21 246 lb 6 oz (111.8 kg)   11/04/21 246 lb (111.6 kg)       Physical Exam  Constitutional:       Appearance: She is well-developed. She is obese. Comments: BMI 42.57   HENT:      Head: Normocephalic and atraumatic. Right Ear: External ear normal.      Left Ear: External ear normal.      Mouth/Throat:      Mouth: Mucous membranes are moist.      Pharynx: Oropharynx is clear. No oropharyngeal exudate. Eyes:      General:         Right eye: No discharge. Left eye: No discharge. Cardiovascular:      Rate and Rhythm: Normal rate and regular rhythm. Pulmonary:      Effort: Pulmonary effort is normal.      Breath sounds: No wheezing, rhonchi or rales. Comments: Diminished breath sounds  Chest:      Chest wall: No tenderness. Abdominal:      General: There is no distension. Tenderness: There is no abdominal tenderness. Musculoskeletal:      Cervical back: Neck supple. Right lower leg: No edema. Left lower leg: No edema. Skin:     General: Skin is warm and dry. Neurological:      General: No focal deficit present. Mental Status: She is alert. Psychiatric:         Mood and Affect: Mood normal.         Behavior: Behavior normal.         Thought Content:  Thought content normal.         Judgment: Judgment normal.          Results   Lung Nodule Screening     [] Qualifies    [x] Does not qualify   [] Declined    [] Completed  Non-smoker   The USPSTF recommends annual screening for lung cancer with low-dose computed tomography (LDCT) in adults aged 48 to [de-identified] years who have a 20 pack-year smoking history and currently smoke or have quit within the past 15 years. Screening should be discontinued once a person has not smoked for 15 years or develops a health problem that substantially limits life expectancy or the ability or willingness to have curative lung surgery. CT Chest 12/9/2021 (Reviewed) tiny pericardial effusion (unchanged), 4 cm fusiform aneurysm (unchanged), mild atelectatic/fibrotic stranding in both lung bases, stable lung nodules  Narrative   PROCEDURE: CT CHEST WO CONTRAST       CLINICAL INFORMATION: Pneumonia of both lower lobes due to infectious organism, Lung nodule < 6cm on CT       COMPARISON: 5/7/2020       TECHNIQUE: Multiple axial 5 millimeter images of the thorax and upper abdomen were obtained without the administration of intravenous contrast material . All CT scans at this facility use dose modulation, iterative reconstruction, and/or weight-based    dosing when appropriate to reduce radiation dose to as low as reasonably achievable.           FINDINGS:        Upper abdomen: Liver normal in appearance. The previously described enhancing lesions in the liver on prior study not seen on today's noncontrast study. These likely represent hemangiomas. MR of the liver would be helpful for confirmation.       Mediastinum and rissa: Tiny pericardial effusion, unchanged. There are a few small postinflammatory lymph nodes in the mediastinum. No abnormally enlarged hilar or mediastinal lymph nodes are seen. There are a few slightly prominent lymph nodes in the    right axilla, more prominent than on the prior study but also likely postinflammatory in nature. Fusiform aneurysm of the ascending thoracic aorta, 4 cm, unchanged.       Bones: No evidence for acute fracture or bone destruction. . There are a few old healed rib fractures left side. Moderate degenerative spurring is scattered in the thoracic spine. It is also an old healed fracture in the sternum. Mild scoliotic curvatures    in the thoracic spine. Cannot exclude muscle spasm.       Lungs: Mild atelectatic/fibrotic stranding both lung bases. No acute infiltrates or effusions is seen. There are a few stable appearing noncalcified nodules in both lungs, likely poorly calcified granulomas.         Impression   1. Tiny pericardial effusion. Mild atelectatic/fibrotic stranding both lung bases. 4 cm stable fusiform aneurysm of the ascending thoracic aorta. 2. There are a few small noncalcified nodules in both lung which are stable appearing, likely poorly calcified granulomas. 3. Contrast enhancing lesion seen on the prior contrast-enhanced study are not seen on today's noncontrast study. These likely represent hemangiomas. Nonetheless, confirmation with MRI of the liver might be considered.               **This report has been created using voice recognition software.  It may contain minor errors which are inherent in voice recognition technology. **       Final report electronically signed by Dr. Daina Christensen on 12/9/2021 1:29 PM             Echo 11/10/2021 (Reviewed) Grade I Diastolic Dysfunction, EF 57-77%, dilation of ascending aorta with diameter of 4.1 cm, and normal right ventricle   Conclusions      Summary   Technically difficult examination. Normal left ventricle size and systolic function. Ejection fraction was   estimated at 60-65%. There were no regional left ventricular wall motion   abnormalities and wall thickness was within normal limits. Doppler parameters were consistent with abnormal left ventricular   relaxation (grade 1 diastolic dysfunction). Mild tricuspid regurgitation.    Dilation of ascending aorta, diameter 4.1 cm. Signature      ----------------------------------------------------------------   Electronically signed by Juan M SaucedoInterpreting   physician) on 11/10/2021 at 05:32 PM   ----------------------------------------------------------------      Findings      Mitral Valve   The mitral valve structure was normal with normal leaflet separation. DOPPLER: The transmitral velocity was within the normal range with no   evidence for mitral stenosis. There was no evidence of mitral   regurgitation. Aortic Valve   The aortic valve was trileaflet with normal thickness and cuspal   separation. DOPPLER: Transaortic velocity was within the normal range with   no evidence of aortic stenosis. There was no evidence of aortic   regurgitation. Tricuspid Valve   The tricuspid valve structure was normal with normal leaflet separation. DOPPLER: There was no evidence of tricuspid stenosis. There was evidence   of Mild tricuspid regurgitation. Pulmonic Valve   The pulmonic valve leaflets exhibited normal thickness, no calcification,   and normal cuspal separation. DOPPLER: The transpulmonic velocity was   within the normal range with no evidence for regurgitation. Left Atrium   Left atrial size was normal.      Left Ventricle   Normal left ventricle size and systolic function. Ejection fraction was   estimated at 60-65%. There were no regional left ventricular wall motion   abnormalities and wall thickness was within normal limits. Doppler parameters were consistent with abnormal left ventricular   relaxation (grade 1 diastolic dysfunction). Right Atrium   Right atrial size was normal.      Right Ventricle   The right ventricular size was normal with normal systolic function and   wall thickness. Pericardial Effusion   The pericardium was normal in appearance with no evidence of a pericardial   effusion.       Pleural Effusion   No evidence of pleural effusion. Aorta / Great Vessels   Dilation of ascending aorta, diameter 4.1 cm.   -The Pulmonary artery is within normal limits. -IVC size is within normal limits with normal respiratory phasic changes. Results for Nick Leach (MRN [id number]) as of 11/17/2021 08:38   Ref. Range 9/15/2021 11:28   Sodium Latest Ref Range: 135 - 145 meq/L 141   Potassium Latest Ref Range: 3.5 - 5.2 meq/L 4.7   Chloride Latest Ref Range: 98 - 111 meq/L 100   CO2 Latest Ref Range: 23 - 33 meq/L 28   BUN Latest Ref Range: 7 - 22 mg/dL 32 (H)   Creatinine Latest Ref Range: 0.4 - 1.2 mg/dL 1.2   Anion Gap Latest Ref Range: 8.0 - 16.0 meq/L 13.0   Est, Glom Filt Rate Latest Units: ml/min/1.73m2 44 (A)   Glucose Latest Ref Range: 70 - 108 mg/dL 128 (H)   Calcium Latest Ref Range: 8.5 - 10.5 mg/dL 10.4   Pro-BNP Latest Ref Range: 0.0 - 900.0 pg/mL 66.1   WBC Latest Ref Range: 4.8 - 10.8 thou/mm3 5.7   RBC Latest Ref Range: 4.20 - 5.40 mill/mm3 4.29   Hemoglobin Quant Latest Ref Range: 12.0 - 16.0 gm/dl 12.9   Hematocrit Latest Ref Range: 37.0 - 47.0 % 42.5   MCV Latest Ref Range: 81.0 - 99.0 fL 99.1 (H)   MCH Latest Ref Range: 26.0 - 33.0 pg 30.1   MCHC Latest Ref Range: 32.2 - 35.5 gm/dl 30.4 (L)   MPV Latest Ref Range: 9.4 - 12.4 fL 9.8   RDW-CV Latest Ref Range: 11.5 - 14.5 % 13.1   RDW-SD Latest Ref Range: 35.0 - 45.0 fL 47.2 (H)   Platelet Count Latest Ref Range: 130 - 400 thou/mm3 284   D-Dimer, Quant Latest Ref Range: 0.00 - 500.00 ng/ml .00 (H)     Six Minute Walk Test  Johan Molina 1950    Six minute walk test done in my office today by my medical assistant. Alison's oxygen saturation at rest on room air was 95%. Her oxygen saturation dropped to 86% on room air with exertion after walking 436 feet and within 3 minutes. The six minute walk test was repeated with oxygen supplementation. Oxygen supplementation was started with 1 LPM via nasal cannula and titrated to 1 LPM via nasal cannula.  At the end of the test Mini Mckeon's oxygen saturation remained at 93% on 1 LPM with exertion. She is mobile in the home and requires oxygen as outlined above. Patient ambulated a total of 864 feet with oxygen. Resting Dyspnea/Tonie score was 0 / 2  and 0 / 0  upon completion of the walk. Resting heart rate was  72 bpm and 102 bpm upon completing the walk. Nasal Oxygen order:  1 lpm to be used with:  Rest: No.  Walking: Yes. Sleep: No.   POC flow: No.  Continuous flow: Yes. DME Medical Necessity Documentation    Mini Moncada was seen in the office on 12/16/2021 for the diagnosis restrictive lung disease. I am prescribing oxygen because the diagnosis and testing requires the patient to have oxygen in the home. her condition will improve or be benefited by oxygen use. The patient is able to perform good mobility in a home setting and therefore does require the use of a portable oxygen system. Assessment      Diagnosis Orders   1. SOB (shortness of breath)  6 Minute Walk Test    DME Order for Home Oxygen as OP    Lung Function Test MIP & MEP   2. Restrictive lung disease  6 Minute Walk Test    DME Order for Home Oxygen as OP    6 Minute Walk Test    Lung Function Test MIP & MEP   3. Atelectasis     4. Multiple lung nodules on CT     5. Grade I diastolic dysfunction     6. CKD (chronic kidney disease), stage II     7. Dysphagia, unspecified type           Plan   1. SOB (shortness of breath)  - Patient reports shortness of breath has greatly improved  - Pneumonia resolved on CT Chest  - 6 Minute Walk Test - 1 lpm with exertion  - DME Order for Home Oxygen as OP  - Lung Function Test MIP & MEP; Future  - COVID-19 vaccine up to date  - Declines flu and pneumonia vaccines    2.  Restrictive lung disease  - Advised patient to continue to work on weight loss  - Obtain Lung Function Test MIP & MEP prior to next appt  - Plan to update Full PFT around May 2022  - 6 Minute Walk Test - 1 lpm with exertion  - DME Order for Home Oxygen as OP  - 6 Minute Walk Test to be done at patient's [facility] on POC to see if she qualifies    3. Atelectasis  - Patient reports she has been using incentive spirometer throughout the day at home and has been improving  - Advised to continue incentive spirometer     4. Multiple lung nodules on CT  - Patient has repeat CTA Chest ordered for October 2022, will re-evaluate lung nodules on CTA Chest    5. Grade I diastolic dysfunction  - Advised patient to continue to follow with the CHF clinic and to call with weight gain of 3 lbs in 1 day or 5 lbs in 1 week    6. CKD (chronic kidney disease), stage II  - Advised to continue with Dr. Lee Ann Alegre. Labs stable. 7. Dysphagia  - Patient completed speech therapy    Advised patient to discuss liver findings on CT scan with Dr. Michael Taylor, radiologist reported consideration for MRI. Advised patient to call office with any changes, questions, or concerns regarding respiratory status or issues with prescribed medications    Return in about 2 months (around 2/16/2022) for RLD with MIP & MEP prior. I spent a total of 45 minutes on the day of the visit. Time spent included review of previous notes, and test results and face to face time with the patient discussing diagnosis and importance of compliance with the treatment plan. Time spent also includes documentation on the day of the visit.     Electronically signed by King Son, DAVID Sykes CNP on 12/16/2021 at 9:43 AM

## 2021-11-18 ENCOUNTER — APPOINTMENT (OUTPATIENT)
Dept: SPEECH THERAPY | Age: 71
End: 2021-11-18
Payer: MEDICARE

## 2021-11-22 ENCOUNTER — HOSPITAL ENCOUNTER (OUTPATIENT)
Dept: PHYSICAL THERAPY | Age: 71
Setting detail: THERAPIES SERIES
Discharge: HOME | End: 2021-11-22
Payer: MEDICARE

## 2021-11-22 PROCEDURE — 97110 THERAPEUTIC EXERCISES: CPT

## 2021-11-22 NOTE — PROGRESS NOTES
[address] [facility]  PHYSICAL THERAPY  [] EVALUATION  [x] DAILY NOTE (LAND) [] DAILY NOTE (AQUATIC ) [] PROGRESS NOTE [] DISCHARGE NOTE    [] [facility]   [] Carlo 90    [] 6[address] [facility]   [x] Demetrice Estrada    Date: 2021  Patient Name:  Alphonse Corbin  : 1950  MRN: [id number]  CSN: [id number]    Referring Practitioner Jose Angel Rico DO   Diagnosis Other intervertebral disc degeneration, lumbar region [M51.36]  Radiculopathy, lumbar region [M54.16]    Treatment Diagnosis Difficulty walking    Date of Evaluation 21    Additional Pertinent History Bi-polar; SOB related to CHF      Functional Outcome Measure Used Tinetti   Functional Outcome Score 15/28 (21)    (21)   (21)   (10/29/21)      Insurance: Primary: Payor: [facility] /  /  / , aquatic therapy is covered; modalities covered except for IONTO  Secondary:    Authorization Information: RECEIVED AUTH FOR PT  TOTAL OF 12 VISITS  FROM 21 TO 21  CPT CODES 05401, 86586, Delta 116. # [id number]   Visit # 26, 10/12 for progress note   Visits Allowed: 12   Recertification Date:    Physician Follow-Up:    Physician Orders:    History of Present Illness: Yusuf is referred to PT to address ongoing balance issues. She states that she noticed that she was struggling with her balance when she slipped off a short deck at a local [facility]. She admits that over the last few months she has also had a few falls; denies any major injury as a result of the falls. She started to workout with a local  a month hoping that it would help her balance. SUBJECTIVE: Pt stated she had a busy morning with seeing  then coming to therapy.      TREATMENT   Precautions:    Pain:2/10 R knee with walking    X in shaded column indicates activity completed today   Modalities Parameters/  Location Notes                     Manual Therapy Time/Technique  Notes                     Exercise/Intervention   Notes   Mariano; review of goals        Standing on airex: tandem and feet together x2 30 sec hold x Occasional hand taps. NuStep x5 min  x Level 5, seat 8, arms 9, 88% then recovered after 30sec of deep breathing. Forward step ups; side step ups  x10    Bilaterally    Airex: heel to raises, marches, mini squats x15 ea   With O2 94%   3 way hip, Hs curls x15 ea  x No band today-Green band   Tandem stepping fwd/retro, side stepping x2 laps ea  x    rocker board fwd/lat x15 ea  x 15 sec balance with 1UE support   wobbleboard 10x  x CW/CCW, manual overpressure provided   Hurdles: forward and lateral  x2 ea.  x Green with 1 orange julio c. Agility mat: forward step in each square; lateral step in each square; march in each square x2   Hand hold assist needed    \"Walked the line\"-tandem walk  x2  x    // bars: forward and lateral walk with emphasis on taking longer, exaggerated steps  x2 ea. NK table: flexion/extension 15x ea. 7.5#   Sit-stands  10x   Without UE support. Marches, LAQs, resisted Hs curls x10 ea   orange   NK table: flexion/extension x10 ea 5#  bilaterally   BOSU lunges  x10      Seated ball squeezes and hip abd x20   blue band   [employer] walking  125 ft, 150 ft    One sitting RB O2 dropped to 91%   Oxygen saturation    Pt stayed on 1L of O2 during the entire session-92% and above     Specific Interventions Next Treatment: NuStep; airex balance exercises; Activity/Treatment Tolerance:  [x]  Patient tolerated treatment well  []  Patient limited by fatigue  []  Patient limited by pain   []  Patient limited by medical complications  []  Other:     Assessment: Pt working on balance reactions and ankle strengthening this session. R ankle continues to be weak and pt educated to wear brace for added support.      Body Structures/Functions/Activity Limitations: impaired activity tolerance, Manipulation, Pain Management, Home Exercise Program, Patient Education and Modalities    []  Plan of care initiated. Plan to see patient 2 times per week for 8 weeks to address the treatment planned outlined above.   []  Continue with current plan of care  [x]  Modify plan of care as follows: 2 time per week   []  Hold pending physician visit  []  Discharge    Time In 1300   Time Out 1330   Timed Code Minutes: 30 min   Total Treatment Time: 30 min       Electronically Signed by: Marilynn Ernst PTA

## 2021-11-29 ENCOUNTER — NURSE ONLY (OUTPATIENT)
Dept: LAB | Age: 71
End: 2021-11-29

## 2021-11-29 ENCOUNTER — APPOINTMENT (OUTPATIENT)
Dept: PHYSICAL THERAPY | Age: 71
End: 2021-11-29
Payer: MEDICARE

## 2021-11-29 DIAGNOSIS — R06.09 DOE (DYSPNEA ON EXERTION): ICD-10-CM

## 2021-11-29 DIAGNOSIS — R06.02 SOB (SHORTNESS OF BREATH): ICD-10-CM

## 2021-11-29 DIAGNOSIS — I50.32 CHRONIC HEART FAILURE WITH PRESERVED EJECTION FRACTION (HFPEF) (HCC): ICD-10-CM

## 2021-11-29 LAB
ALBUMIN SERPL-MCNC: 4.4 G/DL (ref 3.5–5.1)
ALP BLD-CCNC: 73 U/L (ref 38–126)
ALT SERPL-CCNC: 27 U/L (ref 11–66)
ANION GAP SERPL CALCULATED.3IONS-SCNC: 11 MEQ/L (ref 8–16)
AST SERPL-CCNC: 23 U/L (ref 5–40)
BILIRUB SERPL-MCNC: 0.2 MG/DL (ref 0.3–1.2)
BUN BLDV-MCNC: 26 MG/DL (ref 7–22)
CALCIUM SERPL-MCNC: 9.8 MG/DL (ref 8.5–10.5)
CHLORIDE BLD-SCNC: 106 MEQ/L (ref 98–111)
CHOLESTEROL, TOTAL: 164 MG/DL (ref 100–199)
CO2: 25 MEQ/L (ref 23–33)
CREAT SERPL-MCNC: 1 MG/DL (ref 0.4–1.2)
GFR SERPL CREATININE-BSD FRML MDRD: 55 ML/MIN/1.73M2
GLUCOSE BLD-MCNC: 105 MG/DL (ref 70–108)
HDLC SERPL-MCNC: 39 MG/DL
LDL CHOLESTEROL CALCULATED: 80 MG/DL
MAGNESIUM: 2.2 MG/DL (ref 1.6–2.4)
POTASSIUM SERPL-SCNC: 4.7 MEQ/L (ref 3.5–5.2)
SODIUM BLD-SCNC: 142 MEQ/L (ref 135–145)
TOTAL PROTEIN: 6.9 G/DL (ref 6.1–8)
TRIGL SERPL-MCNC: 226 MG/DL (ref 0–199)

## 2021-11-30 ENCOUNTER — HOSPITAL ENCOUNTER (OUTPATIENT)
Dept: PHYSICAL THERAPY | Age: 71
Setting detail: THERAPIES SERIES
Discharge: HOME | End: 2021-11-30
Payer: MEDICARE

## 2021-11-30 PROCEDURE — 97112 NEUROMUSCULAR REEDUCATION: CPT

## 2021-11-30 PROCEDURE — 97530 THERAPEUTIC ACTIVITIES: CPT

## 2021-11-30 PROCEDURE — 97110 THERAPEUTIC EXERCISES: CPT

## 2021-11-30 NOTE — PROGRESS NOTES
** PLEASE SIGN, DATE AND TIME CERTIFICATION BELOW AND RETURN TO [facility] OUTPATIENT REHABILITATION (FAX #: [phone]). ATTEST/CO-SIGN IF ACCESSING VIA [employer]. THANK YOU.**    I certify that I have examined the patient below and determined that Physical Medicine and Rehabilitation service is necessary and that I approve the established plan of care for up to 90 days or as specifically noted. Attestation, signature or co-signature of physician indicates approval of certification requirements.    ________________________ ____________ __________  Physician Signature   Date   Time      [address] [facility]  PHYSICAL THERAPY  [] EVALUATION  [x] DAILY NOTE (LAND) [] DAILY NOTE (AQUATIC ) [x] PROGRESS NOTE [] DISCHARGE NOTE    [] 615 [facility]   [] Dunajs 90    [] 645 [facility]   [x] Michial Bending    Date: 2021  Patient Name:  Lilliam Quiñones  : 1950  MRN: [id number]  CSN: [id number]    Referring Practitioner Gokul Rollins DO   Diagnosis Other intervertebral disc degeneration, lumbar region [M51.36]  Radiculopathy, lumbar region [M54.16]    Treatment Diagnosis Difficulty walking    Date of Evaluation 21    Additional Pertinent History Bi-polar; SOB related to CHF      Functional Outcome Measure Used Tinetti   Functional Outcome Score 15 (21)   24/28 (21)  2428 (21)  24 (10/29/21)  2628 (21)      Insurance: Primary: Payor: [employer] /  /  / , aquatic therapy is covered; modalities covered except for IONTO  Secondary:    Authorization Information: RECEIVED AUTH FOR PT  TOTAL OF 12 VISITS  FROM 21 TO 21  CPT CODES 30270, 94272, [place] 116.  # [id number]   Visit # 26, 10/12 for progress note   Visits Allowed: 12   Recertification Date:    Physician Follow-Up:    Physician Orders:    History of Present Illness: Jesus Barr is referred to PT to address ongoing balance issues. She states that she noticed that she was struggling with her balance when she slipped off a short deck at a local [facility]. She admits that over the last few months she has also had a few falls; denies any major injury as a result of the falls. She started to workout with a local  a month hoping that it would help her balance. SUBJECTIVE: Doing well; did not wear her oxygen in today! Been working with her incentive spirometer     TREATMENT   Precautions:    Pain:2/10 R knee with walking    X in shaded column indicates activity completed today   Modalities Parameters/  Location  Notes                     Manual Therapy Time/Technique  Notes                     Exercise/Intervention   Notes   Mariano; review of goals    x    Standing on airex: tandem and feet together x2 30 sec hold x Occasional hand taps. NuStep x5 min  x Level 5, seat 8, arms 9, 88% then recovered after 30sec of deep breathing. Forward step ups; side step ups  x10    Bilaterally    Airex: heel to raises, marches, mini squats x15 ea  x With O2 94%   3 way hip, Hs curls x15 ea   No band today-Green band   Tandem stepping fwd/retro, side stepping x2 laps ea  x    rocker board fwd/lat x15 ea  x 15 sec balance with 1UE support   wobbleboard 10x   CW/CCW, manual overpressure provided   Hurdles: forward and lateral  x2 ea.  x Green with 1 orange julio c. Agility mat: forward step in each square; lateral step in each square; march in each square x2   Hand hold assist needed    \"Walked the line\"-tandem walk  x2  x    // bars: forward and lateral walk with emphasis on taking longer, exaggerated steps  x2 ea. NK table: flexion/extension 15x ea. 7.5#   Sit-stands  10x  x Without UE support.    Marches, LAQs, resisted Hs curls x10 ea   orange   NK table: flexion/extension x10 ea 5#  bilaterally   BOSU lunges  x10      Seated ball squeezes and hip abd x20   blue band   [employer] walking  125 ft, 150 ft    One sitting RB O2 dropped to 91%   6 min walk test    x Without supplemental oxygen; dropped to 90%   Oxygen saturation    Pt stayed on 1L of O2 during the entire session-92% and above     Specific Interventions Next Treatment: NuStep; airex balance exercises; Activity/Treatment Tolerance:  [x]  Patient tolerated treatment well  []  Patient limited by fatigue  []  Patient limited by pain   []  Patient limited by medical complications  []  Other:     Assessment: Nick Delgado will benefit from continued therapy to work on improving endurance and help her wean off her supplemental oxygen. She has demonstrated excellent improvements in dynamic balance and gait, however she continues to use supplemental oxygen due to drop in oxygen. Body Structures/Functions/Activity Limitations: impaired activity tolerance, impaired balance, impaired coordination, impaired endurance, impaired safety awareness, impaired strength and pain  Prognosis: fair    GOALS:  Patient Goal: minimize fall risk    Short Term Goals:  Time Frame: 4 weeks  1. Nick Delgado will demonstrate a good ankle strategy to maintain standing balance with perturbations to her trunk. 2. Nick Delgado will be able to stand for 20 min at a time without needing a rest break allowing her to prepare meals and perform light chores without limitation. NOT MET-limited to 5 min before she has to sit due to back pain; uses a kitchen stool a lot  3. Alison's Tinetti score will improve to 19/28 indicating minimal fall risk. GOAL MET-improved to 22/28      Long Term Goals:  Time Frame: 8 weeks  1. Discharge with independent HEP ONGOING  2. Alison's Tinetti score will improve to >21/28 indicating no fall risk. GOAL MET; REVISE to improve Tinetti score to >26/28; NOT MET-improved to 26/28  3. Nick Delgado will be able to maintain her standing balance in all 4 conditions of the m-CTSIB, using an ankle strategy to maintain her balance.  NOT MET-able to maintain standing balance in conditions 1-3; LOB after 10 sec in condition 4  4. NEW GOAL: Antonietta Rides will demonstrate good abdominal stabilization with all transfers and dynamic gait activities to provide greater support to low back. IMPROVING   5. NEW GOAL: Antonietta Rides will be able to complete 6 min walking test with rollator while maintaining oxygen levels 92% or above so she can go without supplemental oxygen. Patient Education:   [x]  HEP/Education Completed: ft placement with gait.  Medbridge Access Code:  []  No new Education completed  []  Reviewed Prior HEP      [x]  Patient verbalized and/or demonstrated understanding of education provided. []  Patient unable to verbalize and/or demonstrate understanding of education provided. Will continue education. [x]  Barriers to learning: n/a    PLAN:  Treatment Recommendations: Strengthening, Range of Motion, Balance Training, Endurance Training, Gait Training, Stair Training, Neuromuscular Re-education, Manual Therapy - Soft Tissue Mobilization, Manual Therapy - Joint Manipulation, Pain Management, Home Exercise Program, Patient Education and Modalities    []  Plan of care initiated. Plan to see patient 2 times per week for 8 weeks to address the treatment planned outlined above.   []  Continue with current plan of care  [x]  Modify plan of care as follows: 2 time per week   []  Hold pending physician visit  []  Discharge    Time In 1506   Time Out 1546   Timed Code Minutes: 40 min   Total Treatment Time: 40 min       Electronically Signed by: Darlin Tomlinson PT

## 2021-12-02 ENCOUNTER — HOSPITAL ENCOUNTER (OUTPATIENT)
Dept: SPEECH THERAPY | Age: 71
Setting detail: THERAPIES SERIES
Discharge: HOME | End: 2021-12-02
Payer: MEDICARE

## 2021-12-02 PROCEDURE — 92526 ORAL FUNCTION THERAPY: CPT

## 2021-12-02 NOTE — DISCHARGE SUMMARY
[address] [facility]  [] SPEECH LANGUAGE COGNITIVE EVALUATION  [] DAILY NOTE   [] PROGRESS NOTE [x] DISCHARGE NOTE    [] [facility] [place]   [] Carlo Gonzalez    [] [address] [place]   [x] Jake Cox    Date: 2021  Patient Name:  Tolu Estes  : 1950  MRN: [id number]    Referring Practitioner Maureen Mo CNP   Diagnosis  Dysphagia, unspecified [R13.10]    Treatment Diagnosis Oropharyngeal dysphagia   Date of Evaluation 10/14/21      Functional Outcome Measure Used EAT-10   Functional Outcome Score 6 (10/14/21)    2 (21)  (lower score equates to better swallow function. Score of 3 or greater indicates likely presence of swallowing dysfunction)      Insurance: Primary: Payor: [employer] /  /  / ,   Secondary:    Authorization Information: Pre-cert requires after evaluation. No co-pay. RECEIVED AUTH FOR 8 VISITS FROM 10/16/21-11/15/21   Visit # 5, 5/10 for progress note   Visits Allowed:     Recertification Date:    Physician Follow-Up: DAVID Rogers-EHSAN 21    Physician Orders: Evaluate and treat - \"Reporting difficulty with swallowing and some coughing with swallowing. She was also recently diagnosed with pneumonia\". Pertinent History: Patient reports she was referred for speech therapy evaluation due to having PNA and wanting to ensure aspiration was not the cause. Endorses swallowing trouble where  has done the Heimlich on her previously. Reports trouble with breathing all summer, PNA recognized after oxygen dropping during PT visits. Patient also has hx of CHF with fluid restrictions (200ml/day) and sodium restrictions. History of COVID positive test 2021, however did not have any symptoms at that time reportedly. Patient states, \"When sitting or laying in bed, it feels like something just won't open down there. It's hard to swallow\".   Denies presence of GERD, however is currently taking medication. Patient's daughter is an SLP and she informed patient and  of the following: \"Oral residue with soft and dry foods\", \"food on outside corners of mouth, especially with ice cream and chocolate\", \"coughing during meals but not consistent\", \"mouth breather\", and \"open mouth posture\". Patient endorses coughing when eating and not really at other times. Reports coughing with both solids and liquids, more frequently with solids. Presence of xerostomia due to medications noted, however uses biotiene which she states does help. SUBJECTIVE: Patient pleasant and insightful regarding mastication and deglutition abilities. Reports things are going well and she is attempting more consciously and consistently to \"eat like you told me\". Denies coughing and choking episodes. SLP reviewed concept of respiratory-swallow coordination of exhale, swallow, exhale pattern. Explained with respiratory compromise, often change in coordination with inhales present near swallow vs exhales. Patient now completely off oxygen that was previously needed. Does use CPAP at night when sleeping. Reports she has been using her incentive spirometer and making progress with that. SLP explained continued caution with intake to ensure no active inhalation directly before or after the swallow to reduce risk of aspiration of boluses or residue. Reviewed recommendation for discharge from SLP services, and patient remains agreeable to discharge this visit. SHORT TERM GOAL #1:  Goal 1: Patient will consume regular diet with thin liquids while implementing identified compensatory strategies without overt s/s of aspiraiton in 10/10 trials to meet nutrition and hydration needs. GOAL MET.    INTERVENTIONS:   Patient independently recalled the following strategies: double swallows, alternating solids and liquids,   Also reviewed slow rate, thorough mastication, small and single bites and drinks, adding moisture to dry textures, and upright positioning. Patient reporting good use of these techniques at home and overall great tolerance of regular solids and liquids. Plans to continue to utilize strategies upon discharge. SHORT TERM GOAL #2:  Goal 2: Patient will complete lingual ROM, coordination, and strengthening exercises x25 with good success to improve bolus manipulation and formation to reduce oral stasis. GOAL MET. INTERVENTIONS: Reviewed HEP as listed below. Patient independently explaining how to complete all exercises with accurate technique. HEP:  x3 sets of 10 reps, 2x/day. Lingual Lateralization against manual resistance - Tongue push into L and R cheeks. Hold for 3 seconds. Lingual Circles - Inside oral cavity in both directions  Lingual Protrusion/Retraction - Hold for 5 seconds in each location. Tongue Press - Tongue push into palate. Hold 3-5 seconds. SLP encouraged continued completion as patient desires to facilitate maintenance of skills and possible ongoing progression of lingual strength, ROM, and coordination. SHORT TERM GOAL #3:  Goal 3: Patient will complete tongue base retraction exercises x25 with good success to improve AP transit and reduce base of tongue residue. GOAL NOT MET. INTERVENTIONS: Reviewed pharyngeal swallow exercise (effortful swallow). Patient reports she has forgotten about this specific exercise. SLP educated that MBS results revealed no aspiration or penetration or other significant pharyngeal impairment, and exercise could be discontinued. Encouraged patient to place more focus on lingual HEP. LONG TERM GOALS:  Timeframe for Long-term Goals: 6 weeks  Goal 1: Patient will improve score on EAT-10 to 3 or below to promote safe swallowing practices and reflect improved perceived swallowing functioning. GOAL MET. EAT-10 re-administered this session with total score 2 which is improved from initial score 6 on 10/14/21. Assessment: Goals Met.      The patient has met 2/3 short-term goals and 1/1 long-term goals this treatment period. She is now independent with completion of her recommended lingual ROM, coordination, and strengthening exercises. She is also independently tolerating regular solids and thin liquids with no overt s/s of aspiration when utilizing trained compensatory and behavioral oral intake strategies (e.g, upright position, small and single bites and drinks, alternate solids and liquids, limit distractions, slow rate, additional dry dwallow, add extra moisture to dry textures). A Modified Barium Swallow study (MBSs) was also completed which revealed no laryngeal penetration or tracheal aspiration of any consistencies or textures that could have been negatively impacting her pulmonary functioning. Patient is independent with HEP and has met treatment goals and is now appropriate for discharge from skilled dysphagia therapy services. No further SLP services indicated at this time. Specific Interventions Next Treatment: N/A     Activity/Treatment Tolerance:  [x]  Patient tolerated treatment well  []  Patient limited by fatigue  []  Patient limited by pain   []  Patient limited by other medical complications  []  Other:     Patient Education:   []  HEP/Education Completed: Plan of Care, Goals,   []  No new Education completed  [x]  Reviewed Prior HEP - lingual HEP      [x]  Patient verbalized and/or demonstrated understanding of education provided. []  Patient unable to verbalize and/or demonstrate understanding of education provided. Will continue education. []  Barriers to learning:     PLAN:  []  Plan of care initiated. Plan to see patient 1 time per week for 6 weeks to address the treatment planned outlined above. []  Continue with current plan of care  []  Modify plan of care as follows   []  Hold pending physician visit  [x]  Discharge - Goals Met.      Time In 0900   Time Out 0926   Timed Code Minutes: 0 min   Total Treatment Time: 32 ravi Booth M.S., [address] [place] [address]

## 2021-12-08 ENCOUNTER — HOSPITAL ENCOUNTER (OUTPATIENT)
Dept: PHYSICAL THERAPY | Age: 71
Setting detail: THERAPIES SERIES
Discharge: HOME | End: 2021-12-08
Payer: MEDICARE

## 2021-12-08 PROCEDURE — 97112 NEUROMUSCULAR REEDUCATION: CPT

## 2021-12-08 PROCEDURE — 97110 THERAPEUTIC EXERCISES: CPT

## 2021-12-08 RX ORDER — ATORVASTATIN CALCIUM 20 MG/1
20 TABLET, FILM COATED ORAL DAILY
Qty: 90 TABLET | Refills: 3 | Status: SHIPPED | OUTPATIENT
Start: 2021-12-08

## 2021-12-08 NOTE — PROGRESS NOTES
[address] [facility]  PHYSICAL THERAPY  [] EVALUATION  [x] DAILY NOTE (LAND) [] DAILY NOTE (AQUATIC ) [] PROGRESS NOTE [] DISCHARGE NOTE    [] [facility]   [] Carlo 90    [] [address] [facility]   [x] Nathanael Even    Date: 2021  Patient Name:  Sonam Fikn  : 1950  MRN: [id number]  CSN: [id number]    Referring Practitioner Concepcion Arthur DO   Diagnosis Other intervertebral disc degeneration, lumbar region [M51.36]  Radiculopathy, lumbar region [M54.16]    Treatment Diagnosis Difficulty walking    Date of Evaluation 21    Additional Pertinent History Bi-polar; SOB related to CHF      Functional Outcome Measure Used Tinetti   Functional Outcome Score 15/28 (21)    (21)   (21)   (10/29/21)   (21)      Insurance: Primary: Payor: Saturnino Cortez /  /  / , aquatic therapy is covered; modalities covered except for IONTO  Secondary:    Authorization Information: RECEIVED AUTH FOR PT  TOTAL OF 12 VISITS  FROM 21 TO 21  CPT CODES 69713, 92816, [place] [address]. # [id number]   Visit # 27,  for progress note   Visits Allowed: 12   Recertification Date:    Physician Follow-Up:    Physician Orders:    History of Present Illness: Megan Briggs is referred to PT to address ongoing balance issues. She states that she noticed that she was struggling with her balance when she slipped off a short deck at a local [facility]. She admits that over the last few months she has also had a few falls; denies any major injury as a result of the falls. She started to workout with a local  a month hoping that it would help her balance. SUBJECTIVE: Pt continues to not wear O2 into clinic. Pt stated B knees are bothering her today.      TREATMENT   Precautions:    Pain:2/10 R knee with walking    X in shaded column indicates activity completed today   Modalities Parameters/  Location  Notes                     Manual Therapy Time/Technique  Notes                     Exercise/Intervention   Notes   Mariano; review of goals    x    Standing on airex: tandem and feet together x2 30 sec hold x Occasional hand taps with ft together. 1UE support with tandem   NuStep x5 min  x Level 5, seat 8, arms 9, 95% after Nustep   Forward step ups; side step ups  x10    Bilaterally    Airex: heel to raises, marches, mini squats x15 ea  x With O2    3 way hip, Hs curls x15 ea  x Green band   Tandem stepping fwd/retro, side stepping x2 laps ea  x    rocker board fwd/lat x15 ea  x 15 sec balance with 1UE support   wobbleboard 10x   CW/CCW, manual overpressure provided   Hurdles: forward and lateral  x2 ea.  x Green with 1 orange julio c. Agility mat: forward step in each square; lateral step in each square; march in each square x2   Hand hold assist needed    \"Walked the line\"-tandem walk  x2  x    // bars: forward and lateral walk with emphasis on taking longer, exaggerated steps  x2 ea. NK table: flexion/extension 15x ea. 7.5#   Sit-stands  10x  x Without UE support. Marches, LAQs, resisted Hs curls x10 ea   orange   NK table: flexion/extension x10 ea 5#  bilaterally   BOSU lunges  x10      Seated ball squeezes and hip abd x20   blue band   [employer] walking  125 ft, 150 ft    One sitting RB O2 dropped to 91%   6 min walk test     Without supplemental oxygen; dropped to 90%   Oxygen saturation    Pt stayed on 1L of O2 during the entire session-92% and above     Specific Interventions Next Treatment: NuStep; airex balance exercises; Activity/Treatment Tolerance:  [x]  Patient tolerated treatment well  []  Patient limited by fatigue  []  Patient limited by pain   []  Patient limited by medical complications  []  Other:     Assessment: Perri Muller having increased pain in knees this date. No progressions made during session. Pt was able to keep O2 levels above 90% today.  Increased ankle strategies this date with balance work requiring 1 UE support. Body Structures/Functions/Activity Limitations: impaired activity tolerance, impaired balance, impaired coordination, impaired endurance, impaired safety awareness, impaired strength and pain  Prognosis: fair    GOALS:  Patient Goal: minimize fall risk    Short Term Goals:  Time Frame: 4 weeks  1. Genevieve Lara will demonstrate a good ankle strategy to maintain standing balance with perturbations to her trunk. 2. Genevieve Lara will be able to stand for 20 min at a time without needing a rest break allowing her to prepare meals and perform light chores without limitation. NOT MET-limited to 5 min before she has to sit due to back pain; uses a kitchen stool a lot  3. Alison's Tinetti score will improve to 19/28 indicating minimal fall risk. GOAL MET-improved to 22/28      Long Term Goals:  Time Frame: 8 weeks  1. Discharge with independent HEP ONGOING  2. Alison's Tinetti score will improve to >21/28 indicating no fall risk. GOAL MET; REVISE to improve Tinetti score to >26/28; NOT MET-improved to 26/28  3. Genevieve Lara will be able to maintain her standing balance in all 4 conditions of the m-CTSIB, using an ankle strategy to maintain her balance. NOT MET-able to maintain standing balance in conditions 1-3; LOB after 10 sec in condition 4  4. NEW GOAL: Genevieve Lara will demonstrate good abdominal stabilization with all transfers and dynamic gait activities to provide greater support to low back. IMPROVING   5. NEW GOAL: Genevieve Lara will be able to complete 6 min walking test with rollator while maintaining oxygen levels 92% or above so she can go without supplemental oxygen. Patient Education:   []  HEP/Education Completed: ft placement with gait.  [employer]([facility]) Access Code:  [x]  No new Education completed  []  Reviewed Prior HEP      []  Patient verbalized and/or demonstrated understanding of education provided.   []  Patient unable to verbalize and/or demonstrate understanding of

## 2021-12-09 ENCOUNTER — HOSPITAL ENCOUNTER (OUTPATIENT)
Dept: CT IMAGING | Age: 71
Discharge: HOME | End: 2021-12-09
Payer: MEDICARE

## 2021-12-09 ENCOUNTER — OFFICE VISIT (OUTPATIENT)
Dept: CARDIOLOGY CLINIC | Age: 71
End: 2021-12-09
Payer: MEDICARE

## 2021-12-09 VITALS
WEIGHT: 246.38 LBS | BODY MASS INDEX: 42.06 KG/M2 | OXYGEN SATURATION: 92 % | DIASTOLIC BLOOD PRESSURE: 60 MMHG | SYSTOLIC BLOOD PRESSURE: 136 MMHG | HEART RATE: 69 BPM | HEIGHT: 64 IN

## 2021-12-09 DIAGNOSIS — N18.2 CKD (CHRONIC KIDNEY DISEASE), STAGE II: ICD-10-CM

## 2021-12-09 DIAGNOSIS — J18.9 PNEUMONIA OF BOTH LOWER LOBES DUE TO INFECTIOUS ORGANISM: ICD-10-CM

## 2021-12-09 DIAGNOSIS — I50.32 CHF NYHA CLASS II, CHRONIC, DIASTOLIC (HCC): Primary | ICD-10-CM

## 2021-12-09 DIAGNOSIS — I10 ESSENTIAL HYPERTENSION: ICD-10-CM

## 2021-12-09 PROBLEM — M23.8X1: Status: ACTIVE | Noted: 2021-12-09

## 2021-12-09 PROBLEM — Z96.659 ARTIFICIAL KNEE JOINT PRESENT: Status: ACTIVE | Noted: 2021-12-09

## 2021-12-09 PROBLEM — M54.17 LUMBOSACRAL RADICULOPATHY: Status: ACTIVE | Noted: 2021-12-09

## 2021-12-09 PROBLEM — M41.25 OTHER IDIOPATHIC SCOLIOSIS, THORACOLUMBAR REGION: Status: ACTIVE | Noted: 2021-12-09

## 2021-12-09 PROBLEM — M48.061 SPINAL STENOSIS OF LUMBAR REGION: Status: ACTIVE | Noted: 2021-12-09

## 2021-12-09 PROBLEM — M51.26 DISPLACEMENT OF LUMBAR INTERVERTEBRAL DISC WITHOUT MYELOPATHY: Status: ACTIVE | Noted: 2021-12-09

## 2021-12-09 PROBLEM — M43.10 ACQUIRED SPONDYLOLISTHESIS: Status: ACTIVE | Noted: 2021-12-09

## 2021-12-09 PROCEDURE — G8400 PT W/DXA NO RESULTS DOC: HCPCS | Performed by: NURSE PRACTITIONER

## 2021-12-09 PROCEDURE — 3017F COLORECTAL CA SCREEN DOC REV: CPT | Performed by: NURSE PRACTITIONER

## 2021-12-09 PROCEDURE — G8417 CALC BMI ABV UP PARAM F/U: HCPCS | Performed by: NURSE PRACTITIONER

## 2021-12-09 PROCEDURE — 1036F TOBACCO NON-USER: CPT | Performed by: NURSE PRACTITIONER

## 2021-12-09 PROCEDURE — 99214 OFFICE O/P EST MOD 30 MIN: CPT | Performed by: NURSE PRACTITIONER

## 2021-12-09 PROCEDURE — 1090F PRES/ABSN URINE INCON ASSESS: CPT | Performed by: NURSE PRACTITIONER

## 2021-12-09 PROCEDURE — 4040F PNEUMOC VAC/ADMIN/RCVD: CPT | Performed by: NURSE PRACTITIONER

## 2021-12-09 PROCEDURE — 1123F ACP DISCUSS/DSCN MKR DOCD: CPT | Performed by: NURSE PRACTITIONER

## 2021-12-09 PROCEDURE — G8484 FLU IMMUNIZE NO ADMIN: HCPCS | Performed by: NURSE PRACTITIONER

## 2021-12-09 PROCEDURE — G8427 DOCREV CUR MEDS BY ELIG CLIN: HCPCS | Performed by: NURSE PRACTITIONER

## 2021-12-09 PROCEDURE — 71250 CT THORAX DX C-: CPT

## 2021-12-09 RX ORDER — HYDRALAZINE HYDROCHLORIDE 50 MG/1
50 TABLET, FILM COATED ORAL 3 TIMES DAILY
Qty: 90 TABLET | Refills: 11 | Status: SHIPPED | OUTPATIENT
Start: 2021-12-09

## 2021-12-09 RX ORDER — LEVOTHYROXINE SODIUM 125 MCG
125 TABLET ORAL DAILY
Qty: 30 TABLET | Refills: 11 | Status: SHIPPED | OUTPATIENT
Start: 2021-12-09

## 2021-12-09 ASSESSMENT — ENCOUNTER SYMPTOMS
ABDOMINAL DISTENTION: 0
COUGH: 0
SHORTNESS OF BREATH: 0

## 2021-12-09 NOTE — PATIENT INSTRUCTIONS
You may receive a survey regarding the care you received during your visit. Your input is valuable to us. We encourage you to complete and return your survey. We hope you will choose us in the future for your healthcare needs.     Continue:  · Continue current medications  · Daily weights and record  · Fluid restriction of 2 Liters per day  · Limit sodium in diet to around 0433-3967 mg/day  · Monitor BP  · Activity as tolerated     Call the Heart Failure Clinic for any of the following symptoms: [phone]   Weight gain of 2-3 pounds in 1 day or 5 pounds in 1 week   Increased shortness of breath   Shortness of breath while laying down   Cough   Chest pain   Swelling in feet, ankles or legs   Tenderness or bloating in the abdomen   Fatigue    Decreased appetite or nausea    Confusion   

## 2021-12-09 NOTE — PROGRESS NOTES
Heart Failure Clinic       Visit Date: 12/9/2021  Cardiologist:  Dr. Dhiraj Gonzalez  Primary Care Physician: Dr. Jaison Bragg,     Eda Kiser is a 70 y.o. female who presents today for:  Chief Complaint   Patient presents with    Check-Up    Congestive Heart Failure       HPI:   Eda Kiser is a 70 y.o. female who presents to the office for a follow up patient visit in the heart failure clinic. Accompanied by , Errol     TYPE HF: HFpEF (EF 60%, grd 1)  Device: No  HX: HTN, obesity, thoracic AA, OA-knee surgery, RANDY, Bipolar  Never had PFTs     Dry Wt = 220#     Hospitalization r/t Heart Failure:  Sept 2019 = ESCOTO, Edema, HTN.  Lasix IV.  BNP normal.  +CXR. Discharge weight - 222#  Cath 10/18/19 - Nonobstructive CAD, PWCP 30 - received Lasix 80 IV in lab, changed to Bumex TID  CC 1/15/20 - Saw Dr Beau Moore.  No changes.  MEMs only if readmitted  Slow wt gain seemingly r/t Zyprexa use = lowest 220# (April), highest 244#  Jan 2020 - admitted for suicidal ideations = dry - held Bumex, gave fluids     Today: feeling good overall - mobility is much better. Walked from entrance (walker). Still doing PT and going to the \"Y\". Very rarely needing extra Bumex  Wt down 4# - no swelling noted.    Diet: watching closely    Patient has:  Chest Pain: no  SOB: some mild   Orthopnea/PND: no  RANDY: compliant w/ CPAP  Edema: no  Fatigue: mild  Abdominal bloating:  On/off   Cough: no  Appetite: good   Home weight: stable  Home blood pressure: stable - some 150-160s (rare) - overall improved though    Past Medical History:   Diagnosis Date    Acid reflux     Arthritis     Asthma     [facility] [address]     CHF [facility]congestive [facility] failure) [facility] [address] [facility]     CKD (chronic kidney disease), stage II 11/22/2019    History of blood transfusion 2004    Hypertension     Lumbosacral radiculopathy 12/9/2021    Mood disorder ([address]     Pneumonia 09/13/2021    Sleep apnea     Thyroid disease      Past Surgical History:   Procedure Laterality Date    ANKLE SURGERY      left    CATARACT REMOVAL      Both eyes      SECTION      x 3    FOOT SURGERY      Left     HIP SURGERY      HYSTERECTOMY      Total     TOTAL KNEE ARTHROPLASTY Left 10/14/14    TOTAL KNEE ARTHROPLASTY Right 2019     Family History   Problem Relation Age of Onset    Diabetes Mother     High Blood Pressure Father     Cancer Father     Other Brother     Diabetes Brother     Cancer Sister     Breast Cancer Sister 39    Cancer Brother      Social History     Tobacco Use    Smoking status: Never Smoker    Smokeless tobacco: Never Used   Substance Use Topics    Alcohol use: No     Alcohol/week: 0.0 standard drinks     Comment: rarely     Current Outpatient Medications   Medication Sig Dispense Refill    SYNTHROID 125 MCG tablet Take 1 tablet by mouth daily Take with water on an empty stomach- wait 30 minutes before eating or taking other meds. 30 tablet 11    hydrALAZINE (APRESOLINE) 50 MG tablet Take 1 tablet by mouth 3 times daily 90 tablet 11    atorvastatin (LIPITOR) 20 MG tablet Take 1 tablet by mouth daily 90 tablet 3    cetirizine (ZYRTEC) 10 MG tablet Take 10 mg by mouth daily      vitamin E 200 units capsule Take 200 Units by mouth daily      METAMUCIL FIBER PO Take by mouth      NONFORMULARY daily colase 100 mg 1 am and 2 in pm      carvedilol (COREG) 25 MG tablet Take 1 tablet by mouth 2 times daily 180 tablet 3    omeprazole (PRILOSEC) 20 MG delayed release capsule Take 1 capsule by mouth every morning (before breakfast) 90 capsule 3    spironolactone (ALDACTONE) 25 MG tablet Take 1 tablet by mouth daily 30 tablet 5    bumetanide (BUMEX) 1 MG tablet 1-2 tabs daily as needed.  180 tablet 3    ARIPiprazole (ABILIFY) 5 MG tablet Take 2 tablets by mouth daily 30 tablet 3    OLANZapine (ZYPREXA) 15 MG tablet Take 15 mg by mouth daily      aspirin 81 MG EC tablet Take 81 mg by mouth daily      acetaminophen (TYLENOL) 325 MG tablet Take 650 mg by mouth 4 times daily       ferrous sulfate 325 (65 Fe) MG tablet Take 325 mg by mouth daily (with breakfast)      clonazePAM (KLONOPIN) 1 MG tablet Take 1 mg by mouth 3 times daily as needed.  venlafaxine (EFFEXOR XR) 150 MG extended release capsule Take 225 mg by mouth daily       gabapentin (NEURONTIN) 300 MG capsule Take 300 mg by mouth 3 times daily.  tiZANidine (ZANAFLEX) 2 MG tablet Take 2 mg by mouth 2 times daily      Mirabegron ER (MYRBETRIQ) 50 MG TB24 Take 50 mg by mouth daily      lamoTRIgine (LAMICTAL) 100 MG tablet Take 100 mg by mouth daily Patient taking 1 tablet TID      Ascorbic Acid (VITAMIN C) 500 MG tablet Take 500 mg by mouth daily.  Multiple Vitamin (MULTIVITAMIN PO) Take 1 tablet by mouth daily. No current facility-administered medications for this visit. Allergies   Allergen Reactions    Ace Inhibitors Anaphylaxis    Prednisone Other (See Comments)     Other reaction(s): Manic Behavior  **oral**      Codeine Hives and Nausea And Vomiting    Penicillins Hives    Lotrel [Amlodipine Besy-Benazepril Hcl] Swelling    Typhoid Vaccines     Adhesive Tape      Other reaction(s): Rash    Onion Nausea And Vomiting    Typhoid Vaccine, Live      Other reaction(s): unknown    Wound Dressing Adhesive Rash     tape       SUBJECTIVE:   Review of Systems   Constitutional: Negative for activity change, appetite change and fatigue. Respiratory: Negative for cough and shortness of breath. Cardiovascular: Negative for chest pain, palpitations and leg swelling. Gastrointestinal: Negative for abdominal distention. Neurological: Negative for weakness, light-headedness and headaches. Hematological: Negative for adenopathy. Psychiatric/Behavioral: Negative for sleep disturbance.      OBJECTIVE:   Today's Vitals:  /60   Pulse 69   Ht 5' 4\" (1.626 m)   Wt 246 lb 6 oz (111.8 kg)   SpO2 92%   BMI 42.29 kg/m² Physical Exam  Vitals reviewed. Constitutional:       General: She is not in acute distress. Appearance: Normal appearance. She is well-developed. She is not diaphoretic. HENT:      Head: Normocephalic and atraumatic. Eyes:      Conjunctiva/sclera: Conjunctivae normal.   Neck:      Comments: No JVD  Cardiovascular:      Rate and Rhythm: Normal rate and regular rhythm. Heart sounds: Normal heart sounds. No murmur heard. Pulmonary:      Effort: Pulmonary effort is normal. No respiratory distress. Breath sounds: Normal breath sounds. No wheezing or rales. Abdominal:      General: Bowel sounds are normal. There is no distension. Palpations: Abdomen is soft. Tenderness: There is no abdominal tenderness. Musculoskeletal:         General: Normal range of motion. Cervical back: Normal range of motion and neck supple. Right lower leg: No edema. Left lower leg: No edema. Skin:     General: Skin is warm and dry. Capillary Refill: Capillary refill takes less than 2 seconds. Neurological:      Mental Status: She is alert and oriented to person, place, and time. Coordination: Coordination normal.   Psychiatric:         Behavior: Behavior normal.           Wt Readings from Last 3 Encounters:   12/09/21 246 lb 6 oz (111.8 kg)   11/04/21 246 lb (111.6 kg)   09/15/21 250 lb (113.4 kg)     BP Readings from Last 3 Encounters:   12/09/21 136/60   11/04/21 137/79   09/15/21 137/71     Pulse Readings from Last 3 Encounters:   12/09/21 69   11/04/21 98   09/15/21 76     Body mass index is 42.29 kg/m². ECHO:    Conclusions      Summary   Technically difficult examination. Normal left ventricle size and systolic function. Ejection fraction was   estimated at 60-65%. There were no regional left ventricular wall motion   abnormalities and wall thickness was within normal limits.    Doppler parameters were consistent with abnormal left ventricular   relaxation (grade 1 diastolic dysfunction). Mild tricuspid regurgitation. Dilation of ascending aorta, diameter 4.1 cm. Signature      ----------------------------------------------------------------   Electronically signed by Yon Aguirre MD (Interpreting   physician) on 11/10/2021 at 05:32 PM   ----------------------------------------------------------------        Summary   Normal left ventricle size and systolic function. Ejection fraction was   estimated at 55 to 60 %. There were no regional left ventricular wall   motion abnormalities and wall thickness was within normal limits.   Doppler parameters were consistent with abnormal left ventricular   relaxation (grade 1 diastolic dysfunction).   The left atrium is Moderately dilated.   Aortic aneurysm noted in the ascending aorta .   Aortic aneurysm measures 4.1 cm .      Signature   ----------------------------------------------------------------   Electronically signed by Loraine Anderson MD (Interpreting   physician) on 09/13/2019 at 04:51 PM   ----------------------------------------------------------------        CATH (10/18)  FINDINGS:  HEMODYNAMICS:  1.  Left ventricular end-diastolic pressure was found to be elevated at  30 mmHg. 2.  Cardiac output 8.9 or 8.91 liters per minute. 3.  Cardiac index 4.3 liters per minute per square meter. 4.  Right atrial pressure elevated at 25 mmHg. 5.  RV pressure was elevated at 59/14 mmHg. 6.  Pulmonary arterial pressure was elevated at 57/34 mmHg with a mean  pulmonary arterial pressure of 45 mmHg. 7.  Pulmonary capillary wedge pressure was 30 mmHg.     CORONARY ANGIOGRAM:  1.  RCA:  RCA has moderate tortuosity in the proximal segment.  Proximal  RCA is patent without significant obstruction.  Mid RCA is patent  without significant obstruction.  Distal RCA is patent.  RPDA has some  mild diffuse disease.  RPL has distal mild diffuse disease. 2.  Left main:  Left main is patent without significant obstruction.    Left main bifurcates into LCX and LAD. 3.  LCX:  Moderate tortuosity noted in the LCX.  No significant  obstruction in the proximal segment.  Distal LCX with mild diffuse  disease.  OM1 is a larger tortuous branching vessel with mild diffuse  disease. 4.  LAD:  Proximal LAD is patent without significant obstruction.  Mid  LAD is moderately tortuous.  No significant obstruction.  Distal LAD has  moderate tortuosity with mild diffuse disease. 5.  D1:  Large tortuous vessel without significant obstruction. Dominance, right.     MEDICATIONS:  See MAR.     ACCESS:  1.  Right brachial vein for right heart cath. 2.  Right radial artery for left heart cath.     COMPLICATIONS:  None.     CONCLUSION:  1.  Nonobstructive coronary artery disease. 2.  Congestive heart failure with preserved ejection fraction. 3.  Volume overload. 4.  Elevated right and left filling pressures. 5.  Moderate pulmonary venous hypertension.     RECOMMENDATIONS:  1.  Aggressive risk factor modification for nonobstructive coronary  disease. 2.  Continue aspirin 81 mg.  3.  Lipitor 20 mg p.o. daily. 4.  Fluid restriction to less than 1.5 to 2 liters per day. 5.  Sodium restriction to less than 1.5 gm per day. 6.  Daily weight monitoring. 7.  The patient was given Lasix 80 mg IV x1 in the cath lab. 8.  Stop p.o. Lasix.  Currently, the patient is on Lasix 80 mg p.o.  b.i.d.  9.  Start Bumex 1 mg p.o. t.i.d. 10.  Check BMP and magnesium level next week. 11.  Keep potassium above 4 and magnesium above 2. 12.  The patient needs better blood pressure control.  Her hydralazine  was increased to 100 mg p.o. t.i.d.  13.  Continue to monitor home blood pressure readings. 15.  The patient has history of obstructive sleep apnea.  Continue CPAP  during sleep.  She had an appointment with her sleep medicine specialist  within one to two weeks. 15.  Lifestyle modifications including weight loss advisable.   16.  Refer to cardiac rehab once volume status is 25/day  Vasodilator - Hydralazine 50 TID  Continue Current medications  Stable, appears Euvolemic  Lab reviewed - stable  Repeat blood work in June w/ Hemdeandre  BP/HR stable   No med changes today   Continue diet/fluid adherence  Continue daily wts. F/U w/ Cardiology  F/U in clinic in 6 months    Tolerating above noted HF meds, no ill side effects noted. Will continue to monitor kidney function and electrolytes. Will optimize as tolerated. Pt is compliant w/ medications. Total visit time of 30 minutes has been spent with patient on education of symptoms, management, medication, and plan of care; as well as review of chart: labs, ECHO, radiology reports, etc.   I personally spent more then 50% of the appt time face to face with the patient. · Daily weights  · Fluid restriction of 2 Liters per day  · Limit sodium in diet to around 3747-5984 mg/day  · Monitor BP  · Activity as tolerated     Patient was instructed to call the [facility] for any changes in symptoms as noted in AVS.      No follow-ups on file. or sooner if needed     Patient given educational materials - see patient instructions. We discussed the importance of weighing oneself and recording daily. We also discussed the importance of a low sodium diet, higher sodium foods to avoid and better low sodium food options. Patient verbalizes understanding of plan of care using teach back method, and is agreeable to the treatment plan.        Electronically signed by DAVID Delgado CNP on 12/9/2021 at 11:16 AM

## 2021-12-09 NOTE — TELEPHONE ENCOUNTER
Fax received from [address] [place] requesting a refill on the patients Synthroid 125mcg. Order pended for your signature.

## 2021-12-10 ENCOUNTER — HOSPITAL ENCOUNTER (OUTPATIENT)
Dept: PHYSICAL THERAPY | Age: 71
Setting detail: THERAPIES SERIES
Discharge: HOME | End: 2021-12-10
Payer: MEDICARE

## 2021-12-10 PROCEDURE — 97110 THERAPEUTIC EXERCISES: CPT

## 2021-12-10 NOTE — PROGRESS NOTES
[address] [facility]  PHYSICAL THERAPY  [] EVALUATION  [x] DAILY NOTE (LAND) [] DAILY NOTE (AQUATIC ) [] PROGRESS NOTE [] DISCHARGE NOTE    [] 615 [facility]   [] Carlo 90    [] 645 [facility]   [x] Brady Fernandez    Date: 12/10/2021  Patient Name:  Arci Bustamante  : 1950  MRN: [id number]  CSN: [id number]    Referring Practitioner Zackery Lugo DO   Diagnosis Other intervertebral disc degeneration, lumbar region [M51.36]  Radiculopathy, lumbar region [M54.16]    Treatment Diagnosis Difficulty walking    Date of Evaluation 21    Additional Pertinent History Bi-polar; SOB related to CHF      Functional Outcome Measure Used Tinetti   Functional Outcome Score 15/28 (21)    (21)   (21)   (10/29/21)   (21)      Insurance: Primary: Payor: Lorena Posadas /  /  / , aquatic therapy is covered; modalities covered except for IONTO  Secondary:    Authorization Information: RECEIVED AUTHORIZATION FOR 12 VISITS FROM 21-21     CPT CODES: 61205, , [address]     REF# [id number]      Visit # 28,  for progress note   Visits Allowed: 12   Recertification Date:    Physician Follow-Up:    Physician Orders:    History of Present Illness: Genevieve Lara is referred to PT to address ongoing balance issues. She states that she noticed that she was struggling with her balance when she slipped off a short deck at a local [employer]". She admits that over the last few months she has also had a few falls; denies any major injury as a result of the falls. She started to workout with a local  a month hoping that it would help her balance. SUBJECTIVE: Pt stated she has had a busy morning. Pt stated she forgot her pulse ox today.      TREATMENT   Precautions:    Pain:2/10 R knee with walking    X in shaded column indicates activity completed today   Modalities Parameters/  Location  Notes                     Manual Therapy Time/Technique  Notes                     Exercise/Intervention   Notes   Mariano; review of goals        Standing on airex: feet together x2 30 sec hold x Occasional hand taps with ft together. 1UE support with tandem   Step stance 20sec 2x x    NuStep x6 min  x Level 5, seat 8, arms 9, 90% after Nustep   Forward step ups; side step ups  x10    Bilaterally    Airex: heel to raises, marches, mini squats x15 ea  x    3 way hip, Hs curls x15 ea  x Green band   Tandem stepping fwd/retro, side stepping x2 laps ea  x    rocker board fwd/lat x15 ea  x 20 sec balance with 1UE support   wobbleboard 10x   CW/CCW, manual overpressure provided   Hurdles: forward and lateral  x2 ea.  x Green with 1 orange julio c. Agility mat: forward step in each square; lateral step in each square; march in each square x2   Hand hold assist needed    \"Walked the line\"-tandem walk  x2  x    // bars: forward and lateral walk with emphasis on taking longer, exaggerated steps  x2 ea. NK table: flexion/extension 15x ea. 7.5#   Sit-stands  10x  x Without UE support. Marches, LAQs, resisted Hs curls x10 ea   orange   NK table: flexion/extension x10 ea 5#  bilaterally   BOSU lunges  x10      Seated ball squeezes and hip abd x20   blue band   [employer] walking  125 ft, 150 ft    One sitting RB O2 dropped to 91%   6 min walk test     Without supplemental oxygen; dropped to 90%   5min walk with no O2 used AD 5min. x 86%O2sats   Oxygen saturation    Pt stayed on 1L of O2 during the entire session-92% and above     Specific Interventions Next Treatment: NuStep; airex balance exercises; Activity/Treatment Tolerance:  [x]  Patient tolerated treatment well  []  Patient limited by fatigue  []  Patient limited by pain   []  Patient limited by medical complications  []  Other:     Assessment: Pt continues to work on balance reactions which she continues to roll R foot laterally. Attempted 6min walking with no O2. Pt's O2 sats dropped and patient was visibly fatigued at end of 5min so stopped. Body Structures/Functions/Activity Limitations: impaired activity tolerance, impaired balance, impaired coordination, impaired endurance, impaired safety awareness, impaired strength and pain  Prognosis: fair    GOALS:  Patient Goal: minimize fall risk    Short Term Goals:  Time Frame: 4 weeks  1. Josselyn Huber will demonstrate a good ankle strategy to maintain standing balance with perturbations to her trunk. 2. Josselyn Huber will be able to stand for 20 min at a time without needing a rest break allowing her to prepare meals and perform light chores without limitation. NOT MET-limited to 5 min before she has to sit due to back pain; uses a kitchen stool a lot  3. Alison's Tinetti score will improve to 19/28 indicating minimal fall risk. GOAL MET-improved to 22/28      Long Term Goals:  Time Frame: 8 weeks  1. Discharge with independent HEP ONGOING  2. Alison's Tinetti score will improve to >21/28 indicating no fall risk. GOAL MET; REVISE to improve Tinetti score to >26/28; NOT MET-improved to 26/28  3. Josselyn Huber will be able to maintain her standing balance in all 4 conditions of the m-CTSIB, using an ankle strategy to maintain her balance. NOT MET-able to maintain standing balance in conditions 1-3; LOB after 10 sec in condition 4  4. NEW GOAL: Josselyn Huber will demonstrate good abdominal stabilization with all transfers and dynamic gait activities to provide greater support to low back. IMPROVING   5. NEW GOAL: Josselyn Huber will be able to complete 6 min walking test with rollator while maintaining oxygen levels 92% or above so she can go without supplemental oxygen. Patient Education:   []  HEP/Education Completed: ft placement with gait.  [employer] Access Code:  [x]  No new Education completed  []  Reviewed Prior HEP      []  Patient verbalized and/or demonstrated understanding of education provided.   []  Patient unable to verbalize and/or demonstrate understanding of education provided. Will continue education. [x]  Barriers to learning: n/a    PLAN:  Treatment Recommendations: Strengthening, Range of Motion, Balance Training, Endurance Training, Gait Training, Stair Training, Neuromuscular Re-education, Manual Therapy - Soft Tissue Mobilization, Manual Therapy - Joint Manipulation, Pain Management, Home Exercise Program, Patient Education and Modalities    []  Plan of care initiated. Plan to see patient 2 times per week for 8 weeks to address the treatment planned outlined above.   []  Continue with current plan of care  [x]  Modify plan of care as follows: 2 time per week   []  Hold pending physician visit  []  Discharge    Time In 1430   Time Out 1515   Timed Code Minutes: 45 min   Total Treatment Time: 45 min       Electronically Signed by: Adrian Rosas PTA

## 2021-12-13 ENCOUNTER — PATIENT MESSAGE (OUTPATIENT)
Dept: FAMILY MEDICINE CLINIC | Age: 71
End: 2021-12-13

## 2021-12-13 DIAGNOSIS — Z91.89 DRIVING SAFETY ISSUE: Primary | ICD-10-CM

## 2021-12-13 DIAGNOSIS — R26.81 UNSTABLE GAIT: ICD-10-CM

## 2021-12-13 DIAGNOSIS — M54.16 LUMBAR RADICULOPATHY, CHRONIC: ICD-10-CM

## 2021-12-13 DIAGNOSIS — R41.842 VISUAL-SPATIAL DISORIENTATION: ICD-10-CM

## 2021-12-13 DIAGNOSIS — I50.32 CHRONIC DIASTOLIC HEART FAILURE (HCC): ICD-10-CM

## 2021-12-13 NOTE — TELEPHONE ENCOUNTER
[facility] calling stating the DX on the driving simulator referral for OT will not be covered.   Please change Dx and they will check Epic later

## 2021-12-13 NOTE — TELEPHONE ENCOUNTER
From: Wolfgang Ocampo  To: Dr. Roxy Kingston  Sent: 12/13/2021 9:14 AM EST  Subject: Driving simulator    Dr. Roberth Montgomery, my psychiatrist, has requested that I take a driving simulator test at [facility] AT [facility]. My spatial ability has declined through a test that was done by a Dr. Jerrel Epley in [facility]. For some reason neither one of them can order the driving simulator test at [facility].  Would you be able to order that test? Would you like to see the results of their test? Thank you

## 2021-12-15 ENCOUNTER — APPOINTMENT (OUTPATIENT)
Dept: PHYSICAL THERAPY | Age: 71
End: 2021-12-15
Payer: MEDICARE

## 2021-12-16 ENCOUNTER — OFFICE VISIT (OUTPATIENT)
Dept: PULMONOLOGY | Age: 71
End: 2021-12-16
Payer: MEDICARE

## 2021-12-16 VITALS
WEIGHT: 248 LBS | HEART RATE: 75 BPM | DIASTOLIC BLOOD PRESSURE: 78 MMHG | HEIGHT: 64 IN | OXYGEN SATURATION: 95 % | BODY MASS INDEX: 42.34 KG/M2 | TEMPERATURE: 97.8 F | SYSTOLIC BLOOD PRESSURE: 140 MMHG

## 2021-12-16 DIAGNOSIS — R06.02 SOB (SHORTNESS OF BREATH): Primary | ICD-10-CM

## 2021-12-16 DIAGNOSIS — J98.4 RESTRICTIVE LUNG DISEASE: ICD-10-CM

## 2021-12-16 DIAGNOSIS — J98.11 ATELECTASIS: ICD-10-CM

## 2021-12-16 DIAGNOSIS — N18.2 CKD (CHRONIC KIDNEY DISEASE), STAGE II: ICD-10-CM

## 2021-12-16 DIAGNOSIS — I51.89 GRADE I DIASTOLIC DYSFUNCTION: ICD-10-CM

## 2021-12-16 DIAGNOSIS — R91.8 MULTIPLE LUNG NODULES ON CT: ICD-10-CM

## 2021-12-16 DIAGNOSIS — R13.10 DYSPHAGIA, UNSPECIFIED TYPE: ICD-10-CM

## 2021-12-16 PROCEDURE — 94618 PULMONARY STRESS TESTING: CPT

## 2021-12-16 PROCEDURE — G8484 FLU IMMUNIZE NO ADMIN: HCPCS

## 2021-12-16 PROCEDURE — G8427 DOCREV CUR MEDS BY ELIG CLIN: HCPCS

## 2021-12-16 PROCEDURE — G8417 CALC BMI ABV UP PARAM F/U: HCPCS

## 2021-12-16 PROCEDURE — 3017F COLORECTAL CA SCREEN DOC REV: CPT

## 2021-12-16 PROCEDURE — 1090F PRES/ABSN URINE INCON ASSESS: CPT

## 2021-12-16 PROCEDURE — 4040F PNEUMOC VAC/ADMIN/RCVD: CPT

## 2021-12-16 PROCEDURE — 99215 OFFICE O/P EST HI 40 MIN: CPT

## 2021-12-16 PROCEDURE — G8400 PT W/DXA NO RESULTS DOC: HCPCS

## 2021-12-16 PROCEDURE — 1036F TOBACCO NON-USER: CPT

## 2021-12-16 PROCEDURE — 1123F ACP DISCUSS/DSCN MKR DOCD: CPT

## 2021-12-16 ASSESSMENT — ENCOUNTER SYMPTOMS
DIARRHEA: 0
RHINORRHEA: 0
CONSTIPATION: 1
SINUS PRESSURE: 0
SINUS PAIN: 0
EYE ITCHING: 0
SORE THROAT: 0
WHEEZING: 0
COUGH: 0
SHORTNESS OF BREATH: 1
CHEST TIGHTNESS: 0

## 2021-12-16 NOTE — PATIENT INSTRUCTIONS
We will obtain a lung test (MIP & MEP) prior to your next appointment. Based on testing in the office today, you are still requiring 1 lpm of supplemental oxygen when you are up and walking. Continue to follow your cardiologist. Make sure to weigh yourself daily. If you gain 3 lbs in 1 day or 5 lbs in 1 week, call your cardiologist.     Continue to follow with your nephrologist, Dr. Nelly Riddle. I will see you about in about 2 months.

## 2021-12-17 ENCOUNTER — HOSPITAL ENCOUNTER (OUTPATIENT)
Dept: PHYSICAL THERAPY | Age: 71
Setting detail: THERAPIES SERIES
Discharge: HOME | End: 2021-12-17
Payer: MEDICARE

## 2021-12-17 PROCEDURE — 97530 THERAPEUTIC ACTIVITIES: CPT

## 2021-12-17 PROCEDURE — 97110 THERAPEUTIC EXERCISES: CPT

## 2021-12-17 PROCEDURE — 97112 NEUROMUSCULAR REEDUCATION: CPT

## 2021-12-20 ENCOUNTER — HOSPITAL ENCOUNTER (OUTPATIENT)
Dept: PHYSICAL THERAPY | Age: 71
Setting detail: THERAPIES SERIES
Discharge: HOME | End: 2021-12-20
Payer: MEDICARE

## 2021-12-20 PROCEDURE — 97110 THERAPEUTIC EXERCISES: CPT

## 2021-12-20 NOTE — PROGRESS NOTES
[address] [facility]  PHYSICAL THERAPY  [] EVALUATION  [x] DAILY NOTE (LAND) [] DAILY NOTE (AQUATIC ) [] PROGRESS NOTE [] DISCHARGE NOTE    [] 615 [facility]   [] Carlo 90    [] 645 [facility]   [x] Arch Nearing    Date: 2021  Patient Name:  Mignon Bowers  : 1950  MRN: [id number]  CSN: [id number]    Referring Practitioner Stanford Lehman DO   Diagnosis Other intervertebral disc degeneration, lumbar region [M51.36]  Radiculopathy, lumbar region [M54.16]    Treatment Diagnosis Difficulty walking    Date of Evaluation 21    Additional Pertinent History Bi-polar; SOB related to CHF      Functional Outcome Measure Used Tinetti   Functional Outcome Score 15/28 (21)    (21)   (21)   (10/29/21)   (21)      Insurance: Primary: Payor: Reginald Ivan /  /  / , aquatic therapy is covered; modalities covered except for IONTO  Secondary:    Authorization Information: RECEIVED AUTHORIZATION FOR 12 VISITS FROM 21-21     CPT CODES: 44816, , [address]     REF# [id number]      Visit # 27,  for progress note   Visits Allowed: 12   Recertification Date:    Physician Follow-Up:    Physician Orders:    History of Present Illness: Layo Leal is referred to PT to address ongoing balance issues. She states that she noticed that she was struggling with her balance when she slipped off a short deck at a local [employer]. She admits that over the last few months she has also had a few falls; denies any major injury as a result of the falls. She started to workout with a local  a month hoping that it would help her balance. SUBJECTIVE: Pt stated she has a cold that began over the weekend.      TREATMENT   Precautions:    Pain:3-4/10 Low back    X in shaded column indicates activity completed today   Modalities Parameters/  Location  Notes Manual Therapy Time/Technique  Notes                     Exercise/Intervention   Notes   Mariano; review of goals        Standing on airex: feet together x2 20 sec hold  1 UE support, previous with no UE support   Step stance 20sec 2x     NuStep x6 min   Level 5, seat 8, arms 9, 90% after Nustep   Forward step ups; side step ups  x10    Bilaterally    Airex: heel to raises, marches, x15 ea  x    2way hip, Hs curls x15 ea  x Hip flexion and abduction only today due to increased back pain,Green band   Tandem stepping fwd/retro, side stepping x2 laps ea  x    rocker board fwd/lat x15 ea  x 20 sec balance with 1UE support   wobbleboard 10x   CW/CCW, manual overpressure provided   Hurdles: forward and lateral  x2 ea. Green with 1 orange julio c. Agility mat: forward step in each square; lateral step in each square; march in each square x2   Hand hold assist needed    \"Walked the line\"-tandem walk  x2  x    // bars: forward and lateral walk with emphasis on taking longer, exaggerated steps  x2 ea. NK table: flexion/extension 15x ea. 7.5#   Sit-stands  10x  x Without UE support. Marches, LAQs, resisted Hs curls x15 ea  x blue   NK table: flexion/extension x10 ea 5#  bilaterally   BOSU lunges  x10      Seated ball squeezes and hip abd x15  x blue band   [employer] walking  ~350 ft.    x With supplemental oxygen 1L dropped to 87% requiring sitting RB. Rolator used    6 min walk test     Without supplemental oxygen; dropped to 90%   5min walk with no O2 used AD 5min. 86%O2sats   Oxygen saturation    Pt stayed on 1L of O2 during the entire session-92% and above     Specific Interventions Next Treatment: NuStep; airex balance exercises;      Activity/Treatment Tolerance:  [x]  Patient tolerated treatment well  []  Patient limited by fatigue  []  Patient limited by pain   []  Patient limited by medical complications  []  Other:     Assessment: Pt having difficulty keeping O2 sats above 87% today with no O2 on so pt used 1L of O2 the entire session keeping it at around 94%. Pt fighting a cold which hindered progressions. Continued to work on endurance as best she could safely. Pt cued on technique with exercises to maintain proper posture to decrease stress on back. Body Structures/Functions/Activity Limitations: impaired activity tolerance, impaired balance, impaired coordination, impaired endurance, impaired safety awareness, impaired strength and pain  Prognosis: fair    GOALS:  Patient Goal: minimize fall risk    Short Term Goals:  Time Frame: 4 weeks  1. Anderson Montesinos will demonstrate a good ankle strategy to maintain standing balance with perturbations to her trunk. 2. Anderson Montesinos will be able to stand for 20 min at a time without needing a rest break allowing her to prepare meals and perform light chores without limitation. NOT MET-limited to 5 min before she has to sit due to back pain; uses a kitchen stool a lot  3. Alison's Tinetti score will improve to 19/28 indicating minimal fall risk. GOAL MET-improved to 22/28      Long Term Goals:  Time Frame: 8 weeks  1. Discharge with independent HEP ONGOING  2. Alison's Tinetti score will improve to >21/28 indicating no fall risk. GOAL MET; REVISE to improve Tinetti score to >26/28; NOT MET-improved to 26/28  3. Anderson Montesinos will be able to maintain her standing balance in all 4 conditions of the m-CTSIB, using an ankle strategy to maintain her balance. NOT MET-able to maintain standing balance in conditions 1-3; LOB after 10 sec in condition 4  4. NEW GOAL: Anderson Montesinos will demonstrate good abdominal stabilization with all transfers and dynamic gait activities to provide greater support to low back. IMPROVING   5. NEW GOAL: Anderson Montesinos will be able to complete 6 min walking test with rollator while maintaining oxygen levels 92% or above so she can go without supplemental oxygen. Patient Education:   [x]  HEP/Education Completed: ft placement with gait. , tighten abdominals with standing and walking,  [facility] Access Code:  []  No new Education completed  []  Reviewed Prior HEP      [x]  Patient verbalized and/or demonstrated understanding of education provided. []  Patient unable to verbalize and/or demonstrate understanding of education provided. Will continue education. [x]  Barriers to learning: n/a    PLAN:  Treatment Recommendations: Strengthening, Range of Motion, Balance Training, Endurance Training, Gait Training, Stair Training, Neuromuscular Re-education, Manual Therapy - Soft Tissue Mobilization, Manual Therapy - Joint Manipulation, Pain Management, Home Exercise Program, Patient Education and Modalities    []  Plan of care initiated. Plan to see patient 2 times per week for 8 weeks to address the treatment planned outlined above.   []  Continue with current plan of care  [x]  Modify plan of care as follows: 2 time per week   []  Hold pending physician visit  []  Discharge    Time In 1125   Time Out 1205   Timed Code Minutes: 40 min   Total Treatment Time: 40 min       Electronically Signed by: Kerri Manriquez PTA

## 2021-12-22 ENCOUNTER — HOSPITAL ENCOUNTER (OUTPATIENT)
Dept: PHYSICAL THERAPY | Age: 71
Setting detail: THERAPIES SERIES
Discharge: HOME | End: 2021-12-22
Payer: MEDICARE

## 2021-12-22 PROCEDURE — 97110 THERAPEUTIC EXERCISES: CPT

## 2021-12-22 NOTE — PROGRESS NOTES
[place]  PHYSICAL THERAPY  [] EVALUATION  [x] DAILY NOTE (LAND) [] DAILY NOTE (AQUATIC ) [] PROGRESS NOTE [] DISCHARGE NOTE    [] 615 [facility]   [] Carlo 90    [] 645 [facility]   [x] Tashi Roa    Date: 2021  Patient Name:  Abran Leon  : 1950  MRN: [id number]  CSN: [id number]    Referring Practitioner Sera Gill DO   Diagnosis Other intervertebral disc degeneration, lumbar region [M51.36]  Radiculopathy, lumbar region [M54.16]    Treatment Diagnosis Difficulty walking    Date of Evaluation 21    Additional Pertinent History Bi-polar; SOB related to CHF      Functional Outcome Measure Used Tinetti   Functional Outcome Score 15/28 (21)    (21)   (21)   (10/29/21)   (21)      Insurance: Primary: Payor: Ramona Stein /  /  / , aquatic therapy is covered; modalities covered except for IONTO  Secondary:    Authorization Information: RECEIVED AUTHORIZATION FOR 12 VISITS FROM 21-21     CPT CODES: 25415, [address]     REF# [id number]      Visit # 31,  for progress note   Visits Allowed: 12   Recertification Date: 48/10/20   Physician Follow-Up:    Physician Orders:    History of Present Illness: Jeison Andersen is referred to PT to address ongoing balance issues. She states that she noticed that she was struggling with her balance when she slipped off a short deck at a local [employer] She admits that over the last few months she has also had a few falls; denies any major injury as a result of the falls. She started to workout with a local  a month hoping that it would help her balance. SUBJECTIVE: Pt stated cold is better but now her legs feel weak and low back. Pt stated she has been shuffling more.  Pt was not wearing O2 upon entering building    TREATMENT   Precautions:    Pain:3/10 Low back and R knee    X in shaded column indicates activity completed today   Modalities Parameters/  Location  Notes                     Manual Therapy Time/Technique  Notes                     Exercise/Intervention   Notes   Mariano; review of goals        Standing on airex: feet together x2 20 sec hold  No UE support ,CGA   Step stance 20sec 2x     NuStep x6 min  x Level 5, seat 8, arms 9, 86% after Nustep   Forward step ups; side step ups  x10    Bilaterally    Airex: heel to raises, marches x15 ea  x    3 way hip, Hs curls x15 ea  x no Green band   Tandem stepping fwd/retro x2 laps ea  x    rocker board fwd/lat x15 ea   20 sec balance with 1UE support   wobbleboard 10x   CW/CCW, manual overpressure provided   Hurdles: forward and lateral  x2 ea. Green with 1 orange julio c. Agility mat: forward step in each square; lateral step in each square; march in each square x2   Hand hold assist needed           // bars: forward and lateral walk with emphasis on taking longer, exaggerated steps  x3 ea.  x O2 dropped to 83%, pt put O2 on at 1L          Sit-stands  10x  x Without UE support. NK table: flexion/extension x10 ea 5# x Bilaterally, cues for technique   BOSU lunges  x10      Seated ball squeezes and hip abd x15  x blue band   [employer] walking  ~350 ft. With supplemental oxygen 1L dropped to 87% requiring sitting RB. Rolator used    6 min walk test     Without supplemental oxygen; dropped to 90%   5min walk with no O2 used AD 5min. 86%O2sats   Oxygen saturation    Pt stayed on 1L of O2 during the entire session-92% and above     Specific Interventions Next Treatment: NuStep; airex balance exercises; Activity/Treatment Tolerance:  [x]  Patient tolerated treatment well  []  Patient limited by fatigue  []  Patient limited by pain   []  Patient limited by medical complications  []  Other:     Assessment: Pt continues to have difficulty keeping O2 levels above 90 requiring extended sitting RB's today.  Pt still has cold that effects her breathing. Recommended pt keep O2 on to keep oxygen levels at functional ranges    Body Structures/Functions/Activity Limitations: impaired activity tolerance, impaired balance, impaired coordination, impaired endurance, impaired safety awareness, impaired strength and pain  Prognosis: fair    GOALS:  Patient Goal: minimize fall risk    Short Term Goals:  Time Frame: 4 weeks  1. Artur Verma will demonstrate a good ankle strategy to maintain standing balance with perturbations to her trunk. 2. Artur Verma will be able to stand for 20 min at a time without needing a rest break allowing her to prepare meals and perform light chores without limitation. NOT MET-limited to 5 min before she has to sit due to back pain; uses a kitchen stool a lot  3. Alison's Tinetti score will improve to 19/28 indicating minimal fall risk. GOAL MET-improved to 22/28      Long Term Goals:  Time Frame: 8 weeks  1. Discharge with independent HEP ONGOING  2. Alison's Tinetti score will improve to >21/28 indicating no fall risk. GOAL MET; REVISE to improve Tinetti score to >26/28; NOT MET-improved to 26/28  3. Artur Verma will be able to maintain her standing balance in all 4 conditions of the m-CTSIB, using an ankle strategy to maintain her balance. NOT MET-able to maintain standing balance in conditions 1-3; LOB after 10 sec in condition 4  4. NEW GOAL: Artur Verma will demonstrate good abdominal stabilization with all transfers and dynamic gait activities to provide greater support to low back. IMPROVING   5. NEW GOAL: Artur Verma will be able to complete 6 min walking test with rollator while maintaining oxygen levels 92% or above so she can go without supplemental oxygen. Patient Education:   []  HEP/Education Completed: ft placement with gait. , tighten abdominals with standing and walking,     [employer] Access Code:  []  No new Education completed  [x]  Reviewed Prior HEP      [x]  Patient verbalized and/or demonstrated understanding of education provided. []  Patient unable to verbalize and/or demonstrate understanding of education provided. Will continue education. [x]  Barriers to learning: n/a    PLAN:  Treatment Recommendations: Strengthening, Range of Motion, Balance Training, Endurance Training, Gait Training, Stair Training, Neuromuscular Re-education, Manual Therapy - Soft Tissue Mobilization, Manual Therapy - Joint Manipulation, Pain Management, Home Exercise Program, Patient Education and Modalities    []  Plan of care initiated. Plan to see patient 2 times per week for 8 weeks to address the treatment planned outlined above.   []  Continue with current plan of care  [x]  Modify plan of care as follows: 2 time per week   []  Hold pending physician visit  []  Discharge    Time In 1005   Time Out 1045   Timed Code Minutes: 40 min   Total Treatment Time: 40 min       Electronically Signed by: Marilynn Ernst PTA

## 2021-12-27 ENCOUNTER — HOSPITAL ENCOUNTER (OUTPATIENT)
Dept: PHYSICAL THERAPY | Age: 71
Setting detail: THERAPIES SERIES
Discharge: HOME | End: 2021-12-27
Payer: MEDICARE

## 2021-12-27 DIAGNOSIS — N18.2 CKD (CHRONIC KIDNEY DISEASE), STAGE II: ICD-10-CM

## 2021-12-27 PROCEDURE — 97110 THERAPEUTIC EXERCISES: CPT

## 2021-12-27 RX ORDER — SPIRONOLACTONE 25 MG/1
25 TABLET ORAL DAILY
Qty: 90 TABLET | Refills: 3 | Status: SHIPPED | OUTPATIENT
Start: 2021-12-27 | End: 2022-05-23 | Stop reason: SDUPTHER

## 2021-12-27 NOTE — PROGRESS NOTES
[facility]  PHYSICAL THERAPY  [] EVALUATION  [x] DAILY NOTE (LAND) [] DAILY NOTE (AQUATIC ) [] PROGRESS NOTE [] DISCHARGE NOTE    [] [facility]   [] Carlo 90    [] 6[facility]   [x] Catracho Sims    Date: 2021  Patient Name:  Eduar Yancey  : 1950  MRN: [id number]  CSN: [id number]    Referring Practitioner Kaitlin Hernández DO   Diagnosis Other intervertebral disc degeneration, lumbar region [M51.36]  Radiculopathy, lumbar region [M54.16]    Treatment Diagnosis Difficulty walking    Date of Evaluation 21    Additional Pertinent History Bi-polar; SOB related to CHF      Functional Outcome Measure Used Tinetti   Functional Outcome Score 15/28 (21)    (21)   (21)   (10/29/21)   (21)      Insurance: Primary: Payor: Vikki Romero /  /  / , aquatic therapy is covered; modalities covered except for IONTO  Secondary:    Authorization Information: RECEIVED AUTHORIZATION FOR 12 VISITS FROM 21-21     CPT CODES: [id number], [address], [address] [place] [address]     REF# [id number]      Visit # 28,  for progress note   Visits Allowed: 12   Recertification Date:    Physician Follow-Up:    Physician Orders:    History of Present Illness: Hannah Briscoe is referred to PT to address ongoing balance issues. She states that she noticed that she was struggling with her balance when she slipped off a short deck at a local [facility]. She admits that over the last few months she has also had a few falls; denies any major injury as a result of the falls. She started to workout with a local  a month hoping that it would help her balance. SUBJECTIVE: Pt stated she feels she walks better in the afternoon and that her legs aren't as stiff. Pt just getting over a cold but feels better. No O2 on upon entering and O2sats were 93%. Pt nervous to take driving eval tomorrow. TREATMENT   Precautions:    Pain:1-2/10 Low back and R knee    X in shaded column indicates activity completed today   Modalities Parameters/  Location  Notes                     Manual Therapy Time/Technique  Notes                     Exercise/Intervention   Notes   Mariano; review of goals        Standing on airex: feet together x2 20 sec hold  No UE support ,CGA   Step stance 20sec 2x     NuStep x5 min  x Level 5, seat 8, arms 9, 91% after Nustep   Forward step ups; side step ups  x10    Bilaterally    Airex: heel to raises, marches, mini squats x15 ea  x    3 way hip, Hs curls x15 ea  x no Green band   Tandem stepping fwd/retro x2 laps ea  x    rocker board fwd/lat x15 ea   20 sec balance with 1UE support   wobbleboard 10x   CW/CCW, manual overpressure provided   Hurdles:   x2 ea.  x Green R<>L lead, reciprocal, side stepping    Stepping over 1 julio c  10x  x Forward/retro, required 1UE support occasionally 2 with retro stepping over leading with L leg   Agility mat: forward step in each square; lateral step in each square; march in each square x2   Hand hold assist needed           // bars: forward and lateral walk with emphasis on taking longer, exaggerated steps  x3 ea. O2 dropped to 83%, pt put O2 on at 1L          Sit-stands  15x  x Without UE support. Increased pain in R knee 92%          NK table: flexion/extension x10 ea 5#  Bilaterally, cues for technique   BOSU lunges  x10      Seated ball squeezes and hip abd x15  x blue band   [employer] walking  ~350 ft.    x O2 dropped to 90% so took standing RB and recovered 94% Rolator used    6 min walk test     Without supplemental oxygen; dropped to 90%   5min walk with no O2 used AD 5min. 86%O2sats   Oxygen saturation    Pt stayed on 1L of O2 during the entire session-92% and above     Specific Interventions Next Treatment: NuStep; airex balance exercises;      Activity/Treatment Tolerance:  [x]  Patient tolerated treatment well  []  Patient limited by fatigue  []  Patient limited by pain   []  Patient limited by medical complications  []  Other:     Assessment: Pt tolerated session better today and didn't use supplemental O2 at all during session. Pt able to only take 1 sitting RB but performed exercises at that time. Cues for correct posture for optimal O2 intake. Body Structures/Functions/Activity Limitations: impaired activity tolerance, impaired balance, impaired coordination, impaired endurance, impaired safety awareness, impaired strength and pain  Prognosis: fair    GOALS:  Patient Goal: minimize fall risk    Short Term Goals:  Time Frame: 4 weeks  1. Jessica Greene will demonstrate a good ankle strategy to maintain standing balance with perturbations to her trunk. 2. Jessica Greene will be able to stand for 20 min at a time without needing a rest break allowing her to prepare meals and perform light chores without limitation. NOT MET-limited to 5 min before she has to sit due to back pain; uses a kitchen stool a lot  3. Alison's Tinetti score will improve to 19/28 indicating minimal fall risk. GOAL MET-improved to 22/28      Long Term Goals:  Time Frame: 8 weeks  1. Discharge with independent HEP ONGOING  2. Alison's Tinetti score will improve to >21/28 indicating no fall risk. GOAL MET; REVISE to improve Tinetti score to >26/28; NOT MET-improved to 26/28  3. Jessica Greene will be able to maintain her standing balance in all 4 conditions of the m-CTSIB, using an ankle strategy to maintain her balance. NOT MET-able to maintain standing balance in conditions 1-3; LOB after 10 sec in condition 4  4. NEW GOAL: Jessica Greene will demonstrate good abdominal stabilization with all transfers and dynamic gait activities to provide greater support to low back. IMPROVING   5. NEW GOAL: Jessica Greene will be able to complete 6 min walking test with rollator while maintaining oxygen levels 92% or above so she can go without supplemental oxygen.       Patient Education:   [x]  HEP/Education Completed: ft placement with gait. , tighten abdominals with standing and walking,  Standing tall at wall for optimal O2 intake   Medbridge Access Code:  []  No new Education completed  [x]  Reviewed Prior HEP      [x]  Patient verbalized and/or demonstrated understanding of education provided. []  Patient unable to verbalize and/or demonstrate understanding of education provided. Will continue education. [x]  Barriers to learning: n/a    PLAN:  Treatment Recommendations: Strengthening, Range of Motion, Balance Training, Endurance Training, Gait Training, Stair Training, Neuromuscular Re-education, Manual Therapy - Soft Tissue Mobilization, Manual Therapy - Joint Manipulation, Pain Management, Home Exercise Program, Patient Education and Modalities    []  Plan of care initiated. Plan to see patient 2 times per week for 8 weeks to address the treatment planned outlined above.   []  Continue with current plan of care  [x]  Modify plan of care as follows: 2 time per week   []  Hold pending physician visit  []  Discharge    Time In 0915   Time Out 0955   Timed Code Minutes: 40 min   Total Treatment Time: 40 min       Electronically Signed by: Vale Forbes PTA

## 2021-12-28 ENCOUNTER — HOSPITAL ENCOUNTER (OUTPATIENT)
Dept: OCCUPATIONAL THERAPY | Age: 71
Setting detail: THERAPIES SERIES
Discharge: HOME | End: 2021-12-28
Payer: MEDICARE

## 2021-12-28 PROCEDURE — 97165 OT EVAL LOW COMPLEX 30 MIN: CPT

## 2021-12-28 NOTE — DISCHARGE SUMMARY
[address] [facility]  OCCUPATIONAL THERAPY  DRIVING EVALUATION    PATIENT: Shiraz Espinosa OF BIRTH:  1950  GENDER:  female  CSN: [id number]   REFERRING PHYSICIAN:   Speedy Cates.  DIAGNOSIS:  Unsteady gait, visual-spacial disorientation  DRIVING HISTORY:    Jessica Greene is a currently licensed . Has not driven since . At that time, she had testing done through her psychiatrist who had noticed some decline in some areas of testing, particularly spatial perception testing and recommended she not drive until she had a driving evaluation completed. Currently, her  is the principal . They also have a teen at home who is able to drive. PMH: Please see medical history questionnaire for past medical history, allergies, and medications. PATIENT GOALS:    Return to driving if safe to do so. Be able to drive to the [facility] for exercise. OBJECTIVE  VISUAL SKILLS(using the OPTEC 2000 Visual Evaluator)         FAR VISUAL ACUITY:   Pass. 20/40 R and L     STEREO DEPTH:   Fail. Unable to distinguish past first trial.    FUSION:    Pass. PERIPHERAL VISION:  Pass. 2/3 identified bilaterally. Able to detect light at 45 and 70 degree peripheral angles. Did not detect light at either 85 degree peripheral angle. DYNAVISION TESTIN hits in 60 second self paced trial.  Considered slow for driving. PHYSICAL SKILLS  RANGE OF MOTION:Within functional limits for driving  STRENGTH: Within functional limits for driving  TRANSFER IN/OUT OF SIMULATOR: Within functional limits for driving  AMBULATION: Within functional limits for driving, uses a rollator.     IN VEHICLE MANIPULATION SKILLS:  Steering Wheel:Within functional limits for driving   Gas Pedal:  Within functional limits for driving  Brake Pedal: Within functional limits for driving  Turn Signal: not tested  Seat Belt:not tested     COGNITIVE SKILLS  ORIENTATION:  Within functional limits for driving  ATTENTION SPAN:1 error with reversal of learning task (skipped September when asked to recite the months of the year in backward order. Also, at first had hour hand wrong with clock drawing test, but self corrected). FRUSTRATION TOLERANCE:Within functional limits for driving  IMPULSIVITY:Within functional limits for driving  DIRECTION FOLLOWING:Within functional limits for driving  R/L DISCRIMINATION:Within functional limits for driving  MEMORY:Impaired: 2 errors with short term recall. JUDGMENT: Within functional limits for driving    COGNITIVE TESTING:     SHORT BLESSED TEST:   The Short Blessed Test is a screening tool to assess orientation, short term memory, long term memory, and reversal of learning. Overall this patient received a score of  6 which indicates a questionable impairment in cognition. 1 error with reversal of learning, 2 errors with short term memory. Trail Making Test A - completed in 78 seconds, 1 error that she corrected herself. Clock Drawing Test - 4/4    SIMULATED DRIVING ASSESSMENT:  WARM-UP:    (54 MPH, [address] with several curves. Light on-coming traffic. There are no intersections, pedestrians, cross traffic, slow traffic, etc.)    Total off road crashes: 0 (Good performance is 0)   Total collisions with vehicles and roadway objects: 0 (Good performance is 0)   Percentage of time over the posted speed limit: 0% (Good performance is within +/- 5% of the posted speed limit.)   Percentage of time out of lanes: 7% (Good performance would be less than 5%, moderate is less than 10%, greater than 10% is considered poor.)      BRAKE REACTION TIME:  (The brake reaction time test consists of presenting a large stop sign in the center of the visual display.  The appropriate response if for the  to release the gas and apply the brakes as quickly as possible.)     Total pedal reaction time: 0.7 seconds (Average value is below 0.7 seconds.)   Gas pedal reaction time: posted speed limit. Average speed on this drive is 40 MPH.)     BASIC VEHICLE CONTROL:  ([place], 40 M[address] road. There are 4-way stops with minimal cross traffic and signalized intersections with lights that change from green to red. There are some pedestrians at the final intersection.)   Total number of off road crashes: 0 (Good performance is 0.)   Total number of collisions with vehicles and other roadway objects: 0 (Good performance is 0.)   Total number of traffic light tickets: 1 (Good performance is 0.)   Total number of stop sign tickets: 0 (Good performance is 0.)   Percentage of time over the posted speed limit: 8% (Good performance would be less than 5%, moderate would be less than 10% and anything greater than 10% would be poor)   Percentage of time out of lanes: 7% (Good performance would be less than 1%, moderate would be less than 2.5% and anything greater than 2.5% would be poor)   Number of correctly negotiated intersections: 3  Number of incorrectly negotiated intersections: 2 (Good performance is 0.)       SUB[address]BAN DRIVE:  ([place] and school zones  by curved roadway sections. Hazards include: pedestrians, cross traffic not stopping at unmarked intersections, and vehicles backing out of drives.)   Total number of off road crashes: 0 (Good performance is 0.)   Total number of collisions with vehicles and other roadway objects: 0 (Good performance is 0.)   Total number of traffic light tickets: 0 (Good performance is 0.)   Total number of stop sign tickets: 0 (Good performance is 0.)   Percentage of time over the posted speed limit: 0.7% (Good performance would be less than 5%, moderate would be less than 10% and anything greater than 10% would be poor)   Collision with backing vehicle?  No   Time to collision with backing vehicle: 2 seconds  (Average value is at least 1 second)   Collision with pedestrian or animal? No   Time to collision with pedestrian or animal: 17 seconds (Average value is at least 1 second)   Did the  exceed the posted speed limit in school zone? No        ASSESSMENT AND PLAN    RESULTS: Patient tested as questionable to return to independent driving    RECOMMENDATIONS:     Although Carolann Miles did demonstrate passing vision and adequate pedal reaction time, she also demonstrated a diminished visuo-spatial recognition as evidenced by diminished depth perception and consistent difficulty judging adequate stopping distance at stop signs and traffic lights. She also demonstrated some errors with short term recall and at times demonstrated some delayed visual response time, as evidenced by slow dynavision score. The results of this evaluation was discussed with her and with her supportive  in attendance. Carolann Miles stated that she did see the errors that she was making and she was concerned as well about returning to driving. She stated that she may not decide to complete on road testing right now, but will continue to abstain from driving and allow her family to provide transportation for her. On the road testing with [address] [place] [address] at [phone], or with an alternative licensed on the road  of patient's choice  Patient has been instructed to contact referring physician to discuss results of this evaluation. Patient has been informed that his or her physician is responsible for making the final decision regarding driving status. Thank you for this referral.    History: Low Complexity: brief history completed. Reviewed medical and therapy records related to presenting problem    Performance Deficits/Examination: Low Complexity: 1-2 performance deficits related to presenting problem that result in activity limitations or participation restrictions. Deficits include: visuo-spatial perception, cognitive limitations.     Clinical Decision Making: Clinical Decision making was of Low Complexity as the result of analysis of data from a problem focused assessment, a consideration of a limited number of treatment options, no significant comorbidities affecting the plan of care and no modification or assistance required to complete the evaluation. Evaluation Complexity: Based on the findings of patient history, examination, clinical presentation, and decision making during this evaluation, this patient is of low complexity.     Time in: 0930  Time out: 1100  Timed treatment: 0 minutes  Total time: 90 minutes          Bourne Gut OTR/L, [address]

## 2021-12-30 ENCOUNTER — HOSPITAL ENCOUNTER (OUTPATIENT)
Dept: PHYSICAL THERAPY | Age: 71
Setting detail: THERAPIES SERIES
Discharge: HOME | End: 2021-12-30
Payer: MEDICARE

## 2021-12-30 PROCEDURE — 97112 NEUROMUSCULAR REEDUCATION: CPT

## 2021-12-30 PROCEDURE — 97110 THERAPEUTIC EXERCISES: CPT

## 2021-12-30 PROCEDURE — 97530 THERAPEUTIC ACTIVITIES: CPT

## 2021-12-30 NOTE — PROGRESS NOTES
** PLEASE SIGN, DATE AND TIME CERTIFICATION BELOW AND RETURN TO [facility] OUTPATIENT REHABILITATION (FAX #: [phone]). ATTEST/CO-SIGN IF ACCESSING VIA [employer]. THANK YOU.**    I certify that I have examined the patient below and determined that Physical Medicine and Rehabilitation service is necessary and that I approve the established plan of care for up to 90 days or as specifically noted. Attestation, signature or co-signature of physician indicates approval of certification requirements.    ________________________ ____________ __________  Physician Signature   Date   Time          [address]  [] EVALUATION  [] DAILY NOTE (LAND) [] DAILY NOTE (AQUATIC ) [x] PROGRESS NOTE [] DISCHARGE NOTE    [] [facility]   [] Sarah 90    [] [address] [facility] [address]   [x] Ronan Silveira    Date: 2021  Patient Name:  Josselyn Goetz  : 1950  MRN: [id number]  CSN: [id number]    Referring Practitioner Gigi Bhatti DO   Diagnosis Other intervertebral disc degeneration, lumbar region [M51.36]  Radiculopathy, lumbar region [M54.16]    Treatment Diagnosis Difficulty walking    Date of Evaluation 21    Additional Pertinent History Bi-polar; SOB related to CHF      Functional Outcome Measure Used Tinetti   Functional Outcome Score 15/28 (21)   2428 (21)  24 (21)   (10/29/21)   (21)   (21)      Insurance: Primary: Payor: Yessi Uribe /  /  / , aquatic therapy is covered; modalities covered except for IONTO  Secondary:    Authorization Information:  RECEIVED [address] OF 12 VISITS  FROM 22 TO 22  CPT CODES:  38829, 18450, [id number], [address]  #[id number]   Visit # 35, 012 for progress note   Visits Allowed: 12   Recertification Date:    Physician Follow-Up:    Physician Orders:    History of Present Illness: Carolann Naas is referred to PT to address ongoing balance issues. She states that she noticed that she was struggling with her balance when she slipped off a short deck at a local [facility]. She admits that over the last few months she has also had a few falls; denies any major injury as a result of the falls. She started to workout with a local  a month hoping that it would help her balance. SUBJECTIVE: Unfortunately she did not pass her driving test on Tuesday. Initially she was upset by the results, however she has come to terms with it. She has not been using the oxygen at all lately. She would like to continue with therapy with goal of getting off of her oxygen. TREATMENT   Precautions:    Pain:1-2/10 Low back and R knee    X in shaded column indicates activity completed today   Modalities Parameters/  Location  Notes                     Manual Therapy Time/Technique  Notes                     Exercise/Intervention   Notes   Mariano; review of goals    x    Standing on airex: feet together x2 20 sec hold  No UE support ,CGA   Step stance 20sec 2x     NuStep x5 min  x Level 5, seat 8, arms 9, 91% after Nustep   Forward step ups; side step ups  x10    Bilaterally    Airex: heel to raises, marches, mini squats x15 ea  x    3 way hip, Hs curls x15 ea   no Green band   Tandem stepping fwd/retro x2 laps ea  x    rocker board fwd/lat x15 ea   20 sec balance with 1UE support   wobbleboard 10x   CW/CCW, manual overpressure provided   Hurdles:   x2 ea.    Green R<>L lead, reciprocal, side stepping    Stepping over 1 julio c  10x   Forward/retro, required 1UE support occasionally 2 with retro stepping over leading with L leg   Agility mat: forward step in each square; lateral step in each square; march in each square x2   Hand hold assist needed           // bars: forward and lateral walk with emphasis on taking longer, exaggerated steps  x3 ea.  x O2 dropped to 83%, pt put O2 on at 1L          Sit-stands  15x  x Without UE support. Increased pain in R knee 92%          NK table: flexion/extension x10 ea 5#  Bilaterally, cues for technique   BOSU lunges  x10      Seated ball squeezes and hip abd x15  x blue band   [employer] walking  ~350 ft.     O2 dropped to 90% so took standing RB and recovered 94% Rolator used    6 min walk test    x Without supplemental oxygen; dropped to 90%   5min walk with no O2 used AD 5min. 86%O2sats   Oxygen saturation   x Pt stayed on 1L of O2 during the entire session-92% and above     Specific Interventions Next Treatment: NuStep; airex balance exercises; Activity/Treatment Tolerance:  [x]  Patient tolerated treatment well  []  Patient limited by fatigue  []  Patient limited by pain   []  Patient limited by medical complications  []  Other:     Assessment:  Lindsey Barrera will continue to benefit from additional therapy to work on improving overall endurance. Her oxygen levels do drop to 89-90% on room air with 6 min walk test, however she is recovering faster. She continues to want to work towards being able to stand for 20+ min at a time without support so she may confidently stand and chat with people without falling. Body Structures/Functions/Activity Limitations: impaired activity tolerance, impaired balance, impaired coordination, impaired endurance, impaired safety awareness, impaired strength and pain  Prognosis: fair    GOALS:  Patient Goal: minimize fall risk    Short Term Goals:  Time Frame: 4 weeks  1. Lindsey Barrera will demonstrate a good ankle strategy to maintain standing balance with perturbations to her trunk. 2. Lindsey Barrera will be able to stand for 20 min at a time without needing a rest break allowing her to prepare meals and perform light chores without limitation. NOT MET-limited to 5 min before she has to sit due to back pain; uses a kitchen stool a lot  3. Alison's Tinetti score will improve to 19/28 indicating minimal fall risk.  GOAL MET-improved to 22/28      Long Term Goals:  Time Frame: 8 weeks  1. Discharge with independent HEP ONGOING  2. Alison's Tinetti score will improve to >21/28 indicating no fall risk. GOAL MET; REVISE to improve Tinetti score to >26/28; NOT MET-improved to 26/28  3. Ludmila Love will be able to maintain her standing balance in all 4 conditions of the m-CTSIB, using an ankle strategy to maintain her balance. NOT MET-able to maintain standing balance in conditions 1-3; LOB after 6 sec in condition 4  4. NEW GOAL: Ludmila Love will demonstrate good abdominal stabilization with all transfers and dynamic gait activities to provide greater support to low back. IMPROVING   5. NEW GOAL: Ludmila Love will be able to complete 6 min walking test with rollator while maintaining oxygen levels 92% or above so she can go without supplemental oxygen. NOT MET-dropped to 90%       Patient Education:   [x]  HEP/Education Completed: ft placement with gait. , tighten abdominals with standing and walking,  Standing tall at wall for optimal O2 intake   [employer] Access Code:  []  No new Education completed  [x]  Reviewed Prior HEP      [x]  Patient verbalized and/or demonstrated understanding of education provided. []  Patient unable to verbalize and/or demonstrate understanding of education provided. Will continue education. [x]  Barriers to learning: n/a    PLAN:  Treatment Recommendations: Strengthening, Range of Motion, Balance Training, Endurance Training, Gait Training, Stair Training, Neuromuscular Re-education, Manual Therapy - Soft Tissue Mobilization, Manual Therapy - Joint Manipulation, Pain Management, Home Exercise Program, Patient Education and Modalities    []  Plan of care initiated. Plan to see patient 2 times per week for 8 weeks to address the treatment planned outlined above.   []  Continue with current plan of care  [x]  Modify plan of care as follows: 2 time per week   []  Hold pending physician visit  []  Discharge    Time In 1259   Time Out 1339   Timed Code Minutes: 40 min   Total Treatment Time: 40 min       Electronically Signed by: Etta Bajwa, PT

## 2022-01-04 ENCOUNTER — HOSPITAL ENCOUNTER (OUTPATIENT)
Dept: PHYSICAL THERAPY | Age: 72
Setting detail: THERAPIES SERIES
Discharge: HOME | End: 2022-01-04
Payer: MEDICARE

## 2022-01-04 PROCEDURE — 97110 THERAPEUTIC EXERCISES: CPT

## 2022-01-04 PROCEDURE — 97530 THERAPEUTIC ACTIVITIES: CPT

## 2022-01-04 NOTE — PROGRESS NOTES
[address] [facility]  PHYSICAL THERAPY  [] EVALUATION  [x] DAILY NOTE (LAND) [] DAILY NOTE (AQUATIC ) [] PROGRESS NOTE [] DISCHARGE NOTE    [] [address] [facility]   [] [facility]    [] [address] [facility] [address]   [x] Felicitas Stroud    Date: 2022  Patient Name:  Bernardino Alexis  : 1950  MRN: [id number]  CSN: [id number]    Referring Practitioner Pearl Bermudez DO   Diagnosis Other intervertebral disc degeneration, lumbar region [M51.36]  Radiculopathy, lumbar region [M54.16]    Treatment Diagnosis Difficulty walking    Date of Evaluation 21    Additional Pertinent History Bi-polar; SOB related to CHF      Functional Outcome Measure Used Tinetti   Functional Outcome Score 15/28 (21)    (21)   (21)   (10/29/21)   (21)   (21)      Insurance: Primary: Payor: Anjana Brooks /  /  / , aquatic therapy is covered; modalities covered except for IONTO  Secondary:    Authorization Information:  RECEIVED [address] OF 12 VISITS  FROM 22 TO 22  CPT CODES:  46951, [id number], , [address] #[id number]   Visit #  1,  for progress note   Visits Allowed: 12   Recertification Date: 29   Physician Follow-Up:    Physician Orders:    History of Present Illness: Radha Gleason is referred to PT to address ongoing balance issues. She states that she noticed that she was struggling with her balance when she slipped off a short deck at a local [employer]. She admits that over the last few months she has also had a few falls; denies any major injury as a result of the falls. She started to workout with a local  a month hoping that it would help her balance.       SUBJECTIVE: Reports having no pain today except with mini squats    TREATMENT   Precautions:    0/10 Low back and R knee    X in shaded column indicates activity completed today   Modalities Parameters/  Location Notes                     Manual Therapy Time/Technique  Notes                     Exercise/Intervention   Notes   Mariano; review of goals        Standing on airex: feet together x2 20 sec hold  No UE support ,CGA   Step stance 20sec 2x     NuStep x6 min  x Level 5, seat 8, arms 9, 91% after Nustep   Forward step ups; side step ups  x10    Bilaterally    Airex: heel to raises, marches x15 ea  x Hold mini squats due to increased pain   3 way hip, Hs curls x15 ea  x no Green band   Tandem stepping fwd/retro x2 laps ea  x    rocker board fwd/lat x15 ea  x 20 sec balance with 1UE support   wobbleboard 10x   CW/CCW, manual overpressure provided   Hurdles:   x2 ea. Green R<>L lead, reciprocal, side stepping    Stepping over 1 julio c  10x   Forward/retro, required 1UE support occasionally 2 with retro stepping over leading with L leg   Agility mat: forward step in each square; lateral step in each square; march in each square x2   Hand hold assist needed           // bars: forward and lateral walk with emphasis on taking longer, exaggerated steps  x3 ea.  x           Sit-stands  15x  x Without UE support. Increased pain in R knee 92%          NK table: flexion/extension x10 ea 5#  Bilaterally, cues for technique   BOSU lunges  x10      Seated ball squeezes and hip abd x15  x blue band   [employer] walking  ~350 ft.    x    6 min walk test    x Without supplemental oxygen; dropped to 90%   5min walk with no O2 used AD 5min. 86%O2sats   Oxygen saturation   x Pt stayed on 1L of O2 during the entire session-92% and above     Specific Interventions Next Treatment: NuStep; airex balance exercises; Activity/Treatment Tolerance:  [x]  Patient tolerated treatment well  []  Patient limited by fatigue  []  Patient limited by pain   []  Patient limited by medical complications  []  Other:     Assessment:  O2 levels ranged from 88-96% during session.       Body Structures/Functions/Activity Limitations: impaired activity tolerance, impaired balance, impaired coordination, impaired endurance, impaired safety awareness, impaired strength and pain  Prognosis: fair    GOALS:  Patient Goal: minimize fall risk    Short Term Goals:  Time Frame: 4 weeks  1. Flora Staples will demonstrate a good ankle strategy to maintain standing balance with perturbations to her trunk. 2. Flora Staples will be able to stand for 20 min at a time without needing a rest break allowing her to prepare meals and perform light chores without limitation. NOT MET-limited to 5 min before she has to sit due to back pain; uses a kitchen stool a lot  3. Alison's Tinetti score will improve to 19/28 indicating minimal fall risk. GOAL MET-improved to 22/28      Long Term Goals:  Time Frame: 8 weeks  1. Discharge with independent HEP ONGOING  2. Alison's Tinetti score will improve to >21/28 indicating no fall risk. GOAL MET; REVISE to improve Tinetti score to >26/28; NOT MET-improved to 26/28  3. Flora Staples will be able to maintain her standing balance in all 4 conditions of the m-CTSIB, using an ankle strategy to maintain her balance. NOT MET-able to maintain standing balance in conditions 1-3; LOB after 6 sec in condition 4  4. NEW GOAL: Flora Staples will demonstrate good abdominal stabilization with all transfers and dynamic gait activities to provide greater support to low back. IMPROVING   5. NEW GOAL: Flora Staples will be able to complete 6 min walking test with rollator while maintaining oxygen levels 92% or above so she can go without supplemental oxygen. NOT MET-dropped to 90%       Patient Education:   [x]  HEP/Education Completed: ft placement with gait. , tighten abdominals with standing and walking,  Standing tall at wall for optimal O2 intake   [employer] Access Code:  []  No new Education completed  [x]  Reviewed Prior HEP      [x]  Patient verbalized and/or demonstrated understanding of education provided. []  Patient unable to verbalize and/or demonstrate understanding of education provided.   Will continue education. [x]  Barriers to learning: n/a    PLAN:  Treatment Recommendations: Strengthening, Range of Motion, Balance Training, Endurance Training, Gait Training, Stair Training, Neuromuscular Re-education, Manual Therapy - Soft Tissue Mobilization, Manual Therapy - Joint Manipulation, Pain Management, Home Exercise Program, Patient Education and Modalities    []  Plan of care initiated. Plan to see patient 2 times per week for 8 weeks to address the treatment planned outlined above.   []  Continue with current plan of care  [x]  Modify plan of care as follows: 2 time per week   []  Hold pending physician visit  []  Discharge    Time In 1455   Time Out 1535   Timed Code Minutes: 40 min   Total Treatment Time: 40 min       Electronically Signed by: Giorgio Pollack

## 2022-01-06 ENCOUNTER — HOSPITAL ENCOUNTER (OUTPATIENT)
Dept: PHYSICAL THERAPY | Age: 72
Setting detail: THERAPIES SERIES
Discharge: HOME | End: 2022-01-06
Payer: MEDICARE

## 2022-01-06 PROCEDURE — 97530 THERAPEUTIC ACTIVITIES: CPT

## 2022-01-06 PROCEDURE — 97110 THERAPEUTIC EXERCISES: CPT

## 2022-01-06 NOTE — PROGRESS NOTES
[address] [facility]  PHYSICAL THERAPY  [] EVALUATION  [x] DAILY NOTE (LAND) [] DAILY NOTE (AQUATIC ) [] PROGRESS NOTE [] DISCHARGE NOTE    [] [address] [facility]   [] Carlo 90    [] [address] [facility]   [x] Btety Figueroa    Date: 2022  Patient Name:  Mark Salinas  : 1950  MRN: [id number]  CSN: [id number]    Referring Practitioner Sonido Hazel DO   Diagnosis Other intervertebral disc degeneration, lumbar region [M51.36]  Radiculopathy, lumbar region [M54.16]    Treatment Diagnosis Difficulty walking    Date of Evaluation 21    Additional Pertinent History Bi-polar; SOB related to CHF      Functional Outcome Measure Used Tinetti   Functional Outcome Score 15/28 (21)    (21)   (21)   (10/29/21)   (21)   (21)      Insurance: Primary: Payor: Landry Mchugh /  /  / , aquatic therapy is covered; modalities covered except for IONTO  Secondary:    Authorization Information:  RECEIVED [address] OF 12 VISITS  FROM 22 TO 22  CPT CODES:  94538, 89974, [address] [place] [address], [address] [place] [address] Av S. #[id number]   Visit #  2,  for progress note   Visits Allowed: 12   Recertification Date:    Physician Follow-Up:    Physician Orders:    History of Present Illness: Sonya Cat is referred to PT to address ongoing balance issues. She states that she noticed that she was struggling with her balance when she slipped off a short deck at a local [facility]. She admits that over the last few months she has also had a few falls; denies any major injury as a result of the falls. She started to workout with a local  a month hoping that it would help her balance. SUBJECTIVE: Reports having no shortness of breath.     TREATMENT   Precautions:    0/10 Low back and R knee    X in shaded column indicates activity completed today   Modalities Parameters/  Location  Notes Manual Therapy Time/Technique  Notes                     Exercise/Intervention   Notes   Mariano; review of goals        Standing on airex: feet together x2 20 sec hold  No UE support ,CGA   Step stance 20sec 2x     NuStep x6 min  x Level 5, seat 8, arms 9, 91% after Nustep   Forward step ups; side step ups  x10    Bilaterally    Airex: heel to raises, marches x15 ea  x Hold mini squats due to increased pain   3 way hip, Hs curls x15 ea  x no Green band   Tandem stepping fwd/retro x2 laps ea  x    rocker board fwd/lat x15 ea  x 20 sec balance with 1UE support   wobbleboard 10x   CW/CCW, manual overpressure provided   Hurdles:   x2 ea. Green R<>L lead, reciprocal, side stepping    Stepping over 1 julio c  10x   Forward/retro, required 1UE support occasionally 2 with retro stepping over leading with L leg   Agility mat: forward step in each square; lateral step in each square; march in each square x2   Hand hold assist needed           // bars: forward and lateral walk with emphasis on taking longer, exaggerated steps  x3 ea.  x           Sit-stands  15x  x Without UE support. Increased pain in R knee 92%          NK table: flexion/extension x10 ea 5#  Bilaterally, cues for technique   BOSU lunges  x10      Seated ball squeezes and hip abd x15  x blue band   [employer] walking  ~350 ft.        6 min walk test  x2  x Without supplemental oxygen;  92-96%   5min walk with no O2 used AD 5min. 86%O2sats   Oxygen saturation   x Pt stayed on 1L of O2 during the entire session-92% and above     Specific Interventions Next Treatment: NuStep; airex balance exercises; Activity/Treatment Tolerance:  [x]  Patient tolerated treatment well  []  Patient limited by fatigue  []  Patient limited by pain   []  Patient limited by medical complications  []  Other:     Assessment:  O2 levels ranged from 90-97% during session.       Body Structures/Functions/Activity Limitations: impaired activity tolerance, impaired balance, impaired coordination, impaired endurance, impaired safety awareness, impaired strength and pain  Prognosis: fair    GOALS:  Patient Goal: minimize fall risk    Short Term Goals:  Time Frame: 4 weeks  1. Vicky Blake will demonstrate a good ankle strategy to maintain standing balance with perturbations to her trunk. 2. Vicky Blake will be able to stand for 20 min at a time without needing a rest break allowing her to prepare meals and perform light chores without limitation. NOT MET-limited to 5 min before she has to sit due to back pain; uses a kitchen stool a lot  3. Alison's Tinetti score will improve to 19/28 indicating minimal fall risk. GOAL MET-improved to 22/28      Long Term Goals:  Time Frame: 8 weeks  1. Discharge with independent HEP ONGOING  2. Alison's Tinetti score will improve to >21/28 indicating no fall risk. GOAL MET; REVISE to improve Tinetti score to >26/28; NOT MET-improved to 26/28  3. Vicky Blake will be able to maintain her standing balance in all 4 conditions of the m-CTSIB, using an ankle strategy to maintain her balance. NOT MET-able to maintain standing balance in conditions 1-3; LOB after 6 sec in condition 4  4. NEW GOAL: Vicky Blake will demonstrate good abdominal stabilization with all transfers and dynamic gait activities to provide greater support to low back. IMPROVING   5. NEW GOAL: Vicky Blake will be able to complete 6 min walking test with rollator while maintaining oxygen levels 92% or above so she can go without supplemental oxygen. NOT MET-dropped to 90%       Patient Education:   [x]  HEP/Education Completed: ft placement with gait. , tighten abdominals with standing and walking,  Standing tall at wall for optimal O2 intake   [facility]:  []  No new Education completed  [x]  Reviewed Prior HEP      [x]  Patient verbalized and/or demonstrated understanding of education provided. []  Patient unable to verbalize and/or demonstrate understanding of education provided.   Will continue education. [x]  Barriers to learning: n/a    PLAN:  Treatment Recommendations: Strengthening, Range of Motion, Balance Training, Endurance Training, Gait Training, Stair Training, Neuromuscular Re-education, Manual Therapy - Soft Tissue Mobilization, Manual Therapy - Joint Manipulation, Pain Management, Home Exercise Program, Patient Education and Modalities    []  Plan of care initiated. Plan to see patient 2 times per week for 8 weeks to address the treatment planned outlined above.   []  Continue with current plan of care  [x]  Modify plan of care as follows: 2 time per week   []  Hold pending physician visit  []  Discharge    Time In 1425   Time Out 1505   Timed Code Minutes: 40 min   Total Treatment Time: 40 min       Electronically Signed by: Gareth Simmonds

## 2022-01-11 ENCOUNTER — HOSPITAL ENCOUNTER (OUTPATIENT)
Dept: PHYSICAL THERAPY | Age: 72
Setting detail: THERAPIES SERIES
Discharge: HOME | End: 2022-01-11
Payer: MEDICARE

## 2022-01-11 PROCEDURE — 97110 THERAPEUTIC EXERCISES: CPT

## 2022-01-11 PROCEDURE — 97530 THERAPEUTIC ACTIVITIES: CPT

## 2022-01-11 NOTE — PROGRESS NOTES
[address] [facility]  PHYSICAL THERAPY  [] EVALUATION  [x] DAILY NOTE (LAND) [] DAILY NOTE (AQUATIC ) [] PROGRESS NOTE [] DISCHARGE NOTE    [] [address] [facility]   [] Chiragunruly 90    [] [address] [facility] [address]   [x] Jennyrene Amel    Date: 2022  Patient Name:  Sarmad Telles  : 1950  MRN: [id number]  CSN: [id number]    Referring Practitioner Sonu Yoon DO   Diagnosis Other intervertebral disc degeneration, lumbar region [M51.36]  Radiculopathy, lumbar region [M54.16]    Treatment Diagnosis Difficulty walking    Date of Evaluation 21    Additional Pertinent History Bi-polar; SOB related to CHF      Functional Outcome Measure Used Tinetti   Functional Outcome Score 15/28 (21)    (21)   (21)   (10/29/21)   (21)   (21)      Insurance: Primary: Payor: Cyrus Nascimento /  /  / , aquatic therapy is covered; modalities covered except for IONTO  Secondary:    Authorization Information:  RECEIVED [address] OF 12 VISITS  FROM 22 TO 22  CPT CODES:  33900, 63198, [address] [facility], [address] [place] [address] Ave S. #[id number]   Visit #  3, 3/12 for progress note   Visits Allowed: 12   Recertification Date:    Physician Follow-Up:    Physician Orders:    History of Present Illness: Galindo Birmingham is referred to PT to address ongoing balance issues. She states that she noticed that she was struggling with her balance when she slipped off a short deck at a local [employer]. She admits that over the last few months she has also had a few falls; denies any major injury as a result of the falls. She started to workout with a local  a month hoping that it would help her balance. SUBJECTIVE: Reports having no shortness of breath.     TREATMENT   Precautions:    0/10 Low back and R knee    X in shaded column indicates activity completed today   Modalities Parameters/  Location  Notes Manual Therapy Time/Technique  Notes                     Exercise/Intervention   Notes   Mariano; review of goals        Standing on airex: feet together x2 20 sec hold  No UE support ,CGA   Step stance 20sec 2x     NuStep x6 min  x Level 5, seat 8, arms 9, 91% after Nustep   Forward step ups; side step ups  x10    Bilaterally    Airex: heel to raises, marches x15 ea  x Hold mini squats due to increased pain   3 way hip, Hs curls x15 ea  x no Green band   Tandem stepping fwd/retro x2 laps ea  x    rocker board fwd/lat x15 ea  x 20 sec balance with 1UE support   wobbleboard 10x   CW/CCW, manual overpressure provided   Hurdles:   x2 ea. Green R<>L lead, reciprocal, side stepping    Stepping over 1 julio c  10x   Forward/retro, required 1UE support occasionally 2 with retro stepping over leading with L leg   Agility mat: forward step in each square; lateral step in each square; march in each square x2   Hand hold assist needed           // bars: forward and lateral walk with emphasis on taking longer, exaggerated steps  x3 ea.  x           Sit-stands  15x  x Without UE support. Increased pain in R knee 92%          NK table: flexion/extension x10 ea 5#  Bilaterally, cues for technique   BOSU lunges  x10      Seated ball squeezes and hip abd x15  x blue band   [employer] walking  ~350 ft.        6 min walk test  x2  x Without supplemental oxygen;  92-96%   5min walk with no O2 used AD 5min. 86%O2sats   Oxygen saturation    Pt stayed on 1L of O2 during the entire session-92% and above     Specific Interventions Next Treatment: NuStep; airex balance exercises; Activity/Treatment Tolerance:  [x]  Patient tolerated treatment well  []  Patient limited by fatigue  []  Patient limited by pain   []  Patient limited by medical complications  []  Other:     Assessment:  O2 levels ranged from 91-97% during session. Doing 6 min walk at start and end of session now.       Body Structures/Functions/Activity Limitations: impaired activity tolerance, impaired balance, impaired coordination, impaired endurance, impaired safety awareness, impaired strength and pain  Prognosis: fair    GOALS:  Patient Goal: minimize fall risk    Short Term Goals:  Time Frame: 4 weeks  1. Daniel Hairston will demonstrate a good ankle strategy to maintain standing balance with perturbations to her trunk. 2. Daniel Hairston will be able to stand for 20 min at a time without needing a rest break allowing her to prepare meals and perform light chores without limitation. NOT MET-limited to 5 min before she has to sit due to back pain; uses a kitchen stool a lot  3. Alison's Tinetti score will improve to 19/28 indicating minimal fall risk. GOAL MET-improved to 22/28      Long Term Goals:  Time Frame: 8 weeks  1. Discharge with independent HEP ONGOING  2. Alison's Tinetti score will improve to >21/28 indicating no fall risk. GOAL MET; REVISE to improve Tinetti score to >26/28; NOT MET-improved to 26/28  3. Daniel Hairston will be able to maintain her standing balance in all 4 conditions of the m-CTSIB, using an ankle strategy to maintain her balance. NOT MET-able to maintain standing balance in conditions 1-3; LOB after 6 sec in condition 4  4. NEW GOAL: Daniel Hairston will demonstrate good abdominal stabilization with all transfers and dynamic gait activities to provide greater support to low back. IMPROVING   5. NEW GOAL: Daniel Hairston will be able to complete 6 min walking test with rollator while maintaining oxygen levels 92% or above so she can go without supplemental oxygen. NOT MET-dropped to 90%       Patient Education:   [x]  HEP/Education Completed: ft placement with gait. , tighten abdominals with standing and walking,  Standing tall at wall for optimal O2 intake   [employer] Access Code:  []  No new Education completed  [x]  Reviewed Prior HEP      [x]  Patient verbalized and/or demonstrated understanding of education provided.   []  Patient unable to verbalize and/or demonstrate understanding of education provided. Will continue education. [x]  Barriers to learning: n/a    PLAN:  Treatment Recommendations: Strengthening, Range of Motion, Balance Training, Endurance Training, Gait Training, Stair Training, Neuromuscular Re-education, Manual Therapy - Soft Tissue Mobilization, Manual Therapy - Joint Manipulation, Pain Management, Home Exercise Program, Patient Education and Modalities    []  Plan of care initiated. Plan to see patient 2 times per week for 8 weeks to address the treatment planned outlined above.   []  Continue with current plan of care  [x]  Modify plan of care as follows: 2 time per week   []  Hold pending physician visit  []  Discharge    Time In 1510   Time Out 1550   Timed Code Minutes: 40 min   Total Treatment Time: 40 min       Electronically Signed by: Columba Hernandez

## 2022-01-12 ENCOUNTER — OFFICE VISIT (OUTPATIENT)
Dept: PULMONOLOGY | Age: 72
End: 2022-01-12
Payer: MEDICARE

## 2022-01-12 VITALS
OXYGEN SATURATION: 96 % | SYSTOLIC BLOOD PRESSURE: 132 MMHG | DIASTOLIC BLOOD PRESSURE: 80 MMHG | TEMPERATURE: 98.1 F | BODY MASS INDEX: 42.47 KG/M2 | WEIGHT: 248.8 LBS | HEIGHT: 64 IN | HEART RATE: 90 BPM

## 2022-01-12 DIAGNOSIS — E66.01 MORBID OBESITY WITH BMI OF 40.0-44.9, ADULT (HCC): ICD-10-CM

## 2022-01-12 DIAGNOSIS — Z99.89 OSA ON CPAP: Primary | ICD-10-CM

## 2022-01-12 DIAGNOSIS — G47.33 OSA ON CPAP: Primary | ICD-10-CM

## 2022-01-12 PROCEDURE — G8427 DOCREV CUR MEDS BY ELIG CLIN: HCPCS | Performed by: PHYSICIAN ASSISTANT

## 2022-01-12 PROCEDURE — 99213 OFFICE O/P EST LOW 20 MIN: CPT | Performed by: PHYSICIAN ASSISTANT

## 2022-01-12 PROCEDURE — 1090F PRES/ABSN URINE INCON ASSESS: CPT | Performed by: PHYSICIAN ASSISTANT

## 2022-01-12 PROCEDURE — 1036F TOBACCO NON-USER: CPT | Performed by: PHYSICIAN ASSISTANT

## 2022-01-12 PROCEDURE — G8484 FLU IMMUNIZE NO ADMIN: HCPCS | Performed by: PHYSICIAN ASSISTANT

## 2022-01-12 PROCEDURE — G8417 CALC BMI ABV UP PARAM F/U: HCPCS | Performed by: PHYSICIAN ASSISTANT

## 2022-01-12 PROCEDURE — G8400 PT W/DXA NO RESULTS DOC: HCPCS | Performed by: PHYSICIAN ASSISTANT

## 2022-01-12 PROCEDURE — 4040F PNEUMOC VAC/ADMIN/RCVD: CPT | Performed by: PHYSICIAN ASSISTANT

## 2022-01-12 PROCEDURE — 1123F ACP DISCUSS/DSCN MKR DOCD: CPT | Performed by: PHYSICIAN ASSISTANT

## 2022-01-12 PROCEDURE — 3017F COLORECTAL CA SCREEN DOC REV: CPT | Performed by: PHYSICIAN ASSISTANT

## 2022-01-12 ASSESSMENT — ENCOUNTER SYMPTOMS
STRIDOR: 0
COUGH: 0
CHEST TIGHTNESS: 0
ALLERGIC/IMMUNOLOGIC NEGATIVE: 1
DIARRHEA: 0
WHEEZING: 0
NAUSEA: 0
BACK PAIN: 0
SHORTNESS OF BREATH: 0
EYES NEGATIVE: 1

## 2022-01-12 NOTE — PROGRESS NOTES
[facility]      Nikko Ferrara         [id number]  1/12/2022   Chief Complaint   Patient presents with    1 Year Follow Up     RANDY with download         Pt of Dr. Radha FERNANDES Download:   Ulleticia Reasons or initial AHI: 50.4     Date of initial study: 5/14/2009      Compliant  100%     Noncompliant 0 %     PAP Type Airsense 10 autoset Level  8   Avg Hrs/Day 11 hr 5 min  AHI: 2.0   Recorded compliance dates , 12/12/2021  to 1/10/2022   Machine/Mfg:   [x] ResMed    [] Respironics/Dreamstation   Interface:   [x] Nasal    [] Nasal pillows   [] FFM      Provider:      [x] SR[facility]ME     []Chase     [] Lucila    [] Khalif Robert    [] [place]               [] [facility]      [] Adaptive    [] [address]      [] Other    Neck Size: 17.25  Mallampati Mallampati 4  ESS:    SAQLI:     Here is a scan of the most recent download:            Presentation:   Bjial Arteaga presents for sleep medicine follow up for obstructive sleep apnea  Since the last visit, Bijal Arteaga is doing well with PAP. She is sleeping well and feels rested. Mask is fitting well and night. She is rarely using O2. Equipment issues: The pressure is  acceptable, the mask is acceptable     Sleep issues:  Do you feel better? Yes  More rested? Yes   Better concentration? yes    Progress History:   Since last visit any new medical issues? No  New ER or hospital visits? No  Any new or changes in medicines? No  Any new sleep medicines? No    Review of Systems -   Review of Systems   Constitutional: Negative for activity change, appetite change, chills and fever. HENT: Negative for congestion and postnasal drip. Eyes: Negative. Respiratory: Negative for cough, chest tightness, shortness of breath, wheezing and stridor. Cardiovascular: Negative for chest pain and leg swelling. Gastrointestinal: Negative for diarrhea and nausea. Endocrine: Negative. Genitourinary: Negative. Musculoskeletal: Positive for arthralgias. Negative for back pain. Skin: Negative. Allergic/Immunologic: Negative. Neurological: Positive for weakness. Negative for dizziness and light-headedness. Psychiatric/Behavioral:        Memory loss    All other systems reviewed and are negative. Physical Exam:    BMI:  Body mass index is 42.71 kg/m². Wt Readings from Last 3 Encounters:   01/12/22 248 lb 12.8 oz (112.9 kg)   12/16/21 248 lb (112.5 kg)   12/09/21 246 lb 6 oz (111.8 kg)     Weight stable / unchanged  Vitals: /80 (Site: Right Lower Arm, Position: Sitting, Cuff Size: Large Adult)   Pulse 90   Temp 98.1 °F (36.7 °C) (Temporal)   Ht 5' 4\" (1.626 m)   Wt 248 lb 12.8 oz (112.9 kg)   SpO2 96%   BMI 42.71 kg/m²       Physical Exam  Constitutional:       Appearance: She is well-developed. HENT:      Head: Normocephalic and atraumatic. Right Ear: External ear normal.      Left Ear: External ear normal.   Eyes:      Conjunctiva/sclera: Conjunctivae normal.      Pupils: Pupils are equal, round, and reactive to light. Cardiovascular:      Rate and Rhythm: Normal rate and regular rhythm. Heart sounds: Normal heart sounds. Pulmonary:      Effort: Pulmonary effort is normal.      Breath sounds: Normal breath sounds. Musculoskeletal:         General: Normal range of motion. Cervical back: Normal range of motion and neck supple. Skin:     General: Skin is warm and dry. Neurological:      Mental Status: She is alert and oriented to person, place, and time. Psychiatric:         Behavior: Behavior normal.         Thought Content: Thought content normal.         Judgment: Judgment normal.           ASSESSMENT/DIAGNOSIS     Diagnosis Orders   1. RANDY on CPAP     2. Morbid obesity with BMI of 40.0-44.9, adult [facility])              Plan   Do you need any equipment today?  Yes update supplies  - Download reviewed and discussed with patient  - She  was advised to continue current positive airway pressure therapy with above described pressure. - She  advised to keep good compliance with current recommended pressure to get optimal results and clinical improvement  - Recommend 7-9 hours of sleep with PAP  - She was advised to call [employer] company regarding supplies if needed.   -She call my office for earlier appointment if needed for worsening of sleep symptoms.   - She was instructed on weight loss  - Alexys Lara was educated about my impression and plan. Patient verbalizesunderstanding.   We will see Chencho Sinclair back in: 1 year with download    Information added by my medical assistant/LPN was reviewed today          Joaquin Luz PA-C, MPAS  1/12/2022

## 2022-01-13 ENCOUNTER — HOSPITAL ENCOUNTER (OUTPATIENT)
Dept: PHYSICAL THERAPY | Age: 72
Setting detail: THERAPIES SERIES
Discharge: HOME | End: 2022-01-13
Payer: MEDICARE

## 2022-01-13 PROCEDURE — 97116 GAIT TRAINING THERAPY: CPT

## 2022-01-13 PROCEDURE — 97110 THERAPEUTIC EXERCISES: CPT

## 2022-01-13 PROCEDURE — 97530 THERAPEUTIC ACTIVITIES: CPT

## 2022-01-18 ENCOUNTER — HOSPITAL ENCOUNTER (OUTPATIENT)
Dept: PHYSICAL THERAPY | Age: 72
Setting detail: THERAPIES SERIES
Discharge: HOME | End: 2022-01-18
Payer: MEDICARE

## 2022-01-18 PROCEDURE — 97530 THERAPEUTIC ACTIVITIES: CPT

## 2022-01-18 PROCEDURE — 97110 THERAPEUTIC EXERCISES: CPT

## 2022-01-18 PROCEDURE — 97116 GAIT TRAINING THERAPY: CPT

## 2022-01-18 NOTE — PROGRESS NOTES
[address] [facility]  PHYSICAL THERAPY  [] EVALUATION  [x] DAILY NOTE (LAND) [] DAILY NOTE (AQUATIC ) [] PROGRESS NOTE [] DISCHARGE NOTE    [] [address] [facility]   [] [address]    [] [address] [facility] [address]   [x] Trina Juarez    Date: 2022  Patient Name:  Blaine Wilder  : 1950  MRN: [id number]  CSN: [id number]    Referring Practitioner Estelita Olson DO   Diagnosis Other intervertebral disc degeneration, lumbar region [M51.36]  Radiculopathy, lumbar region [M54.16]    Treatment Diagnosis Difficulty walking    Date of Evaluation 21    Additional Pertinent History Bi-polar; SOB related to CHF      Functional Outcome Measure Used Tinetti   Functional Outcome Score 15/28 (21)    (21)   (21)   (10/29/21)   (21)   (21)      Insurance: Primary: Payor: Meño Briggs /  /  / , aquatic therapy is covered; modalities covered except for IONTO  Secondary:    Authorization Information:  RECEIVED [address] OF 12 VISITS  FROM 22 TO 22  CPT CODES:  65802, [address]345, [address] [place] [address], [address] [place] [address] Ave S. #[id number]   Visit #  5,  for progress note   Visits Allowed: 12   Recertification Date:    Physician Follow-Up:    Physician Orders:    History of Present Illness: Yusuf is referred to PT to address ongoing balance issues. She states that she noticed that she was struggling with her balance when she slipped off a short deck at a local [facility] in [place]. She admits that over the last few months she has also had a few falls; denies any major injury as a result of the falls. She started to workout with a local  a month hoping that it would help her balance. SUBJECTIVE: Reports falling over the weekend , but having no pain from it.     TREATMENT   Precautions:    0/10 Low back and R knee    X in shaded column indicates activity completed today   Modalities Parameters/  Location  Notes                     Manual Therapy Time/Technique  Notes                     Exercise/Intervention   Notes   Tinetti; review of goals        Standing on airex: feet together x2 20 sec hold x No UE support ,CGA   Step stance 20sec 2x     NuStep x6 min  x Level 5, seat 8, arms 9, 91% after Nustep   Forward step ups; side step ups  x10    Bilaterally    Airex: heel to raises, marches x15 ea  x Hold mini squats due to increased pain   3 way hip, Hs curls x15 ea  x no Green band   Tandem stepping fwd/retro x2 laps ea  x    rocker board fwd/lat x15 ea  x 20 sec balance with 1UE support   wobbleboard 10x   CW/CCW, manual overpressure provided   Hurdles:   x2 ea. Green R<>L lead, reciprocal, side stepping    Stepping over 1 julio c  10x   Forward/retro, required 1UE support occasionally 2 with retro stepping over leading with L leg   Agility mat: forward step in each square; lateral step in each square; march in each square x2   Hand hold assist needed           // bars: forward and lateral walk with emphasis on taking longer, exaggerated steps  x3 ea. Sit-stands  15x  x Without UE support. Increased pain in R knee 92%          NK table: flexion/extension x10 ea 5#  Bilaterally, cues for technique   BOSU lunges  x10      Seated ball squeezes and hip abd x15  x blue band   [employer] walking  ~350 ft.        6 min walk test  x2  x Without supplemental oxygen;  91-97%                   Specific Interventions Next Treatment: NuStep; airex balance exercises; Activity/Treatment Tolerance:  [x]  Patient tolerated treatment well  []  Patient limited by fatigue  []  Patient limited by pain   []  Patient limited by medical complications  []  Other:     Assessment:  O2 levels ranged from 90-97% during session. Doing 6 min walk at start and end of session now.       Body Structures/Functions/Activity Limitations: impaired activity tolerance, impaired balance, impaired coordination, impaired endurance, impaired safety awareness, impaired strength and pain  Prognosis: fair    GOALS:  Patient Goal: minimize fall risk    Short Term Goals:  Time Frame: 4 weeks  1. Cornelio Lazo will demonstrate a good ankle strategy to maintain standing balance with perturbations to her trunk. 2. Cornelio Lazo will be able to stand for 20 min at a time without needing a rest break allowing her to prepare meals and perform light chores without limitation. NOT MET-limited to 5 min before she has to sit due to back pain; uses a kitchen stool a lot  3. Alison's Tinetti score will improve to 19/28 indicating minimal fall risk. GOAL MET-improved to 22/28      Long Term Goals:  Time Frame: 8 weeks  1. Discharge with independent HEP ONGOING  2. Alison's Tinetti score will improve to >21/28 indicating no fall risk. GOAL MET; REVISE to improve Tinetti score to >26/28; NOT MET-improved to 26/28  3. Cornelio Lazo will be able to maintain her standing balance in all 4 conditions of the m-CTSIB, using an ankle strategy to maintain her balance. NOT MET-able to maintain standing balance in conditions 1-3; LOB after 6 sec in condition 4  4. NEW GOAL: Cornelio Lazo will demonstrate good abdominal stabilization with all transfers and dynamic gait activities to provide greater support to low back. IMPROVING   5. NEW GOAL: Cornelio Lazo will be able to complete 6 min walking test with rollator while maintaining oxygen levels 92% or above so she can go without supplemental oxygen. NOT MET-dropped to 90%       Patient Education:   [x]  HEP/Education Completed: ft placement with gait. , tighten abdominals with standing and walking,  Standing tall at wall for optimal O2 intake   [employer] Access Code:  []  No new Education completed  [x]  Reviewed Prior HEP      [x]  Patient verbalized and/or demonstrated understanding of education provided. []  Patient unable to verbalize and/or demonstrate understanding of education provided. Will continue education.   [x]  Barriers to learning: n/a    PLAN:  Treatment Recommendations: Strengthening, Range of Motion, Balance Training, Endurance Training, Gait Training, Stair Training, Neuromuscular Re-education, Manual Therapy - Soft Tissue Mobilization, Manual Therapy - Joint Manipulation, Pain Management, Home Exercise Program, Patient Education and Modalities    []  Plan of care initiated. Plan to see patient 2 times per week for 8 weeks to address the treatment planned outlined above.   []  Continue with current plan of care  [x]  Modify plan of care as follows: 2 time per week   []  Hold pending physician visit  []  Discharge    Time In 1255   Time Out 1340   Timed Code Minutes: 45 min   Total Treatment Time: 45 min       Electronically Signed by: Gareth Simmonds

## 2022-01-20 ENCOUNTER — HOSPITAL ENCOUNTER (OUTPATIENT)
Dept: PHYSICAL THERAPY | Age: 72
Setting detail: THERAPIES SERIES
Discharge: HOME | End: 2022-01-20
Payer: MEDICARE

## 2022-01-20 PROCEDURE — 97116 GAIT TRAINING THERAPY: CPT

## 2022-01-20 PROCEDURE — 97110 THERAPEUTIC EXERCISES: CPT

## 2022-01-20 PROCEDURE — 97530 THERAPEUTIC ACTIVITIES: CPT

## 2022-01-20 NOTE — PROGRESS NOTES
[address] [facility]  PHYSICAL THERAPY  [] EVALUATION  [x] DAILY NOTE (LAND) [] DAILY NOTE (AQUATIC ) [] PROGRESS NOTE [] DISCHARGE NOTE    [] [address] [facility]   [] [address]    [] [address] [facility]   [x] Iveth Bardales    Date: 2022  Patient Name:  Homero Noyola  : 1950  MRN: [id number]  CSN: [id number]    Referring Practitioner Aurelia Carmen DO   Diagnosis Other intervertebral disc degeneration, lumbar region [M51.36]  Radiculopathy, lumbar region [M54.16]    Treatment Diagnosis Difficulty walking    Date of Evaluation 21    Additional Pertinent History Bi-polar; SOB related to CHF      Functional Outcome Measure Used Tinetti   Functional Outcome Score 15/28 (21)    (21)   (21)   (10/29/21)   (21)   (21)      Insurance: Primary: Payor: Michael Rowe /  /  / , aquatic therapy is covered; modalities covered except for IONTO  Secondary:    Authorization Information:  RECEIVED [address] OF 12 VISITS  FROM 22 TO 22  CPT CODES:  25654, 22780, [address] [place] [address] Ave S. #[id number]   Visit # 6,  for progress note   Visits Allowed: 12   Recertification Date:    Physician Follow-Up:    Physician Orders:    History of Present Illness: Vicky Blake is referred to PT to address ongoing balance issues. She states that she noticed that she was struggling with her balance when she slipped off a short deck at a local [facility]. She admits that over the last few months she has also had a few falls; denies any major injury as a result of the falls. She started to workout with a local  a month hoping that it would help her balance. SUBJECTIVE: Reports falling over the weekend , but having no pain from it.     TREATMENT   Precautions:    0/10 Low back and R knee    X in shaded column indicates activity completed today   Modalities Parameters/  Location  Notes                     Manual Therapy Time/Technique  Notes                     Exercise/Intervention   Notes   Tinetti; review of goals        Standing on airex: feet together x2 20 sec hold x No UE support ,CGA   Step stance 20sec 2x     NuStep x6 min  x Level 5, seat 8, arms 9, 91% after Nustep   Forward step ups; side step ups  x10    Bilaterally    Airex: heel to raises, marches x15 ea  x Hold mini squats due to increased pain   3 way hip, Hs curls x15 ea  x no Green band   Tandem stepping fwd/retro x2 laps ea  x    rocker board fwd/lat x15 ea  x 20 sec balance with 1UE support   wobbleboard 10x   CW/CCW, manual overpressure provided   Hurdles:   x2 ea. Green R<>L lead, reciprocal, side stepping    Stepping over 1 julio c  10x   Forward/retro, required 1UE support occasionally 2 with retro stepping over leading with L leg   Agility mat: forward step in each square; lateral step in each square; march in each square x2   Hand hold assist needed           // bars: forward and lateral walk with emphasis on taking longer, exaggerated steps  x3 ea. Sit-stands  15x  x Without UE support. Increased pain in R knee 92%          NK table: flexion/extension x10 ea 5#  Bilaterally, cues for technique   BOSU lunges  x10      Seated ball squeezes and hip abd x15  x blue band   [employer] walking  ~350 ft.        6 min walk test  x2  x Without supplemental oxygen;  91-97%                   Specific Interventions Next Treatment: NuStep; airex balance exercises; Activity/Treatment Tolerance:  [x]  Patient tolerated treatment well  []  Patient limited by fatigue  []  Patient limited by pain   []  Patient limited by medical complications  []  Other:     Assessment:  O2 levels ranged from 90-97% during session. Doing 6 min walk at start and end of session now.       Body Structures/Functions/Activity Limitations: impaired activity tolerance, impaired balance, impaired coordination, impaired endurance, impaired safety awareness, impaired strength and pain  Prognosis: fair    GOALS:  Patient Goal: minimize fall risk    Short Term Goals:  Time Frame: 4 weeks  1. Leopold Piano will demonstrate a good ankle strategy to maintain standing balance with perturbations to her trunk. 2. Leopold Piano will be able to stand for 20 min at a time without needing a rest break allowing her to prepare meals and perform light chores without limitation. NOT MET-limited to 5 min before she has to sit due to back pain; uses a kitchen stool a lot  3. Alison's Tinetti score will improve to 19/28 indicating minimal fall risk. GOAL MET-improved to 22/28      Long Term Goals:  Time Frame: 8 weeks  1. Discharge with independent HEP ONGOING  2. Alison's Tinetti score will improve to >21/28 indicating no fall risk. GOAL MET; REVISE to improve Tinetti score to >26/28; NOT MET-improved to 26/28  3. Leopold Piano will be able to maintain her standing balance in all 4 conditions of the m-CTSIB, using an ankle strategy to maintain her balance. NOT MET-able to maintain standing balance in conditions 1-3; LOB after 6 sec in condition 4  4. NEW GOAL: Leopold Piano will demonstrate good abdominal stabilization with all transfers and dynamic gait activities to provide greater support to low back. IMPROVING   5. NEW GOAL: Leopold Piano will be able to complete 6 min walking test with rollator while maintaining oxygen levels 92% or above so she can go without supplemental oxygen. NOT MET-dropped to 90%       Patient Education:   [x]  HEP/Education Completed: ft placement with gait. , tighten abdominals with standing and walking,  Standing tall at wall for optimal O2 intake   [facility] Access Code:  []  No new Education completed  [x]  Reviewed Prior HEP      [x]  Patient verbalized and/or demonstrated understanding of education provided. []  Patient unable to verbalize and/or demonstrate understanding of education provided. Will continue education.   [x]  Barriers to learning: n/a    PLAN:  Treatment Recommendations: Strengthening, Range of Motion, Balance Training, Endurance Training, Gait Training, Stair Training, Neuromuscular Re-education, Manual Therapy - Soft Tissue Mobilization, Manual Therapy - Joint Manipulation, Pain Management, Home Exercise Program, Patient Education and Modalities    []  Plan of care initiated. Plan to see patient 2 times per week for 8 weeks to address the treatment planned outlined above.   []  Continue with current plan of care  [x]  Modify plan of care as follows: 2 time per week   []  Hold pending physician visit  []  Discharge    Time In 1255   Time Out 1340   Timed Code Minutes: 45 min   Total Treatment Time: 45 min       Electronically Signed by: Carter Sheridan

## 2022-01-25 ENCOUNTER — HOSPITAL ENCOUNTER (OUTPATIENT)
Dept: PHYSICAL THERAPY | Age: 72
Setting detail: THERAPIES SERIES
Discharge: HOME | End: 2022-01-25
Payer: MEDICARE

## 2022-01-25 PROCEDURE — 97116 GAIT TRAINING THERAPY: CPT

## 2022-01-25 PROCEDURE — 97110 THERAPEUTIC EXERCISES: CPT

## 2022-01-25 PROCEDURE — 97530 THERAPEUTIC ACTIVITIES: CPT

## 2022-01-25 NOTE — PROGRESS NOTES
[address] [facility]  PHYSICAL THERAPY  [] EVALUATION  [x] DAILY NOTE (LAND) [] DAILY NOTE (AQUATIC ) [] PROGRESS NOTE [] DISCHARGE NOTE    [] [facility]   [] Chiragunruly 90    [] [address] [facility] [address]   [x] Josue Hobbs    Date: 2022  Patient Name:  Rudy Gunn  : 1950  MRN: [id number]  CSN: [id number]    Referring Practitioner Josh Greenwood DO   Diagnosis Other intervertebral disc degeneration, lumbar region [M51.36]  Radiculopathy, lumbar region [M54.16]    Treatment Diagnosis Difficulty walking    Date of Evaluation 21    Additional Pertinent History Bi-polar; SOB related to CHF      Functional Outcome Measure Used Tinetti   Functional Outcome Score 15/28 (21)    (21)   (21)   (10/29/21)   (21)   (21)      Insurance: Primary: Payor: Abelardo Back /  /  / , aquatic therapy is covered; modalities covered except for IONTO  Secondary:    Authorization Information:  RECEIVED [address] OF 12 VISITS  FROM 22 TO 22  CPT CODES:  72398, 75021, , [address] #[id number]   Visit # 7,  for progress note   Visits Allowed: 12   Recertification Date:    Physician Follow-Up:    Physician Orders:    History of Present Illness: Cornelio Lazo is referred to PT to address ongoing balance issues. She states that she noticed that she was struggling with her balance when she slipped off a short deck at a local [facility]. She admits that over the last few months she has also had a few falls; denies any major injury as a result of the falls. She started to workout with a local  a month hoping that it would help her balance. SUBJECTIVE: Reports knees are achy today.     TREATMENT   Precautions:    2/10 bilat knee    X in shaded column indicates activity completed today   Modalities Parameters/  Location  Notes                     Manual Therapy Time/Technique  Notes                     Exercise/Intervention   Notes   Mariano; review of goals        Standing on airex: feet together x2 20 sec hold x No UE support ,CGA   Step stance 20sec 2x     NuStep x6 min  x Level 5, seat 8, arms 9, 91% after Nustep   Forward step ups; side step ups  x10    Bilaterally    Airex: heel to raises, marches x15 ea  x Hold mini squats due to increased pain   3 way hip, Hs curls x15 ea  x no Green band   Tandem stepping fwd/retro x2 laps ea  x    rocker board fwd/lat x15 ea  x 20 sec balance with 1UE support   wobbleboard 10x   CW/CCW, manual overpressure provided   Hurdles:   x2 ea. Green R<>L lead, reciprocal, side stepping    Stepping over 1 julio c  10x   Forward/retro, required 1UE support occasionally 2 with retro stepping over leading with L leg   Agility mat: forward step in each square; lateral step in each square; march in each square x2   Hand hold assist needed           // bars: forward and lateral walk with emphasis on taking longer, exaggerated steps  x3 ea. Sit-stands  15x  x Without UE support. Increased pain in R knee 92%          NK table: flexion/extension x10 ea 5#  Bilaterally, cues for technique   BOSU lunges  x10      Seated ball squeezes and hip abd x15  x blue band   [employer] walking  ~350 ft.        6 min walk test  x2  x Without supplemental oxygen;  91-98%                   Specific Interventions Next Treatment: NuStep; airex balance exercises; Activity/Treatment Tolerance:  [x]  Patient tolerated treatment well  []  Patient limited by fatigue  []  Patient limited by pain   []  Patient limited by medical complications  []  Other:     Assessment:  O2 levels ranged from 91-98% during session. Doing 6 min walk at start and end of session.       Body Structures/Functions/Activity Limitations: impaired activity tolerance, impaired balance, impaired coordination, impaired endurance, impaired safety awareness, impaired strength and pain  Prognosis: fair    GOALS:  Patient Goal: minimize fall risk    Short Term Goals:  Time Frame: 4 weeks  1. Lucrecia Medina will demonstrate a good ankle strategy to maintain standing balance with perturbations to her trunk. 2. Lucrecia Medina will be able to stand for 20 min at a time without needing a rest break allowing her to prepare meals and perform light chores without limitation. NOT MET-limited to 5 min before she has to sit due to back pain; uses a kitchen stool a lot  3. Alison's Tinetti score will improve to 19/28 indicating minimal fall risk. GOAL MET-improved to 22/28      Long Term Goals:  Time Frame: 8 weeks  1. Discharge with independent HEP ONGOING  2. Luiss Tinetti score will improve to >21/28 indicating no fall risk. GOAL MET; REVISE to improve Tinetti score to >26/28; NOT MET-improved to 26/28  3. Lucrecia Medina will be able to maintain her standing balance in all 4 conditions of the m-CTSIB, using an ankle strategy to maintain her balance. NOT MET-able to maintain standing balance in conditions 1-3; LOB after 6 sec in condition 4  4. NEW GOAL: Lucrecia Medina will demonstrate good abdominal stabilization with all transfers and dynamic gait activities to provide greater support to low back. IMPROVING   5. NEW GOAL: Lucrecia Medina will be able to complete 6 min walking test with rollator while maintaining oxygen levels 92% or above so she can go without supplemental oxygen. NOT MET-dropped to 90%       Patient Education:   [x]  HEP/Education Completed: ft placement with gait. , tighten abdominals with standing and walking,  Standing tall at wall for optimal O2 intake   [facility]  []  No new Education completed  [x]  Reviewed Prior HEP      [x]  Patient verbalized and/or demonstrated understanding of education provided. []  Patient unable to verbalize and/or demonstrate understanding of education provided. Will continue education.   [x]  Barriers to learning: n/a    PLAN:  Treatment Recommendations: Strengthening, Range of Motion, Balance Training, Endurance Training, Gait Training, Stair Training, Neuromuscular Re-education, Manual Therapy - Soft Tissue Mobilization, Manual Therapy - Joint Manipulation, Pain Management, Home Exercise Program, Patient Education and Modalities    []  Plan of care initiated. Plan to see patient 2 times per week for 8 weeks to address the treatment planned outlined above.   []  Continue with current plan of care  [x]  Modify plan of care as follows: 2 time per week   []  Hold pending physician visit  []  Discharge    Time In 1455   Time Out 1535   Timed Code Minutes: 40 min   Total Treatment Time: 40 min       Electronically Signed by: Gareth Simmonds

## 2022-01-27 ENCOUNTER — HOSPITAL ENCOUNTER (OUTPATIENT)
Dept: PHYSICAL THERAPY | Age: 72
Setting detail: THERAPIES SERIES
Discharge: HOME | End: 2022-01-27
Payer: MEDICARE

## 2022-01-27 PROCEDURE — 97530 THERAPEUTIC ACTIVITIES: CPT

## 2022-01-27 PROCEDURE — 97112 NEUROMUSCULAR REEDUCATION: CPT

## 2022-01-27 NOTE — PROGRESS NOTES
[address] [facility]  PHYSICAL THERAPY  [] EVALUATION  [] DAILY NOTE (LAND) [] DAILY NOTE (AQUATIC ) [x] PROGRESS NOTE [] DISCHARGE NOTE    [] OUTPATIENT REHABILITATION CENTER [facility]   [] Carlo     [] [facility]   [x] Tracie Huntley    Date: 2022  Patient Name:  Jessica Saenz  : 1950  MRN: [id number]  CSN: [id number]    Referring Practitioner Antonio Hirsch DO   Diagnosis Other intervertebral disc degeneration, lumbar region [M51.36]  Radiculopathy, lumbar region [M54.16]    Treatment Diagnosis Difficulty walking    Date of Evaluation 21    Additional Pertinent History Bi-polar; SOB related to CHF      Functional Outcome Measure Used Tinetti   Functional Outcome Score 15/28 (21)    (21)   (21)   (10/29/21)   (21)   (21)   (22) able to complete 6 min walk test without any rest break and maintained oxygen levels between 91-98%      Insurance: Primary: Payor: Louise Dover /  /  / , aquatic therapy is covered; modalities covered except for IONTO  Secondary:    Authorization Information:  RECEIVED [address] OF 12 VISITS  FROM 22 TO 22  CPT CODES:  48797, 03795, [address] [facility], [address] [place] [address] Av S. #[id number]   Visit # 8,  for progress note   Visits Allowed: 12   Recertification Date:    Physician Follow-Up:    Physician Orders:    History of Present Illness: Adri Haines is referred to PT to address ongoing balance issues. She states that she noticed that she was struggling with her balance when she slipped off a short deck at a local [employer]. She admits that over the last few months she has also had a few falls; denies any major injury as a result of the falls. She started to workout with a local  a month hoping that it would help her balance.       SUBJECTIVE: Knees are sore;     TREATMENT   Precautions:    210 bilat knee    X in shaded column indicates activity completed today   Modalities Parameters/  Location  Notes                     Manual Therapy Time/Technique  Notes                     Exercise/Intervention   Notes   Mariano; review of goals    x    Standing on airex: feet together x2 20 sec hold x No UE support ,CGA   Step stance 20sec 2x x Standing with head turns in horizontal and vertical plane    NuStep x6 min  x Level 5, seat 8, arms 9, 91% after Nustep   Forward step ups; side step ups  x10    Bilaterally    Airex: heel to raises, marches x15 ea  x Hold mini squats due to increased pain   3 way hip, Hs curls x15 ea   no Green band   Tandem stepping fwd/retro x2 laps ea  x    rocker board fwd/lat x15 ea  x 20 sec balance with 1UE support   wobbleboard 10x   CW/CCW, manual overpressure provided   Hurdles:   x2 ea. Green R<>L lead, reciprocal, side stepping    Stepping over 1 julio c  10x   Forward/retro, required 1UE support occasionally 2 with retro stepping over leading with L leg   Agility mat: forward step in each square; lateral step in each square; march in each square x2   Hand hold assist needed           // bars: forward and lateral walk with emphasis on taking longer, exaggerated steps  x3 ea. Sit-stands  15x  x Without UE support. Increased pain in R knee 92%  Off of large mat table at lowest level          NK table: flexion/extension x10 ea 5#  Bilaterally, cues for technique   BOSU lunges  x10      Seated ball squeezes and hip abd x15   blue band   [employer] walking  ~350 ft.        6 min walk test  x2  x Without supplemental oxygen;  91-98%; no rest break                   Specific Interventions Next Treatment: NuStep; airex balance exercises; Activity/Treatment Tolerance:  [x]  Patient tolerated treatment well  []  Patient limited by fatigue  []  Patient limited by pain   []  Patient limited by medical complications  []  Other:     Assessment:  Lucinda Valdez has demonstrated excellent improvements in endurance!!!  She is able to complete a 6 min walk test without supplemental oxygen and without a rest break! She does get short of breath, however she is recovering more quickly. At this point she is limited by back pain and muscle fatigue and will continue to benefit from her remaining authorized visits to solidify her HEP. Body Structures/Functions/Activity Limitations: impaired activity tolerance, impaired balance, impaired coordination, impaired endurance, impaired safety awareness, impaired strength and pain  Prognosis: fair    GOALS:  Patient Goal: minimize fall risk    Short Term Goals:  Time Frame: 4 weeks  1. Galindo Birmingham will demonstrate a good ankle strategy to maintain standing balance with perturbations to her trunk. 2. Galindo Birmingham will be able to stand for 20 min at a time without needing a rest break allowing her to prepare meals and perform light chores without limitation. NOT MET-limited to 5 min before she has to sit due to back pain; uses a kitchen stool a lot  3. Alison's Tinetti score will improve to 19/28 indicating minimal fall risk. GOAL MET-improved to 22/28      Long Term Goals:  Time Frame: 8 weeks  1. Discharge with independent HEP ONGOING  2. Alison's Tinetti score will improve to >21/28 indicating no fall risk. GOAL MET; REVISE to improve Tinetti score to >26/28; NOT MET-improved to 26/28  3. Galindo Birmingham will be able to maintain her standing balance in all 4 conditions of the m-CTSIB, using an ankle strategy to maintain her balance. NOT MET-able to maintain standing balance in conditions 1-3; LOB after 6 sec in condition 4  4. NEW GOAL: Galindo Birmingham will demonstrate good abdominal stabilization with all transfers and dynamic gait activities to provide greater support to low back. IMPROVING   5. NEW GOAL: Galindo Birmingham will be able to complete 6 min walking test with rollator while maintaining oxygen levels 92% or above so she can go without supplemental oxygen.  PARTIALLY MET-oxygen levels dropped to 91, however she was quick to recover to 96%       Patient Education:   [x]  HEP/Education Completed: ft placement with gait. , tighten abdominals with standing and walking,  Standing tall at wall for optimal O2 intake   [employer] Access Code:  []  No new Education completed  [x]  Reviewed Prior HEP      [x]  Patient verbalized and/or demonstrated understanding of education provided. []  Patient unable to verbalize and/or demonstrate understanding of education provided. Will continue education. [x]  Barriers to learning: n/a    PLAN:  Treatment Recommendations: Strengthening, Range of Motion, Balance Training, Endurance Training, Gait Training, Stair Training, Neuromuscular Re-education, Manual Therapy - Soft Tissue Mobilization, Manual Therapy - Joint Manipulation, Pain Management, Home Exercise Program, Patient Education and Modalities    []  Plan of care initiated. Plan to see patient 2 times per week for 8 weeks to address the treatment planned outlined above.   []  Continue with current plan of care  [x]  Modify plan of care as follows: 2 time per week   []  Hold pending physician visit  []  Discharge    Time In 1345   Time Out 1415   Timed Code Minutes: 30 min   Total Treatment Time: 30 min       Electronically Signed by: Radha Rubio, MINE Carver [id number]"Ray Barraza, DPT  [id number]

## 2022-02-01 ENCOUNTER — HOSPITAL ENCOUNTER (OUTPATIENT)
Dept: PHYSICAL THERAPY | Age: 72
Setting detail: THERAPIES SERIES
Discharge: HOME | End: 2022-02-01
Payer: MEDICARE

## 2022-02-01 PROCEDURE — 97110 THERAPEUTIC EXERCISES: CPT

## 2022-02-01 PROCEDURE — 97116 GAIT TRAINING THERAPY: CPT

## 2022-02-01 PROCEDURE — 97530 THERAPEUTIC ACTIVITIES: CPT

## 2022-02-01 NOTE — PROGRESS NOTES
[address] [facility]  PHYSICAL THERAPY  [] EVALUATION  [x] DAILY NOTE (LAND) [] DAILY NOTE (AQUATIC ) [] PROGRESS NOTE [] DISCHARGE NOTE    [] [facility]   [] Carlo [address]    [] [facility]   [x] Luis Media    Date: 2022  Patient Name:  Corine Briseno  : 1950  MRN: [id number]  CSN: [id number]    Referring Practitioner Jojo Navarro DO   Diagnosis Other intervertebral disc degeneration, lumbar region [M51.36]  Radiculopathy, lumbar region [M54.16]    Treatment Diagnosis Difficulty walking    Date of Evaluation 21    Additional Pertinent History Bi-polar; SOB related to CHF      Functional Outcome Measure Used Tinetti   Functional Outcome Score 15/28 (21)    (21)   (21)   (10/29/21)   (21)   (21)   (22) able to complete 6 min walk test without any rest break and maintained oxygen levels between 91-98%      Insurance: Primary: Payor: Bryan Mcrae /  /  / , aquatic therapy is covered; modalities covered except for IONTO  Secondary:    Authorization Information:  RECEIVED [address] OF 12 VISITS  FROM 22 TO 22  CPT CODES:  10350, 36756, , [address] #[id number]   Visit # 8,  for progress note   Visits Allowed: 12   Recertification Date:    Physician Follow-Up:    Physician Orders:    History of Present Illness: Mary Salazar is referred to PT to address ongoing balance issues. She states that she noticed that she was struggling with her balance when she slipped off a short deck at a local [facility]. She admits that over the last few months she has also had a few falls; denies any major injury as a result of the falls. She started to workout with a local  a month hoping that it would help her balance. SUBJECTIVE: Knees and back are sore today.      TREATMENT   Precautions:    2/10 bilat knee, LB    X in shaded column indicates activity completed today   Modalities Parameters/  Location  Notes                     Manual Therapy Time/Technique  Notes                     Exercise/Intervention   Notes   Tinjulianne; review of goals    x    Standing on airex: feet together x2 20 sec hold x No UE support ,CGA   Step stance 20sec 2x x Standing with head turns in horizontal and vertical plane    NuStep x6 min  x Level 5, seat 8, arms 9, 91% after Nustep   Forward step ups; side step ups  x10    Bilaterally    Airex: heel to raises, marches x15 ea  x Hold mini squats due to increased pain   3 way hip, Hs curls x15 ea  x no Green band   Tandem stepping fwd/retro x2 laps ea  x    rocker board fwd/lat x15 ea  x 20 sec balance with 1UE support   wobbleboard 10x   CW/CCW, manual overpressure provided   Hurdles:   x2 ea. Green R<>L lead, reciprocal, side stepping    Stepping over 1 juli oc  10x   Forward/retro, required 1UE support occasionally 2 with retro stepping over leading with L leg   Agility mat: forward step in each square; lateral step in each square; march in each square x2   Hand hold assist needed           // bars: forward and lateral walk with emphasis on taking longer, exaggerated steps  x3 ea. Sit-stands  15x  x Without UE support. Increased pain in R knee 92%  Off of large mat table at lowest level          NK table: flexion/extension x10 ea 5#  Bilaterally, cues for technique   BOSU lunges  x10      Seated ball squeezes and hip abd x15   blue band   [employer] walking  ~350 ft.        6 min walk test  x2  x Without supplemental oxygen;  91-98%; no rest break                   Specific Interventions Next Treatment: NuStep; airex balance exercises;      Activity/Treatment Tolerance:  [x]  Patient tolerated treatment well  []  Patient limited by fatigue  []  Patient limited by pain   []  Patient limited by medical complications  []  Other:     Assessment:  Vita May  is able to complete a 6 min walk test without supplemental oxygen and without a rest break. O2 levels during walk test =97 to 91%      Body Structures/Functions/Activity Limitations: impaired activity tolerance, impaired balance, impaired coordination, impaired endurance, impaired safety awareness, impaired strength and pain  Prognosis: fair    GOALS:  Patient Goal: minimize fall risk    Short Term Goals:  Time Frame: 4 weeks  1. Lucinda Valdez will demonstrate a good ankle strategy to maintain standing balance with perturbations to her trunk. 2. Lucinda Valdez will be able to stand for 20 min at a time without needing a rest break allowing her to prepare meals and perform light chores without limitation. NOT MET-limited to 5 min before she has to sit due to back pain; uses a kitchen stool a lot  3. Alison's Tinetti score will improve to 19/28 indicating minimal fall risk. GOAL MET-improved to 22/28      Long Term Goals:  Time Frame: 8 weeks  1. Discharge with independent HEP ONGOING  2. Alison's Tinetti score will improve to >21/28 indicating no fall risk. GOAL MET; REVISE to improve Tinetti score to >26/28; NOT MET-improved to 26/28  3. Lucinda Valdez will be able to maintain her standing balance in all 4 conditions of the m-CTSIB, using an ankle strategy to maintain her balance. NOT MET-able to maintain standing balance in conditions 1-3; LOB after 6 sec in condition 4  4. NEW GOAL: Lucinda Valdez will demonstrate good abdominal stabilization with all transfers and dynamic gait activities to provide greater support to low back. IMPROVING   5. NEW GOAL: Lucinda Valdez will be able to complete 6 min walking test with rollator while maintaining oxygen levels 92% or above so she can go without supplemental oxygen. PARTIALLY MET-oxygen levels dropped to 91, however she was quick to recover to 96%       Patient Education:   [x]  HEP/Education Completed: ft placement with gait. , tighten abdominals with standing and walking,  Standing tall at wall for optimal O2 intake   [facility] Access Code:  []  No new Education completed  [x]  Reviewed Prior HEP      [x]  Patient verbalized and/or demonstrated understanding of education provided. []  Patient unable to verbalize and/or demonstrate understanding of education provided. Will continue education. [x]  Barriers to learning: n/a    PLAN:  Treatment Recommendations: Strengthening, Range of Motion, Balance Training, Endurance Training, Gait Training, Stair Training, Neuromuscular Re-education, Manual Therapy - Soft Tissue Mobilization, Manual Therapy - Joint Manipulation, Pain Management, Home Exercise Program, Patient Education and Modalities    []  Plan of care initiated. Plan to see patient 2 times per week for 8 weeks to address the treatment planned outlined above.   []  Continue with current plan of care  [x]  Modify plan of care as follows: 2 time per week   []  Hold pending physician visit  []  Discharge    Time In 1425   Time Out 1510   Timed Code Minutes: 45 min   Total Treatment Time: 45 min       Electronically Signed by: Rosina Olivier

## 2022-02-06 NOTE — PROGRESS NOTES
[facility]    Patient: Amber Caceres, 70 y.o.   : 1950  3/8/2022    Patient of DAVID Samuel CNP     Subjective     Chief Complaint   Patient presents with    Follow-up     2 month RLD with MIP/MEP (3/3/22)        HPI  Alexys aLra is here for follow up for shortness of breath and restrictive lung disease. At patient's last appointment, she reported that she was having testing for memory loss and was planning to obtain an MRI of her brain. Her MRI brain revealed no acute intracranial process. She had a 6 MWT at her last appointment and continued to required 1 lpm on exertion. Patient completed speech therapy for dysphagia. Patient's previous Echo demonstrated Grade I Diastolic Dysfunction, EF 24-59%, dilation of ascending aorta with diameter of 4.1 cm, and normal right ventricle. Patient had a 6 MWT with physical therapy and no longer required supplemental O2. Her O2 was thus discontinued. Patient is here with MIP & MEP that were normal. Patient reports that breathing has been doing very well. She has been completing multiple 6 MWTs at physical therapy. She reports that she has a dry cough with occasional clear sputum production. She reports some shortness of breath. Denies wheezing, chest tightness, hemoptysis, chest pain palpitations, leg swelling, or allergy symptoms. Her past medical history is significant for hypertension, thyroid disease, mood disorder, allergenic asthma as a child and young adult (never was treated with inhalers), AAA 4 cm (following with Dr. Boni Epley), CHF (follows CHF clinic), arthritis, reflux, bipolar 1, and CKD (follows Dr. Jeremy Figueroa).     MMRC Dyspnea Scale:   0: Dyspneic on strenuous exercise  1: Dyspneic on walking a slight hill  2: Dyspneic on walking level ground; must stop occasionally due to breathlessness  3: Must stop for breathlessness after walking 100 yards or after a few minutes  4: Cannot leave house; breathless on dressing/undressing    MMRC dyspnea score: 1    Progress History:   Since last visit any new medical issues? No  New ER or hospital visits? No  Any new or changes in medicines? Yes Harden Binder - for memory loss and steroid injection for her back  Using inhalers? No  Last PFT: 2021 - restriction with air trapping and normal DLCO  Last 6 MWT: 2021 - 1 lpm on exertion     Smoking History:  Never smoker  Denies passive tobacco exposure from parents or work environment.     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 (Coal, dust, asbestos, molds ie Hay)  [place]. Denies exposure to pets/animals at home. Denies exposure to tuberculosis.   Denies family history of ALS, GBS, myasthenia gravis, myotonic dystrophy, MS, or spinal muscular atrophy.      Flu vaccine: does not wish to receive  Pneumonia vaccine: Not vaccinated  COVID-19 vaccine: Vaccinated & booster on 2021 and tested positive in 2021, but did not have symptoms  Past Medical hx   PMH:  Past Medical History:   Diagnosis Date    Acid reflux     Arthritis     Asthma     Bipolar 1 disorder ([address]     CHF (congestive heart failure) ([address]     CKD (chronic kidney disease), stage II 2019    History of blood transfusion 2004    Hypertension     Lumbosacral radiculopathy 2021    Mood disorder ([address]     Pneumonia 2021    Sleep apnea     Thyroid disease      SURGICAL HISTORY:  Past Surgical History:   Procedure Laterality Date    ANKLE SURGERY      left    CATARACT REMOVAL      Both eyes      SECTION      x 3    FOOT SURGERY      Left     HIP SURGERY      HYSTERECTOMY      Total     TOTAL KNEE ARTHROPLASTY Left 10/14/14    TOTAL KNEE ARTHROPLASTY Right 2019     SOCIAL HISTORY:  Social History     Tobacco Use    Smoking status: Never Smoker    Smokeless tobacco: Never Used   Vaping Use    Vaping Use: Never used   Substance Use Topics    Alcohol use: No     Alcohol/week: 0.0 standard drinks     Comment: rarely    Drug use: No     ALLERGIES:  Allergies   Allergen Reactions    Ace Inhibitors Anaphylaxis    Prednisone Other (See Comments)     Other reaction(s): Manic Behavior  **oral**      Codeine Hives and Nausea And Vomiting    Penicillins Hives    Lotrel [Amlodipine Besy-Benazepril Hcl] Swelling    Typhoid Vaccines     Adhesive Tape      Other reaction(s): Rash    Onion Nausea And Vomiting    Typhoid Vaccine, Live      Other reaction(s): unknown    Wound Dressing Adhesive Rash     tape     FAMILY HISTORY:  Family History   Problem Relation Age of Onset    Diabetes Mother     High Blood Pressure Father     Cancer Father     Other Brother     Diabetes Brother     Cancer Sister     Breast Cancer Sister 39    Cancer Brother      CURRENT MEDICATIONS:  Current Outpatient Medications   Medication Sig Dispense Refill    rivastigmine (EXELON) 3 MG capsule Take 3 mg by mouth 2 times daily      spironolactone (ALDACTONE) 25 MG tablet Take 1 tablet by mouth daily 90 tablet 3    SYNTHROID 125 MCG tablet Take 1 tablet by mouth daily Take with water on an empty stomach- wait 30 minutes before eating or taking other meds.  30 tablet 11    hydrALAZINE (APRESOLINE) 50 MG tablet Take 1 tablet by mouth 3 times daily 90 tablet 11    atorvastatin (LIPITOR) 20 MG tablet Take 1 tablet by mouth daily 90 tablet 3    cetirizine (ZYRTEC) 10 MG tablet Take 10 mg by mouth daily      vitamin E 200 units capsule Take 200 Units by mouth daily      METAMUCIL FIBER PO Take by mouth      NONFORMULARY daily colase 100 mg 1 am and 2 in pm      carvedilol (COREG) 25 MG tablet Take 1 tablet by mouth 2 times daily 180 tablet 3    omeprazole (PRILOSEC) 20 MG delayed release capsule Take 1 capsule by mouth every morning (before breakfast) 90 capsule 3    bumetanide (BUMEX) 1 MG tablet 1-2 tabs daily as needed. 180 tablet 3    ARIPiprazole (ABILIFY) 5 MG tablet Take 2 tablets by mouth daily 30 tablet 3    OLANZapine (ZYPREXA) 15 MG tablet Take 15 mg by mouth daily      aspirin 81 MG EC tablet Take 81 mg by mouth daily      acetaminophen (TYLENOL) 325 MG tablet Take 650 mg by mouth 4 times daily       ferrous sulfate 325 (65 Fe) MG tablet Take 325 mg by mouth daily (with breakfast)      clonazePAM (KLONOPIN) 1 MG tablet Take 1 mg by mouth 3 times daily as needed.  venlafaxine (EFFEXOR XR) 150 MG extended release capsule Take 225 mg by mouth daily       gabapentin (NEURONTIN) 300 MG capsule Take 300 mg by mouth 3 times daily.  tiZANidine (ZANAFLEX) 2 MG tablet Take 2 mg by mouth 2 times daily      Mirabegron ER (MYRBETRIQ) 50 MG TB24 Take 50 mg by mouth daily      lamoTRIgine (LAMICTAL) 100 MG tablet Take 100 mg by mouth daily Patient taking 1 tablet TID      Ascorbic Acid (VITAMIN C) 500 MG tablet Take 500 mg by mouth daily.  Multiple Vitamin (MULTIVITAMIN PO) Take 1 tablet by mouth daily. No current facility-administered medications for this visit. [facility]   Review of Systems   Constitutional: Negative for appetite change, fever and unexpected weight change. HENT: Negative for congestion, postnasal drip, rhinorrhea, sinus pressure, sinus pain and sneezing. Respiratory: Positive for cough (clear sputum production) and shortness of breath. Negative for chest tightness and wheezing. Denies hemoptysis   Cardiovascular: Negative for chest pain, palpitations and leg swelling. Follows with Dr. Harry Purcell and CHF clinic   Musculoskeletal:        Walks with walker   Allergic/Immunologic: Negative for environmental allergies.         Physical exam   /78 (Site: Right Lower Arm, Position: Sitting, Cuff Size: Medium Adult)   Pulse 91   Temp 98 °F (36.7 °C) (Oral)   Ht 5' 4\" (1.626 m)   Wt 247 lb 12.8 oz (112.4 kg)   SpO2 93%   BMI 42.53 kg/m² Wt Readings from Last 3 Encounters:   03/08/22 247 lb 12.8 oz (112.4 kg)   01/12/22 248 lb 12.8 oz (112.9 kg)   12/16/21 248 lb (112.5 kg)       Physical Exam  Constitutional:       Appearance: She is well-developed. She is obese. Comments: BMI 42.5   HENT:      Right Ear: External ear normal.      Left Ear: External ear normal.      Mouth/Throat:      Mouth: Mucous membranes are moist.      Pharynx: Oropharynx is clear. No oropharyngeal exudate. Eyes:      General:         Right eye: No discharge. Left eye: No discharge. Cardiovascular:      Rate and Rhythm: Normal rate and regular rhythm. Pulmonary:      Effort: Pulmonary effort is normal. No respiratory distress. Breath sounds: No wheezing, rhonchi or rales. Chest:      Chest wall: No tenderness. Abdominal:      General: Bowel sounds are normal.   Musculoskeletal:      Cervical back: Neck supple. Right lower leg: No edema. Left lower leg: No edema. Skin:     General: Skin is warm and dry. Neurological:      General: No focal deficit present. Mental Status: She is alert. Psychiatric:         Mood and Affect: Mood normal.         Behavior: Behavior normal.         Thought Content: Thought content normal.         Judgment: Judgment normal.          Results   Lung Nodule Screening     [] Qualifies    [x] Does not qualify   [] Declined    [] Completed  Non-smoker   The USPSTF recommends annual screening for lung cancer with low-dose computed tomography (LDCT) in adults aged 48 to [de-identified] years who have a 20 pack-year smoking history and currently smoke or have quit within the past 15 years. Screening should be discontinued once a person has not smoked for 15 years or develops a health problem that substantially limits life expectancy or the ability or willingness to have curative lung surgery. MIP & MEP 3/3/2022 (Reviewed) normal    Assessment      Diagnosis Orders   1.  Restrictive lung disease  Full PFT Study With Bronchodilator   2. Multiple lung nodules on CT     3. Grade I diastolic dysfunction     4. CKD (chronic kidney disease), stage II     5. RANDY on CPAP           Plan   1. Restrictive lung disease  - Symptoms are much better controlled. She has been weaned off of supplemental O2.  - Follow restrictive lung disease on Full pulmonary function test in about 4 months  - Patient declines flu and pneumonia vaccines  - COVID-19 vaccine up to date     2. Multiple lung nodules on CT  - Patient has repeat CTA Chest ordered for October 2022, will re-evaluate lung nodules on CTA Chest    3. Grade I diastolic dysfunction  - Advised patient to continue to follow with cardiologist, Dr. Dia Kelley patient to take daily weights and to call cardiologist with weight gain of 3 lbs in 1 day or 5 lbs in 1 week     4. CKD (chronic kidney disease), stage II  - Continue with Dr. Chaya Burnette    5. RANDY on CPAP  - Advised patient to continue PAP therapy at nighttime and continue to follow with TREMAYNE Montalvo in the sleep clinic. Advised patient to call office with any changes, questions, or concerns regarding respiratory status or issues with prescribed medications    Return in about 4 months (around 7/8/2022) for RLD with PFT prior. I spent a total of 20 minutes on the day of the visit. Time spent included review of previous notes and test results and face to face time with the patient discussing diagnosis and importance of compliance with the treatment plan. Time spent also includes documentation on the day of the visit.     Electronically signed by DAVID Ferguson CNP on 3/8/2022 at 1:52 PM

## 2022-02-08 ENCOUNTER — HOSPITAL ENCOUNTER (OUTPATIENT)
Dept: PHYSICAL THERAPY | Age: 72
Setting detail: THERAPIES SERIES
Discharge: HOME | End: 2022-02-08
Payer: MEDICARE

## 2022-02-08 PROCEDURE — 97110 THERAPEUTIC EXERCISES: CPT

## 2022-02-08 PROCEDURE — 97530 THERAPEUTIC ACTIVITIES: CPT

## 2022-02-08 NOTE — PROGRESS NOTES
[facility]  PHYSICAL THERAPY  [] EVALUATION  [x] DAILY NOTE (LAND) [] DAILY NOTE (AQUATIC ) [] PROGRESS NOTE [] DISCHARGE NOTE    [] [facility]   [] [address]    [] [address] [facility]   [x] Trina Juarez    Date: 2022  Patient Name:  Blaine Wilder  : 1950  MRN: [id number]  CSN: [id number]    Referring Practitioner Estelita Olson DO   Diagnosis Other intervertebral disc degeneration, lumbar region [M51.36]  Radiculopathy, lumbar region [M54.16]    Treatment Diagnosis Difficulty walking    Date of Evaluation 21    Additional Pertinent History Bi-polar; SOB related to CHF      Functional Outcome Measure Used Tinetti   Functional Outcome Score 15/28 (21)    (21)   (21)   (10/29/21)   (21)   (21)   (22) able to complete 6 min walk test without any rest break and maintained oxygen levels between 91-98%      Insurance: Primary: Payor: Meño Briggs /  /  / , aquatic therapy is covered; modalities covered except for IONTO  Secondary:    Authorization Information:  RECEIVED [address] OF 12 VISITS  FROM 22 TO 22  CPT CODES:  17192, [address], [address] [place] [address], [address] [place] [address] [id number]   Visit # 9,  for progress note   Visits Allowed: 12   Recertification Date:    Physician Follow-Up:    Physician Orders:    History of Present Illness: Rupal Field is referred to PT to address ongoing balance issues. She states that she noticed that she was struggling with her balance when she slipped off a short deck at a local [employer] [employer] [facility] [place]. She admits that over the last few months she has also had a few falls; denies any major injury as a result of the falls. She started to workout with a local  a month hoping that it would help her balance. SUBJECTIVE: Pulmonologist cleared her to get rid of supplemental oxygen based on our notes with therapy. Standing tolerance is limited to 15 min before her back starts to seize up on her. Scheduled to see back MD on Thursday to discuss another shot     TREATMENT   Precautions:    2/10 bilat knee, LB    X in shaded column indicates activity completed today   Modalities Parameters/  Location  Notes                     Manual Therapy Time/Technique  Notes                     Exercise/Intervention   Notes   Mariano; review of goals        Standing on airex: feet together x2 20 sec hold x No UE support ,CGA   Step stance 20sec 2x x Standing with head turns in horizontal and vertical plane    NuStep x6 min  x Level 5, seat 8, arms 9, 91% after Nustep   Forward step ups; side step ups  x10    Bilaterally    Airex: heel to raises, marches x15 ea  x Hold mini squats due to increased pain   3 way hip, Hs curls x15 ea  x no Green band   Tandem stepping fwd/retro x2 laps ea      rocker board fwd/lat x15 ea   20 sec balance with 1UE support   wobbleboard 10x   CW/CCW, manual overpressure provided   Hurdles:   x2 ea. Green R<>L lead, reciprocal, side stepping    Stepping over 1 julio c  10x   Forward/retro, required 1UE support occasionally 2 with retro stepping over leading with L leg   Agility mat: forward step in each square; lateral step in each square; march in each square x2   Hand hold assist needed           // bars: forward and lateral walk with emphasis on taking longer, exaggerated steps  x3 ea. Sit-stands  15x   Without UE support.  Increased pain in R knee 92%  Off of large mat table at lowest level          NK table: flexion/extension x10 ea 5#  Bilaterally, cues for technique   BOSU lunges  x10      Seated ball squeezes and hip abd x15  x blue band   [employer] walking  ~350 ft.        6 min walk test  x2   Without supplemental oxygen;  91-98%; no rest break   Core strengthening exercises: TA activation; pelvic tilt 10x  x    Hamstring; SKTC; piriformis stretches  3x  x      Specific Interventions Next Treatment: NuStep; airex balance exercises; Activity/Treatment Tolerance:  [x]  Patient tolerated treatment well  []  Patient limited by fatigue  []  Patient limited by pain   []  Patient limited by medical complications  []  Other:     Assessment:  Greater emphasis on core strengthening/stabilization today. Will see her back MD on Thursday and will check in again later this week. Body Structures/Functions/Activity Limitations: impaired activity tolerance, impaired balance, impaired coordination, impaired endurance, impaired safety awareness, impaired strength and pain  Prognosis: fair    GOALS:  Patient Goal: minimize fall risk    Short Term Goals:  Time Frame: 4 weeks  1. Flora Staples will demonstrate a good ankle strategy to maintain standing balance with perturbations to her trunk. 2. Flora Staples will be able to stand for 20 min at a time without needing a rest break allowing her to prepare meals and perform light chores without limitation. NOT MET-limited to 5 min before she has to sit due to back pain; uses a kitchen stool a lot  3. Alison's Tinetti score will improve to 19/28 indicating minimal fall risk. GOAL MET-improved to 22/28      Long Term Goals:  Time Frame: 8 weeks  1. Discharge with independent HEP ONGOING  2. Alison's Tinetti score will improve to >21/28 indicating no fall risk. GOAL MET; REVISE to improve Tinetti score to >26/28; NOT MET-improved to 26/28  3. Flora Staples will be able to maintain her standing balance in all 4 conditions of the m-CTSIB, using an ankle strategy to maintain her balance. NOT MET-able to maintain standing balance in conditions 1-3; LOB after 6 sec in condition 4  4. NEW GOAL: Flora Staples will demonstrate good abdominal stabilization with all transfers and dynamic gait activities to provide greater support to low back.  IMPROVING   5. NEW GOAL: Flora Staples will be able to complete 6 min walking test with rollator while maintaining oxygen levels 92% or above so she can go without supplemental oxygen. PARTIALLY MET-oxygen levels dropped to 91, however she was quick to recover to 96%       Patient Education:   [x]  HEP/Education Completed: ft placement with gait. , tighten abdominals with standing and walking,  Standing tall at wall for optimal O2 intake   Medbridge Access Code:  []  No new Education completed  [x]  Reviewed Prior HEP      [x]  Patient verbalized and/or demonstrated understanding of education provided. []  Patient unable to verbalize and/or demonstrate understanding of education provided. Will continue education. [x]  Barriers to learning: n/a    PLAN:  Treatment Recommendations: Strengthening, Range of Motion, Balance Training, Endurance Training, Gait Training, Stair Training, Neuromuscular Re-education, Manual Therapy - Soft Tissue Mobilization, Manual Therapy - Joint Manipulation, Pain Management, Home Exercise Program, Patient Education and Modalities    []  Plan of care initiated. Plan to see patient 2 times per week for 8 weeks to address the treatment planned outlined above.   []  Continue with current plan of care  [x]  Modify plan of care as follows: 2 time per week   []  Hold pending physician visit  []  Discharge    Time In 1350   Time Out 1425   Timed Code Minutes: 35 min   Total Treatment Time: 35 min       Electronically Signed by: Derek Pride, MINE [id number]"Taya Mckeon DPT  [id number]

## 2022-02-10 ENCOUNTER — APPOINTMENT (OUTPATIENT)
Dept: PHYSICAL THERAPY | Age: 72
End: 2022-02-10
Payer: MEDICARE

## 2022-02-10 ENCOUNTER — TELEPHONE (OUTPATIENT)
Dept: PULMONOLOGY | Age: 72
End: 2022-02-10

## 2022-02-11 ENCOUNTER — HOSPITAL ENCOUNTER (OUTPATIENT)
Dept: PHYSICAL THERAPY | Age: 72
Setting detail: THERAPIES SERIES
Discharge: HOME | End: 2022-02-11
Payer: MEDICARE

## 2022-02-11 PROCEDURE — 97530 THERAPEUTIC ACTIVITIES: CPT

## 2022-02-11 PROCEDURE — 97110 THERAPEUTIC EXERCISES: CPT

## 2022-02-11 NOTE — PROGRESS NOTES
[address] [facility]  PHYSICAL THERAPY  [] EVALUATION  [x] DAILY NOTE (LAND) [] DAILY NOTE (AQUATIC ) [] PROGRESS NOTE [] DISCHARGE NOTE    [] [address] [facility]   [] [address]    [] [address]   [x] Albino Slate    Date: 2022  Patient Name:  Isabella Kwon  : 1950  MRN: [id number]  CSN: [id number]    Referring Practitioner Tariq Coombs DO   Diagnosis Other intervertebral disc degeneration, lumbar region [M51.36]  Radiculopathy, lumbar region [M54.16]    Treatment Diagnosis Difficulty walking    Date of Evaluation 21    Additional Pertinent History Bi-polar; SOB related to CHF      Functional Outcome Measure Used Tinetti   Functional Outcome Score 15/28 (21)    (21)   (21)   (10/29/21)   (21)   (21)   (22) able to complete 6 min walk test without any rest break and maintained oxygen levels between 91-98%      Insurance: Primary: Payor: Billie Baker /  /  / , aquatic therapy is covered; modalities covered except for IONTO  Secondary:    Authorization Information:  RECEIVED [address] OF 12 VISITS  FROM 22 TO 22  CPT CODES:  39261, 19100, [id number], [address] #[id number]   Visit # 10, 10/12 for progress note   Visits Allowed: 12   Recertification Date: 58   Physician Follow-Up:    Physician Orders:    History of Present Illness: Nadine Vivar is referred to PT to address ongoing balance issues. She states that she noticed that she was struggling with her balance when she slipped off a short deck at a local [employer]. She admits that over the last few months she has also had a few falls; denies any major injury as a result of the falls. She started to workout with a local  a month hoping that it would help her balance. SUBJECTIVE: Reports having lower energy today. O2 = 94% at start.     TREATMENT   Precautions:    0/10 bilat knee, LB    X in shaded column indicates activity completed today   Modalities Parameters/  Location  Notes                     Manual Therapy Time/Technique  Notes                     Exercise/Intervention   Notes   Mariano; review of goals        Standing on airex: feet together x2 20 sec hold x No UE support ,CGA   Step stance 20sec 2x x Standing with head turns in horizontal and vertical plane    NuStep x6 min  x Level 5, seat 8, arms 9, 91% after Nustep   Forward step ups; side step ups  x10   x Bilaterally    Airex: heel to raises, marches x15 ea  x Hold mini squats due to increased pain   3 way hip, Hs curls x15 ea  x no Green band   Tandem stepping fwd/retro x2 laps ea      rocker board fwd/lat x15 ea   20 sec balance with 1UE support   wobbleboard 10x   CW/CCW, manual overpressure provided   Hurdles:   x2 ea. Green R<>L lead, reciprocal, side stepping    Stepping over 1 julio c  10x   Forward/retro, required 1UE support occasionally 2 with retro stepping over leading with L leg   Agility mat: forward step in each square; lateral step in each square; march in each square x2   Hand hold assist needed           // bars: forward and lateral walk with emphasis on taking longer, exaggerated steps  x3 ea. Sit-stands  15x   Without UE support. Increased pain in R knee 92%  Off of large mat table at lowest level          NK table: flexion/extension x10 ea 5#  Bilaterally, cues for technique   BOSU lunges  x10      Seated ball squeezes and hip abd x15  x blue band   [employer] walking  ~350 ft.        6 min walk test  x1  x Without supplemental oxygen;  91-98%; no rest break   Core strengthening exercises: TA activation; pelvic tilt 10x  x    Hamstring; SKTC; piriformis stretches  3x  x      Specific Interventions Next Treatment: NuStep; airex balance exercises;      Activity/Treatment Tolerance:  [x]  Patient tolerated treatment well  []  Patient limited by fatigue  []  Patient limited by pain   [] Patient limited by medical complications  []  Other:     Assessment:  Continued emphasis on core strengthening/stabilization this session. Body Structures/Functions/Activity Limitations: impaired activity tolerance, impaired balance, impaired coordination, impaired endurance, impaired safety awareness, impaired strength and pain  Prognosis: fair    GOALS:  Patient Goal: minimize fall risk    Short Term Goals:  Time Frame: 4 weeks  1. Daniel Hairston will demonstrate a good ankle strategy to maintain standing balance with perturbations to her trunk. 2. Daniel Hairston will be able to stand for 20 min at a time without needing a rest break allowing her to prepare meals and perform light chores without limitation. NOT MET-limited to 5 min before she has to sit due to back pain; uses a kitchen stool a lot  3. Alison's Tinetti score will improve to 19/28 indicating minimal fall risk. GOAL MET-improved to 22/28      Long Term Goals:  Time Frame: 8 weeks  1. Discharge with independent HEP ONGOING  2. Alison's Tinetti score will improve to >21/28 indicating no fall risk. GOAL MET; REVISE to improve Tinetti score to >26/28; NOT MET-improved to 26/28  3. Daniel Hairston will be able to maintain her standing balance in all 4 conditions of the m-CTSIB, using an ankle strategy to maintain her balance. NOT MET-able to maintain standing balance in conditions 1-3; LOB after 6 sec in condition 4  4. NEW GOAL: Daniel Hairston will demonstrate good abdominal stabilization with all transfers and dynamic gait activities to provide greater support to low back. IMPROVING   5. NEW GOAL: Daniel Hairston will be able to complete 6 min walking test with rollator while maintaining oxygen levels 92% or above so she can go without supplemental oxygen. PARTIALLY MET-oxygen levels dropped to 91, however she was quick to recover to 96%       Patient Education:   [x]  HEP/Education Completed: ft placement with gait. , tighten abdominals with standing and walking,  Standing tall at wall for optimal O2 intake   [facility] Access Code:  []  No new Education completed  [x]  Reviewed Prior HEP      [x]  Patient verbalized and/or demonstrated understanding of education provided. []  Patient unable to verbalize and/or demonstrate understanding of education provided. Will continue education. [x]  Barriers to learning: n/a    PLAN:  Treatment Recommendations: Strengthening, Range of Motion, Balance Training, Endurance Training, Gait Training, Stair Training, Neuromuscular Re-education, Manual Therapy - Soft Tissue Mobilization, Manual Therapy - Joint Manipulation, Pain Management, Home Exercise Program, Patient Education and Modalities    []  Plan of care initiated. Plan to see patient 2 times per week for 8 weeks to address the treatment planned outlined above.   []  Continue with current plan of care  [x]  Modify plan of care as follows: 2 time per week   []  Hold pending physician visit  []  Discharge    Time In 0920   Time Out 1000   Timed Code Minutes: 40 min   Total Treatment Time: 40 min       Electronically Signed by: Niki Amezquita

## 2022-03-01 ENCOUNTER — HOSPITAL ENCOUNTER (OUTPATIENT)
Dept: PHYSICAL THERAPY | Age: 72
Setting detail: THERAPIES SERIES
Discharge: HOME | End: 2022-03-01
Payer: MEDICARE

## 2022-03-03 ENCOUNTER — HOSPITAL ENCOUNTER (OUTPATIENT)
Dept: PULMONOLOGY | Age: 72
Discharge: HOME | End: 2022-03-03
Payer: MEDICARE

## 2022-03-03 DIAGNOSIS — J98.4 RESTRICTIVE LUNG DISEASE: ICD-10-CM

## 2022-03-03 DIAGNOSIS — R06.02 SOB (SHORTNESS OF BREATH): ICD-10-CM

## 2022-03-03 PROCEDURE — 94799 UNLISTED PULMONARY SVC/PX: CPT

## 2022-03-03 NOTE — PROGRESS NOTES
Prescreening performed prior to testing. The following symptoms may indicate COVID-19 infection:        One of the following criteria:   Temperature taken, patient temperature was 97.3 F. Fever greater 100.0 F -no  New onset cough -  no  New onset shortness of breath -no  New onset difficulty breathing -no        And/or   Two or more of the following criteria:  New onset muscle aches -no  New onset headache -no  New onset sore throat -no  New onset loss of smell/taste -no  New onset diarrhea -no    Patient's screening was negative. PFT will be performed.

## 2022-03-04 ENCOUNTER — HOSPITAL ENCOUNTER (OUTPATIENT)
Dept: PHYSICAL THERAPY | Age: 72
Setting detail: THERAPIES SERIES
Discharge: HOME | End: 2022-03-04
Payer: MEDICARE

## 2022-03-04 PROCEDURE — 97110 THERAPEUTIC EXERCISES: CPT

## 2022-03-04 PROCEDURE — 97530 THERAPEUTIC ACTIVITIES: CPT

## 2022-03-04 NOTE — PROGRESS NOTES
** PLEASE SIGN, DATE AND TIME CERTIFICATION BELOW AND RETURN TO [facility] OUTPATIENT REHABILITATION (FAX #: [phone]). ATTEST/CO-SIGN IF ACCESSING VIA INBitAccessET. THANK YOU.**    I certify that I have examined the patient below and determined that Physical Medicine and Rehabilitation service is necessary and that I approve the established plan of care for up to 90 days or as specifically noted.   Attestation, signature or co-signature of physician indicates approval of certification requirements.    ________________________ ____________ __________  Physician Signature   Date   Time      [address]  [] EVALUATION  [] DAILY NOTE (LAND) [] DAILY NOTE (AQUATIC ) [x] PROGRESS NOTE [] DISCHARGE NOTE    [] [address] [facility]   [] [address]    [] [address] [facility]   [x] Colleen Vera    Date: 3/4/2022  Patient Name:  Italo Fisher  : 1950  MRN: [id number]  CSN: [id number]    Referring Practitioner Koko Babin DO   Diagnosis Other intervertebral disc degeneration, lumbar region [M51.36]  Radiculopathy, lumbar region [M54.16]    Treatment Diagnosis Difficulty walking    Date of Evaluation 21    Additional Pertinent History Bi-polar; SOB related to CHF      Functional Outcome Measure Used Tinetti   Functional Outcome Score 15/28 (21)   2428 (21)   (21)   (10/29/21)   (21)   (21)   (22) able to complete 6 min walk test without any rest break and maintained oxygen levels between 91-98%   (3/4/22)      Insurance: Primary: Payor: Rogelio Shultz /  /  / , aquatic therapy is covered; modalities covered except for IONTO  Secondary:    Authorization Information:  TOTAL  UNITS    FROM 22 TO 22  CPT CODES:  72432, 99867, [id number], 34910, 75518, 05550,  REF# [id number]     Visit # 1, 0/10 for progress note   Visits Allowed: 40 or 60 visits (120 units total)    Recertification Date: April 29, 22   Physician Follow-Up:    Physician Orders:    History of Present Illness: Arvin Curling is referred to PT to address ongoing balance issues. She states that she noticed that she was struggling with her balance when she slipped off a short deck at a local [employer]. She admits that over the last few months she has also had a few falls; denies any major injury as a result of the falls. She started to workout with a local  a month hoping that it would help her balance. SUBJECTIVE: Yesterday she was able to walk/shop for 10 min without needing to sit or use her rollator!! She had another injection in her back a week ago    TREATMENT   Precautions:    0/10 bilat knee, LB    X in shaded column indicates activity completed today   Modalities Parameters/  Location  Notes                     Manual Therapy Time/Technique  Notes                     Exercise/Intervention   Notes   Mariano; review of goals    x    Standing on airex: feet together x2 20 sec hold x No UE support ,CGA   Step stance 20sec 2x  Standing with head turns in horizontal and vertical plane    NuStep x6 min  x Level 5, seat 8, arms 9, 91% after Nustep   Forward step ups; side step ups  x10    Bilaterally    Airex: heel to raises, marches x15 ea  x Hold mini squats due to increased pain   3 way hip, Hs curls x15 ea  x no Green band   Tandem stepping fwd/retro x2 laps ea      rocker board fwd/lat x15 ea   20 sec balance with 1UE support   wobbleboard 10x   CW/CCW, manual overpressure provided   Hurdles:   x2 ea.    Green R<>L lead, reciprocal, side stepping    Stepping over 1 julio c  10x   Forward/retro, required 1UE support occasionally 2 with retro stepping over leading with L leg   Agility mat: forward step in each square; lateral step in each square; march in each square x2   Hand hold assist needed           // bars: forward and lateral walk with emphasis on taking longer, exaggerated steps  x3 ea.  x           Sit-stands  15x   Without UE support. Increased pain in R knee 92%  Off of large mat table at lowest level          NK table: flexion/extension x10 ea 5#  Bilaterally, cues for technique   BOSU lunges  x10      Seated ball squeezes and hip abd x15  x blue band   [employer] walking  ~350 ft.        6 min walk test  x1   Without supplemental oxygen;  91-98%; no rest break   Core strengthening exercises: TA activation; pelvic tilt 10x  x On moist heat WQ   Q 3x  x      Specific Interventions Next Treatment: NuStep; airex balance exercises; Activity/Treatment Tolerance:  [x]  Patient tolerated treatment well  []  Patient limited by fatigue  []  Patient limited by pain   []  Patient limited by medical complications  []  Other:     Assessment: Sharon Goodwin continues to demonstrate improvements in strength, balance and gait. Over the next month we will take advantage of recent back injection and work to improve core strength and walking endurance while she is not limited by pain. Body Structures/Functions/Activity Limitations: impaired activity tolerance, impaired balance, impaired coordination, impaired endurance, impaired safety awareness, impaired strength and pain  Prognosis: fair    GOALS:  Patient Goal: minimize fall risk    Short Term Goals:  Time Frame: 4 weeks  1. Sharon Goodwin will demonstrate a good ankle strategy to maintain standing balance with perturbations to her trunk. 2. Sharon Goodwin will be able to stand for 20 min at a time without needing a rest break allowing her to prepare meals and perform light chores without limitation. NOT MET-limited to 5 min before she has to sit due to back pain; uses a kitchen stool a lot  3. Alison's Tinetti score will improve to 19/28 indicating minimal fall risk. GOAL MET-improved to 22/28      Long Term Goals:  Time Frame: 8 weeks  1. Discharge with independent HEP ONGOING  2. Alison's Tinetti score will improve to >21/28 indicating no fall risk.  GOAL MET; REVISE to improve Tinetti score to >26/28; NOT MET-improved to 26/28  3. Laura Fofana will be able to maintain her standing balance in all 4 conditions of the m-CTSIB, using an ankle strategy to maintain her balance. NOT MET-able to maintain standing balance in conditions 1-3; LOB after 6 sec in condition 4  4. NEW GOAL: Laura Fofana will demonstrate good abdominal stabilization with all transfers and dynamic gait activities to provide greater support to low back. IMPROVING   5. NEW GOAL: Laura Fofana will be able to complete 6 min walking test with rollator while maintaining oxygen levels 92% or above so she can go without supplemental oxygen. PARTIALLY MET-oxygen levels dropped to 91, however she was quick to recover to 96%       Patient Education:   [x]  HEP/Education Completed: ft placement with gait. , tighten abdominals with standing and walking,  Standing tall at wall for optimal O2 intake   [employer] Access Code:  []  No new Education completed  [x]  Reviewed Prior HEP      [x]  Patient verbalized and/or demonstrated understanding of education provided. []  Patient unable to verbalize and/or demonstrate understanding of education provided. Will continue education. [x]  Barriers to learning: n/a    PLAN:  Treatment Recommendations: Strengthening, Range of Motion, Balance Training, Endurance Training, Gait Training, Stair Training, Neuromuscular Re-education, Manual Therapy - Soft Tissue Mobilization, Manual Therapy - Joint Manipulation, Pain Management, Home Exercise Program, Patient Education and Modalities    []  Plan of care initiated. Plan to see patient 2 times per week for 8 weeks to address the treatment planned outlined above.   []  Continue with current plan of care  [x]  Modify plan of care as follows: 2 time per week   []  Hold pending physician visit  []  Discharge    Time In 0850   Time Out 0925   Timed Code Minutes: 35 min   Total Treatment Time: 35 min       Electronically Signed by: Charo Guo, MINE Farnsworth \"MACI\Alirio Hinesence, DPT  [id number]

## 2022-03-07 ENCOUNTER — HOSPITAL ENCOUNTER (OUTPATIENT)
Dept: PHYSICAL THERAPY | Age: 72
Setting detail: THERAPIES SERIES
Discharge: HOME | End: 2022-03-07
Payer: MEDICARE

## 2022-03-07 PROCEDURE — 97530 THERAPEUTIC ACTIVITIES: CPT

## 2022-03-07 PROCEDURE — 97110 THERAPEUTIC EXERCISES: CPT

## 2022-03-07 NOTE — PROGRESS NOTES
[address] [facility]  PHYSICAL THERAPY  [] EVALUATION  [x] DAILY NOTE (LAND) [] DAILY NOTE (AQUATIC ) [] PROGRESS NOTE [] DISCHARGE NOTE    [] [facility]   [] [address]    [] [address] [facility]   [x] Iveth Bardales    Date: 3/7/2022  Patient Name:  Homero Noyola  : 1950  MRN: [id number]  CSN: [id number]    Referring Practitioner Aurelia Carmen DO   Diagnosis Other intervertebral disc degeneration, lumbar region [M51.36]  Radiculopathy, lumbar region [M54.16]    Treatment Diagnosis Difficulty walking    Date of Evaluation 21    Additional Pertinent History Bi-polar; SOB related to CHF      Functional Outcome Measure Used Tinetti   Functional Outcome Score 15/28 (21)    (21)   (21)   (10/29/21)   (21)   (21)   (22) able to complete 6 min walk test without any rest break and maintained oxygen levels between 91-98%   (3/4/22)      Insurance: Primary: Payor: Michael Rowe /  /  / , aquatic therapy is covered; modalities covered except for IONTO  Secondary:    Authorization Information:  TOTAL  UNITS    FROM 22 TO 22  CPT CODES:  16267, 59628, [id number], 90120, 50516, 06776,  REF# [id number]     Visit # 2, 2/10 for progress note   Visits Allowed: 40 or 60 visits (120 units total)    Recertification Date:    Physician Follow-Up:    Physician Orders:    History of Present Illness: Vicky Blake is referred to PT to address ongoing balance issues. She states that she noticed that she was struggling with her balance when she slipped off a short deck at a local [employer]. She admits that over the last few months she has also had a few falls; denies any major injury as a result of the falls. She started to workout with a local  a month hoping that it would help her balance.       SUBJECTIVE: Doing okay; anxious about tomorrows pulmonary follow up    TREATMENT   Precautions:    0/10 bilat knee, LB    X in shaded column indicates activity completed today   Modalities Parameters/  Location  Notes                     Manual Therapy Time/Technique  Notes                     Exercise/Intervention   Notes   Mariano; review of goals    x    Standing on airex: feet together x2 20 sec hold x No UE support ,CGA   Step stance 20sec 2x  Standing with head turns in horizontal and vertical plane    NuStep x6 min  x Level 5, seat 8, arms 9, 91% after Nustep   Forward step ups; side step ups  x10    Bilaterally    Airex: heel to raises, marches x15 ea  x Hold mini squats due to increased pain   3 way hip, Hs curls x15 ea  x no Green band   Tandem stepping fwd/retro x2 laps ea      rocker board fwd/lat x15 ea   20 sec balance with 1UE support   wobbleboard 10x   CW/CCW, manual overpressure provided   Hurdles:   x2 ea. Green R<>L lead, reciprocal, side stepping    Stepping over 1 julio c  10x   Forward/retro, required 1UE support occasionally 2 with retro stepping over leading with L leg   Agility mat: forward step in each square; lateral step in each square; march in each square x2   Hand hold assist needed           // bars: forward and lateral walk with emphasis on taking longer, exaggerated steps  x3 ea.  x           Sit-stands  15x   Without UE support. Increased pain in R knee 92%  Off of large mat table at lowest level   Hip abd; add x15  x    NK table: flexion/extension x10 ea 5#  Bilaterally, cues for technique   BOSU lunges  x10      Seated ball squeezes and hip abd x15  x blue band   [employer] walking  ~350 ft.        6 min walk test  x1  x Without supplemental oxygen;  92-95%; no rest break   Core strengthening exercises: TA activation; pelvic tilt; bridges  10x  x On moist heat WQ   Q 3x  x      Specific Interventions Next Treatment: NuStep; airex balance exercises;      Activity/Treatment Tolerance:  [x]  Patient tolerated treatment well  []  Patient limited by fatigue  []  Patient limited by pain   []  Patient limited by medical complications  []  Other:     Assessment: Galindo Birmingham forgot to take her anxiety meds this AM and she is a bit stressed about tomorrow's pulmonary follow up. She continues to maintain oxygen saturation levels between 93-95%. Body Structures/Functions/Activity Limitations: impaired activity tolerance, impaired balance, impaired coordination, impaired endurance, impaired safety awareness, impaired strength and pain  Prognosis: fair    GOALS:  Patient Goal: minimize fall risk    Short Term Goals:  Time Frame: 4 weeks  1. Galindo Birmingham will demonstrate a good ankle strategy to maintain standing balance with perturbations to her trunk. 2. Galindo Birmingham will be able to stand for 20 min at a time without needing a rest break allowing her to prepare meals and perform light chores without limitation. NOT MET-limited to 5 min before she has to sit due to back pain; uses a kitchen stool a lot  3. Alison's Tinetti score will improve to 19/28 indicating minimal fall risk. GOAL MET-improved to 22/28      Long Term Goals:  Time Frame: 8 weeks  1. Discharge with independent HEP ONGOING  2. Alison's Tinetti score will improve to >21/28 indicating no fall risk. GOAL MET; REVISE to improve Tinetti score to >26/28; NOT MET-improved to 26/28  3. Galindo Birmingham will be able to maintain her standing balance in all 4 conditions of the m-CTSIB, using an ankle strategy to maintain her balance. NOT MET-able to maintain standing balance in conditions 1-3; LOB after 6 sec in condition 4  4. NEW GOAL: Galindo Birmingham will demonstrate good abdominal stabilization with all transfers and dynamic gait activities to provide greater support to low back. IMPROVING   5. NEW GOAL: Galindo Birmingham will be able to complete 6 min walking test with rollator while maintaining oxygen levels 92% or above so she can go without supplemental oxygen.  PARTIALLY MET-oxygen levels dropped to 91, however she was quick to recover to 96% Patient Education:   [x]  HEP/Education Completed: ft placement with gait. , tighten abdominals with standing and walking,  Standing tall at wall for optimal O2 intake   [employer] Access Code:  []  No new Education completed  [x]  Reviewed Prior HEP      [x]  Patient verbalized and/or demonstrated understanding of education provided. []  Patient unable to verbalize and/or demonstrate understanding of education provided. Will continue education. [x]  Barriers to learning: n/a    PLAN:  Treatment Recommendations: Strengthening, Range of Motion, Balance Training, Endurance Training, Gait Training, Stair Training, Neuromuscular Re-education, Manual Therapy - Soft Tissue Mobilization, Manual Therapy - Joint Manipulation, Pain Management, Home Exercise Program, Patient Education and Modalities    []  Plan of care initiated. Plan to see patient 2 times per week for 8 weeks to address the treatment planned outlined above.   []  Continue with current plan of care  [x]  Modify plan of care as follows: 2 time per week   []  Hold pending physician visit  []  Discharge    Time In 0845   Time Out 0925   Timed Code Minutes: 40 min   Total Treatment Time: 40 min       Electronically Signed by: Lakia Correa, PT   Klever Carver [id number]"Noa Reed DPT  [id number]

## 2022-03-08 ENCOUNTER — OFFICE VISIT (OUTPATIENT)
Dept: PULMONOLOGY | Age: 72
End: 2022-03-08
Payer: MEDICARE

## 2022-03-08 VITALS
SYSTOLIC BLOOD PRESSURE: 132 MMHG | HEART RATE: 91 BPM | DIASTOLIC BLOOD PRESSURE: 78 MMHG | WEIGHT: 247.8 LBS | TEMPERATURE: 98 F | OXYGEN SATURATION: 93 % | HEIGHT: 64 IN | BODY MASS INDEX: 42.3 KG/M2

## 2022-03-08 DIAGNOSIS — J98.4 RESTRICTIVE LUNG DISEASE: Primary | ICD-10-CM

## 2022-03-08 DIAGNOSIS — Z99.89 OSA ON CPAP: ICD-10-CM

## 2022-03-08 DIAGNOSIS — G47.33 OSA ON CPAP: ICD-10-CM

## 2022-03-08 DIAGNOSIS — R91.8 MULTIPLE LUNG NODULES ON CT: ICD-10-CM

## 2022-03-08 DIAGNOSIS — I51.89 GRADE I DIASTOLIC DYSFUNCTION: ICD-10-CM

## 2022-03-08 DIAGNOSIS — N18.2 CKD (CHRONIC KIDNEY DISEASE), STAGE II: ICD-10-CM

## 2022-03-08 PROCEDURE — 1090F PRES/ABSN URINE INCON ASSESS: CPT

## 2022-03-08 PROCEDURE — 99213 OFFICE O/P EST LOW 20 MIN: CPT

## 2022-03-08 PROCEDURE — 1123F ACP DISCUSS/DSCN MKR DOCD: CPT

## 2022-03-08 PROCEDURE — 1036F TOBACCO NON-USER: CPT

## 2022-03-08 PROCEDURE — G8427 DOCREV CUR MEDS BY ELIG CLIN: HCPCS

## 2022-03-08 PROCEDURE — G8400 PT W/DXA NO RESULTS DOC: HCPCS

## 2022-03-08 PROCEDURE — G8484 FLU IMMUNIZE NO ADMIN: HCPCS

## 2022-03-08 PROCEDURE — 4040F PNEUMOC VAC/ADMIN/RCVD: CPT

## 2022-03-08 PROCEDURE — 3017F COLORECTAL CA SCREEN DOC REV: CPT

## 2022-03-08 PROCEDURE — G8417 CALC BMI ABV UP PARAM F/U: HCPCS

## 2022-03-08 RX ORDER — RIVASTIGMINE TARTRATE 3 MG/1
3 CAPSULE ORAL 2 TIMES DAILY
COMMUNITY

## 2022-03-08 ASSESSMENT — ENCOUNTER SYMPTOMS
SINUS PAIN: 0
CHEST TIGHTNESS: 0
SHORTNESS OF BREATH: 1
SINUS PRESSURE: 0
WHEEZING: 0
RHINORRHEA: 0
COUGH: 1

## 2022-03-08 NOTE — PATIENT INSTRUCTIONS
We will repeat your pulmonary function test prior to your next visit. Continue to follow your cardiologist. Make sure to weigh yourself daily.  If you gain 3 lbs in 1 day or 5 lbs in 1 week, call your cardiologist.     Continue with Dr. Aguila Cantrell for your kidney disease    Continue PAP therapy at nighttime and continue to follow with TREMAYNE Ramirez in the sleep clinic

## 2022-03-09 ENCOUNTER — HOSPITAL ENCOUNTER (OUTPATIENT)
Dept: PHYSICAL THERAPY | Age: 72
Setting detail: THERAPIES SERIES
Discharge: HOME | End: 2022-03-09
Payer: MEDICARE

## 2022-03-09 PROCEDURE — 97110 THERAPEUTIC EXERCISES: CPT

## 2022-03-09 NOTE — PROGRESS NOTES
[facility] [facility]  PHYSICAL THERAPY  [] EVALUATION  [x] DAILY NOTE (LAND) [] DAILY NOTE (AQUATIC ) [] PROGRESS NOTE [] DISCHARGE NOTE    [] [facility]   [] Carlo 90    [] 645 [facility]   [x] Pat Small    Date: 3/9/2022  Patient Name:  Marlys Sue  : 1950  MRN: [id number]  CSN: [id number]    Referring Practitioner Lynne Kern DO   Diagnosis Other intervertebral disc degeneration, lumbar region [M51.36]  Radiculopathy, lumbar region [M54.16]    Treatment Diagnosis Difficulty walking    Date of Evaluation 21    Additional Pertinent History Bi-polar; SOB related to CHF      Functional Outcome Measure Used Tinetti   Functional Outcome Score 15/28 (21)    (21)   (21)   (10/29/21)   (21)   (21)   (22) able to complete 6 min walk test without any rest break and maintained oxygen levels between 91-98%   (3/4/22)      Insurance: Primary: Payor: Lavelle Varela /  /  / , aquatic therapy is covered; modalities covered except for IONTO  Secondary:    Authorization Information:  TOTAL  UNITS    FROM 22 TO 22  CPT CODES:  34547, 91181, [id number], 03170, 62350, 31578,  REF# [id number]     Visit # 3, 3/10 for progress note   Visits Allowed: 40 or 60 visits (120 units total)    Recertification Date:    Physician Follow-Up:    Physician Orders:    History of Present Illness: Sima Young is referred to PT to address ongoing balance issues. She states that she noticed that she was struggling with her balance when she slipped off a short deck at a local [employer]. She admits that over the last few months she has also had a few falls; denies any major injury as a result of the falls. She started to workout with a local  a month hoping that it would help her balance.       SUBJECTIVE: Pt stated she feels much better after back injections and when we apply heat while lying on back. Pt stated today she has been busy. Pt stated she still still can't stand longer than about 15 min. TREATMENT   Precautions:    0/10 B knee achiness but no pain    X in shaded column indicates activity completed today   Modalities Parameters/  Location  Notes                     Manual Therapy Time/Technique  Notes                     Exercise/Intervention   Notes   Khrisetti; review of goals    x    Standing on airex: feet together x2 20 sec hold x No UE support ,CGA, cues to breathe   Step stance 20sec 2x  Standing with head turns in horizontal and vertical plane    NuStep x6 min  x Level 5, seat 8, arms 9, 91% after Nustep   Forward step ups; side step ups  x10    Bilaterally    Airex: heel to raises, marches x15 ea  x Hold mini squats due to increased pain   3 way hip, Hs curls x15 ea  x no Green band   Tandem stepping fwd/retro x2 laps ea  x    rocker board fwd/lat x15 ea   20 sec balance with 1UE support   wobbleboard 10x   CW/CCW, manual overpressure provided   Hurdles:   x2 ea. Green R<>L lead, reciprocal, side stepping    Stepping over 1 julio c  10x   Forward/retro, required 1UE support occasionally 2 with retro stepping over leading with L leg   Agility mat: forward step in each square; lateral step in each square; march in each square x2   Hand hold assist needed           // bars: forward and lateral walk with emphasis on taking longer, exaggerated steps  x3 ea.  x           Sit-stands  15x   Without UE support.  Increased pain in R knee 92%  Off of large mat table at lowest level          NK table: flexion/extension x10 ea 5#  Bilaterally, cues for technique   BOSU lunges  x10      Supine: ball squeezes and hip abd x15  x blue band, on MHP   [employer] walking  ~350 ft.        6 min walk test  x1   Without supplemental oxygen;  92-95%; no rest break   Core strengthening exercises: TA activation; pelvic tilt; bridges  10x  x On moist heat    TA + marching 10x  x On MHP   TA + 90/90 hold 10 sec hold 2x x Single leg after B LE's, Cues to not arch back, on MHP, educated on how to perform at home   LTR 10x  x On MHP   Q 3x        Specific Interventions Next Treatment: NuStep; airex balance exercises; Activity/Treatment Tolerance:  [x]  Patient tolerated treatment well  []  Patient limited by fatigue  []  Patient limited by pain   []  Patient limited by medical complications  []  Other:     Assessment: Focused today on core engagement in supine. Pt is doing well with gait and not using O2. Balance is also improving. Body Structures/Functions/Activity Limitations: impaired activity tolerance, impaired balance, impaired coordination, impaired endurance, impaired safety awareness, impaired strength and pain  Prognosis: fair    GOALS:  Patient Goal: minimize fall risk    Short Term Goals:  Time Frame: 4 weeks  1. Geoffrey Sanchez will demonstrate a good ankle strategy to maintain standing balance with perturbations to her trunk. 2. Geoffrey Sanchez will be able to stand for 20 min at a time without needing a rest break allowing her to prepare meals and perform light chores without limitation. NOT MET-limited to 5 min before she has to sit due to back pain; uses a kitchen stool a lot  3. Alison's Tinetti score will improve to 19/28 indicating minimal fall risk. GOAL MET-improved to 22/28      Long Term Goals:  Time Frame: 8 weeks  1. Discharge with independent HEP ONGOING  2. Alison's Tinetti score will improve to >21/28 indicating no fall risk. GOAL MET; REVISE to improve Tinetti score to >26/28; NOT MET-improved to 26/28  3. Geoffrey Sanchez will be able to maintain her standing balance in all 4 conditions of the m-CTSIB, using an ankle strategy to maintain her balance. NOT MET-able to maintain standing balance in conditions 1-3; LOB after 6 sec in condition 4  4.  NEW GOAL: Geoffrey Sanchez will demonstrate good abdominal stabilization with all transfers and dynamic gait activities to provide greater support to low back. IMPROVING   5. NEW GOAL: Carrington Kaur will be able to complete 6 min walking test with rollator while maintaining oxygen levels 92% or above so she can go without supplemental oxygen. PARTIALLY MET-oxygen levels dropped to 91, however she was quick to recover to 96%       Patient Education:   []  HEP/Education Completed: ft placement with gait. , tighten abdominals with standing and walking,  Standing tall at wall for optimal O2 intake   Medbridge Access Code:  []  No new Education completed  [x]  Reviewed Prior HEP      [x]  Patient verbalized and/or demonstrated understanding of education provided. []  Patient unable to verbalize and/or demonstrate understanding of education provided. Will continue education. [x]  Barriers to learning: n/a    PLAN:  Treatment Recommendations: Strengthening, Range of Motion, Balance Training, Endurance Training, Gait Training, Stair Training, Neuromuscular Re-education, Manual Therapy - Soft Tissue Mobilization, Manual Therapy - Joint Manipulation, Pain Management, Home Exercise Program, Patient Education and Modalities    []  Plan of care initiated. Plan to see patient 2 times per week for 8 weeks to address the treatment planned outlined above.   []  Continue with current plan of care  [x]  Modify plan of care as follows: 2 time per week   []  Hold pending physician visit  []  Discharge    Time In 1430   Time Out 1510   Timed Code Minutes: 40 min   Total Treatment Time: 40 min       Electronically Signed by: Katelynn Kelly PTA

## 2022-03-14 ENCOUNTER — HOSPITAL ENCOUNTER (OUTPATIENT)
Dept: PHYSICAL THERAPY | Age: 72
Setting detail: THERAPIES SERIES
Discharge: HOME | End: 2022-03-14
Payer: MEDICARE

## 2022-03-14 PROCEDURE — 97110 THERAPEUTIC EXERCISES: CPT

## 2022-03-14 NOTE — PROGRESS NOTES
[facility]  PHYSICAL THERAPY  [] EVALUATION  [x] DAILY NOTE (LAND) [] DAILY NOTE (AQUATIC ) [] PROGRESS NOTE [] DISCHARGE NOTE    [] 615 [facility]   [] Carlo 90    [] 645 [facility]   [x] Tameka Flores    Date: 3/14/2022  Patient Name:  Lilli Cole  : 1950  MRN: [id number]  CSN: [id number]    Referring Practitioner Luis Leary DO   Diagnosis Other intervertebral disc degeneration, lumbar region [M51.36]  Radiculopathy, lumbar region [M54.16]    Treatment Diagnosis Difficulty walking    Date of Evaluation 21    Additional Pertinent History Bi-polar; SOB related to CHF      Functional Outcome Measure Used Tinetti   Functional Outcome Score 15/28 (21)    (21)   (21)   (10/29/21)   (21)   (21)   (22) able to complete 6 min walk test without any rest break and maintained oxygen levels between 91-98%   (3/4/22)      Insurance: Primary: Payor: Taylor Cedeno /  /  / , aquatic therapy is covered; modalities covered except for IONTO  Secondary:    Authorization Information:  TOTAL  UNITS    FROM 22 TO 22  CPT CODES:  62430, 58492, [id number], 16322, 64909, 36161,  REF# [id number]     Visit # 4, 4/10 for progress note   Visits Allowed: 40 or 60 visits (120 units total)    Recertification Date:    Physician Follow-Up:    Physician Orders:    History of Present Illness: Natalie Lemon is referred to PT to address ongoing balance issues. She states that she noticed that she was struggling with her balance when she slipped off a short deck at a local [email or web address]. She admits that over the last few months she has also had a few falls; denies any major injury as a result of the falls. She started to workout with a local  a month hoping that it would help her balance.       SUBJECTIVE: Pt stated she had no back pain after last session with added abdominal work. Pt stated she didn't practice it at home yet. TREATMENT   Precautions:    Pain:  B knee achiness but no pain    X in shaded column indicates activity completed today   Modalities Parameters/  Location  Notes                     Manual Therapy Time/Technique  Notes                     Exercise/Intervention   Notes   Mariano; review of goals    x    Standing on airex: feet together x2 20 sec hold x No UE support ,CGA, cues to breathe   Step stance 20sec 2x  Standing with head turns in horizontal and vertical plane    NuStep x6 min  x Level 5, seat 8, arms 9, 92% after Nustep   Forward step ups; side step ups  x10    Bilaterally    Airex: heel to raises, marches x15 ea  x Hold mini squats due to increased pain   3 way hip, Hs curls x15 ea  x no Green band   Tandem stepping fwd/retro x2 laps ea  x    rocker board fwd/lat x15 ea   20 sec balance with 1UE support   wobbleboard 10x   CW/CCW, manual overpressure provided   Hurdles:   x2 ea. Green R<>L lead, reciprocal, side stepping    Stepping over 1 julio c  10x   Forward/retro, required 1UE support occasionally 2 with retro stepping over leading with L leg   Agility mat: forward step in each square; lateral step in each square; march in each square x2   Hand hold assist needed           // bars: forward and lateral walk with emphasis on taking longer, exaggerated steps, retro, marching x3 laps ea. x Cues to swing arms          Sit-stands  15x   Without UE support.  Increased pain in R knee 92%  Off of large mat table at lowest level          NK table: flexion/extension x10 ea 5# x Bilaterally, cues for technique   BOSU lunges  x10      Supine: ball squeezes and hip abd x15  x blue band, on P   [employer] walking  ~350 ft.        6 min walk test  x1   Without supplemental oxygen;  92-95%; no rest break   Core strengthening exercises: TA activation; pelvic tilt; bridges  10x  x On moist heat    TA + marching 10x  x On MHP   TA + 90/90 hold 10 sec hold 2x x Single leg after B LE's, Cues to not arch back, on MHP, educated on how to perform at home   LTR 10x  x On MHP   Q 3x        Specific Interventions Next Treatment: NuStep; airex balance exercises; Activity/Treatment Tolerance:  [x]  Patient tolerated treatment well  []  Patient limited by fatigue  []  Patient limited by pain   []  Patient limited by medical complications  []  Other:     Assessment: Pt able to complete 15 min. Of standing exercises listed above before fatigue. Continued work on core engagement and importance of practicing at home for spinal stability. Body Structures/Functions/Activity Limitations: impaired activity tolerance, impaired balance, impaired coordination, impaired endurance, impaired safety awareness, impaired strength and pain  Prognosis: fair    GOALS:  Patient Goal: minimize fall risk    Short Term Goals:  Time Frame: 4 weeks  1. Sima Young will demonstrate a good ankle strategy to maintain standing balance with perturbations to her trunk. 2. Sima Young will be able to stand for 20 min at a time without needing a rest break allowing her to prepare meals and perform light chores without limitation. NOT MET-limited to 5 min before she has to sit due to back pain; uses a kitchen stool a lot  3. Alison's Tinetti score will improve to 19/28 indicating minimal fall risk. GOAL MET-improved to 22/28      Long Term Goals:  Time Frame: 8 weeks  1. Discharge with independent HEP ONGOING  2. Alison's Tinetti score will improve to >21/28 indicating no fall risk. GOAL MET; REVISE to improve Tinetti score to >26/28; NOT MET-improved to 26/28  3. Sima Young will be able to maintain her standing balance in all 4 conditions of the m-CTSIB, using an ankle strategy to maintain her balance. NOT MET-able to maintain standing balance in conditions 1-3; LOB after 6 sec in condition 4  4.  NEW GOAL: Sima Young will demonstrate good abdominal stabilization with all transfers and dynamic gait activities to provide greater support to low back. IMPROVING   5. NEW GOAL: Carrington Kaur will be able to complete 6 min walking test with rollator while maintaining oxygen levels 92% or above so she can go without supplemental oxygen. PARTIALLY MET-oxygen levels dropped to 91, however she was quick to recover to 96%       Patient Education:   Fadia Centeno [x]  HEP/Education Completed: core engagement   [address] Access Code:  []  No new Education completed  [x]  Reviewed Prior HEP      [x]  Patient verbalized and/or demonstrated understanding of education provided. []  Patient unable to verbalize and/or demonstrate understanding of education provided. Will continue education. [x]  Barriers to learning: n/a    PLAN:  Treatment Recommendations: Strengthening, Range of Motion, Balance Training, Endurance Training, Gait Training, Stair Training, Neuromuscular Re-education, Manual Therapy - Soft Tissue Mobilization, Manual Therapy - Joint Manipulation, Pain Management, Home Exercise Program, Patient Education and Modalities    []  Plan of care initiated. Plan to see patient 2 times per week for 8 weeks to address the treatment planned outlined above.   []  Continue with current plan of care  [x]  Modify plan of care as follows: 2 time per week   []  Hold pending physician visit  []  Discharge    Time In 0930   Time Out 1015   Timed Code Minutes: 45 min   Total Treatment Time: 45 min       Electronically Signed by: Katelynn Kelly PTA

## 2022-03-16 ENCOUNTER — HOSPITAL ENCOUNTER (OUTPATIENT)
Dept: PHYSICAL THERAPY | Age: 72
Setting detail: THERAPIES SERIES
Discharge: HOME | End: 2022-03-16
Payer: MEDICARE

## 2022-03-16 PROCEDURE — 97110 THERAPEUTIC EXERCISES: CPT

## 2022-03-16 NOTE — PROGRESS NOTES
[address] [facility]  PHYSICAL THERAPY  [] EVALUATION  [x] DAILY NOTE (LAND) [] DAILY NOTE (AQUATIC ) [] PROGRESS NOTE [] DISCHARGE NOTE    [] 615 [facility]   [] Carlo 90    [] 645 [facility]   [x] Dejan Kelley    Date: 3/16/2022  Patient Name:  Anderson Galeas  : 1950  MRN: [id number]  CSN: [id number]    Referring Practitioner Roe Callahan DO   Diagnosis Other intervertebral disc degeneration, lumbar region [M51.36]  Radiculopathy, lumbar region [M54.16]    Treatment Diagnosis Difficulty walking    Date of Evaluation 21    Additional Pertinent History Bi-polar; SOB related to CHF      Functional Outcome Measure Used Tinetti   Functional Outcome Score 15/28 (21)    (21)   (21)   (10/29/21)   (21)   (21)   (22) able to complete 6 min walk test without any rest break and maintained oxygen levels between 91-98%   (3/4/22)      Insurance: Primary: Payor: Mattie Cadet /  /  / , aquatic therapy is covered; modalities covered except for IONTO  Secondary:    Authorization Information:  TOTAL  UNITS    FROM 22 TO 22  CPT CODES:  03453, 77516, [id number], 00218, 48684, 77091,  REF# [id number]     Visit # 5, 5/10 for progress note   Visits Allowed: 40 or 60 visits (120 units total)    Recertification Date:    Physician Follow-Up:    Physician Orders:    History of Present Illness: Fay Cobb is referred to PT to address ongoing balance issues. She states that she noticed that she was struggling with her balance when she slipped off a short deck at a local [employer]. She admits that over the last few months she has also had a few falls; denies any major injury as a result of the falls. She started to workout with a local  a month hoping that it would help her balance. SUBJECTIVE: Pt stated she had a very busy morning.  Pt walked into clinic with no AD. Back hurts at times throughout the day but nothing like before epidural.     TREATMENT   Precautions:    Pain:  B knee achiness but no pain    X in shaded column indicates activity completed today   Modalities Parameters/  Location  Notes                     Manual Therapy Time/Technique  Notes                     Exercise/Intervention   Notes   Mariano; review of goals    x    Standing on airex: feet together x2 20 sec hold x No UE support ,CGA, cues to breathe   Step stance 20sec 2x  Standing with head turns in horizontal and vertical plane    NuStep x6 min  x Level 5, seat 8, arms 9, 92% after Nustep   Forward step ups; side step ups  x10    Bilaterally    Airex: heel to raises, marches x15 ea  x Hold mini squats due to increased pain   3 way hip, Hs curls x15 ea  x Peach tband (aggravated sciatic in R LE)   Sitting HS stretch, piriformis stretch 15sec 3x x    Tandem stepping fwd/retro x2 laps ea  x    rocker board fwd/lat x15 ea   20 sec balance with 1UE support   wobbleboard 10x   CW/CCW, manual overpressure provided   Hurdles:   x2 ea. Green R<>L lead, reciprocal, side stepping    Stepping over 1 julio c  10x   Forward/retro, required 1UE support occasionally 2 with retro stepping over leading with L leg   Agility mat: forward step in each square; lateral step in each square; march in each square x2   Hand hold assist needed           // bars: forward and lateral walk with emphasis on taking longer, exaggerated steps, retro, marching x3 laps ea. x Cues to swing arms, only completed 1 lap retro increased HR to 105. Sit-stands  15x   Without UE support.  Increased pain in R knee 92%  Off of large mat table at lowest level          NK table: flexion/extension x10 ea 5#  Bilaterally, cues for technique   BOSU lunges  x10      Supine: ball squeezes and hip abd x15   blue band, on MHP   [employer] walking  ~350 ft.        6 min walk test  x1   Without supplemental oxygen; 92-95%; no rest break   Core strengthening exercises: TA activation; pelvic tilt; bridges  10x  x On moist heat    TA + marching 10x  x On MHP   TA + 90/90 hold 10 sec hold 3x x Single leg after B LE's, Cues to not arch back, on MHP, educated on how to perform at home   LTR 10x  x On MHP   Q 3x        Specific Interventions Next Treatment: NuStep; airex balance exercises; Activity/Treatment Tolerance:  [x]  Patient tolerated treatment well  []  Patient limited by fatigue  []  Patient limited by pain   []  Patient limited by medical complications  []  Other:     Assessment: Added peach tband to 3 way hip exercises this date but increased R sciatic pain. Pt able to perform seated stretches to alleviate most discomfort. Pt given visuals of stretches to perform at home. Continued work on core engagement with activity. Pt's HR did increase to 105 during standing exercises and 92% O2. Pt requested 1 seated RB. Body Structures/Functions/Activity Limitations: impaired activity tolerance, impaired balance, impaired coordination, impaired endurance, impaired safety awareness, impaired strength and pain  Prognosis: fair    GOALS:  Patient Goal: minimize fall risk    Short Term Goals:  Time Frame: 4 weeks  1. Diana Noguera will demonstrate a good ankle strategy to maintain standing balance with perturbations to her trunk. 2. Diana Noguera will be able to stand for 20 min at a time without needing a rest break allowing her to prepare meals and perform light chores without limitation. NOT MET-limited to 5 min before she has to sit due to back pain; uses a kitchen stool a lot  3. Alison's Tinetti score will improve to 19/28 indicating minimal fall risk. GOAL MET-improved to 22/28      Long Term Goals:  Time Frame: 8 weeks  1. Discharge with independent HEP ONGOING  2. Alison's Tinetti score will improve to >21/28 indicating no fall risk. GOAL MET; REVISE to improve Tinetti score to >26/28; NOT MET-improved to 26/28  3.  Diana Noguera will be able to maintain her standing balance in all 4 conditions of the m-CTSIB, using an ankle strategy to maintain her balance. NOT MET-able to maintain standing balance in conditions 1-3; LOB after 6 sec in condition 4  4. NEW GOAL: Vicky Blake will demonstrate good abdominal stabilization with all transfers and dynamic gait activities to provide greater support to low back. IMPROVING   5. NEW GOAL: Vicky Blake will be able to complete 6 min walking test with rollator while maintaining oxygen levels 92% or above so she can go without supplemental oxygen. PARTIALLY MET-oxygen levels dropped to 91, however she was quick to recover to 96%       Patient Education:    [x]  HEP/Education Completed: core engagement, sitting and supine hamstring and piriformis stretch   Medbridge Access Code:  []  No new Education completed  []  Reviewed Prior HEP      [x]  Patient verbalized and/or demonstrated understanding of education provided. []  Patient unable to verbalize and/or demonstrate understanding of education provided. Will continue education. [x]  Barriers to learning: n/a    PLAN:  Treatment Recommendations: Strengthening, Range of Motion, Balance Training, Endurance Training, Gait Training, Stair Training, Neuromuscular Re-education, Manual Therapy - Soft Tissue Mobilization, Manual Therapy - Joint Manipulation, Pain Management, Home Exercise Program, Patient Education and Modalities    []  Plan of care initiated. Plan to see patient 2 times per week for 8 weeks to address the treatment planned outlined above.   []  Continue with current plan of care  [x]  Modify plan of care as follows: 2 time per week   []  Hold pending physician visit  []  Discharge    Time In 1415   Time Out 1500   Timed Code Minutes: 45 min   Total Treatment Time: 45 min       Electronically Signed by: Raghav Stewart PTA

## 2022-03-21 ENCOUNTER — HOSPITAL ENCOUNTER (OUTPATIENT)
Dept: PHYSICAL THERAPY | Age: 72
Setting detail: THERAPIES SERIES
Discharge: HOME | End: 2022-03-21
Payer: MEDICARE

## 2022-03-21 PROCEDURE — 97110 THERAPEUTIC EXERCISES: CPT

## 2022-03-21 PROCEDURE — 97530 THERAPEUTIC ACTIVITIES: CPT

## 2022-03-21 NOTE — PROGRESS NOTES
bothering her today    TREATMENT   Precautions:    Pain:  B knee achiness but no pain    X in shaded column indicates activity completed today   Modalities Parameters/  Location  Notes                     Manual Therapy Time/Technique  Notes                     Exercise/Intervention   Notes   Tinjulianne; review of goals        Standing on airex: feet together x2 20 sec hold x No UE support ,CGA, cues to breathe   Step stance 20sec 2x  Standing with head turns in horizontal and vertical plane    NuStep x6 min  x Level 5, seat 8, arms 9, 92% after Nustep   Forward step ups; side step ups  x10   x Bilaterally    Airex: heel to raises, marches x15 ea  x Hold mini squats due to increased pain   3 way hip, Hs curls x15 ea   Peach tband (aggravated sciatic in R LE)   Sitting HS stretch, piriformis stretch 15sec 3x x    Tandem stepping fwd/retro x2 laps ea  x    rocker board fwd/lat x15 ea   20 sec balance with 1UE support   wobbleboard 10x   CW/CCW, manual overpressure provided   Hurdles:   x2 ea. Green R<>L lead, reciprocal, side stepping    Stepping over 1 julio c  10x   Forward/retro, required 1UE support occasionally 2 with retro stepping over leading with L leg   Agility mat: forward step in each square; lateral step in each square; march in each square x2   Hand hold assist needed           // bars: forward and lateral walk with emphasis on taking longer, exaggerated steps, retro, marching x3 laps ea. x Cues to swing arms, only completed 1 lap retro increased HR to 105. Sit-stands  15x   Without UE support.  Increased pain in R knee 92%  Off of large mat table at lowest level          NK table: flexion/extension x10 ea 5#  Bilaterally, cues for technique   BOSU lunges  x10      Supine: ball squeezes and hip abd x15  x blue band, on P   [employer] walking  ~350 ft.        6 min walk test  x1   Without supplemental oxygen;  92-95%; no rest break   Core strengthening exercises: TA activation; pelvic tilt; bridges 10x  x On moist heat    TA + marching 10x  x On MHP   TA + 90/90 hold 10 sec hold 3x  Single leg after B LE's, Cues to not arch back, on MHP, educated on how to perform at home   LTR 10x  x On MHP   Q 3x        Specific Interventions Next Treatment: NuStep; airex balance exercises; Activity/Treatment Tolerance:  [x]  Patient tolerated treatment well  []  Patient limited by fatigue  []  Patient limited by pain   []  Patient limited by medical complications  []  Other:     Assessment: Mid-thoracic spine pain seemed to alleviate with therapy today. Spent more time preforming supine exercises to provide greater support to her spine in general.    Body Structures/Functions/Activity Limitations: impaired activity tolerance, impaired balance, impaired coordination, impaired endurance, impaired safety awareness, impaired strength and pain  Prognosis: fair    GOALS:  Patient Goal: minimize fall risk    Short Term Goals:  Time Frame: 4 weeks  1. Lucille Perez will demonstrate a good ankle strategy to maintain standing balance with perturbations to her trunk. 2. Lucille Perez will be able to stand for 20 min at a time without needing a rest break allowing her to prepare meals and perform light chores without limitation. NOT MET-limited to 5 min before she has to sit due to back pain; uses a kitchen stool a lot  3. Alison's Tinetti score will improve to 19/28 indicating minimal fall risk. GOAL MET-improved to 22/28      Long Term Goals:  Time Frame: 8 weeks  1. Discharge with independent HEP ONGOING  2. Alison's Tinetti score will improve to >21/28 indicating no fall risk. GOAL MET; REVISE to improve Tinetti score to >26/28; NOT MET-improved to 26/28  3. Lucille Perez will be able to maintain her standing balance in all 4 conditions of the m-CTSIB, using an ankle strategy to maintain her balance. NOT MET-able to maintain standing balance in conditions 1-3; LOB after 6 sec in condition 4  4.  NEW GOAL: Lucille Perez will demonstrate good abdominal

## 2022-03-22 ENCOUNTER — OFFICE VISIT (OUTPATIENT)
Dept: FAMILY MEDICINE CLINIC | Age: 72
End: 2022-03-22
Payer: MEDICARE

## 2022-03-22 VITALS
RESPIRATION RATE: 16 BRPM | SYSTOLIC BLOOD PRESSURE: 130 MMHG | WEIGHT: 246.5 LBS | BODY MASS INDEX: 42.31 KG/M2 | HEART RATE: 76 BPM | DIASTOLIC BLOOD PRESSURE: 72 MMHG

## 2022-03-22 DIAGNOSIS — M48.061 SPINAL STENOSIS OF LUMBAR REGION, UNSPECIFIED WHETHER NEUROGENIC CLAUDICATION PRESENT: ICD-10-CM

## 2022-03-22 DIAGNOSIS — D22.9 CHANGE IN MOLE: Primary | ICD-10-CM

## 2022-03-22 PROCEDURE — G8400 PT W/DXA NO RESULTS DOC: HCPCS | Performed by: FAMILY MEDICINE

## 2022-03-22 PROCEDURE — 99213 OFFICE O/P EST LOW 20 MIN: CPT | Performed by: FAMILY MEDICINE

## 2022-03-22 PROCEDURE — G8484 FLU IMMUNIZE NO ADMIN: HCPCS | Performed by: FAMILY MEDICINE

## 2022-03-22 PROCEDURE — 1123F ACP DISCUSS/DSCN MKR DOCD: CPT | Performed by: FAMILY MEDICINE

## 2022-03-22 PROCEDURE — 4040F PNEUMOC VAC/ADMIN/RCVD: CPT | Performed by: FAMILY MEDICINE

## 2022-03-22 PROCEDURE — G8427 DOCREV CUR MEDS BY ELIG CLIN: HCPCS | Performed by: FAMILY MEDICINE

## 2022-03-22 PROCEDURE — 1036F TOBACCO NON-USER: CPT | Performed by: FAMILY MEDICINE

## 2022-03-22 PROCEDURE — 1090F PRES/ABSN URINE INCON ASSESS: CPT | Performed by: FAMILY MEDICINE

## 2022-03-22 PROCEDURE — G8417 CALC BMI ABV UP PARAM F/U: HCPCS | Performed by: FAMILY MEDICINE

## 2022-03-22 PROCEDURE — 3017F COLORECTAL CA SCREEN DOC REV: CPT | Performed by: FAMILY MEDICINE

## 2022-03-22 SDOH — ECONOMIC STABILITY: FOOD INSECURITY: WITHIN THE PAST 12 MONTHS, YOU WORRIED THAT YOUR FOOD WOULD RUN OUT BEFORE YOU GOT MONEY TO BUY MORE.: NEVER TRUE

## 2022-03-22 SDOH — ECONOMIC STABILITY: FOOD INSECURITY: WITHIN THE PAST 12 MONTHS, THE FOOD YOU BOUGHT JUST DIDN'T LAST AND YOU DIDN'T HAVE MONEY TO GET MORE.: NEVER TRUE

## 2022-03-22 ASSESSMENT — PATIENT HEALTH QUESTIONNAIRE - PHQ9
3. TROUBLE FALLING OR STAYING ASLEEP: 0
1. LITTLE INTEREST OR PLEASURE IN DOING THINGS: 0
7. TROUBLE CONCENTRATING ON THINGS, SUCH AS READING THE NEWSPAPER OR WATCHING TELEVISION: 0
9. THOUGHTS THAT YOU WOULD BE BETTER OFF DEAD, OR OF HURTING YOURSELF: 0
SUM OF ALL RESPONSES TO PHQ QUESTIONS 1-9: 0
2. FEELING DOWN, DEPRESSED OR HOPELESS: 0
6. FEELING BAD ABOUT YOURSELF - OR THAT YOU ARE A FAILURE OR HAVE LET YOURSELF OR YOUR FAMILY DOWN: 0
SUM OF ALL RESPONSES TO PHQ QUESTIONS 1-9: 0
SUM OF ALL RESPONSES TO PHQ9 QUESTIONS 1 & 2: 0
10. IF YOU CHECKED OFF ANY PROBLEMS, HOW DIFFICULT HAVE THESE PROBLEMS MADE IT FOR YOU TO DO YOUR WORK, TAKE CARE OF THINGS AT HOME, OR GET ALONG WITH OTHER PEOPLE: 0
SUM OF ALL RESPONSES TO PHQ QUESTIONS 1-9: 0
8. MOVING OR SPEAKING SO SLOWLY THAT OTHER PEOPLE COULD HAVE NOTICED. OR THE OPPOSITE, BEING SO FIGETY OR RESTLESS THAT YOU HAVE BEEN MOVING AROUND A LOT MORE THAN USUAL: 0
5. POOR APPETITE OR OVEREATING: 0
SUM OF ALL RESPONSES TO PHQ QUESTIONS 1-9: 0
4. FEELING TIRED OR HAVING LITTLE ENERGY: 0

## 2022-03-22 ASSESSMENT — SOCIAL DETERMINANTS OF HEALTH (SDOH): HOW HARD IS IT FOR YOU TO PAY FOR THE VERY BASICS LIKE FOOD, HOUSING, MEDICAL CARE, AND HEATING?: NOT HARD AT ALL

## 2022-03-22 NOTE — PROGRESS NOTES
Bernardino Alexis (:  1950) is a 70 y.o. female,Established patient, here for evaluation of the following chief complaint(s): Mole (right side of forehead, increase in size) and Other (handicap placard #2)        Subjective   SUBJECTIVE/OBJECTIVE:  HPI:    Chief Complaint   Patient presents with    Mole     right side of forehead, increase in size    Other     handicap placard #2     Pt is here for a mole to the right side of her forehead several years, possibly 20 yrs. Has noticed increasing in size. Patient Active Problem List   Diagnosis    Hypothyroid    Dyslipidemia    Bipolar disorder ([facility] [address]    Post-menopausal    HTN (hypertension)    Obesity (BMI 30-39. 9)    RANDY on CPAP    ACE inhibitor-aggravated angioedema    Acute on chronic diastolic congestive heart failure (HCC)    Ascending aortic aneurysm (HCC)    Chronic diastolic heart failure (HCC)    Thoracic aortic aneurysm without rupture (HCC)    Abnormal stress test    CKD (chronic kidney disease), stage II    Edema of lower extremity    Pneumonia of both lower lobes due to infectious organism    Acute respiratory failure with hypoxia (HCC)    Multiple lung nodules on CT    Congestive heart failure (HCC)    Morbid obesity with BMI of 40.0-44.9, adult (HCC)    Acquired spondylolisthesis    Artificial knee joint present    Deficiency of medial collateral ligament of right knee    Displacement of lumbar intervertebral disc without myelopathy    Lumbosacral radiculopathy    Other idiopathic scoliosis, thoracolumbar region    Spinal stenosis of lumbar region    SOB (shortness of breath)    Restrictive lung disease    Grade I diastolic dysfunction    Atelectasis    Dysphagia     Past Surgical History:   Procedure Laterality Date    ANKLE SURGERY      left    CATARACT REMOVAL      Both eyes      SECTION      x 3    FOOT SURGERY      Left     HIP SURGERY      HYSTERECTOMY      Total     TOTAL KNEE ARTHROPLASTY Left 10/14/14    TOTAL KNEE ARTHROPLASTY Right 05/24/2019     Social History     Tobacco Use    Smoking status: Never Smoker    Smokeless tobacco: Never Used   Vaping Use    Vaping Use: Never used   Substance Use Topics    Alcohol use: No     Alcohol/week: 0.0 standard drinks     Comment: rarely    Drug use: No         Review of Systems   Constitutional: Negative. HENT: Negative. Respiratory: Negative. Cardiovascular: Negative. Gastrointestinal: Negative. Musculoskeletal: Negative. Skin:        Change in mole     All other systems reviewed and are negative. Objective   Physical Exam  Vitals and nursing note reviewed. Constitutional:       General: She is not in acute distress. Appearance: Normal appearance. She is well-developed. HENT:      Head: Normocephalic and atraumatic. Right Ear: Tympanic membrane normal.      Left Ear: Tympanic membrane normal.   Eyes:      Conjunctiva/sclera: Conjunctivae normal.   Cardiovascular:      Rate and Rhythm: Normal rate and regular rhythm. Heart sounds: Normal heart sounds. No murmur heard. Pulmonary:      Effort: Pulmonary effort is normal.      Breath sounds: Normal breath sounds. No wheezing, rhonchi or rales. Abdominal:      General: There is no distension. Musculoskeletal:      Cervical back: Neck supple. Skin:     General: Skin is warm and dry. Findings: No rash (on exposed surfaces). Neurological:      General: No focal deficit present. Mental Status: She is alert. Psychiatric:         Attention and Perception: Attention normal.         Mood and Affect: Mood normal.         Speech: Speech normal.         Behavior: Behavior normal. Behavior is cooperative. Thought Content: Thought content normal.         Judgment: Judgment normal.               ASSESSMENT/PLAN:  1. Change in mole  -     External Referral To Dermatology  2.  Spinal stenosis of lumbar region, unspecified whether

## 2022-03-22 NOTE — PROGRESS NOTES
Referral faxed to Dr. Shweta Rodriguez. Patient aware his office will contact patient to schedule. Handicap placard #2 mailed to patient.

## 2022-03-23 ENCOUNTER — HOSPITAL ENCOUNTER (OUTPATIENT)
Dept: PHYSICAL THERAPY | Age: 72
Setting detail: THERAPIES SERIES
Discharge: HOME | End: 2022-03-23
Payer: MEDICARE

## 2022-03-23 PROCEDURE — 97110 THERAPEUTIC EXERCISES: CPT

## 2022-03-23 ASSESSMENT — ENCOUNTER SYMPTOMS
GASTROINTESTINAL NEGATIVE: 1
ROS SKIN COMMENTS: CHANGE IN MOLE
RESPIRATORY NEGATIVE: 1

## 2022-03-23 NOTE — PROGRESS NOTES
[address] [facility]  PHYSICAL THERAPY  [] EVALUATION  [x] DAILY NOTE (LAND) [] DAILY NOTE (AQUATIC ) [] PROGRESS NOTE [] DISCHARGE NOTE    [] [facility]   [] [address]    [] [address] [facility]   [x] Dejan Kelley    Date: 3/23/2022  Patient Name:  Anderson Galeas  : 1950  MRN: [id number]  CSN: [id number]    Referring Practitioner Roe Callahan DO   Diagnosis Other intervertebral disc degeneration, lumbar region [M51.36]  Radiculopathy, lumbar region [M54.16]    Treatment Diagnosis Difficulty walking    Date of Evaluation 21    Additional Pertinent History Bi-polar; SOB related to CHF      Functional Outcome Measure Used Tinetti   Functional Outcome Score 15/28 (21)    (21)   (21)   (10/29/21)   (21)   (21)   (22) able to complete 6 min walk test without any rest break and maintained oxygen levels between 91-98%   (3/4/22)      Insurance: Primary: Payor: Mattie Cadet /  /  / , aquatic therapy is covered; modalities covered except for IONTO  Secondary:    Authorization Information:  TOTAL  UNITS    FROM 22 TO 22  CPT CODES:  87950, 38124, [id number], 81801, 13922, 41462,  REF# [id number]     Visit # 7, 7/10 for progress note   Visits Allowed: 40 or 60 visits (120 units total)    Recertification Date:    Physician Follow-Up:    Physician Orders:    History of Present Illness: Fya Cobb is referred to PT to address ongoing balance issues. She states that she noticed that she was struggling with her balance when she slipped off a short deck at a local [employer]. She admits that over the last few months she has also had a few falls; denies any major injury as a result of the falls. She started to workout with a local  a month hoping that it would help her balance. SUBJECTIVE: Pt stated back is feeling much better today. TREATMENT   Precautions:    Pain:  Achiness, generalized    X in shaded column indicates activity completed today   Modalities Parameters/  Location  Notes                     Manual Therapy Time/Technique  Notes                     Exercise/Intervention   Notes   Tinetti; review of goals        Standing on airex: feet together x2 20 sec hold  30 sec hold x No UE support ,CGA, cues to breathe   Step stance 20sec 2x  Standing with head turns in horizontal and vertical plane    NuStep x6 min  x Level 5, seat 8, arms 9   Forward step ups; side step ups  x10    Bilaterally    Airex: heel to raises, marches, HS curls x15 ea  x Hold mini squats due to increased pain   3 way hip x15 ea   Peach tband (aggravated sciatic in R LE) no peach band   Sitting HS stretch, piriformis stretch 15sec 3x x    Tandem stepping fwd/retro x2 laps ea  x    rocker board fwd/lat x15 ea   20 sec balance with 1UE support   wobbleboard 10x   CW/CCW, manual overpressure provided   Hurdles:   x2 ea. Green R<>L lead, reciprocal, side stepping    Stepping over 1 julio c  10x   Forward/retro, required 1UE support occasionally 2 with retro stepping over leading with L leg   Agility mat: forward step in each square; lateral step in each square; march in each square x2   Hand hold assist needed           // bars: forward and lateral walk with emphasis on taking longer, exaggerated steps,  marching x3 laps ea. x Cues to swing arms, decreased coordination with UE's          Sit-stands  15x   Without UE support.  Increased pain in R knee 92%  Off of large mat table at lowest level          NK table: flexion/extension x10 ea/2sets 5# x Bilaterally, cues for technique   BOSU lunges  x10      Supine: ball squeezes and hip abd x15  x blue band, on MHP   [employer] walking  ~350 ft.        6 min walk test  x1   Without supplemental oxygen;  92-95%; no rest break   Core strengthening exercises: TA activation; pelvic tilt; bridges  10x  x On moist heat    TA + marching 10x  x On MHP   TA + 90/90 hold 10 sec hold 3x  Single leg after B LE's, Cues to not arch back, on MHP, educated on how to perform at home   LTR 10x  x On MHP   Q 3x        Specific Interventions Next Treatment: NuStep; airex balance exercises; Activity/Treatment Tolerance:  [x]  Patient tolerated treatment well  []  Patient limited by fatigue  []  Patient limited by pain   []  Patient limited by medical complications  []  Other:     Assessment: Pain levels better this session. Pt still is only able to stand for about 15 minutes before glut pain starts. Pt was able to complete standing exercises and pain levels subside as soon as pt sits down. Will continue to work on endurance and balance    Body Structures/Functions/Activity Limitations: impaired activity tolerance, impaired balance, impaired coordination, impaired endurance, impaired safety awareness, impaired strength and pain  Prognosis: fair    GOALS:  Patient Goal: minimize fall risk    Short Term Goals:  Time Frame: 4 weeks  1. Diana Noguera will demonstrate a good ankle strategy to maintain standing balance with perturbations to her trunk. 2. Diana Noguera will be able to stand for 20 min at a time without needing a rest break allowing her to prepare meals and perform light chores without limitation. NOT MET-limited to 5 min before she has to sit due to back pain; uses a kitchen stool a lot  3. Alison's Tinetti score will improve to 19/28 indicating minimal fall risk. GOAL MET-improved to 22/28      Long Term Goals:  Time Frame: 8 weeks  1. Discharge with independent HEP ONGOING  2. Alison's Tinetti score will improve to >21/28 indicating no fall risk. GOAL MET; REVISE to improve Tinetti score to >26/28; NOT MET-improved to 26/28  3. Diana Noguera will be able to maintain her standing balance in all 4 conditions of the m-CTSIB, using an ankle strategy to maintain her balance.  NOT MET-able to maintain standing balance in conditions 1-3; LOB after 6 sec in condition 4  4. NEW GOAL: Nikki Lawrence will demonstrate good abdominal stabilization with all transfers and dynamic gait activities to provide greater support to low back. IMPROVING   5. NEW GOAL: Nikki Lawrence will be able to complete 6 min walking test with rollator while maintaining oxygen levels 92% or above so she can go without supplemental oxygen. PARTIALLY MET-oxygen levels dropped to 91, however she was quick to recover to 96%       Patient Education:    []  HEP/Education Completed: core engagement, sitting and supine hamstring and piriformis stretch   Medbridge Access Code:  [x]  No new Education completed  []  Reviewed Prior HEP      []  Patient verbalized and/or demonstrated understanding of education provided. []  Patient unable to verbalize and/or demonstrate understanding of education provided. Will continue education. [x]  Barriers to learning: n/a    PLAN:  Treatment Recommendations: Strengthening, Range of Motion, Balance Training, Endurance Training, Gait Training, Stair Training, Neuromuscular Re-education, Manual Therapy - Soft Tissue Mobilization, Manual Therapy - Joint Manipulation, Pain Management, Home Exercise Program, Patient Education and Modalities    []  Plan of care initiated. Plan to see patient 2 times per week for 8 weeks to address the treatment planned outlined above.   []  Continue with current plan of care  [x]  Modify plan of care as follows: 2 time per week   []  Hold pending physician visit  []  Discharge    Time In 1430   Time Out 1515   Timed Code Minutes: 45 min   Total Treatment Time: 45 min       Electronically Signed by: Cherri Hernandez PTA

## 2022-03-28 ENCOUNTER — HOSPITAL ENCOUNTER (OUTPATIENT)
Dept: PHYSICAL THERAPY | Age: 72
Setting detail: THERAPIES SERIES
Discharge: HOME | End: 2022-03-28
Payer: MEDICARE

## 2022-03-28 PROCEDURE — 97110 THERAPEUTIC EXERCISES: CPT

## 2022-03-28 PROCEDURE — 97112 NEUROMUSCULAR REEDUCATION: CPT

## 2022-03-28 PROCEDURE — 97530 THERAPEUTIC ACTIVITIES: CPT

## 2022-03-28 NOTE — PROGRESS NOTES
[address] [facility]  PHYSICAL THERAPY  [] EVALUATION  [x] DAILY NOTE (LAND) [] DAILY NOTE (AQUATIC ) [] PROGRESS NOTE [] DISCHARGE NOTE    [] [facility]   [] [address]    [] [address] [facility]   [x] Jennyrene Amel    Date: 3/28/2022  Patient Name:  Sarmad Telles  : 1950  MRN: [id number]  CSN: [id number]    Referring Practitioner Sonu Yoon DO   Diagnosis Other intervertebral disc degeneration, lumbar region [M51.36]  Radiculopathy, lumbar region [M54.16]    Treatment Diagnosis Difficulty walking    Date of Evaluation 21    Additional Pertinent History Bi-polar; SOB related to CHF      Functional Outcome Measure Used Tinetti   Functional Outcome Score 15/28 (21)    (21)   (21)   (10/29/21)   (21)   (21)   (22) able to complete 6 min walk test without any rest break and maintained oxygen levels between 91-98%   (3/4/22)      Insurance: Primary: Payor: Cyrus Nascimento /  /  / , aquatic therapy is covered; modalities covered except for IONTO  Secondary:    Authorization Information:  TOTAL  UNITS    FROM 22 TO 22  CPT CODES:  80537, 34869, [id number], 18696, 50950, 84081,  REF# [id number]     Visit # , 8/10 for progress note   Visits Allowed: 40 or 60 visits (120 units total)    Recertification Date:    Physician Follow-Up:    Physician Orders:    History of Present Illness: Galindo Birmingham is referred to PT to address ongoing balance issues. She states that she noticed that she was struggling with her balance when she slipped off a short deck at a local [employer]. She admits that over the last few months she has also had a few falls; denies any major injury as a result of the falls. She started to workout with a local  a month hoping that it would help her balance. SUBJECTIVE: Doing okay; really enjoying 2 times per week. She fell this weekend; her  washed the floor and warned her, but she slid down onto her butt. TREATMENT   Precautions:    Pain:  Achiness, generalized    X in shaded column indicates activity completed today   Modalities Parameters/  Location  Notes                     Manual Therapy Time/Technique  Notes                     Exercise/Intervention   Notes   Mariano; review of goals        Standing on airex: feet together x2 20 sec hold  30 sec hold x No UE support ,CGA, cues to breathe   Step stance 20sec 2x  Standing with head turns in horizontal and vertical plane    NuStep x6 min  x Level 5, seat 8, arms 9   Forward step ups; side step ups  x10    Bilaterally    Airex: heel to raises, marches, HS curls x15 ea  x Hold mini squats due to increased pain   3 way hip x15 ea   Peach tband (aggravated sciatic in R LE) no peach band   Sitting HS stretch, piriformis stretch 15sec 3x     Tandem stepping fwd/retro x2 laps ea  x    rocker board fwd/lat x15 ea   20 sec balance with 1UE support   wobbleboard 10x   CW/CCW, manual overpressure provided   Hurdles:   x2 ea. Green R<>L lead, reciprocal, side stepping    Stepping over 1 julio c  10x   Forward/retro, required 1UE support occasionally 2 with retro stepping over leading with L leg   Agility mat: forward step in each square; lateral step in each square; march in each square x2   Hand hold assist needed           // bars: forward and lateral walk with emphasis on taking longer, exaggerated steps,  marching x3 laps ea. x Cues to swing arms, decreased coordination with UE's          Sit-stands  15x   Without UE support.  Increased pain in R knee 92%  Off of large mat table at lowest level          NK table: flexion/extension x10 ea/2sets 5#  Bilaterally, cues for technique   BOSU lunges  x10      Supine: ball squeezes and hip abd x15  x blue band, on P   [employer] walking  ~350 ft.        6 min walk test  x1   Without supplemental oxygen;  92-95%; no rest break Core strengthening exercises: TA activation; pelvic tilt; bridges  10x  x On moist heat    TA + marching 10x  x On MHP   TA + 90/90 hold 10 sec hold 3x  Single leg after B LE's, Cues to not arch back, on MHP, educated on how to perform at home   LTR 10x  x On MHP   Q 3x        Specific Interventions Next Treatment: NuStep; airex balance exercises; Activity/Treatment Tolerance:  [x]  Patient tolerated treatment well  []  Patient limited by fatigue  []  Patient limited by pain   []  Patient limited by medical complications  []  Other:     Assessment: Greater emphasis on core strengthening exercises today; increase in back pain due to recent slip/fall at home. Body Structures/Functions/Activity Limitations: impaired activity tolerance, impaired balance, impaired coordination, impaired endurance, impaired safety awareness, impaired strength and pain  Prognosis: fair    GOALS:  Patient Goal: minimize fall risk    Short Term Goals:  Time Frame: 4 weeks  1. Arvin Curling will demonstrate a good ankle strategy to maintain standing balance with perturbations to her trunk. 2. Arvin Curling will be able to stand for 20 min at a time without needing a rest break allowing her to prepare meals and perform light chores without limitation. NOT MET-limited to 5 min before she has to sit due to back pain; uses a kitchen stool a lot  3. Alison's Tinetti score will improve to 19/28 indicating minimal fall risk. GOAL MET-improved to 22/28      Long Term Goals:  Time Frame: 8 weeks  1. Discharge with independent HEP ONGOING  2. Alison's Tinetti score will improve to >21/28 indicating no fall risk. GOAL MET; REVISE to improve Tinetti score to >26/28; NOT MET-improved to 26/28  3. Arvin Curling will be able to maintain her standing balance in all 4 conditions of the m-CTSIB, using an ankle strategy to maintain her balance. NOT MET-able to maintain standing balance in conditions 1-3; LOB after 6 sec in condition 4  4.  NEW GOAL: Arvin Curling will demonstrate good abdominal stabilization with all transfers and dynamic gait activities to provide greater support to low back. IMPROVING   5. NEW GOAL: Vicky Blake will be able to complete 6 min walking test with rollator while maintaining oxygen levels 92% or above so she can go without supplemental oxygen. PARTIALLY MET-oxygen levels dropped to 91, however she was quick to recover to 96%       Patient Education:    []  HEP/Education Completed: core engagement, sitting and supine hamstring and piriformis stretch   Medbridge Access Code:  [x]  No new Education completed  []  Reviewed Prior HEP      []  Patient verbalized and/or demonstrated understanding of education provided. []  Patient unable to verbalize and/or demonstrate understanding of education provided. Will continue education. [x]  Barriers to learning: n/a    PLAN:  Treatment Recommendations: Strengthening, Range of Motion, Balance Training, Endurance Training, Gait Training, Stair Training, Neuromuscular Re-education, Manual Therapy - Soft Tissue Mobilization, Manual Therapy - Joint Manipulation, Pain Management, Home Exercise Program, Patient Education and Modalities    []  Plan of care initiated. Plan to see patient 2 times per week for 8 weeks to address the treatment planned outlined above.   []  Continue with current plan of care  [x]  Modify plan of care as follows: 2 time per week   []  Hold pending physician visit  []  Discharge    Time In 0930   Time Out 1010   Timed Code Minutes: 40 min   Total Treatment Time: 40 min       Electronically Signed by: Jose Brown, MINE Carver [id number]"Diam Nolasco DPT  [id number]

## 2022-03-31 ENCOUNTER — HOSPITAL ENCOUNTER (OUTPATIENT)
Dept: PHYSICAL THERAPY | Age: 72
Setting detail: THERAPIES SERIES
Discharge: HOME | End: 2022-03-31
Payer: MEDICARE

## 2022-03-31 PROCEDURE — 97530 THERAPEUTIC ACTIVITIES: CPT

## 2022-03-31 PROCEDURE — 97110 THERAPEUTIC EXERCISES: CPT

## 2022-03-31 NOTE — PROGRESS NOTES
[address] [facility]  PHYSICAL THERAPY  [] EVALUATION  [] DAILY NOTE (LAND) [] DAILY NOTE (AQUATIC ) [x] PROGRESS NOTE [] DISCHARGE NOTE    [] OUTPATIENT REHABILITATION CENTER [facility]   [] Carlo     [] [address] [facility]   [x] Amada Loya    Date: 3/31/2022  Patient Name:  Daniel Dc  : 1950  MRN: [id number]  CSN: [id number]    Referring Practitioner Kenny Ovalle DO   Diagnosis Other intervertebral disc degeneration, lumbar region [M51.36]  Radiculopathy, lumbar region [M54.16]    Treatment Diagnosis Difficulty walking    Date of Evaluation 21    Additional Pertinent History Bi-polar; SOB related to CHF      Functional Outcome Measure Used Tinetti   Functional Outcome Score 15/28 (21)    (21)   (21)   (10/29/21)   (21)   (21)   (22) able to complete 6 min walk test without any rest break and maintained oxygen levels between 91-98%   (3/4/22)      Insurance: Primary: Payor: Jeromy Klein /  /  / , aquatic therapy is covered; modalities covered except for IONTO  Secondary:    Authorization Information:  TOTAL  UNITS    FROM 22 TO 22  CPT CODES:  92414, 53145, [id number], 82161, 28340, 10289,  REF# [id number]     Visit # 9, /10 for progress note   Visits Allowed: 40 or 60 visits (120 units total)    Recertification Date:    Physician Follow-Up:    Physician Orders:    History of Present Illness: Syeda Álvarez is referred to PT to address ongoing balance issues. She states that she noticed that she was struggling with her balance when she slipped off a short deck at a local [employer]. She admits that over the last few months she has also had a few falls; denies any major injury as a result of the falls. She started to workout with a local  a month hoping that it would help her balance.       SUBJECTIVE: She notices that she does so much better in the afternoons after she has had a chance to get limbered up! TREATMENT   Precautions:    Pain:  Achiness, generalized    X in shaded column indicates activity completed today   Modalities Parameters/  Location  Notes                     Manual Therapy Time/Technique  Notes                     Exercise/Intervention   Notes   Mariano; review of goals        Standing on airex: feet together x2 20 sec hold  30 sec hold  No UE support ,CGA, cues to breathe   Step stance 20sec 2x  Standing with head turns in horizontal and vertical plane    NuStep x6 min  x Level 5, seat 8, arms 9   Forward step ups; side step ups  x10    Bilaterally    Airex: heel to raises, marches, HS curls x15 ea   Hold mini squats due to increased pain   3 way hip x15 ea   Peach tband (aggravated sciatic in R LE) no peach band   Sitting HS stretch, piriformis stretch 15sec 3x     Tandem stepping fwd/retro x2 laps ea      rocker board fwd/lat x15 ea   20 sec balance with 1UE support   wobbleboard 10x   CW/CCW, manual overpressure provided   Hurdles:   x2 ea.  x Green R<>L lead, reciprocal, side stepping    Stepping over 1 julio c  10x   Forward/retro, required 1UE support occasionally 2 with retro stepping over leading with L leg   Agility mat: forward step in each square; lateral step in each square; march in each square x2   Hand hold assist needed           // bars: forward and lateral walk with emphasis on taking longer, exaggerated steps,  marching x3 laps ea. x Cues to swing arms, decreased coordination with UE's          Sit-stands  15x   Without UE support.  Increased pain in R knee 92%  Off of large mat table at lowest level          NK table: flexion/extension x10 ea/2sets 5#  Bilaterally, cues for technique   BOSU lunges  x10      Supine: ball squeezes and hip abd x15   blue band, on P   [employer] walking  ~630 ft.   350 ft without rollator  x Stopped at 213 for standing rest break; seated rest break at 350 ft   6 min walk test  x1 Without supplemental oxygen;  92-95%; no rest break   Core strengthening exercises: TA activation; pelvic tilt; bridges  10x  x On moist heat    TA + marching 10x  x On MHP   TA + 90/90 hold 10 sec hold 3x  Single leg after B LE's, Cues to not arch back, on MHP, educated on how to perform at home   LTR 10x  x On MHP   Q 3x        Specific Interventions Next Treatment: NuStep; airex balance exercises; Activity/Treatment Tolerance:  [x]  Patient tolerated treatment well  []  Patient limited by fatigue  []  Patient limited by pain   []  Patient limited by medical complications  []  Other:     Assessment: Colonel Pascual continues to demonstrate excellent improvement in overall balance and mobility. Today she walked 350 ft without AD, while demonstrating good arm swing! She will benefit from continued therapy to work to improve her stamina and mobility as well as her confidence so she may ambulate in the community without pain or limitation. Body Structures/Functions/Activity Limitations: impaired activity tolerance, impaired balance, impaired coordination, impaired endurance, impaired safety awareness, impaired strength and pain  Prognosis: fair    GOALS:  Patient Goal: minimize fall risk    Short Term Goals:  Time Frame: 4 weeks  1. Colonel Pascual will demonstrate a good ankle strategy to maintain standing balance with perturbations to her trunk. 2. Colonel Pascual will be able to stand for 20 min at a time without needing a rest break allowing her to prepare meals and perform light chores without limitation. NOT MET-limited to 5 min before she has to sit due to back pain; uses a kitchen stool a lot  3. Alison's Tinetti score will improve to 19/28 indicating minimal fall risk. GOAL MET-improved to 22/28      Long Term Goals:  Time Frame: 8 weeks  1. Discharge with independent HEP ONGOING  2. Alison's Tinetti score will improve to >21/28 indicating no fall risk.  GOAL MET; REVISE to improve Tinetti score to >26/28; NOT MET-improved to 26/28  3. Mei Harris will be able to maintain her standing balance in all 4 conditions of the m-CTSIB, using an ankle strategy to maintain her balance. NOT MET-able to maintain standing balance in conditions 1-3; LOB after 6 sec in condition 4  4. Mei Harris will demonstrate good abdominal stabilization with all transfers and dynamic gait activities to provide greater support to low back. GOAL MET   5. Mei Harris will be able to complete 6 min walking test with rollator while maintaining oxygen levels 92% or above so she can go without supplemental oxygen. GOAL MET   6. NEW GOAL: Mei Harris will walk 1/2 a mile without her rollator and no rest break while demonstrating good arm swing. Patient Education:    []  HEP/Education Completed: core engagement, sitting and supine hamstring and piriformis stretch   Medbridge Access Code:  [x]  No new Education completed  []  Reviewed Prior HEP      []  Patient verbalized and/or demonstrated understanding of education provided. []  Patient unable to verbalize and/or demonstrate understanding of education provided. Will continue education. [x]  Barriers to learning: n/a    PLAN:  Treatment Recommendations: Strengthening, Range of Motion, Balance Training, Endurance Training, Gait Training, Stair Training, Neuromuscular Re-education, Manual Therapy - Soft Tissue Mobilization, Manual Therapy - Joint Manipulation, Pain Management, Home Exercise Program, Patient Education and Modalities    []  Plan of care initiated. Plan to see patient 2 times per week for 8 weeks to address the treatment planned outlined above.   []  Continue with current plan of care  [x]  Modify plan of care as follows: 2 time per week   []  Hold pending physician visit  []  Discharge    Time In 1045   Time Out 1125   Timed Code Minutes: 40 min   Total Treatment Time: 40 min       Electronically Signed by: Selena Rust, PT   Klever Carver [id number]"Marian Galeas DPT  [id number]

## 2022-04-05 ENCOUNTER — HOSPITAL ENCOUNTER (OUTPATIENT)
Dept: PHYSICAL THERAPY | Age: 72
Setting detail: THERAPIES SERIES
Discharge: HOME | End: 2022-04-05
Payer: MEDICARE

## 2022-04-05 PROCEDURE — 97530 THERAPEUTIC ACTIVITIES: CPT

## 2022-04-05 PROCEDURE — 97110 THERAPEUTIC EXERCISES: CPT

## 2022-04-05 PROCEDURE — 97112 NEUROMUSCULAR REEDUCATION: CPT

## 2022-04-05 NOTE — PROGRESS NOTES
[address] [facility]  PHYSICAL THERAPY  [] EVALUATION  [x] DAILY NOTE (LAND) [] DAILY NOTE (AQUATIC ) [] PROGRESS NOTE [] DISCHARGE NOTE    [] [address]   [] [address]    [] [address] [facility]   [x] Marixa Rosales    Date: 2022  Patient Name:  Patricia Connell  : 1950  MRN: [id number]  CSN: [id number]    Referring Practitioner Morgan Funez DO   Diagnosis Other intervertebral disc degeneration, lumbar region [M51.36]  Radiculopathy, lumbar region [M54.16]    Treatment Diagnosis Difficulty walking    Date of Evaluation 21    Additional Pertinent History Bi-polar; SOB related to CHF      Functional Outcome Measure Used Tinetti   Functional Outcome Score 15/28 (21)    (21)   (21)   (10/29/21)   (21)   (21)   (22) able to complete 6 min walk test without any rest break and maintained oxygen levels between 91-98%   (3/4/22)      Insurance: Primary: Payor: Huseyin Bernal /  /  / , aquatic therapy is covered; modalities covered except for IONTO  Secondary:    Authorization Information:  TOTAL  UNITS    FROM 22 TO 22  CPT CODES:  10174, 66407, [id number], 22402, 64319, 52918,  REF# [id number]     Visit # 10, 1/10 for progress note   Visits Allowed: 40 or 60 visits (120 units total)    Recertification Date:    Physician Follow-Up:    Physician Orders:    History of Present Illness: Arminda Medley is referred to PT to address ongoing balance issues. She states that she noticed that she was struggling with her balance when she slipped off a short deck at a local [employer]. She admits that over the last few months she has also had a few falls; denies any major injury as a result of the falls. She started to workout with a local  a month hoping that it would help her balance.       SUBJECTIVE: Stiff and more short of breath this AM. She has been working on engaging her core when standing and doing activities     TREATMENT   Precautions:    Pain:  Achiness, generalized    X in shaded column indicates activity completed today   Modalities Parameters/  Location  Notes                     Manual Therapy Time/Technique  Notes                     Exercise/Intervention   Notes   Mariano; review of goals        Standing on airex: feet together x2 20 sec hold  30 sec hold  No UE support ,CGA, cues to breathe   Step stance 20sec 2x  Standing with head turns in horizontal and vertical plane    NuStep x6 min  x Level 5, seat 8, arms 9   Forward step ups; side step ups  x10    Bilaterally    Airex: heel to raises, marches, HS curls x15 ea   Hold mini squats due to increased pain   3 way hip x15 ea   Peach tband (aggravated sciatic in R LE) no peach band   Sitting HS stretch, piriformis stretch 15sec 3x     Tandem stepping fwd/retro x2 laps ea      rocker board fwd/lat x15 ea  x 20 sec balance with 1UE support   wobbleboard 10x   CW/CCW, manual overpressure provided   Hurdles:   x2 ea. Green R<>L lead, reciprocal, side stepping    Stepping over 1 julio c  10x   Forward/retro, required 1UE support occasionally 2 with retro stepping over leading with L leg   Agility mat: forward step in each square; lateral step in each square; march in each square x2   Hand hold assist needed           // bars: forward and lateral walk with emphasis on taking longer, exaggerated steps,  marching x3 laps ea. x Cues to swing arms, decreased coordination with UE's          Sit-stands  15x   Without UE support.  Increased pain in R knee 92%  Off of large mat table at lowest level          NK table: flexion/extension x10 ea/2sets 5#  Bilaterally, cues for technique   BOSU lunges  x10      Supine: ball squeezes and hip abd x15   blue band, on [facility]   [employer] walking  ~630 ft.   350 ft without rollator  x  seated rest break at 350 ft   6 min walk test  x1   Without supplemental oxygen; 92-95%; no rest break   Core strengthening exercises: TA activation; pelvic tilt; bridges  10x  x On moist heat    TA + marching 10x  x On MHP   TA + 90/90 hold 10 sec hold 3x  Single leg after B LE's, Cues to not arch back, on MHP, educated on how to perform at home   LTR 10x  x On MHP   Q 3x        Specific Interventions Next Treatment: NuStep; airex balance exercises; Activity/Treatment Tolerance:  [x]  Patient tolerated treatment well  []  Patient limited by fatigue  []  Patient limited by pain   []  Patient limited by medical complications  []  Other:     Assessment: Jazmyn Jimenez walked 350 ft today without a rest break!!! She did have increase in shortness of breath, however she was able to control her breathing with pursed lip breathing. Body Structures/Functions/Activity Limitations: impaired activity tolerance, impaired balance, impaired coordination, impaired endurance, impaired safety awareness, impaired strength and pain  Prognosis: fair    GOALS:  Patient Goal: minimize fall risk    Short Term Goals:  Time Frame: 4 weeks  1. Jazmyn Jimenez will demonstrate a good ankle strategy to maintain standing balance with perturbations to her trunk. 2. Jazmyn Jimenez will be able to stand for 20 min at a time without needing a rest break allowing her to prepare meals and perform light chores without limitation. NOT MET-limited to 5 min before she has to sit due to back pain; uses a kitchen stool a lot  3. Alison's Tinetti score will improve to 19/28 indicating minimal fall risk. GOAL MET-improved to 22/28      Long Term Goals:  Time Frame: 8 weeks  1. Discharge with independent HEP ONGOING  2. Alison's Tinetti score will improve to >21/28 indicating no fall risk. GOAL MET; REVISE to improve Tinetti score to >26/28; NOT MET-improved to 26/28  3. Jazmyn Jimenez will be able to maintain her standing balance in all 4 conditions of the m-CTSIB, using an ankle strategy to maintain her balance.  NOT MET-able to maintain standing balance in conditions 1-3; LOB after 6 sec in condition 4  4. [facility] AKIRA will demonstrate good abdominal stabilization with all transfers and dynamic gait activities to provide greater support to low back. GOAL MET   5. [facility] AKIRA will be able to complete 6 min walking test with rollator while maintaining oxygen levels 92% or above so she can go without supplemental oxygen. GOAL MET   6. NEW GOAL: [facility] AKIRA will walk 1/2 a mile without her rollator and no rest break while demonstrating good arm swing. Patient Education:    []  HEP/Education Completed: core engagement, sitting and supine hamstring and piriformis stretch   Medbridge Access Code:  [x]  No new Education completed  []  Reviewed Prior HEP      []  Patient verbalized and/or demonstrated understanding of education provided. []  Patient unable to verbalize and/or demonstrate understanding of education provided. Will continue education. [x]  Barriers to learning: n/a    PLAN:  Treatment Recommendations: Strengthening, Range of Motion, Balance Training, Endurance Training, Gait Training, Stair Training, Neuromuscular Re-education, Manual Therapy - Soft Tissue Mobilization, Manual Therapy - Joint Manipulation, Pain Management, Home Exercise Program, Patient Education and Modalities    []  Plan of care initiated. Plan to see patient 2 times per week for 8 weeks to address the treatment planned outlined above.   []  Continue with current plan of care  [x]  Modify plan of care as follows: 2 time per week   []  Hold pending physician visit  []  Discharge    Time In 0856   Time Out 0940   Timed Code Minutes: 44 min   Total Treatment Time: 44 min       Electronically Signed by: Rebecca Eli, PT   Klever Carver [id number]"Natalia Romero DPT  [id number]

## 2022-04-08 ENCOUNTER — APPOINTMENT (OUTPATIENT)
Dept: PHYSICAL THERAPY | Age: 72
End: 2022-04-08
Payer: MEDICARE

## 2022-04-11 ENCOUNTER — HOSPITAL ENCOUNTER (OUTPATIENT)
Dept: PHYSICAL THERAPY | Age: 72
Setting detail: THERAPIES SERIES
Discharge: HOME | End: 2022-04-11
Payer: MEDICARE

## 2022-04-11 PROCEDURE — 97116 GAIT TRAINING THERAPY: CPT

## 2022-04-11 PROCEDURE — 97110 THERAPEUTIC EXERCISES: CPT

## 2022-04-11 NOTE — PROGRESS NOTES
[address] [facility]  PHYSICAL THERAPY  [] EVALUATION  [x] DAILY NOTE (LAND) [] DAILY NOTE (AQUATIC ) [] PROGRESS NOTE [] DISCHARGE NOTE    [] [address] [facility]   [] Ch[address]    [] [address] [facility]   [x] Hazel Hernandez    Date: 2022  Patient Name:  Mendez Deleon  : 1950  MRN: [id number]  CSN: [id number]    Referring Practitioner Geneva Alejandro DO   Diagnosis Other intervertebral disc degeneration, lumbar region [M51.36]  Radiculopathy, lumbar region [M54.16]    Treatment Diagnosis Difficulty walking    Date of Evaluation 21    Additional Pertinent History Bi-polar; SOB related to CHF      Functional Outcome Measure Used Tinetti   Functional Outcome Score 15/28 (21)    (21)   (21)   (10/29/21)   (21)   (21)   (22) able to complete 6 min walk test without any rest break and maintained oxygen levels between 91-98%   (3/4/22)      Insurance: Primary: Payor: Juliann Mondragon /  /  / , aquatic therapy is covered; modalities covered except for IONTO  Secondary:    Authorization Information:  TOTAL  UNITS    FROM 22 TO 22  CPT CODES:  12608, 80917, [id number], 18652, 52833, 90997,  REF# [id number]     Visit # 6, 2/10 for progress note   Visits Allowed: 40 or 60 visits (120 units total)    Recertification Date:    Physician Follow-Up:    Physician Orders:    History of Present Illness: Jordan Reynoso is referred to PT to address ongoing balance issues. She states that she noticed that she was struggling with her balance when she slipped off a short deck at a local [employer]. She admits that over the last few months she has also had a few falls; denies any major injury as a result of the falls. She started to workout with a local  a month hoping that it would help her balance.       SUBJECTIVE: Pt stated she is anxious about walking today because she did so well last time. TREATMENT   Precautions:    Pain:  Achiness, generalized    X in shaded column indicates activity completed today   Modalities Parameters/  Location  Notes                     Manual Therapy Time/Technique  Notes                     Exercise/Intervention   Notes   Mariano; review of goals        Standing on airex: feet together x2 20 sec hold  30 sec hold  No UE support ,CGA, cues to breathe   Step stance 20sec 2x  Standing with head turns in horizontal and vertical plane    NuStep x6 min  x Level 5, seat 8, arms 9   Forward step ups; side step ups  x10    Bilaterally    Airex: heel to raises, marches, HS curls x15 ea   Hold mini squats due to increased pain   3 way hip x15 ea   Peach tband (aggravated sciatic in R LE) no peach band   Sitting HS stretch, piriformis stretch 15sec 3x     Tandem stepping fwd/retro x2 laps ea      rocker board fwd/lat x15 ea  x 20 sec balance with 1UE support   wobbleboard 10x   CW/CCW, manual overpressure provided   Hurdles:   x2 ea. Green R<>L lead, reciprocal, side stepping    Stepping over 1 julio c  10x   Forward/retro, required 1UE support occasionally 2 with retro stepping over leading with L leg   Agility mat: forward step in each square; lateral step in each square; march in each square x2   Hand hold assist needed           // bars: forward and lateral walk with emphasis on taking longer, exaggerated steps,  marching x3 laps ea. x Cues to swing arms, decreased coordination with UE's, no UE support!!          Sit-stands  15x   Without UE support. Increased pain in R knee 92%  Off of large mat table at lowest level          NK table: flexion/extension x10 ea/2sets 5#  Bilaterally, cues for technique   BOSU lunges  x10      Supine: ball squeezes and hip abd x15   blue band, on MHP   [employer] walking  ~700    x  seated rest break at 397 ft then 259ft , no AD.    6 min walk test  x1   Without supplemental oxygen;  92-95%; no rest break   Core strengthening exercises: TA activation; pelvic tilt; bridges  10x 15x x On moist heat, 15 reps with pelvic tilts and bridges   TA + marching 15x  x On MHP   TA + 90/90 hold 10 sec hold 3x  Single leg after B LE's, Cues to not arch back, on MHP, educated on how to perform at home   LTR 15x  x On MHP   Q 3x        Specific Interventions Next Treatment: NuStep; airex balance exercises; Activity/Treatment Tolerance:  [x]  Patient tolerated treatment well  []  Patient limited by fatigue  []  Patient limited by pain   []  Patient limited by medical complications  []  Other:     Assessment: Brice Tsai continues to walk longer distances with no AD. Pt working on B arm swing during this time and decreasing path deviation. Pt able to complete 15 reps of core engagement exercises. Body Structures/Functions/Activity Limitations: impaired activity tolerance, impaired balance, impaired coordination, impaired endurance, impaired safety awareness, impaired strength and pain  Prognosis: fair    GOALS:  Patient Goal: minimize fall risk    Short Term Goals:  Time Frame: 4 weeks  1. Brice Tsai will demonstrate a good ankle strategy to maintain standing balance with perturbations to her trunk. 2. Brice Tsai will be able to stand for 20 min at a time without needing a rest break allowing her to prepare meals and perform light chores without limitation. NOT MET-limited to 5 min before she has to sit due to back pain; uses a kitchen stool a lot  3. Alison's Tinetti score will improve to 19/28 indicating minimal fall risk. GOAL MET-improved to 22/28      Long Term Goals:  Time Frame: 8 weeks  1. Discharge with independent HEP ONGOING  2. Alison's Tinetti score will improve to >21/28 indicating no fall risk. GOAL MET; REVISE to improve Tinetti score to >26/28; NOT MET-improved to 26/28  3. Brice Tsai will be able to maintain her standing balance in all 4 conditions of the m-CTSIB, using an ankle strategy to maintain her balance.  NOT MET-able to maintain standing balance in conditions 1-3; LOB after 6 sec in condition 4  4. Jazmyn Jimenez will demonstrate good abdominal stabilization with all transfers and dynamic gait activities to provide greater support to low back. GOAL MET   5. Jazmyn Jimenez will be able to complete 6 min walking test with rollator while maintaining oxygen levels 92% or above so she can go without supplemental oxygen. GOAL MET   6. NEW GOAL: Jazmyn Jimenez will walk 1/2 a mile without her rollator and no rest break while demonstrating good arm swing. Patient Education:    []  HEP/Education Completed: core engagement, sitting and supine hamstring and piriformis stretch   Medbridge Access Code:  [x]  No new Education completed  []  Reviewed Prior HEP      []  Patient verbalized and/or demonstrated understanding of education provided. []  Patient unable to verbalize and/or demonstrate understanding of education provided. Will continue education. [x]  Barriers to learning: n/a    PLAN:  Treatment Recommendations: Strengthening, Range of Motion, Balance Training, Endurance Training, Gait Training, Stair Training, Neuromuscular Re-education, Manual Therapy - Soft Tissue Mobilization, Manual Therapy - Joint Manipulation, Pain Management, Home Exercise Program, Patient Education and Modalities    []  Plan of care initiated. Plan to see patient 2 times per week for 8 weeks to address the treatment planned outlined above.   []  Continue with current plan of care  [x]  Modify plan of care as follows: 2 time per week   []  Hold pending physician visit  []  Discharge    Time In 1000   Time Out 1045   Timed Code Minutes: 45 min   Total Treatment Time: 45 min       Electronically Signed by: Shira Dias PTA

## 2022-04-14 ENCOUNTER — HOSPITAL ENCOUNTER (OUTPATIENT)
Dept: PHYSICAL THERAPY | Age: 72
Setting detail: THERAPIES SERIES
Discharge: HOME | End: 2022-04-14
Payer: MEDICARE

## 2022-04-14 PROCEDURE — 97116 GAIT TRAINING THERAPY: CPT

## 2022-04-14 PROCEDURE — 97110 THERAPEUTIC EXERCISES: CPT

## 2022-04-14 PROCEDURE — 97530 THERAPEUTIC ACTIVITIES: CPT

## 2022-04-14 NOTE — PROGRESS NOTES
[address] [facility]  PHYSICAL THERAPY  [] EVALUATION  [x] DAILY NOTE (LAND) [] DAILY NOTE (AQUATIC ) [] PROGRESS NOTE [] DISCHARGE NOTE    [] [facility]   [] Carlo 90    [] 645 [facility]   [x] Kem Barr    Date: 2022  Patient Name:  Karlee Henry  : 1950  MRN: [id number]  CSN: [id number]    Referring Practitioner Raciel Frank DO   Diagnosis Other intervertebral disc degeneration, lumbar region [M51.36]  Radiculopathy, lumbar region [M54.16]    Treatment Diagnosis Difficulty walking    Date of Evaluation 21    Additional Pertinent History Bi-polar; SOB related to CHF      Functional Outcome Measure Used Tinetti   Functional Outcome Score 15/28 (21)    (21)   (21)   (10/29/21)   (21)   (21)   (22) able to complete 6 min walk test without any rest break and maintained oxygen levels between 91-98%   (3/4/22)      Insurance: Primary: Payor: Emi Bravo /  /  / , aquatic therapy is covered; modalities covered except for IONTO  Secondary:    Authorization Information:  TOTAL  UNITS    FROM 22 TO 22  CPT CODES:  65130, 10913, [id number], 09920, 30663, 93524,  REF# [id number]     Visit # 15, 3/10 for progress note   Visits Allowed: 40 or 60 visits (120 units total)    Recertification Date:    Physician Follow-Up:    Physician Orders:    History of Present Illness: [facility] is referred to PT to address ongoing balance issues. She states that she noticed that she was struggling with her balance when she slipped off a short deck at a local [employer]. She admits that over the last few months she has also had a few falls; denies any major injury as a result of the falls. She started to workout with a local  a month hoping that it would help her balance. SUBJECTIVE: Pt stated she did not get much sleep last night. exercises: TA activation; pelvic tilt; bridges  10x 15x x On moist heat, 15 reps with pelvic tilts and bridges   TA + marching 15x  x On MHP   TA + 90/90 hold 10 sec hold 3x  Single leg after B LE's, Cues to not arch back, on MHP, educated on how to perform at home   LTR 15x  x On MHP            Specific Interventions Next Treatment: NuStep; airex balance exercises; Activity/Treatment Tolerance:  [x]  Patient tolerated treatment well  []  Patient limited by fatigue  []  Patient limited by pain   []  Patient limited by medical complications  []  Other:     Assessment: Colonel Pascual continues to walk longer distances before needing a rest break and with no AD    Body Structures/Functions/Activity Limitations: impaired activity tolerance, impaired balance, impaired coordination, impaired endurance, impaired safety awareness, impaired strength and pain  Prognosis: fair    GOALS:  Patient Goal: minimize fall risk    Short Term Goals:  Time Frame: 4 weeks  1. Colonel Pascual will demonstrate a good ankle strategy to maintain standing balance with perturbations to her trunk. 2. Colonel Pascual will be able to stand for 20 min at a time without needing a rest break allowing her to prepare meals and perform light chores without limitation. NOT MET-limited to 5 min before she has to sit due to back pain; uses a kitchen stool a lot  3. Alison's Tinetti score will improve to 19/28 indicating minimal fall risk. GOAL MET-improved to 22/28      Long Term Goals:  Time Frame: 8 weeks  1. Discharge with independent HEP ONGOING  2. Alison's Tinetti score will improve to >21/28 indicating no fall risk. GOAL MET; REVISE to improve Tinetti score to >26/28; NOT MET-improved to 26/28  3. Colonel Pascual will be able to maintain her standing balance in all 4 conditions of the m-CTSIB, using an ankle strategy to maintain her balance. NOT MET-able to maintain standing balance in conditions 1-3; LOB after 6 sec in condition 4  4.  Colonel Pascual will demonstrate good abdominal stabilization with all transfers and dynamic gait activities to provide greater support to low back. GOAL MET   5. Osmar Rivera will be able to complete 6 min walking test with rollator while maintaining oxygen levels 92% or above so she can go without supplemental oxygen. GOAL MET   6. NEW GOAL: Osmar Rivera will walk 1/2 a mile without her rollator and no rest break while demonstrating good arm swing. Patient Education:    []  HEP/Education Completed: core engagement, sitting and supine hamstring and piriformis stretch   Medbridge Access Code:  [x]  No new Education completed  []  Reviewed Prior HEP      []  Patient verbalized and/or demonstrated understanding of education provided. []  Patient unable to verbalize and/or demonstrate understanding of education provided. Will continue education. [x]  Barriers to learning: n/a    PLAN:  Treatment Recommendations: Strengthening, Range of Motion, Balance Training, Endurance Training, Gait Training, Stair Training, Neuromuscular Re-education, Manual Therapy - Soft Tissue Mobilization, Manual Therapy - Joint Manipulation, Pain Management, Home Exercise Program, Patient Education and Modalities    []  Plan of care initiated. Plan to see patient 2 times per week for 8 weeks to address the treatment planned outlined above.   []  Continue with current plan of care  [x]  Modify plan of care as follows: 2 time per week   []  Hold pending physician visit  []  Discharge    Time In 1110   Time Out 1150   Timed Code Minutes: 40 min   Total Treatment Time: 40 min       Electronically Signed by: Minna Nolasco

## 2022-04-18 ENCOUNTER — HOSPITAL ENCOUNTER (OUTPATIENT)
Dept: PHYSICAL THERAPY | Age: 72
Setting detail: THERAPIES SERIES
Discharge: HOME | End: 2022-04-18
Payer: MEDICARE

## 2022-04-18 PROCEDURE — 97110 THERAPEUTIC EXERCISES: CPT

## 2022-04-18 NOTE — PROGRESS NOTES
[address] [facility]  PHYSICAL THERAPY  [] EVALUATION  [x] DAILY NOTE (LAND) [] DAILY NOTE (AQUATIC ) [] PROGRESS NOTE [] DISCHARGE NOTE    [] 615 [facility]   [] [address]    [] 645 [facility]   [x] Marixa Rosales    Date: 2022  Patient Name:  Patricia Connell  : 1950  MRN: [id number]  CSN: [id number]    Referring Practitioner Morgan Funez DO   Diagnosis Other intervertebral disc degeneration, lumbar region [M51.36]  Radiculopathy, lumbar region [M54.16]    Treatment Diagnosis Difficulty walking    Date of Evaluation 21    Additional Pertinent History Bi-polar; SOB related to CHF      Functional Outcome Measure Used Tinetti   Functional Outcome Score 15/28 (21)    (21)   (21)   (10/29/21)   (21)   (21)   (22) able to complete 6 min walk test without any rest break and maintained oxygen levels between 91-98%   (3/4/22)      Insurance: Primary: Payor: Huseyin Bernal /  /  / , aquatic therapy is covered; modalities covered except for IONTO  Secondary:    Authorization Information:  TOTAL  UNITS    FROM 22 TO 22  CPT CODES:  03808, 47000, 55922 [facility], 91578, 56156, 01856,  REF# [id number]     Visit # 15, 4/10 for progress note   Visits Allowed: 40 or 60 visits (120 units total)    Recertification Date:    Physician Follow-Up:    Physician Orders:    History of Present Illness: Arminda Medley is referred to PT to address ongoing balance issues. She states that she noticed that she was struggling with her balance when she slipped off a short deck at a local [employer]. She admits that over the last few months she has also had a few falls; denies any major injury as a result of the falls. She started to workout with a local  a month hoping that it would help her balance.       SUBJECTIVE: Pt stated she felt stiff today, no other concerns. TREATMENT   Precautions:    Pain:  Achiness, generalized    X in shaded column indicates activity completed today   Modalities Parameters/  Location  Notes                     Manual Therapy Time/Technique  Notes                     Exercise/Intervention   Notes   Mariano; review of goals        Standing on airex: feet together x2 20 sec hold  30 sec hold  No UE support ,CGA, cues to breathe   Step stance 20sec 2x  Standing with head turns in horizontal and vertical plane    NuStep x6 min  x Level 5, seat 8, arms 9   Forward step ups; side step ups  x10    Bilaterally    Airex: heel to raises, marches, HS curls x15 ea   Hold mini squats due to increased pain   3 way hip x15 ea   Peach tband (aggravated sciatic in R LE) no peach band   Sitting HS stretch, piriformis stretch 15sec 3x     Tandem stepping fwd/retro x2 laps ea      rocker board fwd/lat x15 ea   20 sec balance with 1UE support   wobbleboard 10x   CW/CCW, manual overpressure provided   Hurdles:   x2 ea. Green R<>L lead, reciprocal, side stepping    Stepping over 1 julio c  10x   Forward/retro, required 1UE support occasionally 2 with retro stepping over leading with L leg   Agility mat: forward step in each square; lateral step in each square; march in each square x2   Hand hold assist needed           // bars: forward and lateral walk with emphasis on taking longer, exaggerated steps,  marching x3 laps ea. Cues to swing arms, decreased coordination with UE's, no UE support!!          Sit-stands  15x   Without UE support. Increased pain in R knee 92%  Off of large mat table at lowest level          NK table: flexion/extension x10 ea/2sets 5#  Bilaterally, cues for technique   BOSU lunges  x10      Supine: ball squeezes and hip abd x15   blue band, on MHP   [employer] walking  950ft  x  seated rest break at 400 ft  no AD. 550 ft.  With rollator   6 min walk test  x1   Without supplemental oxygen;  92-95%; no rest break   Core strengthening exercises: TA activation; pelvic tilt; bridges  15x  x On moist heat   TA + marching 15x  x On MHP   TA + 90/90 hold 10 sec hold 3x  Single leg after B LE's, Cues to not arch back, on MHP, educated on how to perform at home   LTR 15x  x On MHP            Specific Interventions Next Treatment: NuStep; airex balance exercises; Activity/Treatment Tolerance:  [x]  Patient tolerated treatment well  []  Patient limited by fatigue  []  Patient limited by pain   []  Patient limited by medical complications  []  Other:     Assessment: Colonel Pascual continues to focus on endurance levels. Pt gets very anxious to exercise and wants to improve each session. Discussed with patient the importance of exercising at home. Body Structures/Functions/Activity Limitations: impaired activity tolerance, impaired balance, impaired coordination, impaired endurance, impaired safety awareness, impaired strength and pain  Prognosis: fair    GOALS:  Patient Goal: minimize fall risk    Short Term Goals:  Time Frame: 4 weeks  1. Colonel Pascual will demonstrate a good ankle strategy to maintain standing balance with perturbations to her trunk. 2. Colonel Pascual will be able to stand for 20 min at a time without needing a rest break allowing her to prepare meals and perform light chores without limitation. NOT MET-limited to 5 min before she has to sit due to back pain; uses a kitchen stool a lot  3. Alison's Tinetti score will improve to 19/28 indicating minimal fall risk. GOAL MET-improved to 22/28      Long Term Goals:  Time Frame: 8 weeks  1. Discharge with independent HEP ONGOING  2. Alison's Tinetti score will improve to >21/28 indicating no fall risk. GOAL MET; REVISE to improve Tinetti score to >26/28; NOT MET-improved to 26/28  3. Colonel Pascual will be able to maintain her standing balance in all 4 conditions of the m-CTSIB, using an ankle strategy to maintain her balance.  NOT MET-able to maintain standing balance in conditions 1-3; LOB after 6 sec in condition 4  4. Hanna Ramirez will demonstrate good abdominal stabilization with all transfers and dynamic gait activities to provide greater support to low back. GOAL MET   5. Hanna Ramirez will be able to complete 6 min walking test with rollator while maintaining oxygen levels 92% or above so she can go without supplemental oxygen. GOAL MET   6. NEW GOAL: Hanna Ramirez will walk 1/2 a mile without her rollator and no rest break while demonstrating good arm swing. Patient Education:    []  HEP/Education Completed: core engagement, sitting and supine hamstring and piriformis stretch   Medbridge Access Code:  []  No new Education completed  [x]  Reviewed Prior HEP      [x]  Patient verbalized and/or demonstrated understanding of education provided. []  Patient unable to verbalize and/or demonstrate understanding of education provided. Will continue education. [x]  Barriers to learning: n/a    PLAN:  Treatment Recommendations: Strengthening, Range of Motion, Balance Training, Endurance Training, Gait Training, Stair Training, Neuromuscular Re-education, Manual Therapy - Soft Tissue Mobilization, Manual Therapy - Joint Manipulation, Pain Management, Home Exercise Program, Patient Education and Modalities    []  Plan of care initiated. Plan to see patient 2 times per week for 8 weeks to address the treatment planned outlined above.   []  Continue with current plan of care  [x]  Modify plan of care as follows: 2 time per week   []  Hold pending physician visit  []  Discharge    Time In 1030   Time Out 1100   Timed Code Minutes: 30 min   Total Treatment Time: 30 min       Electronically Signed by: Shanna Sierra PTA

## 2022-04-21 ENCOUNTER — APPOINTMENT (OUTPATIENT)
Dept: PHYSICAL THERAPY | Age: 72
End: 2022-04-21
Payer: MEDICARE

## 2022-04-22 ENCOUNTER — HOSPITAL ENCOUNTER (OUTPATIENT)
Dept: PHYSICAL THERAPY | Age: 72
Setting detail: THERAPIES SERIES
Discharge: HOME | End: 2022-04-22
Payer: MEDICARE

## 2022-04-22 PROCEDURE — 97530 THERAPEUTIC ACTIVITIES: CPT

## 2022-04-22 PROCEDURE — 97110 THERAPEUTIC EXERCISES: CPT

## 2022-04-22 PROCEDURE — 97112 NEUROMUSCULAR REEDUCATION: CPT

## 2022-04-22 NOTE — PROGRESS NOTES
** PLEASE SIGN, DATE AND TIME CERTIFICATION BELOW AND RETURN TO [facility] OUTPATIENT REHABILITATION (FAX #: [phone]). ATTEST/CO-SIGN IF ACCESSING VIA INevlyET. THANK YOU.**    I certify that I have examined the patient below and determined that Physical Medicine and Rehabilitation service is necessary and that I approve the established plan of care for up to 90 days or as specifically noted.   Attestation, signature or co-signature of physician indicates approval of certification requirements.    ________________________ ____________ __________  Physician Signature   Date   Time      [address]  [] EVALUATION  [] DAILY NOTE (LAND) [] DAILY NOTE (AQUATIC ) [x] PROGRESS NOTE [] DISCHARGE NOTE    [] [facility]   [] [address]    [] 645 [facility]   [x] [facility]    Date: 2022  Patient Name:  Tom Paz  : 1950  MRN: [id number]  CSN: [id number]    Referring Practitioner Ofelia Vázquez DO   Diagnosis Other intervertebral disc degeneration, lumbar region [M51.36]  Radiculopathy, lumbar region [M54.16]    Treatment Diagnosis Difficulty walking    Date of Evaluation 21    Additional Pertinent History Bi-polar; SOB related to CHF      Functional Outcome Measure Used Tinetti   Functional Outcome Score 15/28 (21)   24 (21)   (21)   (10/29/21)   (21)   (21)   (22) able to complete 6 min walk test without any rest break and maintained oxygen levels between 91-98%   (3/4/22)   (22)      Insurance: Primary: Payor: Frank West /  /  / , aquatic therapy is covered; modalities covered except for IONTO  Secondary:    Authorization Information:  TOTAL  UNITS    FROM 22 TO 22  CPT CODES:  04513, 588916, [address] [place] [id number], 30249, 20511,  REF# [id number]     Visit # 15, 0/10 for progress note   Visits Allowed: 36 or 60 visits (120 units total)    Recertification Date: June 17, 22   Physician Follow-Up:    Physician Orders:    History of Present Illness: Damián Deutsch is referred to PT to address ongoing balance issues. She states that she noticed that she was struggling with her balance when she slipped off a short deck at a local [employer]. She admits that over the last few months she has also had a few falls; denies any major injury as a result of the falls. She started to workout with a local  a month hoping that it would help her balance. SUBJECTIVE: Having a \"crappy day\" today. She weighs more and is retaining water this AM. She admits to eating out lately and overall she is not feeling good. TREATMENT   Precautions:    Pain:  Achiness, generalized    X in shaded column indicates activity completed today   Modalities Parameters/  Location  Notes                     Manual Therapy Time/Technique  Notes                     Exercise/Intervention   Notes   Mariano; review of goals    x    Standing on airex: feet together x2 20 sec hold  30 sec hold  No UE support ,CGA, cues to breathe   Step stance 20sec 2x  Standing with head turns in horizontal and vertical plane    NuStep x6 min  x Level 5, seat 8, arms 9   Forward step ups; side step ups  x10    Bilaterally    Airex: heel to raises, marches, HS curls x15 ea   Hold mini squats due to increased pain   3 way hip x15 ea   Peach tband (aggravated sciatic in R LE) no peach band   Sitting HS stretch, piriformis stretch 15sec 3x     Tandem stepping fwd/retro x2 laps ea      rocker board fwd/lat x15 ea   20 sec balance with 1UE support   wobbleboard 10x   CW/CCW, manual overpressure provided   Hurdles:   x2 ea.    Green R<>L lead, reciprocal, side stepping    Stepping over 1 julio c  10x   Forward/retro, required 1UE support occasionally 2 with retro stepping over leading with L leg   Agility mat: forward step in each square; lateral step in each square; march in each square x2   Hand hold assist needed           // bars: forward and lateral walk with emphasis on taking longer, exaggerated steps,  marching x3 laps ea. Cues to swing arms, decreased coordination with UE's, no UE support!!          Sit-stands  15x   Without UE support. Increased pain in R knee 92%  Off of large mat table at lowest level          NK table: flexion/extension x10 ea/2sets 5#  Bilaterally, cues for technique   BOSU lunges  x10      Supine: ball squeezes and hip abd x15   blue band, on [facility]   [employer] walking  950ft  x  seated rest break at 400 ft  no AD. 550 ft. With rollator   6 min walk test  x1   Without supplemental oxygen;  92-95%; no rest break   Core strengthening exercises: TA activation; pelvic tilt; bridges  15x  x On moist heat   TA + marching 15x  x On P   TA + 90/90 hold 10 sec hold 3x  Single leg after B LE's, Cues to not arch back, on [facility], educated on how to perform at home   LTR 15x  x On [facility]            Specific Interventions Next Treatment: NuStep; airex balance exercises; Activity/Treatment Tolerance:  [x]  Patient tolerated treatment well  []  Patient limited by fatigue  []  Patient limited by pain   []  Patient limited by medical complications  []  Other:     Assessment: [facility] struggled more today; she has been retaining water and feeling heavy today. Despite not feeling well today, she continues to push herself and is working on walking endurance so she may continue to wean herself off the rollator. Body Structures/Functions/Activity Limitations: impaired activity tolerance, impaired balance, impaired coordination, impaired endurance, impaired safety awareness, impaired strength and pain  Prognosis: fair    GOALS:  Patient Goal: minimize fall risk    Short Term Goals:  Time Frame: 4 weeks  1. [facility] will demonstrate a good ankle strategy to maintain standing balance with perturbations to her trunk.   2. [facility] will be able to stand for 20 min at a time without needing a rest break allowing her to prepare meals and perform light chores without limitation. NOT MET-limited to 5 min before she has to sit due to back pain; uses a kitchen stool a lot  3. Luiss Tinetti score will improve to 19/28 indicating minimal fall risk. GOAL MET-improved to 22/28      Long Term Goals:  Time Frame: 8 weeks  1. Discharge with independent HEP ONGOING  2. Luiss Tinetti score will improve to >21/28 indicating no fall risk. GOAL MET; REVISE to improve Tinetti score to >26/28; NOT MET-improved to 26/28  3. Louann Will will be able to maintain her standing balance in all 4 conditions of the m-CTSIB, using an ankle strategy to maintain her balance. NOT MET-able to maintain standing balance in conditions 1-3; LOB after 6 sec in condition 4  4. Louann Will will demonstrate good abdominal stabilization with all transfers and dynamic gait activities to provide greater support to low back. GOAL MET   5. Louann Will will be able to complete 6 min walking test with rollator while maintaining oxygen levels 92% or above so she can go without supplemental oxygen. GOAL MET   6. NEW GOAL: Louann Will will walk 1/2 a mile without her rollator and no rest break while demonstrating good arm swing. ONGOING-is walking 400 ft without walker and without a rest break      Patient Education:    []  HEP/Education Completed: core engagement, sitting and supine hamstring and piriformis stretch   [facility] Access Code:  []  No new Education completed  [x]  Reviewed Prior HEP      [x]  Patient verbalized and/or demonstrated understanding of education provided. []  Patient unable to verbalize and/or demonstrate understanding of education provided. Will continue education.   [x]  Barriers to learning: n/a    PLAN:  Treatment Recommendations: Strengthening, Range of Motion, Balance Training, Endurance Training, Gait Training, Stair Training, Neuromuscular Re-education, Manual Therapy - Soft Tissue Mobilization, Manual Therapy - Joint Manipulation, Pain Management, Home Exercise Program, Patient Education and Modalities    []  Plan of care initiated. Plan to see patient 2 times per week for 8 weeks to address the treatment planned outlined above.   []  Continue with current plan of care  [x]  Modify plan of care as follows: 2 time per week   []  Hold pending physician visit  []  Discharge    Time In 0845   Time Out 0925   Timed Code Minutes: 40 min   Total Treatment Time: 40 min       Electronically Signed by: Avila Kennedy, PT    Klever Carver [phone]" Venessa Godfrey DPT  [id number]

## 2022-04-25 ENCOUNTER — HOSPITAL ENCOUNTER (OUTPATIENT)
Dept: PHYSICAL THERAPY | Age: 72
Setting detail: THERAPIES SERIES
Discharge: HOME | End: 2022-04-25
Payer: MEDICARE

## 2022-04-25 PROCEDURE — 97110 THERAPEUTIC EXERCISES: CPT

## 2022-04-25 NOTE — PROGRESS NOTES
[address] [facility]  PHYSICAL THERAPY  [] EVALUATION  [x] DAILY NOTE (LAND) [] DAILY NOTE (AQUATIC ) [] PROGRESS NOTE [] DISCHARGE NOTE    [] [facility]   [] Carlo 90    [] [address] [facility]   [x] Flavia Alberta    Date: 2022  Patient Name:  Mag Zaman  : 1950  MRN: [id number]  CSN: [id number]    Referring Practitioner Beatris Dyer DO   Diagnosis Other intervertebral disc degeneration, lumbar region [M51.36]  Radiculopathy, lumbar region [M54.16]    Treatment Diagnosis Difficulty walking    Date of Evaluation 21    Additional Pertinent History Bi-polar; SOB related to CHF      Functional Outcome Measure Used Tinetti   Functional Outcome Score 15/28 (21)    (21)   (21)   (10/29/21)   (21)   (21)   (22) able to complete 6 min walk test without any rest break and maintained oxygen levels between 91-98%   (3/4/22)   (22)      Insurance: Primary: Payor: Paige Sun /  /  / , aquatic therapy is covered; modalities covered except for IONTO  Secondary:    Authorization Information:  TOTAL  UNITS    FROM 22 TO 22  CPT CODES:  46737, 07931, [address] [facility], 21404, 91764, 63750,  REF# [id number]     Visit # 15, 1/10 for progress note   Visits Allowed: 40 or 60 visits (120 units total)    Recertification Date:    Physician Follow-Up:    Physician Orders:    History of Present Illness: Vipin Montgomery is referred to PT to address ongoing balance issues. She states that she noticed that she was struggling with her balance when she slipped off a short deck at a local [email or web address]. She admits that over the last few months she has also had a few falls; denies any major injury as a result of the falls. She started to workout with a local  a month hoping that it would help her balance.       SUBJECTIVE: Vipin Montgomery stated she feels SOB today and is having some indigestion. TREATMENT   Precautions:    Pain:  R knee pain 4/10    X in shaded column indicates activity completed today   Modalities Parameters/  Location  Notes                     Manual Therapy Time/Technique  Notes                     Exercise/Intervention   Notes   Mariano; review of goals        Standing on airex: feet together x2 30sec  No UE support ,CGA, cues to breathe   Step stance 20sec 2x  Standing with head turns in horizontal and vertical plane    NuStep x6 min  x Level 5, seat 8, arms 9   Forward step ups; side step ups  x10    Bilaterally    Airex: heel to raises, marches, HS curls x15 ea  x Hold mini squats due to increased pain   3 way hip x15 ea   Peach tband (aggravated sciatic in R LE) no peach band   Sitting HS stretch, piriformis stretch 15sec 3x     Tandem stepping fwd/retro x2 laps ea      rocker board fwd/lat x15 ea   20 sec balance with 1UE support   wobbleboard 10x   CW/CCW, manual overpressure provided   Hurdles:   x2 ea. Green R<>L lead, reciprocal, side stepping    Stepping over 1 julio c  10x   Forward/retro, required 1UE support occasionally 2 with retro stepping over leading with L leg   Agility mat: forward step in each square; lateral step in each square; march in each square x2   Hand hold assist needed           // bars: forward and lateral walk with emphasis on taking longer, exaggerated steps,  marching x3 laps ea. x Cues to swing arms, decreased coordination with UE's, no UE support!!   Walking with ziddy sticks to promote UE swing 2laps in clinic  x O2 dropped to 88%   Sit-stands  15x   Without UE support. Increased pain in R knee 92%  Off of large mat table at lowest level            NK table: flexion/extension x10 ea/2sets 5#     BOSU lunges  x10      Supine: ball squeezes and hip abd x15   blue band, on MHP   [employer] walking  1050ft  x  seated rest break at 500 ft , 550 ft.  With rollator   6 min walk test  x1   Without supplemental oxygen; 92-95%; no rest break   Core strengthening exercises: TA activation; pelvic tilt; bridges  15x   On moist heat   TA + marching 15x   On MHP   TA + 90/90 hold 10 sec hold 3x  Single leg after B LE's, Cues to not arch back, on MHP, educated on how to perform at home   LTR 15x   On MHP            Specific Interventions Next Treatment: NuStep; airex balance exercises; Activity/Treatment Tolerance:  [x]  Patient tolerated treatment well  []  Patient limited by fatigue  []  Patient limited by pain   []  Patient limited by medical complications  []  Other:     Assessment: Albin Salmon continues to work on endurance levels. Pt required 2 sitting RB's and O2 sats were taken. O2 would drop to 88% after 15 min of activity. O2 sats would rebound after 5min of sitting with cues for deep abdominal breathing. Body Structures/Functions/Activity Limitations: impaired activity tolerance, impaired balance, impaired coordination, impaired endurance, impaired safety awareness, impaired strength and pain  Prognosis: fair    GOALS:  Patient Goal: minimize fall risk    Short Term Goals:  Time Frame: 4 weeks  1. Albin Salmon will demonstrate a good ankle strategy to maintain standing balance with perturbations to her trunk. 2. Albin Salmon will be able to stand for 20 min at a time without needing a rest break allowing her to prepare meals and perform light chores without limitation. NOT MET-limited to 5 min before she has to sit due to back pain; uses a kitchen stool a lot  3. Alison's Tinetti score will improve to 19/28 indicating minimal fall risk. GOAL MET-improved to 22/28      Long Term Goals:  Time Frame: 8 weeks  1. Discharge with independent HEP ONGOING  2. Alison's Tinetti score will improve to >21/28 indicating no fall risk. GOAL MET; REVISE to improve Tinetti score to >26/28; NOT MET-improved to 26/28  3. Albin Salmon will be able to maintain her standing balance in all 4 conditions of the m-CTSIB, using an ankle strategy to maintain her balance. NOT MET-able to maintain standing balance in conditions 1-3; LOB after 6 sec in condition 4  4. Kelsey Olivera will demonstrate good abdominal stabilization with all transfers and dynamic gait activities to provide greater support to low back. GOAL MET   5. Kelsey Olivera will be able to complete 6 min walking test with rollator while maintaining oxygen levels 92% or above so she can go without supplemental oxygen. GOAL MET   6. NEW GOAL: Kelsey Olivera will walk 1/2 a mile without her rollator and no rest break while demonstrating good arm swing. ONGOING-is walking 400 ft without walker and without a rest break      Patient Education:    []  HEP/Education Completed: core engagement, sitting and supine hamstring and piriformis stretch   Medbridge Access Code:  []  No new Education completed  [x]  Reviewed Prior HEP      [x]  Patient verbalized and/or demonstrated understanding of education provided. []  Patient unable to verbalize and/or demonstrate understanding of education provided. Will continue education. [x]  Barriers to learning: n/a    PLAN:  Treatment Recommendations: Strengthening, Range of Motion, Balance Training, Endurance Training, Gait Training, Stair Training, Neuromuscular Re-education, Manual Therapy - Soft Tissue Mobilization, Manual Therapy - Joint Manipulation, Pain Management, Home Exercise Program, Patient Education and Modalities    []  Plan of care initiated. Plan to see patient 2 times per week for 8 weeks to address the treatment planned outlined above.   []  Continue with current plan of care  [x]  Modify plan of care as follows: 2 time per week   []  Hold pending physician visit  []  Discharge    Time In 0935   Time Out 1015   Timed Code Minutes: 40 min   Total Treatment Time: 40 min       Electronically Signed by: Latonya Anderson PTA

## 2022-04-28 ENCOUNTER — HOSPITAL ENCOUNTER (OUTPATIENT)
Dept: PHYSICAL THERAPY | Age: 72
Setting detail: THERAPIES SERIES
Discharge: HOME | End: 2022-04-28
Payer: MEDICARE

## 2022-04-28 PROCEDURE — 97110 THERAPEUTIC EXERCISES: CPT

## 2022-04-28 NOTE — PROGRESS NOTES
good today. Pt continues to work on weight loss. Pt is SOB with increased activity. TREATMENT   Precautions:    Pain:  Full feeling in B knee no pain today. X in shaded column indicates activity completed today   Modalities Parameters/  Location  Notes                     Manual Therapy Time/Technique  Notes                     Exercise/Intervention   Notes   Mariano; review of goals        Standing on airex: feet together x2 30sec x No UE support ,CGA, cues to breathe, in front of mirror for visual feedback   Step stance 20sec 2x  Standing with head turns in horizontal and vertical plane    NuStep x6 min  x Level 5, seat 8, arms 9   Forward step ups; side step ups  x10    Bilaterally    Airex: heel to raises, marches, HS curls x15 ea  x Hold mini squats due to increased pain   3 way hip x15 ea   Peach tband (aggravated sciatic in R LE) no peach band   Sitting HS stretch, piriformis stretch 15sec 3x     Tandem stepping fwd/retro x2 laps ea      rocker board fwd/lat x15 ea  x 20 sec balance with 1UE support   wobbleboard 10x   CW/CCW, manual overpressure provided   Hurdles:   x2 ea. Green R<>L lead, reciprocal, side stepping    Stepping over 1 julio c  10x   Forward/retro, required 1UE support occasionally 2 with retro stepping over leading with L leg   Agility mat: forward step in each square; lateral step in each square; march in each square x2   Hand hold assist needed           // bars: forward and lateral walk with emphasis on taking longer, exaggerated steps,  marching x3 laps ea. x Cues to swing arms, decreased coordination with UE's, no UE support!!   Walking with ziddy sticks to promote UE swing 3 laps in clinic  x O2 dropped to 88%, recovered quickly when sitting 91%   Sit-stands  15x   Without UE support.  Increased pain in R knee 92%  Off of large mat table at lowest level            NK table: flexion/extension x10 ea/2sets 5#     BOSU lunges  x10      Supine: ball squeezes and hip abd x15   blue band, on MHP   [employer] walking  1050ft  x  seated rest break at 500 ft with NO AD, another 50 ft NO AD , 500 ft. With rollator   6 min walk test  x1   Without supplemental oxygen;  92-95%; no rest break   Core strengthening exercises: TA activation; pelvic tilt; bridges  15x   On moist heat   TA + marching 15x   On MHP   TA + 90/90 hold 10 sec hold 3x  Single leg after B LE's, Cues to not arch back, on MHP, educated on how to perform at home   LTR 15x   On MHP            Specific Interventions Next Treatment: NuStep; airex balance exercises; Activity/Treatment Tolerance:  [x]  Patient tolerated treatment well  []  Patient limited by fatigue  []  Patient limited by pain   []  Patient limited by medical complications  []  Other:     Assessment: Braulio Duenas did well with increased walking distance with no AD and adding lap with Ziddy sticks. Continued focus on endurance levels in safe range. O2 sat would drop to 88% but with better rebound today. Pulse increased to 100 bpm with walking tasks but well within safe range. Body Structures/Functions/Activity Limitations: impaired activity tolerance, impaired balance, impaired coordination, impaired endurance, impaired safety awareness, impaired strength and pain  Prognosis: fair    GOALS:  Patient Goal: minimize fall risk    Short Term Goals:  Time Frame: 4 weeks  1. Braulio Duenas will demonstrate a good ankle strategy to maintain standing balance with perturbations to her trunk. 2. Braulio Duenas will be able to stand for 20 min at a time without needing a rest break allowing her to prepare meals and perform light chores without limitation. NOT MET-limited to 5 min before she has to sit due to back pain; uses a kitchen stool a lot  3. Alison's Tinetti score will improve to 19/28 indicating minimal fall risk. GOAL MET-improved to 22/28      Long Term Goals:  Time Frame: 8 weeks  1. Discharge with independent HEP ONGOING  2.  Alison's Tinetti score will improve to >21/28 indicating no fall risk. GOAL MET; REVISE to improve Tinetti score to >26/28; NOT MET-improved to 26/28  3. Phu Aguilar will be able to maintain her standing balance in all 4 conditions of the m-CTSIB, using an ankle strategy to maintain her balance. NOT MET-able to maintain standing balance in conditions 1-3; LOB after 6 sec in condition 4  4. Phu Aguilar will demonstrate good abdominal stabilization with all transfers and dynamic gait activities to provide greater support to low back. GOAL MET   5. Phu Aguilar will be able to complete 6 min walking test with rollator while maintaining oxygen levels 92% or above so she can go without supplemental oxygen. GOAL MET   6. NEW GOAL: Phu Aguilar will walk 1/2 a mile without her rollator and no rest break while demonstrating good arm swing. ONGOING-is walking 400 ft without walker and without a rest break      Patient Education:    []  HEP/Education Completed: core engagement, sitting and supine hamstring and piriformis stretch   Medbridge Access Code:  []  No new Education completed  [x]  Reviewed Prior HEP      [x]  Patient verbalized and/or demonstrated understanding of education provided. []  Patient unable to verbalize and/or demonstrate understanding of education provided. Will continue education. [x]  Barriers to learning: n/a    PLAN:  Treatment Recommendations: Strengthening, Range of Motion, Balance Training, Endurance Training, Gait Training, Stair Training, Neuromuscular Re-education, Manual Therapy - Soft Tissue Mobilization, Manual Therapy - Joint Manipulation, Pain Management, Home Exercise Program, Patient Education and Modalities    []  Plan of care initiated. Plan to see patient 2 times per week for 8 weeks to address the treatment planned outlined above.   []  Continue with current plan of care  [x]  Modify plan of care as follows: 2 time per week   []  Hold pending physician visit  []  Discharge    Time In 0925   Time Out 1010   Timed Code Minutes: 40 min   Total Treatment Time: 40 min

## 2022-05-02 ENCOUNTER — HOSPITAL ENCOUNTER (OUTPATIENT)
Dept: PHYSICAL THERAPY | Age: 72
Setting detail: THERAPIES SERIES
Discharge: HOME | End: 2022-05-02
Payer: MEDICARE

## 2022-05-02 PROCEDURE — 97530 THERAPEUTIC ACTIVITIES: CPT

## 2022-05-02 PROCEDURE — 97110 THERAPEUTIC EXERCISES: CPT

## 2022-05-02 NOTE — PROGRESS NOTES
[address] [facility]  PHYSICAL THERAPY  [] EVALUATION  [x] DAILY NOTE (LAND) [] DAILY NOTE (AQUATIC ) [] PROGRESS NOTE [] DISCHARGE NOTE    [] [facility]   [] Carlo 90    [] 645 [facility]   [x] Piper Kill    Date: 2022  Patient Name:  Cass Jarrell  : 1950  MRN: [id number]  CSN: [id number]    Referring Practitioner Leta Carrillo DO   Diagnosis Other intervertebral disc degeneration, lumbar region [M51.36]  Radiculopathy, lumbar region [M54.16]    Treatment Diagnosis Difficulty walking    Date of Evaluation 21    Additional Pertinent History Bi-polar; SOB related to CHF      Functional Outcome Measure Used Tinetti   Functional Outcome Score 15/28 (21)    (21)   (21)   (10/29/21)   (21)   (21)   (22) able to complete 6 min walk test without any rest break and maintained oxygen levels between 91-98%   (3/4/22)   (22)      Insurance: Primary: Payor: Ayesha Beverly /  /  / , aquatic therapy is covered; modalities covered except for IONTO  Secondary:    Authorization Information:  TOTAL  UNITS    FROM 22 TO 22  CPT CODES:  52708, 23573, [address] [facility], 13137, 36960, 34829,  REF# [id number]     Visit # 16, 3/10 for progress note   Visits Allowed: 40 or 60 visits (120 units total)    Recertification Date:    Physician Follow-Up:    Physician Orders:    History of Present Illness: Anusha Neal is referred to PT to address ongoing balance issues. She states that she noticed that she was struggling with her balance when she slipped off a short deck at a local [employer]. She admits that over the last few months she has also had a few falls; denies any major injury as a result of the falls. She started to workout with a local  a month hoping that it would help her balance.       SUBJECTIVE: Noticing that she's more short of breath today. TREATMENT   Precautions:    Pain:  Full feeling in B knee no pain today. X in shaded column indicates activity completed today   Modalities Parameters/  Location  Notes                     Manual Therapy Time/Technique  Notes                     Exercise/Intervention   Notes   Mariano; review of goals        Standing on airex: feet together x2 30sec  No UE support ,CGA, cues to breathe, in front of mirror for visual feedback   Step stance 20sec 2x  Standing with head turns in horizontal and vertical plane    NuStep x6 min  x Level 5, seat 8, arms 9   Forward step ups; side step ups  x10    Bilaterally    Airex: heel to raises, marches, HS curls x15 ea   Hold mini squats due to increased pain   3 way hip x15 ea   Peach tband (aggravated sciatic in R LE) no peach band   Sitting HS stretch, piriformis stretch 15sec 3x     Tandem stepping fwd/retro x2 laps ea      rocker board fwd/lat x15 ea   20 sec balance with 1UE support   wobbleboard 10x   CW/CCW, manual overpressure provided   Hurdles:   x2 ea. Green R<>L lead, reciprocal, side stepping    Stepping over 1 julio c  10x   Forward/retro, required 1UE support occasionally 2 with retro stepping over leading with L leg   Agility mat: forward step in each square; lateral step in each square; march in each square x2   Hand hold assist needed           // bars: forward and lateral walk with emphasis on taking longer, exaggerated steps,  marching x3 laps ea. Cues to swing arms, decreased coordination with UE's, no UE support!!   Walking with ziddy sticks to promote UE swing 3 laps in clinic  x O2 dropped to 88%, recovered quickly when sitting 91%   Sit-stands  15x   Without UE support.  Increased pain in R knee 92%  Off of large mat table at lowest level            NK table: flexion/extension x10 ea/2sets 5#     BOSU lunges  x10      Supine: ball squeezes and hip abd x15   blue band, on MHP   [employer] walking  1050ft  x  seated rest break at 300 ft with NO AD, another 50 ft NO AD , 500 ft. With rollator   6 min walk test  x1   Without supplemental oxygen;  92-95%; no rest break   Core strengthening exercises: TA activation; pelvic tilt; bridges  15x   On moist heat   TA + marching 15x   On MHP   TA + 90/90 hold 10 sec hold 3x  Single leg after B LE's, Cues to not arch back, on MHP, educated on how to perform at home   LTR 15x   On MHP            Specific Interventions Next Treatment: NuStep; airex balance exercises; Activity/Treatment Tolerance:  [x]  Patient tolerated treatment well  []  Patient limited by fatigue  []  Patient limited by pain   []  Patient limited by medical complications  []  Other:     Assessment: Greater emphasis on dynamic gait activities today given our shorter session. Although her sats would drop into 89-90%, she did recover quickly and was able to continue with the session. Body Structures/Functions/Activity Limitations: impaired activity tolerance, impaired balance, impaired coordination, impaired endurance, impaired safety awareness, impaired strength and pain  Prognosis: fair    GOALS:  Patient Goal: minimize fall risk    Short Term Goals:  Time Frame: 4 weeks  1. Brice Shirts will demonstrate a good ankle strategy to maintain standing balance with perturbations to her trunk. 2. Brice Shirts will be able to stand for 20 min at a time without needing a rest break allowing her to prepare meals and perform light chores without limitation. NOT MET-limited to 5 min before she has to sit due to back pain; uses a kitchen stool a lot  3. Alison's Tinetti score will improve to 19/28 indicating minimal fall risk. GOAL MET-improved to 22/28      Long Term Goals:  Time Frame: 8 weeks  1. Discharge with independent HEP ONGOING  2. Alison's Tinetti score will improve to >21/28 indicating no fall risk. GOAL MET; REVISE to improve Tinetti score to >26/28; NOT MET-improved to 26/28  3.  Brice Tsai will be able to maintain her standing balance in all 4 conditions of the m-CTSIB, using an ankle strategy to maintain her balance. NOT MET-able to maintain standing balance in conditions 1-3; LOB after 6 sec in condition 4  4. Osmar Rivera will demonstrate good abdominal stabilization with all transfers and dynamic gait activities to provide greater support to low back. GOAL MET   5. Osmar Rivera will be able to complete 6 min walking test with rollator while maintaining oxygen levels 92% or above so she can go without supplemental oxygen. GOAL MET   6. NEW GOAL: Osmar Rivera will walk 1/2 a mile without her rollator and no rest break while demonstrating good arm swing. ONGOING-is walking 400 ft without walker and without a rest break      Patient Education:    []  HEP/Education Completed: core engagement, sitting and supine hamstring and piriformis stretch   Medbridge Access Code:  []  No new Education completed  [x]  Reviewed Prior HEP      [x]  Patient verbalized and/or demonstrated understanding of education provided. []  Patient unable to verbalize and/or demonstrate understanding of education provided. Will continue education. [x]  Barriers to learning: n/a    PLAN:  Treatment Recommendations: Strengthening, Range of Motion, Balance Training, Endurance Training, Gait Training, Stair Training, Neuromuscular Re-education, Manual Therapy - Soft Tissue Mobilization, Manual Therapy - Joint Manipulation, Pain Management, Home Exercise Program, Patient Education and Modalities    []  Plan of care initiated. Plan to see patient 2 times per week for 8 weeks to address the treatment planned outlined above.   []  Continue with current plan of care  [x]  Modify plan of care as follows: 2 time per week   []  Hold pending physician visit  []  Discharge    Time In 0930   Time Out 1000   Timed Code Minutes: 30 min   Total Treatment Time: 30 min       Electronically Signed by: MINE Fisher [id number]"Sanam Chowdhury DPT  [id number]

## 2022-05-05 ENCOUNTER — HOSPITAL ENCOUNTER (OUTPATIENT)
Dept: PHYSICAL THERAPY | Age: 72
Setting detail: THERAPIES SERIES
Discharge: HOME | End: 2022-05-05
Payer: MEDICARE

## 2022-05-05 PROCEDURE — 97116 GAIT TRAINING THERAPY: CPT

## 2022-05-05 PROCEDURE — 97110 THERAPEUTIC EXERCISES: CPT

## 2022-05-05 PROCEDURE — 97530 THERAPEUTIC ACTIVITIES: CPT

## 2022-05-05 NOTE — PROGRESS NOTES
[address] [facility]  PHYSICAL THERAPY  [] EVALUATION  [x] DAILY NOTE (LAND) [] DAILY NOTE (AQUATIC ) [] PROGRESS NOTE [] DISCHARGE NOTE    [] 615 [facility]   [] Carlo 90    [] 645 [facility]   [x] Melina Putnam    Date: 2022  Patient Name:  Danilo Scott  : 1950  MRN: [id number]  CSN: [id number]    Referring Practitioner Davie Santo DO   Diagnosis Other intervertebral disc degeneration, lumbar region [M51.36]  Radiculopathy, lumbar region [M54.16]    Treatment Diagnosis Difficulty walking    Date of Evaluation 21    Additional Pertinent History Bi-polar; SOB related to CHF      Functional Outcome Measure Used Tinetti   Functional Outcome Score 15/28 (21)    (21)   (21)   (10/29/21)   (21)   (21)   (22) able to complete 6 min walk test without any rest break and maintained oxygen levels between 91-98%   (3/4/22)   (22)      Insurance: Primary: Payor: Spring Blakely /  /  / , aquatic therapy is covered; modalities covered except for IONTO  Secondary:    Authorization Information:  TOTAL  UNITS    FROM 22 TO 22  CPT CODES:  29607, 88780, [id number], 55911, 70212, [id number],  REF# [id number]     Visit # 18, 4/10 for progress note   Visits Allowed: 40 or 60 visits (120 units total)    Recertification Date:    Physician Follow-Up:    Physician Orders:    History of Present Illness: Osmar Rivera is referred to PT to address ongoing balance issues. She states that she noticed that she was struggling with her balance when she slipped off a short deck at a local [employer]. She admits that over the last few months she has also had a few falls; denies any major injury as a result of the falls. She started to workout with a local  a month hoping that it would help her balance. SUBJECTIVE: Reports less SOB today. TREATMENT   Precautions:    Pain:  Full feeling in B knee no pain today. X in shaded column indicates activity completed today   Modalities Parameters/  Location  Notes                     Manual Therapy Time/Technique  Notes                     Exercise/Intervention   Notes   Tinetti; review of goals        Standing on airex: feet together x2 30sec  No UE support ,CGA, cues to breathe, in front of mirror for visual feedback   Step stance 20sec 2x  Standing with head turns in horizontal and vertical plane    NuStep x6 min  x Level 5, seat 8, arms 9   Forward step ups; side step ups  x10    Bilaterally    Airex: heel to raises, marches, HS curls x15 ea   Hold mini squats due to increased pain   3 way hip x15 ea   Peach tband (aggravated sciatic in R LE) no peach band   Sitting HS stretch, piriformis stretch 15sec 3x     Tandem stepping fwd/retro x2 laps ea  x    rocker board fwd/lat x15 ea  x 20 sec balance with 1UE support   wobbleboard 10x   CW/CCW, manual overpressure provided   Hurdles:   x2 ea. Green R<>L lead, reciprocal, side stepping    Stepping over 1 julio c  10x   Forward/retro, required 1UE support occasionally 2 with retro stepping over leading with L leg   Agility mat: forward step in each square; lateral step in each square; march in each square x2   Hand hold assist needed           // bars: forward and lateral walk with emphasis on taking longer, exaggerated steps,  marching x3 laps ea. Cues to swing arms, decreased coordination with UE's, no UE support!!   Walking with ziddy sticks to promote UE swing 3 laps in clinic  x O2 dropped to 89%, recovered quickly to 95%    Walking with holding therapists hands to assist with proper UE swing pattern.  50 ft x 3 laps  x             NK table: flexion/extension x10 ea/2sets 5#     BOSU lunges  x10      Supine: ball squeezes and hip abd x15   blue band, on MHP   [employer] walking  1050ft  x  seated rest break at 300 ft with NO AD, another 50 ft NO rollator this session. 6 min walk test  x1   Without supplemental oxygen;  92-95%; no rest break   Core strengthening exercises: TA activation; pelvic tilt; bridges  15x   On moist heat   TA + marching 15x   On MHP   TA + 90/90 hold 10 sec hold 3x  Single leg after B LE's, Cues to not arch back, on MHP, educated on how to perform at home   LTR 15x   On MHP            Specific Interventions Next Treatment: NuStep; airex balance exercises; Activity/Treatment Tolerance:  [x]  Patient tolerated treatment well  []  Patient limited by fatigue  []  Patient limited by pain   []  Patient limited by medical complications  []  Other:     Assessment: Greater emphasis on dynamic gait activities this session. O2 sats 89-95%, this session. Body Structures/Functions/Activity Limitations: impaired activity tolerance, impaired balance, impaired coordination, impaired endurance, impaired safety awareness, impaired strength and pain  Prognosis: fair    GOALS:  Patient Goal: minimize fall risk    Short Term Goals:  Time Frame: 4 weeks  1. Jazmyn Jimenez will demonstrate a good ankle strategy to maintain standing balance with perturbations to her trunk. 2. Jazmyn Jimenez will be able to stand for 20 min at a time without needing a rest break allowing her to prepare meals and perform light chores without limitation. NOT MET-limited to 5 min before she has to sit due to back pain; uses a kitchen stool a lot  3. Alison's Tinetti score will improve to 19/28 indicating minimal fall risk. GOAL MET-improved to 22/28      Long Term Goals:  Time Frame: 8 weeks  1. Discharge with independent HEP ONGOING  2. Alison's Tinetti score will improve to >21/28 indicating no fall risk. GOAL MET; REVISE to improve Tinetti score to >26/28; NOT MET-improved to 26/28  3. Jazmyn Jimenez will be able to maintain her standing balance in all 4 conditions of the m-CTSIB, using an ankle strategy to maintain her balance.  NOT MET-able to maintain standing balance in conditions 1-3; LOB after 6 sec in condition 4  4. Jazmyn Jimenez will demonstrate good abdominal stabilization with all transfers and dynamic gait activities to provide greater support to low back. GOAL MET   5. Jazmyn Jimenez will be able to complete 6 min walking test with rollator while maintaining oxygen levels 92% or above so she can go without supplemental oxygen. GOAL MET   6. NEW GOAL: Jazmyn Jimenez will walk 1/2 a mile without her rollator and no rest break while demonstrating good arm swing. ONGOING-is walking 400 ft without walker and without a rest break      Patient Education:    []  HEP/Education Completed: core engagement, sitting and supine hamstring and piriformis stretch   Medbridge Access Code:  []  No new Education completed  [x]  Reviewed Prior HEP      [x]  Patient verbalized and/or demonstrated understanding of education provided. []  Patient unable to verbalize and/or demonstrate understanding of education provided. Will continue education. [x]  Barriers to learning: n/a    PLAN:  Treatment Recommendations: Strengthening, Range of Motion, Balance Training, Endurance Training, Gait Training, Stair Training, Neuromuscular Re-education, Manual Therapy - Soft Tissue Mobilization, Manual Therapy - Joint Manipulation, Pain Management, Home Exercise Program, Patient Education and Modalities    []  Plan of care initiated. Plan to see patient 2 times per week for 8 weeks to address the treatment planned outlined above.   []  Continue with current plan of care  [x]  Modify plan of care as follows: 2 time per week   []  Hold pending physician visit  []  Discharge    Time In 1250   Time Out 1330   Timed Code Minutes: 40 min   Total Treatment Time: 40 min       Electronically Signed by: Tamiko Hinson

## 2022-05-09 ENCOUNTER — HOSPITAL ENCOUNTER (OUTPATIENT)
Dept: PHYSICAL THERAPY | Age: 72
Setting detail: THERAPIES SERIES
Discharge: HOME | End: 2022-05-09
Payer: MEDICARE

## 2022-05-09 PROCEDURE — 97116 GAIT TRAINING THERAPY: CPT

## 2022-05-09 PROCEDURE — 97110 THERAPEUTIC EXERCISES: CPT

## 2022-05-09 NOTE — PROGRESS NOTES
[address] [facility]  PHYSICAL THERAPY  [] EVALUATION  [x] DAILY NOTE (LAND) [] DAILY NOTE (AQUATIC ) [] PROGRESS NOTE [] DISCHARGE NOTE    [] OUTPATIENT REHABILITATION CENTER [facility]   [] Chiragjermaineyefri     [] 645 [facility]   [x] Bao Favors    Date: 2022  Patient Name:  Stephanie Munoz  : 1950  MRN: [id number]  CSN: [id number]    Referring Practitioner Dima Miranda DO   Diagnosis Other intervertebral disc degeneration, lumbar region [M51.36]  Radiculopathy, lumbar region [M54.16]    Treatment Diagnosis Difficulty walking    Date of Evaluation 21    Additional Pertinent History Bi-polar; SOB related to CHF      Functional Outcome Measure Used Tinetti   Functional Outcome Score 15/28 (21)    (21)   (21)   (10/29/21)   (21)   (21)   (22) able to complete 6 min walk test without any rest break and maintained oxygen levels between 91-98%   (3/4/22)   (22)      Insurance: Primary: Payor: Marin Ringer /  /  / , aquatic therapy is covered; modalities covered except for IONTO  Secondary:    Authorization Information:  TOTAL  UNITS    FROM 22 TO 22  CPT CODES:  21279, 52587, [id number], 59258, 96797, 81769,  REF# [id number]     Visit # 19, 5/10 for progress note   Visits Allowed: 40 or 60 visits (120 units total)    Recertification Date:    Physician Follow-Up:    Physician Orders:    History of Present Illness: Brian Walker is referred to PT to address ongoing balance issues. She states that she noticed that she was struggling with her balance when she slipped off a short deck at a local "[employer]. She admits that over the last few months she has also had a few falls; denies any major injury as a result of the falls. She started to workout with a local  a month hoping that it would help her balance.       SUBJECTIVE: Pt stated she had a busy day yesterday with confirmation party. Pt stated her shortness of breath comes and goes. TREATMENT   Precautions:    Pain:  Full feeling in B knee achyness. X in shaded column indicates activity completed today   Modalities Parameters/  Location  Notes                     Manual Therapy Time/Technique  Notes                     Exercise/Intervention   Notes   Tinetti; review of goals        Standing on airex: feet together x2 30sec  No UE support ,CGA, cues to breathe, in front of mirror for visual feedback   Step stance 20sec 2x  Standing with head turns in horizontal and vertical plane    NuStep x6 min  x Level 5, seat 8, arms 9   Forward step ups; side step ups  x10    Bilaterally    Airex: heel to raises, marches, HS curls x15 ea   Hold mini squats due to increased pain   3 way hip x15 ea   Peach tband (aggravated sciatic in R LE) no peach band   Sitting HS stretch, piriformis stretch 15sec 3x     Tandem stepping fwd/retro x2 laps ea  x    rocker board fwd/lat x15 ea  x 20 sec balance with 1UE support   Airex; ft together in front of mirror with GAIT BELT 10X  X UE horizontal abduction, shoulder flexion, CGA, cues for R foot in neutral   wobbleboard 10x   CW/CCW, manual overpressure provided   Hurdles:   x2 ea. Green R<>L lead, reciprocal, side stepping    Stepping over 1 julio c  10x   Forward/retro, required 1UE support occasionally 2 with retro stepping over leading with L leg   Agility mat: forward step in each square; lateral step in each square; march in each square x2   Hand hold assist needed           // bars: forward and lateral walk with emphasis on taking longer, exaggerated steps,  marching x3 laps ea. Cues to swing arms, decreased coordination with UE's, no UE support!!   Walking with ziddy sticks to promote UE swing 3 laps in clinic  x Better UE swing   Walking with holding therapists hands to assist with proper UE swing pattern.  50 ft x 3 laps               NK table: flexion/extension x10 ea/2sets 5#     BOSU lunges  x10      Supine: ball squeezes and hip abd x15   blue band, on P   [employer] walking  980ft  x  seated rest break at 420 ft with NO AD, another 60 ft NO rollator, then finished 400 ft with AD   6 min walk test  x1   Without supplemental oxygen;  92-95%; no rest break   Core strengthening exercises: TA activation; pelvic tilt; bridges  15x   On moist heat   TA + marching 15x   On MHP   TA + 90/90 hold 10 sec hold 3x  Single leg after B LE's, Cues to not arch back, on MHP, educated on how to perform at home   LTR 15x   On MHP            Specific Interventions Next Treatment: NuStep; airex balance exercises; Activity/Treatment Tolerance:  [x]  Patient tolerated treatment well  []  Patient limited by fatigue  []  Patient limited by pain   []  Patient limited by medical complications  []  Other:     Assessment: Pt continues to have mild path deviation with gait when fatigued. Improved UE swing this date. Added balance activity in front of mirror for visual feedback. Body Structures/Functions/Activity Limitations: impaired activity tolerance, impaired balance, impaired coordination, impaired endurance, impaired safety awareness, impaired strength and pain  Prognosis: fair    GOALS:  Patient Goal: minimize fall risk    Short Term Goals:  Time Frame: 4 weeks  1. John Belcher will demonstrate a good ankle strategy to maintain standing balance with perturbations to her trunk. 2. John Belcher will be able to stand for 20 min at a time without needing a rest break allowing her to prepare meals and perform light chores without limitation. NOT MET-limited to 5 min before she has to sit due to back pain; uses a kitchen stool a lot  3. Alison's Tinetti score will improve to 19/28 indicating minimal fall risk. GOAL MET-improved to 22/28      Long Term Goals:  Time Frame: 8 weeks  1. Discharge with independent HEP ONGOING  2. Alison's Tinetti score will improve to >21/28 indicating no fall risk.  GOAL MET; REVISE to improve Tinetti score to >26/28; NOT MET-improved to 26/28  3. Yusuf will be able to maintain her standing balance in all 4 conditions of the m-CTSIB, using an ankle strategy to maintain her balance. NOT MET-able to maintain standing balance in conditions 1-3; LOB after 6 sec in condition 4  4. Yusuf will demonstrate good abdominal stabilization with all transfers and dynamic gait activities to provide greater support to low back. GOAL MET   5. Yusuf will be able to complete 6 min walking test with rollator while maintaining oxygen levels 92% or above so she can go without supplemental oxygen. GOAL MET   6. NEW GOAL: Yusuf will walk 1/2 a mile without her rollator and no rest break while demonstrating good arm swing. ONGOING-is walking 400 ft without walker and without a rest break      Patient Education:    []  HEP/Education Completed: core engagement, sitting and supine hamstring and piriformis stretch   Medbridge Access Code:  []  No new Education completed  [x]  Reviewed Prior HEP      [x]  Patient verbalized and/or demonstrated understanding of education provided. []  Patient unable to verbalize and/or demonstrate understanding of education provided. Will continue education. [x]  Barriers to learning: n/a    PLAN:  Treatment Recommendations: Strengthening, Range of Motion, Balance Training, Endurance Training, Gait Training, Stair Training, Neuromuscular Re-education, Manual Therapy - Soft Tissue Mobilization, Manual Therapy - Joint Manipulation, Pain Management, Home Exercise Program, Patient Education and Modalities    []  Plan of care initiated. Plan to see patient 2 times per week for 8 weeks to address the treatment planned outlined above.   []  Continue with current plan of care  [x]  Modify plan of care as follows: 2 time per week   []  Hold pending physician visit  []  Discharge    Time In 0930   Time Out 1012   Timed Code Minutes: 38 min   Total Treatment Time: 38 min Electronically Signed by: Koby Chicas, PTA

## 2022-05-12 ENCOUNTER — HOSPITAL ENCOUNTER (OUTPATIENT)
Dept: PHYSICAL THERAPY | Age: 72
Setting detail: THERAPIES SERIES
Discharge: HOME | End: 2022-05-12
Payer: MEDICARE

## 2022-05-12 PROCEDURE — 97110 THERAPEUTIC EXERCISES: CPT

## 2022-05-12 PROCEDURE — 97530 THERAPEUTIC ACTIVITIES: CPT

## 2022-05-12 PROCEDURE — 97112 NEUROMUSCULAR REEDUCATION: CPT

## 2022-05-12 NOTE — PROGRESS NOTES
7115 [facility]  PHYSICAL THERAPY  [] EVALUATION  [x] DAILY NOTE (LAND) [] DAILY NOTE (AQUATIC ) [] PROGRESS NOTE [] DISCHARGE NOTE    [] 615 [facility]   [] Carlo [address]    [] 645 [facility]   [x] Amada Loya    Date: 2022  Patient Name:  Daniel Dc  : 1950  MRN: [id number]  CSN: [id number]    Referring Practitioner Kenny Ovalle DO   Diagnosis Other intervertebral disc degeneration, lumbar region [M51.36]  Radiculopathy, lumbar region [M54.16]    Treatment Diagnosis Difficulty walking    Date of Evaluation 21    Additional Pertinent History Bi-polar; SOB related to CHF      Functional Outcome Measure Used Tinetti   Functional Outcome Score 15/28 (21)    (21)   (21)   (10/29/21)   (21)   (21)   (22) able to complete 6 min walk test without any rest break and maintained oxygen levels between 91-98%   (3/4/22)   (22)      Insurance: Primary: Payor: Jeromy Klein /  /  / , aquatic therapy is covered; modalities covered except for IONTO  Secondary:    Authorization Information:  TOTAL  UNITS    FROM 22 TO 22  CPT CODES:  92506, 33256, [address] [facility], 98058, 18739, 39075,  REF# [id number]     Visit # 20, 6/10 for progress note   Visits Allowed: 40 or 60 visits (120 units total)    Recertification Date:    Physician Follow-Up:    Physician Orders:    History of Present Illness: Syeda Álvarez is referred to PT to address ongoing balance issues. She states that she noticed that she was struggling with her balance when she slipped off a short deck at a local [employer]. She admits that over the last few months she has also had a few falls; denies any major injury as a result of the falls. She started to workout with a local  a month hoping that it would help her balance.       SUBJECTIVE: She is still stiff from sleeping; she stated that she needs to stop scheduling so early    TREATMENT   Precautions:    Pain:  Full feeling in B knee achyness. X in shaded column indicates activity completed today   Modalities Parameters/  Location  Notes                     Manual Therapy Time/Technique  Notes                     Exercise/Intervention   Notes   Tinetti; review of goals        Standing on airex: feet together x2 30sec  No UE support ,CGA, cues to breathe, in front of mirror for visual feedback   Step stance 20sec 2x  Standing with head turns in horizontal and vertical plane    NuStep x6 min  x Level 5, seat 8, arms 9   Forward step ups; side step ups  x10    Bilaterally    Airex: heel to raises, marches, HS curls x15 ea   Hold mini squats due to increased pain   3 way hip x15 ea   Peach tband (aggravated sciatic in R LE) no peach band   Sitting HS stretch, piriformis stretch 15sec 3x     Tandem stepping fwd/retro x2 laps ea  x    rocker board fwd/lat x15 ea   20 sec balance with 1UE support   Airex; ft together in front of mirror with GAIT BELT 10X  X UE horizontal abduction, shoulder flexion, CGA, cues for R foot in neutral   wobbleboard 10x   CW/CCW, manual overpressure provided   Hurdles:   x2 ea.  x Green R<>L lead, reciprocal, side stepping; 2 lengths holding on with only 1 hand    Stepping over 1 julio c  10x   Forward/retro, required 1UE support occasionally 2 with retro stepping over leading with L leg   Agility mat: forward step in each square; lateral step in each square; march in each square x2   Hand hold assist needed           // bars: forward and lateral walk with emphasis on taking longer, exaggerated steps,  marching x3 laps ea. Cues to swing arms, decreased coordination with UE's, no UE support!!   Walking with ziddy sticks to promote UE swing 3 laps in clinic  x Better UE swing   Walking with holding therapists hands to assist with proper UE swing pattern.  50 ft x 3 laps      ziddy stick walking as tandem poles  2x  x Struggled with LOB to her left side today     NK table: flexion/extension x10 ea/2sets 5#     BOSU lunges  x10      Supine: ball squeezes and hip abd x15   blue band, on MHP   [employer] walking  980ft  x  seated rest break at 420 ft with NO AD, another 60 ft NO rollator, then finished 400 ft with AD   6 min walk test  x1   Without supplemental oxygen;  92-95%; no rest break   Core strengthening exercises: TA activation; pelvic tilt; bridges  15x   On moist heat   TA + marching 15x   On MHP   TA + 90/90 hold 10 sec hold 3x  Single leg after B LE's, Cues to not arch back, on MHP, educated on how to perform at home   LTR 15x   On MHP            Specific Interventions Next Treatment: NuStep; airex balance exercises; Activity/Treatment Tolerance:  [x]  Patient tolerated treatment well  []  Patient limited by fatigue  []  Patient limited by pain   []  Patient limited by medical complications  []  Other:     Assessment: Hanna Ramirez struggled with dynamic gait activities today with greater listing to the left today. Her oxygen saturation levels did drop to 88% after walking with ziddy stick, likely combination of fatigue and fear. Body Structures/Functions/Activity Limitations: impaired activity tolerance, impaired balance, impaired coordination, impaired endurance, impaired safety awareness, impaired strength and pain  Prognosis: fair    GOALS:  Patient Goal: minimize fall risk    Short Term Goals:  Time Frame: 4 weeks  1. Hanna Ramirez will demonstrate a good ankle strategy to maintain standing balance with perturbations to her trunk. 2. Hanna Ramirez will be able to stand for 20 min at a time without needing a rest break allowing her to prepare meals and perform light chores without limitation. NOT MET-limited to 5 min before she has to sit due to back pain; uses a kitchen stool a lot  3. Alison's Tinetti score will improve to 19/28 indicating minimal fall risk.  GOAL MET-improved to 22/28      Long Term Goals:  Time Frame: 8 weeks  1. Discharge with independent HEP ONGOING  2. Alison's Tinetti score will improve to >21/28 indicating no fall risk. GOAL MET; REVISE to improve Tinetti score to >26/28; NOT MET-improved to 26/28  3. Yusuf will be able to maintain her standing balance in all 4 conditions of the m-CTSIB, using an ankle strategy to maintain her balance. NOT MET-able to maintain standing balance in conditions 1-3; LOB after 6 sec in condition 4  4. Yusuf will demonstrate good abdominal stabilization with all transfers and dynamic gait activities to provide greater support to low back. GOAL MET   5. Yusuf will be able to complete 6 min walking test with rollator while maintaining oxygen levels 92% or above so she can go without supplemental oxygen. GOAL MET   6. NEW GOAL: Yusuf will walk 1/2 a mile without her rollator and no rest break while demonstrating good arm swing. ONGOING-is walking 400 ft without walker and without a rest break      Patient Education:    []  HEP/Education Completed: core engagement, sitting and supine hamstring and piriformis stretch   Medbridge Access Code:  []  No new Education completed  [x]  Reviewed Prior HEP      [x]  Patient verbalized and/or demonstrated understanding of education provided. []  Patient unable to verbalize and/or demonstrate understanding of education provided. Will continue education. [x]  Barriers to learning: n/a    PLAN:  Treatment Recommendations: Strengthening, Range of Motion, Balance Training, Endurance Training, Gait Training, Stair Training, Neuromuscular Re-education, Manual Therapy - Soft Tissue Mobilization, Manual Therapy - Joint Manipulation, Pain Management, Home Exercise Program, Patient Education and Modalities    []  Plan of care initiated. Plan to see patient 2 times per week for 8 weeks to address the treatment planned outlined above.   []  Continue with current plan of care  [x]  Modify plan of care as follows: 2 time per week   []  Hold pending physician visit  []  Discharge    Time In 0903   Time Out 0950   Timed Code Minutes: 47 min   Total Treatment Time: 47 min       Electronically Signed by: MINE Sinha [id number]" Tennille Boogie DPT  [id number]

## 2022-05-16 ENCOUNTER — HOSPITAL ENCOUNTER (OUTPATIENT)
Dept: PHYSICAL THERAPY | Age: 72
Setting detail: THERAPIES SERIES
End: 2022-05-16
Payer: MEDICARE

## 2022-05-17 ENCOUNTER — NURSE ONLY (OUTPATIENT)
Dept: LAB | Age: 72
End: 2022-05-17

## 2022-05-17 ENCOUNTER — HOSPITAL ENCOUNTER (OUTPATIENT)
Dept: MAMMOGRAPHY | Age: 72
Discharge: HOME | End: 2022-05-17
Payer: MEDICARE

## 2022-05-17 DIAGNOSIS — N18.31 STAGE 3A CHRONIC KIDNEY DISEASE (HCC): ICD-10-CM

## 2022-05-17 DIAGNOSIS — Z12.31 VISIT FOR SCREENING MAMMOGRAM: ICD-10-CM

## 2022-05-17 LAB
ANION GAP SERPL CALCULATED.3IONS-SCNC: 16 MEQ/L (ref 8–16)
BUN BLDV-MCNC: 33 MG/DL (ref 7–22)
CALCIUM SERPL-MCNC: 9.7 MG/DL (ref 8.5–10.5)
CHLORIDE BLD-SCNC: 101 MEQ/L (ref 98–111)
CO2: 24 MEQ/L (ref 23–33)
CREAT SERPL-MCNC: 1.2 MG/DL (ref 0.4–1.2)
CREATININE, URINE: 30 MG/DL
GFR SERPL CREATININE-BSD FRML MDRD: 44 ML/MIN/1.73M2
GLUCOSE BLD-MCNC: 124 MG/DL (ref 70–108)
MICROALBUMIN UR-MCNC: < 1.2 MG/DL
MICROALBUMIN/CREAT UR-RTO: 40 MG/G (ref 0–30)
POTASSIUM SERPL-SCNC: 5 MEQ/L (ref 3.5–5.2)
SODIUM BLD-SCNC: 141 MEQ/L (ref 135–145)

## 2022-05-17 PROCEDURE — 77063 BREAST TOMOSYNTHESIS BI: CPT

## 2022-05-19 ENCOUNTER — HOSPITAL ENCOUNTER (OUTPATIENT)
Dept: PHYSICAL THERAPY | Age: 72
Setting detail: THERAPIES SERIES
Discharge: HOME | End: 2022-05-19
Payer: MEDICARE

## 2022-05-19 PROCEDURE — 97530 THERAPEUTIC ACTIVITIES: CPT

## 2022-05-19 PROCEDURE — 97110 THERAPEUTIC EXERCISES: CPT

## 2022-05-19 PROCEDURE — 97112 NEUROMUSCULAR REEDUCATION: CPT

## 2022-05-19 NOTE — PROGRESS NOTES
[address] [facility]  PHYSICAL THERAPY  [] EVALUATION  [] DAILY NOTE (LAND) [] DAILY NOTE (AQUATIC ) [x] PROGRESS NOTE [] DISCHARGE NOTE    [] [facility] [place]   [] Carlo Carlos    [] [facility]   [x] Amada Loya    Date: 2022  Patient Name:  Daniel Dc  : 1950  MRN: [id number]  CSN: [id number]    Referring Practitioner Kenny Ovalle DO   Diagnosis Other intervertebral disc degeneration, lumbar region [M51.36]  Radiculopathy, lumbar region [M54.16]    Treatment Diagnosis Difficulty walking    Date of Evaluation 21    Additional Pertinent History Bi-polar; SOB related to CHF      Functional Outcome Measure Used Tinetti   Functional Outcome Score 15/28 (21)    (21)   (21)   (10/29/21)   (21)   (21)   (22) able to complete 6 min walk test without any rest break and maintained oxygen levels between 91-98%   (3/4/22)   (22)      Insurance: Primary: Payor: Jeromy Klein /  /  / , aquatic therapy is covered; modalities covered except for IONTO  Secondary:    Authorization Information:  TOTAL  UNITS    FROM 22 TO 22  CPT CODES:  80327, 82064, [address] [facility], [address], 23209, 13915,  REF# [id number]     Visit # 21, 0/10 for progress note   Visits Allowed: 40 or 60 visits (120 units total)    Recertification Date:    Physician Follow-Up:    Physician Orders:    History of Present Illness: Yusuf is referred to PT to address ongoing balance issues. She states that she noticed that she was struggling with her balance when she slipped off a short deck at a local [employer]. She admits that over the last few months she has also had a few falls; denies any major injury as a result of the falls. She started to workout with a local  a month hoping that it would help her balance.       SUBJECTIVE: She notices that she does better in the afternoons; mornings she's more stiff. The knee ache seems to come and go and is not all the time    TREATMENT   Precautions:    Pain:  Full feeling in B knee achyness. X in shaded column indicates activity completed today   Modalities Parameters/  Location  Notes                     Manual Therapy Time/Technique  Notes                     Exercise/Intervention   Notes   Mariano; review of goals    x    Standing on airex: feet together x2 30sec  No UE support ,CGA, cues to breathe, in front of mirror for visual feedback   Step stance 20sec 2x  Standing with head turns in horizontal and vertical plane    NuStep x6 min  x Level 5, seat 8, arms 9   Forward step ups; side step ups  x10    Bilaterally    Airex: heel to raises, marches, HS curls x15 ea  x Hold mini squats due to increased pain   3 way hip x15 ea   Peach tband (aggravated sciatic in R LE) no peach band   Sitting HS stretch, piriformis stretch 15sec 3x     Tandem stepping fwd/retro x2 laps ea      rocker board fwd/lat x15 ea   20 sec balance with 1UE support   Airex; ft together in front of mirror with GAIT BELT 10X  X UE horizontal abduction, shoulder flexion, CGA, cues for R foot in neutral   wobbleboard 10x   CW/CCW, manual overpressure provided   Hurdles:   x2 ea. Green R<>L lead, reciprocal, side stepping; 2 lengths holding on with only 1 hand    Stepping over 1 julio c  10x   Forward/retro, required 1UE support occasionally 2 with retro stepping over leading with L leg   Agility mat: forward step in each square; lateral step in each square; march in each square x2   Hand hold assist needed           // bars: forward and lateral walk with emphasis on taking longer, exaggerated steps,  marching x3 laps ea.    Cues to swing arms, decreased coordination with UE's, no UE support!!   Walking with ziddy sticks to promote UE swing 3 laps in clinic  x Better UE swing   Walking with holding therapists hands to assist with proper UE swing pattern. 50 ft x 3 laps      ziddy stick walking as tandem poles  2x50 ft   Struggled with LOB to her left side today     NK table: flexion/extension x10 ea/2sets 5#     BOSU lunges  x10      Supine: ball squeezes and hip abd x15   blue band, on P   [employer] walking  980ft  x  seated rest break at 420 ft with NO AD, another 60 ft NO rollator, then finished 400 ft with AD   6 min walk test  x1   Without supplemental oxygen;  92-95%; no rest break   Core strengthening exercises: TA activation; pelvic tilt; bridges  15x   On moist heat   TA + marching 15x   On MHP   TA + 90/90 hold 10 sec hold 3x  Single leg after B LE's, Cues to not arch back, on [facility], educated on how to perform at home   LTR 15x   On P            Specific Interventions Next Treatment: NuStep; airex balance exercises; Activity/Treatment Tolerance:  [x]  Patient tolerated treatment well  []  Patient limited by fatigue  []  Patient limited by pain   []  Patient limited by medical complications  []  Other:     Assessment: Barbara Joseph is demonstrating improved walking mechanics including larger, reciprocal arm swing. She does tend to list to the left when walking without AD. She will benefit from a few additional appointments to finalize her HEP and continue to work on walking endurance without her AD to allow greater independence with community ambulation. Body Structures/Functions/Activity Limitations: impaired activity tolerance, impaired balance, impaired coordination, impaired endurance, impaired safety awareness, impaired strength and pain  Prognosis: fair    GOALS:  Patient Goal: minimize fall risk    Short Term Goals:  Time Frame: 4 weeks  1. Barbara Joseph will demonstrate a good ankle strategy to maintain standing balance with perturbations to her trunk. 2. Barbara Joseph will be able to stand for 20 min at a time without needing a rest break allowing her to prepare meals and perform light chores without limitation.  NOT MET-limited to 5 min before she has to sit due to back pain; uses a kitchen stool a lot  3. Luiss Tinetti score will improve to 19/28 indicating minimal fall risk. GOAL MET-improved to 22/28      Long Term Goals:  Time Frame: 8 weeks  1. Discharge with independent HEP ONGOING  2. Luiss Tinetti score will improve to >21/28 indicating no fall risk. GOAL MET; REVISE to improve Tinetti score to >26/28; NOT MET-improved to 26/28  3. Portia Valdivia will be able to maintain her standing balance in all 4 conditions of the m-CTSIB, using an ankle strategy to maintain her balance. NOT MET-able to maintain standing balance in conditions 1-3; LOB after 6 sec in condition 4  4. Portia Valdivia will demonstrate good abdominal stabilization with all transfers and dynamic gait activities to provide greater support to low back. GOAL MET   5. Portia Valdivia will be able to complete 6 min walking test with rollator while maintaining oxygen levels 92% or above so she can go without supplemental oxygen. GOAL MET   6. NEW GOAL: Portia Valdivia will walk 1/2 a mile without her rollator and no rest break while demonstrating good arm swing. ONGOING-is walking 400 ft without walker and without a rest break      Patient Education:    []  HEP/Education Completed: core engagement, sitting and supine hamstring and piriformis stretch   [facility] Access Code:  []  No new Education completed  [x]  Reviewed Prior HEP      [x]  Patient verbalized and/or demonstrated understanding of education provided. []  Patient unable to verbalize and/or demonstrate understanding of education provided. Will continue education. [x]  Barriers to learning: n/a    PLAN:  Treatment Recommendations: Strengthening, Range of Motion, Balance Training, Endurance Training, Gait Training, Stair Training, Neuromuscular Re-education, Manual Therapy - Soft Tissue Mobilization, Manual Therapy - Joint Manipulation, Pain Management, Home Exercise Program, Patient Education and Modalities    []  Plan of care initiated.   Plan to see patient 2 times per week for 8 weeks to address the treatment planned outlined above.   []  Continue with current plan of care  [x]  Modify plan of care as follows:1- 2 times per week   []  Hold pending physician visit  []  Discharge    Time In 0930   Time Out 1010   Timed Code Minutes: 40 min   Total Treatment Time: 40 min       Electronically Signed by: Tiffani Monahan PT   Klever Carver [phone]" Sanam Chowdhury DPT  [id number]

## 2022-05-23 ENCOUNTER — OFFICE VISIT (OUTPATIENT)
Dept: NEPHROLOGY | Age: 72
End: 2022-05-23
Payer: MEDICARE

## 2022-05-23 VITALS
WEIGHT: 246 LBS | OXYGEN SATURATION: 98 % | BODY MASS INDEX: 42.23 KG/M2 | HEART RATE: 80 BPM | SYSTOLIC BLOOD PRESSURE: 118 MMHG | DIASTOLIC BLOOD PRESSURE: 66 MMHG

## 2022-05-23 DIAGNOSIS — N18.30 STAGE 3 CHRONIC KIDNEY DISEASE, UNSPECIFIED WHETHER STAGE 3A OR 3B CKD (HCC): Primary | ICD-10-CM

## 2022-05-23 DIAGNOSIS — N18.2 CKD (CHRONIC KIDNEY DISEASE), STAGE II: ICD-10-CM

## 2022-05-23 PROCEDURE — 99213 OFFICE O/P EST LOW 20 MIN: CPT | Performed by: INTERNAL MEDICINE

## 2022-05-23 PROCEDURE — G8417 CALC BMI ABV UP PARAM F/U: HCPCS | Performed by: INTERNAL MEDICINE

## 2022-05-23 PROCEDURE — 1036F TOBACCO NON-USER: CPT | Performed by: INTERNAL MEDICINE

## 2022-05-23 PROCEDURE — G8400 PT W/DXA NO RESULTS DOC: HCPCS | Performed by: INTERNAL MEDICINE

## 2022-05-23 PROCEDURE — 1090F PRES/ABSN URINE INCON ASSESS: CPT | Performed by: INTERNAL MEDICINE

## 2022-05-23 PROCEDURE — G8427 DOCREV CUR MEDS BY ELIG CLIN: HCPCS | Performed by: INTERNAL MEDICINE

## 2022-05-23 PROCEDURE — 1123F ACP DISCUSS/DSCN MKR DOCD: CPT | Performed by: INTERNAL MEDICINE

## 2022-05-23 PROCEDURE — 3017F COLORECTAL CA SCREEN DOC REV: CPT | Performed by: INTERNAL MEDICINE

## 2022-05-23 RX ORDER — SPIRONOLACTONE 25 MG/1
25 TABLET ORAL DAILY
Qty: 90 TABLET | Refills: 3 | Status: SHIPPED | OUTPATIENT
Start: 2022-05-23

## 2022-05-23 NOTE — PROGRESS NOTES
05/23/22 246 lb (111.6 kg)   03/22/22 246 lb 8 oz (111.8 kg)   03/08/22 247 lb 12.8 oz (112.4 kg)     Body mass index is 42.23 kg/m². General Appearance: alert and cooperative with exam, appears comfortable, no distress  HEENT: EOMI, moist oral mucus membranes  Neck: No jugular venous distention,   Lungs: Air entry B/L, no crackles or rales, no use of accessory muscles  Heart: S1, S2 heard, no rub  GI: soft, non-tender, no guarding,   Extremities: chronic lymphedema, nonpitting edema  Skin: warm, dry  Neurologic: no tremor, no asterixis,      Medications:  Current Outpatient Medications   Medication Sig Dispense Refill    spironolactone (ALDACTONE) 25 MG tablet Take 1 tablet by mouth daily 90 tablet 3    bumetanide (BUMEX) 1 MG tablet 1-2 tabs daily as needed. 60 tablet 3    rivastigmine (EXELON) 3 MG capsule Take 3 mg by mouth 2 times daily 4.5mg in am 6 mg pm      SYNTHROID 125 MCG tablet Take 1 tablet by mouth daily Take with water on an empty stomach- wait 30 minutes before eating or taking other meds.  30 tablet 11    hydrALAZINE (APRESOLINE) 50 MG tablet Take 1 tablet by mouth 3 times daily 90 tablet 11    atorvastatin (LIPITOR) 20 MG tablet Take 1 tablet by mouth daily 90 tablet 3    cetirizine (ZYRTEC) 10 MG tablet Take 10 mg by mouth daily      vitamin E 200 units capsule Take 200 Units by mouth daily      METAMUCIL FIBER PO Take by mouth      NONFORMULARY daily colase 100 mg 1 am and 2 in pm      carvedilol (COREG) 25 MG tablet Take 1 tablet by mouth 2 times daily 180 tablet 3    omeprazole (PRILOSEC) 20 MG delayed release capsule Take 1 capsule by mouth every morning (before breakfast) 90 capsule 3    ARIPiprazole (ABILIFY) 5 MG tablet Take 2 tablets by mouth daily 30 tablet 3    OLANZapine (ZYPREXA) 15 MG tablet Take 15 mg by mouth daily      aspirin 81 MG EC tablet Take 81 mg by mouth daily      acetaminophen (TYLENOL) 325 MG tablet Take 650 mg by mouth 4 times daily       ferrous sulfate 325 (65 Fe) MG tablet Take 325 mg by mouth daily (with breakfast)      clonazePAM (KLONOPIN) 1 MG tablet Take 1 mg by mouth 3 times daily as needed.  venlafaxine (EFFEXOR XR) 150 MG extended release capsule Take 225 mg by mouth daily       gabapentin (NEURONTIN) 300 MG capsule Take 300 mg by mouth 3 times daily.  tiZANidine (ZANAFLEX) 2 MG tablet Take 2 mg by mouth 2 times daily      Mirabegron ER (MYRBETRIQ) 50 MG TB24 Take 50 mg by mouth daily      lamoTRIgine (LAMICTAL) 100 MG tablet Take 100 mg by mouth daily Patient taking 1 tablet TID      Ascorbic Acid (VITAMIN C) 500 MG tablet Take 500 mg by mouth daily.  Multiple Vitamin (MULTIVITAMIN PO) Take 1 tablet by mouth daily. No current facility-administered medications for this visit.         Laboratory & Diagnostics:  CBC:   Lab Results   Component Value Date    WBC 5.7 09/15/2021    HGB 12.9 09/15/2021    HCT 42.5 09/15/2021    MCV 99.1 (H) 09/15/2021     09/15/2021     BMP:    Lab Results   Component Value Date     05/17/2022     11/29/2021     09/15/2021    K 5.0 05/17/2022    K 4.7 11/29/2021    K 4.7 09/15/2021     05/17/2022     11/29/2021     09/15/2021    CO2 24 05/17/2022    CO2 25 11/29/2021    CO2 28 09/15/2021    BUN 33 (H) 05/17/2022    BUN 26 (H) 11/29/2021    BUN 32 (H) 09/15/2021    CREATININE 1.2 05/17/2022    CREATININE 1.0 11/29/2021    CREATININE 1.2 09/15/2021    GLUCOSE 124 (H) 05/17/2022    GLUCOSE 105 11/29/2021    GLUCOSE 128 (H) 09/15/2021      Hepatic:   Lab Results   Component Value Date    AST 23 11/29/2021    AST 33 01/13/2021    AST 19 04/04/2020    ALT 27 11/29/2021    ALT 44 01/13/2021    ALT 18 04/04/2020    BILITOT 0.2 (L) 11/29/2021    BILITOT 0.3 01/13/2021    BILITOT 0.2 (L) 04/04/2020    ALKPHOS 73 11/29/2021    ALKPHOS 68 01/13/2021    ALKPHOS 107 04/04/2020     BNP:   Lab Results   Component Value Date    BNP 14 09/17/2020     (H) 10/08/2019     (H) 10/08/2019     Lipids:   Lab Results   Component Value Date    CHOL 164 11/29/2021    HDL 39 11/29/2021     INR:   Lab Results   Component Value Date    INR 0.88 04/04/2020    INR 0.90 10/18/2019     URINE:   Lab Results   Component Value Date    PROTUR 30 mg/dL 09/23/2014     Lab Results   Component Value Date    NITRU NEGATIVE 09/30/2019    COLORU YELLOW 09/30/2019    PHUR 6.5 09/30/2019    WBCUA 0-5 09/23/2014    RBCUA None 09/23/2014    MUCUS Present 09/23/2014    BACTERIA Trace 09/23/2014    CLARITYU sl cloudy 10/25/2011    SPECGRAV 1.012 09/13/2019    LEUKOCYTESUR NEGATIVE 09/30/2019    UROBILINOGEN 0.2 09/30/2019    BILIRUBINUR NEGATIVE 09/30/2019    BILIRUBINUR beg 10/25/2011    BLOODU NEGATIVE 09/30/2019    GLUCOSEU NEGATIVE 09/30/2019    KETUA NEGATIVE 09/30/2019      Microalbumen/Creatinine ratio:  No components found for: RUCREAT        Impression/Plan:   1. CKD III due to cardiorenal syndrome. Stable. Reassurance given. 2. Essential HTN: controlled. Hx of angioedema from ACE-I  3. Diastolic CHF/pulm HTN: appears euvolemic, on bumex 1 mg daily and spironolactone 25 mg daily. 4. RANDY  5. Bipolar disorder        Bloodwork and medications were reviewed and plan of care discussed with the patient. Return to clinic in 1 year  or sooner if the need arises.       Vasile Boss,   [facility]

## 2022-05-26 ENCOUNTER — OFFICE VISIT (OUTPATIENT)
Dept: CARDIOLOGY CLINIC | Age: 72
End: 2022-05-26
Payer: MEDICARE

## 2022-05-26 VITALS
OXYGEN SATURATION: 93 % | HEART RATE: 77 BPM | SYSTOLIC BLOOD PRESSURE: 148 MMHG | DIASTOLIC BLOOD PRESSURE: 76 MMHG | HEIGHT: 64 IN | WEIGHT: 248.6 LBS | BODY MASS INDEX: 42.44 KG/M2

## 2022-05-26 DIAGNOSIS — I10 ESSENTIAL HYPERTENSION: ICD-10-CM

## 2022-05-26 DIAGNOSIS — E66.01 CLASS 3 SEVERE OBESITY WITH SERIOUS COMORBIDITY AND BODY MASS INDEX (BMI) OF 40.0 TO 44.9 IN ADULT, UNSPECIFIED OBESITY TYPE (HCC): ICD-10-CM

## 2022-05-26 DIAGNOSIS — N18.31 STAGE 3A CHRONIC KIDNEY DISEASE (HCC): ICD-10-CM

## 2022-05-26 DIAGNOSIS — I50.32 CHF NYHA CLASS II, CHRONIC, DIASTOLIC (HCC): Primary | ICD-10-CM

## 2022-05-26 PROCEDURE — 99214 OFFICE O/P EST MOD 30 MIN: CPT | Performed by: NURSE PRACTITIONER

## 2022-05-26 PROCEDURE — G8427 DOCREV CUR MEDS BY ELIG CLIN: HCPCS | Performed by: NURSE PRACTITIONER

## 2022-05-26 PROCEDURE — 3017F COLORECTAL CA SCREEN DOC REV: CPT | Performed by: NURSE PRACTITIONER

## 2022-05-26 PROCEDURE — 1036F TOBACCO NON-USER: CPT | Performed by: NURSE PRACTITIONER

## 2022-05-26 PROCEDURE — G8417 CALC BMI ABV UP PARAM F/U: HCPCS | Performed by: NURSE PRACTITIONER

## 2022-05-26 PROCEDURE — G8400 PT W/DXA NO RESULTS DOC: HCPCS | Performed by: NURSE PRACTITIONER

## 2022-05-26 PROCEDURE — 1123F ACP DISCUSS/DSCN MKR DOCD: CPT | Performed by: NURSE PRACTITIONER

## 2022-05-26 PROCEDURE — 1090F PRES/ABSN URINE INCON ASSESS: CPT | Performed by: NURSE PRACTITIONER

## 2022-05-26 ASSESSMENT — ENCOUNTER SYMPTOMS
SHORTNESS OF BREATH: 0
ABDOMINAL DISTENTION: 1
COUGH: 0

## 2022-05-26 NOTE — PROGRESS NOTES
Heart Failure Clinic       Visit Date: 5/26/2022  Cardiologist:  Dr. Redell Hodgkins  Primary Care Physician: Dr. Zuleika Talavera, DO    Piyush Valles is a 70 y.o. female who presents today for:  Chief Complaint   Patient presents with    Congestive Heart Failure       HPI:   Piyush Valles is a 70 y.o. female who presents to the office for a follow up patient visit in the heart failure clinic. Accompanied by , Errol     TYPE HF: HFpEF (EF 60%, grd 1)  Device: No  HX: HTN, obesity, thoracic AA, OA-knee surgery, RANDY, Bipolar  PFT 5/26/2021 - restriction with air trapping and normal DLCO    Hospitalization r/t Heart Failure:  Sept 2019 = ESCOTO, Edema, HTN.  Lasix IV.  BNP normal.  +CXR. Discharge weight - 222#  Cath 10/18/19 - Nonobstructive CAD, PWCP 30 - received Lasix 80 IV in lab, changed to Bumex TID  CC 1/15/20 - Saw Dr Gil De La Cruz.  No changes.  MEMs only if readmitted  Slow wt gain seemingly r/t Zyprexa use = lowest 220# (April), highest 244#  TPS 9727 - admitted for suicidal ideations = dry - held Bumex, gave fluids    Last OV 12/2021 - wt 246 - doing well  Today: c/o some bloating today. Feels like its fluid. Takes a second Bumex 3-4x/week - states she doesn't notice it helps  Dossie Gamboa out to eat yesterday - states \"splurged\" ate hamburger and fries. Wt 248 = stable. Does have some constipation  Psych doctor recently added Exelon for memory issues, Dementia?  notes sleeping little more. Activity is fair - walked from entrance, tolerated well.      Patient has:  Chest Pain: no  SOB: chronic ESCOTO  Orthopnea/PND: no  RANDY: compliant  Edema: no  Fatigue: same  Abdominal bloating: yes   Cough: no  Appetite: good  Home weight: stable  Home blood pressure: stable - better toward afternoon - around 140/70s at home in AM    Past Medical History:   Diagnosis Date    Acid reflux     Arthritis     Asthma     Bipolar 1 disorder ([facility] [address]     CHF (congestive heart failure) (HCC)     CKD (chronic capsule Take 200 Units by mouth daily      METAMUCIL FIBER PO Take by mouth      NONFORMULARY daily colase 100 mg 1 am and 2 in pm      carvedilol (COREG) 25 MG tablet Take 1 tablet by mouth 2 times daily 180 tablet 3    omeprazole (PRILOSEC) 20 MG delayed release capsule Take 1 capsule by mouth every morning (before breakfast) 90 capsule 3    ARIPiprazole (ABILIFY) 5 MG tablet Take 2 tablets by mouth daily 30 tablet 3    OLANZapine (ZYPREXA) 15 MG tablet Take 15 mg by mouth daily      aspirin 81 MG EC tablet Take 81 mg by mouth daily      acetaminophen (TYLENOL) 325 MG tablet Take 650 mg by mouth 4 times daily       ferrous sulfate 325 (65 Fe) MG tablet Take 325 mg by mouth daily (with breakfast)      clonazePAM (KLONOPIN) 1 MG tablet Take 1 mg by mouth 3 times daily as needed.  venlafaxine (EFFEXOR XR) 150 MG extended release capsule Take 225 mg by mouth daily       gabapentin (NEURONTIN) 300 MG capsule Take 300 mg by mouth 3 times daily.  tiZANidine (ZANAFLEX) 2 MG tablet Take 2 mg by mouth 2 times daily      Mirabegron ER (MYRBETRIQ) 50 MG TB24 Take 50 mg by mouth daily      lamoTRIgine (LAMICTAL) 100 MG tablet Take 100 mg by mouth daily Patient taking 1 tablet TID      Ascorbic Acid (VITAMIN C) 500 MG tablet Take 500 mg by mouth daily.  Multiple Vitamin (MULTIVITAMIN PO) Take 1 tablet by mouth daily. No current facility-administered medications for this visit.      Allergies   Allergen Reactions    Ace Inhibitors Anaphylaxis    Prednisone Other (See Comments)     Other reaction(s): Manic Behavior  **oral**      Codeine Hives and Nausea And Vomiting    Penicillins Hives    Lotrel [Amlodipine Besy-Benazepril Hcl] Swelling    Typhoid Vaccines     Adhesive Tape      Other reaction(s): Rash    Onion Nausea And Vomiting    Typhoid Vaccine, Live      Other reaction(s): unknown    Wound Dressing Adhesive Rash     tape       SUBJECTIVE:   Review of Systems Constitutional: Negative for activity change, appetite change and fatigue. Respiratory: Negative for cough and shortness of breath. Cardiovascular: Negative for chest pain, palpitations and leg swelling. Gastrointestinal: Positive for abdominal distention. Neurological: Negative for weakness, light-headedness and headaches. Hematological: Negative for adenopathy. Psychiatric/Behavioral: Negative for sleep disturbance. OBJECTIVE:   Today's Vitals:  BP (!) 148/76   Pulse 77   Ht 5' 4\" (1.626 m)   Wt 248 lb 9.6 oz (112.8 kg)   SpO2 93%   BMI 42.67 kg/m²     Physical Exam  Vitals reviewed. Constitutional:       General: She is not in acute distress. Appearance: Normal appearance. She is well-developed. She is not diaphoretic. HENT:      Head: Normocephalic and atraumatic. Eyes:      Conjunctiva/sclera: Conjunctivae normal.   Neck:      Comments: No JVD  Cardiovascular:      Rate and Rhythm: Normal rate and regular rhythm. Heart sounds: Normal heart sounds. No murmur heard. Pulmonary:      Effort: Pulmonary effort is normal. No respiratory distress. Breath sounds: Normal breath sounds. No wheezing or rales. Abdominal:      General: Bowel sounds are normal. There is no distension (soft). Palpations: Abdomen is soft. Tenderness: There is no abdominal tenderness. Musculoskeletal:         General: Normal range of motion. Cervical back: Normal range of motion and neck supple. Right lower leg: No edema. Left lower leg: No edema. Skin:     General: Skin is warm and dry. Capillary Refill: Capillary refill takes less than 2 seconds. Neurological:      Mental Status: She is alert and oriented to person, place, and time.       Coordination: Coordination normal.   Psychiatric:         Behavior: Behavior normal.       Wt Readings from Last 3 Encounters:   05/26/22 248 lb 9.6 oz (112.8 kg)   05/23/22 246 lb (111.6 kg)   03/22/22 246 lb 8 oz (111.8 kg)     BP Readings from Last 3 Encounters:   05/26/22 (!) 148/76   05/23/22 118/66   03/22/22 130/72     Pulse Readings from Last 3 Encounters:   05/26/22 77   05/23/22 80   03/22/22 76     Body mass index is 42.67 kg/m². ECHO:    Conclusions      Summary   Technically difficult examination.   Normal left ventricle size and systolic function. Ejection fraction was   estimated at 60-65%. There were no regional left ventricular wall motion   abnormalities and wall thickness was within normal limits.   Doppler parameters were consistent with abnormal left ventricular   relaxation (grade 1 diastolic dysfunction).   Mild tricuspid regurgitation.   Dilation of ascending aorta, diameter 4.1 cm.      Signature      ----------------------------------------------------------------   Electronically signed by Romi Ulrich MD (Interpreting   physician) on 11/10/2021 at 05:32 PM   ----------------------------------------------------------------           Summary   Normal left ventricle size and systolic function. Ejection fraction was   estimated at 55 to 60 %. There were no regional left ventricular wall   motion abnormalities and wall thickness was within normal limits.   Doppler parameters were consistent with abnormal left ventricular   relaxation (grade 1 diastolic dysfunction).   The left atrium is Moderately dilated.   Aortic aneurysm noted in the ascending aorta .   Aortic aneurysm measures 4.1 cm .      Signature   ----------------------------------------------------------------   Electronically signed by Carol Sin MD (Interpreting   physician) on 09/13/2019 at 04:51 PM   ----------------------------------------------------------------        CATH (10/18)  FINDINGS:  HEMODYNAMICS:  1.  Left ventricular end-diastolic pressure was found to be elevated at  30 mmHg. 2.  Cardiac output 8.9 or 8.91 liters per minute. 3.  Cardiac index 4.3 liters per minute per square meter.   4.  Right atrial pressure elevated at 25 mmHg.  5.  RV pressure was elevated at 59/14 mmHg. 6.  Pulmonary arterial pressure was elevated at 57/34 mmHg with a mean  pulmonary arterial pressure of 45 mmHg. 7.  Pulmonary capillary wedge pressure was 30 mmHg.     CORONARY ANGIOGRAM:  1.  RCA:  RCA has moderate tortuosity in the proximal segment.  Proximal  RCA is patent without significant obstruction.  Mid RCA is patent  without significant obstruction.  Distal RCA is patent.  RPDA has some  mild diffuse disease.  RPL has distal mild diffuse disease. 2.  Left main:  Left main is patent without significant obstruction. Left main bifurcates into LCX and LAD. 3.  LCX:  Moderate tortuosity noted in the LCX.  No significant  obstruction in the proximal segment.  Distal LCX with mild diffuse  disease.  OM1 is a larger tortuous branching vessel with mild diffuse  disease. 4.  LAD:  Proximal LAD is patent without significant obstruction.  Mid  LAD is moderately tortuous.  No significant obstruction.  Distal LAD has  moderate tortuosity with mild diffuse disease. 5.  D1:  Large tortuous vessel without significant obstruction. Dominance, right.     MEDICATIONS:  See MAR.     ACCESS:  1.  Right brachial vein for right heart cath. 2.  Right radial artery for left heart cath.     COMPLICATIONS:  None.     CONCLUSION:  1.  Nonobstructive coronary artery disease. 2.  Congestive heart failure with preserved ejection fraction. 3.  Volume overload. 4.  Elevated right and left filling pressures. 5.  Moderate pulmonary venous hypertension.     RECOMMENDATIONS:  1.  Aggressive risk factor modification for nonobstructive coronary  disease. 2.  Continue aspirin 81 mg.  3.  Lipitor 20 mg p.o. daily. 4.  Fluid restriction to less than 1.5 to 2 liters per day. 5.  Sodium restriction to less than 1.5 gm per day. 6.  Daily weight monitoring. 7.  The patient was given Lasix 80 mg IV x1 in the cath lab. 8.  Stop p.o.  Lasix.  Currently, the patient is on Lasix 80 mg p.o.  b.i.d.  9.  Start Bumex 1 mg p.o. t.i.d. 10.  Check BMP and magnesium level next week. 11.  Keep potassium above 4 and magnesium above 2. 12.  The patient needs better blood pressure control.  Her hydralazine  was increased to 100 mg p.o. t.i.d.  13.  Continue to monitor home blood pressure readings. 15.  The patient has history of obstructive sleep apnea.  Continue CPAP  during sleep.  She had an appointment with her sleep medicine specialist  within one to two weeks. 15.  Lifestyle modifications including weight loss advisable. 16.  Refer to cardiac rehab once volume status is better. The above findings and plan of care were discussed in details with the  patient and her family members including  and daughter.    Questions were answered. Shana Cheung are agreeable with above-mentioned plan.     Soniya Hu MD     D: 10/18/2019 9:59:22          Results reviewed:  BNP:   Lab Results   Component Value Date    BNP 14 09/17/2020     CBC:   Lab Results   Component Value Date    WBC 5.7 09/15/2021    RBC 4.29 09/15/2021    HGB 12.9 09/15/2021    HCT 42.5 09/15/2021     09/15/2021     CMP:    Lab Results   Component Value Date     05/17/2022    K 5.0 05/17/2022    K 4.7 04/04/2020     05/17/2022    CO2 24 05/17/2022    BUN 33 05/17/2022    CREATININE 1.2 05/17/2022    AGRATIO 2.0 01/19/2018    LABGLOM 44 05/17/2022    GLUCOSE 124 05/17/2022    GLUCOSE 126 12/17/2020    CALCIUM 9.7 05/17/2022     Hepatic Function Panel:    Lab Results   Component Value Date    ALKPHOS 73 11/29/2021    ALT 27 11/29/2021    AST 23 11/29/2021    PROT 6.9 11/29/2021    BILITOT 0.2 11/29/2021    BILIDIR <0.2 09/29/2019    LABALBU 4.4 11/29/2021     Magnesium:    Lab Results   Component Value Date    MG 2.2 11/29/2021     PT/INR:    Lab Results   Component Value Date    INR 0.88 04/04/2020     Lipids:    Lab Results   Component Value Date    TRIG 226 11/29/2021    HDL 39 11/29/2021    HDL 51 03/16/2012 [address] 80 11/29/2021    LDLDIRECT 120 10/08/2019       ASSESSMENT AND PLAN:      Diagnosis Orders   1. CHF NYHA class II, chronic, diastolic (HCC)  Basic Metabolic Panel   2. Essential hypertension     3. Stage 3a chronic kidney disease (HCC)     4. Class 3 severe obesity with serious comorbidity and body mass index (BMI) of 40.0 to 44.9 in adult, unspecified obesity type (HCC)       Continue:  GDMT:              ACE/ARB/ARNI -  None               BB - Coreg 25 BID              Diuretic - Bumex 1/day              AA - Aldactone 25/day  Vasodilator - Hydralazine 50 TID  Continue Current medications  Grd 1 DD (60%)  HTN - controlled  Obesity  RANDY - compliant  CKD 3 - follows w/ [facility]  Stable, appears Euvolemic on exam  C/o bloating - likely multifactorial w/ diet, constipation, ?fluid  Instructed ok to take Bumex 2mg at once instead of BID - may illicit better response. Discussed at length that this may not be fluid and more diet, constipation related. Encouraged to continue Miralax and stool softener. Add prunes daily - and/or senekot if needed. Strongly encouraged to increase activity and watch diet closer. Lab reviewed - stable  Repeat blood work in 6 months  BP/HR stable   No med changes today   Continue diet/fluid adherence  Continue daily wts. F/U w/ Cardiology  F/U in clinic in 1 year    Tolerating above noted HF meds, no ill side effects noted. Will continue to monitor kidney function and electrolytes. Will optimize as tolerated. Pt is compliant w/ medications. Total visit time of 30 minutes has been spent with patient on education of symptoms, management, medication, and plan of care; as well as review of chart: labs, ECHO, radiology reports, etc.   I personally spent more then 50% of the appt time face to face with the patient.   · Daily weights  · Fluid restriction of 2 Liters per day  · Limit sodium in diet to around 8228-7685 mg/day  · Monitor BP  · Activity as tolerated     Patient was instructed to call the [facility] for any changes in symptoms as noted in AVS.      No follow-ups on file. or sooner if needed     Patient given educational materials - see patient instructions. We discussed the importance of weighing oneself and recording daily. We also discussed the importance of a low sodium diet, higher sodium foods to avoid and better low sodium food options. Patient verbalizes understanding of plan of care using teach back method, and is agreeable to the treatment plan.        Electronically signed by DAVID Brooks CNP on 5/26/2022 at 11:34 AM

## 2022-05-26 NOTE — PATIENT INSTRUCTIONS
You may receive a survey regarding the care you received during your visit. Your input is valuable to us. We encourage you to complete and return your survey. We hope you will choose us in the future for your healthcare needs.     Continue:  · Continue current medications  · Daily weights and record  · Fluid restriction of 2 Liters per day  · Limit sodium in diet to around 2241-8180 mg/day  · Monitor BP  · Activity as tolerated     Call the Heart Failure Clinic for any of the following symptoms: [phone]   Weight gain of 2-3 pounds in 1 day or 5 pounds in 1 week   Increased shortness of breath   Shortness of breath while laying down   Cough   Chest pain   Swelling in feet, ankles or legs   Tenderness or bloating in the abdomen   Fatigue    Decreased appetite or nausea    Confusion   

## 2022-06-02 ENCOUNTER — HOSPITAL ENCOUNTER (OUTPATIENT)
Dept: PHYSICAL THERAPY | Age: 72
Setting detail: THERAPIES SERIES
Discharge: HOME | End: 2022-06-02
Payer: MEDICARE

## 2022-06-02 PROCEDURE — 97530 THERAPEUTIC ACTIVITIES: CPT

## 2022-06-02 PROCEDURE — 97110 THERAPEUTIC EXERCISES: CPT

## 2022-06-02 NOTE — PROGRESS NOTES
[address] [facility]  PHYSICAL THERAPY  [] EVALUATION  [x] DAILY NOTE (LAND) [] DAILY NOTE (AQUATIC ) [] PROGRESS NOTE [] DISCHARGE NOTE    [] [facility]   [] Carlo [address]    [] [facility]   [x] April Cole    Date: 2022  Patient Name:  Tangela Griffin  : 1950  MRN: [id number]  CSN: [id number]    Referring Practitioner Min Jurado DO   Diagnosis Other intervertebral disc degeneration, lumbar region [M51.36]  Radiculopathy, lumbar region [M54.16]    Treatment Diagnosis Difficulty walking    Date of Evaluation 21    Additional Pertinent History Bi-polar; SOB related to CHF      Functional Outcome Measure Used Tinetti   Functional Outcome Score 15/28 (21)    (21)   (21)   (10/29/21)   (21)   (21)   (22) able to complete 6 min walk test without any rest break and maintained oxygen levels between 91-98%   (3/4/22)   (22)      Insurance: Primary: Payor: Eileen Odonnell /  /  / , aquatic therapy is covered; modalities covered except for IONTO  Secondary:    Authorization Information:  TOTAL  UNITS    FROM 22 TO 22  CPT CODES:  91096, 23037, [address] [facility], 34049, 29997, 06941,  REF# [id number]     Visit # 22, 1/10 for progress note   Visits Allowed: 40 or 60 visits (120 units total)    Recertification Date:    Physician Follow-Up:    Physician Orders:    History of Present Illness: Damián Deutsch is referred to PT to address ongoing balance issues. She states that she noticed that she was struggling with her balance when she slipped off a short deck at a local [employer]. She admits that over the last few months she has also had a few falls; denies any major injury as a result of the falls. She started to workout with a local  a month hoping that it would help her balance. SUBJECTIVE: Doing okay today;  She camped over the weekend and did fine    TREATMENT   Precautions:    Pain:  Full feeling in B knee achyness. X in shaded column indicates activity completed today   Modalities Parameters/  Location  Notes                     Manual Therapy Time/Technique  Notes                     Exercise/Intervention   Notes   Mariano; review of goals        Standing on airex: feet together x2 30sec x Head turns    Step stance 20sec 2x  Standing with head turns in horizontal and vertical plane    NuStep x6 min  x Level 5, seat 8, arms 9   Forward step ups; side step ups  x10    Bilaterally    Airex: heel to raises, marches, HS curls x15 ea  x Hold mini squats due to increased pain   3 way hip x15 ea   Peach tband (aggravated sciatic in R LE) no peach band   Sitting HS stretch, piriformis stretch 15sec 3x     Tandem stepping fwd/retro x2 laps ea      rocker board fwd/lat x15 ea   20 sec balance with 1UE support   Airex; ft together in front of mirror with GAIT BELT 10X   UE horizontal abduction, shoulder flexion, CGA, cues for R foot in neutral   wobbleboard 10x   CW/CCW, manual overpressure provided   Hurdles:   x2 ea. Green R<>L lead, reciprocal, side stepping; 2 lengths holding on with only 1 hand    Stepping over 1 julio c  10x   Forward/retro, required 1UE support occasionally 2 with retro stepping over leading with L leg   Agility mat: forward step in each square; lateral step in each square; march in each square x2   Hand hold assist needed           // bars: forward and lateral walk with emphasis on taking longer, exaggerated steps,  marching x3 laps ea. Cues to swing arms, decreased coordination with UE's, no UE support!!   Walking with ziddy sticks to promote UE swing 3 laps in clinic  x Better UE swing   Walking with holding therapists hands to assist with proper UE swing pattern.  50 ft x 3 laps      ziddy stick walking as tandem poles  2x50 ft   Struggled with LOB to her left side today     NK table: flexion/extension x10 ea/2sets 5#     BOSU lunges  x10      Supine: ball squeezes and hip abd x15   blue band, on P   [employer] walking  980ft  x  seated rest break at 420 ft with NO AD, another 60 ft NO rollator, then finished 400 ft with AD   6 min walk test  x1   Without supplemental oxygen;  92-95%; no rest break   Core strengthening exercises: TA activation; pelvic tilt; bridges  15x   On moist heat   TA + marching 15x   On MHP   TA + 90/90 hold 10 sec hold 3x  Single leg after B LE's, Cues to not arch back, on MHP, educated on how to perform at home   LTR 15x   On MHP   Standing without UE support  3 min  x      Specific Interventions Next Treatment: NuStep; airex balance exercises; Activity/Treatment Tolerance:  [x]  Patient tolerated treatment well  []  Patient limited by fatigue  []  Patient limited by pain   []  Patient limited by medical complications  []  Other:     Assessment: Switched up the order of her exercises, starting with walking with Ziddy sticks. Cherry Thomas demonstrated improved arm swing with Y walking after starting with ziddy sticks. Body Structures/Functions/Activity Limitations: impaired activity tolerance, impaired balance, impaired coordination, impaired endurance, impaired safety awareness, impaired strength and pain  Prognosis: fair    GOALS:  Patient Goal: minimize fall risk    Short Term Goals:  Time Frame: 4 weeks  1. Cherry Thomas will demonstrate a good ankle strategy to maintain standing balance with perturbations to her trunk. 2. Cherry Thomas will be able to stand for 20 min at a time without needing a rest break allowing her to prepare meals and perform light chores without limitation. NOT MET-limited to 5 min before she has to sit due to back pain; uses a kitchen stool a lot  3. Alison's Tinetti score will improve to 19/28 indicating minimal fall risk. GOAL MET-improved to 22/28      Long Term Goals:  Time Frame: 8 weeks  1. Discharge with independent HEP ONGOING  2.  Alison's Tinetti score will improve to >21/28 indicating no fall risk. GOAL MET; REVISE to improve Tinetti score to >26/28; NOT MET-improved to 26/28  3. Damián Deutsch will be able to maintain her standing balance in all 4 conditions of the m-CTSIB, using an ankle strategy to maintain her balance. NOT MET-able to maintain standing balance in conditions 1-3; LOB after 6 sec in condition 4  4. Damián Stageboni will demonstrate good abdominal stabilization with all transfers and dynamic gait activities to provide greater support to low back. GOAL MET   5. Damián Deutsch will be able to complete 6 min walking test with rollator while maintaining oxygen levels 92% or above so she can go without supplemental oxygen. GOAL MET   6. NEW GOAL: Damián Deutsch will walk 1/2 a mile without her rollator and no rest break while demonstrating good arm swing. ONGOING-is walking 400 ft without walker and without a rest break      Patient Education:    []  HEP/Education Completed: core engagement, sitting and supine hamstring and piriformis stretch   Medbridge Access Code:  []  No new Education completed  [x]  Reviewed Prior HEP      [x]  Patient verbalized and/or demonstrated understanding of education provided. []  Patient unable to verbalize and/or demonstrate understanding of education provided. Will continue education. [x]  Barriers to learning: n/a    PLAN:  Treatment Recommendations: Strengthening, Range of Motion, Balance Training, Endurance Training, Gait Training, Stair Training, Neuromuscular Re-education, Manual Therapy - Soft Tissue Mobilization, Manual Therapy - Joint Manipulation, Pain Management, Home Exercise Program, Patient Education and Modalities    []  Plan of care initiated. Plan to see patient 2 times per week for 8 weeks to address the treatment planned outlined above.   []  Continue with current plan of care  [x]  Modify plan of care as follows:1- 2 times per week   []  Hold pending physician visit  []  Discharge    Time In 1055   Time Out 1125   Timed Code Minutes: 30 min Total Treatment Time: 30 min       Electronically Signed by: Syeda Hernandez, PT   Klever Carver [id number]"SAVANNAH HarrellT  [id number]

## 2022-06-07 ENCOUNTER — HOSPITAL ENCOUNTER (OUTPATIENT)
Dept: PHYSICAL THERAPY | Age: 72
Setting detail: THERAPIES SERIES
Discharge: HOME | End: 2022-06-07
Payer: MEDICARE

## 2022-06-07 PROCEDURE — 97110 THERAPEUTIC EXERCISES: CPT

## 2022-06-07 PROCEDURE — 97530 THERAPEUTIC ACTIVITIES: CPT

## 2022-06-07 NOTE — PROGRESS NOTES
[place]  PHYSICAL THERAPY  [] EVALUATION  [x] DAILY NOTE (LAND) [] DAILY NOTE (AQUATIC ) [] PROGRESS NOTE [] DISCHARGE NOTE    [] [address] [facility]   [] Sarah    [] [address]   [x] Abdoul Dance    Date: 2022  Patient Name:  Salas Garvin  : 1950  MRN: [id number]  CSN: [id number]    Referring Practitioner Gabe Mathur DO   Diagnosis Other intervertebral disc degeneration, lumbar region [M51.36]  Radiculopathy, lumbar region [M54.16]    Treatment Diagnosis Difficulty walking    Date of Evaluation 21    Additional Pertinent History Bi-polar; SOB related to CHF      Functional Outcome Measure Used Tinetti   Functional Outcome Score 15/28 (21)    (21)   (21)   (10/29/21)   (21)   (21)   (22) able to complete 6 min walk test without any rest break and maintained oxygen levels between 91-98%      Insurance: Primary: Payor: Trung Christiansen /  /  / , aquatic therapy is covered; modalities covered except for IONTO  Secondary:    Authorization Information:  RECEIVED [address] OF 12 VISITS  FROM 22 TO 22  CPT CODES:  99429, [id number], , [address] #[id number]   Visit # 8,  for progress note   Visits Allowed: 12   Recertification Date: 89   Physician Follow-Up:    Physician Orders:    History of Present Illness: Iovn Marroquin is referred to PT to address ongoing balance issues. She states that she noticed that she was struggling with her balance when she slipped [facility] a [facility]. She admits that over the last few months she has also had a few falls; denies any major injury as a result of the falls. She started to workout with a local  a month hoping that it would help her balance. SUBJECTIVE: Reports feeling stiff all over this morning.     TREATMENT   Precautions:    0/10 bilat knee    X in shaded column indicates activity completed today   Modalities Parameters/  Location  Notes                     Manual Therapy Time/Technique  Notes                     Exercise/Intervention   Notes   Mariano; review of goals        Standing on airex: feet together x2 20 sec hold x No UE support ,CGA   Step stance 20sec 2x x Standing with head turns in horizontal and vertical plane    NuStep x6 min  x Level 5, seat 8, arms 9, 91% after Nustep   Forward step ups; side step ups  x10   x Bilaterally    Airex: heel to raises, marches x15 ea  x Hold mini squats due to increased pain   3 way hip, Hs curls x15 ea  x no Green band   Tandem stepping fwd/retro x2 laps ea  x    rocker board fwd/lat x15 ea  x 20 sec balance with 1UE support   wobbleboard 10x   CW/CCW, manual overpressure provided   Hurdles:   x2 ea. Green R<>L lead, reciprocal, side stepping    Stepping over 1 julio c  10x   Forward/retro, required 1UE support occasionally 2 with retro stepping over leading with L leg   Agility mat: forward step in each square; lateral step in each square; march in each square x2   Hand hold assist needed           // bars: forward and lateral walk with emphasis on taking longer, exaggerated steps  x3 ea. Sit-stands  15x  x Without UE support. Increased pain in R knee 92%  Off of large mat table at lowest level          NK table: flexion/extension x10 ea 5#  Bilaterally, cues for technique   BOSU lunges  x10      Seated ball squeezes and hip abd x15   blue band   [employer] walking  ~350 ft.        6 min walk test  x2  x Without supplemental oxygen;  91-98%; no rest break                   Specific Interventions Next Treatment: NuStep; airex balance exercises;      Activity/Treatment Tolerance:  [x]  Patient tolerated treatment well  []  Patient limited by fatigue  []  Patient limited by pain   []  Patient limited by medical complications  []  Other:     Assessment:  Tylor Ruth is doing great with endurance and ambulation without AD. Body Structures/Functions/Activity Limitations: impaired activity tolerance, impaired balance, impaired coordination, impaired endurance, impaired safety awareness, impaired strength and pain  Prognosis: fair    GOALS:  Patient Goal: minimize fall risk    Short Term Goals:  Time Frame: 4 weeks  1. Albin Salmon will demonstrate a good ankle strategy to maintain standing balance with perturbations to her trunk. 2. Albin Salmon will be able to stand for 20 min at a time without needing a rest break allowing her to prepare meals and perform light chores without limitation. NOT MET-limited to 5 min before she has to sit due to back pain; uses a kitchen stool a lot  3. Alison's Tinetti score will improve to 19/28 indicating minimal fall risk. GOAL MET-improved to 22/28      Long Term Goals:  Time Frame: 8 weeks  1. Discharge with independent HEP ONGOING  2. Luiss Tinetti score will improve to >21/28 indicating no fall risk. GOAL MET; REVISE to improve Tinetti score to >26/28; NOT MET-improved to 26/28  3. Albin Salmon will be able to maintain her standing balance in all 4 conditions of the m-CTSIB, using an ankle strategy to maintain her balance. NOT MET-able to maintain standing balance in conditions 1-3; LOB after 6 sec in condition 4  4. NEW GOAL: Albin Salmon will demonstrate good abdominal stabilization with all transfers and dynamic gait activities to provide greater support to low back. IMPROVING   5. NEW GOAL: Albin Salmon will be able to complete 6 min walking test with rollator while maintaining oxygen levels 92% or above so she can go without supplemental oxygen. PARTIALLY MET-oxygen levels dropped to 91, however she was quick to recover to 96%       Patient Education:   [x]  HEP/Education Completed: ft placement with gait. , tighten abdominals with standing and walking,  Standing tall at wall for optimal O2 intake   Aireon Access Code:  []  No new Education completed  [x]  Reviewed Prior HEP      [x]  Patient verbalized and/or demonstrated understanding of education provided. []  Patient unable to verbalize and/or demonstrate understanding of education provided. Will continue education. [x]  Barriers to learning: n/a    PLAN:  Treatment Recommendations: Strengthening, Range of Motion, Balance Training, Endurance Training, Gait Training, Stair Training, Neuromuscular Re-education, Manual Therapy - Soft Tissue Mobilization, Manual Therapy - Joint Manipulation, Pain Management, Home Exercise Program, Patient Education and Modalities    []  Plan of care initiated. Plan to see patient 2 times per week for 8 weeks to address the treatment planned outlined above.   []  Continue with current plan of care  [x]  Modify plan of care as follows: 2 time per week   []  Hold pending physician visit  []  Discharge    Time In 1255   Time Out 1335   Timed Code Minutes: 40 min   Total Treatment Time: 40 min       Electronically Signed by: Qasim Marino

## 2022-06-09 ENCOUNTER — HOSPITAL ENCOUNTER (OUTPATIENT)
Dept: PHYSICAL THERAPY | Age: 72
Setting detail: THERAPIES SERIES
Discharge: HOME | End: 2022-06-09
Payer: MEDICARE

## 2022-06-09 PROCEDURE — 97110 THERAPEUTIC EXERCISES: CPT

## 2022-06-09 PROCEDURE — 97530 THERAPEUTIC ACTIVITIES: CPT

## 2022-06-09 NOTE — PROGRESS NOTES
[address] [facility]  PHYSICAL THERAPY  [] EVALUATION  [x] DAILY NOTE (LAND) [] DAILY NOTE (AQUATIC ) [] PROGRESS NOTE [] DISCHARGE NOTE    [] [address] [facility]   [] Sarahyefri 90    [] [address]   [x] Rica Bigger    Date: 2022  Patient Name:  Adelso Sanders  : 1950  MRN: [id number]  CSN: [id number]    Referring Practitioner Dilan Chan DO   Diagnosis Other intervertebral disc degeneration, lumbar region [M51.36]  Radiculopathy, lumbar region [M54.16]    Treatment Diagnosis Difficulty walking    Date of Evaluation 21    Additional Pertinent History Bi-polar; SOB related to CHF      Functional Outcome Measure Used Tinetti   Functional Outcome Score 15/28 (21)    (21)   (21)   (10/29/21)   (21)   (21)   (22) able to complete 6 min walk test without any rest break and maintained oxygen levels between 91-98%      Insurance: Primary: Payor: Blue Olsen /  /  / , aquatic therapy is covered; modalities covered except for IONTO  Secondary:    Authorization Information:  RECEIVED [address] OF 12 VISITS  FROM 22 TO 22  CPT CODES:  12305, [id number], , [address] #[id number]   Visit # 9,  for progress note   Visits Allowed: 12   Recertification Date:    Physician Follow-Up:    Physician Orders:    History of Present Illness: Pauly Brunson is referred to PT to address ongoing balance issues. She states that she noticed that she was struggling with her balance when she slipped off a short deck [facility]. She admits that over the last few months she has also had a few falls; denies any major injury as a result of the falls. She started to workout with a local  a month hoping that it would help her balance. SUBJECTIVE: Reports knees are achy, but no pain to reports.     TREATMENT   Precautions:    0/10 bilat knee    X in shaded column indicates activity completed today   Modalities Parameters/  Location  Notes                     Manual Therapy Time/Technique  Notes                     Exercise/Intervention   Notes   Tinetti; review of goals        Standing on airex: feet together x2 20 sec hold x No UE support ,CGA   Static standing without UE support x90 sec  x    Step stance 30sec 2x x Standing with head turns in horizontal and vertical plane    NuStep x6 min  x Level 5, seat 8, arms 9   Forward step ups; side step ups  x10   x Bilaterally    Airex: heel to raises, marches x15 ea  x Hold mini squats due to increased pain   3 way hip, Hs curls x15 ea  x no Green band   Tandem stepping fwd/retro x2 laps ea  x    rocker board fwd/lat x15 ea  x 20 sec balance with 1UE support   wobbleboard 10x   CW/CCW, manual overpressure provided   Hurdles:   x2 ea. Green R<>L lead, reciprocal, side stepping    Stepping over 1 julio c  10x   Forward/retro, required 1UE support occasionally 2 with retro stepping over leading with L leg   Agility mat: forward step in each square; lateral step in each square; march in each square x2   Hand hold assist needed           // bars: forward and lateral walk with emphasis on taking longer, exaggerated steps  x3 ea. Sit-stands  15x   Without UE support. Increased pain in R knee 92%  Off of large mat table at lowest level          NK table: flexion/extension x10 ea 5#  Bilaterally, cues for technique   BOSU lunges  x10      Seated ball squeezes and hip abd x15   blue band   [employer] walking  ~350 ft.    x O2 dropped to 89%. Recovered to 95% within 2 min seated rest break. 6 min walk test  x2   Without supplemental oxygen;  91-98%; no rest break                   Specific Interventions Next Treatment: NuStep; airex balance exercises;      Activity/Treatment Tolerance:  [x]  Patient tolerated treatment well  []  Patient limited by fatigue  []  Patient limited by pain   []  Patient limited by medical complications  []  Other:     Assessment:  Barbara Joseph is doing great with endurance and ambulation without AD. Body Structures/Functions/Activity Limitations: impaired activity tolerance, impaired balance, impaired coordination, impaired endurance, impaired safety awareness, impaired strength and pain  Prognosis: fair    GOALS:  Patient Goal: minimize fall risk    Short Term Goals:  Time Frame: 4 weeks  1. Barbara Joseph will demonstrate a good ankle strategy to maintain standing balance with perturbations to her trunk. 2. Barbara Joseph will be able to stand for 20 min at a time without needing a rest break allowing her to prepare meals and perform light chores without limitation. NOT MET-limited to 5 min before she has to sit due to back pain; uses a kitchen stool a lot  3. Alison's Tinetti score will improve to 19/28 indicating minimal fall risk. GOAL MET-improved to 22/28      Long Term Goals:  Time Frame: 8 weeks  1. Discharge with independent HEP ONGOING  2. Alison's Tinetti score will improve to >21/28 indicating no fall risk. GOAL MET; REVISE to improve Tinetti score to >26/28; NOT MET-improved to 26/28  3. Barbara Joseph will be able to maintain her standing balance in all 4 conditions of the m-CTSIB, using an ankle strategy to maintain her balance. NOT MET-able to maintain standing balance in conditions 1-3; LOB after 6 sec in condition 4  4. NEW GOAL: Barbara Joseph will demonstrate good abdominal stabilization with all transfers and dynamic gait activities to provide greater support to low back. IMPROVING   5. NEW GOAL: Barbara Joseph will be able to complete 6 min walking test with rollator while maintaining oxygen levels 92% or above so she can go without supplemental oxygen. PARTIALLY MET-oxygen levels dropped to 91, however she was quick to recover to 96%       Patient Education:   [x]  HEP/Education Completed: ft placement with gait. , tighten abdominals with standing and walking,  Standing tall at wall for optimal O2 intake   [facility] Access Code:  []  No new Education completed  [x]  Reviewed Prior HEP      [x]  Patient verbalized and/or demonstrated understanding of education provided. []  Patient unable to verbalize and/or demonstrate understanding of education provided. Will continue education. [x]  Barriers to learning: n/a    PLAN:  Treatment Recommendations: Strengthening, Range of Motion, Balance Training, Endurance Training, Gait Training, Stair Training, Neuromuscular Re-education, Manual Therapy - Soft Tissue Mobilization, Manual Therapy - Joint Manipulation, Pain Management, Home Exercise Program, Patient Education and Modalities    []  Plan of care initiated. Plan to see patient 2 times per week for 8 weeks to address the treatment planned outlined above.   []  Continue with current plan of care  [x]  Modify plan of care as follows: 2 time per week   []  Hold pending physician visit  []  Discharge    Time In 1300   Time Out 1340   Timed Code Minutes: 40 min   Total Treatment Time: 40 min       Electronically Signed by: Sharon Noyola

## 2022-06-13 ENCOUNTER — APPOINTMENT (OUTPATIENT)
Dept: PHYSICAL THERAPY | Age: 72
End: 2022-06-13
Payer: MEDICARE

## 2022-06-21 ENCOUNTER — HOSPITAL ENCOUNTER (OUTPATIENT)
Dept: PHYSICAL THERAPY | Age: 72
Setting detail: THERAPIES SERIES
Discharge: HOME | End: 2022-06-21
Payer: MEDICARE

## 2022-06-21 PROCEDURE — 97110 THERAPEUTIC EXERCISES: CPT

## 2022-06-21 PROCEDURE — 97530 THERAPEUTIC ACTIVITIES: CPT

## 2022-06-21 NOTE — DISCHARGE SUMMARY
[facility]  PHYSICAL THERAPY  [] EVALUATION  [] DAILY NOTE (LAND) [] DAILY NOTE (AQUATIC ) [] PROGRESS NOTE [x] DISCHARGE NOTE    [] OUTPATIENT [facility]   [] Chiraghoracioyefri     [] [address]   [x] Susi Lights    Date: 2022  Patient Name:  Corey Sanders  : 1950  MRN: [id number]  CSN: [id number]    Referring Practitioner Marnie Nurse, DO   Diagnosis Other intervertebral disc degeneration, lumbar region [M51.36]  Radiculopathy, lumbar region [M54.16]    Treatment Diagnosis Difficulty walking    Date of Evaluation 21    Additional Pertinent History Bi-polar; SOB related to CHF      Functional Outcome Measure Used Tinetti   Functional Outcome Score 15/28 (21)    (21)   (21)   (10/29/21)   (21)   (21)   (22) able to complete 6 min walk test without any rest break and maintained oxygen levels between 91-98%   (22)      Insurance: Primary: Payor: Roberta Parks /  /  / , aquatic therapy is covered; modalities covered except for IONTO  Secondary:    Authorization Information:  RECEIVED [address] OF 12 VISITS  FROM 22 TO 22  CPT CODES:  30402, [id number], , [address] #[id number]   Visit # 10, 10/12 for progress note   Visits Allowed: 12   Recertification Date:    Physician Follow-Up:    Physician Orders:    History of Present Illness: Albin Salmon is referred to PT to address ongoing balance issues. She states that she noticed that she was struggling with her balance when she slipped off a short deck at a local [employer]. She admits that over the last few months she has also had a few falls; denies any major injury as a result of the falls. She started to workout with a local  a month hoping that it would help her balance.       SUBJECTIVE: Been waking up short of breath despite being on C-PAP    TREATMENT Precautions:    0/10 bilat knee    X in shaded column indicates activity completed today   Modalities Parameters/  Location  Notes                     Manual Therapy Time/Technique  Notes                     Exercise/Intervention   Notes   Mariano; review of goals    x    Standing on airex: feet together x2 20 sec hold  No UE support ,CGA   Static standing without UE support x90 sec  x    Step stance 30sec 2x x Standing with head turns in horizontal and vertical plane    NuStep x6 min  x Level 5, seat 8, arms 9   Forward step ups; side step ups  x10    Bilaterally    Airex: heel to raises, marches [employer] mini squats due to increased pain   3 way hip, Hs curls x15 ea   no Green band   Tandem stepping fwd/retro x2 laps ea  x    rocker board fwd/lat x15 ea   20 sec balance with 1UE support   wobbleboard 10x   CW/CCW, manual overpressure provided   Hurdles:   x2 ea. Green R<>L lead, reciprocal, side stepping    Stepping over 1 julio c  10x   Forward/retro, required 1UE support occasionally 2 with retro stepping over leading with L leg   Agility mat: forward step in each square; lateral step in each square; march in each square x2   Hand hold assist needed           // bars: forward and lateral walk with emphasis on taking longer, exaggerated steps  x3 ea.  x           Sit-stands  15x  x Without UE support. Increased pain in R knee 92%  Off of large mat table at lowest level          NK table: flexion/extension x10 ea 5#  Bilaterally, cues for technique   BOSU lunges  x10      Seated ball squeezes and hip abd x15   blue band   [employer] walking  ~350 ft.    x    6 min walk test  x2   Without supplemental oxygen;  91-98%; no rest break                   Specific Interventions Next Treatment: NuStep; airex balance exercises;      Activity/Treatment Tolerance:  [x]  Patient tolerated treatment well  []  Patient limited by fatigue  []  Patient limited by pain   []  Patient limited by medical complications  []  Other: Assessment:  Anusha Neal is discharged from PT with independent HEP to maintain all strength and ROM gains achieved in clinic. She has made excellent improvements in endurance since starting therapy and is feeling more confident with her balance and mobility. Body Structures/Functions/Activity Limitations: impaired activity tolerance, impaired balance, impaired coordination, impaired endurance, impaired safety awareness, impaired strength and pain  Prognosis: fair    GOALS:  Patient Goal: minimize fall risk    Short Term Goals:  Time Frame: 4 weeks  1. Anusha Neal will demonstrate a good ankle strategy to maintain standing balance with perturbations to her trunk. GOAL MET   2. Anusha Neal will be able to stand for 20 min at a time without needing a rest break allowing her to prepare meals and perform light chores without limitation. GOAL MET-limited by back cramping versus balance issues   3. Alison's Tinetti score will improve to 19/28 indicating minimal fall risk. GOAL MET-improved to 26/28      Long Term Goals:  Time Frame: 8 weeks  1. Discharge with independent HEP GOAL MET  2. Alison's Tinetti score will improve to >21/28 indicating no fall risk. GOAL MET; REVISE to improve Tinetti score to >26/28; NOT MET-improved to 26/28  3. Anusha Neal will be able to maintain her standing balance in all 4 conditions of the m-CTSIB, using an ankle strategy to maintain her balance. NOT MET-able to maintain standing balance in conditions 1-3; LOB after 6 sec in condition 4  4. NEW GOAL: Anusha Neal will demonstrate good abdominal stabilization with all transfers and dynamic gait activities to provide greater support to low back. IMPROVING   5. NEW GOAL: Anusha Neal will be able to complete 6 min walking test with rollator while maintaining oxygen levels 92% or above so she can go without supplemental oxygen.  GOAL MET-oxygen levels dropped to 91, however she was quick to recover to 96%       Patient Education:   [x]  HEP/Education Completed: ft placement with gait. , tighten abdominals with standing and walking,  Standing tall at wall for optimal O2 intake   [employer] Access Code:  []  No new Education completed  [x]  Reviewed Prior HEP      [x]  Patient verbalized and/or demonstrated understanding of education provided. []  Patient unable to verbalize and/or demonstrate understanding of education provided. Will continue education. [x]  Barriers to learning: n/a    PLAN:  Treatment Recommendations: Strengthening, Range of Motion, Balance Training, Endurance Training, Gait Training, Stair Training, Neuromuscular Re-education, Manual Therapy - Soft Tissue Mobilization, Manual Therapy - Joint Manipulation, Pain Management, Home Exercise Program, Patient Education and Modalities    []  Plan of care initiated. Plan to see patient 2 times per week for 8 weeks to address the treatment planned outlined above.   []  Continue with current plan of care  []  Modify plan of care as follows: 2 time per week   []  Hold pending physician visit  [x]  Discharge    Time In 0929   Time Out 0955   Timed Code Minutes: 26 min   Total Treatment Time: 26 min       Electronically Signed by: MINE Kramer [id number]"Kendal Valentino DPT  [id number]

## 2022-07-07 NOTE — PROGRESS NOTES
[facility]    Patient: Cisco Bolaños, 67 y.o.   : 1950  7/15/2022    Patient of mine     Subjective     Chief Complaint   Patient presents with    Follow-up     RLD 4 month follow up with PFT prior/rs from -        [facility]  Angelina Maldonado is here for follow up for shortness of breath and restrictive lung disease. Patient was last evaluated on 3/8/22. At that time, she was weaned off of supplemental O2. She has a CTA Chest ordered in 2022 with a history of lung nodules - will follow lung nodules on CTA. She is here with a Full pulmonary function test that revealed a slight decrease in DLCO and improvement in FEV1 and FVC. She had a normal MIP and MEP. Overall patient reports respiratory symptoms have been a little better since last appointment. She reports that her SPO2 will drop to 90% when walking and she will stop and take some deep breaths and her SpO2 will go up to 93%. She has been monitoring her SpO2 frequently at home. Patient reports some physical limitation due to respiratory symptoms. She reports that she will be going to [place] in 2022. 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 Guera Sarmiento), CHF (follows CHF clinic), arthritis, reflux, bipolar 1, and CKD (follows Dr. Reza Stewart). MMRC Dyspnea Scale:   0: Dyspneic on strenuous exercise  1: Dyspneic on walking a slight hill  2: Dyspneic on walking level ground; must stop occasionally due to breathlessness  3: Must stop for breathlessness after walking 100 yards or after a few minutes  4: Cannot leave house; breathless on dressing/undressing    MMRC dyspnea score: 1    Previous Visit [facility]  Angelina Maldonado is here for follow up for shortness of breath and restrictive lung disease.  At patient's last appointment, she reported that she was having testing for memory loss and was planning to obtain an MRI of her brain. Her MRI brain revealed no acute intracranial process. She had a 6 MWT at her last appointment and continued to required 1 lpm on exertion. Patient completed speech therapy for dysphagia. Patient's previous Echo demonstrated Grade I Diastolic Dysfunction, EF 89-90%, dilation of ascending aorta with diameter of 4.1 cm, and normal right ventricle. Patient had a 6 MWT with physical therapy and no longer required supplemental O2. Her O2 was thus discontinued. Patient is here with MIP & MEP that were normal. Patient reports that breathing has been doing very well. She has been completing multiple 6 MWTs at physical therapy. She reports that she has a dry cough with occasional clear sputum production. She reports some shortness of breath. Denies wheezing, chest tightness, hemoptysis, chest pain palpitations, leg swelling, or allergy symptoms. Progress History:   Since last visit any new medical issues? No  New ER or hospital visits? No  Any new or changes in medicines? Yes increase in rivastigmine, decreased abilify, and stopped aspirin for back injection  Using inhalers? No  Last PFT: 5/26/2021 - restriction with air trapping and normal DLCO  Last 6 MWT: 12/16/2021 - 1 lpm on exertion     Smoking History:  Never smoker  Denies passive tobacco exposure from parents or work environment. 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 (Coal, dust, asbestos, molds ie Hay)  [place]. Denies exposure to pets/animals at home. Denies exposure to tuberculosis. Denies family history of ALS, GBS, myasthenia gravis, myotonic dystrophy, MS, or spinal muscular atrophy.      Pneumonia vaccine: Not vaccinated  COVID-19 vaccine: Vaccinated & booster on 12/1/2021 and tested positive in January 2021, but did not have symptoms  Past Medical hx   PMH:  Past Medical History:   Diagnosis Date    Acid reflux     Arthritis     Asthma     Bipolar 1 disorder (HCC)     CHF (congestive heart failure) (HCC)     CKD (chronic kidney disease), stage II 2019    History of blood transfusion     Hypertension     Lumbosacral radiculopathy 2021    Mood disorder ([address]     Pneumonia 2021    Sleep apnea     Thyroid disease      SURGICAL HISTORY:  Past Surgical History:   Procedure Laterality Date    ANKLE SURGERY      left    CATARACT REMOVAL      Both eyes      SECTION      x 3    FOOT SURGERY      Left     HIP SURGERY      HYSTERECTOMY (CERVIX STATUS UNKNOWN)  1984    Total     SATHYA STEROTACTIC LOC BREAST BIOPSY RIGHT Right     Benign    OVARY REMOVAL      several years after hysterectomy    TOTAL KNEE ARTHROPLASTY Left 10/14/14    TOTAL KNEE ARTHROPLASTY Right 2019     BREAST NEEDLE BIOPSY LEFT Left 2017    Benign , fibroadenoma     SOCIAL HISTORY:  Social History     Tobacco Use    Smoking status: Never    Smokeless tobacco: Never   Vaping Use    Vaping Use: Never used   Substance Use Topics    Alcohol use: No     Alcohol/week: 0.0 standard drinks     Comment: rarely    Drug use: No     ALLERGIES:  Allergies   Allergen Reactions    Ace Inhibitors Anaphylaxis    Prednisone Other (See Comments)     Other reaction(s): Manic Behavior  **oral**      Codeine Hives and Nausea And Vomiting    Penicillins Hives    Lotrel [Amlodipine Besy-Benazepril Hcl] Swelling    Typhoid Vaccines     Adhesive Tape      Other reaction(s): Rash    Onion Nausea And Vomiting    Typhoid Vaccine, Live      Other reaction(s): unknown    Wound Dressing Adhesive Rash     tape     FAMILY HISTORY:  Family History   Problem Relation Age of Onset    Diabetes Mother     High Blood Pressure Father     Cancer Father     Other Brother     Diabetes Brother     Cancer Sister     Breast Cancer Sister 43    Cancer Brother     Breast Cancer Maternal Cousin 61     CURRENT MEDICATIONS:  Current Outpatient Medications   Medication Sig Dispense Refill    spironolactone (ALDACTONE) 25 MG tablet Take 1 tablet by mouth daily 90 tablet 3    bumetanide (BUMEX) 1 MG tablet 1-2 tabs daily as needed. 60 tablet 3    rivastigmine (EXELON) 3 MG capsule Take 3 mg by mouth in the morning and 3 mg before bedtime. 4.5mg in am morning 6 mg pmi n the evening. SYNTHROID 125 MCG tablet Take 1 tablet by mouth daily Take with water on an empty stomach- wait 30 minutes before eating or taking other meds. 30 tablet 11    hydrALAZINE (APRESOLINE) 50 MG tablet Take 1 tablet by mouth 3 times daily 90 tablet 11    atorvastatin (LIPITOR) 20 MG tablet Take 1 tablet by mouth daily 90 tablet 3    cetirizine (ZYRTEC) 10 MG tablet Take 10 mg by mouth daily      vitamin E 200 units capsule Take 200 Units by mouth daily      METAMUCIL FIBER PO Take by mouth      NONFORMULARY daily colase 100 mg 1 am and 2 in pm      carvedilol (COREG) 25 MG tablet Take 1 tablet by mouth 2 times daily 180 tablet 3    omeprazole (PRILOSEC) 20 MG delayed release capsule Take 1 capsule by mouth every morning (before breakfast) 90 capsule 3    ARIPiprazole (ABILIFY) 5 MG tablet Take 2 tablets by mouth daily 30 tablet 3    OLANZapine (ZYPREXA) 15 MG tablet Take 15 mg by mouth daily      acetaminophen (TYLENOL) 325 MG tablet Take 650 mg by mouth in the morning and 650 mg at noon and 650 mg in the evening and 650 mg before bedtime. ferrous sulfate 325 (65 Fe) MG tablet Take 325 mg by mouth daily (with breakfast)      clonazePAM (KLONOPIN) 1 MG tablet Take 1 mg by mouth 3 times daily as needed. venlafaxine (EFFEXOR XR) 150 MG extended release capsule Take 225 mg by mouth daily       gabapentin (NEURONTIN) 300 MG capsule Take 300 mg by mouth 3 times daily.        tiZANidine (ZANAFLEX) 2 MG tablet Take 2 mg by mouth 2 times daily      mirabegron (MYRBETRIQ) 50 MG TB24 Take 50 mg by mouth daily      lamoTRIgine (LAMICTAL) 100 MG tablet Take 100 mg by mouth daily Patient taking 1 tablet TID      Ascorbic Acid (VITAMIN C) 500 MG tablet Take 500 mg by mouth daily. Multiple Vitamin (MULTIVITAMIN PO) Take 1 tablet by mouth daily. aspirin 81 MG EC tablet Take 81 mg by mouth daily (Patient not taking: Reported on 7/15/2022)       No current facility-administered medications for this visit. Jane MILLER   Review of Systems   Constitutional:  Negative for appetite change and fever. HENT:  Positive for congestion, rhinorrhea and sneezing. Negative for postnasal drip, sinus pressure and sinus pain. Respiratory:  Negative for cough, chest tightness, shortness of breath and wheezing. Denies hemoptysis   Cardiovascular:  Positive for leg swelling (chronic). Negative for chest pain and palpitations. Follows CHF clinic   Genitourinary:  Negative for difficulty urinating. Allergic/Immunologic: Positive for environmental allergies. Physical exam   /80 (Site: Left Upper Arm, Position: Sitting, Cuff Size: Medium Adult)   Pulse 73   Temp 97.2 °F (36.2 °C) (Temporal)   Ht 5' 4\" (1.626 m)   Wt 245 lb 9.6 oz (111.4 kg)   SpO2 91% Comment: on RA  BMI 42.16 kg/m²    Wt Readings from Last 3 Encounters:   07/15/22 245 lb 9.6 oz (111.4 kg)   05/26/22 248 lb 9.6 oz (112.8 kg)   05/23/22 246 lb (111.6 kg)       Physical Exam  Constitutional:       General: She is not in acute distress. Appearance: She is well-developed. She is obese. Comments: BMI 42   HENT:      Head: Normocephalic and atraumatic. Right Ear: External ear normal.      Left Ear: External ear normal.      Mouth/Throat:      Mouth: Mucous membranes are moist.      Pharynx: Oropharynx is clear. No oropharyngeal exudate. Eyes:      General:         Right eye: No discharge. Left eye: No discharge. Cardiovascular:      Rate and Rhythm: Normal rate and regular rhythm.    Pulmonary:      Effort: Pulmonary effort is normal.      Breath sounds: No wheezing, rhonchi or rales. Comments: Diminished breath sounds  Chest:      Chest wall: No tenderness. Abdominal:      Palpations: Abdomen is soft. Musculoskeletal:      Cervical back: Neck supple. Right lower leg: No edema. Left lower leg: No edema. Skin:     General: Skin is warm and dry. Neurological:      General: No focal deficit present. Mental Status: She is alert. Psychiatric:         Mood and Affect: Mood normal.         Behavior: Behavior normal.         Thought Content: Thought content normal.         Judgment: Judgment normal.        Results   Lung Nodule Screening     [] Qualifies    [x] Does not qualify   [] Declined    [] Completed  Non-smoker   The USPSTF recommends annual screening for lung cancer with low-dose computed tomography (LDCT) in adults aged 48 to [de-identified] years who have a 20 pack-year smoking history and currently smoke or have quit within the past 15 years. Screening should be discontinued once a person has not smoked for 15 years or develops a health problem that substantially limits life expectancy or the ability or willingness to have curative lung surgery. Full pulmonary function test 7/8/22        Assessment      Diagnosis Orders   1. Restrictive lung disease  Full PFT Study With Bronchodilator      2. Multiple lung nodules on CT        3. Acute respiratory failure with hypoxia (HCC)        4. Grade I diastolic dysfunction  Full PFT Study With Bronchodilator      5. CKD (chronic kidney disease), stage II  Full PFT Study With Bronchodilator      6. RANDY on CPAP        7. Class 3 severe obesity with body mass index (BMI) of 40.0 to 44.9 in adult, unspecified obesity type, unspecified whether serious comorbidity present ([address]        8. Pulmonary air trapping           Normal MIP and MEP  Plan   - Pulmonary function test revealing slight decrease in DLCO and improvement in FEV1 and FVC. Consistent with air trapping and possible obstruction.  Patient denies breathing symptoms and feels that her breathing has improved after treatment for pneumonia. Will follow with Full pulmonary function test in 1 year  - Patient has repeat CTA Chest ordered for October 2022, will re-evaluate lung nodules on CTA Chest  - Patient has been weaned off of supplemental O2. Advised patient to continue to monitor SpO2 at home and call with SpO2 < 88%  - Continue PAP therapy at nighttime and continue to follow with TREMAYNE Atkins in the sleep clinic.  - Continue to follow with Dr. Kerry Miller and CHF clinic  - Continue with Dr. Jose Campbell  - Received COVID-19 vaccination     Advised patient to call office with any changes, questions, or concerns regarding respiratory status or issues with prescribed medications    Return in about 1 year (around 7/15/2023) for RLD with PFT prior. I spent a total of 20 minutes on the day of the visit. Time spent included review of previous notes and test results and face to face time with the patient discussing diagnosis and importance of compliance with the treatment plan. Time spent also includes documentation on the day of the visit.     Electronically signed by DAVID Turner CNP on 7/15/2022 at 9:29 AM

## 2022-07-08 ENCOUNTER — HOSPITAL ENCOUNTER (OUTPATIENT)
Dept: PULMONOLOGY | Age: 72
Discharge: HOME | End: 2022-07-08
Payer: MEDICARE

## 2022-07-08 DIAGNOSIS — J98.4 RESTRICTIVE LUNG DISEASE: ICD-10-CM

## 2022-07-08 PROCEDURE — 94726 PLETHYSMOGRAPHY LUNG VOLUMES: CPT

## 2022-07-08 PROCEDURE — 94010 BREATHING CAPACITY TEST: CPT

## 2022-07-08 PROCEDURE — 94729 DIFFUSING CAPACITY: CPT

## 2022-07-08 NOTE — PROGRESS NOTES
Prescreening performed prior to testing. The following symptoms may indicate COVID-19 infection:        One of the following criteria:   Temperature taken, patient temperature was 98.1 F. Fever greater 100.0 F -no  New onset cough -  no  New onset shortness of breath -no  New onset difficulty breathing -no        And/or   Two or more of the following criteria:  New onset muscle aches -no  New onset headache -no  New onset sore throat -no  New onset loss of smell/taste -no  New onset diarrhea -N/A    Patient's screening was negative. PFT will be performed.

## 2022-07-15 ENCOUNTER — OFFICE VISIT (OUTPATIENT)
Dept: PULMONOLOGY | Age: 72
End: 2022-07-15
Payer: MEDICARE

## 2022-07-15 VITALS
SYSTOLIC BLOOD PRESSURE: 138 MMHG | WEIGHT: 245.6 LBS | BODY MASS INDEX: 41.93 KG/M2 | TEMPERATURE: 97.2 F | HEIGHT: 64 IN | OXYGEN SATURATION: 91 % | DIASTOLIC BLOOD PRESSURE: 80 MMHG | HEART RATE: 73 BPM

## 2022-07-15 DIAGNOSIS — I51.89 GRADE I DIASTOLIC DYSFUNCTION: ICD-10-CM

## 2022-07-15 DIAGNOSIS — Z99.89 OSA ON CPAP: ICD-10-CM

## 2022-07-15 DIAGNOSIS — E66.01 CLASS 3 SEVERE OBESITY WITH BODY MASS INDEX (BMI) OF 40.0 TO 44.9 IN ADULT, UNSPECIFIED OBESITY TYPE, UNSPECIFIED WHETHER SERIOUS COMORBIDITY PRESENT (HCC): ICD-10-CM

## 2022-07-15 DIAGNOSIS — N18.2 CKD (CHRONIC KIDNEY DISEASE), STAGE II: ICD-10-CM

## 2022-07-15 DIAGNOSIS — G47.33 OSA ON CPAP: ICD-10-CM

## 2022-07-15 DIAGNOSIS — R91.8 MULTIPLE LUNG NODULES ON CT: ICD-10-CM

## 2022-07-15 DIAGNOSIS — J98.4 RESTRICTIVE LUNG DISEASE: Primary | ICD-10-CM

## 2022-07-15 DIAGNOSIS — R09.89 PULMONARY AIR TRAPPING: ICD-10-CM

## 2022-07-15 DIAGNOSIS — J96.01 ACUTE RESPIRATORY FAILURE WITH HYPOXIA (HCC): ICD-10-CM

## 2022-07-15 PROCEDURE — G8417 CALC BMI ABV UP PARAM F/U: HCPCS

## 2022-07-15 PROCEDURE — 1090F PRES/ABSN URINE INCON ASSESS: CPT

## 2022-07-15 PROCEDURE — 1123F ACP DISCUSS/DSCN MKR DOCD: CPT

## 2022-07-15 PROCEDURE — G8427 DOCREV CUR MEDS BY ELIG CLIN: HCPCS

## 2022-07-15 PROCEDURE — G8400 PT W/DXA NO RESULTS DOC: HCPCS

## 2022-07-15 PROCEDURE — 99213 OFFICE O/P EST LOW 20 MIN: CPT

## 2022-07-15 PROCEDURE — 3017F COLORECTAL CA SCREEN DOC REV: CPT

## 2022-07-15 PROCEDURE — 1036F TOBACCO NON-USER: CPT

## 2022-07-15 ASSESSMENT — ENCOUNTER SYMPTOMS
SINUS PRESSURE: 0
RHINORRHEA: 1
SINUS PAIN: 0
SHORTNESS OF BREATH: 0
COUGH: 0
WHEEZING: 0
CHEST TIGHTNESS: 0

## 2022-07-15 NOTE — PATIENT INSTRUCTIONS
Please call me with any breathing difficulties.     I will see you back in 1 year with a pulmonary function test prior to your appointment

## 2022-08-15 NOTE — PROGRESS NOTES
Immunization record is ready. Patient's father will come today to pick it up. extremities normal, atraumatic, no cyanosis or edema  Neurologic: Mental status: Alert, oriented, thought content appropriate      ASSESSMENT/DIAGNOSIS  RANDY compliant with cpap  Air leakage with elevated AHI  Infected tooth and frequent nocturnal awakenings  Essential HTN       Plan   Do you need any equipment today? No  [facility] just obtained a new mask and is to start usage tonight. She will complete ATB for her tooth in 2 days with plans for extraction after Thanksgiving. 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 No increased in pressure at this time (her weight is stable)    - She  was advised to continue current positive airway pressure therapy with above described pressure. - She  advised to keep good compliance with current recommended pressure to get optimal results and clinical improvement  - Recommend 7-9 hours of sleep with PAP  - She was advised to call [facility] regarding supplies if needed. - She call my office for earlier appointment if needed for worsening of sleep symptoms.   - She was instructed on weight loss  - [facility] was educated about my impression and plan. Patient verbalizes understanding.   We will see Fermin Lied back in: 1 year with download    Electronically signed by Ramesh Starkey CNP on 11/6/2017 at 10:58 AM

## 2022-09-22 DIAGNOSIS — I10 ESSENTIAL HYPERTENSION: ICD-10-CM

## 2022-09-22 RX ORDER — CARVEDILOL 25 MG/1
25 TABLET ORAL 2 TIMES DAILY
Qty: 180 TABLET | Refills: 3 | Status: SHIPPED | OUTPATIENT
Start: 2022-09-22

## 2022-09-28 RX ORDER — OMEPRAZOLE 20 MG/1
20 CAPSULE, DELAYED RELEASE ORAL
Qty: 90 CAPSULE | Refills: 3 | Status: SHIPPED | OUTPATIENT
Start: 2022-09-28

## 2022-10-04 RX ORDER — BUMETANIDE 1 MG/1
TABLET ORAL
Qty: 180 TABLET | Refills: 3 | Status: SHIPPED | OUTPATIENT
Start: 2022-10-04

## 2022-10-14 ENCOUNTER — OFFICE VISIT (OUTPATIENT)
Dept: FAMILY MEDICINE CLINIC | Age: 72
End: 2022-10-14
Payer: MEDICARE

## 2022-10-14 VITALS
SYSTOLIC BLOOD PRESSURE: 128 MMHG | BODY MASS INDEX: 43.03 KG/M2 | HEART RATE: 72 BPM | DIASTOLIC BLOOD PRESSURE: 54 MMHG | WEIGHT: 250.7 LBS | RESPIRATION RATE: 20 BRPM

## 2022-10-14 DIAGNOSIS — R22.1 NECK FULLNESS: Primary | ICD-10-CM

## 2022-10-14 DIAGNOSIS — M20.002 DEFORMITY OF LEFT THUMB JOINT: ICD-10-CM

## 2022-10-14 PROCEDURE — 99213 OFFICE O/P EST LOW 20 MIN: CPT | Performed by: FAMILY MEDICINE

## 2022-10-14 PROCEDURE — 1090F PRES/ABSN URINE INCON ASSESS: CPT | Performed by: FAMILY MEDICINE

## 2022-10-14 PROCEDURE — G8417 CALC BMI ABV UP PARAM F/U: HCPCS | Performed by: FAMILY MEDICINE

## 2022-10-14 PROCEDURE — 1123F ACP DISCUSS/DSCN MKR DOCD: CPT | Performed by: FAMILY MEDICINE

## 2022-10-14 PROCEDURE — G8400 PT W/DXA NO RESULTS DOC: HCPCS | Performed by: FAMILY MEDICINE

## 2022-10-14 PROCEDURE — 3017F COLORECTAL CA SCREEN DOC REV: CPT | Performed by: FAMILY MEDICINE

## 2022-10-14 PROCEDURE — G8427 DOCREV CUR MEDS BY ELIG CLIN: HCPCS | Performed by: FAMILY MEDICINE

## 2022-10-14 PROCEDURE — G8484 FLU IMMUNIZE NO ADMIN: HCPCS | Performed by: FAMILY MEDICINE

## 2022-10-14 PROCEDURE — 1036F TOBACCO NON-USER: CPT | Performed by: FAMILY MEDICINE

## 2022-10-14 NOTE — PROGRESS NOTES
Claudell Liter (:  1950) is a 67 y.o. female,Established patient, here for evaluation of the following chief complaint(s): Other (Area on neck that is swollen the past week, no pain noted) and Hand Problem (Pt wants you to look at her thumb on left hand, no pain noted)        Subjective   SUBJECTIVE/OBJECTIVE:  HPI:    Chief Complaint   Patient presents with    Other     Area on neck that is swollen the past week, no pain noted    Hand Problem     Pt wants you to look at her thumb on left hand, no pain noted     Pt presents today with c/o area of neck that is new. Over the last week, noticed a fullness in the front of the neck. No pain. Denies dysphagia. Pt also c/o left thumb deformity that has been there for several years. Hx of dislocations in the psat, pt would reduce on her own. No pain.       Patient Active Problem List   Diagnosis    Hypothyroid    Dyslipidemia    Bipolar disorder (HCC)    Post-menopausal    HTN (hypertension)    Obesity (BMI 30-39.9)    RANDY on CPAP    ACE inhibitor-aggravated angioedema    Acute on chronic diastolic congestive heart failure (HCC)    Ascending aortic aneurysm    Chronic diastolic heart failure (HCC)    Thoracic aortic aneurysm without rupture    Abnormal stress test    CKD (chronic kidney disease), stage II    Edema of lower extremity    Pneumonia of both lower lobes due to infectious organism    Acute respiratory failure with hypoxia (HCC)    Multiple lung nodules on CT    Congestive heart failure ([address]    Morbid obesity with BMI of 40.0-44.9, adult ([address]    Acquired spondylolisthesis    Artificial knee joint present    Deficiency of medial collateral ligament of right knee    Displacement of lumbar intervertebral disc without myelopathy    Lumbosacral radiculopathy    Other idiopathic scoliosis, thoracolumbar region    Spinal stenosis of lumbar region    SOB (shortness of breath)    Restrictive lung disease    Grade I diastolic dysfunction    Atelectasis Dysphagia     Past Surgical History:   Procedure Laterality Date    ANKLE SURGERY      left    CATARACT REMOVAL      Both eyes      SECTION      x 3    FOOT SURGERY      Left     HIP SURGERY      HYSTERECTOMY (CERVIX STATUS UNKNOWN)  1984    Total     SATHYA STEROTACTIC LOC BREAST BIOPSY RIGHT Right     Benign    OVARY REMOVAL      several years after hysterectomy    TOTAL KNEE ARTHROPLASTY Left 10/14/14    TOTAL KNEE ARTHROPLASTY Right 2019     BREAST NEEDLE BIOPSY LEFT Left 2017    Benign , fibroadenoma     Social History     Tobacco Use    Smoking status: Never    Smokeless tobacco: Never   Vaping Use    Vaping Use: Never used   Substance Use Topics    Alcohol use: No     Alcohol/week: 0.0 standard drinks     Comment: rarely    Drug use: No         Review of Systems   Constitutional: Negative. HENT: Negative. Fullness in neck     Respiratory: Negative. Cardiovascular: Negative. Gastrointestinal: Negative. Musculoskeletal: Negative. Left thumb deformity     All other systems reviewed and are negative. Objective   Physical Exam  Vitals and nursing note reviewed. Constitutional:       General: She is not in acute distress. Appearance: Normal appearance. She is well-developed. HENT:      Head: Normocephalic and atraumatic. Right Ear: Tympanic membrane normal.      Left Ear: Tympanic membrane normal.   Eyes:      Conjunctiva/sclera: Conjunctivae normal.   Neck:      Thyroid: No thyroid mass, thyromegaly or thyroid tenderness. Cardiovascular:      Rate and Rhythm: Normal rate and regular rhythm. Heart sounds: Normal heart sounds. No murmur heard. Pulmonary:      Effort: Pulmonary effort is normal.      Breath sounds: Normal breath sounds. No wheezing, rhonchi or rales. Abdominal:      General: There is no distension. Musculoskeletal:      Left hand: Deformity (left thumb deformity, appears to be chronically dislocated.) present. Cervical back: Neck supple. Skin:     General: Skin is warm and dry. Findings: No rash (on exposed surfaces). Neurological:      General: No focal deficit present. Mental Status: She is alert. Psychiatric:         Attention and Perception: Attention normal.         Mood and Affect: Mood normal.         Speech: Speech normal.         Behavior: Behavior normal. Behavior is cooperative. Thought Content: Thought content normal.         Judgment: Judgment normal.             ASSESSMENT/PLAN:  1. Neck fullness  -     US THYROID; Future  2. Deformity of left thumb joint  -     [facility] - Floresita Cutler DO, Orthopedic Surgery, [facility]    -  Orders above    Return if symptoms worsen or fail to improve. An electronic signature was used to authenticate this note.     --John Cease, DO

## 2022-10-15 ASSESSMENT — ENCOUNTER SYMPTOMS
GASTROINTESTINAL NEGATIVE: 1
RESPIRATORY NEGATIVE: 1

## 2022-10-24 ENCOUNTER — HOSPITAL ENCOUNTER (OUTPATIENT)
Dept: CT IMAGING | Age: 72
Discharge: HOME | End: 2022-10-24
Payer: MEDICARE

## 2022-10-24 ENCOUNTER — TELEPHONE (OUTPATIENT)
Dept: CARDIOLOGY CLINIC | Age: 72
End: 2022-10-24

## 2022-10-24 ENCOUNTER — HOSPITAL ENCOUNTER (OUTPATIENT)
Dept: ULTRASOUND IMAGING | Age: 72
Discharge: HOME | End: 2022-10-24
Payer: MEDICARE

## 2022-10-24 DIAGNOSIS — R22.1 NECK FULLNESS: ICD-10-CM

## 2022-10-24 DIAGNOSIS — I71.21 ASCENDING AORTIC ANEURYSM: ICD-10-CM

## 2022-10-24 LAB — POC CREATININE WHOLE BLOOD: 1.5 MG/DL (ref 0.5–1.2)

## 2022-10-24 PROCEDURE — 82565 ASSAY OF CREATININE: CPT

## 2022-10-24 PROCEDURE — 6360000004 HC RX CONTRAST MEDICATION: Performed by: INTERNAL MEDICINE

## 2022-10-24 PROCEDURE — 76536 US EXAM OF HEAD AND NECK: CPT

## 2022-10-24 PROCEDURE — 71275 CT ANGIOGRAPHY CHEST: CPT

## 2022-10-24 RX ADMIN — IOPAMIDOL 80 ML: 755 INJECTION, SOLUTION INTRAVENOUS at 09:12

## 2022-10-24 NOTE — TELEPHONE ENCOUNTER
----- Message from Jose Guzman MD sent at 10/24/2022  1:15 PM EDT -----    Dilated ascending aorta which measures 4 cm in diameter, unchanged  Stable

## 2022-10-26 ENCOUNTER — TELEPHONE (OUTPATIENT)
Dept: PULMONOLOGY | Age: 72
End: 2022-10-26

## 2022-10-26 NOTE — TELEPHONE ENCOUNTER
Please notify patient that I reviewed her CTA chest completed on 10/24/2022, ordered by Dr. Giovanny Brenner. Her lung nodules appear to be stable. Her CTA chest did show atelectasis or scarring at the left lung base that was not present on her last imaging. Does she have any symptoms of infection? Any shortness of breath, wheezing, coughing, sputum production? I advise patient to use incentive spirometer 4 times daily to help keep the lungs open. If she does not have an incentive spirometer at home, I can order one.   Thank you

## 2022-10-27 ENCOUNTER — NURSE ONLY (OUTPATIENT)
Dept: LAB | Age: 72
End: 2022-10-27

## 2022-10-27 LAB
CREAT SERPL-MCNC: 1.5 MG/DL (ref 0.4–1.2)
GFR SERPL CREATININE-BSD FRML MDRD: 37 ML/MIN/1.73M2

## 2022-10-28 ENCOUNTER — TELEPHONE (OUTPATIENT)
Dept: CARDIOLOGY CLINIC | Age: 72
End: 2022-10-28

## 2022-10-28 DIAGNOSIS — I50.32 CHF NYHA CLASS II, CHRONIC, DIASTOLIC (HCC): Primary | ICD-10-CM

## 2022-10-28 NOTE — TELEPHONE ENCOUNTER
Creatinine from CT scan slightly elevated. Has she taken extra Bumex recently if so use more conservatively.    Repeat full BMP in 1 month

## 2022-11-09 ENCOUNTER — OFFICE VISIT (OUTPATIENT)
Dept: CARDIOLOGY CLINIC | Age: 72
End: 2022-11-09
Payer: MEDICARE

## 2022-11-09 VITALS
HEART RATE: 89 BPM | WEIGHT: 249 LBS | BODY MASS INDEX: 42.51 KG/M2 | DIASTOLIC BLOOD PRESSURE: 79 MMHG | HEIGHT: 64 IN | SYSTOLIC BLOOD PRESSURE: 145 MMHG

## 2022-11-09 DIAGNOSIS — I10 PRIMARY HYPERTENSION: Primary | ICD-10-CM

## 2022-11-09 DIAGNOSIS — I50.32 CHRONIC HEART FAILURE WITH PRESERVED EJECTION FRACTION (HCC): ICD-10-CM

## 2022-11-09 PROCEDURE — 1036F TOBACCO NON-USER: CPT | Performed by: INTERNAL MEDICINE

## 2022-11-09 PROCEDURE — 99214 OFFICE O/P EST MOD 30 MIN: CPT | Performed by: INTERNAL MEDICINE

## 2022-11-09 PROCEDURE — G8484 FLU IMMUNIZE NO ADMIN: HCPCS | Performed by: INTERNAL MEDICINE

## 2022-11-09 PROCEDURE — G8417 CALC BMI ABV UP PARAM F/U: HCPCS | Performed by: INTERNAL MEDICINE

## 2022-11-09 PROCEDURE — 3017F COLORECTAL CA SCREEN DOC REV: CPT | Performed by: INTERNAL MEDICINE

## 2022-11-09 PROCEDURE — 1123F ACP DISCUSS/DSCN MKR DOCD: CPT | Performed by: INTERNAL MEDICINE

## 2022-11-09 PROCEDURE — G8427 DOCREV CUR MEDS BY ELIG CLIN: HCPCS | Performed by: INTERNAL MEDICINE

## 2022-11-09 PROCEDURE — 3074F SYST BP LT 130 MM HG: CPT | Performed by: INTERNAL MEDICINE

## 2022-11-09 PROCEDURE — G8400 PT W/DXA NO RESULTS DOC: HCPCS | Performed by: INTERNAL MEDICINE

## 2022-11-09 PROCEDURE — 1090F PRES/ABSN URINE INCON ASSESS: CPT | Performed by: INTERNAL MEDICINE

## 2022-11-09 PROCEDURE — 93000 ELECTROCARDIOGRAM COMPLETE: CPT | Performed by: INTERNAL MEDICINE

## 2022-11-09 PROCEDURE — 3078F DIAST BP <80 MM HG: CPT | Performed by: INTERNAL MEDICINE

## 2022-11-09 NOTE — PROGRESS NOTES
[address] [facility] [address] [facility] [address]  Dept: [phone]  Dept Fax: [phone]  Loc: [phone]    Visit Date: 11/9/2022    Ms. Bao Resendez is a 67 y.o. female  who presented for:  Congestive Heart Failure  HPI:   FATUMA Mayen is a pleasant 67year old female patient who  has a past medical history of Acid reflux, Arthritis, Asthma, Bipolar 1 disorder ([facility] [address] CHF (congestive heart failure) ([facility] [address] CKD (chronic kidney disease), stage II, History of blood transfusion, Hypertension, Lumbosacral radiculopathy, Mood disorder ([facility] [address] Pneumonia, Sleep apnea, and Thyroid disease. She has known h/o HFpEF, followed at CHF clinic. Cath in 10/2019 revealed nonobstructive CAD, elevated filling pressures. Echo 11/2021 revealed preserved EF, dilation of ascending aorta/diameter 4.1 cm. Chest CTA on 10/24/2022 revealed Dilated ascending aorta which measures 4 cm in diameter (Stable). Patient denies chest pain, shortness of breath, dyspnea on exertion, leg swelling. /79. HR 89. She reports checking her weight daily, been stable per patient. Current Outpatient Medications:     Polyethylene Glycol 3350 (MIRALAX PO), Take by mouth, Disp: , Rfl:     bumetanide (BUMEX) 1 MG tablet, 1-2 tabs daily as needed. , Disp: 180 tablet, Rfl: 3    omeprazole (PRILOSEC) 20 MG delayed release capsule, Take 1 capsule by mouth every morning (before breakfast), Disp: 90 capsule, Rfl: 3    carvedilol (COREG) 25 MG tablet, Take 1 tablet by mouth 2 times daily, Disp: 180 tablet, Rfl: 3    spironolactone (ALDACTONE) 25 MG tablet, Take 1 tablet by mouth daily, Disp: 90 tablet, Rfl: 3    rivastigmine (EXELON) 3 MG capsule, Take 6 mg by mouth 2 times daily 4.5mg in am morning 6 mg pmi n the evening, Disp: , Rfl:     SYNTHROID 125 MCG tablet, Take 1 tablet by mouth daily Take with water on an empty stomach- wait 30 minutes before eating or taking other meds. , Disp: 30 tablet, Rfl: 11    hydrALAZINE (APRESOLINE) 50 MG tablet, Take 1 tablet by mouth 3 times daily, Disp: 90 tablet, Rfl: 11    atorvastatin (LIPITOR) 20 MG tablet, Take 1 tablet by mouth daily, Disp: 90 tablet, Rfl: 3    cetirizine (ZYRTEC) 10 MG tablet, Take 10 mg by mouth daily, Disp: , Rfl:     vitamin E 200 units capsule, Take 200 Units by mouth daily, Disp: , Rfl:     ARIPiprazole (ABILIFY) 5 MG tablet, Take 2 mg by mouth daily, Disp: 30 tablet, Rfl: 3    OLANZapine (ZYPREXA) 15 MG tablet, Take 15 mg by mouth daily, Disp: , Rfl:     aspirin 81 MG EC tablet, Take 81 mg by mouth daily, Disp: , Rfl:     acetaminophen (TYLENOL) 325 MG tablet, Take 650 mg by mouth in the morning and 650 mg at noon and 650 mg in the evening and 650 mg before bedtime. , Disp: , Rfl:     ferrous sulfate 325 (65 Fe) MG tablet, Take 325 mg by mouth daily (with breakfast), Disp: , Rfl:     clonazePAM (KLONOPIN) 1 MG tablet, Take 1 mg by mouth 3 times daily as needed. , Disp: , Rfl:     venlafaxine (EFFEXOR XR) 150 MG extended release capsule, Take 75 mg by mouth daily, Disp: , Rfl:     gabapentin (NEURONTIN) 300 MG capsule, Take 300 mg by mouth 3 times daily. , Disp: , Rfl:     tiZANidine (ZANAFLEX) 2 MG tablet, Take 2 mg by mouth 2 times daily, Disp: , Rfl:     mirabegron (MYRBETRIQ) 50 MG TB24, Take 50 mg by mouth daily, Disp: , Rfl:     lamoTRIgine (LAMICTAL) 100 MG tablet, Take 100 mg by mouth daily Patient taking 1 tablet TID, Disp: , Rfl:     Ascorbic Acid (VITAMIN C) 500 MG tablet, Take 500 mg by mouth daily. , Disp: , Rfl:     Multiple Vitamin (MULTIVITAMIN PO), Take 1 tablet by mouth daily.   , Disp: , Rfl:     METAMUCIL FIBER PO, Take by mouth, Disp: , Rfl:     NONFORMULARY, daily colase 100 mg 1 am and 2 in pm, Disp: , Rfl:     Past Medical History  Andressa Collins  has a past medical history of Acid reflux, Arthritis, Asthma, Bipolar 1 disorder ([facility] [address] [facility] CKD (chronic kidney disease), stage II, Wt Readings from Last 3 Encounters:   11/09/22 249 lb (112.9 kg)   10/14/22 250 lb 11.2 oz (113.7 kg)   07/15/22 245 lb 9.6 oz (111.4 kg)     BP Readings from Last 3 Encounters:   11/09/22 (!) 145/79   10/14/22 (!) 128/54   07/15/22 138/80       Nursing note and vitals reviewed. Physical Exam   Constitutional: Oriented to person, place, and time. Appears well-developed and well-nourished. ENT: Moist mucous membranes. No bleeding. Tongue is midline. Head: Normocephalic and atraumatic. Eyes: EOM are normal. Pupils are equal, round, and reactive to light. Neck: Normal range of motion. Neck supple. No JVD present. Cardiovascular: Normal rate, regular rhythm, no murmur, no rubs, and intact distal pulses. Pulmonary/Chest: Effort normal and breath sounds normal. No respiratory distress. No wheezes. No rales. Abdominal: Soft. Bowel sounds are normal. No distension. There is no tenderness. Musculoskeletal: Normal range of motion. no edema. Neurological: Alert and oriented to person, place, and time. No cranial nerve deficit. Coordination normal.   Skin: Skin is warm and dry. Psychiatric: Normal mood and affect.        No results found for: CKTOTAL, CKMB, CKMBINDEX    Lab Results   Component Value Date/Time    WBC 5.7 09/15/2021 11:28 AM    RBC 4.29 09/15/2021 11:28 AM    HGB 12.9 09/15/2021 11:28 AM    HCT 42.5 09/15/2021 11:28 AM    MCV 99.1 09/15/2021 11:28 AM    MCH 30.1 09/15/2021 11:28 AM    MCHC 30.4 09/15/2021 11:28 AM    RDW 13.7 04/28/2020 03:10 PM     09/15/2021 11:28 AM    MPV 9.8 09/15/2021 11:28 AM       Lab Results   Component Value Date/Time     05/17/2022 02:53 PM    K 5.0 05/17/2022 02:53 PM    K 4.7 04/04/2020 12:32 AM     05/17/2022 02:53 PM    CO2 24 05/17/2022 02:53 PM    BUN 33 05/17/2022 02:53 PM    LABALBU 4.4 11/29/2021 09:42 AM    CREATININE 1.5 10/27/2022 03:24 PM    CALCIUM 9.7 05/17/2022 02:53 PM    LABGLOM 37 10/27/2022 03:24 PM    GLUCOSE 124 05/17/2022 02:53 PM    GLUCOSE 126 12/17/2020 10:26 AM       Lab Results   Component Value Date/Time    ALKPHOS 73 11/29/2021 09:42 AM    ALT 27 11/29/2021 09:42 AM    AST 23 11/29/2021 09:42 AM    PROT 6.9 11/29/2021 09:42 AM    BILITOT 0.2 11/29/2021 09:42 AM    BILIDIR <0.2 09/29/2019 10:30 PM    LABALBU 4.4 11/29/2021 09:42 AM       Lab Results   Component Value Date/Time    MG 2.2 11/29/2021 09:42 AM       Lab Results   Component Value Date    INR 0.88 04/04/2020    INR 0.90 10/18/2019         Lab Results   Component Value Date/Time    LABA1C 5.6 01/19/2018 08:01 AM       Lab Results   Component Value Date/Time    TRIG 226 11/29/2021 09:42 AM    HDL 39 11/29/2021 09:42 AM    HDL 51 03/16/2012 12:00 AM    LDLCALC 80 11/29/2021 09:42 AM    LDLDIRECT 120 10/08/2019 07:29 AM       Lab Results   Component Value Date/Time    TSH 2.620 09/29/2019 10:30 PM         Testing Reviewed:      I have individually reviewed the cardiac test below:    Cath:10/2019  HEMODYNAMICS:  1. Left ventricular end-diastolic pressure was found to be elevated at  30 mmHg. 2.  Cardiac output 8.9 or 8.91 liters per minute. 3.  Cardiac index 4.3 liters per minute per square meter. 4.  Right atrial pressure elevated at 25 mmHg. 5.  RV pressure was elevated at 59/14 mmHg. 6.  Pulmonary arterial pressure was elevated at 57/34 mmHg with a mean  pulmonary arterial pressure of 45 mmHg. 7.  Pulmonary capillary wedge pressure was 30 mmHg. CORONARY ANGIOGRAM:  1. RCA:  RCA has moderate tortuosity in the proximal segment. Proximal  RCA is patent without significant obstruction. Mid RCA is patent  without significant obstruction. Distal RCA is patent. RPDA has some  mild diffuse disease. RPL has distal mild diffuse disease. 2.  Left main:  Left main is patent without significant obstruction. Left main bifurcates into LCX and LAD. 3.  LCX:  Moderate tortuosity noted in the LCX. No significant  obstruction in the proximal segment. Distal LCX with mild diffuse  disease. OM1 is a larger tortuous branching vessel with mild diffuse  disease. 4. LAD:  Proximal LAD is patent without significant obstruction. Mid  LAD is moderately tortuous. No significant obstruction. Distal LAD has  moderate tortuosity with mild diffuse disease. 5.  D1:  Large tortuous vessel without significant obstruction. Dominance, right. MEDICATIONS:  See MAR. ACCESS:  1. Right brachial vein for right heart cath. 2.  Right radial artery for left heart cath. COMPLICATIONS:  None. CONCLUSION:  1. Nonobstructive coronary artery disease. 2.  Congestive heart failure with preserved ejection fraction. 3.  Volume overload. 4.  Elevated right and left filling pressures. 5.  Moderate pulmonary venous hypertension. Impression       1. Stable CTA of the chest, no interval change since previous study dated 12/9/2021.   2. Dilated ascending aorta which measures 4 cm in diameter, unchanged. The descending thoracic aorta is normal.   3. Diffuse COPD, thickening off the interstitial lung markings and small nodule at the left lung base    4. Atelectasis or scarring at the left lung base. 5. Small hilar and mediastinal lymph nodes. 6. Old left-sided rib fractures. **This report has been created using voice recognition software. It may contain minor errors which are inherent in voice recognition technology. **       Final report electronically signed by DR Isaias Friedman on 10/24/2022 10:48 AM           AssessmentPlan:     Army Amezquita is a pleasant 67year old female patient who  has a past medical history of Acid reflux, Arthritis, Asthma, Bipolar 1 disorder ([address] CHF (congestive heart failure) ([address] CKD (chronic kidney disease), stage II, History of blood transfusion, Hypertension, Lumbosacral radiculopathy, Mood disorder ([address] Pneumonia, Sleep apnea, and Thyroid disease. She has known h/o HFpEF, followed at CHF clinic.  Cath in 10/2019 revealed

## 2022-11-28 RX ORDER — LEVOTHYROXINE SODIUM 125 MCG
125 TABLET ORAL DAILY
Qty: 30 TABLET | Refills: 11 | Status: SHIPPED | OUTPATIENT
Start: 2022-11-28

## 2022-11-28 RX ORDER — OMEPRAZOLE 20 MG/1
20 CAPSULE, DELAYED RELEASE ORAL
Qty: 90 CAPSULE | Refills: 3 | Status: SHIPPED | OUTPATIENT
Start: 2022-11-28

## 2022-11-28 RX ORDER — ATORVASTATIN CALCIUM 20 MG/1
20 TABLET, FILM COATED ORAL DAILY
Qty: 90 TABLET | Refills: 3 | Status: SHIPPED | OUTPATIENT
Start: 2022-11-28 | End: 2022-11-29 | Stop reason: SDUPTHER

## 2022-11-28 NOTE — TELEPHONE ENCOUNTER
We received a fax from [facility] requesting a refill of Synthroid. Refill if appropriate.

## 2022-11-29 RX ORDER — ATORVASTATIN CALCIUM 20 MG/1
20 TABLET, FILM COATED ORAL DAILY
Qty: 90 TABLET | Refills: 3 | Status: SHIPPED | OUTPATIENT
Start: 2022-11-29

## 2022-12-02 ENCOUNTER — NURSE ONLY (OUTPATIENT)
Dept: LAB | Age: 72
End: 2022-12-02

## 2022-12-02 DIAGNOSIS — I10 PRIMARY HYPERTENSION: ICD-10-CM

## 2022-12-02 DIAGNOSIS — I50.32 CHRONIC HEART FAILURE WITH PRESERVED EJECTION FRACTION (HCC): ICD-10-CM

## 2022-12-02 DIAGNOSIS — I50.32 CHF NYHA CLASS II, CHRONIC, DIASTOLIC (HCC): ICD-10-CM

## 2022-12-02 LAB
ALBUMIN SERPL-MCNC: 4.3 G/DL (ref 3.5–5.1)
ALP BLD-CCNC: 80 U/L (ref 38–126)
ALT SERPL-CCNC: 23 U/L (ref 11–66)
ANION GAP SERPL CALCULATED.3IONS-SCNC: 15 MEQ/L (ref 8–16)
AST SERPL-CCNC: 21 U/L (ref 5–40)
BILIRUB SERPL-MCNC: 0.2 MG/DL (ref 0.3–1.2)
BUN BLDV-MCNC: 32 MG/DL (ref 7–22)
CALCIUM SERPL-MCNC: 10.5 MG/DL (ref 8.5–10.5)
CHLORIDE BLD-SCNC: 103 MEQ/L (ref 98–111)
CHOLESTEROL, TOTAL: 164 MG/DL (ref 100–199)
CO2: 24 MEQ/L (ref 23–33)
CREAT SERPL-MCNC: 1.3 MG/DL (ref 0.4–1.2)
GFR SERPL CREATININE-BSD FRML MDRD: 44 ML/MIN/1.73M2
GLUCOSE BLD-MCNC: 115 MG/DL (ref 70–108)
HDLC SERPL-MCNC: 41 MG/DL
LDL CHOLESTEROL CALCULATED: 82 MG/DL
POTASSIUM SERPL-SCNC: 4.6 MEQ/L (ref 3.5–5.2)
SODIUM BLD-SCNC: 142 MEQ/L (ref 135–145)
TOTAL PROTEIN: 6.8 G/DL (ref 6.1–8)
TRIGL SERPL-MCNC: 203 MG/DL (ref 0–199)

## 2022-12-22 DIAGNOSIS — N18.2 CKD (CHRONIC KIDNEY DISEASE), STAGE II: ICD-10-CM

## 2022-12-22 DIAGNOSIS — I10 ESSENTIAL HYPERTENSION: ICD-10-CM

## 2022-12-22 RX ORDER — HYDRALAZINE HYDROCHLORIDE 50 MG/1
50 TABLET, FILM COATED ORAL 3 TIMES DAILY
Qty: 270 TABLET | Refills: 3 | Status: SHIPPED | OUTPATIENT
Start: 2022-12-22

## 2023-01-09 ENCOUNTER — OFFICE VISIT (OUTPATIENT)
Dept: PULMONOLOGY | Age: 73
End: 2023-01-09
Payer: MEDICARE

## 2023-01-09 VITALS
HEIGHT: 64 IN | DIASTOLIC BLOOD PRESSURE: 82 MMHG | TEMPERATURE: 98.4 F | SYSTOLIC BLOOD PRESSURE: 136 MMHG | OXYGEN SATURATION: 95 % | WEIGHT: 255.4 LBS | BODY MASS INDEX: 43.6 KG/M2 | HEART RATE: 82 BPM

## 2023-01-09 DIAGNOSIS — Z99.89 OSA ON CPAP: Primary | ICD-10-CM

## 2023-01-09 DIAGNOSIS — G47.33 OSA ON CPAP: Primary | ICD-10-CM

## 2023-01-09 DIAGNOSIS — J98.4 RESTRICTIVE LUNG DISEASE: ICD-10-CM

## 2023-01-09 DIAGNOSIS — E66.01 CLASS 3 SEVERE OBESITY WITH BODY MASS INDEX (BMI) OF 40.0 TO 44.9 IN ADULT, UNSPECIFIED OBESITY TYPE, UNSPECIFIED WHETHER SERIOUS COMORBIDITY PRESENT (HCC): ICD-10-CM

## 2023-01-09 PROCEDURE — 3079F DIAST BP 80-89 MM HG: CPT | Performed by: PHYSICIAN ASSISTANT

## 2023-01-09 PROCEDURE — G8484 FLU IMMUNIZE NO ADMIN: HCPCS | Performed by: PHYSICIAN ASSISTANT

## 2023-01-09 PROCEDURE — 1123F ACP DISCUSS/DSCN MKR DOCD: CPT | Performed by: PHYSICIAN ASSISTANT

## 2023-01-09 PROCEDURE — 3017F COLORECTAL CA SCREEN DOC REV: CPT | Performed by: PHYSICIAN ASSISTANT

## 2023-01-09 PROCEDURE — G8417 CALC BMI ABV UP PARAM F/U: HCPCS | Performed by: PHYSICIAN ASSISTANT

## 2023-01-09 PROCEDURE — 1036F TOBACCO NON-USER: CPT | Performed by: PHYSICIAN ASSISTANT

## 2023-01-09 PROCEDURE — 99213 OFFICE O/P EST LOW 20 MIN: CPT | Performed by: PHYSICIAN ASSISTANT

## 2023-01-09 PROCEDURE — 3075F SYST BP GE 130 - 139MM HG: CPT | Performed by: PHYSICIAN ASSISTANT

## 2023-01-09 PROCEDURE — G8400 PT W/DXA NO RESULTS DOC: HCPCS | Performed by: PHYSICIAN ASSISTANT

## 2023-01-09 PROCEDURE — 1090F PRES/ABSN URINE INCON ASSESS: CPT | Performed by: PHYSICIAN ASSISTANT

## 2023-01-09 PROCEDURE — G8427 DOCREV CUR MEDS BY ELIG CLIN: HCPCS | Performed by: PHYSICIAN ASSISTANT

## 2023-01-09 ASSESSMENT — ENCOUNTER SYMPTOMS
NAUSEA: 0
CHEST TIGHTNESS: 0
SHORTNESS OF BREATH: 0
STRIDOR: 0
DIARRHEA: 0
BACK PAIN: 0
ALLERGIC/IMMUNOLOGIC NEGATIVE: 1
WHEEZING: 0
COUGH: 0
EYES NEGATIVE: 1

## 2023-01-09 NOTE — PROGRESS NOTES
[facility]      Sonam Fink         [id number]  1/9/2023   Chief Complaint   Patient presents with    Follow-up     1yr RANDY f/u w/[facility] download. Doing well. Pt of Dr. Alfonzo May    PAP Download:   Original or initial AHI: 50.4     Date of initial study: 5/14/2009      Compliant  97%     Noncompliant 3 %     PAP Type CPAP    Level  8cmH2O   Avg Hrs/Day 10hrs 23mins  AHI: 1.4   Recorded compliance dates , 12/7/22  to 1/5/23   Machine/Mfg:   [x] ResMed    [] Respironics/Dreamstation   Interface:   [x] Nasal    [] Nasal pillows   [] FFM      Provider:      [x] [facility]     []Chase     [] Lucila    [] Grant Adamson    [] [facility]               [] [facility]      [] [employer]    [] [address]      [] Other    Neck Size: 17.5  Mallampati 4  ESS:  3  SAQLI: 82    Here is a scan of the most recent download:                Presentation:   Megan Briggs presents for sleep medicine follow up for obstructive sleep apnea  Since the last visit, Megan Briggs is doing well with PAP. She is sleeping well and feels rested. She is limited with mobility for secondary to SOB and back pain. Equipment issues: The pressure is  acceptable, the mask is acceptable     Sleep issues:  Do you feel better? Yes  More rested? Yes   Better concentration? yes    Progress History:   Since last visit any new medical issues? No  New ER or hospital visits? No  Any new or changes in medicines? No  Any new sleep medicines? No    Review of Systems -   Review of Systems   Constitutional:  Negative for activity change, appetite change, chills and fever. HENT:  Negative for congestion and postnasal drip. Eyes: Negative. Respiratory:  Negative for cough, chest tightness, shortness of breath, wheezing and stridor. Cardiovascular:  Negative for chest pain and leg swelling. Gastrointestinal:  Negative for diarrhea and nausea. Endocrine: Negative. Genitourinary: Negative. Musculoskeletal: Negative. Negative for arthralgias and back pain. Skin: Negative. Allergic/Immunologic: Negative. Neurological: Negative. Negative for dizziness and light-headedness. Psychiatric/Behavioral: Negative. All other systems reviewed and are negative. Physical Exam:    BMI:  Body mass index is 43.84 kg/m². Wt Readings from Last 3 Encounters:   01/09/23 255 lb 6.4 oz (115.8 kg)   11/09/22 249 lb (112.9 kg)   10/14/22 250 lb 11.2 oz (113.7 kg)     Weight stable / unchanged  Vitals: /82 (Site: Right Upper Arm, Position: Sitting, Cuff Size: Large Adult)   Pulse 82   Temp 98.4 °F (36.9 °C) (Oral)   Ht 5' 4\" (1.626 m)   Wt 255 lb 6.4 oz (115.8 kg)   SpO2 95% Comment: r/a  BMI 43.84 kg/m²       Physical Exam  Constitutional:       Appearance: Normal appearance. She is normal weight. HENT:      Head: Normocephalic and atraumatic. Right Ear: External ear normal.      Left Ear: External ear normal.      Nose: Nose normal.   Eyes:      Extraocular Movements: Extraocular movements intact. Conjunctiva/sclera: Conjunctivae normal.      Pupils: Pupils are equal, round, and reactive to light. Pulmonary:      Effort: Pulmonary effort is normal.   Musculoskeletal:      Cervical back: Normal range of motion and neck supple. Neurological:      General: No focal deficit present. Mental Status: She is alert and oriented to person, place, and time. Psychiatric:         Attention and Perception: Attention and perception normal.         Mood and Affect: Mood and affect normal.         Speech: Speech normal.         Behavior: Behavior normal. Behavior is cooperative. Thought Content: Thought content normal.         Cognition and Memory: Cognition normal.         Judgment: Judgment normal.         ASSESSMENT/DIAGNOSIS     Diagnosis Orders   1. RANDY on CPAP        2.  Class 3 severe obesity with body mass index (BMI) of 40.0 to 44.9 in adult, unspecified obesity type, unspecified whether serious comorbidity present ([address]        3. Restrictive lung disease                 Plan   Do you need any equipment today? Yes update supplies  - continue following with [facility] for restrictive lung dx   - Download reviewed and discussed with patient  - She  was advised to continue current positive airway pressure therapy with above described pressure. - She  advised to keep good compliance with current recommended pressure to get optimal results and clinical improvement  - Recommend 7-9 hours of sleep with PAP  - She was advised to call [employer] company regarding supplies if needed.   -She call my office for earlier appointment if needed for worsening of sleep symptoms.   - She was instructed on weight loss  - Thee Kenrick was educated about my impression and plan. Patient verbalizesunderstanding.   We will see Eda Rashel back in: 1 year with download    Information added by my medical assistant/LPN was reviewed today       Perry Sheth, [address] [facility]  1/9/2023

## 2023-02-06 ENCOUNTER — TELEPHONE (OUTPATIENT)
Dept: CARDIOLOGY CLINIC | Age: 73
End: 2023-02-06

## 2023-02-06 RX ORDER — METOLAZONE 2.5 MG/1
2.5 TABLET ORAL DAILY
Qty: 2 TABLET | Refills: 0 | Status: SHIPPED | OUTPATIENT
Start: 2023-02-06 | End: 2023-02-08

## 2023-02-06 NOTE — TELEPHONE ENCOUNTER
Pt called and left message. Returned call and states she is up to 250# (baseline 242-245#)- does note some mild bloating, mild SOB. Has taken some extra Bumex 2mg/total - took 3 days straight last week w/ no improvement. Does admit since holidays diet has been poor - Is getting back on track w/ sodium. Order for Metolazone x 2 days - 1 hr before Bumex. Call if no improvement. Pt reports understanding.

## 2023-02-16 ENCOUNTER — OFFICE VISIT (OUTPATIENT)
Dept: CARDIOLOGY CLINIC | Age: 73
End: 2023-02-16
Payer: MEDICARE

## 2023-02-16 ENCOUNTER — TELEPHONE (OUTPATIENT)
Dept: CARDIOLOGY CLINIC | Age: 73
End: 2023-02-16

## 2023-02-16 VITALS
SYSTOLIC BLOOD PRESSURE: 136 MMHG | DIASTOLIC BLOOD PRESSURE: 78 MMHG | HEART RATE: 90 BPM | WEIGHT: 253 LBS | OXYGEN SATURATION: 92 % | BODY MASS INDEX: 43.19 KG/M2 | HEIGHT: 64 IN

## 2023-02-16 DIAGNOSIS — R06.02 SOB (SHORTNESS OF BREATH): ICD-10-CM

## 2023-02-16 DIAGNOSIS — N18.32 STAGE 3B CHRONIC KIDNEY DISEASE (HCC): ICD-10-CM

## 2023-02-16 DIAGNOSIS — I50.33 CHF (CONGESTIVE HEART FAILURE), NYHA CLASS III, ACUTE ON CHRONIC, DIASTOLIC (HCC): Primary | ICD-10-CM

## 2023-02-16 DIAGNOSIS — I10 ESSENTIAL HYPERTENSION: ICD-10-CM

## 2023-02-16 DIAGNOSIS — R14.0 BLOATING: ICD-10-CM

## 2023-02-16 DIAGNOSIS — R63.5 WEIGHT GAIN: ICD-10-CM

## 2023-02-16 LAB
ANION GAP SERPL CALC-SCNC: 10 MEQ/L (ref 8–16)
BUN SERPL-MCNC: 35 MG/DL (ref 7–22)
CALCIUM SERPL-MCNC: 9.8 MG/DL (ref 8.5–10.5)
CHLORIDE SERPL-SCNC: 101 MEQ/L (ref 98–111)
CO2 SERPL-SCNC: 29 MEQ/L (ref 23–33)
CREAT SERPL-MCNC: 1.2 MG/DL (ref 0.4–1.2)
GFR SERPL CREATININE-BSD FRML MDRD: 48 ML/MIN/1.73M2
GLUCOSE SERPL-MCNC: 119 MG/DL (ref 70–108)
NT-PROBNP SERPL IA-MCNC: 82.7 PG/ML (ref 0–124)
POTASSIUM SERPL-SCNC: 4 MEQ/L (ref 3.5–5.2)
SODIUM SERPL-SCNC: 140 MEQ/L (ref 135–145)

## 2023-02-16 PROCEDURE — 1036F TOBACCO NON-USER: CPT | Performed by: NURSE PRACTITIONER

## 2023-02-16 PROCEDURE — 36415 COLL VENOUS BLD VENIPUNCTURE: CPT | Performed by: NURSE PRACTITIONER

## 2023-02-16 PROCEDURE — 99215 OFFICE O/P EST HI 40 MIN: CPT | Performed by: NURSE PRACTITIONER

## 2023-02-16 PROCEDURE — 3017F COLORECTAL CA SCREEN DOC REV: CPT | Performed by: NURSE PRACTITIONER

## 2023-02-16 PROCEDURE — G8484 FLU IMMUNIZE NO ADMIN: HCPCS | Performed by: NURSE PRACTITIONER

## 2023-02-16 PROCEDURE — 1090F PRES/ABSN URINE INCON ASSESS: CPT | Performed by: NURSE PRACTITIONER

## 2023-02-16 PROCEDURE — 96374 THER/PROPH/DIAG INJ IV PUSH: CPT | Performed by: NURSE PRACTITIONER

## 2023-02-16 PROCEDURE — 3075F SYST BP GE 130 - 139MM HG: CPT | Performed by: NURSE PRACTITIONER

## 2023-02-16 PROCEDURE — G8417 CALC BMI ABV UP PARAM F/U: HCPCS | Performed by: NURSE PRACTITIONER

## 2023-02-16 PROCEDURE — G8427 DOCREV CUR MEDS BY ELIG CLIN: HCPCS | Performed by: NURSE PRACTITIONER

## 2023-02-16 PROCEDURE — G8400 PT W/DXA NO RESULTS DOC: HCPCS | Performed by: NURSE PRACTITIONER

## 2023-02-16 PROCEDURE — 3078F DIAST BP <80 MM HG: CPT | Performed by: NURSE PRACTITIONER

## 2023-02-16 PROCEDURE — 1123F ACP DISCUSS/DSCN MKR DOCD: CPT | Performed by: NURSE PRACTITIONER

## 2023-02-16 RX ORDER — FUROSEMIDE 10 MG/ML
60 INJECTION INTRAMUSCULAR; INTRAVENOUS ONCE
Status: COMPLETED | OUTPATIENT
Start: 2023-02-16 | End: 2023-02-16

## 2023-02-16 RX ORDER — TORSEMIDE 20 MG/1
40 TABLET ORAL DAILY
Qty: 60 TABLET | Refills: 3 | Status: SHIPPED | OUTPATIENT
Start: 2023-02-16

## 2023-02-16 RX ADMIN — FUROSEMIDE 60 MG: 10 INJECTION INTRAMUSCULAR; INTRAVENOUS at 11:42

## 2023-02-16 ASSESSMENT — ENCOUNTER SYMPTOMS
SHORTNESS OF BREATH: 1
COUGH: 0
ABDOMINAL DISTENTION: 1

## 2023-02-16 NOTE — PROGRESS NOTES
Venipuncture obtained from [facility]. Labs obtained. IVP Lasix given per MAR. Patient tolerated procedure without complications or complaints.

## 2023-02-16 NOTE — PROGRESS NOTES
Heart Failure Clinic       Visit Date: 2023  Cardiologist:  Dr. Henry Aponte  Primary Care Physician: Dr. Trudee Cabot, DO    Mattie Gomez is a 67 y.o. female who presents today for:  Chief Complaint   Patient presents with    Congestive Heart Failure       HPI:   Mattie Gomez is a 67 y.o. female who presents to the office for a follow up patient visit in the heart failure clinic. Accompanied by , Moses Gifford     TYPE HF: HFpEF (EF 60%, grd 1)  Device: No  HX: HTN, obesity, thoracic AA, OA-knee surgery, RANDY, Bipolar  PFT 2021 - restriction with air trapping and normal DLCO    Hospitalization r/t Heart Failure:  2019 = [facility], Edema, HTN. Lasix IV. BNP normal.  +CXR. Discharge weight - 222#  Cath 10/18/19 - Nonobstructive CAD, PWCP 30 - received Lasix 80 IV in lab, changed to Bumex TID  OV [facility] 1/15/20 - Saw Dr Becca Segovia. No changes. MEMs only if readmitted      OV 2022 - 248#. c/o some bloating today. Feels like its fluid. Takes a second Bumex     2/6 - called w/ bloating - Metolazone x 2 doses ordered. Helped little, short term. Today:  253# - (242-245# baseline)  More bloated and SOB. No ANTHONY. Some constipation issues. Walked from entrance. Sun, Mon, Tues took Bumex 2 mg/day - didn't do much. States Bumex not seem work as well. No change in diet, lifestyle.         Past Medical History:   Diagnosis Date    Acid reflux     Arthritis     Asthma     Bipolar 1 disorder (HCC)     CHF (congestive heart failure) ([facility] [facility]     CKD (chronic kidney disease), stage II 2019    History of blood transfusion     Hypertension     Lumbosacral radiculopathy 2021    Mood disorder ([facility] [address]     Pneumonia 2021    Sleep apnea     Thyroid disease      Past Surgical History:   Procedure Laterality Date    ANKLE SURGERY      left    CATARACT REMOVAL      Both eyes      SECTION      x 3    FOOT SURGERY      Left     HIP SURGERY      HYSTERECTOMY (CERVIX STATUS UNKNOWN)  1984    Total     SATHYA STEROTACTIC LOC BREAST BIOPSY RIGHT Right 2002    Benign    OVARY REMOVAL      several years after hysterectomy    TOTAL KNEE ARTHROPLASTY Left 10/14/14    TOTAL KNEE ARTHROPLASTY Right 05/24/2019    US BREAST BIOPSY W LOC DEVICE 1ST LESION LEFT Left 12/13/2017    Benign , fibroadenoma     Family History   Problem Relation Age of Onset    Diabetes Mother     High Blood Pressure Father     Cancer Father     Other Brother     Diabetes Brother     Cancer Sister     Breast Cancer Sister 43    Cancer Brother     Breast Cancer Maternal Cousin 61     Social History     Tobacco Use    Smoking status: Never    Smokeless tobacco: Never    Tobacco comments:     Never smoker   Substance Use Topics    Alcohol use: No     Alcohol/week: 0.0 standard drinks     Comment: rarely     Current Outpatient Medications   Medication Sig Dispense Refill    torsemide (DEMADEX) 20 MG tablet Take 2 tablets by mouth daily 60 tablet 3    hydrALAZINE (APRESOLINE) 50 MG tablet Take 1 tablet by mouth 3 times daily 270 tablet 3    atorvastatin (LIPITOR) 20 MG tablet Take 1 tablet by mouth daily 90 tablet 3    SYNTHROID 125 MCG tablet Take 1 tablet by mouth daily Take with water on an empty stomach- wait 30 minutes before eating or taking other meds.  30 tablet 11    omeprazole (PRILOSEC) 20 MG delayed release capsule Take 1 capsule by mouth every morning (before breakfast) 90 capsule 3    Polyethylene Glycol 3350 (MIRALAX PO) Take by mouth      carvedilol (COREG) 25 MG tablet Take 1 tablet by mouth 2 times daily 180 tablet 3    spironolactone (ALDACTONE) 25 MG tablet Take 1 tablet by mouth daily 90 tablet 3    rivastigmine (EXELON) 3 MG capsule Take 6 mg by mouth 2 times daily      cetirizine (ZYRTEC) 10 MG tablet Take 10 mg by mouth daily      vitamin E 200 units capsule Take 200 Units by mouth daily      ARIPiprazole (ABILIFY) 5 MG tablet Take 2 mg by mouth daily 30 tablet 3    OLANZapine (ZYPREXA) 15 MG tablet Take 15 mg by mouth daily      aspirin 81 MG EC tablet Take 81 mg by mouth daily      acetaminophen (TYLENOL) 325 MG tablet Take 650 mg by mouth in the morning and 650 mg at noon and 650 mg in the evening and 650 mg before bedtime. ferrous sulfate 325 (65 Fe) MG tablet Take 325 mg by mouth daily (with breakfast)      clonazePAM (KLONOPIN) 1 MG tablet Take 1 mg by mouth 3 times daily as needed. venlafaxine (EFFEXOR XR) 150 MG extended release capsule Take 75 mg by mouth daily      gabapentin (NEURONTIN) 300 MG capsule Take 300 mg by mouth in the morning and at bedtime. tiZANidine (ZANAFLEX) 2 MG tablet Take 2 mg by mouth 2 times daily      mirabegron (MYRBETRIQ) 50 MG TB24 Take 50 mg by mouth daily      lamoTRIgine (LAMICTAL) 100 MG tablet Take 100 mg by mouth daily Patient taking 1 tablet TID      Ascorbic Acid (VITAMIN C) 500 MG tablet Take 500 mg by mouth daily. Multiple Vitamin (MULTIVITAMIN PO) Take 1 tablet by mouth daily. metOLazone (ZAROXOLYN) 2.5 MG tablet Take 1 tablet by mouth daily for 2 days 2 tablet 0     Current Facility-Administered Medications   Medication Dose Route Frequency Provider Last Rate Last Admin    furosemide (LASIX) injection 60 mg  60 mg IntraVENous Once Yadira Taveras, APRN - CNP         Allergies   Allergen Reactions    Ace Inhibitors Anaphylaxis    Prednisone Other (See Comments)     Other reaction(s): Manic Behavior  **oral**      Codeine Hives and Nausea And Vomiting    Penicillins Hives    Lotrel [Amlodipine Besy-Benazepril Hcl] Swelling    Typhoid Vaccines     Adhesive Tape      Other reaction(s): Rash    Onion Nausea And Vomiting    Typhoid Vaccine, Live      Other reaction(s): unknown    Wound Dressing Adhesive Rash     tape       SUBJECTIVE:   Review of Systems   Constitutional:  Negative for activity change, appetite change and fatigue. Respiratory:  Positive for shortness of breath. Negative for cough.     Cardiovascular:  Negative for chest pain, palpitations and leg swelling. Gastrointestinal:  Positive for abdominal distention. Neurological:  Negative for weakness, light-headedness and headaches. Hematological:  Negative for adenopathy. Psychiatric/Behavioral:  Negative for sleep disturbance. OBJECTIVE:   Today's Vitals:  /78   Pulse 90   Ht 5' 4\" (1.626 m)   Wt 253 lb (114.8 kg)   SpO2 92%   BMI 43.43 kg/m²     Physical Exam  Vitals reviewed. Constitutional:       General: She is not in acute distress. Appearance: Normal appearance. She is well-developed. She is not diaphoretic. HENT:      Head: Normocephalic and atraumatic. Eyes:      Conjunctiva/sclera: Conjunctivae normal.   Neck:      Comments: No JVD  Cardiovascular:      Rate and Rhythm: Normal rate and regular rhythm. Heart sounds: Normal heart sounds. No murmur heard. Pulmonary:      Effort: Pulmonary effort is normal. No respiratory distress. Breath sounds: Normal breath sounds. No wheezing or rales. Abdominal:      General: Bowel sounds are normal. There is distension (soft). Palpations: Abdomen is soft. Tenderness: There is no abdominal tenderness. Musculoskeletal:         General: Normal range of motion. Cervical back: Normal range of motion and neck supple. Right lower leg: No edema. Left lower leg: No edema. Skin:     General: Skin is warm and dry. Capillary Refill: Capillary refill takes less than 2 seconds. Neurological:      Mental Status: She is alert and oriented to person, place, and time.       Coordination: Coordination normal.   Psychiatric:         Behavior: Behavior normal.     Wt Readings from Last 3 Encounters:   02/16/23 253 lb (114.8 kg)   01/09/23 255 lb 6.4 oz (115.8 kg)   11/09/22 249 lb (112.9 kg)     BP Readings from Last 3 Encounters:   02/16/23 136/78   01/09/23 136/82   11/09/22 (!) 145/79     Pulse Readings from Last 3 Encounters:   02/16/23 90   01/09/23 82   11/09/22 89     Body mass index is 43.43 kg/m². ECHO:    Conclusions      Summary   Technically difficult examination. Normal left ventricle size and systolic function. Ejection fraction was   estimated at 60-65%. There were no regional left ventricular wall motion   abnormalities and wall thickness was within normal limits. Doppler parameters were consistent with abnormal left ventricular   relaxation (grade 1 diastolic dysfunction). Mild tricuspid regurgitation. Dilation of ascending aorta, diameter 4.1 cm. Signature      ----------------------------------------------------------------   Electronically signed by Arturo Zarate MD (Interpreting   physician) on 11/10/2021 at 05:32 PM   ----------------------------------------------------------------           Summary   Normal left ventricle size and systolic function. Ejection fraction was   estimated at 55 to 60 %. There were no regional left ventricular wall   motion abnormalities and wall thickness was within normal limits. Doppler parameters were consistent with abnormal left ventricular   relaxation (grade 1 diastolic dysfunction). The left atrium is Moderately dilated. Aortic aneurysm noted in the ascending aorta . Aortic aneurysm measures 4.1 cm . Signature   ----------------------------------------------------------------   Electronically signed by Cesar Tovar MD (Interpreting   physician) on 09/13/2019 at 04:51 PM   ----------------------------------------------------------------        CATH (10/18)  FINDINGS:  HEMODYNAMICS:  1. Left ventricular end-diastolic pressure was found to be elevated at  30 mmHg. 2.  Cardiac output 8.9 or 8.91 liters per minute. 3.  Cardiac index 4.3 liters per minute per square meter. 4.  Right atrial pressure elevated at 25 mmHg. 5.  RV pressure was elevated at 59/14 mmHg. 6.  Pulmonary arterial pressure was elevated at 57/34 mmHg with a mean  pulmonary arterial pressure of 45 mmHg.   7.  Pulmonary capillary wedge pressure was 30 mmHg. CORONARY ANGIOGRAM:  1. RCA:  RCA has moderate tortuosity in the proximal segment. Proximal  RCA is patent without significant obstruction. Mid RCA is patent  without significant obstruction. Distal RCA is patent. RPDA has some  mild diffuse disease. RPL has distal mild diffuse disease. 2.  Left main:  Left main is patent without significant obstruction. Left main bifurcates into LCX and LAD. 3.  LCX:  Moderate tortuosity noted in the LCX. No significant  obstruction in the proximal segment. Distal LCX with mild diffuse  disease. OM1 is a larger tortuous branching vessel with mild diffuse  disease. 4. LAD:  Proximal LAD is patent without significant obstruction. Mid  LAD is moderately tortuous. No significant obstruction. Distal LAD has  moderate tortuosity with mild diffuse disease. 5.  D1:  Large tortuous vessel without significant obstruction. Dominance, right. MEDICATIONS:  See MAR. ACCESS:  1. Right brachial vein for right heart cath. 2.  Right radial artery for left heart cath. COMPLICATIONS:  None. CONCLUSION:  1. Nonobstructive coronary artery disease. 2.  Congestive heart failure with preserved ejection fraction. 3.  Volume overload. 4.  Elevated right and left filling pressures. 5.  Moderate pulmonary venous hypertension. RECOMMENDATIONS:  1. Aggressive risk factor modification for nonobstructive coronary  disease. 2.  Continue aspirin 81 mg.  3.  Lipitor 20 mg p.o. daily. 4.  Fluid restriction to less than 1.5 to 2 liters per day. 5.  Sodium restriction to less than 1.5 gm per day. 6.  Daily weight monitoring. 7.  The patient was given Lasix 80 mg IV x1 in the cath lab. 8.  Stop p.o. Lasix. Currently, the patient is on Lasix 80 mg p.o.  b.i.d.  9.  Start Bumex 1 mg p.o. t.i.d.  10.  Check BMP and magnesium level next week. 11.  Keep potassium above 4 and magnesium above 2. 12.   The patient needs better blood pressure control. Her hydralazine  was increased to 100 mg p.o. t.i.d.  13.  Continue to monitor home blood pressure readings. 14.  The patient has history of obstructive sleep apnea. Continue CPAP  during sleep. She had an appointment with her sleep medicine specialist  within one to two weeks. 15.  Lifestyle modifications including weight loss advisable. 16.  Refer to cardiac rehab once volume status is better. The above findings and plan of care were discussed in details with the  patient and her family members including  and daughter. Questions were answered. They are agreeable with above-mentioned plan.      Mary Driver MD     D: 10/18/2019 9:59:22          Results reviewed:  BNP:   Lab Results   Component Value Date    BNP 14 09/17/2020     CBC:   Lab Results   Component Value Date/Time    WBC 5.7 09/15/2021 11:28 AM    RBC 4.29 09/15/2021 11:28 AM    HGB 12.9 09/15/2021 11:28 AM    HCT 42.5 09/15/2021 11:28 AM     09/15/2021 11:28 AM     CMP:    Lab Results   Component Value Date/Time     12/02/2022 09:17 AM    K 4.6 12/02/2022 09:17 AM    K 4.7 04/04/2020 12:32 AM     12/02/2022 09:17 AM    CO2 24 12/02/2022 09:17 AM    BUN 32 12/02/2022 09:17 AM    CREATININE 1.3 12/02/2022 09:17 AM    AGRATIO 2.0 01/19/2018 08:01 AM    LABGLOM 44 12/02/2022 09:17 AM    GLUCOSE 115 12/02/2022 09:17 AM    GLUCOSE 126 12/17/2020 10:26 AM    CALCIUM 10.5 12/02/2022 09:17 AM     Hepatic Function Panel:    Lab Results   Component Value Date/Time    ALKPHOS 80 12/02/2022 09:17 AM    ALT 23 12/02/2022 09:17 AM    AST 21 12/02/2022 09:17 AM    PROT 6.8 12/02/2022 09:17 AM    BILITOT 0.2 12/02/2022 09:17 AM    BILIDIR <0.2 09/29/2019 10:30 PM    LABALBU 4.3 12/02/2022 09:17 AM     Magnesium:    Lab Results   Component Value Date/Time    MG 2.2 11/29/2021 09:42 AM     PT/INR:    Lab Results   Component Value Date/Time    INR 0.88 04/04/2020 12:45 AM     Lipids:    Lab Results   Component Value Date/Time TRIG 203 12/02/2022 09:17 AM    HDL 41 12/02/2022 09:17 AM    HDL 51 03/16/2012 12:00 AM    LDLCALC 82 12/02/2022 09:17 AM    LDLDIRECT 120 10/08/2019 07:29 AM       ASSESSMENT AND PLAN:      Diagnosis Orders   1. CHF (congestive heart failure), NYHA class III, acute on chronic, diastolic (HCC)  Brain Natriuretic Peptide    Basic Metabolic Panel    Basic Metabolic Panel      2. Essential hypertension        3. Bloating        4. SOB (shortness of breath)        5. Weight gain        6. Stage 3b chronic kidney disease (HCC)          Continue:  GDMT:              ACE/ARB/ARNI -  None               BB - Coreg 25 BID              Diuretic - Bumex 1-2mg/day - change to Torsemide 40/day              AA - Aldactone 25/day  Vasodilator - Hydralazine 50 TID    Grd 1 DD (60%), NYHA III acute on chronic   HTN - controlled  Obesity  RANDY - compliant  CKD 3 - follows w/ [facility]    Stable, mild Hypervolemic = wt gain up 8# from baseline, bloating, SOB  No improvement w/ Metolazone 10 days ago. Bumex not as effective (been on since 9/2019)  Change to Torsemide 40/day  Lasix 60 mg IV today. BMP, BNP today   Repeat BMP next week. Really encouraged strict diet/fluid   F/U w/ Cardiology  F/U in clinic in 1 year    Tolerating above noted HF meds, no ill side effects noted. Will continue to monitor kidney function and electrolytes. Will optimize as tolerated. Pt is compliant w/ medications. Total visit time of 40 minutes has been spent with patient on education of symptoms, management, medication, and plan of care; as well as review of chart: labs, ECHO, radiology reports, etc.   I personally spent more then 50% of the appt time face to face with the patient.   Daily weights  Fluid restriction of 2 Liters per day  Limit sodium in diet to around 9205-6658 mg/day  Monitor BP  Activity as tolerated     Patient was instructed to call the [facility] for any changes in symptoms as noted in AVS.      Return in about 3 months (around 5/16/2023). or sooner if needed     Patient given educational materials - see patient instructions. We discussed the importance of weighing oneself and recording daily. We also discussed the importance of a low sodium diet, higher sodium foods to avoid and better low sodium food options. Patient verbalizes understanding of plan of care using teach back method, and is agreeable to the treatment plan.        Electronically signed by DAVID Delgado CNP on 2/16/2023 at 11:36 AM

## 2023-02-16 NOTE — PATIENT INSTRUCTIONS
You may receive a survey regarding the care you received during your visit. Your input is valuable to us. We encourage you to complete and return your survey. We hope you will choose us in the future for your healthcare needs. Continue:  Continue current medications  Daily weights and record  Fluid restriction of 2 Liters per day  Limit sodium in diet to around 0241-5499 mg/day  Monitor BP  Activity as tolerated     Call the Heart Failure Clinic for any of the following symptoms: [phone]  Weight gain of 2-3 pounds in 1 day or 5 pounds in 1 week  Increased shortness of breath  Shortness of breath while laying down  Cough  Chest pain  Swelling in feet, ankles or legs  Tenderness or bloating in the abdomen  Fatigue   Decreased appetite or nausea   Confusion        Labs in 1 week. Call w/ update on Mon/Tues.

## 2023-02-17 NOTE — TELEPHONE ENCOUNTER
Weight today 248 lb, down 1.5 lb from home scale. Urinated a lot with the IV Lasix. Will f/u Monday.  Patient took first dose of Toresmide today

## 2023-02-21 ENCOUNTER — NURSE ONLY (OUTPATIENT)
Dept: LAB | Age: 73
End: 2023-02-21

## 2023-02-21 DIAGNOSIS — I50.33 CHF (CONGESTIVE HEART FAILURE), NYHA CLASS III, ACUTE ON CHRONIC, DIASTOLIC (HCC): ICD-10-CM

## 2023-02-21 LAB
ANION GAP SERPL CALC-SCNC: 14 MEQ/L (ref 8–16)
BUN SERPL-MCNC: 37 MG/DL (ref 7–22)
CALCIUM SERPL-MCNC: 10.1 MG/DL (ref 8.5–10.5)
CHLORIDE SERPL-SCNC: 101 MEQ/L (ref 98–111)
CO2 SERPL-SCNC: 28 MEQ/L (ref 23–33)
CREAT SERPL-MCNC: 1.5 MG/DL (ref 0.4–1.2)
GFR SERPL CREATININE-BSD FRML MDRD: 37 ML/MIN/1.73M2
GLUCOSE SERPL-MCNC: 127 MG/DL (ref 70–108)
POTASSIUM SERPL-SCNC: 5 MEQ/L (ref 3.5–5.2)
SODIUM SERPL-SCNC: 143 MEQ/L (ref 135–145)

## 2023-02-21 NOTE — TELEPHONE ENCOUNTER
Called and spoke to pt - states u/o good but still feels bloated and SOB. Wt relatively unchanged. Instructed get labs done today/tomorrow and will f/u from there.

## 2023-02-23 NOTE — TELEPHONE ENCOUNTER
249# - down 2# since changing to Torsemide and IV Lasix  Notes she took Miralax last night w/ decent BM today. Discussed w/ pt that I feel this is not all fluid as she did not respond to IV or diuretic change. She agrees will try getting bowels moving more and f/u next week.

## 2023-03-15 ENCOUNTER — HOSPITAL ENCOUNTER (OUTPATIENT)
Dept: GENERAL RADIOLOGY | Age: 73
Discharge: HOME | End: 2023-03-15
Payer: MEDICARE

## 2023-03-15 ENCOUNTER — TELEPHONE (OUTPATIENT)
Dept: CARDIOLOGY CLINIC | Age: 73
End: 2023-03-15

## 2023-03-15 ENCOUNTER — HOSPITAL ENCOUNTER (OUTPATIENT)
Age: 73
Discharge: HOME | End: 2023-03-15
Payer: MEDICARE

## 2023-03-15 DIAGNOSIS — R14.0 ABDOMINAL BLOATING: ICD-10-CM

## 2023-03-15 DIAGNOSIS — R06.02 SOB (SHORTNESS OF BREATH): ICD-10-CM

## 2023-03-15 DIAGNOSIS — K59.00 CONSTIPATION, UNSPECIFIED CONSTIPATION TYPE: ICD-10-CM

## 2023-03-15 PROCEDURE — 74018 RADEX ABDOMEN 1 VIEW: CPT

## 2023-03-15 PROCEDURE — 71046 X-RAY EXAM CHEST 2 VIEWS: CPT

## 2023-03-15 NOTE — TELEPHONE ENCOUNTER
KUB  Impression   Mild left mid abdominal ileus. Constipation. CXR   Impression   1. Borderline heart size. No effusion. 2. Mild atelectasis/pneumonia in the lingula. No fluid - no signs of CHF.     Needs to f/u w/ PCP - need to get bowels moving - start OTC Miralax daily and a [place] till seen  ER if symptoms worsen

## 2023-03-16 ENCOUNTER — TELEMEDICINE (OUTPATIENT)
Dept: FAMILY MEDICINE CLINIC | Age: 73
End: 2023-03-16
Payer: MEDICARE

## 2023-03-16 DIAGNOSIS — R14.0 ABDOMINAL BLOATING: Primary | ICD-10-CM

## 2023-03-16 DIAGNOSIS — K56.7 ILEUS (HCC): ICD-10-CM

## 2023-03-16 DIAGNOSIS — K59.00 CONSTIPATION, UNSPECIFIED CONSTIPATION TYPE: ICD-10-CM

## 2023-03-16 DIAGNOSIS — I50.32 CHRONIC DIASTOLIC HEART FAILURE (HCC): ICD-10-CM

## 2023-03-16 DIAGNOSIS — R06.02 SOB (SHORTNESS OF BREATH): ICD-10-CM

## 2023-03-16 PROCEDURE — 1123F ACP DISCUSS/DSCN MKR DOCD: CPT | Performed by: FAMILY MEDICINE

## 2023-03-16 PROCEDURE — G8428 CUR MEDS NOT DOCUMENT: HCPCS | Performed by: FAMILY MEDICINE

## 2023-03-16 PROCEDURE — 3017F COLORECTAL CA SCREEN DOC REV: CPT | Performed by: FAMILY MEDICINE

## 2023-03-16 PROCEDURE — 1090F PRES/ABSN URINE INCON ASSESS: CPT | Performed by: FAMILY MEDICINE

## 2023-03-16 PROCEDURE — 99214 OFFICE O/P EST MOD 30 MIN: CPT | Performed by: FAMILY MEDICINE

## 2023-03-16 PROCEDURE — G8400 PT W/DXA NO RESULTS DOC: HCPCS | Performed by: FAMILY MEDICINE

## 2023-03-16 SDOH — ECONOMIC STABILITY: TRANSPORTATION INSECURITY
IN THE PAST 12 MONTHS, HAS LACK OF TRANSPORTATION KEPT YOU FROM MEETINGS, WORK, OR FROM GETTING THINGS NEEDED FOR DAILY LIVING?: NO

## 2023-03-16 SDOH — ECONOMIC STABILITY: FOOD INSECURITY: WITHIN THE PAST 12 MONTHS, YOU WORRIED THAT YOUR FOOD WOULD RUN OUT BEFORE YOU GOT MONEY TO BUY MORE.: NEVER TRUE

## 2023-03-16 SDOH — ECONOMIC STABILITY: INCOME INSECURITY: HOW HARD IS IT FOR YOU TO PAY FOR THE VERY BASICS LIKE FOOD, HOUSING, MEDICAL CARE, AND HEATING?: NOT HARD AT ALL

## 2023-03-16 SDOH — ECONOMIC STABILITY: HOUSING INSECURITY
IN THE LAST 12 MONTHS, WAS THERE A TIME WHEN YOU DID NOT HAVE A STEADY PLACE TO SLEEP OR SLEPT IN A SHELTER (INCLUDING NOW)?: NO

## 2023-03-16 SDOH — ECONOMIC STABILITY: FOOD INSECURITY: WITHIN THE PAST 12 MONTHS, THE FOOD YOU BOUGHT JUST DIDN'T LAST AND YOU DIDN'T HAVE MONEY TO GET MORE.: NEVER TRUE

## 2023-03-16 NOTE — PROGRESS NOTES
pertinent history, physical exam, labs, studies, and medications. 1. Abdominal bloating  2. Ileus ([address]  3. Constipation, unspecified constipation type  4. SOB (shortness of breath)  5. Chronic diastolic heart failure (HCC)    -  Chronic medical problems stable  -  Continue current medications  -  Follow up with specialists as scheduled  -  Recommend bowel rest for #2  -  OK to take Miralax prn    Return if symptoms worsen or fail to improve. Call office with update tomorrow. Elizabeth Gill, was evaluated through a synchronous (real-time) audio-video encounter. The patient (or guardian if applicable) is aware that this is a billable service, which includes applicable co-pays. This Virtual Visit was conducted with patient's (and/or legal guardian's) consent. The visit was conducted pursuant to the emergency declaration under the [address] [place] [address], [address] [place] [address] wa[facility] Healthcare authority and the [employer] General Act. Patient identification was verified, and a caregiver was present when appropriate.    The patient was located at Home: [address]  Provider was located at [place] [address] ([place] [address]): [address] [facility],  [address] [facility],

## 2023-03-21 ASSESSMENT — ENCOUNTER SYMPTOMS
VOMITING: 0
ABDOMINAL PAIN: 0
NAUSEA: 0
BLOOD IN STOOL: 0
RESPIRATORY NEGATIVE: 1
CONSTIPATION: 1
ABDOMINAL DISTENTION: 1

## 2023-05-08 ENCOUNTER — TELEPHONE (OUTPATIENT)
Dept: CARDIOLOGY CLINIC | Age: 73
End: 2023-05-08

## 2023-05-08 NOTE — TELEPHONE ENCOUNTER
Patient called in with \"BP doing weird things\"  Started to stand up and felt dizzy Saturday PM, /95  10 mins later 180/110  15 mins later 145/85  1 hr later 145/55    Yesterday 150/60  140/80    160/95 after medicines today    Usual 140/70-80s

## 2023-05-08 NOTE — TELEPHONE ENCOUNTER
Take an extra HALF tab Hydralazine (25mg) for BP > 150s. Try for few days if helps will increase maintenance dose. Seeing Leana next week - make sure take BP record to appt.

## 2023-05-11 ENCOUNTER — NURSE ONLY (OUTPATIENT)
Dept: LAB | Age: 73
End: 2023-05-11

## 2023-05-11 DIAGNOSIS — N18.30 STAGE 3 CHRONIC KIDNEY DISEASE, UNSPECIFIED WHETHER STAGE 3A OR 3B CKD (HCC): ICD-10-CM

## 2023-05-11 LAB
25(OH)D3 SERPL-MCNC: 34 NG/ML (ref 30–100)
ANION GAP SERPL CALC-SCNC: 13 MEQ/L (ref 8–16)
BUN SERPL-MCNC: 29 MG/DL (ref 7–22)
CALCIUM SERPL-MCNC: 10 MG/DL (ref 8.5–10.5)
CHLORIDE SERPL-SCNC: 100 MEQ/L (ref 98–111)
CO2 SERPL-SCNC: 26 MEQ/L (ref 23–33)
CREAT SERPL-MCNC: 1.1 MG/DL (ref 0.4–1.2)
CREAT UR-MCNC: 55.1 MG/DL
DEPRECATED RDW RBC AUTO: 49.6 FL (ref 35–45)
ERYTHROCYTE [DISTWIDTH] IN BLOOD BY AUTOMATED COUNT: 13.3 % (ref 11.5–14.5)
GFR SERPL CREATININE-BSD FRML MDRD: 53 ML/MIN/1.73M2
GLUCOSE SERPL-MCNC: 93 MG/DL (ref 70–108)
HCT VFR BLD AUTO: 40.3 % (ref 37–47)
HGB BLD-MCNC: 12.3 GM/DL (ref 12–16)
MCH RBC QN AUTO: 30.8 PG (ref 26–33)
MCHC RBC AUTO-ENTMCNC: 30.5 GM/DL (ref 32.2–35.5)
MCV RBC AUTO: 101 FL (ref 81–99)
MICROALBUMIN UR-MCNC: < 1.2 MG/DL
MICROALBUMIN/CREAT RATIO PNL UR: 22 MG/G (ref 0–30)
PHOSPHATE SERPL-MCNC: 3.3 MG/DL (ref 2.4–4.7)
PLATELET # BLD AUTO: 317 THOU/MM3 (ref 130–400)
PMV BLD AUTO: 9.3 FL (ref 9.4–12.4)
POTASSIUM SERPL-SCNC: 4.9 MEQ/L (ref 3.5–5.2)
RBC # BLD AUTO: 3.99 MILL/MM3 (ref 4.2–5.4)
SODIUM SERPL-SCNC: 139 MEQ/L (ref 135–145)
WBC # BLD AUTO: 6.3 THOU/MM3 (ref 4.8–10.8)

## 2023-05-12 LAB — PTH-INTACT SERPL-MCNC: 45.6 PG/ML (ref 15–65)

## 2023-05-19 ENCOUNTER — OFFICE VISIT (OUTPATIENT)
Dept: NEPHROLOGY | Age: 73
End: 2023-05-19
Payer: MEDICARE

## 2023-05-19 VITALS
OXYGEN SATURATION: 95 % | WEIGHT: 253 LBS | DIASTOLIC BLOOD PRESSURE: 78 MMHG | BODY MASS INDEX: 43.43 KG/M2 | SYSTOLIC BLOOD PRESSURE: 130 MMHG | HEART RATE: 84 BPM

## 2023-05-19 DIAGNOSIS — N18.31 STAGE 3A CHRONIC KIDNEY DISEASE (HCC): Primary | ICD-10-CM

## 2023-05-19 DIAGNOSIS — N18.2 CKD (CHRONIC KIDNEY DISEASE), STAGE II: ICD-10-CM

## 2023-05-19 PROCEDURE — G8427 DOCREV CUR MEDS BY ELIG CLIN: HCPCS | Performed by: INTERNAL MEDICINE

## 2023-05-19 PROCEDURE — 3078F DIAST BP <80 MM HG: CPT | Performed by: INTERNAL MEDICINE

## 2023-05-19 PROCEDURE — 1090F PRES/ABSN URINE INCON ASSESS: CPT | Performed by: INTERNAL MEDICINE

## 2023-05-19 PROCEDURE — G8417 CALC BMI ABV UP PARAM F/U: HCPCS | Performed by: INTERNAL MEDICINE

## 2023-05-19 PROCEDURE — G8400 PT W/DXA NO RESULTS DOC: HCPCS | Performed by: INTERNAL MEDICINE

## 2023-05-19 PROCEDURE — 1036F TOBACCO NON-USER: CPT | Performed by: INTERNAL MEDICINE

## 2023-05-19 PROCEDURE — 3075F SYST BP GE 130 - 139MM HG: CPT | Performed by: INTERNAL MEDICINE

## 2023-05-19 PROCEDURE — 99214 OFFICE O/P EST MOD 30 MIN: CPT | Performed by: INTERNAL MEDICINE

## 2023-05-19 PROCEDURE — 1123F ACP DISCUSS/DSCN MKR DOCD: CPT | Performed by: INTERNAL MEDICINE

## 2023-05-19 PROCEDURE — 3017F COLORECTAL CA SCREEN DOC REV: CPT | Performed by: INTERNAL MEDICINE

## 2023-05-19 RX ORDER — SPIRONOLACTONE 50 MG/1
50 TABLET, FILM COATED ORAL DAILY
Qty: 30 TABLET | Refills: 3 | Status: SHIPPED | OUTPATIENT
Start: 2023-05-19 | End: 2023-06-18

## 2023-05-19 RX ORDER — METOLAZONE 5 MG/1
5 TABLET ORAL DAILY
Qty: 2 TABLET | Refills: 0 | Status: SHIPPED | OUTPATIENT
Start: 2023-05-19 | End: 2023-05-21

## 2023-05-19 NOTE — PROGRESS NOTES
[facility]    [facility], [address] [facility], [facility]  [phone]  Progress Note  2023 10:20 AM      Pt Name:    Crispin Noble  YOB: 1950  Primary Care Physician:  Nidhi Rowell DO     Chief Complaint:   Chief Complaint   Patient presents with    Follow-up     CKD III        History of Present Illness: This is a follow-up visit for CKD III and HTN. She has hx of HTN, diastolic CHF, pulm HTN, obesity, thoracic AA, OA, RANDY, bipolar disorder, nephrolithiasis. In fe patient was having some  SOB/bloating issues. KUB showed mild ileus. She took miralax bid for awhile and this improved. Bumex was changed to torsemide 40 mg daily. She feels like she urinates more with this. Was doing well for awhile but past few weeks has been having some increased SOB/bloating, high BP. Hydralazine was increased to 75 mg tid, slight improvement in bps noted but still running higher 735L-170P systolic. She feels SOB and bloated again. No LE edema. Bowels not working as well so she started taking the miralax bid again 2 days ago. Pertinent items are noted in HPI.          Past History:  Past Medical History:   Diagnosis Date    Acid reflux     Arthritis     Asthma     Bipolar 1 disorder ([address]     CHF (congestive heart failure) ([address]     CKD (chronic kidney disease), stage II 2019    History of blood transfusion     Hypertension     Lumbosacral radiculopathy 2021    Mood disorder ([address]     Pneumonia 2021    Sleep apnea     Thyroid disease      Past Surgical History:   Procedure Laterality Date    ANKLE SURGERY      left    CATARACT REMOVAL      Both eyes      SECTION      x 3    FOOT SURGERY      Left     HIP SURGERY      HYSTERECTOMY (CERVIX STATUS UNKNOWN)  1984    Total     SATHYA STEROTACTIC LOC BREAST BIOPSY RIGHT Right     Benign    OVARY REMOVAL      several years after hysterectomy    TOTAL KNEE ARTHROPLASTY Left

## 2023-05-19 NOTE — PATIENT INSTRUCTIONS
Continue miralax twice daily  Increase spironolactone to 50 mg daily  Take metolazone x 2 days  Labs before you see Donnell Barcenas

## 2023-05-22 NOTE — TELEPHONE ENCOUNTER
Gabapentin by itself is OK, but with Diclofenac it would worsen her HTN.  thanks Patient presents today for a post op exam: Post I&D of left scapula abscess    DOS 05/08/2023    Denies any known latex allergies or symptoms of latex sensitivity      Allergies reviewed and updated    Medications reviewed and updated    Smoking history reviewed    Patient, provider and nursing staff wearing masks during office visit today.

## 2023-05-26 ENCOUNTER — NURSE ONLY (OUTPATIENT)
Dept: LAB | Age: 73
End: 2023-05-26

## 2023-05-26 DIAGNOSIS — N18.31 STAGE 3A CHRONIC KIDNEY DISEASE (HCC): ICD-10-CM

## 2023-05-26 LAB
ANION GAP SERPL CALC-SCNC: 20 MEQ/L (ref 8–16)
BUN SERPL-MCNC: 32 MG/DL (ref 7–22)
CALCIUM SERPL-MCNC: 10 MG/DL (ref 8.5–10.5)
CHLORIDE SERPL-SCNC: 91 MEQ/L (ref 98–111)
CO2 SERPL-SCNC: 23 MEQ/L (ref 23–33)
CREAT SERPL-MCNC: 1.2 MG/DL (ref 0.4–1.2)
GFR SERPL CREATININE-BSD FRML MDRD: 48 ML/MIN/1.73M2
GLUCOSE SERPL-MCNC: 153 MG/DL (ref 70–108)
POTASSIUM SERPL-SCNC: 4.4 MEQ/L (ref 3.5–5.2)
SODIUM SERPL-SCNC: 134 MEQ/L (ref 135–145)

## 2023-05-30 ENCOUNTER — TELEPHONE (OUTPATIENT)
Dept: NEPHROLOGY | Age: 73
End: 2023-05-30

## 2023-05-30 ENCOUNTER — OFFICE VISIT (OUTPATIENT)
Dept: CARDIOLOGY CLINIC | Age: 73
End: 2023-05-30
Payer: MEDICARE

## 2023-05-30 VITALS
DIASTOLIC BLOOD PRESSURE: 64 MMHG | OXYGEN SATURATION: 91 % | HEART RATE: 81 BPM | BODY MASS INDEX: 43.98 KG/M2 | WEIGHT: 256.2 LBS | SYSTOLIC BLOOD PRESSURE: 128 MMHG

## 2023-05-30 DIAGNOSIS — I10 ESSENTIAL HYPERTENSION: ICD-10-CM

## 2023-05-30 DIAGNOSIS — I50.32 CHF (CONGESTIVE HEART FAILURE), NYHA CLASS II, CHRONIC, DIASTOLIC (HCC): Primary | ICD-10-CM

## 2023-05-30 DIAGNOSIS — N18.2 CKD (CHRONIC KIDNEY DISEASE), STAGE II: ICD-10-CM

## 2023-05-30 PROCEDURE — G8427 DOCREV CUR MEDS BY ELIG CLIN: HCPCS | Performed by: NURSE PRACTITIONER

## 2023-05-30 PROCEDURE — 3017F COLORECTAL CA SCREEN DOC REV: CPT | Performed by: NURSE PRACTITIONER

## 2023-05-30 PROCEDURE — 3074F SYST BP LT 130 MM HG: CPT | Performed by: NURSE PRACTITIONER

## 2023-05-30 PROCEDURE — 3078F DIAST BP <80 MM HG: CPT | Performed by: NURSE PRACTITIONER

## 2023-05-30 PROCEDURE — G8400 PT W/DXA NO RESULTS DOC: HCPCS | Performed by: NURSE PRACTITIONER

## 2023-05-30 PROCEDURE — G8417 CALC BMI ABV UP PARAM F/U: HCPCS | Performed by: NURSE PRACTITIONER

## 2023-05-30 PROCEDURE — 1036F TOBACCO NON-USER: CPT | Performed by: NURSE PRACTITIONER

## 2023-05-30 PROCEDURE — 1123F ACP DISCUSS/DSCN MKR DOCD: CPT | Performed by: NURSE PRACTITIONER

## 2023-05-30 PROCEDURE — 99214 OFFICE O/P EST MOD 30 MIN: CPT | Performed by: NURSE PRACTITIONER

## 2023-05-30 PROCEDURE — 1090F PRES/ABSN URINE INCON ASSESS: CPT | Performed by: NURSE PRACTITIONER

## 2023-05-30 RX ORDER — TORSEMIDE 20 MG/1
40 TABLET ORAL DAILY
Qty: 60 TABLET | Refills: 5 | Status: SHIPPED | OUTPATIENT
Start: 2023-05-30

## 2023-05-30 RX ORDER — HYDRALAZINE HYDROCHLORIDE 50 MG/1
75 TABLET, FILM COATED ORAL 3 TIMES DAILY
Qty: 135 TABLET | Refills: 5 | Status: SHIPPED | OUTPATIENT
Start: 2023-05-30

## 2023-05-30 ASSESSMENT — ENCOUNTER SYMPTOMS
COUGH: 0
ABDOMINAL DISTENTION: 0
SHORTNESS OF BREATH: 0

## 2023-05-30 NOTE — PROGRESS NOTES
light-headedness and headaches. Hematological:  Negative for adenopathy. Psychiatric/Behavioral:  Negative for sleep disturbance. OBJECTIVE:   Today's Vitals:  /64   Pulse 81   Wt 256 lb 3.2 oz (116.2 kg)   SpO2 91%   BMI 43.98 kg/m²     Physical Exam  Vitals reviewed. Constitutional:       General: She is not in acute distress. Appearance: Normal appearance. She is well-developed. She is not diaphoretic. HENT:      Head: Normocephalic and atraumatic. Eyes:      Conjunctiva/sclera: Conjunctivae normal.   Neck:      Comments: No JVD  Cardiovascular:      Rate and Rhythm: Normal rate and regular rhythm. Heart sounds: Normal heart sounds. No murmur heard. Pulmonary:      Effort: Pulmonary effort is normal. No respiratory distress. Breath sounds: Normal breath sounds. No wheezing or rales. Abdominal:      General: Bowel sounds are normal. There is no distension. Palpations: Abdomen is soft. Tenderness: There is no abdominal tenderness. Musculoskeletal:         General: Normal range of motion. Cervical back: Normal range of motion and neck supple. Right lower leg: No edema. Left lower leg: No edema. Skin:     General: Skin is warm and dry. Capillary Refill: Capillary refill takes less than 2 seconds. Neurological:      Mental Status: She is alert and oriented to person, place, and time. Coordination: Coordination normal.   Psychiatric:         Behavior: Behavior normal.     Wt Readings from Last 3 Encounters:   05/30/23 256 lb 3.2 oz (116.2 kg)   05/19/23 253 lb (114.8 kg)   02/16/23 253 lb (114.8 kg)     BP Readings from Last 3 Encounters:   05/30/23 128/64   05/19/23 130/78   02/16/23 136/78     Pulse Readings from Last 3 Encounters:   05/30/23 81   05/19/23 84   02/16/23 90     Body mass index is 43.98 kg/m². ECHO:    Conclusions      Summary   Technically difficult examination. Normal left ventricle size and systolic function.

## 2023-05-30 NOTE — PATIENT INSTRUCTIONS
You may receive a survey regarding the care you received during your visit. Your input is valuable to us. We encourage you to complete and return your survey. We hope you will choose us in the future for your healthcare needs.     Continue:  Continue current medications  Daily weights and record  Fluid restriction of 2 Liters per day  Limit sodium in diet to around 5203-7022 mg/day  Monitor BP  Activity as tolerated     Call the [facility] for any of the following symptoms: [phone]  Weight gain of 2-3 pounds in 1 day or 5 pounds in 1 week  Increased shortness of breath  Shortness of breath while laying down  Cough  Chest pain  Swelling in feet, ankles or legs  Tenderness or bloating in the abdomen  Fatigue   Decreased appetite or nausea   Confusion

## 2023-05-31 NOTE — TELEPHONE ENCOUNTER
On 4/28/20, creatinine was 1.15 with GFR of 47. Is contrast okay? Patient agreeable to OT evaluation

## 2023-06-07 ENCOUNTER — HOSPITAL ENCOUNTER (OUTPATIENT)
Dept: PHYSICAL THERAPY | Age: 73
Setting detail: THERAPIES SERIES
Discharge: HOME | End: 2023-06-07
Payer: MEDICARE

## 2023-06-07 PROCEDURE — 97162 PT EVAL MOD COMPLEX 30 MIN: CPT

## 2023-06-07 NOTE — PROGRESS NOTES
Patient is seated in a hard, armless chair   1 SITTING BALANCE 1 - Steady, safe   2. RISES FROM CHAIR 2 - Able without use of arms   3. ATTEMPTS TO RISE 2 - Able to rise, 1 attempt   4. IMMEDIATE STANDING BALANCE (FIRST 5 SECONDS) 2 - Steady without walker or other support   5. STANDING BALANCE 2 - Narrow stance without support   6. NUDGED 1 - Staggers, grabs, catches   7. EYES CLOSED 0 - Unsteady      8. TURNING 360 DEGREES 0 - Discontinuous steps and 0 - Unsteady (grabs,staggers)   9. SITTING DOWN 1 - Uses arms or not a smooth motion   BALANCE SCORE 12/16       GAIT SECTION Patient stands with therapist, walks across room (+/- aids), fist at usual pace, then at rapid pace. 1.  INDICATION OF GAIT (Immediately after told to \"go\" 1 - No hesitancy   2. STEP LENGTH AND HEIGHT 1 - Step through Right and 1 - Step through Left   3. FOOT CLEARANCE 1 - Right foot clears floor   4. STEP SYMMETRY 0 - Right and left step length not equal   5. STEP CONTINUITY 1 - Steps appear continuous   6. PATH 1 - Mild/moderate deviation or uses walking aid   7. TRUNK 1 - No sway but flexes knees or back or uses arms for stability   8.   WALKING TIME 1 - Heels almost touching while walking   GAIT SCORE 8/12   TOTAL SCORE 20/28   Risk Indicators:  Less than/equal to 18 = high risk  19-23 Moderate risk  Greater than/equal to 24 = low risk         Gait -- fatigue quickly, unable to take 4 backward steps without use of arms or LOB ,   Single leg stance:  unable B   Layo stance unable   TREATMENT   Precautions:    Pain:     \"X in shaded column indicates activity completed today    *\" next to exercise/intervention indicates progression   Modalities Parameters/  Location  Notes                     Manual Therapy Time/Technique  Notes                     Exercise/  Intervention   Notes                                                                                  Specific Interventions Next Treatment: there ext , balnace

## 2023-06-19 ENCOUNTER — HOSPITAL ENCOUNTER (OUTPATIENT)
Dept: PHYSICAL THERAPY | Age: 73
Setting detail: THERAPIES SERIES
Discharge: HOME | End: 2023-06-19
Payer: MEDICARE

## 2023-06-19 PROCEDURE — 97110 THERAPEUTIC EXERCISES: CPT

## 2023-06-19 PROCEDURE — 97112 NEUROMUSCULAR REEDUCATION: CPT

## 2023-06-19 NOTE — PROGRESS NOTES
[address] [facility]  PHYSICAL THERAPY  [] EVALUATION  [x] DAILY NOTE (LAND) [] DAILY NOTE (AQUATIC ) [] PROGRESS NOTE [] DISCHARGE NOTE    [] [facility]   [] [address]    [] [address] [employer]   [x] Teri Pontiff    Date: 2023  Patient Name:  Rosibel Wright  : 1950  MRN: [id number]  CSN: [id number]    Referring Practitioner Concepcion Vines DO   Diagnosis Bilateral leg weakness [R29.898]  Unstable gait [R26.81]    Treatment Diagnosis Weakness, gait dysfunction    Date of Evaluation 23    Additional Pertinent History HBP, Congestive heart failure, SOB. left foot plates-- decreased feeling, B knee replacement ,        Functional Outcome Measure Used 30 sec sit to stand: tinetti    Functional Outcome Score 7.5 reps :  (23)       Insurance: Primary: Payor: Gely Ferro /  /  / ,   Secondary:    Authorization Information: [facility] PT FROM 23-23 FOR CPT CODES 72329, 50951, [address], [address] [place]   Visit # 2, 2/10 for progress note   Visits Allowed: RECEIVED AUTH FOR 10 VISITS OF PT FROM 23-23 FOR  [facility], [address] [facility], [id number], [address] [facility]     AUTH# [id number]   Recertification Date: 8 weeks,  23    Physician Follow-Up: As needed,    Physician Orders: Eval and treat   History of Present Illness:  Back pain--getting injections every 3 months. Which help control it       SUBJECTIVE: Dominick Yanez ambulated into clinic with rollator. Pt stated she has only been up for about 30 min so pain levels are low. Pt stated she has noticed she is SOB today. TREATMENT   Precautions:    Pain: 2/10 Lateral R knee    \"X in shaded column indicates activity completed today    *\" next to exercise/intervention indicates progression   Modalities Parameters/  Location  Notes                     Manual Therapy Time/Technique  Notes                     Exercise/  Intervention   Notes   Nustep Level 5 5min.  x           Dynamic gait:

## 2023-06-30 ENCOUNTER — HOSPITAL ENCOUNTER (OUTPATIENT)
Dept: PHYSICAL THERAPY | Age: 73
Setting detail: THERAPIES SERIES
Discharge: HOME | End: 2023-06-30
Payer: MEDICARE

## 2023-06-30 PROCEDURE — 97110 THERAPEUTIC EXERCISES: CPT

## 2023-07-05 ENCOUNTER — HOSPITAL ENCOUNTER (OUTPATIENT)
Dept: PHYSICAL THERAPY | Age: 73
Setting detail: THERAPIES SERIES
Discharge: HOME | End: 2023-07-05
Payer: MEDICARE

## 2023-07-05 DIAGNOSIS — N18.2 CKD (CHRONIC KIDNEY DISEASE), STAGE II: ICD-10-CM

## 2023-07-05 DIAGNOSIS — I10 ESSENTIAL HYPERTENSION: ICD-10-CM

## 2023-07-05 PROCEDURE — 97110 THERAPEUTIC EXERCISES: CPT

## 2023-07-05 RX ORDER — HYDRALAZINE HYDROCHLORIDE 50 MG/1
50 TABLET, FILM COATED ORAL 3 TIMES DAILY
Qty: 270 TABLET | Refills: 1 | Status: SHIPPED | OUTPATIENT
Start: 2023-07-05

## 2023-07-05 RX ORDER — HYDRALAZINE HYDROCHLORIDE 25 MG/1
25 TABLET, FILM COATED ORAL 3 TIMES DAILY
Qty: 270 TABLET | Refills: 1 | Status: SHIPPED | OUTPATIENT
Start: 2023-07-05

## 2023-07-05 RX ORDER — HYDRALAZINE HYDROCHLORIDE 25 MG/1
25 TABLET, FILM COATED ORAL 3 TIMES DAILY
COMMUNITY
End: 2023-07-05 | Stop reason: SDUPTHER

## 2023-07-07 ENCOUNTER — HOSPITAL ENCOUNTER (OUTPATIENT)
Dept: PHYSICAL THERAPY | Age: 73
Setting detail: THERAPIES SERIES
Discharge: HOME | End: 2023-07-07
Payer: MEDICARE

## 2023-07-07 PROCEDURE — 97110 THERAPEUTIC EXERCISES: CPT

## 2023-07-07 NOTE — PROGRESS NOTES
marching, mini squats, HS curls. 10x  x    3way hip 10x  x    Airex: ft together EO/EC 20 sec 2x  Min A with EC only performed 1x   Tandem standing 20 sec 2x x 1 UEsupport   Stepping on/off airex 10x   1 UE support   Step ups 4 in. step 10x   B UE support   Stair stretches: HS, calf, quad 3x 15sec  x    SKTC 15 sec 3x x    Piriformis Stretch   x    Sit to stand from mat table 10x  X      Specific Interventions Next Treatment: there ext , balnace training, modalities if pain increases. Activity/Treatment Tolerance:  [x]  Patient tolerated treatment well  []  Patient limited by fatigue  []  Patient limited by pain   []  Patient limited by medical complications  []  Other:     Assessment: Treatment interventions completed as recorded above. Patient tolerated treatment well. GOALS:  Patient Goal: would like to walk further prior to back pain. Less pain in her back      Short Term Goals:  Time Frame: 4 weeks  Increase B LE strength to 4- to 4/5 to improve stability for standing for ADLs  Increase standing to 20 min without increased low back pain or shakiness in order to prepare meals. Increase tinetti to 22 to lessen risk of falls  Yulissa Lebron will demonstrate a good ankle strategy to maintain standing balance with perturbations to her trunk. Long Term Goals:  Time Frame: 8 weeks   Increased B LE strength to 4+ to 5/5  to allow for walking into grocery store. Increase tinetti t 24 to decrease likelihood of falls and less dependence on the walker  Patient to walk without AD with good ankle control for 100 feet in order to carry light gorceries into her home. I HEP   30 sec sit to stand 10 reps which is mor e appropriate for her age.      Patient Education:   [x]  HEP/Education Completed: Plan of Care, Goals, increase walking distance in the home , heel toe raises, marching, HS curls, single knee to chest  [facility] [email or web address]  []  No new Education completed  [x]  Reviewed Prior HEP      [x]

## 2023-07-12 ENCOUNTER — HOSPITAL ENCOUNTER (OUTPATIENT)
Dept: PHYSICAL THERAPY | Age: 73
Setting detail: THERAPIES SERIES
Discharge: HOME | End: 2023-07-12
Payer: MEDICARE

## 2023-07-12 PROCEDURE — 97110 THERAPEUTIC EXERCISES: CPT

## 2023-07-14 ENCOUNTER — HOSPITAL ENCOUNTER (OUTPATIENT)
Dept: PHYSICAL THERAPY | Age: 73
Setting detail: THERAPIES SERIES
Discharge: HOME | End: 2023-07-14
Payer: MEDICARE

## 2023-07-14 PROCEDURE — 97112 NEUROMUSCULAR REEDUCATION: CPT

## 2023-07-14 PROCEDURE — 97110 THERAPEUTIC EXERCISES: CPT

## 2023-07-19 ENCOUNTER — APPOINTMENT (OUTPATIENT)
Dept: PHYSICAL THERAPY | Age: 73
End: 2023-07-19
Payer: MEDICARE

## 2023-07-20 ENCOUNTER — HOSPITAL ENCOUNTER (OUTPATIENT)
Dept: PHYSICAL THERAPY | Age: 73
Setting detail: THERAPIES SERIES
Discharge: HOME | End: 2023-07-20
Payer: MEDICARE

## 2023-07-20 PROCEDURE — 97110 THERAPEUTIC EXERCISES: CPT

## 2023-07-21 ENCOUNTER — HOSPITAL ENCOUNTER (OUTPATIENT)
Dept: PHYSICAL THERAPY | Age: 73
Setting detail: THERAPIES SERIES
Discharge: HOME | End: 2023-07-21
Payer: MEDICARE

## 2023-07-21 PROCEDURE — 97110 THERAPEUTIC EXERCISES: CPT

## 2023-07-21 PROCEDURE — 97112 NEUROMUSCULAR REEDUCATION: CPT

## 2023-07-21 NOTE — PROGRESS NOTES
stand 10 reps which is mor e appropriate for her age. Patient Education:   []  HEP/Education Completed: Plan of Care, Goals, increase walking distance in the home , heel toe raises, marching, HS curls, single knee to chest  [employer] Access Code: [email or web address]  []  No new Education completed  [x]  Reviewed Prior HEP      [x]  Patient verbalized and/or demonstrated understanding of education provided. []  Patient unable to verbalize and/or demonstrate understanding of education provided. Will continue education. [x]  Barriers to learning: none     PLAN:  Treatment Recommendations: Strengthening, Range of Motion, Balance Training, Functional Mobility Training, Transfer Training, Endurance Training, Gait Training, Stair Training, Manual Therapy - Soft Tissue Mobilization, Manual Therapy - Joint Manipulation, Pain Management, Home Exercise Program, Patient Education, and Positioning modalities as needed     []  Plan of care initiated. Plan to see patient 2 times per week for 8 weeks to address the treatment planned outlined above.   [x]  Continue with current plan of care  []  Modify plan of care as follows:    []  Hold pending physician visit  []  Discharge    Time In 1300   Time Out 1345   Timed Code Minutes: 45 min   Total Treatment Time: 45 min       Electronically Signed by: Kenny Magana PTA

## 2023-08-01 ENCOUNTER — TELEPHONE (OUTPATIENT)
Dept: PULMONOLOGY | Age: 73
End: 2023-08-01

## 2023-08-01 ENCOUNTER — APPOINTMENT (OUTPATIENT)
Dept: PHYSICAL THERAPY | Age: 73
End: 2023-08-01
Payer: MEDICARE

## 2023-08-01 NOTE — TELEPHONE ENCOUNTER
Mihaela Bojorquez is calling to get RX for sleep supplies sent to [facility], switching from [place], where previous Rx was sent per patient. [place] [facility] [place] [phone].

## 2023-08-03 ENCOUNTER — HOSPITAL ENCOUNTER (OUTPATIENT)
Dept: PHYSICAL THERAPY | Age: 73
Setting detail: THERAPIES SERIES
Discharge: HOME | End: 2023-08-03
Payer: MEDICARE

## 2023-08-03 PROCEDURE — 97110 THERAPEUTIC EXERCISES: CPT

## 2023-08-03 PROCEDURE — 97112 NEUROMUSCULAR REEDUCATION: CPT

## 2023-08-03 PROCEDURE — 97530 THERAPEUTIC ACTIVITIES: CPT

## 2023-08-03 NOTE — PROGRESS NOTES
** PLEASE SIGN, DATE AND TIME CERTIFICATION BELOW AND RETURN TO [facility] OUTPATIENT REHABILITATION (FAX #: [phone]). ATTEST/CO-SIGN IF ACCESSING VIA [employer]. THANK YOU.**    I certify that I have examined the patient below and determined that Physical Medicine and Rehabilitation service is necessary and that I approve the established plan of care for up to 90 days or as specifically noted. Attestation, signature or co-signature of physician indicates approval of certification requirements.    ________________________ ____________ __________  Physician Signature   Date   Time    [facility]  PHYSICAL THERAPY  [] EVALUATION  [] DAILY NOTE (LAND) [] DAILY NOTE (AQUATIC ) [x] PROGRESS NOTE [] DISCHARGE NOTE    [] [address] [facility] [address] [facility] [place]   [] [facility]    [] [address] [facility]   [x] Estrada Quails    Date: 8/3/2023  Patient Name:  Chris Sanders  : 1950  MRN: [id number]  CSN: [id number]    Referring Practitioner Melina Villalobos DO   Diagnosis Bilateral leg weakness [R29.898]  Unstable gait [R26.81]    Treatment Diagnosis Weakness, gait dysfunction    Date of Evaluation 23    Additional Pertinent History HBP, Congestive heart failure, SOB. left foot plates-- decreased feeling, B knee replacement ,        Functional Outcome Measure Used 30 sec sit to stand: tinetti    Functional Outcome Score 7.5 reps :  20/28(23)   5x sit to stand: 16 sec without UE support; 10 reps in 30 sec (8/3/23)      Insurance: Primary: Payor: Hiro Melissa /  /  / ,   Secondary:    Authorization Information: [address] [facility] PT FROM 23-23 FOR CPT CODES [address], [address] [facility], [address], .60   Visit # 10, 0/10 for progress note   Visits Allowed: RECEIVED AUTH FOR 10 VISITS OF PT FROM 23-23 FOR  [address], [address] [facility], [id number], .60     AUTH# [id number]   Recertification Date: 8 weeks,  23    Physician Follow-Up: As needed,    Physician Orders: Eval and

## 2023-08-09 ENCOUNTER — TELEPHONE (OUTPATIENT)
Dept: PULMONOLOGY | Age: 73
End: 2023-08-09

## 2023-08-09 DIAGNOSIS — J98.4 RESTRICTIVE LUNG DISEASE: ICD-10-CM

## 2023-08-09 DIAGNOSIS — E66.01 CLASS 3 SEVERE OBESITY WITH BODY MASS INDEX (BMI) OF 40.0 TO 44.9 IN ADULT, UNSPECIFIED OBESITY TYPE, UNSPECIFIED WHETHER SERIOUS COMORBIDITY PRESENT (HCC): Primary | ICD-10-CM

## 2023-08-09 DIAGNOSIS — R09.89 PULMONARY AIR TRAPPING: ICD-10-CM

## 2023-08-09 NOTE — TELEPHONE ENCOUNTER
Can you please place another order for pt's PFT, she is scheduled to have it done on 8/18/23 her order expires on 8/17/23.  Thank you

## 2023-08-15 ENCOUNTER — HOSPITAL ENCOUNTER (OUTPATIENT)
Dept: PHYSICAL THERAPY | Age: 73
Setting detail: THERAPIES SERIES
Discharge: HOME | End: 2023-08-15
Payer: MEDICARE

## 2023-08-15 ENCOUNTER — NURSE ONLY (OUTPATIENT)
Dept: LAB | Age: 73
End: 2023-08-15

## 2023-08-15 ENCOUNTER — TELEPHONE (OUTPATIENT)
Dept: PULMONOLOGY | Age: 73
End: 2023-08-15

## 2023-08-15 DIAGNOSIS — N18.31 STAGE 3A CHRONIC KIDNEY DISEASE (HCC): ICD-10-CM

## 2023-08-15 DIAGNOSIS — Z99.89 OSA ON CPAP: Primary | ICD-10-CM

## 2023-08-15 DIAGNOSIS — G47.33 OSA ON CPAP: Primary | ICD-10-CM

## 2023-08-15 LAB
ANION GAP SERPL CALC-SCNC: 14 MEQ/L (ref 8–16)
BUN SERPL-MCNC: 37 MG/DL (ref 7–22)
CALCIUM SERPL-MCNC: 10.5 MG/DL (ref 8.5–10.5)
CHLORIDE SERPL-SCNC: 101 MEQ/L (ref 98–111)
CO2 SERPL-SCNC: 28 MEQ/L (ref 23–33)
CREAT SERPL-MCNC: 1.4 MG/DL (ref 0.4–1.2)
GFR SERPL CREATININE-BSD FRML MDRD: 40 ML/MIN/1.73M2
GLUCOSE SERPL-MCNC: 112 MG/DL (ref 70–108)
POTASSIUM SERPL-SCNC: 4.8 MEQ/L (ref 3.5–5.2)
SODIUM SERPL-SCNC: 143 MEQ/L (ref 135–145)

## 2023-08-15 PROCEDURE — 97112 NEUROMUSCULAR REEDUCATION: CPT

## 2023-08-15 PROCEDURE — 97110 THERAPEUTIC EXERCISES: CPT

## 2023-08-15 PROCEDURE — 97530 THERAPEUTIC ACTIVITIES: CPT

## 2023-08-15 NOTE — TELEPHONE ENCOUNTER
Received faxed from [facility] stating order needed for pt to get a new PAP. Pt was set up on her current PAP 10/9/2015. Last ov 1/9/2023. Her PAP setting at that time was 8 cmH2O. Please advise if this is ok and give order to nurse to fax w/office notes and last sleep study report.

## 2023-08-17 ENCOUNTER — HOSPITAL ENCOUNTER (OUTPATIENT)
Dept: PHYSICAL THERAPY | Age: 73
Setting detail: THERAPIES SERIES
Discharge: HOME | End: 2023-08-17
Payer: MEDICARE

## 2023-08-17 PROCEDURE — 97116 GAIT TRAINING THERAPY: CPT

## 2023-08-17 PROCEDURE — 97530 THERAPEUTIC ACTIVITIES: CPT

## 2023-08-17 PROCEDURE — 97110 THERAPEUTIC EXERCISES: CPT

## 2023-08-18 ENCOUNTER — HOSPITAL ENCOUNTER (OUTPATIENT)
Dept: PULMONOLOGY | Age: 73
Discharge: HOME | End: 2023-08-18
Payer: MEDICARE

## 2023-08-18 DIAGNOSIS — J98.4 RESTRICTIVE LUNG DISEASE: ICD-10-CM

## 2023-08-18 DIAGNOSIS — N18.2 CKD (CHRONIC KIDNEY DISEASE), STAGE II: ICD-10-CM

## 2023-08-18 DIAGNOSIS — I51.89 GRADE I DIASTOLIC DYSFUNCTION: ICD-10-CM

## 2023-08-18 PROCEDURE — 94729 DIFFUSING CAPACITY: CPT

## 2023-08-18 PROCEDURE — 94726 PLETHYSMOGRAPHY LUNG VOLUMES: CPT

## 2023-08-18 PROCEDURE — 94060 EVALUATION OF WHEEZING: CPT

## 2023-08-22 ENCOUNTER — TELEPHONE (OUTPATIENT)
Dept: PULMONOLOGY | Age: 73
End: 2023-08-22

## 2023-08-22 NOTE — TELEPHONE ENCOUNTER
Spoke to pt. Just wants to get supplies at [facility].  Her PAP machine is fine. Please send order for PAP supplies to [facility]. Thank you.

## 2023-08-23 ENCOUNTER — HOSPITAL ENCOUNTER (OUTPATIENT)
Dept: PHYSICAL THERAPY | Age: 73
Setting detail: THERAPIES SERIES
Discharge: HOME | End: 2023-08-23
Payer: MEDICARE

## 2023-08-23 PROCEDURE — 97110 THERAPEUTIC EXERCISES: CPT

## 2023-08-24 ENCOUNTER — OFFICE VISIT (OUTPATIENT)
Dept: PULMONOLOGY | Age: 73
End: 2023-08-24
Payer: MEDICARE

## 2023-08-24 VITALS
HEIGHT: 64 IN | WEIGHT: 247.8 LBS | TEMPERATURE: 97.5 F | BODY MASS INDEX: 42.3 KG/M2 | SYSTOLIC BLOOD PRESSURE: 136 MMHG | DIASTOLIC BLOOD PRESSURE: 66 MMHG | HEART RATE: 76 BPM | OXYGEN SATURATION: 96 %

## 2023-08-24 DIAGNOSIS — I51.89 GRADE I DIASTOLIC DYSFUNCTION: ICD-10-CM

## 2023-08-24 DIAGNOSIS — R09.89 PULMONARY AIR TRAPPING: ICD-10-CM

## 2023-08-24 DIAGNOSIS — E66.01 CLASS 3 SEVERE OBESITY WITH BODY MASS INDEX (BMI) OF 40.0 TO 44.9 IN ADULT, UNSPECIFIED OBESITY TYPE, UNSPECIFIED WHETHER SERIOUS COMORBIDITY PRESENT (HCC): ICD-10-CM

## 2023-08-24 DIAGNOSIS — J98.4 RESTRICTIVE LUNG DISEASE: Primary | ICD-10-CM

## 2023-08-24 DIAGNOSIS — R91.8 MULTIPLE LUNG NODULES ON CT: ICD-10-CM

## 2023-08-24 PROCEDURE — 99213 OFFICE O/P EST LOW 20 MIN: CPT

## 2023-08-24 PROCEDURE — 3075F SYST BP GE 130 - 139MM HG: CPT

## 2023-08-24 PROCEDURE — G8400 PT W/DXA NO RESULTS DOC: HCPCS

## 2023-08-24 PROCEDURE — G8427 DOCREV CUR MEDS BY ELIG CLIN: HCPCS

## 2023-08-24 PROCEDURE — 3017F COLORECTAL CA SCREEN DOC REV: CPT

## 2023-08-24 PROCEDURE — 1123F ACP DISCUSS/DSCN MKR DOCD: CPT

## 2023-08-24 PROCEDURE — 3078F DIAST BP <80 MM HG: CPT

## 2023-08-24 PROCEDURE — 1090F PRES/ABSN URINE INCON ASSESS: CPT

## 2023-08-24 PROCEDURE — 1036F TOBACCO NON-USER: CPT

## 2023-08-24 PROCEDURE — G8417 CALC BMI ABV UP PARAM F/U: HCPCS

## 2023-08-24 ASSESSMENT — ENCOUNTER SYMPTOMS
CHEST TIGHTNESS: 0
COUGH: 0
WHEEZING: 0
SHORTNESS OF BREATH: 1
SINUS PRESSURE: 0
RHINORRHEA: 0
SINUS PAIN: 0

## 2023-08-24 NOTE — TELEPHONE ENCOUNTER
Kayleigh Ashford is calling from [facility] regarding the orders they received. She is wanting to clarify if the patient is needing just supplies or if she is needing a PAP machine as well. I let her know that as far as I could tell, she is just needing the supplies. Kayleigh Ashford said that they will need the patients sleep study in order for them to fill the orders for the supplies.      Fax # [phone]

## 2023-08-24 NOTE — PATIENT INSTRUCTIONS
Please let me know if there are any changes with your lung nodule on CTA scan with cardiology.     Please call with any questions/concerns regarding your breathing

## 2023-08-25 ENCOUNTER — OFFICE VISIT (OUTPATIENT)
Dept: NEPHROLOGY | Age: 73
End: 2023-08-25
Payer: MEDICARE

## 2023-08-25 ENCOUNTER — HOSPITAL ENCOUNTER (OUTPATIENT)
Dept: PHYSICAL THERAPY | Age: 73
Setting detail: THERAPIES SERIES
Discharge: HOME | End: 2023-08-25
Payer: MEDICARE

## 2023-08-25 VITALS
DIASTOLIC BLOOD PRESSURE: 70 MMHG | HEART RATE: 87 BPM | SYSTOLIC BLOOD PRESSURE: 124 MMHG | OXYGEN SATURATION: 96 % | BODY MASS INDEX: 42.05 KG/M2 | WEIGHT: 245 LBS

## 2023-08-25 DIAGNOSIS — N18.31 STAGE 3A CHRONIC KIDNEY DISEASE (HCC): Primary | ICD-10-CM

## 2023-08-25 PROCEDURE — 97110 THERAPEUTIC EXERCISES: CPT

## 2023-08-25 PROCEDURE — G8400 PT W/DXA NO RESULTS DOC: HCPCS | Performed by: INTERNAL MEDICINE

## 2023-08-25 PROCEDURE — 1090F PRES/ABSN URINE INCON ASSESS: CPT | Performed by: INTERNAL MEDICINE

## 2023-08-25 PROCEDURE — 1123F ACP DISCUSS/DSCN MKR DOCD: CPT | Performed by: INTERNAL MEDICINE

## 2023-08-25 PROCEDURE — 99213 OFFICE O/P EST LOW 20 MIN: CPT | Performed by: INTERNAL MEDICINE

## 2023-08-25 PROCEDURE — 3074F SYST BP LT 130 MM HG: CPT | Performed by: INTERNAL MEDICINE

## 2023-08-25 PROCEDURE — 1036F TOBACCO NON-USER: CPT | Performed by: INTERNAL MEDICINE

## 2023-08-25 PROCEDURE — 3017F COLORECTAL CA SCREEN DOC REV: CPT | Performed by: INTERNAL MEDICINE

## 2023-08-25 PROCEDURE — G8427 DOCREV CUR MEDS BY ELIG CLIN: HCPCS | Performed by: INTERNAL MEDICINE

## 2023-08-25 PROCEDURE — 3078F DIAST BP <80 MM HG: CPT | Performed by: INTERNAL MEDICINE

## 2023-08-25 PROCEDURE — G8417 CALC BMI ABV UP PARAM F/U: HCPCS | Performed by: INTERNAL MEDICINE

## 2023-08-30 ENCOUNTER — HOSPITAL ENCOUNTER (OUTPATIENT)
Dept: PHYSICAL THERAPY | Age: 73
Setting detail: THERAPIES SERIES
Discharge: HOME | End: 2023-08-30
Payer: MEDICARE

## 2023-08-30 PROCEDURE — 97110 THERAPEUTIC EXERCISES: CPT

## 2023-09-01 ENCOUNTER — HOSPITAL ENCOUNTER (OUTPATIENT)
Dept: PHYSICAL THERAPY | Age: 73
Setting detail: THERAPIES SERIES
Discharge: HOME | End: 2023-09-01
Payer: MEDICARE

## 2023-09-01 PROCEDURE — 97110 THERAPEUTIC EXERCISES: CPT

## 2023-09-01 NOTE — PROGRESS NOTES
gait:marching,  tandem, side stepping, retro walking 2laps  x    Standing: heel toe raises,  mini squats, HS curls, marching  15x  x    3way hip 15x  x    Airex: standing with head turns  10x             6 min walk test x1   O2 sats= 94 at start, O2 dropped to 93% during 6min walk test                        Airex: ft together EO/EC (with mirror for feedback) 20 sec 2x x Min A with EC only performed 1x, cues for flat feet   Tandem standing (mirror) 20 sec 2x X Hand taps   Stepping on/off airex(mirror) 10x  x 1 UE support   Step ups 4 in. step 10x       B UE support (forward and lateral)   Stair stretches: HS, calf,  3x 15sec   Stopped performing quad stretch due to pain in sciatic   SKTC 15 sec 3x     Piriformis Stretch       Sit to stand from mat table 10x        Specific Interventions Next Treatment: there ext , balnace training, modalities if pain increases. Activity/Treatment Tolerance:  [x]  Patient tolerated treatment well  []  Patient limited by fatigue  []  Patient limited by pain   []  Patient limited by medical complications  []  Other:     Assessment: Pt continues to work on balance reactions with mirror for feedback. Pt does better with balance if visually she is in middle of // bars. Pt needed 1 sitting RB due to SOB    GOALS:  Patient Goal: would like to walk further prior to back pain. Less pain in her back      Short Term Goals:  Time Frame: 4 weeks  Increase B LE strength to 4- to 4/5 to improve stability for standing for ADLs  Increase standing to 20 min without increased low back pain or shakiness in order to prepare meals. NOT MET-limited to 15 min of standing  Increase tinetti to 22 to lessen risk of falls  Micah Han will demonstrate a good ankle strategy to maintain standing balance with perturbations to her trunk. GOAL MET           Long Term Goals:  Time Frame: 8 weeks   Increased B LE strength to 4+ to 5/5  to allow for walking into grocery store.    Increase tinetti t 24 to decrease

## 2023-09-05 ENCOUNTER — HOSPITAL ENCOUNTER (OUTPATIENT)
Dept: PHYSICAL THERAPY | Age: 73
Setting detail: THERAPIES SERIES
Discharge: HOME | End: 2023-09-05
Payer: MEDICARE

## 2023-09-05 PROCEDURE — 97110 THERAPEUTIC EXERCISES: CPT

## 2023-09-07 ENCOUNTER — APPOINTMENT (OUTPATIENT)
Dept: PHYSICAL THERAPY | Age: 73
End: 2023-09-07
Payer: MEDICARE

## 2023-09-08 ENCOUNTER — NURSE ONLY (OUTPATIENT)
Dept: LAB | Age: 73
End: 2023-09-08

## 2023-09-08 ENCOUNTER — HOSPITAL ENCOUNTER (OUTPATIENT)
Dept: PHYSICAL THERAPY | Age: 73
Setting detail: THERAPIES SERIES
Discharge: HOME | End: 2023-09-08
Payer: MEDICARE

## 2023-09-08 LAB
ALBUMIN SERPL BCG-MCNC: 4.4 G/DL (ref 3.5–5.1)
ALP SERPL-CCNC: 84 U/L (ref 38–126)
ALT SERPL W/O P-5'-P-CCNC: 19 U/L (ref 11–66)
AST SERPL-CCNC: 16 U/L (ref 5–40)
BILIRUB CONJ SERPL-MCNC: < 0.2 MG/DL (ref 0–0.3)
BILIRUB SERPL-MCNC: 0.2 MG/DL (ref 0.3–1.2)
DEPRECATED RDW RBC AUTO: 46.9 FL (ref 35–45)
ERYTHROCYTE [DISTWIDTH] IN BLOOD BY AUTOMATED COUNT: 13.2 % (ref 11.5–14.5)
HCT VFR BLD AUTO: 37.8 % (ref 37–47)
HGB BLD-MCNC: 11.8 GM/DL (ref 12–16)
MCH RBC QN AUTO: 30.1 PG (ref 26–33)
MCHC RBC AUTO-ENTMCNC: 31.2 GM/DL (ref 32.2–35.5)
MCV RBC AUTO: 96.4 FL (ref 81–99)
PLATELET # BLD AUTO: 348 THOU/MM3 (ref 130–400)
PMV BLD AUTO: 9.1 FL (ref 9.4–12.4)
PROT SERPL-MCNC: 6.3 G/DL (ref 6.1–8)
RBC # BLD AUTO: 3.92 MILL/MM3 (ref 4.2–5.4)
WBC # BLD AUTO: 7.4 THOU/MM3 (ref 4.8–10.8)

## 2023-09-08 PROCEDURE — 97530 THERAPEUTIC ACTIVITIES: CPT

## 2023-09-08 PROCEDURE — 97110 THERAPEUTIC EXERCISES: CPT

## 2023-09-08 PROCEDURE — 97112 NEUROMUSCULAR REEDUCATION: CPT

## 2023-09-14 DIAGNOSIS — N18.2 CKD (CHRONIC KIDNEY DISEASE), STAGE II: ICD-10-CM

## 2023-09-14 RX ORDER — SPIRONOLACTONE 50 MG/1
50 TABLET, FILM COATED ORAL DAILY
Qty: 90 TABLET | Refills: 1 | Status: SHIPPED | OUTPATIENT
Start: 2023-09-14 | End: 2024-03-12

## 2023-09-19 DIAGNOSIS — I10 ESSENTIAL HYPERTENSION: ICD-10-CM

## 2023-09-19 RX ORDER — CARVEDILOL 25 MG/1
25 TABLET ORAL 2 TIMES DAILY
Qty: 180 TABLET | Refills: 3 | Status: SHIPPED | OUTPATIENT
Start: 2023-09-19

## 2023-10-18 ENCOUNTER — NURSE ONLY (OUTPATIENT)
Dept: LAB | Age: 73
End: 2023-10-18

## 2023-10-18 ENCOUNTER — HOSPITAL ENCOUNTER (OUTPATIENT)
Dept: MAMMOGRAPHY | Age: 73
Discharge: HOME | End: 2023-10-18

## 2023-10-18 DIAGNOSIS — N18.31 STAGE 3A CHRONIC KIDNEY DISEASE (HCC): ICD-10-CM

## 2023-10-18 DIAGNOSIS — Z12.31 VISIT FOR SCREENING MAMMOGRAM: ICD-10-CM

## 2023-10-18 LAB
ANION GAP SERPL CALC-SCNC: 11 MEQ/L (ref 8–16)
BUN SERPL-MCNC: 36 MG/DL (ref 7–22)
CALCIUM SERPL-MCNC: 10 MG/DL (ref 8.5–10.5)
CHLORIDE SERPL-SCNC: 100 MEQ/L (ref 98–111)
CO2 SERPL-SCNC: 27 MEQ/L (ref 23–33)
CREAT SERPL-MCNC: 1.3 MG/DL (ref 0.4–1.2)
GFR SERPL CREATININE-BSD FRML MDRD: 43 ML/MIN/1.73M2
GLUCOSE SERPL-MCNC: 120 MG/DL (ref 70–108)
POTASSIUM SERPL-SCNC: 4.7 MEQ/L (ref 3.5–5.2)
SODIUM SERPL-SCNC: 138 MEQ/L (ref 135–145)

## 2023-11-03 ENCOUNTER — APPOINTMENT (OUTPATIENT)
Dept: WOMENS IMAGING | Age: 73
End: 2023-11-03
Payer: MEDICARE

## 2023-11-03 ENCOUNTER — HOSPITAL ENCOUNTER (OUTPATIENT)
Dept: WOMENS IMAGING | Age: 73
Discharge: HOME | End: 2023-11-03
Payer: MEDICARE

## 2023-11-03 VITALS — BODY MASS INDEX: 41.83 KG/M2 | WEIGHT: 245 LBS | HEIGHT: 64 IN

## 2023-11-03 DIAGNOSIS — Z09 FOLLOW-UP EXAM: Primary | ICD-10-CM

## 2023-11-03 DIAGNOSIS — N64.89 HEMATOMA OF RIGHT BREAST: ICD-10-CM

## 2023-11-03 DIAGNOSIS — N63.12 BREAST LUMP ON RIGHT SIDE AT 1 O'CLOCK POSITION: ICD-10-CM

## 2023-11-03 PROCEDURE — G0279 TOMOSYNTHESIS, MAMMO: HCPCS

## 2023-11-03 PROCEDURE — 76642 ULTRASOUND BREAST LIMITED: CPT

## 2023-11-14 ENCOUNTER — OFFICE VISIT (OUTPATIENT)
Dept: FAMILY MEDICINE CLINIC | Age: 73
End: 2023-11-14
Payer: MEDICARE

## 2023-11-14 VITALS
RESPIRATION RATE: 16 BRPM | HEIGHT: 64 IN | WEIGHT: 249.3 LBS | DIASTOLIC BLOOD PRESSURE: 64 MMHG | HEART RATE: 84 BPM | BODY MASS INDEX: 42.56 KG/M2 | SYSTOLIC BLOOD PRESSURE: 124 MMHG

## 2023-11-14 DIAGNOSIS — L50.8 ACUTE URTICARIA: Primary | ICD-10-CM

## 2023-11-14 DIAGNOSIS — Z91.81 AT HIGH RISK FOR FALLS: ICD-10-CM

## 2023-11-14 PROCEDURE — 3074F SYST BP LT 130 MM HG: CPT | Performed by: NURSE PRACTITIONER

## 2023-11-14 PROCEDURE — G8417 CALC BMI ABV UP PARAM F/U: HCPCS | Performed by: NURSE PRACTITIONER

## 2023-11-14 PROCEDURE — 96372 THER/PROPH/DIAG INJ SC/IM: CPT | Performed by: NURSE PRACTITIONER

## 2023-11-14 PROCEDURE — 1090F PRES/ABSN URINE INCON ASSESS: CPT | Performed by: NURSE PRACTITIONER

## 2023-11-14 PROCEDURE — 1036F TOBACCO NON-USER: CPT | Performed by: NURSE PRACTITIONER

## 2023-11-14 PROCEDURE — G8484 FLU IMMUNIZE NO ADMIN: HCPCS | Performed by: NURSE PRACTITIONER

## 2023-11-14 PROCEDURE — 1123F ACP DISCUSS/DSCN MKR DOCD: CPT | Performed by: NURSE PRACTITIONER

## 2023-11-14 PROCEDURE — G8400 PT W/DXA NO RESULTS DOC: HCPCS | Performed by: NURSE PRACTITIONER

## 2023-11-14 PROCEDURE — 99213 OFFICE O/P EST LOW 20 MIN: CPT | Performed by: NURSE PRACTITIONER

## 2023-11-14 PROCEDURE — 3017F COLORECTAL CA SCREEN DOC REV: CPT | Performed by: NURSE PRACTITIONER

## 2023-11-14 PROCEDURE — 3078F DIAST BP <80 MM HG: CPT | Performed by: NURSE PRACTITIONER

## 2023-11-14 PROCEDURE — G8427 DOCREV CUR MEDS BY ELIG CLIN: HCPCS | Performed by: NURSE PRACTITIONER

## 2023-11-14 RX ORDER — TRIAMCINOLONE ACETONIDE 1 MG/ML
LOTION TOPICAL
COMMUNITY
Start: 2023-11-13

## 2023-11-14 RX ORDER — METHYLPREDNISOLONE ACETATE 40 MG/ML
40 INJECTION, SUSPENSION INTRA-ARTICULAR; INTRALESIONAL; INTRAMUSCULAR; SOFT TISSUE ONCE
Status: COMPLETED | OUTPATIENT
Start: 2023-11-14 | End: 2023-11-14

## 2023-11-14 RX ORDER — ARIPIPRAZOLE 10 MG/1
TABLET ORAL
COMMUNITY
Start: 2023-10-25

## 2023-11-14 RX ORDER — HYDROXYZINE PAMOATE 25 MG/1
25 CAPSULE ORAL 3 TIMES DAILY PRN
Qty: 21 CAPSULE | Refills: 0 | Status: SHIPPED | OUTPATIENT
Start: 2023-11-14 | End: 2023-11-21

## 2023-11-14 RX ORDER — METHYLPREDNISOLONE ACETATE 80 MG/ML
80 INJECTION, SUSPENSION INTRA-ARTICULAR; INTRALESIONAL; INTRAMUSCULAR; SOFT TISSUE ONCE
Status: COMPLETED | OUTPATIENT
Start: 2023-11-14 | End: 2023-11-14

## 2023-11-14 RX ADMIN — METHYLPREDNISOLONE ACETATE 40 MG: 40 INJECTION, SUSPENSION INTRA-ARTICULAR; INTRALESIONAL; INTRAMUSCULAR; SOFT TISSUE at 09:32

## 2023-11-14 RX ADMIN — METHYLPREDNISOLONE ACETATE 80 MG: 80 INJECTION, SUSPENSION INTRA-ARTICULAR; INTRALESIONAL; INTRAMUSCULAR; SOFT TISSUE at 09:32

## 2023-11-14 ASSESSMENT — PATIENT HEALTH QUESTIONNAIRE - PHQ9
SUM OF ALL RESPONSES TO PHQ QUESTIONS 1-9: 0
3. TROUBLE FALLING OR STAYING ASLEEP: 0
1. LITTLE INTEREST OR PLEASURE IN DOING THINGS: 0
9. THOUGHTS THAT YOU WOULD BE BETTER OFF DEAD, OR OF HURTING YOURSELF: 0
SUM OF ALL RESPONSES TO PHQ QUESTIONS 1-9: 0
8. MOVING OR SPEAKING SO SLOWLY THAT OTHER PEOPLE COULD HAVE NOTICED. OR THE OPPOSITE, BEING SO FIGETY OR RESTLESS THAT YOU HAVE BEEN MOVING AROUND A LOT MORE THAN USUAL: 0
6. FEELING BAD ABOUT YOURSELF - OR THAT YOU ARE A FAILURE OR HAVE LET YOURSELF OR YOUR FAMILY DOWN: 0
SUM OF ALL RESPONSES TO PHQ QUESTIONS 1-9: 0
7. TROUBLE CONCENTRATING ON THINGS, SUCH AS READING THE NEWSPAPER OR WATCHING TELEVISION: 0
2. FEELING DOWN, DEPRESSED OR HOPELESS: 0
10. IF YOU CHECKED OFF ANY PROBLEMS, HOW DIFFICULT HAVE THESE PROBLEMS MADE IT FOR YOU TO DO YOUR WORK, TAKE CARE OF THINGS AT HOME, OR GET ALONG WITH OTHER PEOPLE: 0
5. POOR APPETITE OR OVEREATING: 0
SUM OF ALL RESPONSES TO PHQ QUESTIONS 1-9: 0
SUM OF ALL RESPONSES TO PHQ9 QUESTIONS 1 & 2: 0
4. FEELING TIRED OR HAVING LITTLE ENERGY: 0

## 2023-11-14 ASSESSMENT — COLUMBIA-SUICIDE SEVERITY RATING SCALE - C-SSRS
7. DID THIS OCCUR IN THE LAST THREE MONTHS: YES
4. HAVE YOU HAD THESE THOUGHTS AND HAD SOME INTENTION OF ACTING ON THEM?: NO
3. HAVE YOU BEEN THINKING ABOUT HOW YOU MIGHT KILL YOURSELF?: NO
5. HAVE YOU STARTED TO WORK OUT OR WORKED OUT THE DETAILS OF HOW TO KILL YOURSELF? DO YOU INTEND TO CARRY OUT THIS PLAN?: YES

## 2023-11-14 ASSESSMENT — ENCOUNTER SYMPTOMS
ABDOMINAL PAIN: 0
COUGH: 0
NAUSEA: 0
SHORTNESS OF BREATH: 0

## 2023-11-14 NOTE — PROGRESS NOTES
Administrations This Visit       methylPREDNISolone acetate (DEPO-MEDROL) injection 40 mg       Admin Date  11/14/2023  09:32 Action  Given Dose  40 mg Route  IntraMUSCular Site  Dorsogluteal Right Administered By  Jai Judge CMA ([facility])    Ordering Provider: DAVID Harris CNP    NDC: [address]-8    Lot#: [id number]    : Kelvin Doyle    Patient Supplied?: No              methylPREDNISolone acetate (DEPO-MEDROL) injection 80 mg       Admin Date  11/14/2023  09:32 Action  Given Dose  80 mg Route  IntraMUSCular Site  Dorsogluteal Right Administered By  Jai Judge CMA ([address] [place] [address])    Ordering Provider: DAVID Harris CNP    NDC: [address]-6    Lot#: [id number]    : Kelvin Doyle    Patient Supplied?: No                     After obtaining consent, and per orders of oJvanni Mayer CNP, injection of Methylprednisolone 120 mg given in Right upper quad. gluteus by Jai Judge CMA ([facility]). Patient instructed to report any adverse reaction to me immediately.

## 2023-11-14 NOTE — PROGRESS NOTES
Sujata Solis (1950) 68 y.o. female here for evaluation of the following chief complaint(s):      HPI:  Chief Complaint   Patient presents with    Rash     Arm, stomach and back of neck. Was on legs- seems it has improved     Was in [facility] and they were washing her clothes in [place] 10/27-10/31       Mychart Message 11/12/23:  I am miserable. I have a rash all over my body. During my hospitalization which ended October 31( psych unit at [facility]),  we thought I developed an allergy to tide detergent. [facility] washed my clothes in [place] when I arrived and I started having an allergy reaction while I was there. Since then the rash continues all over my body. I went to urgent care at [facility] on November 7. They offered acetonide lotion. I still have rash all over my body. Taking Benadryl. we have done everything from Benadryl lotion to Cortizone lotion, several different kinds of body lotion, including oatmeal lotion, and nothing seems to work. Dr Iraj Benson would prefer that I not take PO prednisone since last time it precipited a manic episode. No Benefit with OTC topical or rx triamcinolone cream.   Use of oatmeal baths at home.        Vitals:    11/14/23 0859   BP: 124/64   Pulse: 84   Resp: 16       Patient Active Problem List   Diagnosis    Hypothyroid    Dyslipidemia    Bipolar disorder (HCC)    Post-menopausal    HTN (hypertension)    Obesity (BMI 30-39.9)    RANDY on CPAP    ACE inhibitor-aggravated angioedema    Acute on chronic diastolic congestive heart failure (HCC)    Ascending aortic aneurysm (HCC)    Chronic diastolic heart failure (HCC)    Thoracic aortic aneurysm without rupture (HCC)    Abnormal stress test    CKD (chronic kidney disease), stage II    Edema of lower extremity    Pneumonia of both lower lobes due to infectious organism    Acute respiratory failure with hypoxia (HCC)    Multiple lung nodules on CT    Congestive heart failure ([address])    Morbid obesity with

## 2023-11-15 ENCOUNTER — OFFICE VISIT (OUTPATIENT)
Dept: CARDIOLOGY CLINIC | Age: 73
End: 2023-11-15
Payer: MEDICARE

## 2023-11-15 VITALS
BODY MASS INDEX: 42 KG/M2 | HEART RATE: 86 BPM | SYSTOLIC BLOOD PRESSURE: 107 MMHG | HEIGHT: 64 IN | DIASTOLIC BLOOD PRESSURE: 58 MMHG | WEIGHT: 246 LBS

## 2023-11-15 DIAGNOSIS — R06.02 SOB (SHORTNESS OF BREATH): Primary | ICD-10-CM

## 2023-11-15 DIAGNOSIS — I50.32 CHRONIC HEART FAILURE WITH PRESERVED EJECTION FRACTION (HCC): ICD-10-CM

## 2023-11-15 PROCEDURE — G8417 CALC BMI ABV UP PARAM F/U: HCPCS | Performed by: INTERNAL MEDICINE

## 2023-11-15 PROCEDURE — G8484 FLU IMMUNIZE NO ADMIN: HCPCS | Performed by: INTERNAL MEDICINE

## 2023-11-15 PROCEDURE — 3074F SYST BP LT 130 MM HG: CPT | Performed by: INTERNAL MEDICINE

## 2023-11-15 PROCEDURE — G8400 PT W/DXA NO RESULTS DOC: HCPCS | Performed by: INTERNAL MEDICINE

## 2023-11-15 PROCEDURE — 3017F COLORECTAL CA SCREEN DOC REV: CPT | Performed by: INTERNAL MEDICINE

## 2023-11-15 PROCEDURE — 1036F TOBACCO NON-USER: CPT | Performed by: INTERNAL MEDICINE

## 2023-11-15 PROCEDURE — 3078F DIAST BP <80 MM HG: CPT | Performed by: INTERNAL MEDICINE

## 2023-11-15 PROCEDURE — 1123F ACP DISCUSS/DSCN MKR DOCD: CPT | Performed by: INTERNAL MEDICINE

## 2023-11-15 PROCEDURE — 93000 ELECTROCARDIOGRAM COMPLETE: CPT | Performed by: INTERNAL MEDICINE

## 2023-11-15 PROCEDURE — 1090F PRES/ABSN URINE INCON ASSESS: CPT | Performed by: INTERNAL MEDICINE

## 2023-11-15 PROCEDURE — 99214 OFFICE O/P EST MOD 30 MIN: CPT | Performed by: INTERNAL MEDICINE

## 2023-11-15 PROCEDURE — G8427 DOCREV CUR MEDS BY ELIG CLIN: HCPCS | Performed by: INTERNAL MEDICINE

## 2023-11-15 NOTE — PROGRESS NOTES
Dyslipidemia    Echo 11/2021 revealed preserved EF, dilation of ascending aorta/diameter 4.1 cm  Has no signs of volume overload  On torsemide   Con aldactone    No leg edema  Limit Na intake, daily weight   Followed at CHF clinic  Cath in 10/2019 revealed nonobstructive CAD, elevated filling pressures  NSR on ekg  Continue risk factor modification and medical management  ASA  COREG   Hydralazine   Chest CTA on 10/24/2022 revealed Dilated ascending aorta which measures 4 cm in diameter (Stable). Surveillance is indicated, has CKD, proceed with echo       Above findings and plan of care were discussed with patient in details, patient's questions were answered.      Disposition:  RTC in 12 months             Electronically signed by Leanna Mccann MD, [facility], [address]    11/15/2023 at 1:43 PM EST

## 2023-11-27 ENCOUNTER — HOSPITAL ENCOUNTER (OUTPATIENT)
Age: 73
Discharge: HOME | End: 2023-11-29
Attending: INTERNAL MEDICINE
Payer: MEDICARE

## 2023-11-27 VITALS
HEIGHT: 64 IN | SYSTOLIC BLOOD PRESSURE: 184 MMHG | DIASTOLIC BLOOD PRESSURE: 83 MMHG | WEIGHT: 246 LBS | BODY MASS INDEX: 42 KG/M2

## 2023-11-27 DIAGNOSIS — R06.02 SOB (SHORTNESS OF BREATH): ICD-10-CM

## 2023-11-27 DIAGNOSIS — I50.32 CHRONIC HEART FAILURE WITH PRESERVED EJECTION FRACTION (HCC): ICD-10-CM

## 2023-11-27 LAB
ECHO AO ASC DIAM: 3.9 CM
ECHO AO ASCENDING AORTA INDEX: 1.82 CM/M2
ECHO AV CUSP MM: 2.2 CM
ECHO AV PEAK GRADIENT: 12 MMHG
ECHO AV PEAK VELOCITY: 1.7 M/S
ECHO AV VELOCITY RATIO: 0.71
ECHO BSA: 2.24 M2
ECHO IVC PROX: 1.2 CM
ECHO LA AREA 2C: 17.8 CM2
ECHO LA AREA 4C: 19.8 CM2
ECHO LA DIAMETER INDEX: 2.01 CM/M2
ECHO LA DIAMETER: 4.3 CM
ECHO LA MAJOR AXIS: 6 CM
ECHO LA MINOR AXIS: 5.4 CM
ECHO LA VOL BP: 54 ML (ref 22–52)
ECHO LA VOL MOD A2C: 48 ML (ref 22–52)
ECHO LA VOL MOD A4C: 54 ML (ref 22–52)
ECHO LA VOL/BSA BIPLANE: 25 ML/M2 (ref 16–34)
ECHO LA VOLUME INDEX MOD A2C: 22 ML/M2 (ref 16–34)
ECHO LA VOLUME INDEX MOD A4C: 25 ML/M2 (ref 16–34)
ECHO LV E' LATERAL VELOCITY: 6 CM/S
ECHO LV E' SEPTAL VELOCITY: 7 CM/S
ECHO LV FRACTIONAL SHORTENING: 31 % (ref 28–44)
ECHO LV INTERNAL DIMENSION DIASTOLE INDEX: 2.71 CM/M2
ECHO LV INTERNAL DIMENSION DIASTOLIC: 5.8 CM (ref 3.9–5.3)
ECHO LV INTERNAL DIMENSION SYSTOLIC INDEX: 1.87 CM/M2
ECHO LV INTERNAL DIMENSION SYSTOLIC: 4 CM
ECHO LV ISOVOLUMETRIC RELAXATION TIME (IVRT): 74 MS
ECHO LV IVSD: 1.2 CM (ref 0.6–0.9)
ECHO LV MASS 2D: 297 G (ref 67–162)
ECHO LV MASS INDEX 2D: 138.8 G/M2 (ref 43–95)
ECHO LV POSTERIOR WALL DIASTOLIC: 1.2 CM (ref 0.6–0.9)
ECHO LV RELATIVE WALL THICKNESS RATIO: 0.41
ECHO LVOT PEAK GRADIENT: 6 MMHG
ECHO LVOT PEAK VELOCITY: 1.2 M/S
ECHO MV A VELOCITY: 1.45 M/S
ECHO MV E DECELERATION TIME (DT): 239 MS
ECHO MV E VELOCITY: 0.85 M/S
ECHO MV E/A RATIO: 0.59
ECHO MV E/E' LATERAL: 14.17
ECHO MV E/E' RATIO (AVERAGED): 13.15
ECHO PV MAX VELOCITY: 1.1 M/S
ECHO PV PEAK GRADIENT: 5 MMHG
ECHO RV INTERNAL DIMENSION: 3.1 CM
ECHO RV TAPSE: 2.7 CM (ref 1.7–?)
ECHO TV E WAVE: 0.8 M/S

## 2023-11-27 PROCEDURE — 93306 TTE W/DOPPLER COMPLETE: CPT

## 2023-11-27 PROCEDURE — 93306 TTE W/DOPPLER COMPLETE: CPT | Performed by: INTERNAL MEDICINE

## 2023-11-27 RX ORDER — OMEPRAZOLE 20 MG/1
20 CAPSULE, DELAYED RELEASE ORAL
Qty: 90 CAPSULE | Refills: 3 | Status: SHIPPED | OUTPATIENT
Start: 2023-11-27

## 2023-11-27 RX ORDER — LEVOTHYROXINE SODIUM 125 MCG
125 TABLET ORAL DAILY
Qty: 30 TABLET | Refills: 11 | Status: SHIPPED | OUTPATIENT
Start: 2023-11-27

## 2023-11-27 NOTE — TELEPHONE ENCOUNTER
Fax received from [facility] requesting refills  on the patients omeprazole DR 20 mg caps and Synthroid 125 mcg tabs. Orders pended for your signature.

## 2023-11-29 ENCOUNTER — TELEPHONE (OUTPATIENT)
Dept: CARDIOLOGY CLINIC | Age: 73
End: 2023-11-29

## 2023-11-29 NOTE — TELEPHONE ENCOUNTER
----- Message from Griselda Rosales MD sent at 11/28/2023  7:13 PM EST -----  Dilated ascending aorta is mild  Stable  Inform patient  Office follow up as scheduled

## 2023-12-20 PROBLEM — M19.049 OSTEOARTHRITIS OF METACARPOPHALANGEAL (MCP) JOINT: Status: ACTIVE | Noted: 2023-11-30

## 2023-12-24 ENCOUNTER — APPOINTMENT (OUTPATIENT)
Dept: GENERAL RADIOLOGY | Age: 73
DRG: 193 | End: 2023-12-24
Payer: MEDICARE

## 2023-12-24 ENCOUNTER — APPOINTMENT (OUTPATIENT)
Dept: CT IMAGING | Age: 73
DRG: 193 | End: 2023-12-24
Payer: MEDICARE

## 2023-12-24 ENCOUNTER — HOSPITAL ENCOUNTER (INPATIENT)
Age: 73
LOS: 5 days | Discharge: NURSING, REHAB OR LONG-TERM CARE | DRG: 193 | End: 2023-12-30
Attending: EMERGENCY MEDICINE | Admitting: INTERNAL MEDICINE
Payer: MEDICARE

## 2023-12-24 DIAGNOSIS — D72.119 HYPEREOSINOPHILIC SYNDROME, UNSPECIFIED TYPE: ICD-10-CM

## 2023-12-24 DIAGNOSIS — R09.02 HYPOXIA: ICD-10-CM

## 2023-12-24 DIAGNOSIS — F41.9 ANXIETY: ICD-10-CM

## 2023-12-24 DIAGNOSIS — D72.19 OTHER EOSINOPHILIA: ICD-10-CM

## 2023-12-24 DIAGNOSIS — J96.01 ACUTE RESPIRATORY FAILURE WITH HYPOXIA (HCC): ICD-10-CM

## 2023-12-24 DIAGNOSIS — J96.11 CHRONIC RESPIRATORY FAILURE WITH HYPOXIA AND HYPERCAPNIA (HCC): ICD-10-CM

## 2023-12-24 DIAGNOSIS — G47.33 OSA TREATED WITH BIPAP: ICD-10-CM

## 2023-12-24 DIAGNOSIS — G47.33 OSA ON CPAP: ICD-10-CM

## 2023-12-24 DIAGNOSIS — J18.9 PNEUMONIA DUE TO INFECTIOUS ORGANISM, UNSPECIFIED LATERALITY, UNSPECIFIED PART OF LUNG: Primary | ICD-10-CM

## 2023-12-24 DIAGNOSIS — J96.12 CHRONIC RESPIRATORY FAILURE WITH HYPOXIA AND HYPERCAPNIA (HCC): ICD-10-CM

## 2023-12-24 LAB
ALBUMIN SERPL BCG-MCNC: 3.8 G/DL (ref 3.5–5.1)
ALP SERPL-CCNC: 310 U/L (ref 38–126)
ALT SERPL W/O P-5'-P-CCNC: 59 U/L (ref 11–66)
ANION GAP SERPL CALC-SCNC: 11 MEQ/L (ref 8–16)
ARTERIAL PATENCY WRIST A: POSITIVE
AST SERPL-CCNC: 20 U/L (ref 5–40)
BASE EXCESS BLDA CALC-SCNC: 1.2 MMOL/L (ref -2.5–2.5)
BDY SITE: ABNORMAL
BILIRUB SERPL-MCNC: 0.2 MG/DL (ref 0.3–1.2)
BUN SERPL-MCNC: 51 MG/DL (ref 7–22)
CALCIUM SERPL-MCNC: 9.8 MG/DL (ref 8.5–10.5)
CHLORIDE SERPL-SCNC: 98 MEQ/L (ref 98–111)
CO2 SERPL-SCNC: 28 MEQ/L (ref 23–33)
COLLECTED BY:: ABNORMAL
CREAT SERPL-MCNC: 2.2 MG/DL (ref 0.4–1.2)
D DIMER PPP IA.FEU-MCNC: 1951 NG/ML FEU (ref 0–500)
DEVICE: ABNORMAL
FIO2 ON VENT O2 ANALYZER: 44 %
FLUAV RNA RESP QL NAA+PROBE: NOT DETECTED
FLUBV RNA RESP QL NAA+PROBE: NOT DETECTED
GFR SERPL CREATININE-BSD FRML MDRD: 23 ML/MIN/1.73M2
GLUCOSE SERPL-MCNC: 136 MG/DL (ref 70–108)
HCO3 BLDA-SCNC: 27 MMOL/L (ref 23–28)
LACTATE SERPL-SCNC: 1 MMOL/L (ref 0.5–2)
NT-PROBNP SERPL IA-MCNC: 442.9 PG/ML (ref 0–124)
OSMOLALITY SERPL CALC.SUM OF ELEC: 289.6 MOSMOL/KG (ref 275–300)
PCO2 BLDA: 48 MMHG (ref 35–45)
PH BLDA: 7.36 [PH] (ref 7.35–7.45)
PO2 BLDA: 65 MMHG (ref 71–104)
POTASSIUM SERPL-SCNC: 4.9 MEQ/L (ref 3.5–5.2)
PROT SERPL-MCNC: 7.1 G/DL (ref 6.1–8)
SAO2 % BLDA: 91 %
SARS-COV-2 RNA RESP QL NAA+PROBE: NOT DETECTED
SCAN OF BLOOD SMEAR: NORMAL
SODIUM SERPL-SCNC: 137 MEQ/L (ref 135–145)
TROPONIN, HIGH SENSITIVITY: 51 NG/L (ref 0–12)

## 2023-12-24 PROCEDURE — 93005 ELECTROCARDIOGRAM TRACING: CPT | Performed by: EMERGENCY MEDICINE

## 2023-12-24 PROCEDURE — 99285 EMERGENCY DEPT VISIT HI MDM: CPT

## 2023-12-24 PROCEDURE — 82607 VITAMIN B-12: CPT

## 2023-12-24 PROCEDURE — 71046 X-RAY EXAM CHEST 2 VIEWS: CPT

## 2023-12-24 PROCEDURE — 83605 ASSAY OF LACTIC ACID: CPT

## 2023-12-24 PROCEDURE — 6360000004 HC RX CONTRAST MEDICATION: Performed by: EMERGENCY MEDICINE

## 2023-12-24 PROCEDURE — 2700000000 HC OXYGEN THERAPY PER DAY

## 2023-12-24 PROCEDURE — 6370000000 HC RX 637 (ALT 250 FOR IP): Performed by: EMERGENCY MEDICINE

## 2023-12-24 PROCEDURE — 82746 ASSAY OF FOLIC ACID SERUM: CPT

## 2023-12-24 PROCEDURE — 80053 COMPREHEN METABOLIC PANEL: CPT

## 2023-12-24 PROCEDURE — 99223 1ST HOSP IP/OBS HIGH 75: CPT | Performed by: NURSE PRACTITIONER

## 2023-12-24 PROCEDURE — 94640 AIRWAY INHALATION TREATMENT: CPT

## 2023-12-24 PROCEDURE — 36600 WITHDRAWAL OF ARTERIAL BLOOD: CPT

## 2023-12-24 PROCEDURE — 84484 ASSAY OF TROPONIN QUANT: CPT

## 2023-12-24 PROCEDURE — 71275 CT ANGIOGRAPHY CHEST: CPT

## 2023-12-24 PROCEDURE — 94761 N-INVAS EAR/PLS OXIMETRY MLT: CPT

## 2023-12-24 PROCEDURE — 83880 ASSAY OF NATRIURETIC PEPTIDE: CPT

## 2023-12-24 PROCEDURE — 84145 PROCALCITONIN (PCT): CPT

## 2023-12-24 PROCEDURE — 94660 CPAP INITIATION&MGMT: CPT

## 2023-12-24 PROCEDURE — 36415 COLL VENOUS BLD VENIPUNCTURE: CPT

## 2023-12-24 PROCEDURE — 85025 COMPLETE CBC W/AUTO DIFF WBC: CPT

## 2023-12-24 PROCEDURE — 5A09557 ASSISTANCE WITH RESPIRATORY VENTILATION, GREATER THAN 96 CONSECUTIVE HOURS, CONTINUOUS POSITIVE AIRWAY PRESSURE: ICD-10-PCS | Performed by: INTERNAL MEDICINE

## 2023-12-24 PROCEDURE — 85379 FIBRIN DEGRADATION QUANT: CPT

## 2023-12-24 PROCEDURE — 82803 BLOOD GASES ANY COMBINATION: CPT

## 2023-12-24 PROCEDURE — 84443 ASSAY THYROID STIM HORMONE: CPT

## 2023-12-24 PROCEDURE — 87636 SARSCOV2 & INF A&B AMP PRB: CPT

## 2023-12-24 RX ORDER — 0.9 % SODIUM CHLORIDE 0.9 %
1000 INTRAVENOUS SOLUTION INTRAVENOUS ONCE
Status: COMPLETED | OUTPATIENT
Start: 2023-12-25 | End: 2023-12-25

## 2023-12-24 RX ORDER — IPRATROPIUM BROMIDE AND ALBUTEROL SULFATE 2.5; .5 MG/3ML; MG/3ML
2 SOLUTION RESPIRATORY (INHALATION) ONCE
Status: COMPLETED | OUTPATIENT
Start: 2023-12-24 | End: 2023-12-24

## 2023-12-24 RX ADMIN — IOPAMIDOL 80 ML: 755 INJECTION, SOLUTION INTRAVENOUS at 23:57

## 2023-12-24 RX ADMIN — IPRATROPIUM BROMIDE AND ALBUTEROL SULFATE 2 DOSE: .5; 3 SOLUTION RESPIRATORY (INHALATION) at 23:25

## 2023-12-24 ASSESSMENT — PAIN - FUNCTIONAL ASSESSMENT: PAIN_FUNCTIONAL_ASSESSMENT: NONE - DENIES PAIN

## 2023-12-25 ENCOUNTER — APPOINTMENT (OUTPATIENT)
Dept: ULTRASOUND IMAGING | Age: 73
DRG: 193 | End: 2023-12-25
Payer: MEDICARE

## 2023-12-25 PROBLEM — D72.10 EOSINOPHILIA: Status: ACTIVE | Noted: 2023-12-25

## 2023-12-25 PROBLEM — J18.9 PNEUMONIA OF BOTH LOWER LOBES DUE TO INFECTIOUS ORGANISM: Status: RESOLVED | Noted: 2021-09-15 | Resolved: 2023-12-25

## 2023-12-25 LAB
ANION GAP SERPL CALC-SCNC: 11 MEQ/L (ref 8–16)
ANISOCYTOSIS BLD QL SMEAR: PRESENT
ARTERIAL PATENCY WRIST A: POSITIVE
BACTERIA: ABNORMAL
BASE EXCESS BLDA CALC-SCNC: -0.3 MMOL/L (ref -2.5–2.5)
BASOPHILS ABSOLUTE: 0.1 THOU/MM3 (ref 0–0.1)
BASOPHILS NFR BLD AUTO: 0.4 %
BDY SITE: ABNORMAL
BILIRUB UR QL STRIP: NEGATIVE
BUN SERPL-MCNC: 49 MG/DL (ref 7–22)
C3C SERPL-MCNC: 160 MG/DL (ref 90–180)
C4 SERPL-MCNC: 12 MG/DL (ref 10–40)
CA-I BLD ISE-SCNC: 1.11 MMOL/L (ref 1.12–1.32)
CALCIUM SERPL-MCNC: 9 MG/DL (ref 8.5–10.5)
CASTS #/AREA URNS LPF: ABNORMAL /LPF
CASTS #/AREA URNS LPF: ABNORMAL /LPF
CHARACTER UR: CLEAR
CHARCOAL URNS QL MICRO: ABNORMAL
CHLORIDE SERPL-SCNC: 102 MEQ/L (ref 98–111)
CO2 SERPL-SCNC: 26 MEQ/L (ref 23–33)
COLLECTED BY:: ABNORMAL
COLOR UR: YELLOW
CREAT SERPL-MCNC: 1.9 MG/DL (ref 0.4–1.2)
CREAT UR-MCNC: 89.3 MG/DL
CRP SERPL-MCNC: 9.05 MG/DL (ref 0–1)
CRYSTALS URNS QL MICRO: ABNORMAL
DEPRECATED RDW RBC AUTO: 52 FL (ref 35–45)
DEVICE: ABNORMAL
EKG ATRIAL RATE: 88 BPM
EKG P AXIS: 62 DEGREES
EKG P-R INTERVAL: 152 MS
EKG Q-T INTERVAL: 332 MS
EKG QRS DURATION: 80 MS
EKG QTC CALCULATION (BAZETT): 401 MS
EKG R AXIS: -4 DEGREES
EKG T AXIS: 56 DEGREES
EKG VENTRICULAR RATE: 88 BPM
EOSINOPHIL NFR BLD AUTO: 27.6 %
EOSINOPHILS ABSOLUTE: 6.3 THOU/MM3 (ref 0–0.4)
EPITHELIAL CELLS, UA: ABNORMAL /HPF
ERYTHROCYTE [DISTWIDTH] IN BLOOD BY AUTOMATED COUNT: 14.1 % (ref 11.5–14.5)
ERYTHROCYTE [SEDIMENTATION RATE] IN BLOOD BY WESTERGREN METHOD: 40 MM/HR (ref 0–20)
FIO2 ON VENT O2 ANALYZER: 40 %
FOLATE SERPL-MCNC: > 20 NG/ML (ref 4.8–24.2)
GFR SERPL CREATININE-BSD FRML MDRD: 27 ML/MIN/1.73M2
GLUCOSE SERPL-MCNC: 109 MG/DL (ref 70–108)
GLUCOSE UR QL STRIP.AUTO: NEGATIVE MG/DL
HCO3 BLDA-SCNC: 26 MMOL/L (ref 23–28)
HCT VFR BLD AUTO: 40 % (ref 37–47)
HGB BLD-MCNC: 12.2 GM/DL (ref 12–16)
HGB UR QL STRIP.AUTO: NEGATIVE
IMM GRANULOCYTES # BLD AUTO: 0.39 THOU/MM3 (ref 0–0.07)
IMM GRANULOCYTES NFR BLD AUTO: 1.7 %
KETONES UR QL STRIP.AUTO: NEGATIVE
LEUKOCYTE ESTERASE UR QL STRIP.AUTO: ABNORMAL
LYMPHOCYTES ABSOLUTE: 1.9 THOU/MM3 (ref 1–4.8)
LYMPHOCYTES NFR BLD AUTO: 8.5 %
MAGNESIUM SERPL-MCNC: 2.6 MG/DL (ref 1.6–2.4)
MCH RBC QN AUTO: 30.6 PG (ref 26–33)
MCHC RBC AUTO-ENTMCNC: 30.5 GM/DL (ref 32.2–35.5)
MCV RBC AUTO: 100.3 FL (ref 81–99)
MONOCYTES ABSOLUTE: 1.3 THOU/MM3 (ref 0.4–1.3)
MONOCYTES NFR BLD AUTO: 5.8 %
MRSA DNA SPEC QL NAA+PROBE: POSITIVE
NEUTROPHILS NFR BLD AUTO: 56 %
NITRITE UR QL STRIP.AUTO: NEGATIVE
NRBC BLD AUTO-RTO: 0 /100 WBC
OSMOLALITY UR: 393 MOSMOL/KG (ref 250–750)
PATHOLOGIST REVIEW: ABNORMAL
PCO2 BLDA: 51 MMHG (ref 35–45)
PEEP SETTING VENT: 8 MMHG
PH BLDA: 7.33 [PH] (ref 7.35–7.45)
PH UR STRIP.AUTO: 5 [PH] (ref 5–9)
PIP: 15 CMH2O
PLATELET # BLD AUTO: 266 THOU/MM3 (ref 130–400)
PLATELET BLD QL SMEAR: ADEQUATE
PMV BLD AUTO: 9.1 FL (ref 9.4–12.4)
PO2 BLDA: 80 MMHG (ref 71–104)
POLYCHROMASIA BLD QL SMEAR: ABNORMAL
POTASSIUM SERPL-SCNC: 4.7 MEQ/L (ref 3.5–5.2)
PROCALCITONIN SERPL IA-MCNC: 0.27 NG/ML (ref 0.01–0.09)
PROT UR STRIP.AUTO-MCNC: NEGATIVE MG/DL
RBC # BLD AUTO: 3.99 MILL/MM3 (ref 4.2–5.4)
RBC #/AREA URNS HPF: ABNORMAL /HPF
RENAL EPI CELLS #/AREA URNS HPF: ABNORMAL /[HPF]
SAO2 % BLDA: 95 %
SEGMENTED NEUTROPHILS ABSOLUTE COUNT: 12.8 THOU/MM3 (ref 1.8–7.7)
SODIUM SERPL-SCNC: 139 MEQ/L (ref 135–145)
SODIUM UR-SCNC: 25 MEQ/L
SP GR UR REFRACT.AUTO: > 1.03 (ref 1–1.03)
TROPONIN, HIGH SENSITIVITY: 38 NG/L (ref 0–12)
TSH SERPL DL<=0.005 MIU/L-ACNC: 2.95 UIU/ML (ref 0.4–4.2)
UROBILINOGEN UR QL STRIP.AUTO: 0.2 EU/DL (ref 0–1)
VENTILATION MODE VENT: ABNORMAL
VIT B12 SERPL-MCNC: 580 PG/ML (ref 211–911)
WBC # BLD AUTO: 22.9 THOU/MM3 (ref 4.8–10.8)
WBC #/AREA URNS HPF: ABNORMAL /HPF
YEAST LIKE FUNGI URNS QL MICRO: ABNORMAL

## 2023-12-25 PROCEDURE — 82570 ASSAY OF URINE CREATININE: CPT

## 2023-12-25 PROCEDURE — 94761 N-INVAS EAR/PLS OXIMETRY MLT: CPT

## 2023-12-25 PROCEDURE — 84484 ASSAY OF TROPONIN QUANT: CPT

## 2023-12-25 PROCEDURE — 82803 BLOOD GASES ANY COMBINATION: CPT

## 2023-12-25 PROCEDURE — 82330 ASSAY OF CALCIUM: CPT

## 2023-12-25 PROCEDURE — 36415 COLL VENOUS BLD VENIPUNCTURE: CPT

## 2023-12-25 PROCEDURE — 94660 CPAP INITIATION&MGMT: CPT

## 2023-12-25 PROCEDURE — 99233 SBSQ HOSP IP/OBS HIGH 50: CPT | Performed by: INTERNAL MEDICINE

## 2023-12-25 PROCEDURE — 86160 COMPLEMENT ANTIGEN: CPT

## 2023-12-25 PROCEDURE — 87077 CULTURE AEROBIC IDENTIFY: CPT

## 2023-12-25 PROCEDURE — 85651 RBC SED RATE NONAUTOMATED: CPT

## 2023-12-25 PROCEDURE — 2700000000 HC OXYGEN THERAPY PER DAY

## 2023-12-25 PROCEDURE — 86038 ANTINUCLEAR ANTIBODIES: CPT

## 2023-12-25 PROCEDURE — 87147 CULTURE TYPE IMMUNOLOGIC: CPT

## 2023-12-25 PROCEDURE — 2500000003 HC RX 250 WO HCPCS: Performed by: NURSE PRACTITIONER

## 2023-12-25 PROCEDURE — 96361 HYDRATE IV INFUSION ADD-ON: CPT

## 2023-12-25 PROCEDURE — 2580000003 HC RX 258: Performed by: EMERGENCY MEDICINE

## 2023-12-25 PROCEDURE — 87205 SMEAR GRAM STAIN: CPT

## 2023-12-25 PROCEDURE — 81001 URINALYSIS AUTO W/SCOPE: CPT

## 2023-12-25 PROCEDURE — 93010 ELECTROCARDIOGRAM REPORT: CPT | Performed by: NUCLEAR MEDICINE

## 2023-12-25 PROCEDURE — 6360000002 HC RX W HCPCS: Performed by: EMERGENCY MEDICINE

## 2023-12-25 PROCEDURE — 83735 ASSAY OF MAGNESIUM: CPT

## 2023-12-25 PROCEDURE — 2580000003 HC RX 258: Performed by: NURSE PRACTITIONER

## 2023-12-25 PROCEDURE — 80048 BASIC METABOLIC PNL TOTAL CA: CPT

## 2023-12-25 PROCEDURE — 83935 ASSAY OF URINE OSMOLALITY: CPT

## 2023-12-25 PROCEDURE — 87186 SC STD MICRODIL/AGAR DIL: CPT

## 2023-12-25 PROCEDURE — 36600 WITHDRAWAL OF ARTERIAL BLOOD: CPT

## 2023-12-25 PROCEDURE — 87641 MR-STAPH DNA AMP PROBE: CPT

## 2023-12-25 PROCEDURE — 86140 C-REACTIVE PROTEIN: CPT

## 2023-12-25 PROCEDURE — 87070 CULTURE OTHR SPECIMN AEROBIC: CPT

## 2023-12-25 PROCEDURE — 76770 US EXAM ABDO BACK WALL COMP: CPT

## 2023-12-25 PROCEDURE — 6360000002 HC RX W HCPCS: Performed by: NURSE PRACTITIONER

## 2023-12-25 PROCEDURE — 6370000000 HC RX 637 (ALT 250 FOR IP)

## 2023-12-25 PROCEDURE — 84300 ASSAY OF URINE SODIUM: CPT

## 2023-12-25 PROCEDURE — 87075 CULTR BACTERIA EXCEPT BLOOD: CPT

## 2023-12-25 PROCEDURE — 2500000003 HC RX 250 WO HCPCS

## 2023-12-25 PROCEDURE — 2580000003 HC RX 258

## 2023-12-25 PROCEDURE — 2060000000 HC ICU INTERMEDIATE R&B

## 2023-12-25 PROCEDURE — 96365 THER/PROPH/DIAG IV INF INIT: CPT

## 2023-12-25 PROCEDURE — 6370000000 HC RX 637 (ALT 250 FOR IP): Performed by: NURSE PRACTITIONER

## 2023-12-25 RX ORDER — RIVASTIGMINE TARTRATE 1.5 MG/1
6 CAPSULE ORAL 2 TIMES DAILY
Status: DISCONTINUED | OUTPATIENT
Start: 2023-12-25 | End: 2023-12-30 | Stop reason: HOSPADM

## 2023-12-25 RX ORDER — LEVOTHYROXINE SODIUM 0.12 MG/1
125 TABLET ORAL DAILY
Status: DISCONTINUED | OUTPATIENT
Start: 2023-12-25 | End: 2023-12-30 | Stop reason: HOSPADM

## 2023-12-25 RX ORDER — BUMETANIDE 0.25 MG/ML
0.5 INJECTION INTRAMUSCULAR; INTRAVENOUS ONCE
Status: COMPLETED | OUTPATIENT
Start: 2023-12-25 | End: 2023-12-25

## 2023-12-25 RX ORDER — FERROUS SULFATE 325(65) MG
325 TABLET ORAL
Status: DISCONTINUED | OUTPATIENT
Start: 2023-12-25 | End: 2023-12-29

## 2023-12-25 RX ORDER — SODIUM CHLORIDE 9 MG/ML
INJECTION, SOLUTION INTRAVENOUS PRN
Status: DISCONTINUED | OUTPATIENT
Start: 2023-12-25 | End: 2023-12-30 | Stop reason: HOSPADM

## 2023-12-25 RX ORDER — SPIRONOLACTONE 25 MG/1
50 TABLET ORAL DAILY
Status: DISCONTINUED | OUTPATIENT
Start: 2023-12-25 | End: 2023-12-30 | Stop reason: HOSPADM

## 2023-12-25 RX ORDER — POLYETHYLENE GLYCOL 3350 17 G/17G
17 POWDER, FOR SOLUTION ORAL 2 TIMES DAILY
Status: DISCONTINUED | OUTPATIENT
Start: 2023-12-25 | End: 2023-12-30 | Stop reason: HOSPADM

## 2023-12-25 RX ORDER — LACTOBACILLUS RHAMNOSUS GG 10B CELL
1 CAPSULE ORAL DAILY
Status: DISCONTINUED | OUTPATIENT
Start: 2023-12-25 | End: 2023-12-30 | Stop reason: HOSPADM

## 2023-12-25 RX ORDER — ASCORBIC ACID 500 MG
500 TABLET ORAL DAILY
Status: DISCONTINUED | OUTPATIENT
Start: 2023-12-25 | End: 2023-12-30 | Stop reason: HOSPADM

## 2023-12-25 RX ORDER — ATORVASTATIN CALCIUM 20 MG/1
20 TABLET, FILM COATED ORAL DAILY
Status: DISCONTINUED | OUTPATIENT
Start: 2023-12-25 | End: 2023-12-30 | Stop reason: HOSPADM

## 2023-12-25 RX ORDER — PANTOPRAZOLE SODIUM 40 MG/1
40 TABLET, DELAYED RELEASE ORAL
Status: DISCONTINUED | OUTPATIENT
Start: 2023-12-25 | End: 2023-12-30 | Stop reason: HOSPADM

## 2023-12-25 RX ORDER — ARIPIPRAZOLE 10 MG/1
10 TABLET ORAL DAILY
Status: DISCONTINUED | OUTPATIENT
Start: 2023-12-25 | End: 2023-12-30 | Stop reason: HOSPADM

## 2023-12-25 RX ORDER — HYDRALAZINE HYDROCHLORIDE 50 MG/1
50 TABLET, FILM COATED ORAL 3 TIMES DAILY
Status: DISCONTINUED | OUTPATIENT
Start: 2023-12-25 | End: 2023-12-30 | Stop reason: HOSPADM

## 2023-12-25 RX ORDER — SODIUM CHLORIDE 0.9 % (FLUSH) 0.9 %
5-40 SYRINGE (ML) INJECTION EVERY 12 HOURS SCHEDULED
Status: DISCONTINUED | OUTPATIENT
Start: 2023-12-25 | End: 2023-12-30 | Stop reason: HOSPADM

## 2023-12-25 RX ORDER — EVENING PRIMROSE OIL 500 MG
200 CAPSULE ORAL DAILY
Status: DISCONTINUED | OUTPATIENT
Start: 2023-12-25 | End: 2023-12-30 | Stop reason: HOSPADM

## 2023-12-25 RX ORDER — SODIUM CHLORIDE 0.9 % (FLUSH) 0.9 %
5-40 SYRINGE (ML) INJECTION PRN
Status: DISCONTINUED | OUTPATIENT
Start: 2023-12-25 | End: 2023-12-30 | Stop reason: HOSPADM

## 2023-12-25 RX ORDER — MULTIVITAMIN WITH IRON
1 TABLET ORAL DAILY
Status: DISCONTINUED | OUTPATIENT
Start: 2023-12-25 | End: 2023-12-30 | Stop reason: HOSPADM

## 2023-12-25 RX ORDER — CLONAZEPAM 1 MG/1
1 TABLET ORAL 3 TIMES DAILY PRN
Status: DISCONTINUED | OUTPATIENT
Start: 2023-12-25 | End: 2023-12-30 | Stop reason: HOSPADM

## 2023-12-25 RX ORDER — BENZOCAINE/MENTHOL 6 MG-10 MG
LOZENGE MUCOUS MEMBRANE 2 TIMES DAILY
Status: DISCONTINUED | OUTPATIENT
Start: 2023-12-25 | End: 2023-12-25

## 2023-12-25 RX ORDER — TRIAMCINOLONE ACETONIDE 1 MG/G
CREAM TOPICAL 2 TIMES DAILY
Status: DISCONTINUED | OUTPATIENT
Start: 2023-12-25 | End: 2023-12-30 | Stop reason: HOSPADM

## 2023-12-25 RX ORDER — ENOXAPARIN SODIUM 100 MG/ML
30 INJECTION SUBCUTANEOUS EVERY 12 HOURS
Status: DISCONTINUED | OUTPATIENT
Start: 2023-12-25 | End: 2023-12-30 | Stop reason: HOSPADM

## 2023-12-25 RX ORDER — CARVEDILOL 25 MG/1
25 TABLET ORAL 2 TIMES DAILY WITH MEALS
Status: DISCONTINUED | OUTPATIENT
Start: 2023-12-25 | End: 2023-12-30 | Stop reason: HOSPADM

## 2023-12-25 RX ORDER — SENNOSIDES A AND B 8.6 MG/1
1 TABLET, FILM COATED ORAL DAILY
COMMUNITY

## 2023-12-25 RX ORDER — ONDANSETRON 4 MG/1
4 TABLET, ORALLY DISINTEGRATING ORAL EVERY 8 HOURS PRN
Status: DISCONTINUED | OUTPATIENT
Start: 2023-12-25 | End: 2023-12-30 | Stop reason: HOSPADM

## 2023-12-25 RX ORDER — ASPIRIN 81 MG/1
81 TABLET ORAL DAILY
Status: DISCONTINUED | OUTPATIENT
Start: 2023-12-25 | End: 2023-12-30 | Stop reason: HOSPADM

## 2023-12-25 RX ORDER — TORSEMIDE 20 MG/1
40 TABLET ORAL DAILY
Status: DISCONTINUED | OUTPATIENT
Start: 2023-12-25 | End: 2023-12-30 | Stop reason: HOSPADM

## 2023-12-25 RX ORDER — SODIUM CHLORIDE 9 MG/ML
INJECTION, SOLUTION INTRAVENOUS CONTINUOUS
Status: DISCONTINUED | OUTPATIENT
Start: 2023-12-25 | End: 2023-12-26

## 2023-12-25 RX ORDER — TIZANIDINE 4 MG/1
2 TABLET ORAL 2 TIMES DAILY
Status: DISCONTINUED | OUTPATIENT
Start: 2023-12-25 | End: 2023-12-30 | Stop reason: HOSPADM

## 2023-12-25 RX ORDER — ONDANSETRON 2 MG/ML
4 INJECTION INTRAMUSCULAR; INTRAVENOUS EVERY 6 HOURS PRN
Status: DISCONTINUED | OUTPATIENT
Start: 2023-12-25 | End: 2023-12-30 | Stop reason: HOSPADM

## 2023-12-25 RX ORDER — POLYETHYLENE GLYCOL 3350 17 G/17G
17 POWDER, FOR SOLUTION ORAL DAILY PRN
Status: DISCONTINUED | OUTPATIENT
Start: 2023-12-25 | End: 2023-12-30 | Stop reason: HOSPADM

## 2023-12-25 RX ORDER — ACETAMINOPHEN 325 MG/1
650 TABLET ORAL EVERY 6 HOURS PRN
Status: DISCONTINUED | OUTPATIENT
Start: 2023-12-25 | End: 2023-12-30 | Stop reason: HOSPADM

## 2023-12-25 RX ORDER — CETIRIZINE HYDROCHLORIDE 5 MG/1
5 TABLET ORAL DAILY
Status: DISCONTINUED | OUTPATIENT
Start: 2023-12-25 | End: 2023-12-30 | Stop reason: HOSPADM

## 2023-12-25 RX ORDER — GABAPENTIN 300 MG/1
300 CAPSULE ORAL 2 TIMES DAILY
Status: DISCONTINUED | OUTPATIENT
Start: 2023-12-25 | End: 2023-12-30 | Stop reason: HOSPADM

## 2023-12-25 RX ORDER — ACETAMINOPHEN 650 MG/1
650 SUPPOSITORY RECTAL EVERY 6 HOURS PRN
Status: DISCONTINUED | OUTPATIENT
Start: 2023-12-25 | End: 2023-12-30 | Stop reason: HOSPADM

## 2023-12-25 RX ADMIN — GABAPENTIN 300 MG: 300 CAPSULE ORAL at 08:12

## 2023-12-25 RX ADMIN — POLYETHYLENE GLYCOL 3350 17 G: 17 POWDER, FOR SOLUTION ORAL at 20:34

## 2023-12-25 RX ADMIN — CEFTRIAXONE SODIUM 1000 MG: 1 INJECTION, POWDER, FOR SOLUTION INTRAMUSCULAR; INTRAVENOUS at 01:27

## 2023-12-25 RX ADMIN — RIVASTIGMINE TARTRATE 6 MG: 1.5 CAPSULE ORAL at 20:33

## 2023-12-25 RX ADMIN — OLANZAPINE 15 MG: 5 TABLET, FILM COATED ORAL at 20:33

## 2023-12-25 RX ADMIN — SODIUM CHLORIDE: 9 INJECTION, SOLUTION INTRAVENOUS at 15:21

## 2023-12-25 RX ADMIN — Medication 1 CAPSULE: at 08:08

## 2023-12-25 RX ADMIN — SODIUM CHLORIDE, PRESERVATIVE FREE 10 ML: 5 INJECTION INTRAVENOUS at 20:37

## 2023-12-25 RX ADMIN — RIVASTIGMINE TARTRATE 6 MG: 1.5 CAPSULE ORAL at 08:13

## 2023-12-25 RX ADMIN — SPIRONOLACTONE 50 MG: 25 TABLET ORAL at 08:10

## 2023-12-25 RX ADMIN — CLONAZEPAM 1 MG: 1 TABLET ORAL at 22:16

## 2023-12-25 RX ADMIN — VENLAFAXINE HYDROCHLORIDE 225 MG: 150 CAPSULE, EXTENDED RELEASE ORAL at 08:12

## 2023-12-25 RX ADMIN — ACETAMINOPHEN 650 MG: 325 TABLET ORAL at 22:16

## 2023-12-25 RX ADMIN — ARIPIPRAZOLE 10 MG: 10 TABLET ORAL at 08:11

## 2023-12-25 RX ADMIN — Medication 1 TABLET: at 08:11

## 2023-12-25 RX ADMIN — LEVOTHYROXINE SODIUM 125 MCG: 0.12 TABLET ORAL at 08:11

## 2023-12-25 RX ADMIN — CARVEDILOL 25 MG: 25 TABLET, FILM COATED ORAL at 08:10

## 2023-12-25 RX ADMIN — CARVEDILOL 25 MG: 25 TABLET, FILM COATED ORAL at 16:39

## 2023-12-25 RX ADMIN — AZITHROMYCIN MONOHYDRATE 500 MG: 500 INJECTION, POWDER, LYOPHILIZED, FOR SOLUTION INTRAVENOUS at 02:11

## 2023-12-25 RX ADMIN — TRIAMCINOLONE ACETONIDE: 1 CREAM TOPICAL at 20:35

## 2023-12-25 RX ADMIN — SODIUM CHLORIDE, PRESERVATIVE FREE 10 ML: 5 INJECTION INTRAVENOUS at 08:12

## 2023-12-25 RX ADMIN — BUMETANIDE 0.5 MG: 0.25 INJECTION INTRAMUSCULAR; INTRAVENOUS at 07:02

## 2023-12-25 RX ADMIN — FERROUS SULFATE TAB 325 MG (65 MG ELEMENTAL FE) 325 MG: 325 (65 FE) TAB at 08:10

## 2023-12-25 RX ADMIN — GABAPENTIN 300 MG: 300 CAPSULE ORAL at 20:34

## 2023-12-25 RX ADMIN — HYDROCORTISONE: 1 CREAM TOPICAL at 15:21

## 2023-12-25 RX ADMIN — TIZANIDINE 2 MG: 4 TABLET ORAL at 20:34

## 2023-12-25 RX ADMIN — ENOXAPARIN SODIUM 30 MG: 100 INJECTION SUBCUTANEOUS at 08:13

## 2023-12-25 RX ADMIN — OXYCODONE HYDROCHLORIDE AND ACETAMINOPHEN 500 MG: 500 TABLET ORAL at 08:12

## 2023-12-25 RX ADMIN — SODIUM CHLORIDE 1000 ML: 9 INJECTION, SOLUTION INTRAVENOUS at 00:25

## 2023-12-25 RX ADMIN — ATORVASTATIN CALCIUM 20 MG: 20 TABLET, FILM COATED ORAL at 08:11

## 2023-12-25 RX ADMIN — ENOXAPARIN SODIUM 30 MG: 100 INJECTION SUBCUTANEOUS at 20:34

## 2023-12-25 RX ADMIN — MICONAZOLE NITRATE: 2 POWDER TOPICAL at 20:34

## 2023-12-25 RX ADMIN — TIZANIDINE 2 MG: 4 TABLET ORAL at 08:11

## 2023-12-25 RX ADMIN — PANTOPRAZOLE SODIUM 40 MG: 40 TABLET, DELAYED RELEASE ORAL at 05:53

## 2023-12-25 RX ADMIN — CLONAZEPAM 1 MG: 1 TABLET ORAL at 08:08

## 2023-12-25 RX ADMIN — ASPIRIN 81 MG: 81 TABLET, COATED ORAL at 08:08

## 2023-12-25 ASSESSMENT — PAIN SCALES - GENERAL
PAINLEVEL_OUTOF10: 0
PAINLEVEL_OUTOF10: 1

## 2023-12-25 ASSESSMENT — LIFESTYLE VARIABLES
HOW MANY STANDARD DRINKS CONTAINING ALCOHOL DO YOU HAVE ON A TYPICAL DAY: PATIENT DOES NOT DRINK
HOW OFTEN DO YOU HAVE A DRINK CONTAINING ALCOHOL: NEVER

## 2023-12-25 ASSESSMENT — PAIN DESCRIPTION - ORIENTATION: ORIENTATION: RIGHT;LEFT

## 2023-12-25 ASSESSMENT — PAIN DESCRIPTION - LOCATION: LOCATION: KNEE

## 2023-12-25 NOTE — PROGRESS NOTES
Pharmacist Review and Automatic Dose Adjustment of Prophylactic Enoxaparin    The reviewing pharmacist has made an adjustment to the ordered enoxaparin dose or converted to UFH per the approved [facility] protocol and table as identified below. Josefine Osgood is a 68 y.o. female. Recent Labs     12/24/23  2250 12/25/23  0439   CREATININE 2.2* 1.9*       Estimated Creatinine Clearance: 32 mL/min (A) (based on SCr of 1.9 mg/dL (H)). Recent Labs     12/24/23  2250   HGB 12.2   HCT 40.0        No results for input(s): \"INR\" in the last 72 hours.     Height:   Ht Readings from Last 1 Encounters:   12/25/23 1.626 m (5' 4.02\")     Weight:  Wt Readings from Last 1 Encounters:   12/25/23 111.1 kg (245 lb)               Plan: Based upon the patient's weight and renal function    Ordered: Enoxaparin 30mg SUBQ Daily    Changed/converted to    New Order: Enoxaparin 30mg SUBQ BID      Thank you,  Anna Guido [facility]  12/25/2023, 6:56 AM

## 2023-12-25 NOTE — ED NOTES
Patient resting in bed, admitting team at bedside. Respirations easy and unlabored. No distress noted. Call light within reach.

## 2023-12-25 NOTE — PLAN OF CARE
Problem: Discharge Planning  Goal: Discharge to home or other facility with appropriate resources  Outcome: Progressing  Flowsheets  Taken 12/25/2023 0335  Discharge to home or other facility with appropriate resources:   Identify barriers to discharge with patient and caregiver   Arrange for needed discharge resources and transportation as appropriate   Identify discharge learning needs (meds, wound care, etc)   Refer to discharge planning if patient needs post-hospital services based on physician order or complex needs related to functional status, cognitive ability or social support system  Taken 12/25/2023 0334  Discharge to home or other facility with appropriate resources:   Identify barriers to discharge with patient and caregiver   Identify discharge learning needs (meds, wound care, etc)   Refer to discharge planning if patient needs post-hospital services based on physician order or complex needs related to functional status, cognitive ability or social support system   Arrange for needed discharge resources and transportation as appropriate     Problem: Safety - Adult  Goal: Free from fall injury  Outcome: Progressing  Flowsheets (Taken 12/25/2023 0341)  Free From Fall Injury:   Instruct family/caregiver on patient safety   Based on caregiver fall risk screen, instruct family/caregiver to ask for assistance with transferring infant if caregiver noted to have fall risk factors     Problem: Chronic Conditions and Co-morbidities  Goal: Patient's chronic conditions and co-morbidity symptoms are monitored and maintained or improved  Outcome: Progressing  Flowsheets (Taken 12/25/2023 0335)  Care Plan - Patient's Chronic Conditions and Co-Morbidity Symptoms are Monitored and Maintained or Improved:   Monitor and assess patient's chronic conditions and comorbid symptoms for stability, deterioration, or improvement   Collaborate with multidisciplinary team to address chronic and comorbid conditions and prevent

## 2023-12-25 NOTE — PROGRESS NOTES
Patient was instructed on NIV. Education  that was discussed was purpose and advantage of NIV, information on level of care, vital sign frequency, and monitoring requirements, agreement to be NPO for duration of NIV and  to not routinely disrupt NIV except for routine oral care. Patient was also told to advise nursing of any nausea, chest discomfort, sudden increase in shortness of breath or severe headache.

## 2023-12-25 NOTE — FLOWSHEET NOTE
12/25/23 1290   Safe Environment   Safety Measures Call light within reach  (VN safety round)     VN called into patients room and introduced myself and role. Patient did not answer. Video activated for safety check. Patient up in chair. Patient voiced no needs or concerns at this time. Pt denies pain. VN educated pt on utilizing call light if condition changes. Call light within reach.

## 2023-12-25 NOTE — PROGRESS NOTES
Internal Medicine Resident  Progress Note      Patient:  Ngozi Lake    Unit/Bed:4-09/009-A  YOB: 1950  MRN: [id number]   Acct: [de-identified]   PCP: Juan Madsen DO  Date of Admission: 12/24/2023  Date of Service: Pt seen/examined on 12/25/23      Assessment/Plan:  #Acute hypoxic, hypercapnic respiratory failure: Likely 2/2 developing pneumonia. CRP 9.05. WBC 22.9. ESR 40. Recent steroid injection could be contributing to counts. Unlikely HF.  CTA chest shows moderate atelectasis bilaterally, consolidation in left base. Required BiPAP to maintain O2 sat >90%. ABG shows mild respiratory acidosis. Ceftriaxone 1 g daily   Azithromycin 500 mg  Continue to wean O2 as tolerated    #Stage I HEBER on CKD 3A/B: Baseline creatinine variable between 1-1.5. Creatinine on admission 2.2. Repeat today 1.9. Fena indicates prerenal cause. Sarkis[facility] mL/HR   Monitor with daily BMP     #Pityriasis rosea: Notes a pruritic rash that first began back in October. She received a steroid injection at that time and states that the rash is gone completely for 10 to 14 days. There was some question as outpatient whether lamotrigine or statin was playing a role. There was no diagnosis of pityriasis rosea. Unrelieved by Eucerin cream.  Received Depo-Medrol 80 mg 12/20. States this improved for couple days, but it has come back. Denies any myalgias. begin topical steroid cream.    #HFpEF echo 11/27/2023 LVEF 60 to 65%, mild TR. Notes SOB with exertion. Denies orthopnea, denies any worsening leg swelling. Nonproductive cough. She notes good compliance of 2 g sodium and 2 L fluid restriction. Hold home torsemide   Continue home Aldactone   Continue Coreg 25 twice daily    #Intertrigo: Noted on breast folds. Miconazole powder  #Elevated troponin: Likely 2/2 demand. No EKG changes. No chest pain. Troponin 51-38. #RANDY: On home CPAP. Family to bring unit.   #GERD: Continue

## 2023-12-25 NOTE — ED PROVIDER NOTES
[facility] ICU STEPDOWN TELEMETRY 4K      EMERGENCY MEDICINE     Pt Name: Alison Mckeon  MRN: [id number]  Birthdate 1950  Date of evaluation: 12/24/2023  Provider: BRIJESH MCKEON MD    CHIEF COMPLAINT       Chief Complaint   Patient presents with    Shortness of Breath    Wheezing    Rash     HISTORY OF PRESENT ILLNESS   Alison Mckeon is a pleasant 73 y.o. female who presents to the emergency department from from home, by private vehicle for evaluation of shortness of breath and hypoxia.  Patient states that she started feeling short of breath after [employer] this morning.  They went home and use their home pulse oximeter which stated that she was at 75 to 80%.  They thought maybe she would get better but she continued to have increasing shortness of breath.  She denies any chest pain.  She denies any cough, fever, or congestion.  She states that she does have CHF and she has been taking her torsemide.  In the past Lasix and Bumex have not worked for her.  She denies any leg swelling.  Patient states that it is difficult to take a breath.  She also feels that she is working really hard to breathe.    PASTMEDICAL HISTORY     Past Medical History:   Diagnosis Date    Acid reflux     Arthritis     Asthma     Bipolar 1 disorder (HCC)     CHF (congestive heart failure) (HCC)     CKD (chronic kidney disease), stage II 11/22/2019    History of blood transfusion 2004    Hypertension     Injury of breast     Lumbosacral radiculopathy 12/09/2021    Mood disorder (HCC)     Osteoarthritis of metacarpophalangeal (MCP) joint 11/30/2023    Pneumonia 09/13/2021    Sleep apnea     Thyroid disease        Patient Active Problem List   Diagnosis Code    Hypothyroid E03.9    Dyslipidemia E78.5    Bipolar disorder (HCC) F31.9    Post-menopausal Z78.0    HTN (hypertension) I10    Obesity (BMI 30-39.9) E66.9    RANDY on CPAP G47.33    ACE inhibitor-aggravated angioedema T78.3XXA, T46.4X5A    Acute on chronic diastolic congestive heart failure  solution 2 Dose (2 Doses Inhalation Given 12/24/23 2043)   sodium chloride 0.9 % bolus 1,000 mL (0 mLs IntraVENous Stopped 12/25/23 0226)   iopamidol (ISOVUE-370) 76 % injection 80 mL (80 mLs IntraVENous Given 12/24/23 4950)   cefTRIAXone (ROCEPHIN) 1,000 mg in sodium chloride 0.9 % 50 mL IVPB (mini-bag) (0 mg IntraVENous Stopped 12/25/23 0212)     And   azithromycin (ZITHROMAX) 500 mg in sodium chloride 0.9 % 250 mL IVPB ([id number]) (0 mg IntraVENous Stopped 12/25/23 0311)         PROCEDURES: (None if blank)  Procedures:     CRITICAL CARE: (None if blank)      DISCHARGE PRESCRIPTIONS: (None if blank)  Current Discharge Medication List          FINAL IMPRESSION      1. Pneumonia due to infectious organism, unspecified laterality, unspecified part of lung    2. Hypoxia          DISPOSITION/PLAN   DISPOSITION Admitted 12/25/2023 02:11:31 AM      OUTPATIENT FOLLOW UP THE PATIENT:  No follow-up provider specified.     MD Josias Lynch MD  12/25/23 0930

## 2023-12-25 NOTE — H&P
Hospitalist  History and Physical    Patient:  Alison Mckeon  MRN: [id number]    CHIEF COMPLAINT:  shortness of breathj    History Obtained From:  patient, family member - , electronic medical record  PCP: Gaurav Reddy DO    HISTORY OF PRESENT ILLNESS:   Dell Mckeon is a 73-year-old  female presented to [facility] ER 12/24/2023 via EMS with chief complaint of shortness of breath and wheezing.     Patient has past medical history significant for lifetime non-smoker, RANDY-CPAP, asthma, essential hypertension [facility] 2019 nonobstructive CAD, HFpEF-ECHO 11/2023 LVEF 60-65% grade 1 diastolic dysfunction, mild TR, essential hypertension, hypothyroidism, GERD, CKD stage 3a (baseline CREA 1.0-1.5) bipolar depression.    Family identify ongoing treatment for rash since October.  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  Onset earlier in the day with home monitoring SaO2 75 to 80%.  Denied any fever, chest pain, cough or lower leg edema.   is present at bedside and assisting with history pieces. They thought maybe she would get better but she continued to have increasing shortness of breath.   is a .  Patient attended a second [facility] at 9 PM.  She wanted to watch her family open [place] gifts, and at the encouragement of her daughter she sought treatment through the emergency room.     On arrival to the ER SaO2 76% on room air and RR 24-32 attempted 6L/NC with no improvement in work of breathing. Bipap was applied and improvement noted. CTA Chest completed.   While in the ER patient did receive Rocephin/Zithromax/DuoNeb aerosol/0.9NS 1L Bolus    Past Medical History:        Diagnosis Date    Acid reflux     Arthritis     Asthma     Bipolar 1 disorder (HCC)     CHF (congestive  improved. Will titrate as able to nasal cannula/HFNC. Acute on chronic HFpEF: Recent ECHO 11/27/2023 LVEF 60-65%, mild TR. Recently did receive IM Depomedrol 80 mg 12/20/2023. Family identify poor response to IVP Lasix in the past. IVP Bumex has been ordered and Demadex placed on hold. Aldactone will be continued. Troponin 51-38. Noted elevated Alkaline Phosphatase likely secondary to such. Chest Xray bilateral pleural effusions. Cardiology to evaluate. Acute on chronic kidney injury (baseline Crea 3a 1.0-1.5) Urine is pending. Renal US is pending. Widened BUN/CREA ratio likely secondary to IM Depomedrol received 12/20 2023. Dose medications to GFR, no NSAIDS to be given. Impaired glucose tolerance: likely secondary to recent steroid 12/20/2023, monitor. Patient denies any polyuria or polydipsia. Leukocytosis: no fever, cough or sputum production, no infiltrate seen on chest xray. Likely secondary to steroid given 12/20/2023 monitor. Inflammatory markers are pending. Rash: ongoing since October ? DILE secondary to Apresoline. MICHAEL is pending. Apresoline has been placed on hold. RANDY-CPAP:home unit will be brought in family. GERD: history continue home PPI  Bipolar Depression: history    Patient Active Problem List   Diagnosis Code    Hypothyroid E03.9    Dyslipidemia E78.5    Bipolar disorder ([address]) F31.9    Post-menopausal Z78.0    HTN (hypertension) I10    Obesity (BMI 30-39. 9) E66.9    RANDY on CPAP G47.33    ACE inhibitor-aggravated angioedema T78. 3XXA, T46.4X5A    Acute on chronic diastolic congestive heart failure (HCC) I50.33    Ascending aortic aneurysm (HCC) I71.21    Chronic diastolic heart failure (HCC) I50.32    Thoracic aortic aneurysm without rupture (HCC) I71.20    Abnormal stress test R94.39    CKD (chronic kidney disease), stage II N18.2    Edema of lower extremity R60.0    Pneumonia of both lower lobes due to infectious organism J18.9    Acute respiratory failure with hypoxia (HCC) J96.01

## 2023-12-25 NOTE — ED TRIAGE NOTES
Patient presents to ED from home with complaints of shortness of breath and wheezing. Patient states has been short of breath all day, at home o2 sats were in the high 80's. Patient placed on 6L NC upon arrival to the room, patient now at 92%. Patient states the only other time she has felt like this is when she had pneumonia. Patient with audible expiratory wheezes. Patient states she also has a rash, possible related to medications, stopped taking the medication on Thursday. Patient is alert and oriented at this time,  at bedside. EKG completed and INT established.

## 2023-12-25 NOTE — ED NOTES
Patient medicated per MAR at this time. Patient resting in bed. Respirations easy and unlabored. No distress noted. Call light within reach.

## 2023-12-25 NOTE — ED NOTES
Pt transported to [facility] on cart in stable condition. Floor contacted, spoke with staff prior to transport.

## 2023-12-26 LAB
ANION GAP SERPL CALC-SCNC: 11 MEQ/L (ref 8–16)
BASOPHILS ABSOLUTE: 0.1 THOU/MM3 (ref 0–0.1)
BASOPHILS NFR BLD AUTO: 0.4 %
BUN SERPL-MCNC: 39 MG/DL (ref 7–22)
CALCIUM SERPL-MCNC: 9.2 MG/DL (ref 8.5–10.5)
CHLORIDE SERPL-SCNC: 102 MEQ/L (ref 98–111)
CO2 SERPL-SCNC: 25 MEQ/L (ref 23–33)
CREAT SERPL-MCNC: 1.4 MG/DL (ref 0.4–1.2)
CREAT UR-MCNC: 44.6 MG/DL
DEPRECATED RDW RBC AUTO: 53 FL (ref 35–45)
EOSINOPHIL NFR BLD AUTO: 46.1 %
EOSINOPHILS ABSOLUTE: 10.4 THOU/MM3 (ref 0–0.4)
ERYTHROCYTE [DISTWIDTH] IN BLOOD BY AUTOMATED COUNT: 14.3 % (ref 11.5–14.5)
GFR SERPL CREATININE-BSD FRML MDRD: 40 ML/MIN/1.73M2
GLUCOSE SERPL-MCNC: 101 MG/DL (ref 70–108)
HCT VFR BLD AUTO: 38.6 % (ref 37–47)
HGB BLD-MCNC: 11.5 GM/DL (ref 12–16)
IGE SERPL-ACNC: 1883 IU/ML
IMM GRANULOCYTES # BLD AUTO: 0.35 THOU/MM3 (ref 0–0.07)
IMM GRANULOCYTES NFR BLD AUTO: 1.6 %
LYMPHOCYTES ABSOLUTE: 2.2 THOU/MM3 (ref 1–4.8)
LYMPHOCYTES NFR BLD AUTO: 9.8 %
MCH RBC QN AUTO: 30.2 PG (ref 26–33)
MCHC RBC AUTO-ENTMCNC: 29.8 GM/DL (ref 32.2–35.5)
MCV RBC AUTO: 101.3 FL (ref 81–99)
MONOCYTES ABSOLUTE: 1.3 THOU/MM3 (ref 0.4–1.3)
MONOCYTES NFR BLD AUTO: 5.6 %
NEUTROPHILS NFR BLD AUTO: 36.5 %
NRBC BLD AUTO-RTO: 0 /100 WBC
PLATELET # BLD AUTO: 257 THOU/MM3 (ref 130–400)
PMV BLD AUTO: 9 FL (ref 9.4–12.4)
POTASSIUM SERPL-SCNC: 4.8 MEQ/L (ref 3.5–5.2)
PROT UR-MCNC: 6.2 MG/DL
PROT/CREAT 24H UR: 0.14 MG/G{CREAT}
RBC # BLD AUTO: 3.81 MILL/MM3 (ref 4.2–5.4)
SEGMENTED NEUTROPHILS ABSOLUTE COUNT: 8.2 THOU/MM3 (ref 1.8–7.7)
SODIUM SERPL-SCNC: 138 MEQ/L (ref 135–145)
TRANSFERRIN SERPL-MCNC: 29 U/L (ref 8–52)
WBC # BLD AUTO: 22.5 THOU/MM3 (ref 4.8–10.8)

## 2023-12-26 PROCEDURE — 94640 AIRWAY INHALATION TREATMENT: CPT

## 2023-12-26 PROCEDURE — 87305 ASPERGILLUS AG IA: CPT

## 2023-12-26 PROCEDURE — 82570 ASSAY OF URINE CREATININE: CPT

## 2023-12-26 PROCEDURE — 86255 FLUORESCENT ANTIBODY SCREEN: CPT

## 2023-12-26 PROCEDURE — 6360000002 HC RX W HCPCS

## 2023-12-26 PROCEDURE — 82164 ANGIOTENSIN I ENZYME TEST: CPT

## 2023-12-26 PROCEDURE — 99223 1ST HOSP IP/OBS HIGH 75: CPT | Performed by: INTERNAL MEDICINE

## 2023-12-26 PROCEDURE — 2500000003 HC RX 250 WO HCPCS: Performed by: INTERNAL MEDICINE

## 2023-12-26 PROCEDURE — 36415 COLL VENOUS BLD VENIPUNCTURE: CPT

## 2023-12-26 PROCEDURE — 2580000003 HC RX 258

## 2023-12-26 PROCEDURE — 85025 COMPLETE CBC W/AUTO DIFF WBC: CPT

## 2023-12-26 PROCEDURE — 84156 ASSAY OF PROTEIN URINE: CPT

## 2023-12-26 PROCEDURE — 6370000000 HC RX 637 (ALT 250 FOR IP)

## 2023-12-26 PROCEDURE — 6360000002 HC RX W HCPCS: Performed by: INTERNAL MEDICINE

## 2023-12-26 PROCEDURE — 94660 CPAP INITIATION&MGMT: CPT

## 2023-12-26 PROCEDURE — 99233 SBSQ HOSP IP/OBS HIGH 50: CPT | Performed by: INTERNAL MEDICINE

## 2023-12-26 PROCEDURE — 6370000000 HC RX 637 (ALT 250 FOR IP): Performed by: NURSE PRACTITIONER

## 2023-12-26 PROCEDURE — 2060000000 HC ICU INTERMEDIATE R&B

## 2023-12-26 PROCEDURE — 2580000003 HC RX 258: Performed by: NURSE PRACTITIONER

## 2023-12-26 PROCEDURE — 82785 ASSAY OF IGE: CPT

## 2023-12-26 PROCEDURE — 94761 N-INVAS EAR/PLS OXIMETRY MLT: CPT

## 2023-12-26 PROCEDURE — 6360000002 HC RX W HCPCS: Performed by: NURSE PRACTITIONER

## 2023-12-26 PROCEDURE — 94669 MECHANICAL CHEST WALL OSCILL: CPT

## 2023-12-26 PROCEDURE — 2500000003 HC RX 250 WO HCPCS

## 2023-12-26 PROCEDURE — 2700000000 HC OXYGEN THERAPY PER DAY

## 2023-12-26 PROCEDURE — 83516 IMMUNOASSAY NONANTIBODY: CPT

## 2023-12-26 PROCEDURE — 80048 BASIC METABOLIC PNL TOTAL CA: CPT

## 2023-12-26 RX ORDER — BUMETANIDE 0.25 MG/ML
1 INJECTION INTRAMUSCULAR; INTRAVENOUS 2 TIMES DAILY
Status: COMPLETED | OUTPATIENT
Start: 2023-12-26 | End: 2023-12-27

## 2023-12-26 RX ORDER — ALBUTEROL SULFATE 2.5 MG/3ML
2.5 SOLUTION RESPIRATORY (INHALATION)
Status: DISCONTINUED | OUTPATIENT
Start: 2023-12-26 | End: 2023-12-26

## 2023-12-26 RX ORDER — METHYLPREDNISOLONE SODIUM SUCCINATE 40 MG/ML
40 INJECTION, POWDER, LYOPHILIZED, FOR SOLUTION INTRAMUSCULAR; INTRAVENOUS EVERY 12 HOURS
Status: DISCONTINUED | OUTPATIENT
Start: 2023-12-26 | End: 2023-12-27

## 2023-12-26 RX ORDER — ERYTHROMYCIN 5 MG/G
OINTMENT OPHTHALMIC EVERY 6 HOURS SCHEDULED
Status: DISCONTINUED | OUTPATIENT
Start: 2023-12-26 | End: 2023-12-30 | Stop reason: HOSPADM

## 2023-12-26 RX ORDER — ALBUTEROL SULFATE 2.5 MG/3ML
2.5 SOLUTION RESPIRATORY (INHALATION)
Status: DISCONTINUED | OUTPATIENT
Start: 2023-12-26 | End: 2023-12-30 | Stop reason: HOSPADM

## 2023-12-26 RX ORDER — METHYLPREDNISOLONE SODIUM SUCCINATE 40 MG/ML
40 INJECTION, POWDER, LYOPHILIZED, FOR SOLUTION INTRAMUSCULAR; INTRAVENOUS EVERY 12 HOURS
Status: DISCONTINUED | OUTPATIENT
Start: 2023-12-26 | End: 2023-12-26 | Stop reason: CLARIF

## 2023-12-26 RX ADMIN — Medication 1 TABLET: at 08:53

## 2023-12-26 RX ADMIN — BUMETANIDE 1 MG: 0.25 INJECTION INTRAMUSCULAR; INTRAVENOUS at 22:11

## 2023-12-26 RX ADMIN — LEVOTHYROXINE SODIUM 125 MCG: 0.12 TABLET ORAL at 08:54

## 2023-12-26 RX ADMIN — ATORVASTATIN CALCIUM 20 MG: 20 TABLET, FILM COATED ORAL at 08:54

## 2023-12-26 RX ADMIN — ARIPIPRAZOLE 10 MG: 10 TABLET ORAL at 08:53

## 2023-12-26 RX ADMIN — RIVASTIGMINE TARTRATE 6 MG: 1.5 CAPSULE ORAL at 22:10

## 2023-12-26 RX ADMIN — TRIAMCINOLONE ACETONIDE: 1 CREAM TOPICAL at 08:59

## 2023-12-26 RX ADMIN — GABAPENTIN 300 MG: 300 CAPSULE ORAL at 08:53

## 2023-12-26 RX ADMIN — RIVASTIGMINE TARTRATE 6 MG: 1.5 CAPSULE ORAL at 08:53

## 2023-12-26 RX ADMIN — Medication 1 CAPSULE: at 08:53

## 2023-12-26 RX ADMIN — ACETAMINOPHEN 650 MG: 325 TABLET ORAL at 14:08

## 2023-12-26 RX ADMIN — ENOXAPARIN SODIUM 30 MG: 100 INJECTION SUBCUTANEOUS at 08:54

## 2023-12-26 RX ADMIN — TIZANIDINE 2 MG: 4 TABLET ORAL at 22:10

## 2023-12-26 RX ADMIN — ERYTHROMYCIN: 5 OINTMENT OPHTHALMIC at 22:12

## 2023-12-26 RX ADMIN — GABAPENTIN 300 MG: 300 CAPSULE ORAL at 22:11

## 2023-12-26 RX ADMIN — PANTOPRAZOLE SODIUM 40 MG: 40 TABLET, DELAYED RELEASE ORAL at 06:25

## 2023-12-26 RX ADMIN — ALBUTEROL SULFATE 2.5 MG: 2.5 SOLUTION RESPIRATORY (INHALATION) at 16:05

## 2023-12-26 RX ADMIN — POLYETHYLENE GLYCOL 3350 17 G: 17 POWDER, FOR SOLUTION ORAL at 22:11

## 2023-12-26 RX ADMIN — CARVEDILOL 25 MG: 25 TABLET, FILM COATED ORAL at 08:53

## 2023-12-26 RX ADMIN — METHYLPREDNISOLONE SODIUM SUCCINATE 40 MG: 40 INJECTION, POWDER, FOR SOLUTION INTRAMUSCULAR; INTRAVENOUS at 16:26

## 2023-12-26 RX ADMIN — CETIRIZINE HYDROCHLORIDE 5 MG: 10 TABLET ORAL at 08:53

## 2023-12-26 RX ADMIN — TIZANIDINE 2 MG: 4 TABLET ORAL at 08:54

## 2023-12-26 RX ADMIN — TRIAMCINOLONE ACETONIDE: 1 CREAM TOPICAL at 22:12

## 2023-12-26 RX ADMIN — CLONAZEPAM 1 MG: 1 TABLET ORAL at 08:54

## 2023-12-26 RX ADMIN — FERROUS SULFATE TAB 325 MG (65 MG ELEMENTAL FE) 325 MG: 325 (65 FE) TAB at 08:57

## 2023-12-26 RX ADMIN — ERYTHROMYCIN: 5 OINTMENT OPHTHALMIC at 18:39

## 2023-12-26 RX ADMIN — ENOXAPARIN SODIUM 30 MG: 100 INJECTION SUBCUTANEOUS at 22:11

## 2023-12-26 RX ADMIN — MICONAZOLE NITRATE: 2 POWDER TOPICAL at 08:54

## 2023-12-26 RX ADMIN — ASPIRIN 81 MG: 81 TABLET, COATED ORAL at 08:54

## 2023-12-26 RX ADMIN — BUMETANIDE 1 MG: 0.25 INJECTION INTRAMUSCULAR; INTRAVENOUS at 11:54

## 2023-12-26 RX ADMIN — OXYCODONE HYDROCHLORIDE AND ACETAMINOPHEN 500 MG: 500 TABLET ORAL at 08:54

## 2023-12-26 RX ADMIN — OLANZAPINE 15 MG: 5 TABLET, FILM COATED ORAL at 22:10

## 2023-12-26 RX ADMIN — ALBUTEROL SULFATE 2.5 MG: 2.5 SOLUTION RESPIRATORY (INHALATION) at 20:25

## 2023-12-26 RX ADMIN — SODIUM CHLORIDE, PRESERVATIVE FREE 10 ML: 5 INJECTION INTRAVENOUS at 22:17

## 2023-12-26 RX ADMIN — AZITHROMYCIN MONOHYDRATE 500 MG: 500 INJECTION, POWDER, LYOPHILIZED, FOR SOLUTION INTRAVENOUS at 02:40

## 2023-12-26 RX ADMIN — MICONAZOLE NITRATE: 2 POWDER TOPICAL at 22:28

## 2023-12-26 RX ADMIN — VENLAFAXINE HYDROCHLORIDE 225 MG: 150 CAPSULE, EXTENDED RELEASE ORAL at 08:53

## 2023-12-26 RX ADMIN — CARVEDILOL 25 MG: 25 TABLET, FILM COATED ORAL at 16:26

## 2023-12-26 RX ADMIN — CEFTRIAXONE SODIUM 1000 MG: 1 INJECTION, POWDER, FOR SOLUTION INTRAMUSCULAR; INTRAVENOUS at 01:25

## 2023-12-26 ASSESSMENT — PAIN SCALES - GENERAL: PAINLEVEL_OUTOF10: 0

## 2023-12-26 NOTE — PLAN OF CARE
Problem: Respiratory - Adult  Goal: Achieves optimal ventilation and oxygenation  12/26/2023 0554 by Torie SOTO RCP  Outcome: Progressing   Remains on HFNC to support breathing and oxygenation along with CPAP HS. Will continue weaning HFNC as pt tolerates. Patient mutually agreed on goals.

## 2023-12-26 NOTE — CARE COORDINATION
Case Management Assessment  Initial Evaluation    Date/Time of Evaluation: 12/26/2023 11:12 AM  Assessment Completed by: Eliana Wood RN    If patient is discharged prior to next notation, then this note serves as note for discharge by case management. Patient Name: Augusta Vivas                   YOB: 1950  Diagnosis: Hypoxia [R09.02]  Acute respiratory failure with hypoxia ([address]) [J96.01]  Pneumonia due to infectious organism, unspecified laterality, unspecified part of lung [J18.9]                   Date / Time: 12/24/2023 10:25 PM  Location: [facility]/009-     Patient Admission Status: Inpatient   Readmission Risk Low 0-14, Mod 15-19), High > 20: Readmission Risk Score: 18.9    Current PCP: Radha Fulton, DO  PCP verified by CM? Yes    Chart Reviewed: Yes      History Provided by: Patient, Medical Record  Patient Orientation: Alert and Oriented    Patient Cognition: Alert    Hospitalization in the last 30 days (Readmission):  No    If yes, Readmission Assessment in CM Navigator will be completed. Advance Directives:      Code Status: Full Code   Patient's Primary Decision Maker is: Named in Scanned ACP Document    Primary Decision Maker: Ana Shin Spouse - [phone]    Discharge Planning:    Patient lives with: Spouse/Significant Other Type of Home: House  Primary Care Giver: Self  Patient Support Systems include: Spouse/Significant Other, Friends/Neighbors, [employer]/[place] [employer]   Current Financial resources: Severo Ball  Current community resources: None  Current services prior to admission: Durable Medical Equipment, C-pap            Current DME: Walker, Wheelchair, Cpap            Type of Home Care services:  None    ADLS  Prior functional level: Independent in ADLs/IADLs  Current functional level: Independent in ADLs/IADLs    Family can provide assistance at DC:  Yes  Would you like Case Management to discuss the discharge plan with any other family members/significant

## 2023-12-26 NOTE — PROGRESS NOTES
Internal Medicine Resident  Progress Note      Patient:  Kimberly Case    Unit/Bed:4K-09/009-A  YOB: 1950  MRN: [id number]   Acct: [de-identified]   PCP: Trinh Baltazar DO  Date of Admission: 12/24/2023  Date of Service: Pt seen/examined on 12/26/23      Assessment/Plan:  #Acute hypoxic, hypercapnic respiratory failure: Likely 2/2 developing pneumonia. Possible reactive airway disease. CRP 9.05. WBC 22.9. ESR 40. Recent steroid injection could be contributing to counts. Unlikely HF.  CTA chest shows moderate atelectasis bilaterally, consolidation in left base. Required BiPAP to maintain O2 sat >90%. ABG shows mild respiratory acidosis. Oxygen requirements not consistent to degree of infiltrate. Given eosinophilia expanding differential.   Pulmonology following, started Solu-Medrol 40 twice daily,   Albuterol nebs. Ceftriaxone 1 g daily   Azithromycin 500 mg daily  Continue to wean O2 as tolerated    #Stage I HEBER on CKD 3A/B: Baseline creatinine variable between 1-1.5. Creatinine on admission 2.2. Repeat today 1.9. Fena indicates prerenal cause. Inspiratory basilar lung crackles. DC fluids. 3 doses 1 mg Bumex IV   Monitor with daily BMP     #Pityriasis rosea: Notes a pruritic rash that first began back in October. She received a steroid injection at that time and states that the rash is gone completely for 10 to 14 days. There was some question as outpatient whether lamotrigine or statin was playing a role. There was no diagnosis of pityriasis rosea. Unrelieved by Eucerin cream.  Received Depo-Medrol 80 mg 12/20. States this improved for couple days, but it has come back. Denies any myalgias. topical steroid cream.   Started on Solu-Medrol 40 twice daily by pulm for reactive airway disease as above. #Eosinophilia: Eosinophil count 10.4. Pityriasis above could be playing a role. Differential includes eosinophilic pneumonia, EGPA, ABPA.   Also atelectasis. This document has been electronically signed by: Anderw Vasquez III, MD on 12/24/2023 11:21 PM     DVT prophylaxis:    [x] Lovenox  [] SCDs  [] SQ Heparin  [] Encourage ambulation   [] Already on Anticoagulation  Diet: ADULT DIET; Regular; Low Fat/Low Chol/High Fiber/2 gm Na  Code Status: Full Code  PT/OT: Yes   Tele: yes  IVF: NS @ 50 ml/hr    Electronically signed by Jaden Bates DO on 12/26/2023 at 2:44 PM    Case was discussed with Attending, Dr. Charbel bush DO

## 2023-12-26 NOTE — PLAN OF CARE
Problem: Discharge Planning  Goal: Discharge to home or other facility with appropriate resources  Outcome: Progressing  Flowsheets (Taken 12/26/2023 0342)  Discharge to home or other facility with appropriate resources:   Identify barriers to discharge with patient and caregiver   Arrange for needed discharge resources and transportation as appropriate   Identify discharge learning needs (meds, wound care, etc)   Refer to discharge planning if patient needs post-hospital services based on physician order or complex needs related to functional status, cognitive ability or social support system     Problem: Safety - Adult  Goal: Free from fall injury  Outcome: Progressing  Flowsheets (Taken 12/26/2023 0342)  Free From Fall Injury:   Instruct family/caregiver on patient safety   Based on caregiver fall risk screen, instruct family/caregiver to ask for assistance with transferring infant if caregiver noted to have fall risk factors     Problem: Chronic Conditions and Co-morbidities  Goal: Patient's chronic conditions and co-morbidity symptoms are monitored and maintained or improved  Outcome: Progressing  Flowsheets (Taken 12/26/2023 0342)  Care Plan - Patient's Chronic Conditions and Co-Morbidity Symptoms are Monitored and Maintained or Improved:   Monitor and assess patient's chronic conditions and comorbid symptoms for stability, deterioration, or improvement   Collaborate with multidisciplinary team to address chronic and comorbid conditions and prevent exacerbation or deterioration   Update acute care plan with appropriate goals if chronic or comorbid symptoms are exacerbated and prevent overall improvement and discharge     Problem: Pain  Goal: Verbalizes/displays adequate comfort level or baseline comfort level  Outcome: Progressing  Flowsheets (Taken 12/26/2023 0342)  Verbalizes/displays adequate comfort level or baseline comfort level:   Encourage patient to monitor pain and request assistance   Assess pain using appropriate pain scale   Administer analgesics based on type and severity of pain and evaluate response   Implement non-pharmacological measures as appropriate and evaluate response   Notify Licensed Independent Practitioner if interventions unsuccessful or patient reports new pain     Problem: Respiratory - Adult  Goal: Achieves optimal ventilation and oxygenation  Outcome: Progressing  Flowsheets (Taken 12/26/2023 0342)  Achieves optimal ventilation and oxygenation:   Assess for changes in respiratory status   Position to facilitate oxygenation and minimize respiratory effort   Assess for changes in mentation and behavior   Oxygen supplementation based on oxygen saturation or arterial blood gases   Encourage broncho-pulmonary hygiene including cough, deep breathe, incentive spirometry   Assess the need for suctioning and aspirate as needed   Assess and instruct to report shortness of breath or any respiratory difficulty   Respiratory therapy support as indicated     Problem: ABCDS Injury Assessment  Goal: Absence of physical injury  Outcome: Progressing  Flowsheets (Taken 12/26/2023 0342)  Absence of Physical Injury: Implement safety measures based on patient assessment

## 2023-12-26 NOTE — CONSULTS
was noted to have diffuse audible wheezing by her . Her oxygen saturation was found to be 77percent on room air. She was subsequently brought to the emergency room by her family members for further evaluation. In the emergency room patient oxygen saturation was found to be 76% on room air with a respiratory rate of 24 to 32/min. She was tried with 6 LPM of oxygen via nasal cannula with no improvement in oxygen saturation. She underwent CT angiogram of chest due to elevated serum D-dimer level. She was found to have left lower lobe atelectasis/pneumonia on her CT angiogram of chest.  Pulmonary medicine was called for further evaluation and management. She is having cough: Yes  Duration of cough: for 2 days  Her cough is associated with sputum production: No   Hemoptysis:No  Diurnal variation: None. She gives a history of fever: No    She is having chest pain:No    She denies any orthopnea or paroxysmal nocturnal dyspnea. During her initial work up she was found to have abnormal CTA of chest with left lower lower lobe pneumonia. Prior to his current hospitalization:  She is currently not using any oxygen supplementation at rest, exercise or during sleep/at night time. She was never diagnosed with pulmonary diseases I.e COPD,Pulmonary fibrosis, Sarcoidosis, pulmonary embolism,pulmonary hypertension or pleural effusion/s in the past.    History of pulmonary tuberculosis in the past: No    Recent exposure to any patients with tuberculosis:No  Recent travel to endemic places of tuberculosis:No.  Prior PPD status: Negative several years back.     PMHx   Past Medical History      Diagnosis Date    Acid reflux     Arthritis     Asthma     Bipolar 1 disorder (HCC)     CHF (congestive heart failure) ([address] [facility])     CKD (chronic kidney disease), stage II 11/22/2019    History of blood transfusion 2004    Hypertension     Injury of breast     Lumbosacral radiculopathy 12/09/2021    Mood disorder ([facility]) MD Eleni on 12/26/2023 at 9:32 AM

## 2023-12-27 ENCOUNTER — APPOINTMENT (OUTPATIENT)
Dept: GENERAL RADIOLOGY | Age: 73
DRG: 193 | End: 2023-12-27
Payer: MEDICARE

## 2023-12-27 LAB
ALBUMIN SERPL BCG-MCNC: 3.4 G/DL (ref 3.5–5.1)
ALP SERPL-CCNC: 254 U/L (ref 38–126)
ALT SERPL W/O P-5'-P-CCNC: 32 U/L (ref 11–66)
ANION GAP SERPL CALC-SCNC: 11 MEQ/L (ref 8–16)
ARTERIAL PATENCY WRIST A: POSITIVE
AST SERPL-CCNC: 9 U/L (ref 5–40)
BASE EXCESS BLDA CALC-SCNC: 0.9 MMOL/L (ref -2.5–2.5)
BASE EXCESS BLDA CALC-SCNC: 2 MMOL/L (ref -2.5–2.5)
BASE EXCESS BLDA CALC-SCNC: 2.6 MMOL/L (ref -2.5–2.5)
BASOPHILS ABSOLUTE: 0.1 THOU/MM3 (ref 0–0.1)
BASOPHILS NFR BLD AUTO: 0.3 %
BDY SITE: ABNORMAL
BILIRUB SERPL-MCNC: 0.2 MG/DL (ref 0.3–1.2)
BREATHS SETTING VENT: 16 BPM
BUN SERPL-MCNC: 36 MG/DL (ref 7–22)
CALCIUM SERPL-MCNC: 9.3 MG/DL (ref 8.5–10.5)
CHLORIDE SERPL-SCNC: 103 MEQ/L (ref 98–111)
CO2 SERPL-SCNC: 25 MEQ/L (ref 23–33)
COLLECTED BY:: ABNORMAL
CREAT SERPL-MCNC: 1.2 MG/DL (ref 0.4–1.2)
DEPRECATED RDW RBC AUTO: 50.5 FL (ref 35–45)
DEVICE: ABNORMAL
EOSINOPHIL NFR BLD AUTO: 34.5 %
EOSINOPHILS ABSOLUTE: 6.2 THOU/MM3 (ref 0–0.4)
ERYTHROCYTE [DISTWIDTH] IN BLOOD BY AUTOMATED COUNT: 13.9 % (ref 11.5–14.5)
FIO2 ON VENT O2 ANALYZER: 50 %
FIO2 ON VENT O2 ANALYZER: 55 %
FIO2 ON VENT O2 ANALYZER: 60 %
GFR SERPL CREATININE-BSD FRML MDRD: 48 ML/MIN/1.73M2
GLUCOSE SERPL-MCNC: 143 MG/DL (ref 70–108)
HCO3 BLDA-SCNC: 27 MMOL/L (ref 23–28)
HCO3 BLDA-SCNC: 29 MMOL/L (ref 23–28)
HCO3 BLDA-SCNC: 29 MMOL/L (ref 23–28)
HCT VFR BLD AUTO: 34.3 % (ref 37–47)
HGB BLD-MCNC: 10.4 GM/DL (ref 12–16)
IMM GRANULOCYTES # BLD AUTO: 0.18 THOU/MM3 (ref 0–0.07)
IMM GRANULOCYTES NFR BLD AUTO: 1 %
LYMPHOCYTES ABSOLUTE: 2.4 THOU/MM3 (ref 1–4.8)
LYMPHOCYTES NFR BLD AUTO: 13.1 %
MCH RBC QN AUTO: 30.1 PG (ref 26–33)
MCHC RBC AUTO-ENTMCNC: 30.3 GM/DL (ref 32.2–35.5)
MCV RBC AUTO: 99.4 FL (ref 81–99)
MONOCYTES ABSOLUTE: 0.7 THOU/MM3 (ref 0.4–1.3)
MONOCYTES NFR BLD AUTO: 4.1 %
NEUTROPHILS NFR BLD AUTO: 47 %
NRBC BLD AUTO-RTO: 0 /100 WBC
PCO2 BLDA: 50 MMHG (ref 35–45)
PCO2 BLDA: 51 MMHG (ref 35–45)
PCO2 BLDA: 53 MMHG (ref 35–45)
PEEP SETTING VENT: 6 MMHG
PEEP SETTING VENT: 6 MMHG
PH BLDA: 7.34 [PH] (ref 7.35–7.45)
PH BLDA: 7.34 [PH] (ref 7.35–7.45)
PH BLDA: 7.36 [PH] (ref 7.35–7.45)
PIP: 16 CMH2O
PLATELET # BLD AUTO: 257 THOU/MM3 (ref 130–400)
PMV BLD AUTO: 9.3 FL (ref 9.4–12.4)
PO2 BLDA: 107 MMHG (ref 71–104)
PO2 BLDA: 136 MMHG (ref 71–104)
PO2 BLDA: 66 MMHG (ref 71–104)
POTASSIUM SERPL-SCNC: 4.9 MEQ/L (ref 3.5–5.2)
PROT SERPL-MCNC: 7.2 G/DL (ref 6.1–8)
RBC # BLD AUTO: 3.45 MILL/MM3 (ref 4.2–5.4)
SAO2 % BLDA: 91 %
SAO2 % BLDA: 98 %
SAO2 % BLDA: 99 %
SCAN OF BLOOD SMEAR: NORMAL
SEGMENTED NEUTROPHILS ABSOLUTE COUNT: 8.5 THOU/MM3 (ref 1.8–7.7)
SODIUM SERPL-SCNC: 139 MEQ/L (ref 135–145)
VARIANT LYMPHS BLD QL SMEAR: ABNORMAL %
VENTILATION MODE VENT: ABNORMAL
WBC # BLD AUTO: 18.1 THOU/MM3 (ref 4.8–10.8)

## 2023-12-27 PROCEDURE — 80053 COMPREHEN METABOLIC PANEL: CPT

## 2023-12-27 PROCEDURE — 94761 N-INVAS EAR/PLS OXIMETRY MLT: CPT

## 2023-12-27 PROCEDURE — 94640 AIRWAY INHALATION TREATMENT: CPT

## 2023-12-27 PROCEDURE — 6370000000 HC RX 637 (ALT 250 FOR IP)

## 2023-12-27 PROCEDURE — 82803 BLOOD GASES ANY COMBINATION: CPT

## 2023-12-27 PROCEDURE — 36600 WITHDRAWAL OF ARTERIAL BLOOD: CPT

## 2023-12-27 PROCEDURE — 6360000002 HC RX W HCPCS: Performed by: INTERNAL MEDICINE

## 2023-12-27 PROCEDURE — 86635 COCCIDIOIDES ANTIBODY: CPT

## 2023-12-27 PROCEDURE — 71045 X-RAY EXAM CHEST 1 VIEW: CPT

## 2023-12-27 PROCEDURE — 2580000003 HC RX 258

## 2023-12-27 PROCEDURE — 86698 HISTOPLASMA ANTIBODY: CPT

## 2023-12-27 PROCEDURE — 36415 COLL VENOUS BLD VENIPUNCTURE: CPT

## 2023-12-27 PROCEDURE — 99233 SBSQ HOSP IP/OBS HIGH 50: CPT | Performed by: INTERNAL MEDICINE

## 2023-12-27 PROCEDURE — 6370000000 HC RX 637 (ALT 250 FOR IP): Performed by: NURSE PRACTITIONER

## 2023-12-27 PROCEDURE — 6360000002 HC RX W HCPCS

## 2023-12-27 PROCEDURE — 6360000002 HC RX W HCPCS: Performed by: NURSE PRACTITIONER

## 2023-12-27 PROCEDURE — 85025 COMPLETE CBC W/AUTO DIFF WBC: CPT

## 2023-12-27 PROCEDURE — 2500000003 HC RX 250 WO HCPCS: Performed by: INTERNAL MEDICINE

## 2023-12-27 PROCEDURE — 2060000000 HC ICU INTERMEDIATE R&B

## 2023-12-27 PROCEDURE — 2580000003 HC RX 258: Performed by: NURSE PRACTITIONER

## 2023-12-27 PROCEDURE — 94669 MECHANICAL CHEST WALL OSCILL: CPT

## 2023-12-27 PROCEDURE — 86612 BLASTOMYCES ANTIBODY: CPT

## 2023-12-27 PROCEDURE — 86606 ASPERGILLUS ANTIBODY: CPT

## 2023-12-27 PROCEDURE — 2700000000 HC OXYGEN THERAPY PER DAY

## 2023-12-27 PROCEDURE — 94660 CPAP INITIATION&MGMT: CPT

## 2023-12-27 RX ORDER — METHYLPREDNISOLONE SODIUM SUCCINATE 40 MG/ML
40 INJECTION, POWDER, LYOPHILIZED, FOR SOLUTION INTRAMUSCULAR; INTRAVENOUS DAILY
Status: DISCONTINUED | OUTPATIENT
Start: 2023-12-28 | End: 2023-12-29

## 2023-12-27 RX ORDER — BUDESONIDE 0.5 MG/2ML
0.5 INHALANT ORAL
Status: DISCONTINUED | OUTPATIENT
Start: 2023-12-27 | End: 2023-12-30 | Stop reason: HOSPADM

## 2023-12-27 RX ADMIN — FERROUS SULFATE TAB 325 MG (65 MG ELEMENTAL FE) 325 MG: 325 (65 FE) TAB at 10:26

## 2023-12-27 RX ADMIN — BUDESONIDE 500 MCG: 0.5 INHALANT RESPIRATORY (INHALATION) at 20:12

## 2023-12-27 RX ADMIN — RIVASTIGMINE TARTRATE 6 MG: 1.5 CAPSULE ORAL at 19:58

## 2023-12-27 RX ADMIN — TRIAMCINOLONE ACETONIDE: 1 CREAM TOPICAL at 19:59

## 2023-12-27 RX ADMIN — BUMETANIDE 1 MG: 0.25 INJECTION INTRAMUSCULAR; INTRAVENOUS at 08:34

## 2023-12-27 RX ADMIN — ENOXAPARIN SODIUM 30 MG: 100 INJECTION SUBCUTANEOUS at 08:51

## 2023-12-27 RX ADMIN — METHYLPREDNISOLONE SODIUM SUCCINATE 40 MG: 40 INJECTION, POWDER, FOR SOLUTION INTRAMUSCULAR; INTRAVENOUS at 03:20

## 2023-12-27 RX ADMIN — PANTOPRAZOLE SODIUM 40 MG: 40 TABLET, DELAYED RELEASE ORAL at 05:24

## 2023-12-27 RX ADMIN — OLANZAPINE 15 MG: 5 TABLET, FILM COATED ORAL at 22:52

## 2023-12-27 RX ADMIN — ASPIRIN 81 MG: 81 TABLET, COATED ORAL at 08:33

## 2023-12-27 RX ADMIN — CETIRIZINE HYDROCHLORIDE 5 MG: 10 TABLET ORAL at 10:27

## 2023-12-27 RX ADMIN — CARVEDILOL 25 MG: 25 TABLET, FILM COATED ORAL at 19:13

## 2023-12-27 RX ADMIN — LEVOTHYROXINE SODIUM 125 MCG: 0.12 TABLET ORAL at 10:28

## 2023-12-27 RX ADMIN — ENOXAPARIN SODIUM 30 MG: 100 INJECTION SUBCUTANEOUS at 19:56

## 2023-12-27 RX ADMIN — CEFTRIAXONE SODIUM 1000 MG: 1 INJECTION, POWDER, FOR SOLUTION INTRAMUSCULAR; INTRAVENOUS at 03:25

## 2023-12-27 RX ADMIN — MICONAZOLE NITRATE: 2 POWDER TOPICAL at 19:56

## 2023-12-27 RX ADMIN — GABAPENTIN 300 MG: 300 CAPSULE ORAL at 10:27

## 2023-12-27 RX ADMIN — SODIUM CHLORIDE, PRESERVATIVE FREE 10 ML: 5 INJECTION INTRAVENOUS at 08:32

## 2023-12-27 RX ADMIN — CLONAZEPAM 1 MG: 1 TABLET ORAL at 08:51

## 2023-12-27 RX ADMIN — GABAPENTIN 300 MG: 300 CAPSULE ORAL at 19:58

## 2023-12-27 RX ADMIN — CARVEDILOL 25 MG: 25 TABLET, FILM COATED ORAL at 10:32

## 2023-12-27 RX ADMIN — TIZANIDINE 2 MG: 4 TABLET ORAL at 22:52

## 2023-12-27 RX ADMIN — ERYTHROMYCIN: 5 OINTMENT OPHTHALMIC at 19:13

## 2023-12-27 RX ADMIN — ERYTHROMYCIN: 5 OINTMENT OPHTHALMIC at 12:30

## 2023-12-27 RX ADMIN — Medication 1 CAPSULE: at 08:33

## 2023-12-27 RX ADMIN — AZITHROMYCIN MONOHYDRATE 500 MG: 500 INJECTION, POWDER, LYOPHILIZED, FOR SOLUTION INTRAVENOUS at 03:29

## 2023-12-27 RX ADMIN — ARIPIPRAZOLE 10 MG: 10 TABLET ORAL at 10:27

## 2023-12-27 RX ADMIN — VENLAFAXINE HYDROCHLORIDE 225 MG: 150 CAPSULE, EXTENDED RELEASE ORAL at 10:27

## 2023-12-27 RX ADMIN — ERYTHROMYCIN: 5 OINTMENT OPHTHALMIC at 05:26

## 2023-12-27 RX ADMIN — CLONAZEPAM 1 MG: 1 TABLET ORAL at 19:58

## 2023-12-27 RX ADMIN — SODIUM CHLORIDE, PRESERVATIVE FREE 10 ML: 5 INJECTION INTRAVENOUS at 19:56

## 2023-12-27 RX ADMIN — TIZANIDINE 2 MG: 4 TABLET ORAL at 08:36

## 2023-12-27 RX ADMIN — ATORVASTATIN CALCIUM 20 MG: 20 TABLET, FILM COATED ORAL at 10:28

## 2023-12-27 RX ADMIN — Medication 1 TABLET: at 10:27

## 2023-12-27 RX ADMIN — ALBUTEROL SULFATE 2.5 MG: 2.5 SOLUTION RESPIRATORY (INHALATION) at 11:30

## 2023-12-27 RX ADMIN — ALBUTEROL SULFATE 2.5 MG: 2.5 SOLUTION RESPIRATORY (INHALATION) at 20:12

## 2023-12-27 RX ADMIN — MICONAZOLE NITRATE: 2 POWDER TOPICAL at 08:31

## 2023-12-27 RX ADMIN — RIVASTIGMINE TARTRATE 6 MG: 1.5 CAPSULE ORAL at 10:28

## 2023-12-27 RX ADMIN — ALBUTEROL SULFATE 2.5 MG: 2.5 SOLUTION RESPIRATORY (INHALATION) at 16:09

## 2023-12-27 RX ADMIN — OXYCODONE HYDROCHLORIDE AND ACETAMINOPHEN 500 MG: 500 TABLET ORAL at 10:28

## 2023-12-27 RX ADMIN — BUDESONIDE 500 MCG: 0.5 INHALANT RESPIRATORY (INHALATION) at 11:30

## 2023-12-27 NOTE — PROGRESS NOTES
Patient unable to tolerate cpap for long periods of time. Patient was placed on Cpap multiple times throughout the night. ABG was done on Heated High Flow.     PH 7.34  CO2 50  PAO2 65.7  HCO3 27.2  Base 0.9  SAO2 91%

## 2023-12-27 NOTE — PROGRESS NOTES
Physician Progress Note      PATIENT:               STACY MCCLELLAN  [facility] #:                  [id number]  :                       1950  ADMIT DATE:       2023 10:25 PM  DISCH DATE:  RESPONDING  PROVIDER #:        NELY GALINDO          QUERY TEXT:    Patient admitted with acute hypoxic respiratory failure.   Noted documentation   of acute on chronic HFpEF in H&P and HFpEF in PN -. If possible,   please document in progress notes and discharge summary if you are evaluating   and /or treating any of the following:    The medical record reflects the following:    Risk Factors: hx of CHF, HTN  Clinical Indicators: presents with SOB, echo 2023 LVEF 60 to 65%, CXR:   Small bilateral pleural effusions with mild left medial basilar atelectasis,   Pro-.9  Treatment: IV Bumex, Coreg, Lipitor    Thank you!    Jimbo Santana, BSN,RN, CRCR  RN Clinical   Options provided:  -- Acute on chronic HFpEF confirmed after study  -- Acute HFpEF POA now resolved and only with chronic HFpEF now  -- Acute HFpEF ruled out and chronic HFpEF only  -- Other - I will add my own diagnosis  -- Disagree - Not applicable / Not valid  -- Disagree - Clinically unable to determine / Unknown  -- Refer to Clinical Documentation Reviewer    PROVIDER RESPONSE TEXT:    Acute HFpEF ruled out and chronic HFpEF only.    Query created by: Jimbo Santana on 2023 11:34 AM      QUERY TEXT:    Patient admitted with BMI 42.1. If possible, please document in progress notes   and discharge summary if you are evaluating and /or treating any of the   following:    The medical record reflects the following:    Risk Factors: depression, thyroid disease  Clinical Indicators: BMI 42.1 ht 5'4\" wt 245 lb  Treatment: weight based/pharmacy dosed meds when needed    ?Specificity of obesity and morbid obesity should be reported based on   physician documentation, as there are several published classifications  and   definitions?  MS-DRG Training Guide. CDC:   https://christina-shira.info/. WHO:   http://rashad.idania/. NIH:   LargeFood.be    Thank you! Lianet Light, CRCR  RN Clinical   Options provided:  -- Obesity  -- Morbid obesity  -- Severe obesity  -- Other - I will add my own diagnosis  -- Disagree - Not applicable / Not valid  -- Disagree - Clinically unable to determine / Unknown  -- Refer to Clinical Documentation Reviewer    PROVIDER RESPONSE TEXT:    This patient has morbid obesity.     Query created by: Helga Kraft on 12/27/2023 11:34 AM      Electronically signed by:  Tayler Stewart 12/27/2023 1:20 PM

## 2023-12-27 NOTE — PROGRESS NOTES
Internal Medicine Resident  Progress Note      Patient:  Sujatha Chamorro    Unit/Bed:4K-09/009-A  YOB: 1950  MRN: [id number]   Acct: [de-identified]   PCP: Maira Aj DO  Date of Admission: 12/24/2023  Date of Service: Pt seen/examined on 12/27/23      Assessment/Plan:  #Acute hypoxic, hypercapnic respiratory failure: Likely 2/2 developing pneumonia. Possible reactive airway disease. CRP 9.05. WBC 22.9. ESR 40. Recent steroid injection could be contributing to counts. Unlikely HF.  CTA chest shows moderate atelectasis bilaterally, consolidation in left base. Required BiPAP to maintain O2 sat >90%. ABG shows mild respiratory acidosis. Oxygen requirements not consistent to degree of infiltrate. Given eosinophilia expanding differential.  Still has mild respiratory acidosis. Pulmonology following, Solu-Medrol 40 daily. To trial BiPAP. Albuterol nebs. Ceftriaxone 1 g daily   Azithromycin 500 mg daily  Continue to wean O2 as tolerated    #Pityriasis rosea: Notes a pruritic rash that first began back in October. She received a steroid injection at that time and states that the rash is gone completely for 10 to 14 days. There was some question as outpatient whether lamotrigine or statin was playing a role. There was no diagnosis of pityriasis rosea. Unrelieved by Eucerin cream.  Received Depo-Medrol 80 mg 12/20. States this improved for couple days, but it has come back. Denies any myalgias. Rash appears to be improving, pruritus resolved. topical steroid cream.   Started on Solu-Medrol 40 daily by pulm for reactive airway disease as above. #Eosinophilia: Eosinophil count originally 10. 4. down to 6.2 today. Pityriasis above could be playing a role. Differential includes eosinophilic pneumonia, EGPA, ABPA. Also considering sarcoidosis and lupus. C3, C4 negative. IgE 1883, urine protein to creatinine ratio 0. 14. ACE 29.    Peripheral smear for

## 2023-12-27 NOTE — PROGRESS NOTES
Andrew Vasquez MD  [phone] 12/24/2023      Narrative & Impression  Two-view chest     Comparison: 04/04/2020    IMPRESSION:  Impression:  Small bilateral pleural effusions with mild left medial basilar   atelectasis.    CT Scans  (See actual reports for details)  CT angiogram of chest with IV contrast: Performed on 20 December 2023:  Reading Physician Reading Date Result Priority   Andrew Vasquez MD  [phone] 12/25/2023      Narrative & Impression  CT angiography chest with contrast. 3D Postprocessing.     Comparison: 10/24/2022    IMPRESSION:  Impression:  1. No evidence of pulmonary embolism.  2. Moderate atelectasis bilaterally, particularly in the left base with   associated small pleural effusion. Follow up chest radiography recommended   within the next 72 hours to exclude developing pneumonia.  3. See findings for additional chronic changes.     This document has been electronically signed by: Andrew Vasquez III, MD on   12/25/2023 12:59 AM      Assessment   -Acute hypoxic respiratory failure due to reactive airway disease exacerbation VS late onset bronchial asthma exacerbation due to left lower lobe pneumonia Vs exacerbation of diastolic CHF less likely.  -Left lower lobe pneumonia due to community-acquired pneumonia VS atypical pneumonia  -Severe obstructive sleep apnea on treatment with a CPAP pressure of 8cm H20. She is using her CPAP with good compliance for >4hours.  She follows with Ms. Destiny Noble CNP at the [facility] sleep clinic.  - Anxiety and depression.  She follows with a psychiatrist.  She develops hypomania symptoms while on treatment with systemic steroid therapy.  - Hypertension is under good control.  - Hx of  Left knee replacement.  - She is using a mouth guard for her trigeminal neuralgia. She need to use it at night time    Plan   -Wean from HFNC as tolerated keeping spo2 89-94%  -Follow-up pending cultures- not yet collected  -Begin bipap at 16/6 and recheck ABG in 3  hours. If stable, begin bipap overnight. May need home bipap at d/c. Currently wears CPAP with a pressure of 8 cm of water during nighttime.  -Continue Rocephin and Zithromax by primary service  -Decrease solu-medrol to 40 mg IV daily - she is currently having no adverse effects from steroids after having increase in manic episodes from them in the past.  -Will consult psychiatry service for possible worsening of hypomanic symptoms while on treatment with steroids-requested by the patient and her .  -Begin pulmicort neb 0.5 mg BID and continue to assess mental status and tolerance of steroids.  -Will continue incentive spirometry Q4h while awake. -MetaNebs every 4 hourly as tolerated  -Continue albuterol 2.5 mg by nebulization every 4 hourly while awake  -Deep Venous Thrombosis Prophylaxis: Lovenox 30 mg SQ every 12 hours  -Discussed today's plan with nursing, RT, and patient. \"Thank you for asking us to see this patient\"      Electronically signed by   DAVID Delgado CNP on 12/27/2023 at 10:33 AM    Addendum by Dr. Alina Guo MD:  Patient seen by me independently including key components of medical care. Face to face evaluation and examination was performed. Case discussed with DAVID Raza CNP. Agree with Certified nurse practitioner's findings and plan as documented in the Certified nurse practitioner's note. Italicized font, if present,  represents changes to the note made by me. More than 50% of the encounter time involved with patient care by the Pulmonary & Critical care service team spent by me.     Please see my modifications mentioned below:  Lab Results   Component Value Date/Time    PH 7.34 12/27/2023 02:06 PM    PCO2 53 12/27/2023 02:06 PM    PO2 107 12/27/2023 02:06 PM    HCO3 29 12/27/2023 02:06 PM    O2SAT 98 12/27/2023 02:06 PM     Lab Results   Component Value Date/Time    IFIO2 60 12/27/2023 02:06 PM    MODE BiLevel 12/25/2023 03:34 AM    SETPEEP 6.0

## 2023-12-27 NOTE — PROGRESS NOTES
Family member in room and patient on bipap right now, does not want to be disturbed, will check back at later time.

## 2023-12-27 NOTE — FLOWSHEET NOTE
12/27/23 2608   Safe Environment   Safety Measures Bed in low position;Call light within reach; Family at bedside;Side rails X 2  (Virtual Nurse Safety Round Complete)     Call placed to patients room, patient responds to audio, permitted video. Patient alert and oriented. Patient denied any voiced concerns/complaints at this time. Patient educated on utilizing call light. Call light within reach, no signs or symtpoms of distress noted.

## 2023-12-27 NOTE — PLAN OF CARE
Problem: Respiratory - Adult  Goal: Achieves optimal ventilation and oxygenation  Outcome: Progressing  Achieves optimal ventilation and oxygenation: Assess for changes in respiratory status  Note: Bipap hs.  Treatments for increased aeration

## 2023-12-28 PROBLEM — J45.41 MODERATE PERSISTENT ASTHMA WITH ACUTE EXACERBATION: Status: ACTIVE | Noted: 2023-12-28

## 2023-12-28 LAB
ANION GAP SERPL CALC-SCNC: 11 MEQ/L (ref 8–16)
ANION GAP SERPL CALC-SCNC: 9 MEQ/L (ref 8–16)
ASPERGILLUS GALACTO AG: NORMAL
BACTERIA SPEC AEROBE CULT: NORMAL
BASOPHILS ABSOLUTE: 0 THOU/MM3 (ref 0–0.1)
BASOPHILS NFR BLD AUTO: 0.2 %
BUN SERPL-MCNC: 30 MG/DL (ref 7–22)
BUN SERPL-MCNC: 31 MG/DL (ref 7–22)
CALCIUM SERPL-MCNC: 8.9 MG/DL (ref 8.5–10.5)
CALCIUM SERPL-MCNC: 9.5 MG/DL (ref 8.5–10.5)
CHLORIDE SERPL-SCNC: 102 MEQ/L (ref 98–111)
CHLORIDE SERPL-SCNC: 99 MEQ/L (ref 98–111)
CO2 SERPL-SCNC: 27 MEQ/L (ref 23–33)
CO2 SERPL-SCNC: 28 MEQ/L (ref 23–33)
CREAT SERPL-MCNC: 1 MG/DL (ref 0.4–1.2)
CREAT SERPL-MCNC: 1 MG/DL (ref 0.4–1.2)
DEPRECATED RDW RBC AUTO: 49.7 FL (ref 35–45)
EOSINOPHIL NFR BLD AUTO: 40.7 %
EOSINOPHILS ABSOLUTE: 7.6 THOU/MM3 (ref 0–0.4)
ERYTHROCYTE [DISTWIDTH] IN BLOOD BY AUTOMATED COUNT: 13.7 % (ref 11.5–14.5)
FERRITIN SERPL IA-MCNC: 276 NG/ML (ref 10–291)
GFR SERPL CREATININE-BSD FRML MDRD: 59 ML/MIN/1.73M2
GFR SERPL CREATININE-BSD FRML MDRD: 59 ML/MIN/1.73M2
GLUCOSE SERPL-MCNC: 111 MG/DL (ref 70–108)
GLUCOSE SERPL-MCNC: 183 MG/DL (ref 70–108)
HCT VFR BLD AUTO: 31 % (ref 37–47)
HGB BLD-MCNC: 9.5 GM/DL (ref 12–16)
HGB RETIC QN AUTO: 30.3 PG (ref 28.2–35.7)
IMM GRANULOCYTES # BLD AUTO: 0.16 THOU/MM3 (ref 0–0.07)
IMM GRANULOCYTES NFR BLD AUTO: 0.9 %
IMM RETICS NFR: 21.3 % (ref 3–15.9)
IRON SATN MFR SERPL: 36 % (ref 20–50)
IRON SERPL-MCNC: 67 UG/DL (ref 50–170)
LYMPHOCYTES ABSOLUTE: 2.3 THOU/MM3 (ref 1–4.8)
LYMPHOCYTES NFR BLD AUTO: 12.2 %
MCH RBC QN AUTO: 30.7 PG (ref 26–33)
MCHC RBC AUTO-ENTMCNC: 30.6 GM/DL (ref 32.2–35.5)
MCV RBC AUTO: 100.3 FL (ref 81–99)
MONOCYTES ABSOLUTE: 1.2 THOU/MM3 (ref 0.4–1.3)
MONOCYTES NFR BLD AUTO: 6.4 %
NEUTROPHILS NFR BLD AUTO: 39.6 %
NRBC BLD AUTO-RTO: 0 /100 WBC
NUCLEAR IGG SER QL IA: NORMAL
PLATELET # BLD AUTO: 238 THOU/MM3 (ref 130–400)
PMV BLD AUTO: 9.4 FL (ref 9.4–12.4)
POTASSIUM SERPL-SCNC: 4.8 MEQ/L (ref 3.5–5.2)
POTASSIUM SERPL-SCNC: 5.2 MEQ/L (ref 3.5–5.2)
RBC # BLD AUTO: 3.09 MILL/MM3 (ref 4.2–5.4)
RETICS # AUTO: 48 THOU/MM3 (ref 20–115)
RETICS/RBC NFR AUTO: 1.5 % (ref 0.5–2)
REVIEWED BY: NORMAL
SEGMENTED NEUTROPHILS ABSOLUTE COUNT: 7.4 THOU/MM3 (ref 1.8–7.7)
SMEAR REVIEW: NORMAL
SODIUM SERPL-SCNC: 138 MEQ/L (ref 135–145)
SODIUM SERPL-SCNC: 138 MEQ/L (ref 135–145)
TIBC SERPL-MCNC: 186 UG/DL (ref 171–450)
WBC # BLD AUTO: 18.6 THOU/MM3 (ref 4.8–10.8)

## 2023-12-28 PROCEDURE — 97116 GAIT TRAINING THERAPY: CPT

## 2023-12-28 PROCEDURE — 94761 N-INVAS EAR/PLS OXIMETRY MLT: CPT

## 2023-12-28 PROCEDURE — 85046 RETICYTE/HGB CONCENTRATE: CPT

## 2023-12-28 PROCEDURE — 6360000002 HC RX W HCPCS

## 2023-12-28 PROCEDURE — 94640 AIRWAY INHALATION TREATMENT: CPT

## 2023-12-28 PROCEDURE — 82728 ASSAY OF FERRITIN: CPT

## 2023-12-28 PROCEDURE — 90792 PSYCH DIAG EVAL W/MED SRVCS: CPT | Performed by: PSYCHIATRY & NEUROLOGY

## 2023-12-28 PROCEDURE — 2700000000 HC OXYGEN THERAPY PER DAY

## 2023-12-28 PROCEDURE — 99232 SBSQ HOSP IP/OBS MODERATE 35: CPT | Performed by: INTERNAL MEDICINE

## 2023-12-28 PROCEDURE — 85025 COMPLETE CBC W/AUTO DIFF WBC: CPT

## 2023-12-28 PROCEDURE — 2580000003 HC RX 258: Performed by: NURSE PRACTITIONER

## 2023-12-28 PROCEDURE — 93010 ELECTROCARDIOGRAM REPORT: CPT | Performed by: INTERNAL MEDICINE

## 2023-12-28 PROCEDURE — 2580000003 HC RX 258

## 2023-12-28 PROCEDURE — 6370000000 HC RX 637 (ALT 250 FOR IP): Performed by: NURSE PRACTITIONER

## 2023-12-28 PROCEDURE — 6360000002 HC RX W HCPCS: Performed by: NURSE PRACTITIONER

## 2023-12-28 PROCEDURE — 97530 THERAPEUTIC ACTIVITIES: CPT

## 2023-12-28 PROCEDURE — 97166 OT EVAL MOD COMPLEX 45 MIN: CPT

## 2023-12-28 PROCEDURE — 93005 ELECTROCARDIOGRAM TRACING: CPT

## 2023-12-28 PROCEDURE — 94669 MECHANICAL CHEST WALL OSCILL: CPT

## 2023-12-28 PROCEDURE — 2500000003 HC RX 250 WO HCPCS

## 2023-12-28 PROCEDURE — 80048 BASIC METABOLIC PNL TOTAL CA: CPT

## 2023-12-28 PROCEDURE — 83550 IRON BINDING TEST: CPT

## 2023-12-28 PROCEDURE — 87177 OVA AND PARASITES SMEARS: CPT

## 2023-12-28 PROCEDURE — 36415 COLL VENOUS BLD VENIPUNCTURE: CPT

## 2023-12-28 PROCEDURE — 97535 SELF CARE MNGMENT TRAINING: CPT

## 2023-12-28 PROCEDURE — 6360000002 HC RX W HCPCS: Performed by: INTERNAL MEDICINE

## 2023-12-28 PROCEDURE — 94660 CPAP INITIATION&MGMT: CPT

## 2023-12-28 PROCEDURE — 2060000000 HC ICU INTERMEDIATE R&B

## 2023-12-28 PROCEDURE — 83540 ASSAY OF IRON: CPT

## 2023-12-28 PROCEDURE — 97162 PT EVAL MOD COMPLEX 30 MIN: CPT

## 2023-12-28 PROCEDURE — 99233 SBSQ HOSP IP/OBS HIGH 50: CPT | Performed by: INTERNAL MEDICINE

## 2023-12-28 RX ADMIN — ALBUTEROL SULFATE 2.5 MG: 2.5 SOLUTION RESPIRATORY (INHALATION) at 12:15

## 2023-12-28 RX ADMIN — TRIAMCINOLONE ACETONIDE: 1 CREAM TOPICAL at 21:05

## 2023-12-28 RX ADMIN — MICONAZOLE NITRATE: 2 POWDER TOPICAL at 21:05

## 2023-12-28 RX ADMIN — CETIRIZINE HYDROCHLORIDE 5 MG: 10 TABLET ORAL at 07:53

## 2023-12-28 RX ADMIN — CLONAZEPAM 1 MG: 1 TABLET ORAL at 12:10

## 2023-12-28 RX ADMIN — TRIAMCINOLONE ACETONIDE: 1 CREAM TOPICAL at 07:59

## 2023-12-28 RX ADMIN — ALBUTEROL SULFATE 2.5 MG: 2.5 SOLUTION RESPIRATORY (INHALATION) at 21:17

## 2023-12-28 RX ADMIN — ALBUTEROL SULFATE 2.5 MG: 2.5 SOLUTION RESPIRATORY (INHALATION) at 09:59

## 2023-12-28 RX ADMIN — ACETAMINOPHEN 650 MG: 325 TABLET ORAL at 21:08

## 2023-12-28 RX ADMIN — OXYCODONE HYDROCHLORIDE AND ACETAMINOPHEN 500 MG: 500 TABLET ORAL at 07:53

## 2023-12-28 RX ADMIN — OLANZAPINE 15 MG: 5 TABLET, FILM COATED ORAL at 21:08

## 2023-12-28 RX ADMIN — AZITHROMYCIN MONOHYDRATE 500 MG: 500 INJECTION, POWDER, LYOPHILIZED, FOR SOLUTION INTRAVENOUS at 02:05

## 2023-12-28 RX ADMIN — BUDESONIDE 500 MCG: 0.5 INHALANT RESPIRATORY (INHALATION) at 21:17

## 2023-12-28 RX ADMIN — MICONAZOLE NITRATE: 2 POWDER TOPICAL at 07:53

## 2023-12-28 RX ADMIN — RIVASTIGMINE TARTRATE 6 MG: 1.5 CAPSULE ORAL at 21:06

## 2023-12-28 RX ADMIN — ASPIRIN 81 MG: 81 TABLET, COATED ORAL at 07:53

## 2023-12-28 RX ADMIN — TIZANIDINE 2 MG: 4 TABLET ORAL at 07:52

## 2023-12-28 RX ADMIN — Medication 1 TABLET: at 07:52

## 2023-12-28 RX ADMIN — SODIUM CHLORIDE, PRESERVATIVE FREE 10 ML: 5 INJECTION INTRAVENOUS at 07:57

## 2023-12-28 RX ADMIN — HYDRALAZINE HYDROCHLORIDE 50 MG: 50 TABLET ORAL at 12:02

## 2023-12-28 RX ADMIN — RIVASTIGMINE TARTRATE 6 MG: 1.5 CAPSULE ORAL at 07:52

## 2023-12-28 RX ADMIN — GABAPENTIN 300 MG: 300 CAPSULE ORAL at 07:52

## 2023-12-28 RX ADMIN — CEFTRIAXONE SODIUM 1000 MG: 1 INJECTION, POWDER, FOR SOLUTION INTRAMUSCULAR; INTRAVENOUS at 01:32

## 2023-12-28 RX ADMIN — TIZANIDINE 2 MG: 4 TABLET ORAL at 21:06

## 2023-12-28 RX ADMIN — LEVOTHYROXINE SODIUM 125 MCG: 0.12 TABLET ORAL at 07:53

## 2023-12-28 RX ADMIN — PANTOPRAZOLE SODIUM 40 MG: 40 TABLET, DELAYED RELEASE ORAL at 05:00

## 2023-12-28 RX ADMIN — SODIUM CHLORIDE, PRESERVATIVE FREE 10 ML: 5 INJECTION INTRAVENOUS at 21:06

## 2023-12-28 RX ADMIN — TORSEMIDE 40 MG: 20 TABLET ORAL at 12:02

## 2023-12-28 RX ADMIN — GABAPENTIN 300 MG: 300 CAPSULE ORAL at 21:08

## 2023-12-28 RX ADMIN — CARVEDILOL 25 MG: 25 TABLET, FILM COATED ORAL at 07:53

## 2023-12-28 RX ADMIN — VENLAFAXINE HYDROCHLORIDE 225 MG: 150 CAPSULE, EXTENDED RELEASE ORAL at 07:52

## 2023-12-28 RX ADMIN — ENOXAPARIN SODIUM 30 MG: 100 INJECTION SUBCUTANEOUS at 07:51

## 2023-12-28 RX ADMIN — Medication 200 UNITS: at 07:51

## 2023-12-28 RX ADMIN — ARIPIPRAZOLE 10 MG: 10 TABLET ORAL at 07:52

## 2023-12-28 RX ADMIN — ALBUTEROL SULFATE 2.5 MG: 2.5 SOLUTION RESPIRATORY (INHALATION) at 16:22

## 2023-12-28 RX ADMIN — BUDESONIDE 500 MCG: 0.5 INHALANT RESPIRATORY (INHALATION) at 09:59

## 2023-12-28 RX ADMIN — ERYTHROMYCIN: 5 OINTMENT OPHTHALMIC at 00:40

## 2023-12-28 RX ADMIN — FERROUS SULFATE TAB 325 MG (65 MG ELEMENTAL FE) 325 MG: 325 (65 FE) TAB at 07:53

## 2023-12-28 RX ADMIN — ERYTHROMYCIN: 5 OINTMENT OPHTHALMIC at 05:00

## 2023-12-28 RX ADMIN — CARVEDILOL 25 MG: 25 TABLET, FILM COATED ORAL at 16:33

## 2023-12-28 RX ADMIN — ENOXAPARIN SODIUM 30 MG: 100 INJECTION SUBCUTANEOUS at 21:06

## 2023-12-28 RX ADMIN — ATORVASTATIN CALCIUM 20 MG: 20 TABLET, FILM COATED ORAL at 07:53

## 2023-12-28 RX ADMIN — SPIRONOLACTONE 50 MG: 25 TABLET ORAL at 16:33

## 2023-12-28 RX ADMIN — Medication 1 CAPSULE: at 07:53

## 2023-12-28 RX ADMIN — POLYETHYLENE GLYCOL 3350 17 G: 17 POWDER, FOR SOLUTION ORAL at 21:16

## 2023-12-28 RX ADMIN — METHYLPREDNISOLONE SODIUM SUCCINATE 40 MG: 40 INJECTION, POWDER, FOR SOLUTION INTRAMUSCULAR; INTRAVENOUS at 05:00

## 2023-12-28 RX ADMIN — HYDRALAZINE HYDROCHLORIDE 50 MG: 50 TABLET ORAL at 21:08

## 2023-12-28 RX ADMIN — CLONAZEPAM 1 MG: 1 TABLET ORAL at 21:06

## 2023-12-28 ASSESSMENT — PAIN SCALES - GENERAL: PAINLEVEL_OUTOF10: 0

## 2023-12-28 NOTE — PROGRESS NOTES
Consult for spiritual support. Patient requested chaplains Bill Phan or Zhen Hartley but was amenable to this staff's visit. Jasmin Verduzco was in her 4K bed and on HFNC. She reports her  will be having surgery in early January. Additionally. , they have raised two teens grandsons for the past six years. They have a daughter and son who live just far enough that visits take some effort. She is growing weary of the \"hurry up and wait\" of testing and attempts to discover what is going on with her health. She reports that CHF DX came a couple years ago and she has been struggling with the progression of her illness. 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 We shared a luis and prayer- for healing and peace.

## 2023-12-28 NOTE — CONSULTS
Department of Psychiatry   Psychiatric Assessment      Thank you very much for allowing us to participate in the care of this patient. Reason for Consult:  r/o hypomania    HISTORY OF PRESENT ILLNESS:          The patient is a 68 y.o. female with significant history of mood disorder who is admitted medically for SOB. Patient mentions that she has extensive history of bipolar disorder which is managed well by her outpatient psychiatrist.  Reports that she did go to a hypomanic episode when she tried steroids in the past and they wanted psychiatry to be on board just to be cautious. Reports that she is tolerating steroids well this time. Family indicated that they did not observe any hypomanic symptoms. Discussed at length about her medications including Abilify and Zyprexa that she is on. Reports that she does stable when she is taking these medications. No recent episodes of laurie or hypomania. No recent episodes of any significant depression. Denies any suicidal ideation plan or intent today. Discussed with her that we will continue to monitor and call us back if family starts noticing any concerns regarding hypomanic symptoms  Patient and family are agreeable to the plan. PSYCHIATRIC HISTORY:      Outpatient psychiatric provider:  Dr Don Ham in [place]  Suicide attempts: denies  Inpatient psychiatric admissions: had few admissions in past      [facility]etime Psychiatric Review of Systems         Obsessions and Compulsions: Denies       Laurie or Hypomania: hypomanic     Hallucinations: Denies     Panic Attacks:  Denies     Delusions:  Denies     Phobias:  Denies     Trauma: Denies    Prior to Admission medications    Medication Sig Start Date End Date Taking?  Authorizing Provider   senna (SENOKOT) 8.6 MG tablet Take 1 tablet by mouth daily   Yes Ghassan Cheek MD   OLANZapine (ZYPREXA) 15 MG tablet Take 1 tablet by mouth nightly 12/15/23   ProviderGhassan MD   rivastigmine (EXELON) 6 MG

## 2023-12-28 NOTE — DISCHARGE INSTR - COC
Continuity of Care Form    Patient Name: Irene Silveira   :  1950  MRN:  [id number]    [address] date:  2023  Discharge date:  23    Code Status Order: Full Code   Advance Directives:     Admitting Physician:  Mohsen Leon MD  PCP: Saira Morales DO    Discharging Nurse: [facility] Unit/Room#: 4K-06/006-A  Discharging Unit Phone Number: [phone]    Emergency Contact:   Extended Emergency Contact Information  Primary Emergency Contact: Alex HENRIQUEZ  Address: [address] [place] [address], [address] [place] [address] Ave [facility] of [address] [place] [place] Phone: [phone]  Mobile Phone: [phone]  Relation: Spouse  Secondary Emergency Contact: Kat Soria   [facility] of [address] Phone: [phone]  Mobile Phone: [phone]  Relation: Child    Past Surgical History:  Past Surgical History:   Procedure Laterality Date    ANKLE SURGERY      left    CATARACT REMOVAL      Both eyes      SECTION      x 3    FOOT SURGERY      Left     HIP SURGERY      HYSTERECTOMY (CERVIX STATUS UNKNOWN)  1984    Total     SATHYA STEROTACTIC LOC BREAST BIOPSY RIGHT Right     Benign    OVARY REMOVAL      several years after hysterectomy    TOTAL KNEE ARTHROPLASTY Left 10/14/14    TOTAL KNEE ARTHROPLASTY Right 2019    US BREAST BIOPSY W LOC DEVICE 1ST LESION LEFT Left 2017    Benign , fibroadenoma       Immunization History:   Immunization History   Administered Date(s) Administered    COVID-19, MODERNA BLUE border, Primary or Immunocompromised, (age 12y+), IM, 100 mcg/0.5mL 2021, 2021, 2021, 2022    COVID-19, [place] Vaccine, Vaccine Unspecified 2021, 2021, 2021    PPD Test 2018       Active Problems:  Patient Active Problem List   Diagnosis Code    Hypothyroid E03.9    Dyslipidemia E78.5    Bipolar disorder ([address]) F31.9    Post-menopausal Z78.0    HTN (hypertension) I10    Obesity (BMI 30-39. 9) E66.9    RANDY on CPAP G47.33    ACE Unplanned Readmission:  21           Discharging to Facility/ Agency   Name: Alejandra Mauro  Address: [address] [place] [address], [place] [address]   Phone: [phone]   Fax: [phone]     Dialysis Facility (if applicable)   Name:  Address:  Dialysis Schedule:  Phone:  Fax:    / signature: Electronically signed by GABRIELA Dobbs on 12/28/23 at 2:19 PM EST    PHYSICIAN SECTION    Prognosis: Good    Condition at Discharge: Stable    Rehab Potential (if transferring to Rehab): Good    Recommended Labs or Other Treatments After Discharge: cbc with diff 1 week     Physician Certification: I certify the above information and transfer of Alison Mckeon  is necessary for the continuing treatment of the diagnosis listed and that she requires Skilled Nursing Facility for greater 30 days.     Update Admission H&P: No change in H&P    PHYSICIAN SIGNATURE:  Electronically signed by Jaden Bates DO on 12/30/23 at 1:03 PM EST

## 2023-12-28 NOTE — PLAN OF CARE
Problem: Discharge Planning  Goal: Discharge to home or other facility with appropriate resources  12/28/2023 1207 by GABRIELA Unger  Outcome: Progressing

## 2023-12-28 NOTE — CARE COORDINATION
CM Note  Prefers new [place] (precert, therapy following), new BIPAP 18/6 (no BUR); collaborated w PulmonaryRadha SW  Electronically signed by Gretel Lacy RN on 12/28/2023 at 11:33 AM

## 2023-12-28 NOTE — PROGRESS NOTES
[facility]  INPATIENT PHYSICAL THERAPY  EVALUATION  STR ICU STEPDOWN TELEMETRY 4K - 4K-06/006-A    Time In: 8030  Time Out: 5081  Timed Code Treatment Minutes: 13 Minutes  Minutes: 22          Date: 2023  Patient Name: Irene Silveira,  Gender:  female        MRN: [id number]  : 1950  (68 y.o.)      Referring Practitioner: Harriet Brizuela DO  Diagnosis: Acute respiratory failure with hypoxia  Additional Pertinent Hx: 79-year-old  female presented to [facility] ER 2023 via EMS with chief complaint of shortness of breath and wheezing. Patient has past medical history significant for lifetime non-smoker, RANDY-CPAP, asthma, essential hypertension [facility]  nonobstructive CAD, HFpEF-ECHO 2023 LVEF 74-67% grade 1 diastolic dysfunction, mild TR, essential hypertension, hypothyroidism, GERD, CKD stage 3a (baseline CREA 1.0-1.5) bipolar depression. Restrictions/Precautions:  Restrictions/Precautions: Fall Risk, Contact Precautions    Subjective:  Chart Reviewed: Yes  Patient assessed for rehabilitation services?: Yes  Subjective: RN approved session. Hazel Brooks  Pt pleasant and agreeable to therapy    General:  Overall Orientation Status: Within Functional Limits  Vision: Within Functional Limits  Hearing: Within functional limits       Pain: 0/10: denies pain     Vitals: Oxygen: 6L NC ; PO2> 90% throughout     Social/Functional History:    Lives With: Spouse  Type of Home: House  Home Layout: One level  Home Access: Stairs to enter with rails  Entrance Stairs - Number of Steps: 2  Entrance Stairs - Rails: Right  Home Equipment: Rollator, Electric scooter             ADL Assistance: Independent  Homemaking Assistance: Independent  Ambulation Assistance: Independent  Transfer Assistance: Independent    Active : No     Additional Comments: ind no AD in the home, takes 4 ww for longer distances    OBJECTIVE:  Range of Motion:  Bilateral Lower Extremity: WFL    Strength:  Bilateral Lower mobility and in order to decrease fall risk and return pt to PLOF.   Therapy Prognosis: Good    Requires PT Follow-Up: Yes    Discharge Recommendations:  Discharge Recommendations: Home with assist PRN    Patient Education:      .    Patient Education  Education Given To: Patient  Education Provided: Role of Therapy, Plan of Care  Education Method: Verbal  Barriers to Learning: None  Education Outcome: Verbalized understanding       Equipment Recommendations:  Equipment Needed: No    Plan:  Current Treatment Recommendations: Strengthening, Balance training, Functional mobility training, Transfer training, Endurance training, Gait training, Stair training, Neuromuscular re-education, Home exercise program, Safety education & training, Patient/Caregiver education & training, Therapeutic activities  General Plan:  (5x GM)    Goals:  Patient Goals : none stated  Short Term Goals  Time Frame for Short Term Goals: by discharge  Short Term Goal 1: bed mobility with HOB flat, no rails, mod I for increased functional ind  Short Term Goal 2: sit<>Stand from various surfaces with LRD mod I for safe transfers  Short Term Goal 3: ambulate 100' with LRD mod I for safe household distances  Short Term Goal 4: navigate 2 steps with LRD mod I for safe enter/exit of home  Long Term Goals  Time Frame for Long Term Goals : NA d/t short ELOS    Following session, patient left in safe position with all fall risk precautions in place.    Leo Espinal ([place]) PT, DPT

## 2023-12-28 NOTE — PLAN OF CARE
Problem: Discharge Planning  Goal: Discharge to home or other facility with appropriate resources  Outcome: Progressing     Problem: Safety - Adult  Goal: Free from fall injury  Outcome: Progressing     Problem: Chronic Conditions and Co-morbidities  Goal: Patient's chronic conditions and co-morbidity symptoms are monitored and maintained or improved  Outcome: Progressing     Problem: Pain  Goal: Verbalizes/displays adequate comfort level or baseline comfort level  Outcome: Progressing     Problem: Skin/Tissue Integrity  Goal: Absence of new skin breakdown  Description: 1. Monitor for areas of redness and/or skin breakdown  2. Assess vascular access sites hourly  3. Every 4-6 hours minimum:  Change oxygen saturation probe site  4. Every 4-6 hours:  If on nasal continuous positive airway pressure, respiratory therapy assess nares and determine need for appliance change or resting period. Outcome: Progressing     Problem: Respiratory - Adult  Goal: Achieves optimal ventilation and oxygenation  Outcome: Progressing     Problem: ABCDS Injury Assessment  Goal: Absence of physical injury  Outcome: Progressing     Problem: Nutrition Deficit:  Goal: Optimize nutritional status  Outcome: Progressing   Care plan reviewed with patient and . Patient and  verbalize understanding of the plan of care and contribute to goal setting.

## 2023-12-28 NOTE — FLOWSHEET NOTE
12/28/23 1027   Safe Environment   Safety Measures Standard Safety Measures; Other (comment);Gripper socks; Family at bedside; Fall prevention (comment); Call light within reach  (VN Safety round)     Patient responds to VN appropriately and gives permission for camera to be turned on at this time.  at bedside. D/C concerns expressed by pt and   and pt and family assured that home o2 testing and PT eval will be completed before d/c. Pt and family deny having any further questions at this time and deny any immediate needs or concerns at this time. Fall prevention education reinforced at this time. Virtual to continue to round on and educate pt as appropriate.

## 2023-12-28 NOTE — PROGRESS NOTES
Internal Medicine Resident Progress Note    Patient:  Josefine Osgood    YOB: 1950  Unit/Bed:4K-06/006-A  MRN: [id number]    Acct: [de-identified]   PCP: Loretta Hernandez DO    Date of Admission: 12/24/2023      Assessment/Plan:    #Acute hypoxic, hypercapnic respiratory failure: Likely 2/2 developing pneumonia. Possible reactive airway disease. CRP 9.05. WBC 28.6. ESR 40. Recent steroid injection could be contributing to counts. Unlikely HF.  CTA chest shows moderate atelectasis bilaterally, consolidation in left base. Required BiPAP to maintain O2 sat >90%. ABG shows mild respiratory acidosis. Oxygen requirements not consistent to degree of infiltrate. Given eosinophilia expanding differential.  Still has mild respiratory acidosis. Pulmonology following, Solu-Medrol 40 daily. To trial BiPAP. Albuterol nebs. Ceftriaxone 1 g daily                Azithromycin 500 mg daily    Continue to wean O2 as tolerated     #Pityriasis rosea: Notes a pruritic rash that first began back in October. She received a steroid injection at that time and states that the rash is gone completely for 10 to 14 days. There was some question as outpatient whether lamotrigine or statin was playing a role. There was no diagnosis of pityriasis rosea. Unrelieved by Eucerin cream.  Received Depo-Medrol 80 mg 12/20. States this improved for couple days, but it has come back. Denies any myalgias. Rash appears to be improving, pruritus resolved. topical steroid cream.    Started on Solu-Medrol 40 daily by pulm for reactive airway    disease as above. #Eosinophilia: Eosinophil count originally 10. 4. down to 7.6 today but increased from yesterday. Pityriasis above could be playing a role. Differential includes eosinophilic pneumonia, EGPA, ABPA. Also considering sarcoidosis and lupus. C3, C4 negative.   IgE 1883, urine protein to Course:  Alison Mckeon is a 73-year-old  female presented to [facility] ER 12/24/2023 via EMS with chief complaint of shortness of breath and wheezing.        Expand All Collapse All                       Internal Medicine Resident  Progress Note        Patient:  Alison Mckeon                            Unit/Bed:-09/009-A  YOB: 1950  MRN: [id number]            Acct: [id number]   PCP: Gaurav Reddy DO  Date of Admission: 12/24/2023  Date of Service: Pt seen/examined on 12/27/23        Assessment/Plan:  #Acute hypoxic, hypercapnic respiratory failure: Likely 2/2 developing pneumonia.  Possible reactive airway disease.  CRP 9.05.  WBC 22.9.  ESR 40.  Recent steroid injection could be contributing to counts.  Unlikely HF.  CTA chest shows moderate atelectasis bilaterally, consolidation in left base.  Required BiPAP to maintain O2 sat >90%.  ABG shows mild respiratory acidosis.  Oxygen requirements not consistent to degree of infiltrate.  Given eosinophilia expanding differential.  Still has mild respiratory acidosis.                Pulmonology following, Solu-Medrol 40 daily.                To trial BiPAP.                Albuterol nebs.    Ceftriaxone 1 g daily                Azithromycin 500 mg daily    Continue to wean O2 as tolerated     #Pityriasis rosea: Notes a pruritic rash that first began back in October.  She received a steroid injection at that time and states that the rash is gone completely for 10 to 14 days.  There was some question as outpatient whether lamotrigine or statin was playing a role.  There was no diagnosis of pityriasis rosea.  Unrelieved by Eucerin cream.  Received Depo-Medrol 80 mg 12/20.  States this improved for couple days, but it has come back.  Denies any myalgias.                 Rash appears to be improving, pruritus resolved.                 topical steroid cream.                Started on Solu-Medrol 40 daily by pulm for reactive airway disease as above.

## 2023-12-28 NOTE — PROGRESS NOTES
[place]      Patient - Alison Mckeon   MRN -  [id number]   [facility] # - [id number]   - 1950      Date of Admission -  2023 10:25 PM  Date of evaluation -  2023  Room - -006-A   Hospital Day - 3  Consulting - Severo Car MD Primary Care Physician - Gaurav Reddy DO     Problem List      Active Hospital Problems    Diagnosis Date Noted    RANDY on CPAP [G47.33] 2015     Priority: High    Obesity (BMI 30-39.9) [E66.9] 2014     Priority: Medium    HTN (hypertension) [I10] 2011     Priority: Medium    Hypothyroid [E03.9] 2011     Priority: Low    Eosinophilia [D72.10] 2023    Acute respiratory failure with hypoxia (HCC) [J96.01] 09/15/2021    Pneumonia due to infectious organism [J18.9] 09/15/2021    Acute on chronic diastolic congestive heart failure (HCC) [I50.33]     ACE inhibitor-aggravated angioedema [T78.3XXA, T46.4X5A] 2016    Dyslipidemia [E78.5] 2011    Bipolar disorder (HCC) [F31.9] 2011     Reason for Consult    For management of PNA and hypoxic resp failure  HPI   History Obtained From: Patient and electronic medical record.    Alison Mckeon is a 73 y.o. female  was initially admitted under hospitalist service. Pulmonary medicine was consulted for further management of Pneumonia and hypoxic respiratory failure.    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  She developed worsening of shortness of breath while opening of her [place] gifts along with the rest of the family members yesterday morning.  She was noted to  Oral BID    spironolactone  50 mg Oral Daily    levothyroxine  125 mcg Oral Daily    tiZANidine  2 mg Oral BID    torsemide  40 mg Oral Daily    venlafaxine  225 mg Oral Daily    vitamin E  200 Units Oral Daily    sodium chloride flush  5-40 mL IntraVENous 2 times per day    enoxaparin  30 mg SubCUTAneous Q12H    cefTRIAXone (ROCEPHIN) IV  1,000 mg IntraVENous Q24H    azithromycin  500 mg IntraVENous Q24H    miconazole   Topical BID    triamcinolone   Topical BID     clonazePAM, sodium chloride flush, sodium chloride, ondansetron **OR** ondansetron, polyethylene glycol, acetaminophen **OR** acetaminophen  IV Drips/Infusions   sodium chloride       Home Medications  Medications Prior to Admission: senna (SENOKOT) 8.6 MG tablet, Take 1 tablet by mouth daily  OLANZapine (ZYPREXA) 15 MG tablet, Take 1 tablet by mouth nightly  rivastigmine (EXELON) 6 MG capsule, Take 1 capsule by mouth 2 times daily  omeprazole (PRILOSEC) 20 MG delayed release capsule, Take 1 capsule by mouth every morning (before breakfast)  SYNTHROID 125 MCG tablet, Take 1 tablet by mouth daily Take with water on an empty stomach- wait 30 minutes before eating or taking other meds.   ARIPiprazole (ABILIFY) 10 MG tablet,   triamcinolone (KENALOG) 0.1 % lotion,   Probiotic Product (PROBIOTIC DAILY PO), Take by mouth  carvedilol (COREG) 25 MG tablet, Take 1 tablet by mouth 2 times daily  spironolactone (ALDACTONE) 50 MG tablet, Take 1 tablet by mouth daily  hydrALAZINE (APRESOLINE) 50 MG tablet, Take 1 tablet by mouth 3 times daily Along with Hydralazine 25MG to equal 75MG three times a day  torsemide (DEMADEX) 20 MG tablet, Take 2 tablets by mouth daily  atorvastatin (LIPITOR) 20 MG tablet, Take 1 tablet by mouth daily  Polyethylene Glycol 3350 (MIRALAX PO), Take by mouth in the morning and at bedtime  cetirizine (ZYRTEC) 10 MG tablet, Take 1 tablet by mouth daily  vitamin E 200 units capsule, Take 1 capsule by mouth daily  aspirin 81 MG EC tablet, Take 1

## 2023-12-28 NOTE — CARE COORDINATION
12/28/23, 12:02 PM EST  DISCHARGE PLANNING EVALUATION    SW was notified that patient and spouse are wanting a referral to [facility]. SW spoke with patient and spouse about discharge planning. SW explained that SW was notified that they are wanting referral to [facility]. Patient reported that they are just at the stage where they are thinking about it. SW answered questions about ECF versus HH. Patient reported that they would like to talk to their daughter about which ECF to go to. They are considering [facility] and [facility]. They will let SW know tomorrow morning on their decision. 12/28/23, 1:29 PM EST  DISCHARGE PLANNING EVALUATION    SW received a voicemail from spouse Edwin Joiner and he reported that they have decided on an ECF. SW called Edwin Joiner and he reported that they would like referral to [facility]. SW called and made referral to [facility] and notified them of patient needing bipap. SW faxed bipap order to Suzi at [facility]. 12/28/23, 2:19 PM EST  DISCHARGE PLANNING EVALUATION    SW received call from Suzi at [facility] and he reported that they will be able to accept patient at discharge and will start precert today. SW called spouse Edwin Joiner and notified him of [facility] accepting at discharge.

## 2023-12-28 NOTE — FLOWSHEET NOTE
12/28/23 1739   Safe Environment   Safety Measures Standard Safety Measures; Other (comment); Call light within reach  (VN Safety round)     VN called into room, introduced self and role. Pt does not  respond to verbal prompts at this time. VN verbalizes that camera will be turned on at this time to complete safety check. Camera turned on and pt observed to be OOB to chair, awake, call bell within reach, no acute distress noted. No safety concerns noted at this time. Virtual to continue to round and educate as appropriate.

## 2023-12-28 NOTE — PROGRESS NOTES
[facility] ICU STEPDOWN TELEMETRY 4K  EVALUATION    Time:   Time In: 7762  Time Out: 6797  Timed Code Treatment Minutes: 23 Minutes  Minutes: 32          Date: 2023  Patient Name: Rubi Garcia,   Gender: female      MRN: [id number]  : 1950  (68 y.o.)  Referring Practitioner: Theodore Jacinto DO  Diagnosis: acute repiratory failure with hypoxia  Additional Pertinent Hx: Brigid Borjas is a 77-year-old  female presented to [facility] ER 2023 via EMS with chief complaint of shortness of breath and wheezing. Patient has past medical history significant for lifetime non-smoker, RANDY-CPAP, asthma, essential hypertension [facility]  nonobstructive CAD, HFpEF-ECHO 2023 LVEF 84-26% grade 1 diastolic dysfunction, mild TR, essential hypertension, hypothyroidism, GERD, CKD stage 3a (baseline CREA 1.0-1.5) bipolar depression. Restrictions/Precautions:  Restrictions/Precautions: Fall Risk, Contact Precautions    Subjective  Chart Reviewed: Yes, Orders, Progress Notes  Patient assessed for rehabilitation services?: Yes    Subjective: Nurse approved OT eval. Pt sitting on BSC with teach and nurse present on OT arrival. Pt is pleasant and cooperative and agreeable to OT eval. pt reprots \"this is the most I have done since I have been here\"    Pain: does not c/o pain    Vitals: Oxygen: on 6L O2. 95% at rest, 88-91% with activity.  Cues for PLB in standing while brushing teeth to recover from 88% to 91%    Social/Functional History:  Lives With: Spouse  Type of Home: House  Home Layout: One level  Home Access: Stairs to enter with rails  Entrance Stairs - Number of Steps: 2  Entrance Stairs - Rails: Right  Home Equipment: Rollator, Electric scooter   Bathroom Shower/Tub: Walk-in shower, Shower chair with back       ADL Assistance: [address] Independent  Ambulation Assistance: Independent  Transfer Assistance: Independent    Active

## 2023-12-29 ENCOUNTER — APPOINTMENT (OUTPATIENT)
Dept: GENERAL RADIOLOGY | Age: 73
DRG: 193 | End: 2023-12-29
Payer: MEDICARE

## 2023-12-29 PROBLEM — D72.119 HYPEREOSINOPHILIC SYNDROME: Status: ACTIVE | Noted: 2023-12-29

## 2023-12-29 LAB
ANION GAP SERPL CALC-SCNC: 7 MEQ/L (ref 8–16)
ANISOCYTOSIS BLD QL SMEAR: PRESENT
BACTERIA SPEC AEROBE CULT: ABNORMAL
BACTERIA SPEC ANAEROBE CULT: ABNORMAL
BASOPHILS ABSOLUTE: 0 THOU/MM3 (ref 0–0.1)
BASOPHILS NFR BLD AUTO: 0.3 %
BUN SERPL-MCNC: 33 MG/DL (ref 7–22)
CALCIUM SERPL-MCNC: 9.1 MG/DL (ref 8.5–10.5)
CHLORIDE SERPL-SCNC: 103 MEQ/L (ref 98–111)
CO2 SERPL-SCNC: 31 MEQ/L (ref 23–33)
CREAT SERPL-MCNC: 1.1 MG/DL (ref 0.4–1.2)
DEPRECATED RDW RBC AUTO: 50.5 FL (ref 35–45)
EOSINOPHIL NFR BLD AUTO: 56.9 %
EOSINOPHILS ABSOLUTE: 8.7 THOU/MM3 (ref 0–0.4)
ERYTHROCYTE [DISTWIDTH] IN BLOOD BY AUTOMATED COUNT: 13.6 % (ref 11.5–14.5)
GFR SERPL CREATININE-BSD FRML MDRD: 53 ML/MIN/1.73M2
GLUCOSE SERPL-MCNC: 111 MG/DL (ref 70–108)
GRAM STN SPEC: ABNORMAL
HCT VFR BLD AUTO: 33 % (ref 37–47)
HGB BLD-MCNC: 9.9 GM/DL (ref 12–16)
IMM GRANULOCYTES # BLD AUTO: 0.08 THOU/MM3 (ref 0–0.07)
IMM GRANULOCYTES NFR BLD AUTO: 0.5 %
LDH SERPL L TO P-CCNC: 258 U/L (ref 100–190)
LYMPHOCYTES ABSOLUTE: 2.3 THOU/MM3 (ref 1–4.8)
LYMPHOCYTES NFR BLD AUTO: 14.8 %
MCH RBC QN AUTO: 30.4 PG (ref 26–33)
MCHC RBC AUTO-ENTMCNC: 30 GM/DL (ref 32.2–35.5)
MCV RBC AUTO: 101.2 FL (ref 81–99)
MONOCYTES ABSOLUTE: 0.7 THOU/MM3 (ref 0.4–1.3)
MONOCYTES NFR BLD AUTO: 4.7 %
MYELOPEROXIDASE AB SER-ACNC: 0 AU/ML (ref 0–19)
NEUTROPHILS NFR BLD AUTO: 22.8 %
NRBC BLD AUTO-RTO: 0 /100 WBC
O+P STL TRI STN: NORMAL
ORGANISM: ABNORMAL
PLATELET # BLD AUTO: 226 THOU/MM3 (ref 130–400)
PMV BLD AUTO: 9.3 FL (ref 9.4–12.4)
POLYCHROMASIA BLD QL SMEAR: ABNORMAL
POTASSIUM SERPL-SCNC: 5.1 MEQ/L (ref 3.5–5.2)
PROTEINASE3 AB SER-ACNC: 0 AU/ML (ref 0–19)
RBC # BLD AUTO: 3.26 MILL/MM3 (ref 4.2–5.4)
SCAN OF BLOOD SMEAR: NORMAL
SEGMENTED NEUTROPHILS ABSOLUTE COUNT: 3.5 THOU/MM3 (ref 1.8–7.7)
SODIUM SERPL-SCNC: 141 MEQ/L (ref 135–145)
VARIANT LYMPHS BLD QL SMEAR: ABNORMAL %
WBC # BLD AUTO: 15.3 THOU/MM3 (ref 4.8–10.8)

## 2023-12-29 PROCEDURE — 6360000002 HC RX W HCPCS

## 2023-12-29 PROCEDURE — 2580000003 HC RX 258

## 2023-12-29 PROCEDURE — 6360000002 HC RX W HCPCS: Performed by: INTERNAL MEDICINE

## 2023-12-29 PROCEDURE — 80048 BASIC METABOLIC PNL TOTAL CA: CPT

## 2023-12-29 PROCEDURE — 97116 GAIT TRAINING THERAPY: CPT

## 2023-12-29 PROCEDURE — 2700000000 HC OXYGEN THERAPY PER DAY

## 2023-12-29 PROCEDURE — 6370000000 HC RX 637 (ALT 250 FOR IP): Performed by: NURSE PRACTITIONER

## 2023-12-29 PROCEDURE — 99232 SBSQ HOSP IP/OBS MODERATE 35: CPT | Performed by: INTERNAL MEDICINE

## 2023-12-29 PROCEDURE — 36415 COLL VENOUS BLD VENIPUNCTURE: CPT

## 2023-12-29 PROCEDURE — 71046 X-RAY EXAM CHEST 2 VIEWS: CPT

## 2023-12-29 PROCEDURE — 97110 THERAPEUTIC EXERCISES: CPT

## 2023-12-29 PROCEDURE — 94660 CPAP INITIATION&MGMT: CPT

## 2023-12-29 PROCEDURE — 6360000002 HC RX W HCPCS: Performed by: NURSE PRACTITIONER

## 2023-12-29 PROCEDURE — 85027 COMPLETE CBC AUTOMATED: CPT

## 2023-12-29 PROCEDURE — 83615 LACTATE (LD) (LDH) ENZYME: CPT

## 2023-12-29 PROCEDURE — 88184 FLOWCYTOMETRY/ TC 1 MARKER: CPT

## 2023-12-29 PROCEDURE — 2060000000 HC ICU INTERMEDIATE R&B

## 2023-12-29 PROCEDURE — 94761 N-INVAS EAR/PLS OXIMETRY MLT: CPT

## 2023-12-29 PROCEDURE — 94640 AIRWAY INHALATION TREATMENT: CPT

## 2023-12-29 PROCEDURE — 2580000003 HC RX 258: Performed by: NURSE PRACTITIONER

## 2023-12-29 PROCEDURE — 88185 FLOWCYTOMETRY/TC ADD-ON: CPT

## 2023-12-29 PROCEDURE — 94669 MECHANICAL CHEST WALL OSCILL: CPT

## 2023-12-29 RX ORDER — PREDNISONE 20 MG/1
40 TABLET ORAL DAILY
Status: DISCONTINUED | OUTPATIENT
Start: 2023-12-30 | End: 2023-12-30 | Stop reason: HOSPADM

## 2023-12-29 RX ORDER — FERROUS SULFATE 325(65) MG
325 TABLET ORAL
Status: DISCONTINUED | OUTPATIENT
Start: 2023-12-31 | End: 2023-12-30 | Stop reason: HOSPADM

## 2023-12-29 RX ORDER — FLUTICASONE PROPIONATE 110 UG/1
2 AEROSOL, METERED RESPIRATORY (INHALATION) 2 TIMES DAILY
Qty: 12 G | Refills: 3 | Status: SHIPPED | OUTPATIENT
Start: 2023-12-29 | End: 2024-12-28

## 2023-12-29 RX ADMIN — ARIPIPRAZOLE 10 MG: 10 TABLET ORAL at 09:18

## 2023-12-29 RX ADMIN — ACETAMINOPHEN 650 MG: 325 TABLET ORAL at 15:45

## 2023-12-29 RX ADMIN — CLONAZEPAM 1 MG: 1 TABLET ORAL at 09:28

## 2023-12-29 RX ADMIN — ASPIRIN 81 MG: 81 TABLET, COATED ORAL at 09:27

## 2023-12-29 RX ADMIN — HYDRALAZINE HYDROCHLORIDE 50 MG: 50 TABLET ORAL at 15:45

## 2023-12-29 RX ADMIN — TORSEMIDE 40 MG: 20 TABLET ORAL at 09:20

## 2023-12-29 RX ADMIN — TRIAMCINOLONE ACETONIDE: 1 CREAM TOPICAL at 09:28

## 2023-12-29 RX ADMIN — BUDESONIDE 500 MCG: 0.5 INHALANT RESPIRATORY (INHALATION) at 16:35

## 2023-12-29 RX ADMIN — VENLAFAXINE HYDROCHLORIDE 225 MG: 150 CAPSULE, EXTENDED RELEASE ORAL at 09:18

## 2023-12-29 RX ADMIN — HYDRALAZINE HYDROCHLORIDE 50 MG: 50 TABLET ORAL at 09:20

## 2023-12-29 RX ADMIN — GABAPENTIN 300 MG: 300 CAPSULE ORAL at 09:17

## 2023-12-29 RX ADMIN — Medication 1 TABLET: at 09:18

## 2023-12-29 RX ADMIN — ALBUTEROL SULFATE 2.5 MG: 2.5 SOLUTION RESPIRATORY (INHALATION) at 21:23

## 2023-12-29 RX ADMIN — TIZANIDINE 2 MG: 4 TABLET ORAL at 22:01

## 2023-12-29 RX ADMIN — ALBUTEROL SULFATE 2.5 MG: 2.5 SOLUTION RESPIRATORY (INHALATION) at 16:35

## 2023-12-29 RX ADMIN — METHYLPREDNISOLONE SODIUM SUCCINATE 40 MG: 40 INJECTION, POWDER, FOR SOLUTION INTRAMUSCULAR; INTRAVENOUS at 06:30

## 2023-12-29 RX ADMIN — CEFTRIAXONE SODIUM 1000 MG: 1 INJECTION, POWDER, FOR SOLUTION INTRAMUSCULAR; INTRAVENOUS at 01:58

## 2023-12-29 RX ADMIN — RIVASTIGMINE TARTRATE 6 MG: 1.5 CAPSULE ORAL at 22:01

## 2023-12-29 RX ADMIN — BUDESONIDE 500 MCG: 0.5 INHALANT RESPIRATORY (INHALATION) at 21:23

## 2023-12-29 RX ADMIN — ATORVASTATIN CALCIUM 20 MG: 20 TABLET, FILM COATED ORAL at 09:19

## 2023-12-29 RX ADMIN — CARVEDILOL 25 MG: 25 TABLET, FILM COATED ORAL at 09:19

## 2023-12-29 RX ADMIN — ALBUTEROL SULFATE 2.5 MG: 2.5 SOLUTION RESPIRATORY (INHALATION) at 10:03

## 2023-12-29 RX ADMIN — SPIRONOLACTONE 50 MG: 25 TABLET ORAL at 09:17

## 2023-12-29 RX ADMIN — CARVEDILOL 25 MG: 25 TABLET, FILM COATED ORAL at 17:06

## 2023-12-29 RX ADMIN — ENOXAPARIN SODIUM 30 MG: 100 INJECTION SUBCUTANEOUS at 22:06

## 2023-12-29 RX ADMIN — Medication 200 UNITS: at 13:33

## 2023-12-29 RX ADMIN — TIZANIDINE 2 MG: 4 TABLET ORAL at 09:18

## 2023-12-29 RX ADMIN — ENOXAPARIN SODIUM 30 MG: 100 INJECTION SUBCUTANEOUS at 09:27

## 2023-12-29 RX ADMIN — AZITHROMYCIN MONOHYDRATE 500 MG: 500 INJECTION, POWDER, LYOPHILIZED, FOR SOLUTION INTRAVENOUS at 02:34

## 2023-12-29 RX ADMIN — LEVOTHYROXINE SODIUM 125 MCG: 0.12 TABLET ORAL at 09:18

## 2023-12-29 RX ADMIN — CLONAZEPAM 1 MG: 1 TABLET ORAL at 22:10

## 2023-12-29 RX ADMIN — FERROUS SULFATE TAB 325 MG (65 MG ELEMENTAL FE) 325 MG: 325 (65 FE) TAB at 09:18

## 2023-12-29 RX ADMIN — HYDRALAZINE HYDROCHLORIDE 50 MG: 50 TABLET ORAL at 22:01

## 2023-12-29 RX ADMIN — TRIAMCINOLONE ACETONIDE: 1 CREAM TOPICAL at 22:05

## 2023-12-29 RX ADMIN — MICONAZOLE NITRATE: 2 POWDER TOPICAL at 22:02

## 2023-12-29 RX ADMIN — CETIRIZINE HYDROCHLORIDE 5 MG: 10 TABLET ORAL at 09:20

## 2023-12-29 RX ADMIN — OXYCODONE HYDROCHLORIDE AND ACETAMINOPHEN 500 MG: 500 TABLET ORAL at 09:18

## 2023-12-29 RX ADMIN — PANTOPRAZOLE SODIUM 40 MG: 40 TABLET, DELAYED RELEASE ORAL at 09:28

## 2023-12-29 RX ADMIN — Medication 1 CAPSULE: at 09:28

## 2023-12-29 RX ADMIN — MICONAZOLE NITRATE: 2 POWDER TOPICAL at 09:28

## 2023-12-29 RX ADMIN — GABAPENTIN 300 MG: 300 CAPSULE ORAL at 21:01

## 2023-12-29 RX ADMIN — RIVASTIGMINE TARTRATE 6 MG: 1.5 CAPSULE ORAL at 09:17

## 2023-12-29 RX ADMIN — CLONAZEPAM 1 MG: 1 TABLET ORAL at 15:45

## 2023-12-29 RX ADMIN — ACETAMINOPHEN 650 MG: 325 TABLET ORAL at 22:10

## 2023-12-29 RX ADMIN — OLANZAPINE 15 MG: 5 TABLET, FILM COATED ORAL at 22:01

## 2023-12-29 RX ADMIN — SODIUM CHLORIDE, PRESERVATIVE FREE 10 ML: 5 INJECTION INTRAVENOUS at 21:59

## 2023-12-29 RX ADMIN — POLYETHYLENE GLYCOL 3350 17 G: 17 POWDER, FOR SOLUTION ORAL at 22:02

## 2023-12-29 ASSESSMENT — PAIN DESCRIPTION - ORIENTATION: ORIENTATION: MID

## 2023-12-29 ASSESSMENT — PAIN SCALES - GENERAL
PAINLEVEL_OUTOF10: 3
PAINLEVEL_OUTOF10: 0

## 2023-12-29 ASSESSMENT — PAIN DESCRIPTION - DESCRIPTORS: DESCRIPTORS: ACHING

## 2023-12-29 ASSESSMENT — PAIN - FUNCTIONAL ASSESSMENT: PAIN_FUNCTIONAL_ASSESSMENT: ACTIVITIES ARE NOT PREVENTED

## 2023-12-29 ASSESSMENT — PAIN DESCRIPTION - LOCATION: LOCATION: THROAT

## 2023-12-29 NOTE — DISCHARGE INSTRUCTIONS
-Schedule patient for BiPAP retitration at [facility] sleep lab as soon as possible after discharge from the hospital. Patient to follow with Ms. Chandler Bland CNP in 2 to 3months for clinical re evaluation and management. She also need a separate appointment for the send pulmonary disease pulmonary clinic with Ms. Chandler Bland CNP to follow and manage bronchial asthma.    -Continue decadron 6 mg daily until able to follow up with rheumatology. If unable to be scheduled in the next 2 weeks, follow up with primary care provider for further management of steroids.

## 2023-12-29 NOTE — PROGRESS NOTES
[facility]  INPATIENT PHYSICAL THERAPY  DAILY NOTE  [facility] ICU STEPDOWN TELEMETRY 4K - 4K-06/006-A    Time In: 7930  Time Out: 1520  Timed Code Treatment Minutes: 33 Minutes  Minutes: 33          Date: 2023  Patient Name: Sujata Solis,  Gender:  female        MRN: [id number]  : 1950  (68 y.o.)     Referring Practitioner: Mandy Bravo DO  Diagnosis: Acute respiratory failure with hypoxia  Additional Pertinent Hx: 66-year-old  female presented to [facility] ER 2023 via EMS with chief complaint of shortness of breath and wheezing. Patient has past medical history significant for lifetime non-smoker, RANDY-CPAP, asthma, essential hypertension [facility]  nonobstructive CAD, HFpEF-ECHO 2023 LVEF 78-68% grade 1 diastolic dysfunction, mild TR, essential hypertension, hypothyroidism, GERD, CKD stage 3a (baseline CREA 1.0-1.5) bipolar depression.      Prior Level of Function:  Lives With: Spouse  Type of Home: House  Home Layout: One level  Home Access: Stairs to enter with rails  Entrance Stairs - Number of Steps: 2  Entrance Stairs - Rails: Right  Home Equipment: Rollator, Electric scooter   Bathroom Shower/Tub: Walk-in shower, Shower chair with back    ADL Assistance: Independent  Homemaking Assistance: Independent  Ambulation Assistance: Independent  Transfer Assistance: Independent  Active : No  Additional Comments: ind no AD in the home, takes 4 ww for longer distances    Restrictions/Precautions:  Restrictions/Precautions: Fall Risk, Contact Precautions     SUBJECTIVE: Pt in bed upon arrival, and agrees to therapy, RN approved session, Pt A&O X 4/, Pt pleasant and cooperative, pts daughter Zita Samuels and  naheed present during session    PAIN: 0/10: denies    Vitals: pt on 1L nasal cannula    OBJECTIVE:  Bed Mobility:  Rolling to Left: Stand By Assistance   Supine to Sit: Stand By Assistance  Sit to Supine: Stand By Assistance   Scooting: Stand By

## 2023-12-29 NOTE — PROGRESS NOTES
[address] [facility] THERAPY MISSED TREATMENT NOTE  [facility] ICU STEPDOWN TELEMETRY 4K  4K-006-A      Date: 2023  Patient Name: Nickolas Scanlon        CSN: [id number]   : 1950  (68 y.o.)  Gender: female   Referring Practitioner: Cate Chow DO  Diagnosis: acute repiratory failure with hypoxia         REASON FOR MISSED TREATMENT: Patient Off Floor for Testing. Will re attempt as able.

## 2023-12-29 NOTE — PROGRESS NOTES
Internal Medicine Resident  Progress Note      Patient:  Kathryn Cheatham    Unit/Bed:4K-06/006-A  YOB: 1950  MRN: [id number]   Acct: [de-identified]   PCP: Tan Lama DO  Date of Admission: 12/24/2023  Date of Service: Pt seen/examined on 12/28/23      Assessment/Plan:  #Acute hypoxic, hypercapnic respiratory failure: Likely 2/2 developing pneumonia. Possible reactive airway disease. CRP 9.05. WBC 22.9. ESR 40. Recent steroid injection could be contributing to counts. Unlikely HF.  CTA chest shows moderate atelectasis bilaterally, consolidation in left base. Required BiPAP to maintain O2 sat >90%. ABG shows mild respiratory acidosis. Oxygen requirements not consistent to degree of infiltrate. Given eosinophilia expanding differential.  Still has mild respiratory acidosis. Pulmonology following, Solu-Medrol 40 daily. To trial BiPAP. Albuterol nebs. Ceftriaxone 1 g daily   Azithromycin 500 mg daily  Continue to wean O2 as tolerated    #Pityriasis rosea: Notes a pruritic rash that first began back in October. She received a steroid injection at that time and states that the rash is gone completely for 10 to 14 days. There was some question as outpatient whether lamotrigine or statin was playing a role. There was no diagnosis of pityriasis rosea. Unrelieved by Eucerin cream.  Received Depo-Medrol 80 mg 12/20. States this improved for couple days, but it has come back. Denies any myalgias. Rash appears to be improving, pruritus resolved. topical steroid cream.   Started on Solu-Medrol 40 daily by pulm for reactive airway disease as above. #Eosinophilia: Eosinophil count originally 10. 4. down to 6.2 today. Pityriasis above could be playing a role. Differential includes eosinophilic pneumonia, EGPA, ABPA, protozoan,  lymphoid malignancies, idiopathic hypereosinophilic syndrome. No new medications. Also considering sarcoidosis and lupus.   C3, C4

## 2023-12-29 NOTE — PLAN OF CARE
Problem: Discharge Planning  Goal: Discharge to home or other facility with appropriate resources  Outcome: Progressing  Flowsheets (Taken 12/29/2023 0542)  Discharge to home or other facility with appropriate resources:   Identify barriers to discharge with patient and caregiver   Identify discharge learning needs (meds, wound care, etc)   Refer to discharge planning if patient needs post-hospital services based on physician order or complex needs related to functional status, cognitive ability or social support system   Arrange for needed discharge resources and transportation as appropriate     Problem: Safety - Adult  Goal: Free from fall injury  Outcome: Progressing  Flowsheets (Taken 12/29/2023 0542)  Free From Fall Injury: Instruct family/caregiver on patient safety  Note: Bed in low position  Call light in reach  Bed wheel lock  Teaching to use call light for assistance.        Problem: Chronic Conditions and Co-morbidities  Goal: Patient's chronic conditions and co-morbidity symptoms are monitored and maintained or improved  Outcome: Progressing  Flowsheets (Taken 12/29/2023 0542)  Care Plan - Patient's Chronic Conditions and Co-Morbidity Symptoms are Monitored and Maintained or Improved:   Monitor and assess patient's chronic conditions and comorbid symptoms for stability, deterioration, or improvement   Collaborate with multidisciplinary team to address chronic and comorbid conditions and prevent exacerbation or deterioration   Update acute care plan with appropriate goals if chronic or comorbid symptoms are exacerbated and prevent overall improvement and discharge     Problem: Pain  Goal: Verbalizes/displays adequate comfort level or baseline comfort level  Outcome: Progressing  Flowsheets (Taken 12/29/2023 0542)  Verbalizes/displays adequate comfort level or baseline comfort level:   Encourage patient to monitor pain and request assistance   Assess pain using appropriate pain scale   Administer lock  Teaching to use call light for assistance.       Problem: Nutrition Deficit:  Goal: Optimize nutritional status  Outcome: Progressing  Flowsheets (Taken 12/29/2023 9588)  Nutrient intake appropriate for improving, restoring, or maintaining nutritional needs:   Assess nutritional status and recommend course of action   Monitor oral intake, labs, and treatment plans     Patient educated on how to use incentive spirometer. Patient verbalized understanding and demonstrated proper use. Emphasized importance and usage of device, with coughing and deep breathing every 4 hours while awake.     Pain Assessment: None - Denies Pain  Pain Level: 0   Patient's Stated Pain Goal: 0 - No pain   Is pain goal met at this time?  Yes      Care plan reviewed with patient.  Patient verbalizes understanding of the plan of care and contributes to goal setting.

## 2023-12-29 NOTE — PROGRESS NOTES
Internal Medicine Resident  Progress Note      Patient:  Alison Mckeon    Unit/Bed:4K-06/006-A  YOB: 1950  MRN: [id number]   Acct: [id number]   PCP: Gaurav Reddy DO  Date of Admission: 12/24/2023  Date of Service: Pt seen/examined on 12/29/23      Assessment/Plan:  #Acute hypoxic, hypercapnic respiratory failure: Likely 2/2 undifferentiated pneumonia.  Possible reactive airway disease.  CRP 9.05.  WBC 22.9.  ESR 40.  Recent steroid injection could be contributing to counts.  Unlikely HF.  CTA chest shows moderate atelectasis bilaterally, consolidation in left base.  Required BiPAP to maintain O2 sat >90%.  ABG shows mild respiratory acidosis.  Oxygen requirements not consistent to degree of infiltrate.  Given eosinophilia as below expanding differential.  Still has mild respiratory acidosis.   Pulmonology following,  begin prednisone taper 12/30   BiPAP 18/6 overnight and daytime naps   MetaNebs every 4 hours,   Flovent for DC for maintenance.  Ceftriaxone 1 g daily   Azithromycin 500 mg daily completed 12/29  Continue to wean O2 as tolerated    #Pityriasis rosea: Notes a pruritic rash that first began back in October.  She received a steroid injection at that time and states that the rash is gone completely for 10 to 14 days.  There was some question as outpatient whether lamotrigine or statin was playing a role.  There was no diagnosis of pityriasis rosea.  Unrelieved by Eucerin cream.  Received Depo-Medrol 80 mg 12/20.  States this improved for couple days, but it has come back.  Denies any myalgias.    Rash continues to be improving, pruritus resolved.    topical steroid cream.   Was on Solu-Medrol, to begin prednisone taper 12/30    #Eosinophilia: Eosinophil count originally 10.4. ,  8.7 today. Pityriasis above could be playing a role.  Differential includes eosinophilic pneumonia, EGPA, ABPA, protozoan,  lymphoid malignancies, idiopathic hypereosinophilic syndrome.  No  appreciated. Respiratory:  Normal effort. Bilateral air entry. Bibasilar crackles in lower lung fields. Cardiovascular: Normal rate, regular rhythm with normal S1/S2 without murmurs. No lower extremity edema. Abdomen: Soft, non-tender, non-distended with normal bowel sounds. Musculoskeletal: No joint swelling or tenderness. Normal tone. No abnormal movements. Skin: Warm. Intertrigo in breast folds improved. Macular rash that coalesces on chest and abdomen,majority of back, and small area of right anterior thigh has decreased in intensity to the point where it is nearly completely gone. .  Papular nonpustular rash on anterior chest also improved. She does have ecchymoses over anterior abdomen from Lovenox shots. Also small areas of ecchymoses on back. Neurologic:  No focal sensory/motor deficits in the upper or lower extremities. Cranial nerves:  grossly non-focal 2-12. Psychiatric: Alert and oriented, normal insight and thought content. Capillary Refill: Brisk,< 3 seconds. Peripheral Pulses: +2 palpable, equal bilaterally. Labs:   Recent Labs     12/27/23  0408 12/28/23  0352 12/29/23  0456   WBC 18.1* 18.6* 15.3*   HGB 10.4* 9.5* 9.9*   HCT 34.3* 31.0* 33.0*    238 226       Recent Labs     12/28/23  0352 12/28/23  1726 12/29/23  0456    138 141   K 4.8 5.2 5.1    99 103   CO2 27 28 31   BUN 31* 30* 33*   CREATININE 1.0 1.0 1.1   CALCIUM 8.9 9.5 9.1       Recent Labs     12/27/23  0408   AST 9   ALT 32   BILITOT 0.2*   ALKPHOS 254*       No results for input(s): \"INR\" in the last 72 hours. No results for input(s): \"TROPONINT\" in the last 72 hours. No results for input(s): \"PROCAL\" in the last 72 hours.      Lab Results   Component Value Date/Time    NITRU NEGATIVE 12/25/2023 08:30 AM    WBCUA 2-4 12/25/2023 08:30 AM    WBCUA 0-5 09/23/2014 08:15 AM    BACTERIA NONE SEEN 12/25/2023 08:30 AM    RBCUA 0-2 12/25/2023 08:30 AM    BLOODU NEGATIVE 12/25/2023 08:30 AM

## 2023-12-29 NOTE — PROGRESS NOTES
[place] [facility] Sleep [facility] Critical [facility]      Patient - Alison Mckeon   MRN -  [id number]   [facility] # - [id number]   - 1950      Date of Admission -  2023 10:25 PM  Date of evaluation -  2023  Room - 4-006-A   Hospital Day - 4  Consulting - Severo Car MD Primary Care Physician - Gaurav Reddy DO     Problem List      Active Hospital Problems    Diagnosis Date Noted    RANDY on CPAP [G47.33] 2015     Priority: High    Obesity (BMI 30-39.9) [E66.9] 2014     Priority: Medium    HTN (hypertension) [I10] 2011     Priority: Medium    Hypothyroid [E03.9] 2011     Priority: Low    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    Alison Mckeon is a 73 y.o. female  was initially admitted under hospitalist service. Pulmonary medicine was consulted for further management of Pneumonia and hypoxic respiratory failure.    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  She developed worsening of shortness of  04/04/2020    IMPRESSION:  Impression:  Small bilateral pleural effusions with mild left medial basilar   atelectasis. CT Scans  (See actual reports for details)  CT angiogram of chest with IV contrast: Performed on 20 December 2023:  Reading Physician Reading Date Result Priority   Alejo Slaughter MD  [phone] 12/25/2023      Narrative & Impression  CT angiography chest with contrast. 3D Postprocessing. Comparison: 10/24/2022    IMPRESSION:  Impression:  1. No evidence of pulmonary embolism. 2. Moderate atelectasis bilaterally, particularly in the left base with   associated small pleural effusion. Follow up chest radiography recommended   within the next 72 hours to exclude developing pneumonia. 3. See findings for additional chronic changes. This document has been electronically signed by: Bang Lindquist MD on   12/25/2023 12:59 AM      Assessment   -Acute hypoxic respiratory failure due to reactive airway disease exacerbation VS late onset bronchial asthma exacerbation due to left lower lobe pneumonia Vs exacerbation of diastolic CHF less likely. Improved with bipap.  -Left lower lobe pneumonia due to community-acquired pneumonia VS atypical pneumonia  -Severe obstructive sleep apnea on treatment with a CPAP pressure of 8cm H20. She is using her CPAP with good compliance for >4hours. She follows with Ms. Mary Moffett CNP at the [facility] sleep clinic. - CPAP changed to BIPAP for outpatient use. - Anxiety and depression. She follows with a psychiatrist.  She develops hypomania symptoms while on treatment with systemic steroid therapy. - Hypertension is under good control.  - Hx of  Left knee replacement. - She is using a mouth guard for her trigeminal neuralgia. She need to use it at night time    [address] from [place] as tolerated keeping spo2 89-94% - Decreased to 2 LPM during visit and doing well. Continuous pulse ox clip is showing spo2% significantly lower than home pulse oximeter.

## 2023-12-29 NOTE — CARE COORDINATION
Pla12/29/23, 10:22 AM EST    Patient goals/plan/ treatment preferences discussed by  and . Patient goals/plan/ treatment preferences reviewed with patient/ family. Patient/ family verbalize understanding of discharge plan and are in agreement with goal/plan/treatment preferences. Understanding was demonstrated using the teach back method. AVS provided by RN at time of discharge, which includes all necessary medical information pertaining to the patients current course of illness, treatment, post-discharge goals of care, and treatment preferences. Services At/After Discharge: [facility] ([facility]) Joelle [facility]    Patient could potentially discharge over the weekend if precert is approved. Patient is aware and agreeable to discharge plan. She reported that her spouse will likely be able to transport at discharge. NICOLÁS notified [facility] of discharge over the weekend if precert comes back approved. Provided name and number of weekend CM in case of precert returning over weekend. RN made aware and discharge instructions placed on chart. NICOLÁS placed note with Landy German at [facility] cell number to call to ensure precert is approved before discharging. IMM Letter  IMM Letter given to Patient/Family/Significant other/Guardian/POA/by[de-identified] patient access  IMM Letter date given[de-identified] 12/25/23  IMM Letter time given[de-identified] 5909       12/29/23, 11:18 AM EST  DISCHARGE PLANNING EVALUATION    NICOLÁS received a call from Landy German and precert has been approved. NICOLÁS updated attending resident. Plan for discharge tomorrow.

## 2023-12-30 VITALS
HEIGHT: 64 IN | RESPIRATION RATE: 18 BRPM | WEIGHT: 249.56 LBS | SYSTOLIC BLOOD PRESSURE: 136 MMHG | HEART RATE: 81 BPM | TEMPERATURE: 97.9 F | DIASTOLIC BLOOD PRESSURE: 79 MMHG | OXYGEN SATURATION: 92 % | BODY MASS INDEX: 42.61 KG/M2

## 2023-12-30 LAB
ANCA AB PATTERN SER IF-IMP: NORMAL
ANCA IGG TITR SER IF: NORMAL {TITER}
ANION GAP SERPL CALC-SCNC: 9 MEQ/L (ref 8–16)
BASOPHILS ABSOLUTE: 0 THOU/MM3 (ref 0–0.1)
BASOPHILS NFR BLD AUTO: 0.3 %
BUN SERPL-MCNC: 40 MG/DL (ref 7–22)
CALCIUM SERPL-MCNC: 9.1 MG/DL (ref 8.5–10.5)
CHLORIDE SERPL-SCNC: 99 MEQ/L (ref 98–111)
CO2 SERPL-SCNC: 29 MEQ/L (ref 23–33)
CREAT SERPL-MCNC: 1 MG/DL (ref 0.4–1.2)
DEPRECATED RDW RBC AUTO: 51 FL (ref 35–45)
EKG ATRIAL RATE: 77 BPM
EKG P AXIS: 57 DEGREES
EKG P-R INTERVAL: 158 MS
EKG Q-T INTERVAL: 346 MS
EKG QRS DURATION: 80 MS
EKG QTC CALCULATION (BAZETT): 391 MS
EKG R AXIS: -10 DEGREES
EKG T AXIS: 43 DEGREES
EKG VENTRICULAR RATE: 77 BPM
EOSINOPHIL NFR BLD AUTO: 33.9 %
EOSINOPHILS ABSOLUTE: 3.9 THOU/MM3 (ref 0–0.4)
ERYTHROCYTE [DISTWIDTH] IN BLOOD BY AUTOMATED COUNT: 13.7 % (ref 11.5–14.5)
GFR SERPL CREATININE-BSD FRML MDRD: 59 ML/MIN/1.73M2
GLUCOSE SERPL-MCNC: 123 MG/DL (ref 70–108)
HCT VFR BLD AUTO: 33.1 % (ref 37–47)
HGB BLD-MCNC: 9.8 GM/DL (ref 12–16)
IMM GRANULOCYTES # BLD AUTO: 0.07 THOU/MM3 (ref 0–0.07)
IMM GRANULOCYTES NFR BLD AUTO: 0.6 %
LYMPHOCYTES ABSOLUTE: 2.2 THOU/MM3 (ref 1–4.8)
LYMPHOCYTES NFR BLD AUTO: 18.8 %
MCH RBC QN AUTO: 30.1 PG (ref 26–33)
MCHC RBC AUTO-ENTMCNC: 29.6 GM/DL (ref 32.2–35.5)
MCV RBC AUTO: 101.5 FL (ref 81–99)
MONOCYTES ABSOLUTE: 1.1 THOU/MM3 (ref 0.4–1.3)
MONOCYTES NFR BLD AUTO: 9.9 %
NEUTROPHILS NFR BLD AUTO: 36.5 %
NRBC BLD AUTO-RTO: 0 /100 WBC
PLATELET # BLD AUTO: 253 THOU/MM3 (ref 130–400)
PLATELET BLD QL SMEAR: ADEQUATE
PMV BLD AUTO: 10.1 FL (ref 9.4–12.4)
POLYCHROMASIA BLD QL SMEAR: ABNORMAL
POTASSIUM SERPL-SCNC: 4.9 MEQ/L (ref 3.5–5.2)
RBC # BLD AUTO: 3.26 MILL/MM3 (ref 4.2–5.4)
SCAN OF BLOOD SMEAR: NORMAL
SEGMENTED NEUTROPHILS ABSOLUTE COUNT: 4.2 THOU/MM3 (ref 1.8–7.7)
SODIUM SERPL-SCNC: 137 MEQ/L (ref 135–145)
WBC # BLD AUTO: 11.6 THOU/MM3 (ref 4.8–10.8)

## 2023-12-30 PROCEDURE — 85025 COMPLETE CBC W/AUTO DIFF WBC: CPT

## 2023-12-30 PROCEDURE — 2580000003 HC RX 258

## 2023-12-30 PROCEDURE — 6360000002 HC RX W HCPCS: Performed by: NURSE PRACTITIONER

## 2023-12-30 PROCEDURE — 36415 COLL VENOUS BLD VENIPUNCTURE: CPT

## 2023-12-30 PROCEDURE — 94640 AIRWAY INHALATION TREATMENT: CPT

## 2023-12-30 PROCEDURE — 6360000002 HC RX W HCPCS

## 2023-12-30 PROCEDURE — 94761 N-INVAS EAR/PLS OXIMETRY MLT: CPT

## 2023-12-30 PROCEDURE — 2700000000 HC OXYGEN THERAPY PER DAY

## 2023-12-30 PROCEDURE — 6370000000 HC RX 637 (ALT 250 FOR IP): Performed by: NURSE PRACTITIONER

## 2023-12-30 PROCEDURE — 99239 HOSP IP/OBS DSCHRG MGMT >30: CPT | Performed by: INTERNAL MEDICINE

## 2023-12-30 PROCEDURE — 6360000002 HC RX W HCPCS: Performed by: INTERNAL MEDICINE

## 2023-12-30 PROCEDURE — 80048 BASIC METABOLIC PNL TOTAL CA: CPT

## 2023-12-30 PROCEDURE — 94660 CPAP INITIATION&MGMT: CPT

## 2023-12-30 PROCEDURE — 6370000000 HC RX 637 (ALT 250 FOR IP)

## 2023-12-30 RX ORDER — ALBUTEROL SULFATE 2.5 MG/3ML
2.5 SOLUTION RESPIRATORY (INHALATION)
Qty: 120 EACH | Refills: 3 | DISCHARGE
Start: 2023-12-30

## 2023-12-30 RX ORDER — FERROUS SULFATE 325(65) MG
325 TABLET ORAL
Qty: 30 TABLET | Refills: 3 | DISCHARGE
Start: 2023-12-31

## 2023-12-30 RX ORDER — CLONAZEPAM 1 MG/1
1 TABLET ORAL 3 TIMES DAILY PRN
Qty: 12 TABLET | Refills: 0 | Status: SHIPPED | OUTPATIENT
Start: 2023-12-30 | End: 2024-01-03

## 2023-12-30 RX ORDER — TRIAMCINOLONE ACETONIDE 1 MG/G
CREAM TOPICAL
DISCHARGE
Start: 2023-12-30

## 2023-12-30 RX ORDER — ASPIRIN 81 MG/1
81 TABLET ORAL DAILY
Qty: 30 TABLET | Refills: 3 | DISCHARGE
Start: 2023-12-31

## 2023-12-30 RX ORDER — DEXAMETHASONE 6 MG/1
6 TABLET ORAL DAILY
Qty: 14 TABLET | Refills: 0 | DISCHARGE
Start: 2023-12-30 | End: 2024-01-13

## 2023-12-30 RX ADMIN — PREDNISONE 40 MG: 20 TABLET ORAL at 09:17

## 2023-12-30 RX ADMIN — TORSEMIDE 40 MG: 20 TABLET ORAL at 09:17

## 2023-12-30 RX ADMIN — Medication 1 TABLET: at 09:19

## 2023-12-30 RX ADMIN — CETIRIZINE HYDROCHLORIDE 5 MG: 10 TABLET ORAL at 09:17

## 2023-12-30 RX ADMIN — TIZANIDINE 2 MG: 4 TABLET ORAL at 09:19

## 2023-12-30 RX ADMIN — MICONAZOLE NITRATE: 2 POWDER TOPICAL at 09:17

## 2023-12-30 RX ADMIN — HYDRALAZINE HYDROCHLORIDE 50 MG: 50 TABLET ORAL at 09:19

## 2023-12-30 RX ADMIN — OXYCODONE HYDROCHLORIDE AND ACETAMINOPHEN 500 MG: 500 TABLET ORAL at 09:17

## 2023-12-30 RX ADMIN — VENLAFAXINE HYDROCHLORIDE 225 MG: 150 CAPSULE, EXTENDED RELEASE ORAL at 09:17

## 2023-12-30 RX ADMIN — ERYTHROMYCIN: 5 OINTMENT OPHTHALMIC at 07:02

## 2023-12-30 RX ADMIN — Medication 1 CAPSULE: at 09:20

## 2023-12-30 RX ADMIN — PANTOPRAZOLE SODIUM 40 MG: 40 TABLET, DELAYED RELEASE ORAL at 07:01

## 2023-12-30 RX ADMIN — ALBUTEROL SULFATE 2.5 MG: 2.5 SOLUTION RESPIRATORY (INHALATION) at 12:44

## 2023-12-30 RX ADMIN — BUDESONIDE 500 MCG: 0.5 INHALANT RESPIRATORY (INHALATION) at 07:30

## 2023-12-30 RX ADMIN — SPIRONOLACTONE 50 MG: 25 TABLET ORAL at 09:17

## 2023-12-30 RX ADMIN — ARIPIPRAZOLE 10 MG: 10 TABLET ORAL at 09:17

## 2023-12-30 RX ADMIN — TRIAMCINOLONE ACETONIDE: 1 CREAM TOPICAL at 09:18

## 2023-12-30 RX ADMIN — CLONAZEPAM 1 MG: 1 TABLET ORAL at 09:20

## 2023-12-30 RX ADMIN — CARVEDILOL 25 MG: 25 TABLET, FILM COATED ORAL at 09:17

## 2023-12-30 RX ADMIN — ASPIRIN 81 MG: 81 TABLET, COATED ORAL at 09:20

## 2023-12-30 RX ADMIN — ATORVASTATIN CALCIUM 20 MG: 20 TABLET, FILM COATED ORAL at 09:17

## 2023-12-30 RX ADMIN — Medication 200 UNITS: at 09:17

## 2023-12-30 RX ADMIN — ERYTHROMYCIN: 5 OINTMENT OPHTHALMIC at 00:11

## 2023-12-30 RX ADMIN — GABAPENTIN 300 MG: 300 CAPSULE ORAL at 09:17

## 2023-12-30 RX ADMIN — ACETAMINOPHEN 650 MG: 325 TABLET ORAL at 09:22

## 2023-12-30 RX ADMIN — LEVOTHYROXINE SODIUM 125 MCG: 0.12 TABLET ORAL at 09:17

## 2023-12-30 RX ADMIN — ALBUTEROL SULFATE 2.5 MG: 2.5 SOLUTION RESPIRATORY (INHALATION) at 07:30

## 2023-12-30 RX ADMIN — RIVASTIGMINE TARTRATE 6 MG: 1.5 CAPSULE ORAL at 09:19

## 2023-12-30 RX ADMIN — CEFTRIAXONE SODIUM 1000 MG: 1 INJECTION, POWDER, FOR SOLUTION INTRAMUSCULAR; INTRAVENOUS at 02:18

## 2023-12-30 RX ADMIN — ENOXAPARIN SODIUM 30 MG: 100 INJECTION SUBCUTANEOUS at 09:20

## 2023-12-30 NOTE — DISCHARGE SUMMARY
seconds.  Peripheral Pulses: +2 palpable, equal bilaterally.       Labs: For convenience the most recent labs are provided:  CBC:    Lab Results   Component Value Date/Time    WBC 11.6 12/30/2023 03:29 AM    HGB 9.8 12/30/2023 03:29 AM    HCT 33.1 12/30/2023 03:29 AM     12/30/2023 03:29 AM     Renal:    Lab Results   Component Value Date/Time     12/30/2023 03:29 AM    K 4.9 12/30/2023 03:29 AM    K 5.2 12/28/2023 05:26 PM    CL 99 12/30/2023 03:29 AM    CO2 29 12/30/2023 03:29 AM    BUN 40 12/30/2023 03:29 AM    CREATININE 1.0 12/30/2023 03:29 AM    CALCIUM 9.1 12/30/2023 03:29 AM    PHOS 3.3 05/11/2023 02:13 PM     Liver:   Lab Results   Component Value Date/Time    AST 9 12/27/2023 04:08 AM    ALT 32 12/27/2023 04:08 AM         Significant Diagnostic Studies    Radiology:   XR CHEST (2 VW)   Final Result   1. Borderline heart size. Old healed granulomatous disease.   2. Tiny effusion left side. Mild bibasilar atelectasis/pneumonia.   3. Overall appearance of chest improved from prior.            **This report has been created using voice recognition software.  It may contain minor errors which are inherent in voice recognition technology.**      Final report electronically signed by Dr. Krishna Case on 12/29/2023 3:26 PM      XR CHEST PORTABLE   Final Result   1. Increased left basilar opacity with obscuration of the left    costophrenic angle. This may reflect increased atelectasis or airspace    disease with small left pleural effusion not excluded.   2. Stable mild cardiomegaly.      This document has been electronically signed by: Amado Cabrales MD on    12/28/2023 01:05 AM      US RENAL COMPLETE   Final Result   1. No renal calculi or hydronephrosis.   2. Fatty liver is incidentally noted. A 2.6 x 1.4 x 1.8 cm heterogeneously    hypoechoic lesion is noted within the right hepatic lobe. This is    nonspecific. Differential considerations include focal fatty sparing or    other liver lesion.  hydrALAZINE 50 MG tablet  Commonly known as: APRESOLINE  Take 1 tablet by mouth 3 times daily Along with Hydralazine 25MG to equal 75MG three times a day     MIRALAX PO     MULTIVITAMIN PO     OLANZapine 15 MG tablet  Commonly known as: ZYPREXA     omeprazole 20 MG delayed release capsule  Commonly known as: PRILOSEC  Take 1 capsule by mouth every morning (before breakfast)     PROBIOTIC DAILY PO     rivastigmine 6 MG capsule  Commonly known as: EXELON     senna 8.6 MG tablet  Commonly known as: SENOKOT     spironolactone 50 MG tablet  Commonly known as: ALDACTONE  Take 1 tablet by mouth daily     Synthroid 125 MCG tablet  Generic drug: levothyroxine  Take 1 tablet by mouth daily Take with water on an empty stomach- wait 30 minutes before eating or taking other meds.      tiZANidine 2 MG tablet  Commonly known as: ZANAFLEX     torsemide 20 MG tablet  Commonly known as: DEMADEX  Take 2 tablets by mouth daily     venlafaxine 150 MG extended release capsule  Commonly known as: EFFEXOR XR     vitamin C 500 MG tablet  Commonly known as: ASCORBIC ACID     vitamin E 200 units capsule            STOP taking these medications      lamoTRIgine 100 MG tablet  Commonly known as: LAMICTAL     triamcinolone 0.1 % lotion  Commonly known as: KENALOG  Replaced by: triamcinolone 0.1 % cream               Where to Get Your Medications        These medications were sent to [place] [address], [address] [facility] [address] [address]      Phone: [phone]   fluticasone 110 MCG/ACT inhaler       Information about where to get these medications is not yet available    Ask your nurse or doctor about these medications  albuterol (2.5 MG/3ML) 0.083% nebulizer solution  aspirin 81 MG EC tablet  dexAMETHasone 6 MG tablet  ferrous sulfate 325 (65 Fe) MG tablet  miconazole 2 % powder  triamcinolone 0.1 % cream          Time Spent on discharge is 40

## 2023-12-30 NOTE — PROGRESS NOTES
Discharge teaching and instructions for diagnosis/procedure of acute respiratory failure completed with patient using teachback method. AVS reviewed. Printed prescriptions given to patient. Patient voiced understanding regarding prescriptions, follow up appointments, and care of self at home. Discharged in a wheelchair to  skilled nursing per family.

## 2023-12-30 NOTE — FLOWSHEET NOTE
12/30/23 1050   Safe Environment   Safety Measures Caregiver at bedside;Call light within reach; Fall prevention (comment); Family at bedside;Standard Safety Measures  (Virtual nurse safety round completed.)     Patient is awake and  alert and answers questions. No distress visible. Inquired if patient had needs for addressing pain, potty, positioning, access to personal things. No needs at this time. Instructed on call light use.

## 2023-12-30 NOTE — FLOWSHEET NOTE
12/30/23 1302   Safe Environment   Safety Measures Bed in low position;Call light within reach; Family at bedside;Standard Safety Measures  (Virtual nurse safety round completed.)     Patient is awake and  alert and answers questions. No distress visible. Inquired if patient had needs for addressing pain, potty, positioning, access to personal things. No needs at this time. Educated on call light use. Voices understanding on using the call light. Call light in reach.

## 2023-12-31 LAB
ASPERGILLUS AB TITR SER CF: ABNORMAL {TITER}
B DERMAT AB SER-ACNC: 1.7 IV
COCCIDIOIDES AB TITR SER CF: ABNORMAL {TITER}
H CAPSUL MYC AB TITR SER CF: ABNORMAL {TITER}
H CAPSUL YST AB TITR SER CF: ABNORMAL {TITER}
LEUK/LYMPH PHENOTYPING WB: NORMAL

## 2024-01-03 ENCOUNTER — TELEPHONE (OUTPATIENT)
Dept: FAMILY MEDICINE CLINIC | Age: 74
End: 2024-01-03

## 2024-01-03 NOTE — TELEPHONE ENCOUNTER
Care Transitions Initial Follow Up Call    Outreach made within 2 business days of discharge: Yes    Patient: Alison Mckeon Patient : 1950   MRN: [id number]  Reason for Admission: There are no discharge diagnoses documented for the most recent discharge.  Discharge Date: 23       Spoke with: spoke with  Errol on HIPAA, says pt resides at [facility] currently.    Discharge department/facility: [facility] Interactive Patient Contact:    Scheduled appointment with PCP within 7-14 days    Follow Up  Future Appointments   Date Time Provider Department Center   2024  8:15 PM SCHEDULE, STR SLEEP TECH 02 STRZ SLEEP [facility]   3/1/2024 11:00 AM Isabel Dueñas DO N JTD KIDNEY [facility] [place]   3/5/2024 11:00 AM Lexis Dudley PA-C N Pulm Med [facility] [place]   2024 10:00 AM Destiny Noble APRN - CNP N Pulm Med [facility] [place]   2024 11:30 AM Yadira Taveras APRN - CNP N SRPX CHF [facility] [place]   12/3/2024 10:30 AM Natalie White MD N SRPX Heart [facility] [place]       Eva Nicholas CMA ([facility])

## 2024-01-16 ENCOUNTER — PATIENT MESSAGE (OUTPATIENT)
Dept: FAMILY MEDICINE CLINIC | Age: 74
End: 2024-01-16

## 2024-01-16 DIAGNOSIS — R26.81 UNSTABLE GAIT: Primary | ICD-10-CM

## 2024-01-16 NOTE — TELEPHONE ENCOUNTER
From: Alison Mckeon  To: Dr. Gaurav Reddy  Sent: 1/16/2024 9:17 AM EST  Subject: PT    I was contacted by [facility] at the [facility] to say that my physical therapy order has not come through from [facility]. Would you be willing to order me physical therapy to strengthen my ability to walk and my balance? Thank you.

## 2024-01-17 ENCOUNTER — HOSPITAL ENCOUNTER (OUTPATIENT)
Dept: PHYSICAL THERAPY | Age: 74
Setting detail: THERAPIES SERIES
Discharge: HOME | End: 2024-01-17
Payer: MEDICARE

## 2024-01-17 PROCEDURE — 97162 PT EVAL MOD COMPLEX 30 MIN: CPT

## 2024-01-17 RX ORDER — TORSEMIDE 20 MG/1
40 TABLET ORAL DAILY
Qty: 180 TABLET | Refills: 1 | Status: SHIPPED | OUTPATIENT
Start: 2024-01-17

## 2024-01-17 NOTE — PROGRESS NOTES
good    GOALS:  Patient Goal: walk up steps easier, stand to cook lunch of dinner, walk in house without rollator     Short Term Goals:  Time Frame: 4 weeks  Increase B LE strength to 4/5 to allow for standing longer than 10 min  Increase tamdon stance to 5 sec  Increase single leg stance to 9 sec   Balance on foam with feet apart for 15 sec   Initiate HEP     Long Term Goals:  Time Frame: 8 weeks  Increase B LE to 5/5 so patient can walk and down steps with 1 hand hold and no pulling to get in  and out of her home   Increase bolton score to 40 to allow for gait with less dependence on AD   Increase endurance to 10 reps sit to  30 sec.   Balance on foam for 30 sec without UE hold  Pt to walk with cane or no assist for 25 feet and normal step length to carry light item in her kitchen to prepare a meal   Independent HEP       Patient Education:   [x]  HEP/Education Completed: Plan of Care, Goals,   Medbridge Access Code:  []  No new Education completed  []  Reviewed Prior HEP      []  Patient verbalized and/or demonstrated understanding of education provided.  []  Patient unable to verbalize and/or demonstrate understanding of education provided.  Will continue education.  [x]  Barriers to learning: none     PLAN:  Treatment Recommendations: Strengthening, Balance Training, Functional Mobility Training, Gait Training, Stair Training, Home Exercise Program, Patient Education, and Safety Education and Training    [x]  Plan of care initiated.  Plan to see patient 2-3 times per week for 8  weeks to address the treatment planned outlined above.  []  Continue with current plan of care  []  Modify plan of care as follows:    []  Hold pending physician visit  []  Discharge    Time In 1200   Time Out 1240   Timed Code Minutes: 0 min   Total Treatment Time: 40 min       Electronically Signed by: Robina Sanchez, PT

## 2024-01-18 ENCOUNTER — HOSPITAL ENCOUNTER (OUTPATIENT)
Dept: SLEEP CENTER | Age: 74
Discharge: HOME | End: 2024-01-20
Payer: MEDICARE

## 2024-01-18 ENCOUNTER — TELEPHONE (OUTPATIENT)
Dept: SLEEP CENTER | Age: 74
End: 2024-01-18

## 2024-01-18 DIAGNOSIS — G47.33 OSA TREATED WITH BIPAP: ICD-10-CM

## 2024-01-18 PROCEDURE — 95811 POLYSOM 6/>YRS CPAP 4/> PARM: CPT

## 2024-01-18 NOTE — TELEPHONE ENCOUNTER
Hi Dr. Knowles,  Can we get clarification on the sleep study order for tonight. [facility] DME had a message to us that the bipap did not meet medical necessity and that CPAP needs to be initiated first and if failed then Bipap can be done. The order is for a Bipap titration. Please advise.  Thanks so much.

## 2024-01-18 NOTE — TELEPHONE ENCOUNTER
I spoke with Dr. Knowles, he advised to start patient on CPAP and if she does not tolerate and fails CPAP then to switch her over to Bipap.

## 2024-01-19 DIAGNOSIS — G47.33 OSA TREATED WITH BIPAP: Primary | ICD-10-CM

## 2024-01-20 NOTE — PROGRESS NOTES
inductance  plethysmography (RIP), flow volume loop, sound and video.  Sleep staging  and scoring followed the standard put forth by the American Academy of  Sleep Medicine and utilized the 1B obstructive hypopnea event  desaturation of 4 percent or greater.    INTERPRETATION:  This is a BiPap re-titration study.  The study was  performed on 01/18/2024.  The study was started at 09:59 p.m. and was  terminated at 05:13 a.m. with a total time in bed of 433.6 minutes,  total sleep time was 409 minutes.  Overall sleep efficiency was 94.3% of  total sleep time.  The sleep onset latency was 4.7 minutes, wake after  sleep onset was 19.5 minutes and REM sleep latency was 301 minutes.    SLEEP STAGING AND DISTRIBUTION SUMMARY:  Revealed the patient spent 128  minutes in stage I consisting of 31.3%, 259 minutes in stage II  consisting of 63.3%, 22 minutes in REM sleep consisting of 5.4% of total  sleep time.  The patient had an absent slow-wave sleep.    PERIODIC LIMB MOVEMENT ANALYSIS:  Revealed the patient had a total of 16  periodic limb movements with a PLM index of 2.3.    The CPAP/BiPap titration study was started with a CPAP pressure of 5 cm  of water and the CPAP pressure was gradually increased to a CPAP  pressure of 6 cm of water by titrating to apneas and hypopneas.  Due to  failed CPAP therapy and persistence of respiratory events, the CPAP mode  was changed to BiPap pressures with a starting BiPap pressure of 8 x 4  cm of water.  The BiPap pressure was further increased to a final BiPap  pressure of 10 x 6 cm of water.  At a final BiPap pressure of 10 x 6 cm  of water, the patient spent a total of 234.5 minutes in bed.  Out of  234.5 minutes, the patient slept for a period of 210.5 minutes in  non-REM sleep.  The patient slept 22 minutes in REM sleep.  At a BiPap  pressure of 10 x 6 cm of water, the patient had one obstructive hypopnea  event with an apnea-hypopnea index of 0.3.  The maximum oxygen  desaturation

## 2024-01-22 ENCOUNTER — TELEPHONE (OUTPATIENT)
Dept: SLEEP CENTER | Age: 74
End: 2024-01-22

## 2024-01-30 ENCOUNTER — HOSPITAL ENCOUNTER (OUTPATIENT)
Dept: PHYSICAL THERAPY | Age: 74
Setting detail: THERAPIES SERIES
End: 2024-01-30
Payer: MEDICARE

## 2024-01-31 NOTE — PROGRESS NOTES
[facility]    Patient: STACY MCCLELLAN, 73 y.o.   : 1950    Patient of Dr. Knowles     Subjective     Chief Complaint   Patient presents with    Follow-up     3 month hfu        HPI  Stacy is here for hospital follow up for respiratory failure due to LLL pneumonia, CHF exacerbation, vs reactive airway disease exacerbation. Patient messaged into the office with low O2 levels due to recent \"significant cold.\" I advised she come in for evaluation. She was started on Flovent at discharge. Patient reports she was doing great until . She reports her  had a cold with symptoms starting a couple days prior. He was (-) COVID-19 x 2. Patient reports that since starting the URI symptoms she has had chest congestion with productive cough, shortness of breath, and rhinorrhea. She also reports that her O2 levels were dropping with exertion and her O2 was 80% in the AM one day after using PAP machine. Denies known sick exposures. Her daughter had a stomach bug and was home from school on the  and . She reports this morning she coughed large amount of bright yellow.      Overall patient reports respiratory symptoms have been fluctuating a bit since last appointment. Patient reports good compliance with inhaled medications (Flovent). Patient has not been using albuterol as she is out. Patient reports physical limitation due to respiratory symptoms. 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 White), CHF (follows CHF clinic), arthritis, reflux, bipolar 1, and CKD (follows Dr. Dueñas).     Chest x-ray 23 - mild bibasilar atelectasis/pneumonia, tiny left effusion  Chest x-ray 23 -  Increased left basilar opacity with obscuration of the left costophrenic angle. This may reflect increased

## 2024-02-01 ENCOUNTER — TELEPHONE (OUTPATIENT)
Dept: PULMONOLOGY | Age: 74
End: 2024-02-01

## 2024-02-01 ENCOUNTER — OFFICE VISIT (OUTPATIENT)
Dept: PULMONOLOGY | Age: 74
End: 2024-02-01
Payer: MEDICARE

## 2024-02-01 ENCOUNTER — HOSPITAL ENCOUNTER (OUTPATIENT)
Dept: GENERAL RADIOLOGY | Age: 74
Discharge: HOME | End: 2024-02-01
Payer: MEDICARE

## 2024-02-01 ENCOUNTER — HOSPITAL ENCOUNTER (OUTPATIENT)
Age: 74
Discharge: HOME | End: 2024-02-01
Payer: MEDICARE

## 2024-02-01 VITALS
SYSTOLIC BLOOD PRESSURE: 128 MMHG | DIASTOLIC BLOOD PRESSURE: 78 MMHG | BODY MASS INDEX: 43.38 KG/M2 | TEMPERATURE: 98.1 F | HEART RATE: 90 BPM | HEIGHT: 63 IN | WEIGHT: 244.8 LBS | OXYGEN SATURATION: 93 %

## 2024-02-01 DIAGNOSIS — I51.89 GRADE I DIASTOLIC DYSFUNCTION: ICD-10-CM

## 2024-02-01 DIAGNOSIS — R06.02 SHORTNESS OF BREATH: ICD-10-CM

## 2024-02-01 DIAGNOSIS — R05.8 PRODUCTIVE COUGH: ICD-10-CM

## 2024-02-01 DIAGNOSIS — R06.02 SHORTNESS OF BREATH: Primary | ICD-10-CM

## 2024-02-01 DIAGNOSIS — J98.11 ATELECTASIS: ICD-10-CM

## 2024-02-01 DIAGNOSIS — J45.40 MODERATE PERSISTENT ASTHMA, UNSPECIFIED WHETHER COMPLICATED: ICD-10-CM

## 2024-02-01 DIAGNOSIS — R93.89 ABNORMAL CXR: ICD-10-CM

## 2024-02-01 DIAGNOSIS — R91.8 INFILTRATE OF LEFT LUNG PRESENT ON CHEST X-RAY: Primary | ICD-10-CM

## 2024-02-01 PROCEDURE — 71046 X-RAY EXAM CHEST 2 VIEWS: CPT

## 2024-02-01 PROCEDURE — 3017F COLORECTAL CA SCREEN DOC REV: CPT

## 2024-02-01 PROCEDURE — 1123F ACP DISCUSS/DSCN MKR DOCD: CPT

## 2024-02-01 PROCEDURE — 1090F PRES/ABSN URINE INCON ASSESS: CPT

## 2024-02-01 PROCEDURE — 99214 OFFICE O/P EST MOD 30 MIN: CPT

## 2024-02-01 PROCEDURE — G8484 FLU IMMUNIZE NO ADMIN: HCPCS

## 2024-02-01 PROCEDURE — G8427 DOCREV CUR MEDS BY ELIG CLIN: HCPCS

## 2024-02-01 PROCEDURE — 3078F DIAST BP <80 MM HG: CPT

## 2024-02-01 PROCEDURE — 3074F SYST BP LT 130 MM HG: CPT

## 2024-02-01 PROCEDURE — G8417 CALC BMI ABV UP PARAM F/U: HCPCS

## 2024-02-01 PROCEDURE — G8400 PT W/DXA NO RESULTS DOC: HCPCS

## 2024-02-01 PROCEDURE — 1036F TOBACCO NON-USER: CPT

## 2024-02-01 PROCEDURE — 94618 PULMONARY STRESS TESTING: CPT

## 2024-02-01 RX ORDER — ALBUTEROL SULFATE 2.5 MG/3ML
2.5 SOLUTION RESPIRATORY (INHALATION) EVERY 6 HOURS PRN
Qty: 120 EACH | Refills: 6 | Status: SHIPPED | OUTPATIENT
Start: 2024-02-01

## 2024-02-01 RX ORDER — NEBULIZER ACCESSORIES
1 KIT MISCELLANEOUS EVERY 6 HOURS PRN
Qty: 1 KIT | Refills: 1 | Status: SHIPPED | OUTPATIENT
Start: 2024-02-01 | End: 2025-01-31

## 2024-02-01 RX ORDER — ALBUTEROL SULFATE 90 UG/1
2 AEROSOL, METERED RESPIRATORY (INHALATION) EVERY 6 HOURS PRN
Qty: 18 G | Refills: 11 | Status: SHIPPED | OUTPATIENT
Start: 2024-02-01

## 2024-02-01 RX ORDER — AZITHROMYCIN 250 MG/1
250 TABLET, FILM COATED ORAL SEE ADMIN INSTRUCTIONS
Qty: 6 TABLET | Refills: 0 | Status: SHIPPED | OUTPATIENT
Start: 2024-02-01 | End: 2024-02-06

## 2024-02-01 ASSESSMENT — ENCOUNTER SYMPTOMS
SINUS PRESSURE: 0
COUGH: 1
SHORTNESS OF BREATH: 1
RHINORRHEA: 1
CHEST TIGHTNESS: 1
SINUS PAIN: 0
WHEEZING: 1

## 2024-02-01 NOTE — TELEPHONE ENCOUNTER
Please notify patient that she has questionable atelectasis vs pneumonia in her left lower lung. Given her symptoms today, will treat with antibiotic therapy of azithromycin. Take as directed. I also recommend she use an incentive spirometer 4 x daily. Thanks!

## 2024-02-02 ENCOUNTER — HOSPITAL ENCOUNTER (OUTPATIENT)
Dept: PHYSICAL THERAPY | Age: 74
Setting detail: THERAPIES SERIES
Discharge: HOME | End: 2024-02-02
Payer: MEDICARE

## 2024-02-02 PROCEDURE — 97110 THERAPEUTIC EXERCISES: CPT

## 2024-02-02 NOTE — PROGRESS NOTES
[facility]  PHYSICAL THERAPY  [] EVALUATION  [x] DAILY NOTE (LAND) [] DAILY NOTE (AQUATIC ) [] PROGRESS NOTE [] DISCHARGE NOTE    [] OUTPATIENT REHABILITATION CENTER [facility]   [] [facility]    [] [facility]   [x] [facility]    Date: 2024  Patient Name:  Alison Mckeon  : 1950  MRN: [id number]    Referring Practitioner Gaurav Reddy DO   Diagnosis Unsteadiness on feet [R26.81]    Treatment Diagnosis Gait dysfunction, deconditioning    Date of Evaluation 24    Additional Pertinent History Bipolar , pinched nerve , left ankle fracture,      Functional Outcome Measure Used Viramontes balance test   Functional Outcome Score 29 (24)       Insurance: Primary: Payor: [employer] MEDICARE /  /  / ,   Secondary:    Authorization Information: Needs precert    Visit # 2, 2/10 for progress note   Visits Allowed: Eval only    Recertification Date: 8 weeks  3/17/24    Physician Follow-Up: As needed    Physician Orders: Eval and treat, unstable gait   History of Present Illness: Hospitalized Dec 24,2023 with Atelectasis /Pneumonia--    then transferred to [facility] and then home on .       SUBJECTIVE: Pt stated she was at Dr yesterday and was put on more antibiotics for pneumonia Pt stated she is more reliant on her rollator since being hospitalized.    TREATMENT   Precautions:    Pain: 2/10 B knee stiffness    “X” in shaded column indicates activity completed today   Modalities Parameters/  Location  Notes                     Manual Therapy Time/Technique  Notes                     Exercise/Intervention   Notes   Nustep Level 5  6min.  x           Dynamic gait: marching, side stepping, retro, tandem 2laps  x    In // bars: heel toe raises, HS curls, mini squats 10x  x    3 way hip  10x  x           Sitting ex       Marching 15x  x    LAQ 15x  x    HS curls 15x lime x    Hip abduction 15x lime x    Adductor squeeze 20x  x

## 2024-02-02 NOTE — TELEPHONE ENCOUNTER
Patients  notified, they started yesterday on abx. Also advised on incentive QID and he verbalized understanding. Also stated they picked up inh and nebs and using as directed

## 2024-02-06 ENCOUNTER — HOSPITAL ENCOUNTER (OUTPATIENT)
Dept: PHYSICAL THERAPY | Age: 74
Setting detail: THERAPIES SERIES
Discharge: HOME | End: 2024-02-06
Payer: MEDICARE

## 2024-02-06 PROCEDURE — 97110 THERAPEUTIC EXERCISES: CPT

## 2024-02-06 NOTE — PROGRESS NOTES
[facility]  PHYSICAL THERAPY  [] EVALUATION  [x] DAILY NOTE (LAND) [] DAILY NOTE (AQUATIC ) [] PROGRESS NOTE [] DISCHARGE NOTE    [] OUTPATIENT REHABILITATION CENTER [facility]   [] [facility] [facility]    [] [facility]   [x] [facility]    Date: 2024  Patient Name:  Alison Mckeon  : 1950  MRN: [id number]    Referring Practitioner Gaurav Reddy DO   Diagnosis Unsteadiness on feet [R26.81]    Treatment Diagnosis Gait dysfunction, deconditioning    Date of Evaluation 24    Additional Pertinent History Bipolar , pinched nerve , left ankle fracture,      Functional Outcome Measure Used Viramontes balance test   Functional Outcome Score 29 (24)       Insurance: Primary: Payor: [employer] MEDICARE /  /  / ,   Secondary:    Authorization Information: Needs precert    Visit # 3, 3/10 for progress note   Visits Allowed: Eval only    Recertification Date: 8 weeks  3/17/24    Physician Follow-Up: As needed    Physician Orders: Eval and treat, unstable gait   History of Present Illness: Hospitalized Dec 24,2023 with Atelectasis /Pneumonia--    then transferred to [facility] and then home on .       SUBJECTIVE: Subjective reports of Low Back Pain, rated 3/10 upon initial interview. Patient denies any recent falls or Significant Loss of Balance at this time though her legs are \"shaky\".     TREATMENT   Precautions:    Pain: 2/10 B knee stiffness    “X” in shaded column indicates activity completed today   Modalities Parameters/  Location  Notes                     Manual Therapy Time/Technique  Notes                     Exercise/Intervention   Notes   Nustep Level 5  6min.  X           Dynamic gait: marching, side stepping, retro, tandem 2laps  X    In // bars: heel toe raises, HS curls, mini squats 15x  X    3 way hip  15x             Sitting ex:       Marching 15x  X    LAQ 15x  X    HS curls 15x lime x    Hip abduction 15x lime X

## 2024-02-09 ENCOUNTER — HOSPITAL ENCOUNTER (OUTPATIENT)
Dept: PHYSICAL THERAPY | Age: 74
Setting detail: THERAPIES SERIES
Discharge: HOME | End: 2024-02-09
Payer: MEDICARE

## 2024-02-09 PROCEDURE — 97110 THERAPEUTIC EXERCISES: CPT

## 2024-02-09 PROCEDURE — 97112 NEUROMUSCULAR REEDUCATION: CPT

## 2024-02-09 NOTE — PROGRESS NOTES
[facility]  PHYSICAL THERAPY  [] EVALUATION  [x] DAILY NOTE (LAND) [] DAILY NOTE (AQUATIC ) [] PROGRESS NOTE [] DISCHARGE NOTE    [] OUTPATIENT REHABILITATION CENTER [facility]   [] [place] [facility]    [] [facility]   [x] [facility]    Date: 2024  Patient Name:  Alison Mckeon  : 1950  MRN: [id number]    Referring Practitioner Gaurav Reddy DO   Diagnosis Unsteadiness on feet [R26.81]    Treatment Diagnosis Gait dysfunction, deconditioning    Date of Evaluation 24    Additional Pertinent History Bipolar , pinched nerve , left ankle fracture,      Functional Outcome Measure Used Viramontes balance test   Functional Outcome Score 29 (24)       Insurance: Primary: Payor: [employer] MEDICARE /  /  / ,   Secondary:    Authorization Information: Needs precert    Visit # 3, 3/10 for progress note   Visits Allowed: Eval only    Recertification Date: 8 weeks  3/17/24    Physician Follow-Up: As needed    Physician Orders: Eval and treat, unstable gait   History of Present Illness: Hospitalized Dec 24,2023 with Atelectasis /Pneumonia--    then transferred to [facility] and then home on .       SUBJECTIVE: Pt stated she has been doing well with therapy session. Pt stated she has not been doing HEP    TREATMENT   Precautions:    Pain: 2/10 B knee stiffness, achy, 4/10 LB with increase in activity.     “X” in shaded column indicates activity completed today   Modalities Parameters/  Location  Notes                     Manual Therapy Time/Technique  Notes                     Exercise/Intervention   Notes   Nustep Level 5  6min.  X O2sats 92%          Dynamic gait: marching, side stepping, retro, tandem 2laps  X    In // bars: heel toe raises, HS curls, mini squats 15x  X 93%   3 way hip  15x  x    Airex: ft together 20 sec 3x x No UE's          Sitting ex:       Marching 15x  X    LAQ 15x  X    HS curls 15x lime x    Hip abduction 15x lime

## 2024-02-13 ENCOUNTER — HOSPITAL ENCOUNTER (OUTPATIENT)
Dept: PHYSICAL THERAPY | Age: 74
Setting detail: THERAPIES SERIES
Discharge: HOME | End: 2024-02-13
Payer: MEDICARE

## 2024-02-13 PROCEDURE — 97110 THERAPEUTIC EXERCISES: CPT

## 2024-02-13 NOTE — PROGRESS NOTES
Will continue education.  [x]  Barriers to learning: none     PLAN:  Treatment Recommendations: Strengthening, Balance Training, Functional Mobility Training, Gait Training, Stair Training, Home Exercise Program, Patient Education, and Safety Education and Training    []  Plan of care initiated.  Plan to see patient 2-3 times per week for 8  weeks to address the treatment planned outlined above.  [x]  Continue with current plan of care  []  Modify plan of care as follows:    []  Hold pending physician visit  []  Discharge    Time In 1005   Time Out 1045   Timed Code Minutes: 40 min   Total Treatment Time: 40 min       Electronically Signed by: Vu Kennedy PTA

## 2024-02-16 ENCOUNTER — HOSPITAL ENCOUNTER (OUTPATIENT)
Dept: PHYSICAL THERAPY | Age: 74
Setting detail: THERAPIES SERIES
Discharge: HOME | End: 2024-02-16
Payer: MEDICARE

## 2024-02-16 PROCEDURE — 97110 THERAPEUTIC EXERCISES: CPT

## 2024-02-16 PROCEDURE — 97112 NEUROMUSCULAR REEDUCATION: CPT

## 2024-02-16 NOTE — PROGRESS NOTES
[facility]  PHYSICAL THERAPY  [] EVALUATION  [x] DAILY NOTE (LAND) [] DAILY NOTE (AQUATIC ) [] PROGRESS NOTE [] DISCHARGE NOTE    [] OUTPATIENT REHABILITATION CENTER [facility]   [] [place] [facility]    [] [facility]   [x] [facility]    Date: 2024  Patient Name:  Alison Mckeon  : 1950  MRN: [id number]    Referring Practitioner Gaurav Reddy DO   Diagnosis Unsteadiness on feet [R26.81]    Treatment Diagnosis Gait dysfunction, deconditioning    Date of Evaluation 24    Additional Pertinent History Bipolar , pinched nerve , left ankle fracture,      Functional Outcome Measure Used Viramontes balance test   Functional Outcome Score 29 (24)       Insurance: Primary: Payor: [employer] MEDICARE /  /  / ,   Secondary:    Authorization Information: Needs precert    Visit # 5, 5/10 for progress note   Visits Allowed: Eval only    Recertification Date: 8 weeks  3/17/24    Physician Follow-Up: As needed    Physician Orders: Eval and treat, unstable gait   History of Present Illness: Hospitalized Dec 24,2023 with Atelectasis /Pneumonia--    then transferred to [facility] and then home on .       SUBJECTIVE: Pt stated she feel more stiff and out of breath in the mornings. Pt stated by the afternoon she is able to walk in her home without her AD.     TREATMENT   Precautions:    Pain: 5/10 B knee stiffness, achy, 0/10 LB with increase in activity.     “X” in shaded column indicates activity completed today   Modalities Parameters/  Location  Notes                     Manual Therapy Time/Technique  Notes                     Exercise/Intervention   Notes   Nustep Level 5  6min.  x O2sats 92%           Dynamic gait: marching, side stepping, retro, tandem 2laps  x After marching and side stepping o2 was 92% but    In // bars: heel toe raises, HS curls, mini squats 15x  x 93%   3 way hip  15x  x    Airex: ft together 20 sec 3x x No

## 2024-02-20 ENCOUNTER — HOSPITAL ENCOUNTER (OUTPATIENT)
Dept: PHYSICAL THERAPY | Age: 74
Setting detail: THERAPIES SERIES
Discharge: HOME | End: 2024-02-20
Payer: MEDICARE

## 2024-02-20 PROCEDURE — 97110 THERAPEUTIC EXERCISES: CPT

## 2024-02-20 NOTE — PROGRESS NOTES
[facility]  PHYSICAL THERAPY  [] EVALUATION  [x] DAILY NOTE (LAND) [] DAILY NOTE (AQUATIC ) [] PROGRESS NOTE [] DISCHARGE NOTE    [] OUTPATIENT REHABILITATION CENTER [facility]   [] [place] [facility]    [] [facility]   [x] [facility]    Date: 2024  Patient Name:  Alison Mckeon  : 1950  MRN: [id number]    Referring Practitioner Gaurav Reddy DO   Diagnosis Unsteadiness on feet [R26.81]    Treatment Diagnosis Gait dysfunction, deconditioning    Date of Evaluation 24    Additional Pertinent History Bipolar , pinched nerve , left ankle fracture,      Functional Outcome Measure Used Viramontes balance test   Functional Outcome Score 29 (24)       Insurance: Primary: Payor: [employer] MEDICARE /  /  / ,   Secondary:    Authorization Information: Needs precert    Visit # 6, 6 /10 for progress note   Visits Allowed: Eval only    Recertification Date: 8 weeks  3/17/24    Physician Follow-Up: As needed    Physician Orders: Eval and treat, unstable gait   History of Present Illness: Hospitalized Dec 24,2023 with Atelectasis /Pneumonia--    then transferred to [facility] and then home on .       SUBJECTIVE: Subjective reports of bilateral knee aching, rated 2/10 upon initial interview.     TREATMENT   Precautions:    Pain: 5/10 B knee stiffness, achy, 0/10 LB with increase in activity.     “X” in shaded column indicates activity completed today   Modalities Parameters/  Location  Notes                     Manual Therapy Time/Technique  Notes                     Exercise/Intervention   Notes   Nustep Level 5  6min.  X O2sats 92%           Dynamic gait: marching, side stepping, retro, tandem 2laps  X After marching and side stepping o2 was 92% but    In // bars: heel toe raises, HS curls, mini squats 15x  X 93%   3 way hip  15x  X    Airex: ft together, looking over shoulders, vertical  20 sec/ 10x  3x X No UE's/ Bilateral

## 2024-02-23 ENCOUNTER — HOSPITAL ENCOUNTER (OUTPATIENT)
Dept: PHYSICAL THERAPY | Age: 74
Setting detail: THERAPIES SERIES
Discharge: HOME | End: 2024-02-23
Payer: MEDICARE

## 2024-02-23 ENCOUNTER — NURSE ONLY (OUTPATIENT)
Dept: LAB | Age: 74
End: 2024-02-23

## 2024-02-23 DIAGNOSIS — N18.31 STAGE 3A CHRONIC KIDNEY DISEASE (HCC): ICD-10-CM

## 2024-02-23 DIAGNOSIS — N18.31 STAGE 3A CHRONIC KIDNEY DISEASE (HCC): Primary | ICD-10-CM

## 2024-02-23 DIAGNOSIS — D72.119 HYPEREOSINOPHILIC SYNDROME, UNSPECIFIED TYPE: ICD-10-CM

## 2024-02-23 DIAGNOSIS — D72.19 OTHER EOSINOPHILIA: ICD-10-CM

## 2024-02-23 LAB
ANION GAP SERPL CALC-SCNC: 14 MEQ/L (ref 8–16)
BASOPHILS ABSOLUTE: 0 THOU/MM3 (ref 0–0.1)
BASOPHILS NFR BLD AUTO: 0.5 %
BUN SERPL-MCNC: 37 MG/DL (ref 7–22)
CALCIUM SERPL-MCNC: 10.1 MG/DL (ref 8.5–10.5)
CHLORIDE SERPL-SCNC: 105 MEQ/L (ref 98–111)
CO2 SERPL-SCNC: 25 MEQ/L (ref 23–33)
CREAT SERPL-MCNC: 1.3 MG/DL (ref 0.4–1.2)
DEPRECATED RDW RBC AUTO: 53.1 FL (ref 35–45)
EOSINOPHIL NFR BLD AUTO: 3.1 %
EOSINOPHILS ABSOLUTE: 0.2 THOU/MM3 (ref 0–0.4)
ERYTHROCYTE [DISTWIDTH] IN BLOOD BY AUTOMATED COUNT: 14.7 % (ref 11.5–14.5)
GFR SERPL CREATININE-BSD FRML MDRD: 43 ML/MIN/1.73M2
GLUCOSE SERPL-MCNC: 108 MG/DL (ref 70–108)
HCT VFR BLD AUTO: 37.9 % (ref 37–47)
HGB BLD-MCNC: 11.8 GM/DL (ref 12–16)
IMM GRANULOCYTES # BLD AUTO: 0.02 THOU/MM3 (ref 0–0.07)
IMM GRANULOCYTES NFR BLD AUTO: 0.3 %
LYMPHOCYTES ABSOLUTE: 1.6 THOU/MM3 (ref 1–4.8)
LYMPHOCYTES NFR BLD AUTO: 26.5 %
MCH RBC QN AUTO: 30.6 PG (ref 26–33)
MCHC RBC AUTO-ENTMCNC: 31.1 GM/DL (ref 32.2–35.5)
MCV RBC AUTO: 98.4 FL (ref 81–99)
MONOCYTES ABSOLUTE: 0.7 THOU/MM3 (ref 0.4–1.3)
MONOCYTES NFR BLD AUTO: 11.6 %
NEUTROPHILS NFR BLD AUTO: 58 %
NRBC BLD AUTO-RTO: 0 /100 WBC
PLATELET # BLD AUTO: 300 THOU/MM3 (ref 130–400)
PMV BLD AUTO: 9.4 FL (ref 9.4–12.4)
POTASSIUM SERPL-SCNC: 4.9 MEQ/L (ref 3.5–5.2)
RBC # BLD AUTO: 3.85 MILL/MM3 (ref 4.2–5.4)
SEGMENTED NEUTROPHILS ABSOLUTE COUNT: 3.4 THOU/MM3 (ref 1.8–7.7)
SODIUM SERPL-SCNC: 144 MEQ/L (ref 135–145)
WBC # BLD AUTO: 5.9 THOU/MM3 (ref 4.8–10.8)

## 2024-02-23 PROCEDURE — 97110 THERAPEUTIC EXERCISES: CPT

## 2024-02-23 NOTE — PROGRESS NOTES
importance of HEP, popping front wheels of walker   Gravity Access Code: [email or web address]   []  No new Education completed  [x]  Reviewed Prior HEP      [x]  Patient verbalized and/or demonstrated understanding of education provided.  []  Patient unable to verbalize and/or demonstrate understanding of education provided.  Will continue education.  [x]  Barriers to learning: none     PLAN:  Treatment Recommendations: Strengthening, Balance Training, Functional Mobility Training, Gait Training, Stair Training, Home Exercise Program, Patient Education, and Safety Education and Training    []  Plan of care initiated.  Plan to see patient 2-3 times per week for 8  weeks to address the treatment planned outlined above.  [x]  Continue with current plan of care  []  Modify plan of care as follows:    []  Hold pending physician visit  []  Discharge    Time In 1015   Time Out 1045   Timed Code Minutes: 30 min   Total Treatment Time: 30 min       Electronically Signed by: Veronica Montes, PTA

## 2024-02-26 ENCOUNTER — HOSPITAL ENCOUNTER (OUTPATIENT)
Dept: PHYSICAL THERAPY | Age: 74
Setting detail: THERAPIES SERIES
Discharge: HOME | End: 2024-02-26
Payer: MEDICARE

## 2024-02-26 PROCEDURE — 97110 THERAPEUTIC EXERCISES: CPT

## 2024-02-26 NOTE — PROGRESS NOTES
session. Pt feels she may have better performance with afternoon sessions.  B knee pain today .     TREATMENT   Precautions:    Pain: 2/10 B knee stiffness, achy    “X” in shaded column indicates activity completed today   Modalities Parameters/  Location  Notes                     Manual Therapy Time/Technique  Notes                     Exercise/Intervention   Notes   Nustep Level 5  6min.  X O2sats 92%           Dynamic gait: marching, side stepping, retro, tandem 2laps  X 93%   In // bars: heel toe raises, HS curls, mini squats 15x  X    3 way hip  15x  X 94%    Airex: ft together, looking over shoulders, vertical , UE swings 20 sec/ 10x  3x X No UE's/ Bilateral for looking exercises   Lifting rollator on top of mini trampoline  2x   Cues for posture and proper lifting techniques to practice lifting rollator to platform porch step at home   Stepping on and off airex 10x  x    Sitting ex:       Marching 15x 2# x    LAQ 15x 2# x    HS curls 15x orange  x    Hip abduction 15x lime                                               Adductor squeeze 20x      Hip flex  B 4  Hip abd B 3+   Hip ext B 3+   Knee flex 4+  Knee ext 4  Ankle DF 3 B inversion and eversion 4/5 B   Ankle PF 3- B   Single leg stance 3 sec r, 2 sec left   Tandom stance 6 sec with moderate encouragement     Specific Interventions Next Treatment: balance and LE strengthening, aerobic training, steps     Activity/Treatment Tolerance:  [x]  Patient tolerated treatment well  []  Patient limited by fatigue  []  Patient limited by pain   []  Patient limited by medical complications  []  Other:     Assessment: O2 sats stayed above 92% this date with standing and dynamic gait activities. Pt only required 1 sitting RB. Able to complete all exercises without being SOB she is completing all exercises quicker.     Body Structures/Functions/Activity Limitations: impaired activity tolerance, impaired balance, impaired endurance, impaired ROM, impaired strength,

## 2024-02-29 ENCOUNTER — HOSPITAL ENCOUNTER (OUTPATIENT)
Dept: PHYSICAL THERAPY | Age: 74
Setting detail: THERAPIES SERIES
Discharge: HOME | End: 2024-02-29
Payer: MEDICARE

## 2024-02-29 PROCEDURE — 97530 THERAPEUTIC ACTIVITIES: CPT

## 2024-02-29 PROCEDURE — 97110 THERAPEUTIC EXERCISES: CPT

## 2024-02-29 NOTE — PROGRESS NOTES
Bilateral for looking exercises   Lifting rollator on top of mini trampoline  2x   Cues for posture and proper lifting techniques to practice lifting rollator to platform porch step at home   Stepping on and off airex 10x  x    Sitting ex:       Marching 15x 2# x    LAQ 15x 2# x    HS curls 15x orange  x    Hip abduction 15x lime                                               Adductor squeeze 20x        Specific Interventions Next Treatment: balance and LE strengthening, aerobic training, steps     Activity/Treatment Tolerance:  [x]  Patient tolerated treatment well  []  Patient limited by fatigue  []  Patient limited by pain   []  Patient limited by medical complications  []  Other:     Assessment: O2 sats stayed above 92% this date with standing and dynamic gait activities. Pt only required 1 sitting RB. Able to complete all exercises without being SOB she is completing all exercises quicker.     Body Structures/Functions/Activity Limitations: impaired activity tolerance, impaired balance, impaired endurance, impaired ROM, impaired strength, abnormal gait, and abnormal posture  Prognosis: good    GOALS:  Patient Goal: walk up steps easier, stand to cook lunch of dinner, walk in house without rollator     Short Term Goals:  Time Frame: 4 weeks  Increase B LE strength to 4/5 to allow for standing longer than 10 min  MET for standing longer than 10 min,   Increase tamdon stance to 5 sec--  MET  6 sec today , new goal 10 sec.   Increase single leg stance to 9 sec - Partially met  cont  goal   Balance on foam with feet apart for 15 sec   MET  new goal-- foam with together for 10 sec wihtou UE supprot  Initiate HEP  MET     Long Term Goals:  Time Frame: 8 weeks-- NONE MET  cont all   Increase B LE to 5/5 so patient can walk and down steps with 1 hand hold and no pulling to get in  and out of her home   Increase bolton score to 40 to allow for gait with less dependence on AD   Increase endurance to 10 reps sit to

## 2024-03-01 ENCOUNTER — OFFICE VISIT (OUTPATIENT)
Dept: NEPHROLOGY | Age: 74
End: 2024-03-01

## 2024-03-01 VITALS
OXYGEN SATURATION: 95 % | DIASTOLIC BLOOD PRESSURE: 68 MMHG | WEIGHT: 239 LBS | BODY MASS INDEX: 42.34 KG/M2 | SYSTOLIC BLOOD PRESSURE: 122 MMHG | HEART RATE: 77 BPM

## 2024-03-01 DIAGNOSIS — N18.31 STAGE 3A CHRONIC KIDNEY DISEASE (HCC): Primary | ICD-10-CM

## 2024-03-01 NOTE — PROGRESS NOTES
[facility]    [address], [address]   [place] [address]  [phone]  Progress Note  3/1/2024 11:17 AM      Pt Name:    Alison Mckeon  YOB: 1950  Primary Care Physician:  Gaurav Reddy DO     Chief Complaint:   Chief Complaint   Patient presents with    Follow-up     CKD III        History of Present Illness:   This is a follow-up visit for CKD III and HTN.    She has hx of HTN, diastolic CHF, pulm HTN, obesity, thoracic AA, OA, RANDY, bipolar disorder, nephrolithiasis.    She was hospitalized in December for pneumonia. Also had HEBER which resolved with IV fluids.     Doing better now. Volume status stable. On torsemide 40 mg daily, aldactone 50 mg daily. Bp controlled. Chronic ESCOTO. No significant edema or bloating.         Pertinent items are noted in HPI.         Past History:  Past Medical History:   Diagnosis Date    Acid reflux     Arthritis     Asthma     Bipolar 1 disorder (HCC)     CHF (congestive heart failure) (HCC)     CKD (chronic kidney disease), stage II 2019    History of blood transfusion     Hypertension     Injury of breast     Lumbosacral radiculopathy 2021    Mood disorder (HCC)     Osteoarthritis of metacarpophalangeal (MCP) joint 2023    Pneumonia 2021    Sleep apnea     Thyroid disease      Past Surgical History:   Procedure Laterality Date    ANKLE SURGERY      left    CATARACT REMOVAL      Both eyes      SECTION      x 3    FOOT SURGERY      Left     HIP SURGERY      HYSTERECTOMY (CERVIX STATUS UNKNOWN)      Total     SATHYA STEROTACTIC LOC BREAST BIOPSY RIGHT Right     Benign    OVARY REMOVAL      several years after hysterectomy    TOTAL KNEE ARTHROPLASTY Left 10/14/14    TOTAL KNEE ARTHROPLASTY Right 2019    US BREAST BIOPSY W LOC DEVICE 1ST LESION LEFT Left 2017    Benign , fibroadenoma        VITALS:  /68 (Site: Right Lower Arm, Position: Sitting, Cuff Size: Large Adult)   Pulse

## 2024-03-04 ENCOUNTER — HOSPITAL ENCOUNTER (OUTPATIENT)
Dept: PHYSICAL THERAPY | Age: 74
Setting detail: THERAPIES SERIES
Discharge: HOME | End: 2024-03-04
Payer: MEDICARE

## 2024-03-04 PROCEDURE — 97110 THERAPEUTIC EXERCISES: CPT

## 2024-03-04 NOTE — PROGRESS NOTES
** PLEASE SIGN, DATE AND TIME CERTIFICATION BELOW AND RETURN TO [facility] OUTPATIENT REHABILITATION (FAX #: [phone]).  ATTEST/CO-SIGN IF ACCESSING VIA [employer].  THANK YOU.**    I certify that I have examined the patient below and determined that Physical Medicine and Rehabilitation service is necessary and that I approve the established plan of care for up to 90 days or as specifically noted.  Attestation, signature or co-signature of physician indicates approval of certification requirements.    ________________________ ____________ __________  Physician Signature   Date   Time  [facility]  PHYSICAL THERAPY  [] EVALUATION  [] DAILY NOTE (LAND) [] DAILY NOTE (AQUATIC ) [x] PROGRESS NOTE [] DISCHARGE NOTE    [] OUTPATIENT REHABILITATION [facility]   [] [facility]    [] [facility]   [x] [facility]    Date: 3/4/2024  Patient Name:  Alison Mckeon  : 1950  MRN: [id number]    Referring Practitioner Gaurav Reddy DO   Diagnosis Unsteadiness on feet [R26.81]    Treatment Diagnosis Gait dysfunction, deconditioning    Date of Evaluation 24    Additional Pertinent History Bipolar, pinched nerve , left ankle fracture,      Functional Outcome Measure Used Viramontes balance test,:  30 sec sit to stand goal-- 21 sec for age appropriate   Functional Outcome Score 29 (24)                     32 sec     (3-4-24)      Insurance: Primary: Payor: [employer] MEDICARE /  /  / ,   Secondary:    Authorization Information: Needs precert    Visit # 11   0 /10 for progress note   Visits Allowed:   11 total :  10 PT visits for CPT codes 67971, 28635 and 84200 from 24 through 24.    Recertification Date: Updated 4 more weeks,24     Physician Follow-Up: As needed    Physician Orders: Eval and treat, unstable gait   History of Present Illness: Hospitalized Dec 24,2023 with Atelectasis /Pneumonia--    then transferred to [facility]

## 2024-03-05 ENCOUNTER — OFFICE VISIT (OUTPATIENT)
Dept: PULMONOLOGY | Age: 74
End: 2024-03-05
Payer: MEDICARE

## 2024-03-05 VITALS
OXYGEN SATURATION: 94 % | SYSTOLIC BLOOD PRESSURE: 130 MMHG | WEIGHT: 243.4 LBS | BODY MASS INDEX: 43.12 KG/M2 | TEMPERATURE: 98.4 F | HEART RATE: 75 BPM | DIASTOLIC BLOOD PRESSURE: 82 MMHG | HEIGHT: 63 IN

## 2024-03-05 DIAGNOSIS — J96.12 CHRONIC HYPERCAPNIC RESPIRATORY FAILURE (HCC): ICD-10-CM

## 2024-03-05 DIAGNOSIS — E66.01 MORBID OBESITY WITH BMI OF 40.0-44.9, ADULT (HCC): ICD-10-CM

## 2024-03-05 DIAGNOSIS — G47.33 OSA TREATED WITH BIPAP: Primary | ICD-10-CM

## 2024-03-05 PROCEDURE — 99214 OFFICE O/P EST MOD 30 MIN: CPT | Performed by: PHYSICIAN ASSISTANT

## 2024-03-05 PROCEDURE — G8417 CALC BMI ABV UP PARAM F/U: HCPCS | Performed by: PHYSICIAN ASSISTANT

## 2024-03-05 PROCEDURE — G8400 PT W/DXA NO RESULTS DOC: HCPCS | Performed by: PHYSICIAN ASSISTANT

## 2024-03-05 PROCEDURE — 3075F SYST BP GE 130 - 139MM HG: CPT | Performed by: PHYSICIAN ASSISTANT

## 2024-03-05 PROCEDURE — 3017F COLORECTAL CA SCREEN DOC REV: CPT | Performed by: PHYSICIAN ASSISTANT

## 2024-03-05 PROCEDURE — G8484 FLU IMMUNIZE NO ADMIN: HCPCS | Performed by: PHYSICIAN ASSISTANT

## 2024-03-05 PROCEDURE — 3079F DIAST BP 80-89 MM HG: CPT | Performed by: PHYSICIAN ASSISTANT

## 2024-03-05 PROCEDURE — 1036F TOBACCO NON-USER: CPT | Performed by: PHYSICIAN ASSISTANT

## 2024-03-05 PROCEDURE — 1123F ACP DISCUSS/DSCN MKR DOCD: CPT | Performed by: PHYSICIAN ASSISTANT

## 2024-03-05 PROCEDURE — G8427 DOCREV CUR MEDS BY ELIG CLIN: HCPCS | Performed by: PHYSICIAN ASSISTANT

## 2024-03-05 PROCEDURE — 1090F PRES/ABSN URINE INCON ASSESS: CPT | Performed by: PHYSICIAN ASSISTANT

## 2024-03-05 ASSESSMENT — ENCOUNTER SYMPTOMS
NAUSEA: 0
WHEEZING: 0
BACK PAIN: 0
DIARRHEA: 0
STRIDOR: 0
SHORTNESS OF BREATH: 0
COUGH: 0
EYES NEGATIVE: 1
CHEST TIGHTNESS: 0
ALLERGIC/IMMUNOLOGIC NEGATIVE: 1

## 2024-03-05 NOTE — PROGRESS NOTES
[facility]      Alison Mckeon         [id number]  3/5/2024   Chief Complaint   Patient presents with    Follow-up     Etienne f/u         Pt of Dr. Eleni FERNANDES Download:   Original or initial AHI: 50.4     Date of initial study: 5/14/2009      Compliant  100%     Noncompliant 0%     PAP Type Spont Level  IPAP 68ivR09 EPAP 6cmH20   Avg Hrs/Day 11 hours 21 minutes  AHI: 2.0   Recorded compliance dates 1/30/24-2/28/24   Machine/Mfg:   [x] ResMed    [] Respironics/Dreamstation   Interface:   [x] Nasal    [] Nasal pillows   [] FFM      Provider:      [x] [facility]     [][employer]     [] [employer]    [] [employer]    [] [employer]               [] [employer]      [] [employer]    [] [place]:      [] Other    Neck Size: 17.5 inches  Mallampati 4  ESS:  4  SAQLI: 83    Here is a scan of the most recent download:                Presentation:   Alison presents for sleep medicine follow up for obstructive sleep apnea  Since the last visit, Alison is doing well with Bipap. She was in the hospital for pneumonia and diagnosed with hypercapnic resp failure and started on bipap. She had an in lab titration and set up on bipap. She is sleeping well.  She gets tired.  All hospital records reviewed.    Equipment issues:  The pressure is  acceptable, the mask is acceptable     Sleep issues:  Do you feel better? Yes  More rested?Yes   Better concentration? yes    Progress History:   Since last visit any new medical issues? Yes pneumonia  New ER or hospital visits? Yes   Any new or changes in medicines? No  Any new sleep medicines? No    Review of Systems -   Review of Systems   Constitutional:  Negative for activity change, appetite change, chills and fever.   HENT:  Negative for congestion and postnasal drip.    Eyes: Negative.    Respiratory:  Negative for cough, chest tightness, shortness of breath, wheezing and stridor.    Cardiovascular:  Negative for chest pain and leg swelling.

## 2024-03-08 ENCOUNTER — OFFICE VISIT (OUTPATIENT)
Dept: FAMILY MEDICINE CLINIC | Age: 74
End: 2024-03-08

## 2024-03-08 VITALS
DIASTOLIC BLOOD PRESSURE: 68 MMHG | RESPIRATION RATE: 16 BRPM | BODY MASS INDEX: 43.51 KG/M2 | SYSTOLIC BLOOD PRESSURE: 122 MMHG | WEIGHT: 245.6 LBS | HEART RATE: 76 BPM

## 2024-03-08 DIAGNOSIS — L30.9 DERMATITIS: Primary | ICD-10-CM

## 2024-03-08 RX ORDER — METHYLPREDNISOLONE ACETATE 80 MG/ML
80 INJECTION, SUSPENSION INTRA-ARTICULAR; INTRALESIONAL; INTRAMUSCULAR; SOFT TISSUE ONCE
Status: COMPLETED | OUTPATIENT
Start: 2024-03-08 | End: 2024-03-08

## 2024-03-08 RX ORDER — METHYLPREDNISOLONE ACETATE 40 MG/ML
40 INJECTION, SUSPENSION INTRA-ARTICULAR; INTRALESIONAL; INTRAMUSCULAR; SOFT TISSUE ONCE
Status: COMPLETED | OUTPATIENT
Start: 2024-03-08 | End: 2024-03-08

## 2024-03-08 RX ORDER — HYDROCORTISONE 25 MG/ML
LOTION TOPICAL
Qty: 120 ML | Refills: 0 | Status: SHIPPED | OUTPATIENT
Start: 2024-03-08

## 2024-03-08 RX ADMIN — METHYLPREDNISOLONE ACETATE 40 MG: 40 INJECTION, SUSPENSION INTRA-ARTICULAR; INTRALESIONAL; INTRAMUSCULAR; SOFT TISSUE at 09:37

## 2024-03-08 RX ADMIN — METHYLPREDNISOLONE ACETATE 80 MG: 80 INJECTION, SUSPENSION INTRA-ARTICULAR; INTRALESIONAL; INTRAMUSCULAR; SOFT TISSUE at 09:38

## 2024-03-08 ASSESSMENT — PATIENT HEALTH QUESTIONNAIRE - PHQ9
SUM OF ALL RESPONSES TO PHQ QUESTIONS 1-9: 0
SUM OF ALL RESPONSES TO PHQ QUESTIONS 1-9: 0
4. FEELING TIRED OR HAVING LITTLE ENERGY: 0
SUM OF ALL RESPONSES TO PHQ QUESTIONS 1-9: 0
6. FEELING BAD ABOUT YOURSELF - OR THAT YOU ARE A FAILURE OR HAVE LET YOURSELF OR YOUR FAMILY DOWN: 0
1. LITTLE INTEREST OR PLEASURE IN DOING THINGS: 0
9. THOUGHTS THAT YOU WOULD BE BETTER OFF DEAD, OR OF HURTING YOURSELF: 0
8. MOVING OR SPEAKING SO SLOWLY THAT OTHER PEOPLE COULD HAVE NOTICED. OR THE OPPOSITE, BEING SO FIGETY OR RESTLESS THAT YOU HAVE BEEN MOVING AROUND A LOT MORE THAN USUAL: 0
5. POOR APPETITE OR OVEREATING: 0
7. TROUBLE CONCENTRATING ON THINGS, SUCH AS READING THE NEWSPAPER OR WATCHING TELEVISION: 0
SUM OF ALL RESPONSES TO PHQ QUESTIONS 1-9: 0
10. IF YOU CHECKED OFF ANY PROBLEMS, HOW DIFFICULT HAVE THESE PROBLEMS MADE IT FOR YOU TO DO YOUR WORK, TAKE CARE OF THINGS AT HOME, OR GET ALONG WITH OTHER PEOPLE: 0
SUM OF ALL RESPONSES TO PHQ9 QUESTIONS 1 & 2: 0
2. FEELING DOWN, DEPRESSED OR HOPELESS: 0
3. TROUBLE FALLING OR STAYING ASLEEP: 0

## 2024-03-08 ASSESSMENT — ENCOUNTER SYMPTOMS
RESPIRATORY NEGATIVE: 1
GASTROINTESTINAL NEGATIVE: 1

## 2024-03-08 NOTE — PROGRESS NOTES
Patient was given Depo Medrol 120mg 2ml IM in right dorsogluteal per mercedes.frannie Reddy.  Patient tolerated well and without incident.      Administrations This Visit       methylPREDNISolone acetate (DEPO-MEDROL) injection 40 mg       Admin Date  03/08/2024 Action  Given Dose  40 mg Route  IntraMUSCular Administered By  Shelbie Desir LPN              methylPREDNISolone acetate (DEPO-MEDROL) injection 80 mg       Admin Date  03/08/2024 Action  Given Dose  80 mg Route  IntraMUSCular Administered By  Shelbie Desir LPN                    
Appearance: Normal appearance. She is well-developed.   HENT:      Head: Normocephalic and atraumatic.      Right Ear: Tympanic membrane normal.      Left Ear: Tympanic membrane normal.   Eyes:      Conjunctiva/sclera: Conjunctivae normal.   Cardiovascular:      Rate and Rhythm: Normal rate and regular rhythm.      Heart sounds: Normal heart sounds. No murmur heard.  Pulmonary:      Effort: Pulmonary effort is normal.      Breath sounds: Normal breath sounds. No wheezing, rhonchi or rales.   Abdominal:      General: There is no distension.   Musculoskeletal:      Cervical back: Neck supple.   Skin:     General: Skin is warm and dry.      Findings: Rash (macular, urticarial style rash to back and LUE) present.   Neurological:      General: No focal deficit present.      Mental Status: She is alert.   Psychiatric:         Attention and Perception: Attention normal.         Mood and Affect: Mood normal.         Speech: Speech normal.         Behavior: Behavior normal. Behavior is cooperative.         Thought Content: Thought content normal.         Judgment: Judgment normal.               ASSESSMENT/PLAN:  1. Dermatitis  -     methylPREDNISolone acetate (DEPO-MEDROL) injection 40 mg; 40 mg, IntraMUSCular, ONCE, 1 dose, On Fri 3/8/24 at 0945  -     methylPREDNISolone acetate (DEPO-MEDROL) injection 80 mg; 80 mg, IntraMUSCular, ONCE, 1 dose, On Fri 3/8/24 at 0945    -  Uncertain etiology  -  Likely drug eruption but not sure to which one  -  Stopped Lipitor and Lamictal previously  -  Depo 120 mg IM  -  Consider Allergy referral    Return if symptoms worsen or fail to improve.             An electronic signature was used to authenticate this note.    --Gaurav Reddy DO

## 2024-03-11 ENCOUNTER — HOSPITAL ENCOUNTER (OUTPATIENT)
Dept: PHYSICAL THERAPY | Age: 74
Setting detail: THERAPIES SERIES
Discharge: HOME | End: 2024-03-11
Payer: MEDICARE

## 2024-03-11 PROCEDURE — 97112 NEUROMUSCULAR REEDUCATION: CPT

## 2024-03-11 PROCEDURE — 97110 THERAPEUTIC EXERCISES: CPT

## 2024-03-11 NOTE — PROGRESS NOTES
[facility]  PHYSICAL THERAPY  [] EVALUATION  [x] DAILY NOTE (LAND) [] DAILY NOTE (AQUATIC ) [] PROGRESS NOTE [] DISCHARGE NOTE    [] OUTPATIENT REHABILITATION CENTER [facility]   [] [place] [facility]AT[facility]    [] [facility]   [x] [facility]    Date: 3/11/2024  Patient Name:  Alison Mckeon  : 1950  MRN: [id number]    Referring Practitioner Gaurav Reddy DO   Diagnosis Unsteadiness on feet [R26.81]    Treatment Diagnosis Gait dysfunction, deconditioning    Date of Evaluation 24    Additional Pertinent History Bipolar, pinched nerve , left ankle fracture,      Functional Outcome Measure Used Viramontes balance test,:  30 sec sit to stand goal-- 21 sec for age appropriate   Functional Outcome Score 29 (24)                     32 sec     (3-4-24)      Insurance: Primary: Payor: [employer] MEDICARE /  /  / ,   Secondary:    Authorization Information: Needs precert    Visit # 12   2/10 for progress note   Visits Allowed:   11 total :  10 PT visits for CPT codes 09204, 03675 and 19756 from 24 through 24.    Recertification Date: Updated 4 more weeks,24     Physician Follow-Up: As needed    Physician Orders: Eval and treat, unstable gait   History of Present Illness: Hospitalized Dec 24,2023 with Atelectasis /Pneumonia--    then transferred to [facility] and then home on .       SUBJECTIVE: Pt stated she had been getting around house with no AD. Pt still gets SOB sometimes due to congestive heart failure. Pt stated she has a rash all over her body and  gave her a corizone shot but still is irritating.        TREATMENT   Precautions:    Pain: 1/10 B knee stiffness, achy    “X” in shaded column indicates activity completed today   Modalities Parameters/  Location  Notes                     Manual Therapy Time/Technique  Notes                     Exercise/Intervention   Notes   Nustep Level 5  7 min.  X O2sats 93% HR 95

## 2024-03-14 ENCOUNTER — HOSPITAL ENCOUNTER (OUTPATIENT)
Dept: PHYSICAL THERAPY | Age: 74
Setting detail: THERAPIES SERIES
Discharge: HOME | End: 2024-03-14
Payer: MEDICARE

## 2024-03-14 ENCOUNTER — PATIENT MESSAGE (OUTPATIENT)
Dept: FAMILY MEDICINE CLINIC | Age: 74
End: 2024-03-14

## 2024-03-14 DIAGNOSIS — L30.9 DERMATITIS: Primary | ICD-10-CM

## 2024-03-14 PROCEDURE — 97530 THERAPEUTIC ACTIVITIES: CPT

## 2024-03-14 PROCEDURE — 97110 THERAPEUTIC EXERCISES: CPT

## 2024-03-14 RX ORDER — DEXAMETHASONE 6 MG/1
6 TABLET ORAL DAILY
Qty: 10 TABLET | Refills: 0 | Status: SHIPPED | OUTPATIENT
Start: 2024-03-14 | End: 2024-03-24

## 2024-03-14 NOTE — PROGRESS NOTES
Airex: ft together, looking over shoulders, vertical , UE swings, Horizontal abduction 20 sec/ 10x  3x X No UE's   Stepping forward and back fast with UE hold  10   x    Lifting rollator on top of mini trampoline  2x   Cues for posture and proper lifting techniques to practice lifting rollator to platform porch step at home   Stepping on and off airex 10x      Sitting ex:       Marching 15x 2# x    LAQ 15x 2# x    HS curls 15x lime x    Hip abduction 15x lime x           SLS   30 sec                     Ambulate through clinic without AD Various distances  x    Footware education    More stable heel, avoid faom   Adductor squeeze 20x      Specific Interventions Next Treatment: balance and LE strengthening, aerobic training, steps     Activity/Treatment Tolerance:  [x]  Patient tolerated treatment well  []  Patient limited by fatigue  []  Patient limited by pain   []  Patient limited by medical complications  []  Other:     Assessment: Pt doing well with increasing endurance levels.          Body Structures/Functions/Activity Limitations: impaired activity tolerance, impaired balance, impaired endurance, impaired ROM, impaired strength, abnormal gait, and abnormal posture  Prognosis: good    GOALS:  Patient Goal: walk up steps easier, stand to cook lunch of dinner, walk in house without rollator     Short Term Goals:  Time Frame: 4 weeks  Increase B LE strength to 4/5 to allow for standing longer than 10 min  MET for standing longer than 10 min , not met for strength  cont goal   Increase tamdon stance to 5 sec--  MET  6 sec today , new goal 10 sec.   Increase single leg stance to 9 sec - Partially met  cont  goal   Balance on foam with feet apart for 15 sec   MET  new goal-- foam with together for 10 sec without UE supprot  Initiate HEP  MET   10 reps sit to stand less than 26 sec,     Long Term Goals:  Time Frame: 8 weeks-- NONE MET  cont all   Increase B LE to 5/5 so patient can walk and down steps with 1 hand

## 2024-03-14 NOTE — TELEPHONE ENCOUNTER
From: Alison Mckeon  To: Dr. Gaurav Reddy  Sent: 3/14/2024 8:12 AM EDT  Subject: Itching    I have used up the 4 oz bottle of lotion as I am using on the upper half of my body. It didn't seem to help anymore than the 1% cream. I am still itching and broken out. It doesn't seem any better. Other suggestions? I am open to other ideas. I did check with [facility] and they have dexamethasone 6 mg tablets in stock. They were prescribed 1 a day.

## 2024-03-18 ENCOUNTER — HOSPITAL ENCOUNTER (OUTPATIENT)
Dept: PHYSICAL THERAPY | Age: 74
Setting detail: THERAPIES SERIES
Discharge: HOME | End: 2024-03-18
Payer: MEDICARE

## 2024-03-18 PROCEDURE — 97112 NEUROMUSCULAR REEDUCATION: CPT

## 2024-03-18 PROCEDURE — 97110 THERAPEUTIC EXERCISES: CPT

## 2024-03-18 NOTE — PROGRESS NOTES
bars on AIREX: heel toe raises, HS curls, mini squats, marching 15x  X    3 way hip  15x      Airex: ft together, looking over shoulders, vertical , UE swings, Horizontal abduction 20 sec/ 10x   X No UE's, CGA with head turns   Stepping forward and back fast with UE hold  10   x    Lifting rollator on top of mini trampoline  2x   Cues for posture and proper lifting techniques to practice lifting rollator to platform porch step at home   Stepping on and off airex 10x      Step ups  10x 6in. x 91%, 124 bpm   Sitting ex:       Marching 15x 2# x    LAQ 15x 2# x    HS curls 15x lime x    Hip abduction 15x lime x    Sit to stands ( NO UE's) 10x  x 94%, 102 bpm   SLS   30 sec                     Ambulate through clinic without AD Various distances  x    Footware education    More stable heel, avoid faom   Adductor squeeze 20x      Specific Interventions Next Treatment: balance and LE strengthening, aerobic training, steps     Activity/Treatment Tolerance:  [x]  Patient tolerated treatment well  []  Patient limited by fatigue  []  Patient limited by pain   []  Patient limited by medical complications  []  Other:     Assessment: Pt continues to work on endurance levels while monitoring O2 sats and pulse. Added sit to stands and step ups this date. Balance reactions required CGA for safety this date but overall pt did well today.         Body Structures/Functions/Activity Limitations: impaired activity tolerance, impaired balance, impaired endurance, impaired ROM, impaired strength, abnormal gait, and abnormal posture  Prognosis: good    GOALS:  Patient Goal: walk up steps easier, stand to cook lunch of dinner, walk in house without rollator     Short Term Goals:  Time Frame: 4 weeks  Increase B LE strength to 4/5 to allow for standing longer than 10 min  MET for standing longer than 10 min , not met for strength  cont goal   Increase tamdon stance to 5 sec--  MET  6 sec today , new goal 10 sec.   Increase single leg stance

## 2024-03-20 ENCOUNTER — HOSPITAL ENCOUNTER (OUTPATIENT)
Dept: PHYSICAL THERAPY | Age: 74
Setting detail: THERAPIES SERIES
Discharge: HOME | End: 2024-03-20
Payer: MEDICARE

## 2024-03-20 PROCEDURE — 97110 THERAPEUTIC EXERCISES: CPT

## 2024-03-25 ENCOUNTER — HOSPITAL ENCOUNTER (OUTPATIENT)
Dept: PHYSICAL THERAPY | Age: 74
Setting detail: THERAPIES SERIES
Discharge: HOME | End: 2024-03-25
Payer: MEDICARE

## 2024-03-25 PROCEDURE — 97110 THERAPEUTIC EXERCISES: CPT

## 2024-03-25 NOTE — PROGRESS NOTES
[facility]  PHYSICAL THERAPY  [] EVALUATION  [x] DAILY NOTE (LAND) [] DAILY NOTE (AQUATIC ) [] PROGRESS NOTE [] DISCHARGE NOTE    [] OUTPATIENT REHABILITATION CENTER [facility]   [] [place] [facility]BULATORY [facility]ARE CENTER    [] [facility]   [x] [facility]    Date: 3/25/2024  Patient Name:  Alison Mckeon  : 1950  MRN: [id number]    Referring Practitioner Gaurav Reddy DO   Diagnosis Unsteadiness on feet [R26.81]    Treatment Diagnosis Gait dysfunction, deconditioning    Date of Evaluation 24    Additional Pertinent History Bipolar, pinched nerve , left ankle fracture,      Functional Outcome Measure Used Viramontes balance test,:  30 sec sit to stand goal-- 21 sec for age appropriate   Functional Outcome Score 29 (24)                     32 sec     (3-4-24)      Insurance: Primary: Payor: [employer] MEDICARE /  /  / ,   Secondary:    Authorization Information: Needs precert    Visit # 16,   6/10 for progress note   Visits Allowed:   11 total :  10 PT visits for CPT codes 13630, 94226 and 63289 from 24 through 24.    Recertification Date: Updated 4 more weeks,24     Physician Follow-Up: As needed    Physician Orders: Eval and treat, unstable gait   History of Present Illness: Hospitalized Dec 24,2023 with Atelectasis /Pneumonia--    then transferred to [facility] [place] and then home on .       SUBJECTIVE: Alison had a long weekend with granddaughter being in a HS musical. Pt stated she has to get back doing her HEP.         TREATMENT   Precautions:    Pain: 2-3/10 B knee stiffness, achy    “X” in shaded column indicates activity completed today   Modalities Parameters/  Location  Notes                     Manual Therapy Time/Technique  Notes                     Exercise/Intervention   Notes   Nustep Level 5  6 min.  X           Dynamic gait: marching, side stepping, retro, tandem 2laps  X 94%    In // bars on AIREX: heel toe raises, HS

## 2024-03-27 ENCOUNTER — HOSPITAL ENCOUNTER (OUTPATIENT)
Dept: PHYSICAL THERAPY | Age: 74
Setting detail: THERAPIES SERIES
End: 2024-03-27
Payer: MEDICARE

## 2024-03-28 NOTE — PROGRESS NOTES
[facility]    Patient: ALISON MCCLELLAN, 73 y.o.   : 1950    Patient of Dr. Knowles    Assessment/Plan   1. Moderate persistent asthma, unspecified whether complicated - currently well controlled  -OK to continue Flovent on as needed basis during allergy seasons/weather changes.   -Continue albuterol inhaler and nebulizer, one or the other, every 6 hours as needed for shortness of breath or wheezing   -Reviewed preventative vaccinations    2. Chronic hypercapnic respiratory failure (HCC)  -Continue good compliance with BiPAP therapy. Continue with TREMAYNE Vail in sleep clinic     3. Infiltrate and Atelectasis of left lung present on chest x-ray  -Repeat XR CHEST (2 VW) to follow    4. Grade I diastolic dysfunction - stable  -Continue with CHF clinic and cardio. Continue to monitor weight and swelling    Advised patient to call office with any changes, questions, or concerns regarding respiratory status or issues with prescribed medications    Return in about 6 months (around 10/2/2024) for asthma.        Subjective     Chief Complaint   Patient presents with    Follow-up     3 month pulm f/u with cxr-2-1-24        HPI  Complaints: Shortness of Breath  Onset Duration: Over a year  Exacerbating factors: Exertion  Alleviating factors: Rest  Pertinent negatives: Cough, Sputum Production, Hemoptysis, Wheezing, and Chest Tightness  Risk factors for lung disease: no known risk factors    Alison is here for follow up for respiratory failure due to LLL pnneumonia and RAD. Overall patient reports respiratory symptoms have been improved since last appointment. Patient reports poor compliance with inhaled medications (Flovent). Patient reports that she stop about 2 weeks after coming home from [facility]. She has been feeling her shortness of breath is back to baseline. Patient has not required albuterol in weeks. Patient reports physical limitation due to respiratory

## 2024-04-01 ENCOUNTER — HOSPITAL ENCOUNTER (OUTPATIENT)
Dept: PHYSICAL THERAPY | Age: 74
Setting detail: THERAPIES SERIES
Discharge: HOME | End: 2024-04-01
Payer: MEDICARE

## 2024-04-01 PROCEDURE — 97110 THERAPEUTIC EXERCISES: CPT

## 2024-04-01 PROCEDURE — 97112 NEUROMUSCULAR REEDUCATION: CPT

## 2024-04-01 NOTE — PROGRESS NOTES
[facility]  PHYSICAL THERAPY  [] EVALUATION  [x] DAILY NOTE (LAND) [] DAILY NOTE (AQUATIC ) [] PROGRESS NOTE [] DISCHARGE NOTE    [] OUTPATIENT REHABILITATION CENTER [facility]   [] [place] [facility]    [] [facility]   [x] [facility]    Date: 2024  Patient Name:  Alison Mckeon  : 1950  MRN: [id number]    Referring Practitioner Gaurav Reddy DO   Diagnosis Unsteadiness on feet [R26.81]    Treatment Diagnosis Gait dysfunction, deconditioning    Date of Evaluation 24    Additional Pertinent History Bipolar, pinched nerve , left ankle fracture,      Functional Outcome Measure Used Viramontes balance test,:  30 sec sit to stand goal-- 21 sec for age appropriate   Functional Outcome Score 29 (24)                     32 sec     (3-4-24)      Insurance: Primary: Payor: [employer] MEDICARE /  /  / ,   Secondary:    Authorization Information: Needs precert    Visit # 17,   7/10 for progress note   Visits Allowed:   11 total :  10 PT visits for CPT codes 63872, 41061 and 02685 from 24 through 24.    Recertification Date: Updated 4 more weeks,24     Physician Follow-Up: As needed    Physician Orders: Eval and treat, unstable gait   History of Present Illness: Hospitalized Dec 24,2023 with Atelectasis /Pneumonia--    then transferred to [facility] [place] and then home on .       SUBJECTIVE: Pt stated she had a good Easter weekend. No new concerns expressed.        TREATMENT   Precautions:    Pain: 2-3/10 B knee stiffness, achy    “X” in shaded column indicates activity completed today   Modalities Parameters/  Location  Notes                     Manual Therapy Time/Technique  Notes                     Exercise/Intervention   Notes   Nustep Level 5  6 min.  X 92%, HR 92          Dynamic gait: marching, side stepping, retro, tandem 2laps  X 93%    In // bars on [employer]: heel toe raises, HS curls, mini squats, marching 15x  X

## 2024-04-02 ENCOUNTER — OFFICE VISIT (OUTPATIENT)
Dept: PULMONOLOGY | Age: 74
End: 2024-04-02
Payer: MEDICARE

## 2024-04-02 VITALS
WEIGHT: 242 LBS | BODY MASS INDEX: 42.88 KG/M2 | HEIGHT: 63 IN | HEART RATE: 89 BPM | SYSTOLIC BLOOD PRESSURE: 118 MMHG | OXYGEN SATURATION: 94 % | TEMPERATURE: 98 F | DIASTOLIC BLOOD PRESSURE: 70 MMHG

## 2024-04-02 DIAGNOSIS — J98.11 ATELECTASIS: ICD-10-CM

## 2024-04-02 DIAGNOSIS — J96.12 CHRONIC HYPERCAPNIC RESPIRATORY FAILURE (HCC): ICD-10-CM

## 2024-04-02 DIAGNOSIS — J45.40 MODERATE PERSISTENT ASTHMA, UNSPECIFIED WHETHER COMPLICATED: Primary | ICD-10-CM

## 2024-04-02 DIAGNOSIS — I51.89 GRADE I DIASTOLIC DYSFUNCTION: ICD-10-CM

## 2024-04-02 DIAGNOSIS — R91.8 INFILTRATE OF LEFT LUNG PRESENT ON CHEST X-RAY: ICD-10-CM

## 2024-04-02 PROCEDURE — 3074F SYST BP LT 130 MM HG: CPT

## 2024-04-02 PROCEDURE — G8427 DOCREV CUR MEDS BY ELIG CLIN: HCPCS

## 2024-04-02 PROCEDURE — G8400 PT W/DXA NO RESULTS DOC: HCPCS

## 2024-04-02 PROCEDURE — 1090F PRES/ABSN URINE INCON ASSESS: CPT

## 2024-04-02 PROCEDURE — 99214 OFFICE O/P EST MOD 30 MIN: CPT

## 2024-04-02 PROCEDURE — 1036F TOBACCO NON-USER: CPT

## 2024-04-02 PROCEDURE — G8417 CALC BMI ABV UP PARAM F/U: HCPCS

## 2024-04-02 PROCEDURE — 1123F ACP DISCUSS/DSCN MKR DOCD: CPT

## 2024-04-02 PROCEDURE — 3017F COLORECTAL CA SCREEN DOC REV: CPT

## 2024-04-02 PROCEDURE — 3078F DIAST BP <80 MM HG: CPT

## 2024-04-02 ASSESSMENT — ENCOUNTER SYMPTOMS
CHEST TIGHTNESS: 0
COUGH: 0
SHORTNESS OF BREATH: 1
RHINORRHEA: 0
WHEEZING: 0
SINUS PRESSURE: 0
SINUS PAIN: 0

## 2024-04-03 ENCOUNTER — APPOINTMENT (OUTPATIENT)
Dept: PHYSICAL THERAPY | Age: 74
End: 2024-04-03
Payer: MEDICARE

## 2024-04-04 ENCOUNTER — HOSPITAL ENCOUNTER (OUTPATIENT)
Dept: GENERAL RADIOLOGY | Age: 74
Discharge: HOME | End: 2024-04-04
Payer: MEDICARE

## 2024-04-04 ENCOUNTER — HOSPITAL ENCOUNTER (OUTPATIENT)
Age: 74
Discharge: HOME | End: 2024-04-04
Payer: MEDICARE

## 2024-04-04 ENCOUNTER — HOSPITAL ENCOUNTER (OUTPATIENT)
Dept: PHYSICAL THERAPY | Age: 74
Setting detail: THERAPIES SERIES
Discharge: HOME | End: 2024-04-04
Payer: MEDICARE

## 2024-04-04 DIAGNOSIS — I51.89 GRADE I DIASTOLIC DYSFUNCTION: ICD-10-CM

## 2024-04-04 DIAGNOSIS — J45.40 MODERATE PERSISTENT ASTHMA, UNSPECIFIED WHETHER COMPLICATED: ICD-10-CM

## 2024-04-04 DIAGNOSIS — J96.12 CHRONIC HYPERCAPNIC RESPIRATORY FAILURE (HCC): ICD-10-CM

## 2024-04-04 DIAGNOSIS — J98.11 ATELECTASIS: ICD-10-CM

## 2024-04-04 DIAGNOSIS — R91.8 INFILTRATE OF LEFT LUNG PRESENT ON CHEST X-RAY: ICD-10-CM

## 2024-04-04 PROCEDURE — 71046 X-RAY EXAM CHEST 2 VIEWS: CPT

## 2024-04-04 PROCEDURE — 97112 NEUROMUSCULAR REEDUCATION: CPT

## 2024-04-04 PROCEDURE — 97110 THERAPEUTIC EXERCISES: CPT

## 2024-04-10 ENCOUNTER — HOSPITAL ENCOUNTER (OUTPATIENT)
Dept: PHYSICAL THERAPY | Age: 74
Setting detail: THERAPIES SERIES
Discharge: HOME | End: 2024-04-10
Payer: MEDICARE

## 2024-04-10 PROCEDURE — 97110 THERAPEUTIC EXERCISES: CPT

## 2024-04-10 NOTE — DISCHARGE SUMMARY
pulling to get in  and out of her home   MET    Increase bolton score to 40 to allow for gait with less dependence on AD  MET   Balance on foam for 30 sec without UE hold   NOT MET    Pt to walk with cane or no assist for 25 feet and normal step length to carry light item in her kitchen to prepare a meal  NOT MET   attempted cane and made gait more difficult, more steady without AD.    Independent HEP  MET   New goal:  10 reps sit to  less than 21 sec which is normal for her age.   NOT MET  now 30 sec.      Patient Education:   []  HEP/Education Completed: Plan of Care, Goals, heel toe raises, marching, mini squats at countertop, educated on importance of HEP, popping front wheels of walker, sit to stands   [facility] Access Code: [email or web address]   [x]  No new Education completed  []  Reviewed Prior HEP      []  Patient verbalized and/or demonstrated understanding of education provided.  []  Patient unable to verbalize and/or demonstrate understanding of education provided.  Will continue education.  [x]  Barriers to learning: none     PLAN:  Treatment Recommendations: Strengthening, Balance Training, Functional Mobility Training, Gait Training, Stair Training, Home Exercise Program, Patient Education, and Safety Education and Training    [x]  Plan of care initiated.  Plan to see patient 2-3 times per week for 8  weeks to address the treatment planned outlined above.  [x]  Continue with current plan of care  2 times for 4-5 weeks when approved.   []  Modify plan of care as follows:    []  Hold pending further PT approval   [x]  Discharge  cont with HEP       Time In 1030   Time Out 1115   Timed Code Minutes: 45 min   Total Treatment Time: 45 min       Electronically Signed by: Robina Sanchez, PT

## 2024-04-23 DIAGNOSIS — N18.2 CKD (CHRONIC KIDNEY DISEASE), STAGE II: ICD-10-CM

## 2024-04-24 RX ORDER — SPIRONOLACTONE 50 MG/1
50 TABLET, FILM COATED ORAL DAILY
Qty: 90 TABLET | Refills: 3 | Status: SHIPPED | OUTPATIENT
Start: 2024-04-24 | End: 2024-10-21

## 2024-05-01 ENCOUNTER — HOSPITAL ENCOUNTER (OUTPATIENT)
Dept: WOMENS IMAGING | Age: 74
Discharge: HOME | End: 2024-05-01
Attending: RADIOLOGY
Payer: MEDICARE

## 2024-05-01 DIAGNOSIS — N64.89 HEMATOMA OF RIGHT BREAST: ICD-10-CM

## 2024-05-01 DIAGNOSIS — Z09 FOLLOW-UP EXAM: ICD-10-CM

## 2024-05-01 PROCEDURE — 76642 ULTRASOUND BREAST LIMITED: CPT

## 2024-05-09 ENCOUNTER — HOSPITAL ENCOUNTER (OUTPATIENT)
Dept: PHYSICAL THERAPY | Age: 74
Setting detail: THERAPIES SERIES
Discharge: HOME | End: 2024-05-09
Payer: MEDICARE

## 2024-05-09 PROCEDURE — 97161 PT EVAL LOW COMPLEX 20 MIN: CPT

## 2024-05-09 NOTE — PROGRESS NOTES
** PLEASE SIGN, DATE AND TIME CERTIFICATION BELOW AND RETURN TO [facility] OUTPATIENT REHABILITATION (FAX #: [phone]).  ATTEST/CO-SIGN IF ACCESSING VIA [place].  THANK YOU.**    I certify that I have examined the patient below and determined that Physical Medicine and Rehabilitation service is necessary and that I approve the established plan of care for up to 90 days or as specifically noted.  Attestation, signature or co-signature of physician indicates approval of certification requirements.    ________________________ ____________ __________  Physician Signature   Date   Time  [facility]  PHYSICAL THERAPY  [x] EVALUATION  [] DAILY NOTE (LAND) [] DAILY NOTE (AQUATIC ) [] PROGRESS NOTE [] DISCHARGE NOTE    [] OUTPATIENT REHABILITATION CENTER [facility]   [] [facility]    [] [facility]   [x] [facility]    Date: 2024  Patient Name:  Alison Mckeon  : 1950  MRN: [id number]  CSN: [id number]    Referring Practitioner Kade Montejo DO    Diagnosis Spinal stenosis, lumbar region with neurogenic claudication  Radiculopathy, lumbar region   Treatment Diagnosis M54.59, G89.29  Chronic Lower Back Pain  R53.1 Weakness   Date of Evaluation 24    Additional Pertinent History HTN; bi-polar      Functional Outcome Measure Used Modified TIARRA   Functional Outcome Score 19/50 or 38% disability  (24)       Insurance: Primary: Payor: HU[employer] MEDICARE /  /  / ,   Secondary:    Authorization Information: Pre-certification is needed after eval   Approved Procedure Codes: 41784 - Therapeutic Exercise  , 97733 - Manual Therapy , 89480 - Neuromuscular Re-Education , 52055 - Gait Training, and 14686 - Therapeutic Activities  (Codes requested indicated by red font, codes approved indicated by black font)   Visit # 1, 1/10 for progress note (Reporting Period:  to     )   Visits Allowed: Based on medical necessity   Recertification Date:    Physician

## 2024-05-16 ENCOUNTER — HOSPITAL ENCOUNTER (OUTPATIENT)
Dept: PHYSICAL THERAPY | Age: 74
Setting detail: THERAPIES SERIES
Discharge: HOME | End: 2024-05-16
Payer: MEDICARE

## 2024-05-16 PROCEDURE — 97110 THERAPEUTIC EXERCISES: CPT

## 2024-05-16 NOTE — PROGRESS NOTES
[facility]  PHYSICAL THERAPY  [] EVALUATION  [x] DAILY NOTE (LAND) [] DAILY NOTE (AQUATIC ) [] PROGRESS NOTE [] DISCHARGE NOTE    [] OUTPATIENT REHABILITATION CENTER [facility]   [] [place] [facility]    [] [facility][1]   [x] [facility]    Date: 2024  Patient Name:  Alison Mckeon  : 1950  MRN: [id number]  CSN: [id number]    Referring Practitioner Kade Montejo DO    Diagnosis Spinal stenosis, lumbar region with neurogenic claudication  Radiculopathy, lumbar region  Other low back pain  Other chronic pain  Weakness   Treatment Diagnosis M54.59, G89.29  Chronic Lower Back Pain  R53.1 Weakness   Date of Evaluation 24    Additional Pertinent History HTN; bi-polar      Functional Outcome Measure Used Modified TIARRA   Functional Outcome Score 19/50 or 38% disability  (24)       Insurance: Primary: Payor: HU[employer]NA MEDICARE /  /  / ,   Secondary:    Authorization Information: Pre-certification is needed after eval   Approved Procedure Codes: 53570 - Therapeutic Exercise  , 29108 - Manual Therapy , 24831 - Neuromuscular Re-Education , 86477 - Gait Training, and 97508 - Therapeutic Activities  (Codes requested indicated by red font, codes approved indicated by black font)   Visit # 2, 2/10 for progress note (Reporting Period:  to     )   Visits Allowed: Based on medical necessity   Recertification Date:    Physician Follow-Up:    Physician Orders:    History of Present Illness: Alison returns to therapy after following up with her back doctor. She had an epidural 2-3 weeks ago and it alleviated pain she would normally have with sitting. Unfortunately she continues to have the pain with standing for max of 10 min.     She does report radicular symptoms that coincide with increase in back pain. She does describe it as burning and stabbing when she does have it.      SUBJECTIVE: Subjective reports of continued pain in the Right Hip/Low Back.

## 2024-05-21 ENCOUNTER — HOSPITAL ENCOUNTER (OUTPATIENT)
Dept: PHYSICAL THERAPY | Age: 74
Setting detail: THERAPIES SERIES
Discharge: HOME | End: 2024-05-21
Payer: MEDICARE

## 2024-05-21 PROCEDURE — 97110 THERAPEUTIC EXERCISES: CPT

## 2024-05-21 NOTE — PROGRESS NOTES
[facility]  PHYSICAL THERAPY  [] EVALUATION  [x] DAILY NOTE (LAND) [] DAILY NOTE (AQUATIC ) [] PROGRESS NOTE [] DISCHARGE NOTE    [] OUTPATIENT REHABILITATION CENTER [facility]   [] [facility]    [] [facility]   [x] [facility]    Date: 2024  Patient Name:  Alison Mckeon  : 1950  MRN: [id number]  CSN: [id number]    Referring Practitioner Kade Montejo DO    Diagnosis Spinal stenosis, lumbar region with neurogenic claudication  Radiculopathy, lumbar region  Other low back pain  Other chronic pain  Weakness   Treatment Diagnosis M54.59, G89.29  Chronic Lower Back Pain  R53.1 Weakness   Date of Evaluation 24    Additional Pertinent History HTN; bi-polar      Functional Outcome Measure Used Modified TIARRA   Functional Outcome Score 19/50 or 38% disability  (24)       Insurance: Primary: Payor: HU[employer] MEDICARE /  /  / ,   Secondary:    Authorization Information: Pre-certification is needed after eval   Approved Procedure Codes: 76923 - Therapeutic Exercise  , 01059 - Manual Therapy , 88464 - Neuromuscular Re-Education , 75414 - Gait Training, and 54784 - Therapeutic Activities  (Codes requested indicated by red font, codes approved indicated by black font)   Visit # 3, 3/10 for progress note (Reporting Period:  to     )   Visits Allowed: Based on medical necessity   Recertification Date:    Physician Follow-Up:    Physician Orders:    History of Present Illness: Alison returns to therapy after following up with her back doctor. She had an epidural 2-3 weeks ago and it alleviated pain she would normally have with sitting. Unfortunately she continues to have the pain with standing for max of 10 min.     She does report radicular symptoms that coincide with increase in back pain. She does describe it as burning and stabbing when she does have it.      SUBJECTIVE: Subjective reports of continued pain in the Bilateral knees. Pain in Low

## 2024-05-22 DIAGNOSIS — N18.2 CKD (CHRONIC KIDNEY DISEASE), STAGE II: ICD-10-CM

## 2024-05-22 DIAGNOSIS — I10 ESSENTIAL HYPERTENSION: ICD-10-CM

## 2024-05-22 RX ORDER — HYDRALAZINE HYDROCHLORIDE 50 MG/1
50 TABLET, FILM COATED ORAL 3 TIMES DAILY
Qty: 270 TABLET | Refills: 3 | Status: SHIPPED | OUTPATIENT
Start: 2024-05-22

## 2024-05-23 ENCOUNTER — HOSPITAL ENCOUNTER (OUTPATIENT)
Dept: PHYSICAL THERAPY | Age: 74
Setting detail: THERAPIES SERIES
Discharge: HOME | End: 2024-05-23
Payer: MEDICARE

## 2024-05-23 PROCEDURE — 97110 THERAPEUTIC EXERCISES: CPT

## 2024-05-23 NOTE — PROGRESS NOTES
will be able to stand for 30 min at a time, maintaining TA activation, so she can clean dishes, light meal prep without being limited by back pain.      Long Term Goals:  Time Frame: 6 weeks  Alison will be discharged from PT with independent HEP to maintain all gains achieved in clinic.  Alison's Modified TIARRA will improve to <10 indicating minimal disability related to her back pain.      Patient Education:   []  HEP/Education Completed: Plan of Care, Goals, HEP  [facility]:  [x]  No new Education completed  []  Reviewed Prior HEP      [x]  Patient verbalized and/or demonstrated understanding of education provided.  []  Patient unable to verbalize and/or demonstrate understanding of education provided.  Will continue education.  [x]  Barriers to learning: not consistent with HEP; discussed importance of consistency with HEP for this to be beneficial    PLAN:  Treatment Recommendations: Strengthening, Range of Motion, Balance Training, Transfer Training, Endurance Training, Gait Training, Stair Training, Neuromuscular Re-education, Manual Therapy - Soft Tissue Mobilization, Manual Therapy - Joint Manipulation, Pain Management, Home Exercise Program, Patient Education, and Modalities    []  Plan of care initiated.  Plan to see patient 2 times per week for 6 weeks to address the treatment planned outlined above.  [x]  Continue with current plan of care  []  Modify plan of care as follows:    []  Hold pending physician visit  []  Discharge    Time In 1415   Time Out 1455   Timed Code Minutes: 40 min   Total Treatment Time: 40 min       Electronically Signed by: Veronica Montes PTA

## 2024-05-28 ENCOUNTER — HOSPITAL ENCOUNTER (OUTPATIENT)
Dept: PHYSICAL THERAPY | Age: 74
Setting detail: THERAPIES SERIES
Discharge: HOME | End: 2024-05-28
Payer: MEDICARE

## 2024-05-28 ENCOUNTER — OFFICE VISIT (OUTPATIENT)
Dept: CARDIOLOGY CLINIC | Age: 74
End: 2024-05-28
Payer: MEDICARE

## 2024-05-28 VITALS
SYSTOLIC BLOOD PRESSURE: 140 MMHG | BODY MASS INDEX: 42.7 KG/M2 | HEIGHT: 63 IN | HEART RATE: 81 BPM | DIASTOLIC BLOOD PRESSURE: 72 MMHG | WEIGHT: 241 LBS | OXYGEN SATURATION: 91 %

## 2024-05-28 DIAGNOSIS — I10 ESSENTIAL HYPERTENSION: ICD-10-CM

## 2024-05-28 DIAGNOSIS — I50.32 CHF (CONGESTIVE HEART FAILURE), NYHA CLASS II, CHRONIC, DIASTOLIC (HCC): Primary | ICD-10-CM

## 2024-05-28 DIAGNOSIS — N18.32 STAGE 3B CHRONIC KIDNEY DISEASE (HCC): ICD-10-CM

## 2024-05-28 PROCEDURE — G8427 DOCREV CUR MEDS BY ELIG CLIN: HCPCS | Performed by: NURSE PRACTITIONER

## 2024-05-28 PROCEDURE — 3078F DIAST BP <80 MM HG: CPT | Performed by: NURSE PRACTITIONER

## 2024-05-28 PROCEDURE — G8417 CALC BMI ABV UP PARAM F/U: HCPCS | Performed by: NURSE PRACTITIONER

## 2024-05-28 PROCEDURE — 1036F TOBACCO NON-USER: CPT | Performed by: NURSE PRACTITIONER

## 2024-05-28 PROCEDURE — 97530 THERAPEUTIC ACTIVITIES: CPT

## 2024-05-28 PROCEDURE — 1090F PRES/ABSN URINE INCON ASSESS: CPT | Performed by: NURSE PRACTITIONER

## 2024-05-28 PROCEDURE — 99214 OFFICE O/P EST MOD 30 MIN: CPT | Performed by: NURSE PRACTITIONER

## 2024-05-28 PROCEDURE — 1123F ACP DISCUSS/DSCN MKR DOCD: CPT | Performed by: NURSE PRACTITIONER

## 2024-05-28 PROCEDURE — 3017F COLORECTAL CA SCREEN DOC REV: CPT | Performed by: NURSE PRACTITIONER

## 2024-05-28 PROCEDURE — 3077F SYST BP >= 140 MM HG: CPT | Performed by: NURSE PRACTITIONER

## 2024-05-28 PROCEDURE — 97110 THERAPEUTIC EXERCISES: CPT

## 2024-05-28 PROCEDURE — G8400 PT W/DXA NO RESULTS DOC: HCPCS | Performed by: NURSE PRACTITIONER

## 2024-05-28 RX ORDER — HYDROXYZINE HYDROCHLORIDE 10 MG/1
20 TABLET, FILM COATED ORAL
COMMUNITY

## 2024-05-28 ASSESSMENT — ENCOUNTER SYMPTOMS
SHORTNESS OF BREATH: 0
COUGH: 0
ABDOMINAL DISTENTION: 0

## 2024-05-28 NOTE — PROGRESS NOTES
Heart Failure Clinic       Visit Date: 5/28/2024  Cardiologist:  Dr. White  Primary Care Physician: Dr. Reddy, Gaurav Sharpe,     Alison Mckeon is a 73 y.o. female who presents today for:  No chief complaint on file.      HPI:   Alison Mckeon is a 73 y.o. female who presents to the office for a follow up patient visit in the heart failure clinic.  Accompanied by , Errol     TYPE HF: HFpEF (EF 60%, grd 1)  Device: No  HX: HTN, obesity, thoracic AA, OA-knee surgery, RANDY, Bipolar  PFT 5/26/2021 - restriction with air trapping and normal DLCO    Hospitalization r/t Heart Failure:  Sept 2019 = ESCOTO, Edema, HTN.  Lasix IV.  BNP normal.  +CXR.  Discharge weight - 222#  Cath 10/18/19 - Nonobstructive CAD, PWCP 30 - received Lasix 80 IV in lab, changed to Bumex TID  OV [facility] 1/15/20 - Saw Dr Luciano.  No changes.  MEMs only if readmitted    2/6 - called w/ bloating - Metolazone x 2 doses ordered. Helped little, short term.   2/2023 - 253# - (242-245# baseline)  More bloated and SOB.  No ANTHONY.   Some constipation issues.    Walked from entrance.   Sun, Mon, Tues took Bumex 2 mg/day - didn't do much.   States Bumex not seem work as well.   No change in diet, lifestyle.      5/2023 =   256#  Home BPs little higher = 140-150s   Feeling good overall.   Still frustrated w/ no wt loss.  No fluid on exam.       TODAY 5/2024 - 241#  Walked from [employer] - did well  Not feel like holding onto fluid   None on exam.   Doing well overall.        Past Medical History:   Diagnosis Date    Acid reflux     Arthritis     Asthma     Bipolar 1 disorder (HCC)     CHF (congestive heart failure) (HCC)     CKD (chronic kidney disease), stage II 11/22/2019    History of blood transfusion 2004    Hypertension     Injury of breast     Lumbosacral radiculopathy 12/09/2021    Mood disorder (HCC)     Osteoarthritis of metacarpophalangeal (MCP) joint 11/30/2023    Pneumonia 09/13/2021    Sleep apnea     Thyroid disease      Past Surgical

## 2024-05-28 NOTE — PATIENT INSTRUCTIONS
You may receive a survey regarding the care you received during your visit.  Your input is valuable to us.  We encourage you to complete and return your survey.  We hope you will choose us in the future for your healthcare needs.    Your nurses today were Blanca.  Office hours:   Mon-Thurs 8-4:30  Friday 8-12  Phone: [phone]    Continue:  Continue current medications  Daily weights and record  Fluid restriction of 2 Liters per day  Limit sodium in diet to around 9665-7417 mg/day  Monitor BP  Activity as tolerated     Call the Heart Failure Clinic for any of the following symptoms:   Weight gain of 3 pounds in 1 day or 5 pounds in 1 week  Increased shortness of breath  Shortness of breath while laying down  Cough  Chest pain  Swelling in feet, ankles or legs  Bloating in abdomen  Fatigue

## 2024-05-28 NOTE — PROGRESS NOTES
[facility]  PHYSICAL THERAPY  [] EVALUATION  [x] DAILY NOTE (LAND) [] DAILY NOTE (AQUATIC ) [] PROGRESS NOTE [] DISCHARGE NOTE    [] OUTPATIENT REHABILITATION CENTER [facility]   [] [place] [facility]    [] [facility]   [x] [facility]    Date: 2024  Patient Name:  Alison Mckeon  : 1950  MRN: [id number]  CSN: [id number]    Referring Practitioner Kade Montejo DO    Diagnosis Spinal stenosis, lumbar region with neurogenic claudication  Radiculopathy, lumbar region  Other low back pain  Other chronic pain  Weakness   Treatment Diagnosis M54.59, G89.29  Chronic Lower Back Pain  R53.1 Weakness   Date of Evaluation 24    Additional Pertinent History HTN; bi-polar      Functional Outcome Measure Used Modified TIARRA   Functional Outcome Score 19/50 or 38% disability  (24)       Insurance: Primary: Payor: HU[employer]NA MEDICARE /  /  / ,   Secondary:    Authorization Information: Pre-certification is needed after eval   Approved Procedure Codes: 29245 - Therapeutic Exercise  , 46490 - Manual Therapy , 68896 - Neuromuscular Re-Education , 27208 - Gait Training, and 10706 - Therapeutic Activities  (Codes requested indicated by red font, codes approved indicated by black font)   Visit # 5, 5/10 for progress note (Reporting Period:  to     )   Visits Allowed: Based on medical necessity   Recertification Date:    Physician Follow-Up:    Physician Orders:    History of Present Illness: Alison returns to therapy after following up with her back doctor. She had an epidural 2-3 weeks ago and it alleviated pain she would normally have with sitting. Unfortunately she continues to have the pain with standing for max of 10 min.     She does report radicular symptoms that coincide with increase in back pain. She does describe it as burning and stabbing when she does have it.      SUBJECTIVE: Pt states that she has had a busy day so far today.      TREATMENT

## 2024-05-30 ENCOUNTER — HOSPITAL ENCOUNTER (OUTPATIENT)
Dept: PHYSICAL THERAPY | Age: 74
Setting detail: THERAPIES SERIES
Discharge: HOME | End: 2024-05-30
Payer: MEDICARE

## 2024-05-30 PROCEDURE — 97110 THERAPEUTIC EXERCISES: CPT

## 2024-05-30 NOTE — PROGRESS NOTES
[facility]  PHYSICAL THERAPY  [] EVALUATION  [x] DAILY NOTE (LAND) [] DAILY NOTE (AQUATIC ) [] PROGRESS NOTE [] DISCHARGE NOTE    [] OUTPATIENT REHABILITATION CENTER [facility]   [] [place] [facility]    [] [facility]   [x] [facility]    Date: 2024  Patient Name:  Alison Mckeon  : 1950  MRN: [id number]  CSN: [id number]    Referring Practitioner Kade Montejo DO    Diagnosis Spinal stenosis, lumbar region with neurogenic claudication  Radiculopathy, lumbar region  Other low back pain  Other chronic pain  Weakness   Treatment Diagnosis M54.59, G89.29  Chronic Lower Back Pain  R53.1 Weakness   Date of Evaluation 24    Additional Pertinent History HTN; bi-polar      Functional Outcome Measure Used Modified TIARRA   Functional Outcome Score 19/50 or 38% disability  (24)       Insurance: Primary: Payor: HUMANA MEDICARE /  /  / ,   Secondary:    Authorization Information: Pre-certification is needed after eval   Approved Procedure Codes: 75184 - Therapeutic Exercise  , 58903 - Manual Therapy , 10630 - Neuromuscular Re-Education , 30103 - Gait Training, and 40999 - Therapeutic Activities  (Codes requested indicated by red font, codes approved indicated by black font)   Visit # 6, 6/10 for progress note (Reporting Period:  to     )   Visits Allowed: Based on medical necessity   Recertification Date:    Physician Follow-Up:    Physician Orders:    History of Present Illness: Alison returns to therapy after following up with her back doctor. She had an epidural 2-3 weeks ago and it alleviated pain she would normally have with sitting. Unfortunately she continues to have the pain with standing for max of 10 min.     She does report radicular symptoms that coincide with increase in back pain. She does describe it as burning and stabbing when she does have it.      SUBJECTIVE: Pt stated she is having a busy week. Pain continues to be intermittent

## 2024-06-04 ENCOUNTER — HOSPITAL ENCOUNTER (OUTPATIENT)
Dept: PHYSICAL THERAPY | Age: 74
Setting detail: THERAPIES SERIES
Discharge: HOME | End: 2024-06-04
Payer: MEDICARE

## 2024-06-04 PROCEDURE — 97530 THERAPEUTIC ACTIVITIES: CPT

## 2024-06-04 PROCEDURE — 97112 NEUROMUSCULAR REEDUCATION: CPT

## 2024-06-04 PROCEDURE — 97110 THERAPEUTIC EXERCISES: CPT

## 2024-06-04 NOTE — PROGRESS NOTES
follows:    []  Hold pending physician visit  []  Discharge    Time In 1342   Time Out 1423   Timed Code Minutes: 41 min   Total Treatment Time: 41 min       Electronically Signed by: Tenisha Burgess, PT  Tenisha \"Guadalupe\" Jenifer, DPT  [id number]

## 2024-06-11 ENCOUNTER — HOSPITAL ENCOUNTER (OUTPATIENT)
Dept: PHYSICAL THERAPY | Age: 74
Setting detail: THERAPIES SERIES
Discharge: HOME | End: 2024-06-11
Payer: MEDICARE

## 2024-06-11 PROCEDURE — 97110 THERAPEUTIC EXERCISES: CPT

## 2024-06-11 PROCEDURE — 97530 THERAPEUTIC ACTIVITIES: CPT

## 2024-06-11 NOTE — PROGRESS NOTES
She does report radicular symptoms that coincide with increase in back pain. She does describe it as burning and stabbing when she does have it.      SUBJECTIVE: Reports falling and bumping her L leg while walking in the pool this weekend.      TREATMENT   Precautions:    Pain:2/10 L leg    \"X” in shaded column indicates activity completed today    “*\" next to exercise/intervention indicates progression   Modalities Parameters/  Location  Notes   MHP  x Concurrent with Supine Exercise               Manual Therapy Time/Technique  Notes                     Exercise/  Intervention   Notes   NuStep 5 mins  X           // bars: forward march; side step; retro, tandem walk 2 laps  X    Heel toe raises, marching, mini squat, HS curls 10x  x           TA activation  15x  X    Pelvic tilt 15x  X    Glute Sets 15x  X    Quad Sets 15x      Hooklying hip abduction  15x lime x    Hooklying hip adduction 15x ball x    Heel walk out and back to hooklying 5x  x    LTR 10x  X    SLR 10x bilaterally x    Hamstring Stretch [id number] Manually Assisted X    Piriformis Stretch [id number] Towel X                    Specific Interventions Next Treatment: NuStep; progress DLS program    Activity/Treatment Tolerance:  [x]  Patient tolerated treatment well  []  Patient limited by fatigue  []  Patient limited by pain   []  Patient limited by medical complications  []  Other:     Assessment:Completed therex indicated above without increased symptoms.        Body Structures/Functions/Activity Limitations: impaired activity tolerance, impaired balance, impaired endurance, impaired muscle tone, impaired ROM, impaired strength, pain, abnormal gait, and abnormal posture  Prognosis: fair    GOALS:  Patient Goal: Minimize low back pain; be able to stand and cut veggies/perform light household chores without being limited by back pain.    Short Term Goals:  Time Frame: 3 weeks   Alison will consistently maintain TA activation with all exercises,

## 2024-06-14 ENCOUNTER — HOSPITAL ENCOUNTER (OUTPATIENT)
Dept: PHYSICAL THERAPY | Age: 74
Setting detail: THERAPIES SERIES
Discharge: HOME | End: 2024-06-14
Payer: MEDICARE

## 2024-06-14 PROCEDURE — 97530 THERAPEUTIC ACTIVITIES: CPT

## 2024-06-14 PROCEDURE — 97110 THERAPEUTIC EXERCISES: CPT

## 2024-06-14 NOTE — PROGRESS NOTES
She does report radicular symptoms that coincide with increase in back pain. She does describe it as burning and stabbing when she does have it.      SUBJECTIVE: Reports mild L leg pain remains for recent fall.    TREATMENT   Precautions:    Pain:1/10 L leg    \"X” in shaded column indicates activity completed today    “*\" next to exercise/intervention indicates progression   Modalities Parameters/  Location  Notes   MHP  x Concurrent with Supine Exercise               Manual Therapy Time/Technique  Notes                     Exercise/  Intervention   Notes   NuStep 5 mins  X           // bars: forward march; side step; retro, tandem walk 2 laps  X    Heel toe raises, marching, mini squat, HS curls 10x  x           TA activation  15x  X    Pelvic tilt 15x  X    Glute Sets 15x  X    Quad Sets 15x      Hooklying hip abduction  15x lime x    Hooklying hip adduction 15x ball x    Heel walk out and back to hooklying 5x  x    LTR 10x  X    SLR 10x bilaterally x    Hamstring Stretch [id number] Manually Assisted X    Piriformis Stretch [id number] Towel X                    Specific Interventions Next Treatment: NuStep; progress DLS program    Activity/Treatment Tolerance:  [x]  Patient tolerated treatment well  []  Patient limited by fatigue  []  Patient limited by pain   []  Patient limited by medical complications  []  Other:     Assessment:Completed therex indicated above without increased symptoms.        Body Structures/Functions/Activity Limitations: impaired activity tolerance, impaired balance, impaired endurance, impaired muscle tone, impaired ROM, impaired strength, pain, abnormal gait, and abnormal posture  Prognosis: fair    GOALS:  Patient Goal: Minimize low back pain; be able to stand and cut veggies/perform light household chores without being limited by back pain.    Short Term Goals:  Time Frame: 3 weeks   Alison will consistently maintain TA activation with all exercises, transfers etc thereby providing greater

## 2024-07-23 RX ORDER — TORSEMIDE 20 MG/1
40 TABLET ORAL DAILY
Qty: 180 TABLET | Refills: 3 | Status: SHIPPED | OUTPATIENT
Start: 2024-07-23

## 2024-08-26 ENCOUNTER — LAB (OUTPATIENT)
Dept: LAB | Age: 74
End: 2024-08-26

## 2024-08-26 DIAGNOSIS — N18.31 STAGE 3A CHRONIC KIDNEY DISEASE (HCC): ICD-10-CM

## 2024-08-26 LAB
ANION GAP SERPL CALC-SCNC: 12 MEQ/L (ref 8–16)
BUN SERPL-MCNC: 44 MG/DL (ref 7–22)
CALCIUM SERPL-MCNC: 10.1 MG/DL (ref 8.5–10.5)
CHLORIDE SERPL-SCNC: 100 MEQ/L (ref 98–111)
CO2 SERPL-SCNC: 28 MEQ/L (ref 23–33)
CREAT SERPL-MCNC: 1.3 MG/DL (ref 0.4–1.2)
GFR SERPL CREATININE-BSD FRML MDRD: 43 ML/MIN/1.73M2
GLUCOSE SERPL-MCNC: 112 MG/DL (ref 70–108)
POTASSIUM SERPL-SCNC: 5.2 MEQ/L (ref 3.5–5.2)
SODIUM SERPL-SCNC: 140 MEQ/L (ref 135–145)

## 2024-09-23 DIAGNOSIS — I10 ESSENTIAL HYPERTENSION: ICD-10-CM

## 2024-09-23 RX ORDER — CARVEDILOL 25 MG/1
25 TABLET ORAL 2 TIMES DAILY
Qty: 180 TABLET | Refills: 3 | Status: SHIPPED | OUTPATIENT
Start: 2024-09-23

## 2024-10-01 NOTE — PROGRESS NOTES
breath or wheezing) Please dispense 1 Nebulizer machine. 1 each 0    Respiratory Therapy Supplies (NEBULIZER/TUBING/MOUTHPIECE) KIT 1 kit by Does not apply route every 6 hours as needed (shortness of breath) 1 kit 1    albuterol (PROVENTIL) (2.5 MG/3ML) 0.083% nebulizer solution Take 3 mLs by nebulization every 6 hours as needed for Wheezing or Shortness of Breath 120 each 6    albuterol sulfate HFA (VENTOLIN HFA) 108 (90 Base) MCG/ACT inhaler Inhale 2 puffs into the lungs every 6 hours as needed for Wheezing or Shortness of Breath 18 g 11    aspirin 81 MG EC tablet Take 1 tablet by mouth daily 30 tablet 3    ferrous sulfate (IRON 325) 325 (65 Fe) MG tablet Take 1 tablet by mouth every 48 hours 30 tablet 3    senna (SENOKOT) 8.6 MG tablet Take 1 tablet by mouth daily      OLANZapine (ZYPREXA) 15 MG tablet Take 1 tablet by mouth nightly      rivastigmine (EXELON) 6 MG capsule Take 1 capsule by mouth 2 times daily      omeprazole (PRILOSEC) 20 MG delayed release capsule Take 1 capsule by mouth every morning (before breakfast) 90 capsule 3    SYNTHROID 125 MCG tablet Take 1 tablet by mouth daily Take with water on an empty stomach- wait 30 minutes before eating or taking other meds. 30 tablet 11    ARIPiprazole (ABILIFY) 10 MG tablet       Probiotic Product (PROBIOTIC DAILY PO) Take by mouth      Polyethylene Glycol 3350 (MIRALAX PO) Take by mouth in the morning and at bedtime      cetirizine (ZYRTEC) 10 MG tablet Take 1 tablet by mouth daily      vitamin E 200 units capsule Take 1 capsule by mouth daily      acetaminophen (TYLENOL) 325 MG tablet Take 2 tablets by mouth 4 times daily      venlafaxine (EFFEXOR XR) 150 MG extended release capsule Take 225 mg by mouth daily      gabapentin (NEURONTIN) 300 MG capsule Take 1 capsule by mouth in the morning and at bedtime.      tiZANidine (ZANAFLEX) 2 MG tablet Take 1 tablet by mouth 2 times daily      Ascorbic Acid (VITAMIN C) 500 MG tablet Take 1 tablet by mouth daily

## 2024-10-02 ENCOUNTER — OFFICE VISIT (OUTPATIENT)
Dept: PULMONOLOGY | Age: 74
End: 2024-10-02
Payer: MEDICARE

## 2024-10-02 VITALS
BODY MASS INDEX: 42.42 KG/M2 | TEMPERATURE: 97.8 F | DIASTOLIC BLOOD PRESSURE: 80 MMHG | HEIGHT: 63 IN | OXYGEN SATURATION: 96 % | SYSTOLIC BLOOD PRESSURE: 124 MMHG | WEIGHT: 239.4 LBS | HEART RATE: 65 BPM

## 2024-10-02 DIAGNOSIS — J45.30 MILD PERSISTENT ASTHMA WITHOUT COMPLICATION: Primary | ICD-10-CM

## 2024-10-02 DIAGNOSIS — G47.33 OSA TREATED WITH BIPAP: ICD-10-CM

## 2024-10-02 DIAGNOSIS — J96.12 CHRONIC HYPERCAPNIC RESPIRATORY FAILURE: ICD-10-CM

## 2024-10-02 DIAGNOSIS — I51.89 GRADE I DIASTOLIC DYSFUNCTION: ICD-10-CM

## 2024-10-02 PROCEDURE — 99214 OFFICE O/P EST MOD 30 MIN: CPT

## 2024-10-02 PROCEDURE — 1036F TOBACCO NON-USER: CPT

## 2024-10-02 PROCEDURE — 1090F PRES/ABSN URINE INCON ASSESS: CPT

## 2024-10-02 PROCEDURE — G8417 CALC BMI ABV UP PARAM F/U: HCPCS

## 2024-10-02 PROCEDURE — 3017F COLORECTAL CA SCREEN DOC REV: CPT

## 2024-10-02 PROCEDURE — G8427 DOCREV CUR MEDS BY ELIG CLIN: HCPCS

## 2024-10-02 PROCEDURE — 1123F ACP DISCUSS/DSCN MKR DOCD: CPT

## 2024-10-02 PROCEDURE — G8484 FLU IMMUNIZE NO ADMIN: HCPCS

## 2024-10-02 PROCEDURE — G8400 PT W/DXA NO RESULTS DOC: HCPCS

## 2024-10-02 PROCEDURE — 3074F SYST BP LT 130 MM HG: CPT

## 2024-10-02 PROCEDURE — 3079F DIAST BP 80-89 MM HG: CPT

## 2024-10-02 RX ORDER — FLUTICASONE PROPIONATE 110 UG/1
1 AEROSOL, METERED RESPIRATORY (INHALATION) 2 TIMES DAILY PRN
COMMUNITY

## 2024-10-02 ASSESSMENT — ENCOUNTER SYMPTOMS
SHORTNESS OF BREATH: 1
SINUS PRESSURE: 0
SINUS PAIN: 0
COUGH: 0
RHINORRHEA: 0
ROS SKIN COMMENTS: ITCHING ON BACK
CHEST TIGHTNESS: 0
WHEEZING: 0

## 2024-10-02 NOTE — PATIENT INSTRUCTIONS
Patient education: Avoiding asthma triggers (The Basics)  Written by the doctors and editors at [facility]  Please read the Disclaimer at the end of this page.  What are asthma triggers?  An asthma trigger is anything that causes asthma symptoms or makes asthma symptoms worse. It's important to know what your asthma triggers are. That way, you can avoid them so that your symptoms don't get worse.  What are some common asthma triggers?  Common asthma triggers are:  ?Cigarette smoke  ?Stress  ?Getting sick, like with a cold, the flu, COVID-19, or a lung, ear, or sinus infection  ?Strong cleaning products, such as bleach  ?Strong perfumes or scents  ?Air pollution  ?Certain medicines, such as aspirin and other medicines for pain or fever  ?Exercise  ?Very cold and dry air  People can have other triggers, too. These include things in the environment that they are allergic to. These are called \"allergic triggers.\" Examples of allergic triggers are:  ?Dust mites - These are tiny bugs that are too small for you to see. They live in beds, couches, carpets, and other places in your home.  ?Mold - Mold can grow in basements, showers, and other damp and wet places.  ?Dogs and cats - People can be allergic to animal saliva, urine, or dander (flakes of dead skin).  ?Pollen from trees, grass, and weeds  ?Cockroach droppings  ?Mice  How do I know what my asthma triggers are?  You might already know what makes your asthma symptoms worse. But if you don't, talk with your doctor or nurse. They can help you figure it out by talking with you and asking you questions.  Your doctor might do allergy tests to see if you have allergic triggers. Allergy tests include blood tests or skin tests. During a skin test, a doctor puts a drop of a substance that you might be allergic to on your skin, and makes a tiny needle prick in the skin. Then, they watch your skin to see if it turns red and bumpy.  What should I do when I find out what my triggers

## 2024-12-03 ENCOUNTER — OFFICE VISIT (OUTPATIENT)
Dept: CARDIOLOGY CLINIC | Age: 74
End: 2024-12-03
Payer: MEDICARE

## 2024-12-03 VITALS
DIASTOLIC BLOOD PRESSURE: 72 MMHG | SYSTOLIC BLOOD PRESSURE: 132 MMHG | BODY MASS INDEX: 41.11 KG/M2 | HEIGHT: 63 IN | WEIGHT: 232 LBS | HEART RATE: 69 BPM

## 2024-12-03 DIAGNOSIS — R00.2 PALPITATIONS: ICD-10-CM

## 2024-12-03 DIAGNOSIS — R06.02 SOB (SHORTNESS OF BREATH): ICD-10-CM

## 2024-12-03 DIAGNOSIS — I50.32 CHF (CONGESTIVE HEART FAILURE), NYHA CLASS II, CHRONIC, DIASTOLIC (HCC): ICD-10-CM

## 2024-12-03 DIAGNOSIS — I10 ESSENTIAL HYPERTENSION: Primary | ICD-10-CM

## 2024-12-03 PROCEDURE — G8427 DOCREV CUR MEDS BY ELIG CLIN: HCPCS | Performed by: INTERNAL MEDICINE

## 2024-12-03 PROCEDURE — G8484 FLU IMMUNIZE NO ADMIN: HCPCS | Performed by: INTERNAL MEDICINE

## 2024-12-03 PROCEDURE — 3078F DIAST BP <80 MM HG: CPT | Performed by: INTERNAL MEDICINE

## 2024-12-03 PROCEDURE — 1036F TOBACCO NON-USER: CPT | Performed by: INTERNAL MEDICINE

## 2024-12-03 PROCEDURE — G8417 CALC BMI ABV UP PARAM F/U: HCPCS | Performed by: INTERNAL MEDICINE

## 2024-12-03 PROCEDURE — 3075F SYST BP GE 130 - 139MM HG: CPT | Performed by: INTERNAL MEDICINE

## 2024-12-03 PROCEDURE — 99214 OFFICE O/P EST MOD 30 MIN: CPT | Performed by: INTERNAL MEDICINE

## 2024-12-03 PROCEDURE — G8400 PT W/DXA NO RESULTS DOC: HCPCS | Performed by: INTERNAL MEDICINE

## 2024-12-03 PROCEDURE — 3017F COLORECTAL CA SCREEN DOC REV: CPT | Performed by: INTERNAL MEDICINE

## 2024-12-03 PROCEDURE — 93000 ELECTROCARDIOGRAM COMPLETE: CPT | Performed by: INTERNAL MEDICINE

## 2024-12-03 PROCEDURE — 1159F MED LIST DOCD IN RCRD: CPT | Performed by: INTERNAL MEDICINE

## 2024-12-03 PROCEDURE — 1123F ACP DISCUSS/DSCN MKR DOCD: CPT | Performed by: INTERNAL MEDICINE

## 2024-12-03 PROCEDURE — 1090F PRES/ABSN URINE INCON ASSESS: CPT | Performed by: INTERNAL MEDICINE

## 2024-12-03 RX ORDER — CARVEDILOL 25 MG/1
25 TABLET ORAL 2 TIMES DAILY
Qty: 180 TABLET | Refills: 3 | Status: SHIPPED | OUTPATIENT
Start: 2024-12-03

## 2024-12-03 RX ORDER — TORSEMIDE 20 MG/1
40 TABLET ORAL DAILY
Qty: 180 TABLET | Refills: 3 | Status: SHIPPED | OUTPATIENT
Start: 2024-12-03

## 2024-12-03 NOTE — PATIENT INSTRUCTIONS
Your assistants today were NNAMDI De Jesus and STANISLAW Bentley  Your provider today was Dr. White  Phone number: [phone]

## 2024-12-03 NOTE — PROGRESS NOTES
1 year follow up    Reports SOB  Denies chest pain, palpitations, or swelling in BLE.     EKG completed today in office.     Med list up to date and pharmacy verified.   
Yearly screening   mammography recommended for healthcare maintenance purposes. Mild   osteopenia, no acute compression deformity through the thoracic spine.     IMPRESSION:  Impression:  1. No evidence of pulmonary embolism.  2. Moderate atelectasis bilaterally, particularly in the left base with   associated small pleural effusion. Follow up chest radiography recommended   within the next 72 hours to exclude developing pneumonia.  3. See findings for additional chronic changes.     This document has been electronically signed by: Andrew Vasquez III, MD on   12/25/2023 12:59 AM    CATH 2019   CONCLUSION:  1.  Nonobstructive coronary artery disease.  2.  Congestive heart failure with preserved ejection fraction.  3.  Volume overload.  4.  Elevated right and left filling pressures.  5.  Moderate pulmonary venous hypertension    EKG Sinus Rhythm   Low voltage in precordial leads.  -Old anterior infarct.    ABNORMAL    Assessment/Plan   Mild ESCOTO   Palpitations   Chronic HFpEF  Mild ascending aortic aneurysm   Hypertension  Obesity  CKD  Anemia  Asthma   Dyslipidemia    LHC in 10/2019 revealed nonobstructive CAD, elevated filling pressures  Continue risk factor modification and medical management  /72  HR 69  On COREG   The patient is advised to begin progressive daily aerobic exercise program and attempt to lose weight  Has h/o CHF  On Torsemide and Aldactone   No weight gain   Daily weight   Limit Na intake  The patient has chronic mild dyspnea on exertion. Patient denies chest pain, leg swelling or weight gain  Has no leg edema   ASA  Reports palpitations with ESCOTO. Symptoms are concerning for possible underlying rhythm problems, EKG was reviewed. Will proceed with a 48 Holter monitor   Thoracic ascending diameter has been stable, mild. Correlation between CT scan an Echo measurement noted   Chest CTA 12/2023 was negative for PE, ascending aortic diameter 4 cm  Echo 11/2023 revealed EF of 60 - 65%, Ao ascending

## 2024-12-09 ENCOUNTER — HOSPITAL ENCOUNTER (OUTPATIENT)
Age: 74
Discharge: HOME | End: 2024-12-11
Attending: INTERNAL MEDICINE
Payer: MEDICARE

## 2024-12-09 DIAGNOSIS — R00.2 PALPITATIONS: ICD-10-CM

## 2024-12-09 PROCEDURE — 93225 XTRNL ECG REC<48 HRS REC: CPT

## 2024-12-11 ENCOUNTER — OFFICE VISIT (OUTPATIENT)
Dept: FAMILY MEDICINE CLINIC | Age: 74
End: 2024-12-11

## 2024-12-11 ENCOUNTER — TELEPHONE (OUTPATIENT)
Dept: FAMILY MEDICINE CLINIC | Age: 74
End: 2024-12-11

## 2024-12-11 VITALS
WEIGHT: 232 LBS | SYSTOLIC BLOOD PRESSURE: 122 MMHG | HEIGHT: 63 IN | HEART RATE: 60 BPM | BODY MASS INDEX: 41.11 KG/M2 | DIASTOLIC BLOOD PRESSURE: 78 MMHG | RESPIRATION RATE: 20 BRPM

## 2024-12-11 DIAGNOSIS — Z78.0 POST-MENOPAUSAL: ICD-10-CM

## 2024-12-11 DIAGNOSIS — R73.01 IFG (IMPAIRED FASTING GLUCOSE): ICD-10-CM

## 2024-12-11 DIAGNOSIS — Z00.00 MEDICARE ANNUAL WELLNESS VISIT, SUBSEQUENT: Primary | ICD-10-CM

## 2024-12-11 DIAGNOSIS — I77.810 THORACIC AORTIC ECTASIA (HCC): ICD-10-CM

## 2024-12-11 DIAGNOSIS — I50.32 CHRONIC DIASTOLIC HEART FAILURE (HCC): ICD-10-CM

## 2024-12-11 DIAGNOSIS — F31.81 BIPOLAR II DISORDER (HCC): ICD-10-CM

## 2024-12-11 DIAGNOSIS — G47.33 OSA ON CPAP: ICD-10-CM

## 2024-12-11 DIAGNOSIS — E78.00 PURE HYPERCHOLESTEROLEMIA: ICD-10-CM

## 2024-12-11 DIAGNOSIS — E03.9 HYPOTHYROIDISM, UNSPECIFIED TYPE: ICD-10-CM

## 2024-12-11 RX ORDER — LEVOTHYROXINE SODIUM 125 MCG
125 TABLET ORAL DAILY
Qty: 30 TABLET | Refills: 11 | Status: CANCELLED | OUTPATIENT
Start: 2024-12-11

## 2024-12-11 SDOH — ECONOMIC STABILITY: FOOD INSECURITY: WITHIN THE PAST 12 MONTHS, YOU WORRIED THAT YOUR FOOD WOULD RUN OUT BEFORE YOU GOT MONEY TO BUY MORE.: NEVER TRUE

## 2024-12-11 SDOH — HEALTH STABILITY: PHYSICAL HEALTH: ON AVERAGE, HOW MANY MINUTES DO YOU ENGAGE IN EXERCISE AT THIS LEVEL?: 40 MIN

## 2024-12-11 SDOH — ECONOMIC STABILITY: INCOME INSECURITY: HOW HARD IS IT FOR YOU TO PAY FOR THE VERY BASICS LIKE FOOD, HOUSING, MEDICAL CARE, AND HEATING?: NOT HARD AT ALL

## 2024-12-11 SDOH — HEALTH STABILITY: PHYSICAL HEALTH: ON AVERAGE, HOW MANY DAYS PER WEEK DO YOU ENGAGE IN MODERATE TO STRENUOUS EXERCISE (LIKE A BRISK WALK)?: 2 DAYS

## 2024-12-11 SDOH — ECONOMIC STABILITY: FOOD INSECURITY: WITHIN THE PAST 12 MONTHS, THE FOOD YOU BOUGHT JUST DIDN'T LAST AND YOU DIDN'T HAVE MONEY TO GET MORE.: NEVER TRUE

## 2024-12-11 ASSESSMENT — PATIENT HEALTH QUESTIONNAIRE - PHQ9
SUM OF ALL RESPONSES TO PHQ QUESTIONS 1-9: 0
10. IF YOU CHECKED OFF ANY PROBLEMS, HOW DIFFICULT HAVE THESE PROBLEMS MADE IT FOR YOU TO DO YOUR WORK, TAKE CARE OF THINGS AT HOME, OR GET ALONG WITH OTHER PEOPLE: NOT DIFFICULT AT ALL
5. POOR APPETITE OR OVEREATING: NOT AT ALL
1. LITTLE INTEREST OR PLEASURE IN DOING THINGS: NOT AT ALL
9. THOUGHTS THAT YOU WOULD BE BETTER OFF DEAD, OR OF HURTING YOURSELF: NOT AT ALL
SUM OF ALL RESPONSES TO PHQ QUESTIONS 1-9: 0
4. FEELING TIRED OR HAVING LITTLE ENERGY: NOT AT ALL
8. MOVING OR SPEAKING SO SLOWLY THAT OTHER PEOPLE COULD HAVE NOTICED. OR THE OPPOSITE, BEING SO FIGETY OR RESTLESS THAT YOU HAVE BEEN MOVING AROUND A LOT MORE THAN USUAL: NOT AT ALL
2. FEELING DOWN, DEPRESSED OR HOPELESS: NOT AT ALL
6. FEELING BAD ABOUT YOURSELF - OR THAT YOU ARE A FAILURE OR HAVE LET YOURSELF OR YOUR FAMILY DOWN: NOT AT ALL
SUM OF ALL RESPONSES TO PHQ9 QUESTIONS 1 & 2: 0
SUM OF ALL RESPONSES TO PHQ QUESTIONS 1-9: 0
7. TROUBLE CONCENTRATING ON THINGS, SUCH AS READING THE NEWSPAPER OR WATCHING TELEVISION: NOT AT ALL
SUM OF ALL RESPONSES TO PHQ QUESTIONS 1-9: 0
2. FEELING DOWN, DEPRESSED OR HOPELESS: NOT AT ALL
SUM OF ALL RESPONSES TO PHQ QUESTIONS 1-9: 0
1. LITTLE INTEREST OR PLEASURE IN DOING THINGS: NOT AT ALL
3. TROUBLE FALLING OR STAYING ASLEEP: NOT AT ALL
SUM OF ALL RESPONSES TO PHQ9 QUESTIONS 1 & 2: 0

## 2024-12-11 ASSESSMENT — LIFESTYLE VARIABLES
HOW MANY STANDARD DRINKS CONTAINING ALCOHOL DO YOU HAVE ON A TYPICAL DAY: 1
HOW OFTEN DO YOU HAVE A DRINK CONTAINING ALCOHOL: MONTHLY OR LESS
HOW MANY STANDARD DRINKS CONTAINING ALCOHOL DO YOU HAVE ON A TYPICAL DAY: 1 OR 2
HOW OFTEN DO YOU HAVE SIX OR MORE DRINKS ON ONE OCCASION: 1
HOW OFTEN DO YOU HAVE A DRINK CONTAINING ALCOHOL: 2

## 2024-12-11 ASSESSMENT — ENCOUNTER SYMPTOMS
GASTROINTESTINAL NEGATIVE: 1
RESPIRATORY NEGATIVE: 1

## 2024-12-11 NOTE — PROGRESS NOTES
Alison Mckeon (:  1950) is a 74 y.o. female,Established patient, here for evaluation of the following chief complaint(s):  Medicare AWV          Subjective   SUBJECTIVE/OBJECTIVE:  HPI  Chief Complaint   Patient presents with    Medicare AWV     AWV.  Doing well overall.    Currently has a holter monitor on, 48 hr.  Has follow up scheduled with Cardio.    BP Readings from Last 3 Encounters:   24 122/78   24 132/72   10/02/24 124/80     Wt Readings from Last 3 Encounters:   24 105.2 kg (232 lb)   24 105.2 kg (232 lb)   10/02/24 108.6 kg (239 lb 6.4 oz)       Patient Active Problem List   Diagnosis    Hypothyroid    Dyslipidemia    Bipolar disorder (HCC)    Post-menopausal    HTN (hypertension)    Obesity (BMI 30-39.9)    RANDY on CPAP    ACE inhibitor-aggravated angioedema    Ascending aortic aneurysm (HCC)    Chronic diastolic heart failure (HCC)    Thoracic aortic aneurysm without rupture (HCC)    CKD (chronic kidney disease), stage II    Edema of lower extremity    Pneumonia due to infectious organism    Acute respiratory failure with hypoxia    Multiple lung nodules on CT    Congestive heart failure (HCC)    Morbid obesity with BMI of 40.0-44.9, adult    Acquired spondylolisthesis    Artificial knee joint present    Deficiency of medial collateral ligament of right knee    Displacement of lumbar intervertebral disc without myelopathy    Lumbosacral radiculopathy    Other idiopathic scoliosis, thoracolumbar region    Spinal stenosis of lumbar region    SOB (shortness of breath)    Restrictive lung disease    Grade I diastolic dysfunction    Atelectasis    Dysphagia    Osteoarthritis of metacarpophalangeal (MCP) joint    Eosinophilia    Moderate persistent asthma with acute exacerbation    Hypereosinophilic syndrome       Current Outpatient Medications   Medication Sig Dispense Refill    omeprazole (PRILOSEC) 20 MG delayed release capsule Take 1 capsule by mouth every morning (before

## 2024-12-11 NOTE — PATIENT INSTRUCTIONS
You may receive a survey about your visit with us today. The feedback from our patients helps us identify what is working well and where the service to all patients can be enhanced. Thank you!        Preventing Falls: Care Instructions  Injuries and health problems such as trouble walking or poor eyesight can increase your risk of falling. So can some medicines. But there are things you can do to help prevent falls. You can exercise to get stronger. You can also arrange your home to make it safer.    Talk to your doctor about the medicines you take. Ask if any of them increase the risk of falls and whether they can be changed or stopped.   Try to exercise regularly. It can help improve your strength and balance. This can help lower your risk of falling.         Practice fall safety and prevention.   Wear low-heeled shoes that fit well and give your feet good support. Talk to your doctor if you have foot problems that make this hard.  Carry a cellphone or wear a medical alert device that you can use to call for help.  Use stepladders instead of chairs to reach high objects. Don't climb if you're at risk for falls. Ask for help, if needed.  Wear the correct eyeglasses, if you need them.        Make your home safer.   Remove rugs, cords, clutter, and furniture from walkways.  Keep your house well lit. Use night-lights in hallways and bathrooms.  Install and use sturdy handrails on stairways.  Wear nonskid footwear, even inside. Don't walk barefoot or in socks without shoes.        Be safe outside.   Use handrails, curb cuts, and ramps whenever possible.  Keep your hands free by using a shoulder bag or backpack.  Try to walk in well-lit areas. Watch out for uneven ground, changes in pavement, and debris.  Be careful in the winter. Walk on the grass or gravel when sidewalks are slippery. Use de-icer on steps and walkways. Add non-slip devices to shoes.    Put grab bars and nonskid mats in your shower or tub and near the

## 2024-12-11 NOTE — TELEPHONE ENCOUNTER
----- Message from Dr. Gaurav Reddy DO sent at 12/11/2024  9:46 AM EST -----  Pt had colonoscopy at [facility] on 10/4/19 per progress notes in media.  Please call for report and update in system.

## 2024-12-11 NOTE — TELEPHONE ENCOUNTER
Spoke with Flor at [facility]# [phone] and she will fax colonoscopy report to office.

## 2024-12-13 LAB
ACQUISITION DURATION: NORMAL S
AVERAGE HEART RATE: 89 BPM
HOOKUP DATE: NORMAL
HOOKUP TIME: NORMAL
MAX HEART RATE TIME/DATE: NORMAL
MAX HEART RATE: 125 BPM
MIN HEART RATE TIME/DATE: NORMAL
MIN HEART RATE: 63 BPM
NUMBER OF QRS COMPLEXES: NORMAL
NUMBER OF SUPRAVENTRICULAR COUPLETS: 0
NUMBER OF SUPRAVENTRICULAR ECTOPICS: 89
NUMBER OF SUPRAVENTRICULAR ISOLATED BEATS: 89
NUMBER OF VENTRICULAR BIGEMINAL CYCLES: 0
NUMBER OF VENTRICULAR COUPLETS: 0
NUMBER OF VENTRICULAR ECTOPICS: 0

## 2025-01-08 ENCOUNTER — LAB (OUTPATIENT)
Dept: LAB | Age: 75
End: 2025-01-08

## 2025-01-08 DIAGNOSIS — E03.9 HYPOTHYROIDISM, UNSPECIFIED TYPE: ICD-10-CM

## 2025-01-08 DIAGNOSIS — R73.01 IFG (IMPAIRED FASTING GLUCOSE): ICD-10-CM

## 2025-01-08 DIAGNOSIS — E78.00 PURE HYPERCHOLESTEROLEMIA: ICD-10-CM

## 2025-01-08 LAB
CHOLEST SERPL-MCNC: 255 MG/DL (ref 100–199)
DEPRECATED MEAN GLUCOSE BLD GHB EST-ACNC: 114 MG/DL (ref 70–126)
HBA1C MFR BLD HPLC: 5.8 % (ref 4.4–6.4)
HDLC SERPL-MCNC: 39 MG/DL
LDLC SERPL CALC-MCNC: 145 MG/DL
TRIGL SERPL-MCNC: 353 MG/DL (ref 0–199)
TSH SERPL DL<=0.005 MIU/L-ACNC: 2.15 UIU/ML (ref 0.4–4.2)

## 2025-01-10 RX ORDER — ROSUVASTATIN CALCIUM 10 MG/1
10 TABLET, COATED ORAL DAILY
Qty: 90 TABLET | Refills: 3 | Status: SHIPPED | OUTPATIENT
Start: 2025-01-10

## 2025-02-05 RX ORDER — LEVOTHYROXINE SODIUM 125 MCG
125 TABLET ORAL DAILY
Qty: 30 TABLET | Refills: 11 | Status: SHIPPED | OUTPATIENT
Start: 2025-02-05

## 2025-02-24 ENCOUNTER — LAB (OUTPATIENT)
Dept: LAB | Age: 75
End: 2025-02-24

## 2025-02-24 DIAGNOSIS — N18.31 STAGE 3A CHRONIC KIDNEY DISEASE (HCC): ICD-10-CM

## 2025-02-24 LAB
25(OH)D3 SERPL-MCNC: 30 NG/ML (ref 30–100)
ANION GAP SERPL CALC-SCNC: 18 MEQ/L (ref 8–16)
BUN SERPL-MCNC: 42 MG/DL (ref 7–22)
CALCIUM SERPL-MCNC: 10.3 MG/DL (ref 8.2–9.6)
CHLORIDE SERPL-SCNC: 106 MEQ/L (ref 98–111)
CO2 SERPL-SCNC: 22 MEQ/L (ref 23–33)
CREAT SERPL-MCNC: 1.2 MG/DL (ref 0.4–1.2)
DEPRECATED RDW RBC AUTO: 43.4 FL (ref 35–45)
ERYTHROCYTE [DISTWIDTH] IN BLOOD BY AUTOMATED COUNT: 12.5 % (ref 11.5–14.5)
GFR SERPL CREATININE-BSD FRML MDRD: 47 ML/MIN/1.73M2
GLUCOSE SERPL-MCNC: 152 MG/DL (ref 70–108)
HCT VFR BLD AUTO: 39.5 % (ref 37–47)
HGB BLD-MCNC: 12.2 GM/DL (ref 12–16)
MCH RBC QN AUTO: 29.7 PG (ref 26–33)
MCHC RBC AUTO-ENTMCNC: 30.9 GM/DL (ref 32.2–35.5)
MCV RBC AUTO: 96.1 FL (ref 81–99)
PHOSPHATE SERPL-MCNC: 3.1 MG/DL (ref 2.4–4.7)
PLATELET # BLD AUTO: 304 THOU/MM3 (ref 130–400)
PMV BLD AUTO: 9.9 FL (ref 9.4–12.4)
POTASSIUM SERPL-SCNC: 4.7 MEQ/L (ref 3.5–5.2)
PTH-INTACT SERPL-MCNC: 43.3 PG/ML (ref 15–65)
RBC # BLD AUTO: 4.11 MILL/MM3 (ref 4.2–5.4)
SODIUM SERPL-SCNC: 146 MEQ/L (ref 135–145)
WBC # BLD AUTO: 7.3 THOU/MM3 (ref 4.8–10.8)

## 2025-02-28 ENCOUNTER — OFFICE VISIT (OUTPATIENT)
Dept: NEPHROLOGY | Age: 75
End: 2025-02-28
Payer: MEDICARE

## 2025-02-28 VITALS
HEART RATE: 60 BPM | BODY MASS INDEX: 41.4 KG/M2 | WEIGHT: 233.69 LBS | OXYGEN SATURATION: 98 % | DIASTOLIC BLOOD PRESSURE: 56 MMHG | SYSTOLIC BLOOD PRESSURE: 106 MMHG

## 2025-02-28 DIAGNOSIS — N18.32 STAGE 3B CHRONIC KIDNEY DISEASE (HCC): Primary | ICD-10-CM

## 2025-02-28 DIAGNOSIS — N18.2 CKD (CHRONIC KIDNEY DISEASE), STAGE II: ICD-10-CM

## 2025-02-28 PROCEDURE — 1159F MED LIST DOCD IN RCRD: CPT | Performed by: INTERNAL MEDICINE

## 2025-02-28 PROCEDURE — G8427 DOCREV CUR MEDS BY ELIG CLIN: HCPCS | Performed by: INTERNAL MEDICINE

## 2025-02-28 PROCEDURE — G8417 CALC BMI ABV UP PARAM F/U: HCPCS | Performed by: INTERNAL MEDICINE

## 2025-02-28 PROCEDURE — 1036F TOBACCO NON-USER: CPT | Performed by: INTERNAL MEDICINE

## 2025-02-28 PROCEDURE — 3074F SYST BP LT 130 MM HG: CPT | Performed by: INTERNAL MEDICINE

## 2025-02-28 PROCEDURE — 3017F COLORECTAL CA SCREEN DOC REV: CPT | Performed by: INTERNAL MEDICINE

## 2025-02-28 PROCEDURE — 1160F RVW MEDS BY RX/DR IN RCRD: CPT | Performed by: INTERNAL MEDICINE

## 2025-02-28 PROCEDURE — 3078F DIAST BP <80 MM HG: CPT | Performed by: INTERNAL MEDICINE

## 2025-02-28 PROCEDURE — 99214 OFFICE O/P EST MOD 30 MIN: CPT | Performed by: INTERNAL MEDICINE

## 2025-02-28 PROCEDURE — 1090F PRES/ABSN URINE INCON ASSESS: CPT | Performed by: INTERNAL MEDICINE

## 2025-02-28 PROCEDURE — 1123F ACP DISCUSS/DSCN MKR DOCD: CPT | Performed by: INTERNAL MEDICINE

## 2025-02-28 PROCEDURE — G8400 PT W/DXA NO RESULTS DOC: HCPCS | Performed by: INTERNAL MEDICINE

## 2025-02-28 RX ORDER — SPIRONOLACTONE 50 MG/1
50 TABLET, FILM COATED ORAL DAILY
Qty: 90 TABLET | Refills: 3 | Status: SHIPPED | OUTPATIENT
Start: 2025-02-28 | End: 2025-05-29

## 2025-02-28 RX ORDER — DULOXETIN HYDROCHLORIDE 60 MG/1
60 CAPSULE, DELAYED RELEASE ORAL DAILY
COMMUNITY

## 2025-02-28 NOTE — PROGRESS NOTES
to go to rehab ([facility]) 08/26/2024    GLUCOSE 108 02/23/2024      Hepatic:   Lab Results   Component Value Date    AST 9 12/27/2023    AST 20 12/24/2023    AST 16 09/08/2023    ALT 32 12/27/2023    ALT 59 12/24/2023    ALT 19 09/08/2023    BILITOT 0.2 (L) 12/27/2023    BILITOT 0.2 (L) 12/24/2023    BILITOT 0.2 (L) 09/08/2023    ALKPHOS 254 (H) 12/27/2023    ALKPHOS 310 (H) 12/24/2023    ALKPHOS 84 09/08/2023     BNP:   Lab Results   Component Value Date    BNP 14 09/17/2020     (H) 10/08/2019     (H) 10/08/2019     Lipids:   Lab Results   Component Value Date    CHOL 255 (H) 01/08/2025    HDL 39 01/08/2025     INR:   Lab Results   Component Value Date    INR 0.88 04/04/2020    INR 0.90 10/18/2019     URINE:   Lab Results   Component Value Date/Time    NAUR 25 12/25/2023 08:30 AM    PROTUR 6.2 12/26/2023 12:00 PM     Lab Results   Component Value Date/Time    NITRU NEGATIVE 12/25/2023 08:30 AM    COLORU YELLOW 12/25/2023 08:30 AM    PHUR 5.0 12/25/2023 08:30 AM    LABCAST NONE SEEN 12/25/2023 08:30 AM    LABCAST NONE SEEN 12/25/2023 08:30 AM    WBCUA 2-4 12/25/2023 08:30 AM    WBCUA 0-5 09/23/2014 08:15 AM    RBCUA 0-2 12/25/2023 08:30 AM    MUCUS Present 09/23/2014 08:15 AM    YEAST NONE SEEN 12/25/2023 08:30 AM    BACTERIA NONE SEEN 12/25/2023 08:30 AM    CLARITYU sl cloudy 10/25/2011 10:10 AM    SPECGRAV 1.015 10/25/2011 10:10 AM    LEUKOCYTESUR TRACE 12/25/2023 08:30 AM    LEUKOCYTESUR NEGATIVE 09/30/2019 12:07 AM    UROBILINOGEN 0.2 12/25/2023 08:30 AM    BILIRUBINUR NEGATIVE 12/25/2023 08:30 AM    BILIRUBINUR beg 10/25/2011 10:10 AM    BLOODU NEGATIVE 12/25/2023 08:30 AM    GLUCOSEU NEGATIVE 12/25/2023 08:30 AM    GLUCOSEU NEGATIVE 09/30/2019 12:07 AM    KETUA NEGATIVE 12/25/2023 08:30 AM      Microalbumen/Creatinine ratio:  No components found for: \"RUCREAT\"        Impression/Plan:   1.  CKD III due to cardiorenal syndrome.  Creatinine fluctuates due to diuretics but is overall stable. baseline 1.1-1.5 mg/dL.  to go to rehab

## 2025-03-25 ENCOUNTER — OFFICE VISIT (OUTPATIENT)
Dept: FAMILY MEDICINE CLINIC | Age: 75
End: 2025-03-25
Payer: MEDICARE

## 2025-03-25 VITALS
WEIGHT: 236.8 LBS | DIASTOLIC BLOOD PRESSURE: 70 MMHG | HEART RATE: 74 BPM | RESPIRATION RATE: 18 BRPM | BODY MASS INDEX: 41.95 KG/M2 | SYSTOLIC BLOOD PRESSURE: 124 MMHG

## 2025-03-25 DIAGNOSIS — R29.898 WEAKNESS OF RIGHT SHOULDER: ICD-10-CM

## 2025-03-25 DIAGNOSIS — M25.511 CHRONIC RIGHT SHOULDER PAIN: Primary | ICD-10-CM

## 2025-03-25 DIAGNOSIS — G89.29 CHRONIC RIGHT SHOULDER PAIN: Primary | ICD-10-CM

## 2025-03-25 DIAGNOSIS — M75.51 SUBACROMIAL BURSITIS OF RIGHT SHOULDER JOINT: ICD-10-CM

## 2025-03-25 PROCEDURE — 1090F PRES/ABSN URINE INCON ASSESS: CPT | Performed by: FAMILY MEDICINE

## 2025-03-25 PROCEDURE — G8427 DOCREV CUR MEDS BY ELIG CLIN: HCPCS | Performed by: FAMILY MEDICINE

## 2025-03-25 PROCEDURE — G2211 COMPLEX E/M VISIT ADD ON: HCPCS | Performed by: FAMILY MEDICINE

## 2025-03-25 PROCEDURE — G8400 PT W/DXA NO RESULTS DOC: HCPCS | Performed by: FAMILY MEDICINE

## 2025-03-25 PROCEDURE — 3078F DIAST BP <80 MM HG: CPT | Performed by: FAMILY MEDICINE

## 2025-03-25 PROCEDURE — 1159F MED LIST DOCD IN RCRD: CPT | Performed by: FAMILY MEDICINE

## 2025-03-25 PROCEDURE — G8417 CALC BMI ABV UP PARAM F/U: HCPCS | Performed by: FAMILY MEDICINE

## 2025-03-25 PROCEDURE — 3017F COLORECTAL CA SCREEN DOC REV: CPT | Performed by: FAMILY MEDICINE

## 2025-03-25 PROCEDURE — 1036F TOBACCO NON-USER: CPT | Performed by: FAMILY MEDICINE

## 2025-03-25 PROCEDURE — 99213 OFFICE O/P EST LOW 20 MIN: CPT | Performed by: FAMILY MEDICINE

## 2025-03-25 PROCEDURE — 1123F ACP DISCUSS/DSCN MKR DOCD: CPT | Performed by: FAMILY MEDICINE

## 2025-03-25 PROCEDURE — 3074F SYST BP LT 130 MM HG: CPT | Performed by: FAMILY MEDICINE

## 2025-03-25 RX ORDER — METHYLPREDNISOLONE 4 MG/1
TABLET ORAL
Qty: 1 KIT | Refills: 0 | Status: SHIPPED | OUTPATIENT
Start: 2025-03-25 | End: 2025-03-31

## 2025-03-25 SDOH — ECONOMIC STABILITY: FOOD INSECURITY: WITHIN THE PAST 12 MONTHS, THE FOOD YOU BOUGHT JUST DIDN'T LAST AND YOU DIDN'T HAVE MONEY TO GET MORE.: NEVER TRUE

## 2025-03-25 SDOH — ECONOMIC STABILITY: FOOD INSECURITY: WITHIN THE PAST 12 MONTHS, YOU WORRIED THAT YOUR FOOD WOULD RUN OUT BEFORE YOU GOT MONEY TO BUY MORE.: NEVER TRUE

## 2025-03-25 ASSESSMENT — PATIENT HEALTH QUESTIONNAIRE - PHQ9
10. IF YOU CHECKED OFF ANY PROBLEMS, HOW DIFFICULT HAVE THESE PROBLEMS MADE IT FOR YOU TO DO YOUR WORK, TAKE CARE OF THINGS AT HOME, OR GET ALONG WITH OTHER PEOPLE: NOT DIFFICULT AT ALL
SUM OF ALL RESPONSES TO PHQ QUESTIONS 1-9: 3
1. LITTLE INTEREST OR PLEASURE IN DOING THINGS: SEVERAL DAYS
9. THOUGHTS THAT YOU WOULD BE BETTER OFF DEAD, OR OF HURTING YOURSELF: NOT AT ALL
8. MOVING OR SPEAKING SO SLOWLY THAT OTHER PEOPLE COULD HAVE NOTICED. OR THE OPPOSITE, BEING SO FIGETY OR RESTLESS THAT YOU HAVE BEEN MOVING AROUND A LOT MORE THAN USUAL: NOT AT ALL
7. TROUBLE CONCENTRATING ON THINGS, SUCH AS READING THE NEWSPAPER OR WATCHING TELEVISION: NOT AT ALL
5. POOR APPETITE OR OVEREATING: NOT AT ALL
2. FEELING DOWN, DEPRESSED OR HOPELESS: SEVERAL DAYS
SUM OF ALL RESPONSES TO PHQ QUESTIONS 1-9: 3
SUM OF ALL RESPONSES TO PHQ QUESTIONS 1-9: 3
3. TROUBLE FALLING OR STAYING ASLEEP: NOT AT ALL
SUM OF ALL RESPONSES TO PHQ QUESTIONS 1-9: 3
4. FEELING TIRED OR HAVING LITTLE ENERGY: NOT AT ALL
6. FEELING BAD ABOUT YOURSELF - OR THAT YOU ARE A FAILURE OR HAVE LET YOURSELF OR YOUR FAMILY DOWN: SEVERAL DAYS

## 2025-03-25 ASSESSMENT — ENCOUNTER SYMPTOMS
RESPIRATORY NEGATIVE: 1
GASTROINTESTINAL NEGATIVE: 1

## 2025-03-25 NOTE — PROGRESS NOTES
Alison Mckeon (:  1950) is a 74 y.o. female,Established patient, here for evaluation of the following chief complaint(s):  Shoulder Pain (Right shoulder x 1 year worsen for the past month)          Subjective   SUBJECTIVE/OBJECTIVE:  HPI  Chief Complaint   Patient presents with    Shoulder Pain     Right shoulder x 1 year worsen for the past month     Pt presents today for intermittent right shoulder pain for the last year.  Worse over the last month.      She has noticed some weakness.  She has also noticed some numbness and tingling in the arm.    Denies neck pain.    Patient Active Problem List   Diagnosis    Hypothyroid    Dyslipidemia    Bipolar disorder (HCC)    Post-menopausal    HTN (hypertension)    Obesity (BMI 30-39.9)    RANDY on CPAP    ACE inhibitor-aggravated angioedema    Ascending aortic aneurysm    Chronic diastolic heart failure (HCC)    Thoracic aortic aneurysm without rupture    CKD (chronic kidney disease), stage II    Edema of lower extremity    Pneumonia due to infectious organism    Acute respiratory failure with hypoxia (HCC)    Multiple lung nodules on CT    Congestive heart failure (HCC)    Morbid obesity with BMI of 40.0-44.9, adult    Acquired spondylolisthesis    Artificial knee joint present    Deficiency of medial collateral ligament of right knee    Displacement of lumbar intervertebral disc without myelopathy    Lumbosacral radiculopathy    Other idiopathic scoliosis, thoracolumbar region    Spinal stenosis of lumbar region    SOB (shortness of breath)    Restrictive lung disease    Grade I diastolic dysfunction    Atelectasis    Dysphagia    Osteoarthritis of metacarpophalangeal (MCP) joint    Eosinophilia    Moderate persistent asthma with acute exacerbation    Hypereosinophilic syndrome    Stage 3b chronic kidney disease (HCC)       Current Outpatient Medications   Medication Sig Dispense Refill    methylPREDNISolone (MEDROL, ALVIN,) 4 MG tablet As directed 1 kit 0

## 2025-05-02 ENCOUNTER — HOSPITAL ENCOUNTER (OUTPATIENT)
Dept: MAMMOGRAPHY | Age: 75
Discharge: HOME | End: 2025-05-02
Payer: MEDICARE

## 2025-05-02 DIAGNOSIS — Z12.31 VISIT FOR SCREENING MAMMOGRAM: ICD-10-CM

## 2025-05-02 PROCEDURE — 77063 BREAST TOMOSYNTHESIS BI: CPT

## 2025-05-20 ENCOUNTER — OFFICE VISIT (OUTPATIENT)
Dept: CARDIOLOGY CLINIC | Age: 75
End: 2025-05-20
Payer: MEDICARE

## 2025-05-20 VITALS
OXYGEN SATURATION: 96 % | HEART RATE: 88 BPM | SYSTOLIC BLOOD PRESSURE: 102 MMHG | BODY MASS INDEX: 41.1 KG/M2 | DIASTOLIC BLOOD PRESSURE: 63 MMHG | WEIGHT: 232 LBS

## 2025-05-20 DIAGNOSIS — I10 ESSENTIAL HYPERTENSION: ICD-10-CM

## 2025-05-20 DIAGNOSIS — I50.32 CHRONIC DIASTOLIC HEART FAILURE (HCC): ICD-10-CM

## 2025-05-20 DIAGNOSIS — N18.2 CKD (CHRONIC KIDNEY DISEASE), STAGE II: ICD-10-CM

## 2025-05-20 PROCEDURE — 3017F COLORECTAL CA SCREEN DOC REV: CPT | Performed by: NURSE PRACTITIONER

## 2025-05-20 PROCEDURE — 3078F DIAST BP <80 MM HG: CPT | Performed by: NURSE PRACTITIONER

## 2025-05-20 PROCEDURE — G8427 DOCREV CUR MEDS BY ELIG CLIN: HCPCS | Performed by: NURSE PRACTITIONER

## 2025-05-20 PROCEDURE — 1090F PRES/ABSN URINE INCON ASSESS: CPT | Performed by: NURSE PRACTITIONER

## 2025-05-20 PROCEDURE — 99214 OFFICE O/P EST MOD 30 MIN: CPT | Performed by: NURSE PRACTITIONER

## 2025-05-20 PROCEDURE — G8417 CALC BMI ABV UP PARAM F/U: HCPCS | Performed by: NURSE PRACTITIONER

## 2025-05-20 PROCEDURE — 1036F TOBACCO NON-USER: CPT | Performed by: NURSE PRACTITIONER

## 2025-05-20 PROCEDURE — 1123F ACP DISCUSS/DSCN MKR DOCD: CPT | Performed by: NURSE PRACTITIONER

## 2025-05-20 PROCEDURE — 1159F MED LIST DOCD IN RCRD: CPT | Performed by: NURSE PRACTITIONER

## 2025-05-20 PROCEDURE — 3074F SYST BP LT 130 MM HG: CPT | Performed by: NURSE PRACTITIONER

## 2025-05-20 PROCEDURE — G8400 PT W/DXA NO RESULTS DOC: HCPCS | Performed by: NURSE PRACTITIONER

## 2025-05-20 RX ORDER — FERROUS SULFATE 325(65) MG
325 TABLET ORAL
COMMUNITY

## 2025-05-20 RX ORDER — HYDRALAZINE HYDROCHLORIDE 50 MG/1
50 TABLET, FILM COATED ORAL 3 TIMES DAILY
Qty: 30 TABLET | Refills: 0
Start: 2025-05-20

## 2025-05-20 ASSESSMENT — ENCOUNTER SYMPTOMS
SHORTNESS OF BREATH: 0
COUGH: 0
ABDOMINAL DISTENTION: 0

## 2025-05-20 NOTE — PATIENT INSTRUCTIONS
You may receive a survey regarding the care you received during your visit.  Your input is valuable to us.  We encourage you to complete and return your survey.  We hope you will choose us in the future for your healthcare needs.    Your nurses today were Irena Arizmendi and Joanna.  Office hours:   Mon-Thurs 8-4:30  Friday 8-12  Phone: [phone]    Continue:  Continue current medications  Daily weights and record  Fluid restriction of 2 Liters per day  Limit sodium in diet to around 8710-9246 mg/day  Monitor BP    Call the Heart Failure Clinic for any of the following symptoms:   Weight gain of 3 pounds in 1 day or 5 pounds in 1 week  Increased shortness of breath  Shortness of breath while laying down  Chest pain  Swelling in feet, ankles or legs  Bloating in abdomen  Fatigue

## 2025-05-20 NOTE — PROGRESS NOTES
Heart Failure Clinic       Visit Date: 5/20/2025  Cardiologist:  Dr. White  Primary Care Physician: Dr. Reddy, Gaurav Sharpe,     Alison Mckeon is a 74 y.o. female who presents today for:  Chief Complaint   Patient presents with    Check-Up     CHF       HPI:   Alison Mckeon is a 74 y.o. female who presents to the office for a follow up patient visit in the heart failure clinic.  Accompanied by , Errol     TYPE HF: HFpEF (EF 60%, grd 1)  Device: No  HX: HTN, obesity, thoracic AA, OA-knee surgery, RANDY, Bipolar  PFT 5/26/2021 - restriction with air trapping and normal DLCO    Hospitalization r/t Heart Failure:  Sept 2019 = ESCOTO, Edema, HTN.  Lasix IV.  BNP normal.  +CXR.  Discharge weight - 222#  Cath 10/18/19 - Nonobstructive CAD, PWCP 30 - received Lasix 80 IV in lab, changed to Bumex TID  OV [facility] 1/15/20 - Saw Dr Luciano.  No changes.  MEMs only if readmitted    2/6 - called w/ bloating - Metolazone x 2 doses ordered. Helped little, short term.   2/2023 - 253# - (242-245# baseline)  More bloated and SOB.  No ANTHONY.   Some constipation issues.    Walked from entrance.   Sun, Mon, Tues took Bumex 2 mg/day - didn't do much.   States Bumex not seem work as well.   No change in diet, lifestyle.      5/2023 =   256#  Home BPs little higher = 140-150s   Feeling good overall.   Still frustrated w/ no wt loss.  No fluid on exam.     5/2024 - 241#  Walked from [employer] - did well  Not feel like holding onto fluid   None on exam.   Doing well overall.        TODAY 5/2025 - 232#  Biggest c/o is SOB/ESCOTO.  Fatigue, naps most days - overall no worse than last year though  Walked from [employer].   Volunteering - staying pretty active.   Contributes most of fatigue to multiple psych meds  No fluid issues.    Very compliant w/ CPAP  BPs at home 120-130s    Past Medical History:   Diagnosis Date    Acid reflux     Aneurysm     Arthritis     Asthma     Bipolar 1 disorder (HCC)     CHF (congestive heart failure) ([facility])

## 2025-05-29 DIAGNOSIS — N18.2 CKD (CHRONIC KIDNEY DISEASE), STAGE II: ICD-10-CM

## 2025-05-29 DIAGNOSIS — I10 ESSENTIAL HYPERTENSION: ICD-10-CM

## 2025-05-29 RX ORDER — HYDRALAZINE HYDROCHLORIDE 50 MG/1
50 TABLET, FILM COATED ORAL 3 TIMES DAILY
Qty: 270 TABLET | Refills: 4 | Status: SHIPPED | OUTPATIENT
Start: 2025-05-29

## 2025-07-15 NOTE — TELEPHONE ENCOUNTER
Pulse 68-76  bp in am prior to meds 150/70  Pm prior to meds 180/80  Am post med 125/60  110/65      Patient wanted to know if you want repeat labs anytime? w/ RW/good balance

## 2025-08-26 RX ORDER — TORSEMIDE 20 MG/1
40 TABLET ORAL DAILY
Qty: 180 TABLET | Refills: 1 | Status: SHIPPED | OUTPATIENT
Start: 2025-08-26